# Patient Record
Sex: FEMALE | Race: BLACK OR AFRICAN AMERICAN | NOT HISPANIC OR LATINO | Employment: FULL TIME | URBAN - METROPOLITAN AREA
[De-identification: names, ages, dates, MRNs, and addresses within clinical notes are randomized per-mention and may not be internally consistent; named-entity substitution may affect disease eponyms.]

---

## 2017-04-08 ENCOUNTER — OFFICE VISIT (OUTPATIENT)
Dept: URGENT CARE | Facility: CLINIC | Age: 52
End: 2017-04-08
Payer: COMMERCIAL

## 2017-04-08 VITALS
WEIGHT: 233 LBS | BODY MASS INDEX: 38.82 KG/M2 | DIASTOLIC BLOOD PRESSURE: 66 MMHG | SYSTOLIC BLOOD PRESSURE: 122 MMHG | TEMPERATURE: 98 F | HEIGHT: 65 IN

## 2017-04-08 DIAGNOSIS — J45.20 ALLERGIC ASTHMA, MILD INTERMITTENT, UNCOMPLICATED: Primary | ICD-10-CM

## 2017-04-08 PROCEDURE — 1160F RVW MEDS BY RX/DR IN RCRD: CPT | Mod: S$GLB,,, | Performed by: PHYSICIAN ASSISTANT

## 2017-04-08 PROCEDURE — 99999 PR PBB SHADOW E&M-EST. PATIENT-LVL III: CPT | Mod: PBBFAC,,, | Performed by: PHYSICIAN ASSISTANT

## 2017-04-08 PROCEDURE — 99213 OFFICE O/P EST LOW 20 MIN: CPT | Mod: S$GLB,,, | Performed by: PHYSICIAN ASSISTANT

## 2017-04-08 RX ORDER — METHYLPREDNISOLONE 4 MG/1
TABLET ORAL
Qty: 1 PACKAGE | Refills: 0 | Status: SHIPPED | OUTPATIENT
Start: 2017-04-08 | End: 2018-03-02

## 2017-04-08 RX ORDER — LORATADINE 10 MG
10 TABLET,DISINTEGRATING ORAL DAILY
Qty: 30 TABLET | Refills: 11 | Status: SHIPPED | OUTPATIENT
Start: 2017-04-08 | End: 2023-05-10

## 2017-04-08 RX ORDER — ALBUTEROL SULFATE 90 UG/1
2 AEROSOL, METERED RESPIRATORY (INHALATION) EVERY 6 HOURS PRN
Qty: 18 G | Refills: 0 | Status: SHIPPED | OUTPATIENT
Start: 2017-04-08 | End: 2019-01-04

## 2017-04-08 RX ORDER — MONTELUKAST SODIUM 10 MG/1
10 TABLET ORAL NIGHTLY
Qty: 30 TABLET | Refills: 0 | Status: SHIPPED | OUTPATIENT
Start: 2017-04-08 | End: 2017-05-08

## 2017-04-08 RX ORDER — AZELASTINE HYDROCHLORIDE, FLUTICASONE PROPIONATE 137; 50 UG/1; UG/1
1 SPRAY, METERED NASAL 2 TIMES DAILY
Qty: 23 G | Refills: 1 | Status: SHIPPED | OUTPATIENT
Start: 2017-04-08 | End: 2017-04-22

## 2017-04-08 NOTE — MR AVS SNAPSHOT
[facility]  [address] [facility] [address] [place] [address]  Phone: [phone]  Fax: [phone]                  Jake Hoskins   2017 9:10 AM   Office Visit    Description:  Female : 1965   Provider:  Bulmaro Villagomez Jr., PAMarlineC   Department:  [facility]           Reason for Visit     Cough           Diagnoses this Visit        Comments    Allergic asthma, mild intermittent, uncomplicated    -  Primary            To Do List           Goals (5 Years of Data)     None       These Medications        Disp Refills Start End    azelastine-fluticasone 137-50 mcg/spray Spry nassal spray 23 g 1 2017    1 spray by Each Nare route 2 (two) times daily. - Each Nare    Pharmacy: [facility] [address] [place] [address] Ph #: [phone]       methylPREDNISolone (MEDROL DOSEPACK) 4 mg tablet 1 Package 0 2017     As directed    Pharmacy: [address] Ph #: [phone]       montelukast (SINGULAIR) 10 mg tablet 30 tablet 0 2017    Take 1 tablet (10 mg total) by mouth every evening. - Oral    Pharmacy: [facility] [address] [place] [address] Ph #: [phone]       loratadine (CLARITIN REDITABS) 10 mg dissolvable tablet 30 tablet 11 2017    Take 1 tablet (10 mg total) by mouth once daily. - Oral    Pharmacy: [facility] [address] [place] [address] Ph #: [phone]       albuterol 90 mcg/actuation inhaler 18 g 0 2017    Inhale 2 puffs into the lungs every 6 (six) hours as needed for Wheezing. - Inhalation    Pharmacy: [address] Ph #: [phone]         [facility]     [facility] Care [facility] -  Assistance  Unless otherwise directed by your provider, please contact [facility], our nurse care line that is available for  assistance.     Registered nurses in the [facility]  provide: appointment scheduling, clinical advisement, health education, and other advisory services.  Call: [phone] (toll free)               Medications           Message regarding Medications     Verify the changes and/or additions to your medication regime listed below are the same as discussed with your clinician today.  If any of these changes or additions are incorrect, please notify your healthcare provider.        START taking these NEW medications        Refills    azelastine-fluticasone 137-50 mcg/spray Spry nassal spray 1    Si spray by Each Nare route 2 (two) times daily.    Class: Normal    Route: Each Nare    methylPREDNISolone (MEDROL DOSEPACK) 4 mg tablet 0    Sig: As directed    Class: Normal    montelukast (SINGULAIR) 10 mg tablet 0    Sig: Take 1 tablet (10 mg total) by mouth every evening.    Class: Normal    Route: Oral    loratadine (CLARITIN REDITABS) 10 mg dissolvable tablet 11    Sig: Take 1 tablet (10 mg total) by mouth once daily.    Class: Normal    Route: Oral    albuterol 90 mcg/actuation inhaler 0    Sig: Inhale 2 puffs into the lungs every 6 (six) hours as needed for Wheezing.    Class: Normal    Route: Inhalation      STOP taking these medications     loratadine (CLARITIN) 10 mg tablet Take 10 mg by mouth daily as needed.            Verify that the below list of medications is an accurate representation of the medications you are currently taking.  If none reported, the list may be blank. If incorrect, please contact your healthcare provider. Carry this list with you in case of emergency.           Current Medications     ergocalciferol (ERGOCALCIFEROL) 50,000 unit Cap Take 1 capsule (50,000 Units total) by mouth every 7 days.    FERROUS SULFATE, DRIED (IRON, DRIED, ORAL) Take by mouth once daily.     ibuprofen (ADVIL,MOTRIN) 200 MG tablet Take 400 mg by mouth every 6 (six) hours as needed for Pain.    meloxicam (MOBIC) 7.5 MG tablet Take 1 tablet (7.5 mg total) by mouth  "once daily. Take 2 pills for 12 days and then only take as needed for pain.    MULTIVITS,CA,MINERALS/IRON/FA (ONE-A-DAY WOMENS FORMULA ORAL) Take by mouth once daily.     albuterol 90 mcg/actuation inhaler Inhale 2 puffs into the lungs every 6 (six) hours as needed for Wheezing.    azelastine-fluticasone 137-50 mcg/spray Spry nassal spray 1 spray by Each Nare route 2 (two) times daily.    loratadine (CLARITIN REDITABS) 10 mg dissolvable tablet Take 1 tablet (10 mg total) by mouth once daily.    methylPREDNISolone (MEDROL DOSEPACK) 4 mg tablet As directed    montelukast (SINGULAIR) 10 mg tablet Take 1 tablet (10 mg total) by mouth every evening.           Clinical Reference Information           Your Vitals Were     BP Temp Height Weight BMI    122/66 98.1 °F (36.7 °C) 5' 4.8" (1.646 m) 105.7 kg (233 lb 0.4 oz) 39.02 kg/m2      Blood Pressure          Most Recent Value    BP  122/66      Allergies as of 4/8/2017     Tramadol      Immunizations Administered on Date of Encounter - 4/8/2017     None      [facility] Sign-Up     Activating your [facility] account is as easy as 1-2-3!     1) Visit [email or web address], select Sign Up Now, enter this activation code and your date of birth, then select Next.  [email or web address]  Expires: 5/23/2017  9:38 AM      2) Create a username and password to use when you visit [facility] in the future and select a security question in case you lose your password and select Next.    3) Enter your e-mail address and click Sign Up!    Additional Information  If you have questions, please e-mail [email or web address] or call [phone] to talk to our [facility] staff. Remember, [facility] is NOT to be used for urgent needs. For medical emergencies, dial 911.         Language Assistance Services     ATTENTION: Language assistance services are available, free of charge. Please call [phone].      ATENCIÓN: Si dmitri milligan, tiene a corado disposición servicios gratuitos de asistencia lingüística. " Ángel villeda [phone].     ANDRA Ý: N?u b?n nói Ti?ng Vi?t, có các d?ch v? h? tr? ngôn ng? mi?n phí dành cho b?n. G?i s? [phone].         [facility] complies with applicable Federal civil rights laws and does not discriminate on the basis of race, color, national origin, age, disability, or sex.

## 2017-04-08 NOTE — PROGRESS NOTES
"Subjective:      Patient ID: Jake Hoskins is a 52 y.o. female.    Chief Complaint: Cough (conge)    HPI Comments: Mrs. Hoskins is a 52-year-old female presenting to the clinic with seasonal ALLERGIC rhinitis with increased coughing, eye irritation, wheezing, and clear rhinorrhea.    History of present illness reveals patient was in her usual state of health until earlier this month when her symptoms started mildly.  They have progressively gotten worse with coughing which increases in the supine position, excessive rhinorrhea, itchy watery eyes, and a scratchy sore throat.  Pertinent negatives include no fever, no thick discolored mucus, no chest pain or shortness of breath.    Patient does have a history of chronic seasonal ALLERGIES and does use Flonase and Claritin on a regular basis which has not been controlling her symptoms lately.      Review of Systems   HENT: Positive for congestion and postnasal drip.    Eyes: Positive for itching.   Respiratory: Positive for cough and wheezing.         She also states that she can't laugh without having a significant spasm of coughing.        Review of patient's allergies indicates:   Allergen Reactions    Tramadol Nausea And Vomiting       Past Medical History:   Diagnosis Date    Anemia     Plantar fasciitis of left foot        Objective:   /66  Temp 98.1 °F (36.7 °C)  Ht 5' 4.8" (1.646 m)  Wt 105.7 kg (233 lb 0.4 oz)  BMI 39.02 kg/m2    Physical Exam   Constitutional: She is oriented to person, place, and time. She appears well-developed and well-nourished.   HENT:   Head: Normocephalic.   Eyes: EOM are normal. Pupils are equal, round, and reactive to light. Right eye exhibits no discharge. Left eye exhibits no discharge. No scleral icterus.   Sclera is hyperemic, there is no discharge excessive tearing.   Neck: Normal range of motion. Neck supple. No JVD present. No tracheal deviation present. No thyromegaly present.   Cardiovascular: Normal rate, " regular rhythm and normal heart sounds.    No murmur heard.  Pulmonary/Chest: Effort normal. No stridor. She has wheezes.   Abdominal: Soft. Bowel sounds are normal. She exhibits no mass.   Musculoskeletal: Normal range of motion. She exhibits no edema.   Lymphadenopathy:     She has no cervical adenopathy.   Neurological: She is alert and oriented to person, place, and time. She exhibits normal muscle tone.   Skin: Skin is warm and dry.   Psychiatric: She has a normal mood and affect.   Nursing note and vitals reviewed.    Assessment:     1. Allergic asthma, mild intermittent, uncomplicated       Plan:   Jake Hoskins is seen today for   1. Allergic asthma, mild intermittent, uncomplicated      We have discussed the etiology and treatment options associated with the diagnosis as well as alternatives. She has elected the following treatments.     Allergic asthma, mild intermittent, uncomplicated  -     azelastine-fluticasone 137-50 mcg/spray Spry nassal spray; 1 spray by Each Nare route 2 (two) times daily.  Dispense: 23 g; Refill: 1  -     methylPREDNISolone (MEDROL DOSEPACK) 4 mg tablet; As directed  Dispense: 1 Package; Refill: 0  -     montelukast (SINGULAIR) 10 mg tablet; Take 1 tablet (10 mg total) by mouth every evening.  Dispense: 30 tablet; Refill: 0  -     loratadine (CLARITIN REDITABS) 10 mg dissolvable tablet; Take 1 tablet (10 mg total) by mouth once daily.  Dispense: 30 tablet; Refill: 11  -     albuterol 90 mcg/actuation inhaler; Inhale 2 puffs into the lungs every 6 (six) hours as needed for Wheezing.  Dispense: 18 g; Refill: 0        The patient was explained the above plan and given opportunity to ask questions. She understands, chooses and consents to this plan and accepts all the risks, which include but are not limited to the risks mentioned above. She understands the alternative of having no testing, interventions or treatments at this time. She left content and without further questions.

## 2017-08-21 ENCOUNTER — PATIENT OUTREACH (OUTPATIENT)
Dept: ADMINISTRATIVE | Facility: HOSPITAL | Age: 52
End: 2017-08-21

## 2017-08-21 NOTE — LETTER
August 21, 2017    Jake Hoskins  [address]   [address]  [facility] [address]             [facility]  [address]  [facility] [address]  Phone: [phone] Dear Jake Hoskins     In the spirit of maintaining your good health, our system indicates that you have not been seen in the office in over 12 months and due for a visit.     Our system indicates that you are due for the following:   Health Maintenance Due   Topic Date Due    Colonoscopy  11/20/2014    Mammogram  02/21/2015    Pap Smear with HPV Cotest  02/21/2017    Influenza Vaccine  08/01/2017       Leonie Oropeza MD would like you to schedule an appointment for an annual exam at your earliest convenience. If you have completed any of these health maintenance requirements at an outside facility, please contact our office so we may update your health record.    If your PRIMARY CARE PHYSICIAN has changed please contact your insurance company to reflect the correct physician.    If you have any issues or need assistance in scheduling this appointment, please call [phone]. Thank you for choosing [facility] for all your health care needs.     Gaby GRIJALVA LPN Care Coordinator  [facility]  [phone]

## 2018-03-02 ENCOUNTER — OFFICE VISIT (OUTPATIENT)
Dept: URGENT CARE | Facility: CLINIC | Age: 53
End: 2018-03-02
Payer: COMMERCIAL

## 2018-03-02 VITALS
TEMPERATURE: 98 F | BODY MASS INDEX: 41.52 KG/M2 | HEIGHT: 64 IN | HEART RATE: 68 BPM | DIASTOLIC BLOOD PRESSURE: 74 MMHG | OXYGEN SATURATION: 98 % | SYSTOLIC BLOOD PRESSURE: 133 MMHG | WEIGHT: 243.19 LBS

## 2018-03-02 DIAGNOSIS — J30.1 ACUTE SEASONAL ALLERGIC RHINITIS DUE TO POLLEN: Primary | ICD-10-CM

## 2018-03-02 DIAGNOSIS — R05.9 COUGH: ICD-10-CM

## 2018-03-02 PROCEDURE — 99214 OFFICE O/P EST MOD 30 MIN: CPT | Mod: S$GLB,,, | Performed by: NURSE PRACTITIONER

## 2018-03-02 PROCEDURE — 99999 PR PBB SHADOW E&M-EST. PATIENT-LVL III: CPT | Mod: PBBFAC,,, | Performed by: NURSE PRACTITIONER

## 2018-03-02 RX ORDER — FLUTICASONE PROPIONATE 50 MCG
2 SPRAY, SUSPENSION (ML) NASAL DAILY
Qty: 16 G | Refills: 1 | Status: SHIPPED | OUTPATIENT
Start: 2018-03-02 | End: 2019-07-05 | Stop reason: SDUPTHER

## 2018-03-02 RX ORDER — MONTELUKAST SODIUM 10 MG/1
10 TABLET ORAL NIGHTLY
Qty: 30 TABLET | Refills: 1 | Status: SHIPPED | OUTPATIENT
Start: 2018-03-02 | End: 2018-04-01

## 2018-03-02 RX ORDER — PROMETHAZINE HYDROCHLORIDE AND DEXTROMETHORPHAN HYDROBROMIDE 6.25; 15 MG/5ML; MG/5ML
5 SYRUP ORAL
Qty: 180 ML | Refills: 0 | Status: SHIPPED | OUTPATIENT
Start: 2018-03-02 | End: 2018-03-12

## 2018-03-02 NOTE — PATIENT INSTRUCTIONS
PLAN:   Advise increase p.o. fluids--at least 64 ounces of water/juice & rest  Meds: Flonase with Sensmist & Phenergan dm  Simple saline nasal wash  to irrigate sinuses and for congestion/runny nose.  Cool mist humidifier/vaporizer.  Practice good handwashing.  Mucinex for cough and chest congestion.  Tylenol or Ibuprofen for fever, headache and body aches.  Warm salt water gargles for throat comfort.  Chloraseptic spray or lozenges for throat comfort.  Advise follow with PCP  Advise go to ER if symptoms worsen or fail to improve with treatment.  Given work excuse

## 2018-03-02 NOTE — PROGRESS NOTES
CHIEF COMPLAINT/REASON FOR VISIT: nasal congestion, post nasal drip, sore throat, facial pressure    HISTORY OF PRESENT ILLNESS:  52 y/o female complains of  sneezing, runny nose, nasal congestion, postnasal drip of clear to yellow mucus with cough onset 1 week ago. Patient admits tried medications with little relief. Patient admits has bad allergies. Admits every year has increased allergies at this time. Patient denies shortness of breath, chest pain, dizziness, headache, ear pain, nausea, vomiting, diarrhea & no body aches.     Past Medical History:   Diagnosis Date    Anemia     Plantar fasciitis of left foot          .History reviewed. No pertinent surgical history.      Social History     Social History    Marital status:      Spouse name: N/A    Number of children: 2    Years of education: N/A     Occupational History    [employer]     Social History Main Topics    Smoking status: Never Smoker    Smokeless tobacco: Not on file    Alcohol use No    Drug use: No    Sexual activity: No     Other Topics Concern    Not on file     Social History Narrative    No narrative on file       Family History   Problem Relation Age of Onset    Hypertension Mother     Asthma Mother     Cancer Mother      unknown    Hypertension Father     Heart disease Father     Hypertension Sister          ROS:  GENERAL: reports no fever, chills.  SKIN: No rashes, itching or changes in color or texture of skin.  HEENT: reports sneezing, runny nose, nasal congestion, postnasal drip of clear to yellow mucus  CHEST: report cough with clear to yellow mucus.  CARDIOVASCULAR: Denies chest pain, shortness of breath  ABDOMEN: Appetite fine. No weight loss. .  MUSCULOSKELETAL: No joint stiffness or swelling. Denies back pain.  NEUROLOGIC: No history of seizures, paralysis, alteration of gait or coordination.  PSYCHIATRIC: Denies mood swings, depression or suicidal thoughts.    PE:   APPEARANCE: Well nourished, well  developed, in mild distress.   V/S: Reviewed.  SKIN: Normal skin turgor, no lesions.  HEENT: Turbinates pink,  minimal red pharynx, TM's poor light reflex bilaterally, no facial tenderness. Bilateral sclera injected, no d/c, no soft tissue swelling  CHEST: Lungs clear to auscultation. no wheezing  CARDIOVASCULAR: Regular rate and rhythm.  MUSCULOSKELETAL: Motor: 5/5 strength major flexors/extensors.  NEUROLOGIC: No sensory deficits. Gait & Posture: Normal, No cerebellar signs.  MENTAL STATUS: Patient alert, oriented x 3 & conversant.    PLAN:   Advise increase p.o. fluids--at least 64 ounces of water/juice & rest  Med's: Flonase with Sensmist, Singulair & Phenergan dm  Simple saline nasal wash  to irrigate sinuses and for congestion/runny nose.  Cool mist humidifier/vaporizer.  Practice good handwashing.  Mucinex for cough and chest congestion.  Tylenol or Ibuprofen for fever, headache and body aches.  Warm salt water gargles for throat comfort.  Chloraseptic spray or lozenges for throat comfort.  Advise follow with PCP  Advise go to ER if symptoms worsen or fail to improve with treatment.  Given work excuse    DIAGNOSIS:  Allergic rhinitis  Cough

## 2018-05-10 ENCOUNTER — OFFICE VISIT (OUTPATIENT)
Dept: PODIATRY | Facility: CLINIC | Age: 53
End: 2018-05-10
Payer: COMMERCIAL

## 2018-05-10 ENCOUNTER — HOSPITAL ENCOUNTER (OUTPATIENT)
Dept: RADIOLOGY | Facility: HOSPITAL | Age: 53
Discharge: HOME | End: 2018-05-10
Attending: PODIATRIST
Payer: COMMERCIAL

## 2018-05-10 VITALS — WEIGHT: 242.5 LBS | BODY MASS INDEX: 41.4 KG/M2 | HEIGHT: 64 IN

## 2018-05-10 DIAGNOSIS — M79.672 LEFT FOOT PAIN: Primary | ICD-10-CM

## 2018-05-10 DIAGNOSIS — M79.672 LEFT FOOT PAIN: ICD-10-CM

## 2018-05-10 DIAGNOSIS — M72.2 PLANTAR FASCIITIS: Primary | ICD-10-CM

## 2018-05-10 DIAGNOSIS — M62.462 GASTROCNEMIUS EQUINUS OF LEFT LOWER EXTREMITY: ICD-10-CM

## 2018-05-10 PROCEDURE — 73630 X-RAY EXAM OF FOOT: CPT | Mod: 26,LT,, | Performed by: RADIOLOGY

## 2018-05-10 PROCEDURE — 99999 PR PBB SHADOW E&M-EST. PATIENT-LVL II: CPT | Mod: PBBFAC,,, | Performed by: PODIATRIST

## 2018-05-10 PROCEDURE — 73630 X-RAY EXAM OF FOOT: CPT | Mod: TC,FY,PO,LT

## 2018-05-10 PROCEDURE — 3008F BODY MASS INDEX DOCD: CPT | Mod: CPTII,S$GLB,, | Performed by: PODIATRIST

## 2018-05-10 PROCEDURE — 99214 OFFICE O/P EST MOD 30 MIN: CPT | Mod: S$GLB,,, | Performed by: PODIATRIST

## 2018-05-10 RX ORDER — AZELASTINE HYDROCHLORIDE AND FLUTICASONE PROPIONATE 137; 50 UG/1; UG/1
SPRAY, METERED NASAL
COMMUNITY
Start: 2018-02-23 | End: 2018-07-13

## 2018-05-10 RX ORDER — METHYLPREDNISOLONE 4 MG/1
TABLET ORAL
Qty: 1 PACKAGE | Refills: 0 | Status: SHIPPED | OUTPATIENT
Start: 2018-05-10 | End: 2018-05-31

## 2018-05-10 NOTE — PROGRESS NOTES
PODIATRY  NOTE    CHIEF COMPLAINT  Chief Complaint   Patient presents with    Plantar Fasciitis     Left foot plantar fasciitis pain. Has been stretching and icing without relief. Pain 10/10 at worst       HPI  Jake Hoskins is a 53 y.o. female who complains of pain to the LEFT heel. Pt has been treated for this in the past. She has a 3 year history of left foot plantar fasciitis.   Pain is described as achy.  Pain is worst in the morning and after periods of sitting down and then getting up and walking again.   Pain is rated to be 10/10 by the patient on a 1-10 scale.   Pain has been present for >3 years  Pain is worsening.  Pain is aggravated by weight bearing.  Pain is improved by rest.  Previous treatments include: NSAIDs which resolved symptoms. She is currently wearing inserts and new balance. 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      PMH  Past Medical History:   Diagnosis Date    Anemia     Plantar fasciitis of left foot         PSH  History reviewed. No pertinent surgical history.     MEDS  Current Outpatient Prescriptions on File Prior to Visit   Medication Sig Dispense Refill    albuterol 90 mcg/actuation inhaler Inhale 2 puffs into the lungs every 6 (six) hours as needed for Wheezing. 18 g 0    ergocalciferol (ERGOCALCIFEROL) 50,000 unit Cap Take 1 capsule (50,000 Units total) by mouth every 7 days. 10 capsule 0    FERROUS SULFATE, DRIED (IRON, DRIED, ORAL) Take by mouth once daily.       fluticasone (FLONASE) 50 mcg/actuation nasal spray 2 sprays (100 mcg total) by Each Nare route once daily. 16 g 1    ibuprofen (ADVIL,MOTRIN) 200 MG tablet Take 400 mg by mouth every 6 (six) hours as needed for Pain.      loratadine (CLARITIN REDITABS) 10 mg dissolvable tablet Take 1 tablet (10 mg total) by mouth once daily. 30 tablet 11    MULTIVITS,CA,MINERALS/IRON/FA (ONE-A-DAY WOMENS FORMULA ORAL) Take by mouth once daily.       meloxicam (MOBIC) 7.5 MG tablet Take 1 tablet (7.5 mg  total) by mouth once daily. Take 2 pills for 12 days and then only take as needed for pain. 30 tablet 1     No current facility-administered medications on file prior to visit.       Medication List with Changes/Refills   Current Medications    ALBUTEROL 90 MCG/ACTUATION INHALER    Inhale 2 puffs into the lungs every 6 (six) hours as needed for Wheezing.    DYMISTA 137-50 MCG/SPRAY SPRY NASSAL SPRAY        ERGOCALCIFEROL (ERGOCALCIFEROL) 50,000 UNIT CAP    Take 1 capsule (50,000 Units total) by mouth every 7 days.    FERROUS SULFATE, DRIED (IRON, DRIED, ORAL)    Take by mouth once daily.     FLUTICASONE (FLONASE) 50 MCG/ACTUATION NASAL SPRAY    2 sprays (100 mcg total) by Each Nare route once daily.    IBUPROFEN (ADVIL,MOTRIN) 200 MG TABLET    Take 400 mg by mouth every 6 (six) hours as needed for Pain.    LORATADINE (CLARITIN REDITABS) 10 MG DISSOLVABLE TABLET    Take 1 tablet (10 mg total) by mouth once daily.    MELOXICAM (MOBIC) 7.5 MG TABLET    Take 1 tablet (7.5 mg total) by mouth once daily. Take 2 pills for 12 days and then only take as needed for pain.    MULTIVITS,CA,MINERALS/IRON/FA (ONE-A-DAY WOMENS FORMULA ORAL)    Take by mouth once daily.        ALLG  Review of patient's allergies indicates:   Allergen Reactions    Tramadol Nausea And Vomiting       SOCIAL Hx  Social History     Social History    Marital status:      Spouse name: N/A    Number of children: 2    Years of education: N/A     Occupational History    [employer]     Social History Main Topics    Smoking status: Never Smoker    Smokeless tobacco: Never Used    Alcohol use No    Drug use: No    Sexual activity: No     Other Topics Concern    None     Social History Narrative    None       FAM Hx  Family History   Problem Relation Age of Onset    Hypertension Mother     Asthma Mother     Cancer Mother         unknown    Hypertension Father     Heart disease Father     Hypertension Sister      REVIEW OF SYSTEMS  "  General: This patient is well-developed, well-nourished and appears stated age, well-oriented to person, place and time, and cooperative and pleasant on today's visit  Constitutional: Negative for chills and fever.   Respiratory: Negative for shortness of breath.    Cardiovascular: Negative for chest pain, palpitations, orthopnea  Gastrointestinal: Negative for diarrhea, nausea and vomiting.   Musculoskeletal: Positive for above noted in HPI  Skin: Negative for rash, skin, or nail changes   Neurological: Negative for tingling and sensory changes  Peripheral Vascular: no claudication or cyanosis  Psychiatric/Behavioral: Negative for altered mental status     OBJECTIVE:  Vitals:    05/10/18 0807   Weight: 110 kg (242 lb 8.1 oz)   Height: 5' 4" (1.626 m)   PainSc: 0-No pain     Lower Extremity Physical Exam     Vascular exam:   · Dorsalis pedis and posterior tibial pulses palpable 2/4 bilaterally.   · Capillary refill time immediate to the toes.   · Feet are warm to the touch.   · There are no varicosities, telangiectasias noted to bilateral foot and ankle regions.   · There are no ecchymoses noted to bilateral foot and ankle regions.   · There is no gross lower extremity edema.     Dermatologic exam:   · Skin moist with healthy texture and turgor.  · There are no open ulcerations, lacerations, or fissures to bilateral foot and ankle regions. There are no signs of infection as there is no erythema, no proximal-extending lymphangiitis, no fluctuance, or crepitus noted on palpation to bilateral foot and ankle regions.   · There is no interdigital maceration.   · There are no hyperkeratotic lesions noted to feet. Nails are well-trimmed.     Neurologic exam:  ·  Epicritic sensation is intact as the patient is able to sense light touch to bilateral foot and ankle regions.   · There is a negative Tinel's sign on percussion of the tibial nerve, dorsal cutaneous nerves, sural nerves of the LEFT foot.     Musculoskeletal " exam:   · Pain on palpation plantar medial tubercle of calcaneus, LEFT foot. No pain along plantar fascia bilaterally.   · Maximally pronated foot type  · RCSP eversion  · No pain with medial-lateral compression of calcaneus.  · 5/5 muscle strength in all 4 quadrants.         ASSESSMENT:   Plantar fasciitis - Left Foot    Gastrocnemius equinus of left lower extremity    Other orders  -     methylPREDNISolone (MEDROL DOSEPACK) 4 mg tablet; use as directed  Dispense: 1 Package; Refill: 0           PLAN:   1. The patient was examined and evaluated   2. Treatment options were discussed in detail; the patient elected to undergo conservative treatment. Discussed different treatment options for heel pain.   3. I gave written and verbal instructions on heel cord stretching and this was demonstrated for the patient. A theraband was also provided to the patient for stretching exercises. Recommendations were given for plantar fasciitis treatment including icing, stretching, arch supports, avoidance of barefoot walking, appropriate shoe wear, and strict compliance. Discussed importance of patient to perform stretching exercises.   4. Continue with accommodative insert and new balance shoe. Will benefit from Power steps.  I Discussed that plantar fasciitis typically resolves on its own in a variable amount of time. If no improvement, will proceed with cortisone injection. I also  discussed possible tx with SLC, PT, Dynasplint and custom orthotics.Surgical intervention is the last resort for plantar fasciitis.   5. Information was given regarding the patient's condition and prognosis. All the patient's questions were answered.   6. Return to clinic in 4-6 weeks for follow up evaluation, or sooner if symptoms worsen.   7. The patient is welcome to call or email with any questions or concerns at any time.     Report Electronically Signed By:  Kaylee Florian DPM   Podiatric Medicine & Surgery  [facility]  [place]  5/10/2018  8:39 AM

## 2018-06-15 ENCOUNTER — LAB VISIT (OUTPATIENT)
Dept: LAB | Facility: HOSPITAL | Age: 53
End: 2018-06-15
Attending: FAMILY MEDICINE
Payer: COMMERCIAL

## 2018-06-15 ENCOUNTER — OFFICE VISIT (OUTPATIENT)
Dept: INTERNAL MEDICINE | Facility: CLINIC | Age: 53
End: 2018-06-15
Payer: COMMERCIAL

## 2018-06-15 VITALS
SYSTOLIC BLOOD PRESSURE: 122 MMHG | OXYGEN SATURATION: 99 % | DIASTOLIC BLOOD PRESSURE: 84 MMHG | WEIGHT: 242.94 LBS | HEART RATE: 83 BPM | BODY MASS INDEX: 41.48 KG/M2 | TEMPERATURE: 98 F | RESPIRATION RATE: 18 BRPM | HEIGHT: 64 IN

## 2018-06-15 DIAGNOSIS — I10 ESSENTIAL HYPERTENSION: Primary | ICD-10-CM

## 2018-06-15 DIAGNOSIS — Z12.39 SCREENING FOR BREAST CANCER: ICD-10-CM

## 2018-06-15 DIAGNOSIS — Z00.00 WELLNESS EXAMINATION: ICD-10-CM

## 2018-06-15 DIAGNOSIS — Z12.11 SCREEN FOR COLON CANCER: ICD-10-CM

## 2018-06-15 DIAGNOSIS — Z23 NEED FOR VACCINATION: ICD-10-CM

## 2018-06-15 DIAGNOSIS — D50.9 IRON DEFICIENCY ANEMIA, UNSPECIFIED IRON DEFICIENCY ANEMIA TYPE: ICD-10-CM

## 2018-06-15 DIAGNOSIS — I10 ESSENTIAL HYPERTENSION: ICD-10-CM

## 2018-06-15 LAB
ANION GAP SERPL CALC-SCNC: 7 MMOL/L
BUN SERPL-MCNC: 12 MG/DL
CALCIUM SERPL-MCNC: 9.8 MG/DL
CHLORIDE SERPL-SCNC: 106 MMOL/L
CO2 SERPL-SCNC: 28 MMOL/L
CREAT SERPL-MCNC: 0.8 MG/DL
EST. GFR  (AFRICAN AMERICAN): >60 ML/MIN/1.73 M^2
EST. GFR  (NON AFRICAN AMERICAN): >60 ML/MIN/1.73 M^2
GLUCOSE SERPL-MCNC: 85 MG/DL
POTASSIUM SERPL-SCNC: 3.7 MMOL/L
SODIUM SERPL-SCNC: 141 MMOL/L

## 2018-06-15 PROCEDURE — 99203 OFFICE O/P NEW LOW 30 MIN: CPT | Mod: 25,S$GLB,, | Performed by: FAMILY MEDICINE

## 2018-06-15 PROCEDURE — 99386 PREV VISIT NEW AGE 40-64: CPT | Mod: 25,S$GLB,, | Performed by: FAMILY MEDICINE

## 2018-06-15 PROCEDURE — 36415 COLL VENOUS BLD VENIPUNCTURE: CPT | Mod: PO

## 2018-06-15 PROCEDURE — 3008F BODY MASS INDEX DOCD: CPT | Mod: CPTII,S$GLB,, | Performed by: FAMILY MEDICINE

## 2018-06-15 PROCEDURE — 3079F DIAST BP 80-89 MM HG: CPT | Mod: CPTII,S$GLB,, | Performed by: FAMILY MEDICINE

## 2018-06-15 PROCEDURE — 99999 PR PBB SHADOW E&M-EST. PATIENT-LVL V: CPT | Mod: PBBFAC,,, | Performed by: FAMILY MEDICINE

## 2018-06-15 PROCEDURE — 80048 BASIC METABOLIC PNL TOTAL CA: CPT

## 2018-06-15 PROCEDURE — 3074F SYST BP LT 130 MM HG: CPT | Mod: CPTII,S$GLB,, | Performed by: FAMILY MEDICINE

## 2018-06-15 RX ORDER — HYDROCHLOROTHIAZIDE 12.5 MG/1
12.5 CAPSULE ORAL DAILY
Qty: 30 CAPSULE | Refills: 11 | Status: SHIPPED | OUTPATIENT
Start: 2018-06-15 | End: 2019-07-05 | Stop reason: SDUPTHER

## 2018-06-15 NOTE — PATIENT INSTRUCTIONS
You have a new prescription for hydrochlorothiazide, which is a diuretic used to treat high blood pressure. Be sure to take it every day and monitor your home blood pressure readings if you can. The goal is 120/80 or less. Let us know if you feel symptoms of low blood pressure, such as lightheadedness or feeling faint. Be sure to eat a high potassium food each day to replace potassium that may be lost in the urine because of the medication. Examples include banana, avocado, apricots, sweet potatoes.    Tips for Using Less Salt    Most people with heart problems need to eat less salt (sodium). Reducing the amount of salt you eat may help control your blood pressure. The higher your blood pressure, the greater your risk for heart disease, stroke, blindness, and kidney problems.  At the store  · Make low-salt choices by reading labels carefully. Look for the total amount of sodium per serving.  · Use more fresh food. Buy more fruits and vegetables. Select lean meats, fish, and poultry.  · Use fewer frozen, canned, and packaged foods which often contain a lot of sodium.  · Use plain frozen vegetables without sauces or toppings. These products are often low- or no-sodium.  · Opt for reduced-sodium or no-salt-added versions of canned vegetables and soups.  In the kitchen  · Don't add salt to food when you're cooking. Season with flavorings such as onion, garlic, pepper, salt-free herbal blends, and lemon or lime juice.  · Use a cookbook containing low-salt recipes. It can give you ideas for tasty meals that are healthy for your heart.  · Sprinkle salt-free herbal blends on vegetables and meat.  · Drain and rinse canned foods, such as canned beans and vegetables, before cooking or eating.  Eating out  · Tell the  you're on a low-salt diet. Ask questions about the menu.  · Order fish, chicken, and meat broiled, baked, poached, or grilled without salt, butter, or breading.  · Use lemon, pepper, and salt-free herb mixes  to add flavor.  · Choose plain steamed rice, boiled noodles, and baked or boiled potatoes. Top potatoes with chives and a little sour cream.     Beware! Salt goes by many other names. Limit foods with these words listed as ingredients: salt, sodium, soy sauce, baking soda, baking powder, MSG, monosodium, Na (the chemical symbol for sodium). Some antacids are also high in salt.   Date Last Reviewed: 6/19/2015  © 7634-2413 [employer]. [address] [place] [address]. All rights reserved. This information is not intended as a substitute for professional medical care. Always follow your healthcare professional's instructions.        4 Steps for Eating Healthier  Changing the way you eat can improve your health. It can lower your cholesterol and blood pressure, and help you stay at a healthy weight. Your diet doesnt have to be bland and boring to be healthy. Just watch your calories and follow these steps:    1. Eat fewer unhealthy fats  · Choose more fish and lean meats instead of fatty cuts of meat.  · Skip butter and lard, and use less margarine.  · Pass on foods that have palm, coconut, or hydrogenated oils.  · Eat fewer high-fat dairy foods like cheese, ice cream, and whole milk.  · Get a heart-healthy cookbook and try some low-fat recipes.  2. Go light on salt  · Keep the saltshaker off the table.  · Limit high-salt ingredients, such as soy sauce, bouillon, and garlic salt.  · Instead of adding salt when cooking, season your food with herbs and flavorings. Try lemon, garlic, and onion.  · Limit convenience foods, such as boxed or canned foods and restaurant food.  · Read food labels and choose lower-sodium options.  3. Limit sugar  · Pause before you add sugars to pancakes, cereal, coffee, or tea. This includes white and brown table sugar, syrup, honey, and molasses. Cut your usual amount by half.  · Use non-sugar sweeteners. Stevia, aspartame, and sucralose can satisfy a sweet tooth  without adding calories.  · Swap out sugar-filled soda and other drinks. Buy sugar-free or low-calorie beverages. Remember water is always the best choice.  · Read labels and choose foods with less added sugar. Keep in mind that dairy foods and foods with fruit will have some natural sugar.  · Cut the sugar in recipes by 1/3 to 1/2. Boost the flavor with extracts like almond, vanilla, or orange. Or add spices such as cinnamon or nutmeg.  4. Eat more fiber  · Eat fresh fruits and vegetables every day.  · Boost your diet with whole grains. Go for oats, whole-grain rice, and bran.  · Add beans and lentils to your meals.  · Drink more water to match your fiber increase. This is to help prevent constipation.  Date Last Reviewed: 5/11/2015  © 7252-0997 [employer]. [address] [place] [address], [place] [address]. All rights reserved. This information is not intended as a substitute for professional medical care. Always follow your healthcare professional's instructions.

## 2018-06-15 NOTE — PROGRESS NOTES
Subjective:       Patient ID: Jake Hoskins is a 53 y.o. female.    Chief Complaint: Establish Care and Hypertension (Mild headaches)    52 yo female here to establish care. She would like to change doctors. She has been reluctant to do health maintenance recommended by her previous physician. She is overweight and realizes that she needs to eat better and exercise but finds this difficult to do. She works on her feet all day.     She also is concerned about her high blood pressure. She has noticed a mild headache with increased blood pressure readings at home. 143/88, 132/89; Manual reading by me today is 140/90. She has daily pain due to plantar fasciitis and heel spurs; she was treated with steroids with minimal improvement. She has a f/u coming up with podiatry to address this continued pain.       Hypertension   This is a new problem. The current episode started more than 1 month ago. The problem is uncontrolled. Associated symptoms include headaches. Pertinent negatives include no chest pain, peripheral edema, shortness of breath or sweats. There are no associated agents to hypertension. Past treatments include nothing. Compliance problems include diet and exercise.  There is no history of angina, kidney disease, CVA or PVD. There is no history of a hypertension causing med or a thyroid problem.      does not have any pertinent problems on file.  Past Medical History:   Diagnosis Date    Anemia     Plantar fasciitis of left foot      History reviewed. No pertinent surgical history.  Family History   Problem Relation Age of Onset    Hypertension Mother     Asthma Mother     Cancer Mother         unknown    Hypertension Father     Heart disease Father     Hypertension Sister      Social History     Social History    Marital status:      Spouse name: N/A    Number of children: 2    Years of education: N/A     Occupational History    [employer]     Social History Main Topics     Smoking status: Never Smoker    Smokeless tobacco: Never Used    Alcohol use No    Drug use: No    Sexual activity: No     Other Topics Concern    Not on file     Social History Narrative    No narrative on file     Review of Systems   Constitutional: Negative for appetite change, chills, fatigue and fever.   Respiratory: Negative for cough and shortness of breath.    Cardiovascular: Negative for chest pain and leg swelling.   Musculoskeletal: Positive for gait problem.        Foot pain   Neurological: Positive for headaches. Negative for syncope and light-headedness.   All other systems reviewed and are negative.      Objective:     Vitals:    06/15/18 1338   BP: 122/84   Pulse: 83   Resp: 18   Temp: 97.6 °F (36.4 °C)        Physical Exam   Constitutional: She is oriented to person, place, and time. She appears well-developed and well-nourished.   HENT:   Head: Normocephalic and atraumatic.   Right Ear: External ear normal.   Left Ear: External ear normal.   Nose: Nose normal.   Eyes: Conjunctivae and EOM are normal. Pupils are equal, round, and reactive to light. No scleral icterus.   Cardiovascular: Normal rate and regular rhythm.    Pulmonary/Chest: Effort normal and breath sounds normal.   Neurological: She is alert and oriented to person, place, and time.   Normal gait. No speech abnormality   Psychiatric: She has a normal mood and affect. Her behavior is normal. Judgment and thought content normal.   Nursing note and vitals reviewed.      Assessment:       1. Essential hypertension    2. Wellness examination    3. Screening for breast cancer    4. Screen for colon cancer    5. Need for vaccination    6. Iron deficiency anemia, unspecified iron deficiency anemia type        Plan:           Problem List Items Addressed This Visit     None      Visit Diagnoses     Essential hypertension    -  Primary    Relevant Medications    hydroCHLOROthiazide (MICROZIDE) 12.5 mg capsule    Other Relevant Orders     Basic metabolic panel    Comprehensive metabolic panel    Lipid panel    TSH    Ambulatory referral to Ophthalmology    Wellness examination        Relevant Orders    Ambulatory referral to Obstetrics / Gynecology    Vitamin D    Screening for breast cancer        Relevant Orders    Mammo Digital Screening Bilat with CAD    Screen for colon cancer        Relevant Orders    Case request GI: COLONOSCOPY (Completed)    Need for vaccination        Relevant Orders    Tdap Vaccine    Iron deficiency anemia, unspecified iron deficiency anemia type        Relevant Orders    CBC auto differential      Information given on low salt diet for hypertension as well as healthy eating.     Wellness: Health maintenance addressed today; will return for nurse visit to get tdap immunization. Fasting labs in 3 weeks prior to next visit. Reassess anemia.

## 2018-06-18 ENCOUNTER — TELEPHONE (OUTPATIENT)
Dept: INTERNAL MEDICINE | Facility: CLINIC | Age: 53
End: 2018-06-18

## 2018-06-22 ENCOUNTER — OFFICE VISIT (OUTPATIENT)
Dept: OBSTETRICS AND GYNECOLOGY | Facility: CLINIC | Age: 53
End: 2018-06-22
Payer: COMMERCIAL

## 2018-06-22 VITALS
BODY MASS INDEX: 41.05 KG/M2 | HEIGHT: 64 IN | DIASTOLIC BLOOD PRESSURE: 80 MMHG | WEIGHT: 240.44 LBS | SYSTOLIC BLOOD PRESSURE: 120 MMHG

## 2018-06-22 DIAGNOSIS — Z01.419 ENCOUNTER FOR GYNECOLOGICAL EXAMINATION WITHOUT ABNORMAL FINDING: Primary | ICD-10-CM

## 2018-06-22 DIAGNOSIS — N84.1 CERVICAL POLYP: ICD-10-CM

## 2018-06-22 PROCEDURE — 99386 PREV VISIT NEW AGE 40-64: CPT | Mod: 25,S$GLB,, | Performed by: NURSE PRACTITIONER

## 2018-06-22 PROCEDURE — 88305 TISSUE EXAM BY PATHOLOGIST: CPT | Mod: 26,,, | Performed by: PATHOLOGY

## 2018-06-22 PROCEDURE — 99999 PR PBB SHADOW E&M-EST. PATIENT-LVL IV: CPT | Mod: PBBFAC,,, | Performed by: NURSE PRACTITIONER

## 2018-06-22 PROCEDURE — 3079F DIAST BP 80-89 MM HG: CPT | Mod: CPTII,S$GLB,, | Performed by: NURSE PRACTITIONER

## 2018-06-22 PROCEDURE — 3074F SYST BP LT 130 MM HG: CPT | Mod: CPTII,S$GLB,, | Performed by: NURSE PRACTITIONER

## 2018-06-22 PROCEDURE — 88305 TISSUE EXAM BY PATHOLOGIST: CPT | Performed by: PATHOLOGY

## 2018-06-22 PROCEDURE — 57500 BIOPSY OF CERVIX: CPT | Mod: S$GLB,,, | Performed by: NURSE PRACTITIONER

## 2018-06-22 PROCEDURE — 88175 CYTOPATH C/V AUTO FLUID REDO: CPT

## 2018-06-22 NOTE — LETTER
June 22, 2018      Leonie Oropeza MD  [address] [place] [address]           [facility] - OB/ GYN  [address] [place] [address]  Phone: [phone]  Fax: [phone]          Patient: Jake Hoskins   MR Number: [id number]   YOB: 1965   Date of Visit: 6/22/2018       Dear Dr. Leonie Oropeza:    Thank you for referring Jake Hoskins to me for evaluation. Attached you will find relevant portions of my assessment and plan of care.    If you have questions, please do not hesitate to call me. I look forward to following Jake Hoskins along with you.    Sincerely,    Fabiola Calzada, NP    Enclosure  CC:  No Recipients    If you would like to receive this communication electronically, please contact [email or web address] or [phone] to request more information on [employer] [facility] access.    For providers and/or their staff who would like to refer a patient to [facility], please contact us through our one-stop-shop provider referral line, [facility], at [phone].    If you feel you have received this communication in error or would no longer like to receive these types of communications, please e-mail [email or web address]

## 2018-06-22 NOTE — PATIENT INSTRUCTIONS
The Range of Pap Test Results  When your Pap test is sent to the lab, the lab studies your cell samples and reports any abnormal cell changes. Your health care provider can discuss these changes with you. In some cases, an abnormal Pap test is due to an infection. More serious cell changes range from dysplasia to cancer. Talk to your health care provider about your Pap test.    Normal results  Cervical cells, even normal ones, are always changing. As they mature, normal squamous cells move from deeper layers within the cervix. Over time, these cells flatten and cover the surface of the cervix. Within the cervical canal, the cells are different. These glandular cells are taller and not as flat as the cells on the surface of the cervix. When a Pap test sample shows healthy cells of both types, the results are negative. Keep having Pap tests as often as directed.  Abnormal results  A positive Pap test result means some cells in the sample showed abnormal changes. These results are grouped by the type of cell change and the location, or extent, of the changes. Depending on the results, you may need further testing.  · Inflammation: Noncancerous changes are present. They may be due to normal cell repair. Or, they may be caused by an infection, such as HPV or yeast. Further testing may be needed. (Also called reactive cellular changes.)  · Atypical squamous cells: Test results are unclear. Cells on the surface of the cervix show changes, but their significance is not yet known. Testing for HPV and other sexually transmitted infections (STIs) may be needed. Treatment may be required. (Reported as ASC-US or ASC-H.)  · Atypical glandular cells: Cells lining the cervical canal show abnormal changes. Further testing is likely. You may also have treatment to destroy or remove problem cells. (Reported as AGC.)  · Mild dysplasia: Cells show distinct changes. More testing or HPV typing may be done. You may also have treatment to  destroy or remove problem cells. (Reported as low-grade KULWINDER or JO 1.)  · Moderate to severe dysplasia: Cells show precancerous changes. Or, noninvasive cancer (carcinoma in situ) may be present. Treatment to destroy or remove problem cells is likely. (Reported as high-grade KULWINDER or JO 2 or JO 3.)  · Cancer: Different types of cancer may be detected by your Pap test. More tests to assess the cancer's extent are likely. The type of treatment will depend on the test results and other factors, such as age and health history. (Reported as squamous cell carcinoma, endocervical adenocarcinoma in situ, or adenocarcinoma.)  Date Last Reviewed: 5/12/2015  © 9804-8219 [employer]. [address] [place] [address], [place] [address]. All rights reserved. This information is not intended as a substitute for professional medical care. Always follow your healthcare professional's instructions.        Clinical Breast Exam    Many health organizations recommend a yearly clinical breast exam. This exam may be done by a gynecologist, family healthcare provider, nurse practitioner, or specially trained nurse. Yearly breast exams help to make sure that breast conditions are found early.  Your healthcare providers role  A healthcare professional knows the tests and follow-up care needed if a problem is found. Your clinical exam is also a great time to ask questions about breast self-exams. You can find out if youre checking your breasts in the best way. Or you may want to ask how pregnancy, breast implants, or breast reduction surgery affect the way you should check your breasts.  Diagnostic tests  If a clinical exam reveals a breast change, you may have other tests to find out more. These tests may include:  · Mammography. A low-dose X-ray of your breast tissue.  · Ultrasound. An imaging test that uses sound waves to create images of your breast.  · Biopsy. A small amount of breast tissue is removed by needle or by a cut  (incision). The tissue is then checked under a microscope.  Guidelines for having clinical breast exams  The American College of Obstetricians and Gynecologists recommends that starting at age 29, you should have a clinical breast exam every 1 to 3 years. After age 40, have a clinical breast exam each year. If youre at higher risk for breast cancer, you may need exams more often. Risk factors for breast cancer may include:  · Being over 50 or postmenopausal  · Having a family history of breast cancer  · Having the BRCA1 or BRCA2 gene mutation or certain other gene mutations  · Having more menstrual periods due to starting menstruation early (before age 12) or having a late menopause (after age 55)  · Having no pregnancies  · Having a first pregnancy after age 30  · Being obese  · Having a history of radiation treatment to your chest area  · Exposure to SALMA during your mother's pregnancy  · Not being active  · Drinking too much alcohol  · Having dense breast tissue  · Taking hormone therapy after menopause  Other health organizations have different recommendations. Talk to your healthcare provider about what is best for you.   Date Last Reviewed: 8/13/2015 © 2000-2017 [employer]. [address] [place] [address]. All rights reserved. This information is not intended as a substitute for professional medical care. Always follow your healthcare professional's instructions.        Breast Health: Breast Self-Awareness  What is breast self-awareness?  Breast self-awareness is knowing how your breasts normally look and feel. Your breasts change as you go through different stages of your life. So its important to learn what is normal for your breasts. Breast self-awareness helps you notice any changes in your breasts right away. Report any changes to your healthcare provider.  Why is breast self-awareness important?  Many experts now say that women should focus on breast self-awareness instead of  doing a breast self-examination (BSE). These experts include the American Cancer Society, the U.S. Preventive Services Task Force, and the American Congress of Obstetricians and Gynecologists. Some experts even advise not teaching women to do a BSE. Thats because research hasnt shown a clear benefit to doing BSEs.  Breast self-awareness is different than a BSE. Breast self-awareness isnt about following a certain method and schedule. Its about knowing what's normal for your breasts. That way you can notice even small changes right away. If you see any changes, report them to your healthcare provider.  Changes to look for  Call your healthcare provider if you find any changes in your breasts that concern you. These changes may include:  · A lump  · Nipple discharge other than breast milk, especially a bloody discharge  · Swelling  · A change in size or shape  · Skin irritation, such as redness, thickening, or dimpling of the skin  · Swollen lymph nodes in the armpit  · Nipple problems, such as pain or redness  If you find a lump  Contact your provider if you find lumpiness in one breast, feel something different in the tissue, or feel a definite lump. Sometimes lumpiness may be due to menstrual changes. But there may be reason for concern.  Your provider may want to see you right away if you have:  · Nipple discharge that is bloody  · Skin changes on your breast, such as dimpling or puckering  Its normal to be upset if you find a lump. But its important to contact your provider right away. Remember that most breast lumps are benign. This means they are not cancer.  Date Last Reviewed: 8/10/2015  © 8230-1848 [employer]. [address] [place] [address], [place] [address]. All rights reserved. This information is not intended as a substitute for professional medical care. Always follow your healthcare professional's instructions.

## 2018-06-22 NOTE — PROGRESS NOTES
"CC: Well woman exam    Jake Hoskins is a 53 y.o. female  presents for well woman exam.  LMP: Patient's last menstrual period was 06/15/2017 (lmp unknown)..  No issues, problems, or complaints.    MMG in July.     Past Medical History:   Diagnosis Date    Anemia     Plantar fasciitis of left foot      History reviewed. No pertinent surgical history.  Social History     Social History    Marital status:      Spouse name: N/A    Number of children: 2    Years of education: N/A     Occupational History    [employer]     Social History Main Topics    Smoking status: Never Smoker    Smokeless tobacco: Never Used    Alcohol use No    Drug use: No    Sexual activity: No     Other Topics Concern    Not on file     Social History Narrative    No narrative on file     Family History   Problem Relation Age of Onset    Hypertension Mother     Asthma Mother     Cancer Mother         unknown    Hypertension Father     Heart disease Father     Hypertension Sister      OB History      Para Term  AB Living    2 2 2     2    SAB TAB Ectopic Multiple Live Births            2          /80   Ht 5' 4" (1.626 m)   Wt 109.1 kg (240 lb 6.6 oz)   LMP 06/15/2017 (LMP Unknown)   BMI 41.27 kg/m²       ROS:  GENERAL: Denies weight gain or weight loss. Feeling well overall.   SKIN: Denies rash or lesions.   HEAD: Denies head injury or headache.   NODES: Denies enlarged lymph nodes.   CHEST: Denies chest pain or shortness of breath.   CARDIOVASCULAR: Denies palpitations or left sided chest pain.   ABDOMEN: No abdominal pain, constipation, diarrhea, nausea, vomiting or rectal bleeding.   URINARY: No frequency, dysuria, hematuria, or burning on urination.  REPRODUCTIVE: See HPI.   BREASTS: The patient performs breast self-examination and denies pain, lumps, or nipple discharge.   HEMATOLOGIC: No easy bruisability or excessive bleeding.   MUSCULOSKELETAL: Denies joint pain or " swelling.   NEUROLOGIC: Denies syncope or weakness.   PSYCHIATRIC: Denies depression, anxiety or mood swings.    PHYSICAL EXAM:  APPEARANCE: Well nourished, well developed, in no acute distress.  AFFECT: WNL, alert and oriented x 3  SKIN: No acne or hirsutism  NECK: Neck symmetric without masses or thyromegaly  NODES: No inguinal, cervical, axillary, or femoral lymph node enlargement  CHEST: Good respiratory effect  ABDOMEN: Soft.  No tenderness or masses.  No hepatosplenomegaly.  No hernias.  BREASTS: Symmetrical, no skin changes or visible lesions.  No palpable masses, nipple discharge bilaterally.  PELVIC: Normal external genitalia without lesions.  Normal hair distribution.  Adequate perineal body, normal urethral meatus.  Vagina moist and well rugated without lesions or discharge.  Cervix pink - endocervical polyp was noted - easily removed with ring forceps and mild bleeding cauterized with moncells, otherwise without lesions, discharge or tenderness.  No significant cystocele or rectocele.  Bimanual exam shows uterus to be normal size, regular, mobile and nontender.  Adnexa without masses or tenderness.    EXTREMITIES: No edema.  Physical Exam    1. Encounter for gynecological examination without abnormal finding  Liquid-based pap smear, screening   2. Cervical polyp  Tissue Specimen To Pathology, Obstetrics/Gynecology    Biopsy of cervix    AND PLAN:    Patient was counseled today on A.C.S. Pap guidelines and recommendations for yearly pelvic exams, mammograms and monthly self breast exams; to see her PCP for other health maintenance.

## 2018-06-25 ENCOUNTER — DOCUMENTATION ONLY (OUTPATIENT)
Dept: ENDOSCOPY | Facility: HOSPITAL | Age: 53
End: 2018-06-25

## 2018-06-27 ENCOUNTER — TELEPHONE (OUTPATIENT)
Dept: INTERNAL MEDICINE | Facility: CLINIC | Age: 53
End: 2018-06-27

## 2018-06-27 NOTE — TELEPHONE ENCOUNTER
----- Message from Rox Shea MD sent at 6/15/2018  5:01 PM CDT -----  I'm trying to get Ms. Joseph's health maintenance up to date. Will you call her to let her know I've referred her for an eye exam. Please schedule her for a nurse visit to get her Tdap. Please also schedule a FASTING lab draw in about 3 weeks prior to her f/u appt with me. Thanks!

## 2018-06-27 NOTE — TELEPHONE ENCOUNTER
Called pt to inform her  Dr. Shae referred  her for an eye exam. Please schedule her for a nurse visit to get her Tdap. Please also schedule a FASTING lab draw in about 3 weeks prior to her f/u appt with jarad KIM and scheduled tdap on her next visit . /stb

## 2018-06-28 ENCOUNTER — PATIENT MESSAGE (OUTPATIENT)
Dept: OBSTETRICS AND GYNECOLOGY | Facility: CLINIC | Age: 53
End: 2018-06-28

## 2018-07-06 ENCOUNTER — HOSPITAL ENCOUNTER (OUTPATIENT)
Dept: RADIOLOGY | Facility: HOSPITAL | Age: 53
Discharge: HOME | End: 2018-07-06
Attending: FAMILY MEDICINE
Payer: COMMERCIAL

## 2018-07-06 VITALS — BODY MASS INDEX: 40.97 KG/M2 | HEIGHT: 64 IN | WEIGHT: 240 LBS

## 2018-07-06 DIAGNOSIS — Z12.39 SCREENING FOR BREAST CANCER: ICD-10-CM

## 2018-07-06 PROCEDURE — 77067 SCR MAMMO BI INCL CAD: CPT | Mod: TC,PO

## 2018-07-06 PROCEDURE — 77067 SCR MAMMO BI INCL CAD: CPT | Mod: 26,,, | Performed by: RADIOLOGY

## 2018-07-09 ENCOUNTER — PATIENT MESSAGE (OUTPATIENT)
Dept: OBSTETRICS AND GYNECOLOGY | Facility: CLINIC | Age: 53
End: 2018-07-09

## 2018-07-13 ENCOUNTER — OFFICE VISIT (OUTPATIENT)
Dept: INTERNAL MEDICINE | Facility: CLINIC | Age: 53
End: 2018-07-13
Payer: COMMERCIAL

## 2018-07-13 VITALS
BODY MASS INDEX: 41.33 KG/M2 | TEMPERATURE: 99 F | OXYGEN SATURATION: 96 % | HEIGHT: 64 IN | WEIGHT: 242.06 LBS | DIASTOLIC BLOOD PRESSURE: 86 MMHG | SYSTOLIC BLOOD PRESSURE: 124 MMHG | HEART RATE: 74 BPM

## 2018-07-13 DIAGNOSIS — D64.9 ANEMIA, UNSPECIFIED TYPE: ICD-10-CM

## 2018-07-13 DIAGNOSIS — Z12.11 ENCOUNTER FOR COLORECTAL CANCER SCREENING: ICD-10-CM

## 2018-07-13 DIAGNOSIS — Z12.12 ENCOUNTER FOR COLORECTAL CANCER SCREENING: ICD-10-CM

## 2018-07-13 DIAGNOSIS — I10 ESSENTIAL HYPERTENSION: Primary | ICD-10-CM

## 2018-07-13 DIAGNOSIS — N84.1 CERVICAL POLYP: ICD-10-CM

## 2018-07-13 DIAGNOSIS — M72.2 PLANTAR FASCIITIS OF LEFT FOOT: ICD-10-CM

## 2018-07-13 PROCEDURE — 3008F BODY MASS INDEX DOCD: CPT | Mod: CPTII,S$GLB,, | Performed by: FAMILY MEDICINE

## 2018-07-13 PROCEDURE — 90471 IMMUNIZATION ADMIN: CPT | Mod: S$GLB,,, | Performed by: FAMILY MEDICINE

## 2018-07-13 PROCEDURE — 99999 PR PBB SHADOW E&M-EST. PATIENT-LVL IV: CPT | Mod: PBBFAC,,, | Performed by: FAMILY MEDICINE

## 2018-07-13 PROCEDURE — 90715 TDAP VACCINE 7 YRS/> IM: CPT | Mod: S$GLB,,, | Performed by: FAMILY MEDICINE

## 2018-07-13 PROCEDURE — 3074F SYST BP LT 130 MM HG: CPT | Mod: CPTII,S$GLB,, | Performed by: FAMILY MEDICINE

## 2018-07-13 PROCEDURE — 99214 OFFICE O/P EST MOD 30 MIN: CPT | Mod: 25,S$GLB,, | Performed by: FAMILY MEDICINE

## 2018-07-13 PROCEDURE — 3079F DIAST BP 80-89 MM HG: CPT | Mod: CPTII,S$GLB,, | Performed by: FAMILY MEDICINE

## 2018-07-13 RX ORDER — SODIUM, POTASSIUM,MAG SULFATES 17.5-3.13G
1 SOLUTION, RECONSTITUTED, ORAL ORAL SEE ADMIN INSTRUCTIONS
Qty: 1 KIT | Refills: 0 | Status: SHIPPED | OUTPATIENT
Start: 2018-07-13 | End: 2018-07-15

## 2018-07-13 NOTE — PROGRESS NOTES
Subjective:       Patient ID: Jake Hoskins is a 53 y.o. female.    Chief Complaint: Follow-up    52 yo female here for 1 month follow up. She has had pap smear, blood work, mammogram. Still needs c-scope and eye visit scheduled. She will get tdap today. She has been doing well on her blood pressure medication, no bothersome side effects. Potassium running a little low but she will work on increasing potassium in her diet. She has been working on lowering the salt in her diet. Blood pressure much improved with medication.         does not have any pertinent problems on file.  Past Medical History:   Diagnosis Date    Anemia     Plantar fasciitis of left foot      History reviewed. No pertinent surgical history.  Family History   Problem Relation Age of Onset    Hypertension Mother     Asthma Mother     Cancer Mother         unknown    Hypertension Father     Heart disease Father     Hypertension Sister      Social History     Social History    Marital status:      Spouse name: N/A    Number of children: 2    Years of education: N/A     Occupational History    [employer]     Social History Main Topics    Smoking status: Never Smoker    Smokeless tobacco: Never Used    Alcohol use No    Drug use: No    Sexual activity: No     Other Topics Concern    Not on file     Social History Narrative    No narrative on file     Review of Systems   Constitutional: Negative for fatigue and unexpected weight change.   HENT: Negative for hearing loss and sore throat.    Eyes: Negative for pain and visual disturbance.   Respiratory: Negative for cough and shortness of breath.    Cardiovascular: Negative for chest pain and palpitations.   Gastrointestinal: Negative for abdominal pain, constipation and diarrhea.   Genitourinary: Negative for difficulty urinating, dysuria and vaginal discharge.   Musculoskeletal: Negative for arthralgias and myalgias.   Skin: Negative for rash and wound.    Neurological: Negative for light-headedness and headaches.   Hematological: Negative.        Objective:     Vitals:    07/13/18 1305   BP: 124/86   Pulse: 74   Temp: 98.7 °F (37.1 °C)        Physical Exam   Constitutional: She is oriented to person, place, and time. She appears well-developed and well-nourished.   HENT:   Head: Normocephalic and atraumatic.   Eyes: EOM are normal. Pupils are equal, round, and reactive to light.   Neck: Normal range of motion. Neck supple.   Cardiovascular: Normal rate and regular rhythm.    Pulmonary/Chest: Effort normal and breath sounds normal.   Musculoskeletal: Normal range of motion. She exhibits no deformity.   Neurological: She is alert and oriented to person, place, and time.   Skin: Skin is warm and dry.   Psychiatric: She has a normal mood and affect. Her behavior is normal.   Nursing note and vitals reviewed.      Assessment:       1. Essential hypertension    2. Anemia, unspecified type    3. Plantar fasciitis of left foot    4. Cervical polyp        Plan:           Problem List Items Addressed This Visit     Anemia    Current Assessment & Plan     Stable; continue daily iron         Plantar fasciitis of left foot    Overview     Waiting on appointment with podiatry         Essential hypertension - Primary (Chronic)    Overview     Controlled on HCTZ         Cervical polyp    Overview     Removed 6/2018; benign           Tdap today; schedule c-scope and eye visit as well as podiatry appointment. She evidently missed phone call from endoscopy . RTC 6 months for check up with me.

## 2018-07-13 NOTE — PATIENT INSTRUCTIONS
You need to schedule appointments with GI for your colonoscopy, your eye appointment, and your podiatry appointment.

## 2019-01-04 ENCOUNTER — HOSPITAL ENCOUNTER (OUTPATIENT)
Dept: RADIOLOGY | Facility: HOSPITAL | Age: 54
Discharge: HOME | End: 2019-01-04
Attending: NURSE PRACTITIONER
Payer: COMMERCIAL

## 2019-01-04 ENCOUNTER — OFFICE VISIT (OUTPATIENT)
Dept: INTERNAL MEDICINE | Facility: CLINIC | Age: 54
End: 2019-01-04
Payer: COMMERCIAL

## 2019-01-04 VITALS
TEMPERATURE: 97 F | OXYGEN SATURATION: 99 % | HEIGHT: 64 IN | DIASTOLIC BLOOD PRESSURE: 94 MMHG | HEART RATE: 67 BPM | BODY MASS INDEX: 40.12 KG/M2 | WEIGHT: 235 LBS | SYSTOLIC BLOOD PRESSURE: 124 MMHG

## 2019-01-04 DIAGNOSIS — M25.562 ACUTE PAIN OF LEFT KNEE: Primary | ICD-10-CM

## 2019-01-04 DIAGNOSIS — M25.562 ACUTE PAIN OF LEFT KNEE: ICD-10-CM

## 2019-01-04 DIAGNOSIS — I10 ESSENTIAL HYPERTENSION: Chronic | ICD-10-CM

## 2019-01-04 PROCEDURE — 3008F PR BODY MASS INDEX (BMI) DOCUMENTED: ICD-10-PCS | Mod: CPTII,S$GLB,, | Performed by: NURSE PRACTITIONER

## 2019-01-04 PROCEDURE — 73562 X-RAY EXAM OF KNEE 3: CPT | Mod: 26,RT,, | Performed by: RADIOLOGY

## 2019-01-04 PROCEDURE — 99214 OFFICE O/P EST MOD 30 MIN: CPT | Mod: 25,S$GLB,, | Performed by: NURSE PRACTITIONER

## 2019-01-04 PROCEDURE — 99999 PR PBB SHADOW E&M-EST. PATIENT-LVL IV: ICD-10-PCS | Mod: PBBFAC,,, | Performed by: NURSE PRACTITIONER

## 2019-01-04 PROCEDURE — 99214 PR OFFICE/OUTPT VISIT, EST, LEVL IV, 30-39 MIN: ICD-10-PCS | Mod: 25,S$GLB,, | Performed by: NURSE PRACTITIONER

## 2019-01-04 PROCEDURE — 3080F DIAST BP >= 90 MM HG: CPT | Mod: CPTII,S$GLB,, | Performed by: NURSE PRACTITIONER

## 2019-01-04 PROCEDURE — 96372 THER/PROPH/DIAG INJ SC/IM: CPT | Mod: 59,S$GLB,, | Performed by: NURSE PRACTITIONER

## 2019-01-04 PROCEDURE — 99999 PR PBB SHADOW E&M-EST. PATIENT-LVL IV: CPT | Mod: PBBFAC,,, | Performed by: NURSE PRACTITIONER

## 2019-01-04 PROCEDURE — 73562 XR KNEE ORTHO BILAT: ICD-10-PCS | Mod: 26,RT,, | Performed by: RADIOLOGY

## 2019-01-04 PROCEDURE — 3074F SYST BP LT 130 MM HG: CPT | Mod: CPTII,S$GLB,, | Performed by: NURSE PRACTITIONER

## 2019-01-04 PROCEDURE — 3074F PR MOST RECENT SYSTOLIC BLOOD PRESSURE < 130 MM HG: ICD-10-PCS | Mod: CPTII,S$GLB,, | Performed by: NURSE PRACTITIONER

## 2019-01-04 PROCEDURE — 73562 X-RAY EXAM OF KNEE 3: CPT | Mod: 26,LT,, | Performed by: RADIOLOGY

## 2019-01-04 PROCEDURE — 3080F PR MOST RECENT DIASTOLIC BLOOD PRESSURE >= 90 MM HG: ICD-10-PCS | Mod: CPTII,S$GLB,, | Performed by: NURSE PRACTITIONER

## 2019-01-04 PROCEDURE — 73562 X-RAY EXAM OF KNEE 3: CPT | Mod: TC,50,FY,PO

## 2019-01-04 PROCEDURE — 96372 PR INJECTION,THERAP/PROPH/DIAG2ST, IM OR SUBCUT: ICD-10-PCS | Mod: 59,S$GLB,, | Performed by: NURSE PRACTITIONER

## 2019-01-04 PROCEDURE — 3008F BODY MASS INDEX DOCD: CPT | Mod: CPTII,S$GLB,, | Performed by: NURSE PRACTITIONER

## 2019-01-04 RX ORDER — MONTELUKAST SODIUM 10 MG/1
10 TABLET ORAL DAILY
COMMUNITY
Start: 2018-11-21 | End: 2019-07-05 | Stop reason: SDUPTHER

## 2019-01-04 RX ORDER — DEXTROMETHORPHAN HYDROBROMIDE, GUAIFENESIN 5; 100 MG/5ML; MG/5ML
650 LIQUID ORAL EVERY 8 HOURS
Status: ON HOLD | COMMUNITY
End: 2021-05-18 | Stop reason: HOSPADM

## 2019-01-04 RX ORDER — KETOROLAC TROMETHAMINE 30 MG/ML
30 INJECTION, SOLUTION INTRAMUSCULAR; INTRAVENOUS
Status: COMPLETED | OUTPATIENT
Start: 2019-01-04 | End: 2019-01-04

## 2019-01-04 RX ORDER — MELOXICAM 15 MG/1
15 TABLET ORAL DAILY
Qty: 14 TABLET | Refills: 0 | Status: SHIPPED | OUTPATIENT
Start: 2019-01-04 | End: 2019-04-30

## 2019-01-04 RX ADMIN — KETOROLAC TROMETHAMINE 30 MG: 30 INJECTION, SOLUTION INTRAMUSCULAR; INTRAVENOUS at 04:01

## 2019-01-04 NOTE — PATIENT INSTRUCTIONS
RICE     Rest an injury, elevate it, and use ice and compression as directed.   RICE stands for rest, ice, compression, and elevation. These can limit pain and swelling after an injury. RICE may be recommended to help treat fractures, sprains, strains, and bruises or bumps.   Home care  The following explain the details of RICE:  · Rest. Limit the use of the injured body part. This helps prevent further damage to the body part and gives it time to heal. In some cases, you may need a sling, brace, splint, or cast to help keep the body part still until it has healed.  · Ice. Applying ice right after an injury helps relieve pain and swelling. Wrap a bag of ice in a thin towel. Then, place it over the injured area. Do this for 10 to 15 minutes every 3 to 4 hours. Continue for the next 1 to 3 days or until your symptoms improve. Never put ice directly on your skin or ice an area longer than 15 minutes at a time.  · Compression. Putting pressure on an injury helps reduce swelling and provides support. Wrap the injured area firmly with an elastic bandage/wrap. Make sure not to wrap the bandage too tightly or you will cut off blood flow to the injured area. If your bandage loosens, rewrap it.  · Elevation. Keeping an injury raised above the level of your heart reduces swelling, pain, and throbbing. For instance, if you have a broken leg, it may help to rest your leg on several pillows when sitting or lying down. Try to keep the injured area elevated for at least 2 to 3 hours per day.  Follow-up care  Follow up with your healthcare provider, or as advised.  When to seek medical advice  Call your healthcare provider right away if any of these occur:  · Fever of 100.4°F (38°C) or higher, or as directed by your healthcare provider  · Increased pain or swelling in the injured body part  · Injured body part becomes cold, blue, numb, or tingly  · Signs of infection. These include warmth in the skin, redness, drainage, or bad  smell coming from the injured body part.  Date Last Reviewed: 1/18/2016  © 3022-6308 [employer]. [address] [place] [address], [place] [address]. All rights reserved. This information is not intended as a substitute for professional medical care. Always follow your healthcare professional's instructions.

## 2019-01-04 NOTE — PROGRESS NOTES
Patient was ordered a Ketorolac 30 mg injection per Luz Gilliam NP. Patient was advised to wait in the clinic 15 minutes after injection. While giving injection patient slightly jumped but otherwise tolerated well. Patient complained of a little discomfort after I advised her that's the medicene, Patient will come back down after knee x-ray to reassess pain.

## 2019-01-04 NOTE — PROGRESS NOTES
Subjective:       Patient ID: Jake Hoskins is a 53 y.o. female.    Chief Complaint: Knee Pain    Knee Pain    There was no injury mechanism. The pain is present in the left knee. The quality of the pain is described as aching. The pain is at a severity of 4/10. The pain is moderate. The pain has been fluctuating since onset. Associated symptoms include a loss of motion. Pertinent negatives include no inability to bear weight, loss of sensation, muscle weakness, numbness or tingling. She reports no foreign bodies present. The symptoms are aggravated by movement and weight bearing. She has tried elevation, immobilization, rest, acetaminophen and NSAIDs for the symptoms. The treatment provided mild relief.           Past Medical History:   Diagnosis Date    Allergy     Anemia     Plantar fasciitis of left foot      History reviewed. No pertinent surgical history.  History reviewed. No pertinent surgical history.  Social History     Socioeconomic History    Marital status:      Spouse name: Not on file    Number of children: 2    Years of education: Not on file    Highest education level: Not on file   Social Needs    Financial resource strain: Not on file    Food insecurity - worry: Not on file    Food insecurity - inability: Not on file    Transportation needs - medical: Not on file    Transportation needs - non-medical: Not on file   Occupational History    Occupation: [employer]     Employer: [employer]   Tobacco Use    Smoking status: Never Smoker    Smokeless tobacco: Never Used   Substance and Sexual Activity    Alcohol use: No    Drug use: No    Sexual activity: No   Other Topics Concern    Not on file   Social History Narrative    Not on file     Review of patient's allergies indicates:   Allergen Reactions    Tramadol Nausea And Vomiting     Current Outpatient Medications   Medication Sig    acetaminophen (TYLENOL) 650 MG TbSR Take 650 mg by mouth every 8 (eight) hours.    FERROUS  SULFATE, DRIED (IRON, DRIED, ORAL) Take by mouth once daily.     fluticasone (FLONASE) 50 mcg/actuation nasal spray 2 sprays (100 mcg total) by Each Nare route once daily.    hydroCHLOROthiazide (MICROZIDE) 12.5 mg capsule Take 1 capsule (12.5 mg total) by mouth once daily.    ibuprofen (ADVIL,MOTRIN) 200 MG tablet Take 400 mg by mouth every 6 (six) hours as needed for Pain.    loratadine (CLARITIN REDITABS) 10 mg dissolvable tablet Take 1 tablet (10 mg total) by mouth once daily.    meloxicam (MOBIC) 15 MG tablet Take 1 tablet (15 mg total) by mouth once daily.    MULTIVITS,CA,MINERALS/IRON/FA (ONE-A-DAY WOMENS FORMULA ORAL) Take by mouth once daily.     montelukast (SINGULAIR) 10 mg tablet Take 10 mg by mouth once daily.     No current facility-administered medications for this visit.            Review of Systems   Constitutional: Negative for activity change, appetite change, chills, diaphoresis, fatigue, fever and unexpected weight change.   HENT: Negative for congestion, ear pain, postnasal drip, rhinorrhea, sinus pressure, sinus pain, sneezing, sore throat, tinnitus, trouble swallowing and voice change.    Eyes: Negative for photophobia, pain and visual disturbance.   Respiratory: Negative for cough, chest tightness, shortness of breath and wheezing.    Cardiovascular: Negative for chest pain, palpitations and leg swelling.   Gastrointestinal: Negative for abdominal distention, abdominal pain, constipation, diarrhea, nausea and vomiting.   Genitourinary: Negative for decreased urine volume, difficulty urinating, dysuria, flank pain, frequency, hematuria and urgency.   Musculoskeletal: Positive for arthralgias (L knee pain). Negative for back pain, joint swelling, neck pain and neck stiffness.   Allergic/Immunologic: Negative for immunocompromised state.   Neurological: Negative for dizziness, tingling, tremors, seizures, syncope, facial asymmetry, speech difficulty, weakness, light-headedness, numbness  and headaches.   Hematological: Negative for adenopathy. Does not bruise/bleed easily.   Psychiatric/Behavioral: Negative for confusion and sleep disturbance.       Objective:      Physical Exam   Musculoskeletal:        Left knee: She exhibits decreased range of motion (crepitus ). She exhibits no swelling, no effusion, no ecchymosis and no deformity. No tenderness found.       Assessment:     Vitals:    01/04/19 1620   BP: (!) 124/94   Pulse: 67   Temp: 97.3 °F (36.3 °C)         1. Acute pain of left knee    2. Essential hypertension        Plan:   Acute pain of left knee  -     X-ray Knee Ortho Bilateral; Future; Expected date: 01/04/2019  -     meloxicam (MOBIC) 15 MG tablet; Take 1 tablet (15 mg total) by mouth once daily.  Dispense: 14 tablet; Refill: 0  -     ketorolac injection 30 mg    Essential hypertension    RICE  toradol now  Start mobic tomorrow x 2 weeks  xrays now  New or worsening symptoms return for re-evaluation

## 2019-01-08 RX ORDER — PREDNISONE 20 MG/1
20 TABLET ORAL DAILY
Qty: 5 TABLET | Refills: 0 | Status: SHIPPED | OUTPATIENT
Start: 2019-01-08 | End: 2019-01-13

## 2019-02-01 ENCOUNTER — OFFICE VISIT (OUTPATIENT)
Dept: RHEUMATOLOGY | Facility: CLINIC | Age: 54
End: 2019-02-01
Payer: COMMERCIAL

## 2019-02-01 ENCOUNTER — HOSPITAL ENCOUNTER (OUTPATIENT)
Dept: RADIOLOGY | Facility: HOSPITAL | Age: 54
Discharge: HOME | End: 2019-02-01
Attending: STUDENT IN AN ORGANIZED HEALTH CARE EDUCATION/TRAINING PROGRAM
Payer: COMMERCIAL

## 2019-02-01 VITALS
BODY MASS INDEX: 41.4 KG/M2 | HEART RATE: 72 BPM | DIASTOLIC BLOOD PRESSURE: 78 MMHG | WEIGHT: 241.19 LBS | SYSTOLIC BLOOD PRESSURE: 135 MMHG

## 2019-02-01 DIAGNOSIS — M19.90 OSTEOARTHRITIS, UNSPECIFIED OSTEOARTHRITIS TYPE, UNSPECIFIED SITE: ICD-10-CM

## 2019-02-01 DIAGNOSIS — M19.90 OSTEOARTHRITIS, UNSPECIFIED OSTEOARTHRITIS TYPE, UNSPECIFIED SITE: Primary | ICD-10-CM

## 2019-02-01 PROCEDURE — 73130 X-RAY EXAM OF HAND: CPT | Mod: 50,TC

## 2019-02-01 PROCEDURE — 3075F SYST BP GE 130 - 139MM HG: CPT | Mod: CPTII,S$GLB,, | Performed by: STUDENT IN AN ORGANIZED HEALTH CARE EDUCATION/TRAINING PROGRAM

## 2019-02-01 PROCEDURE — 3075F PR MOST RECENT SYSTOLIC BLOOD PRESS GE 130-139MM HG: ICD-10-PCS | Mod: CPTII,S$GLB,, | Performed by: STUDENT IN AN ORGANIZED HEALTH CARE EDUCATION/TRAINING PROGRAM

## 2019-02-01 PROCEDURE — 3008F PR BODY MASS INDEX (BMI) DOCUMENTED: ICD-10-PCS | Mod: CPTII,S$GLB,, | Performed by: STUDENT IN AN ORGANIZED HEALTH CARE EDUCATION/TRAINING PROGRAM

## 2019-02-01 PROCEDURE — 99999 PR PBB SHADOW E&M-EST. PATIENT-LVL III: CPT | Mod: PBBFAC,,, | Performed by: STUDENT IN AN ORGANIZED HEALTH CARE EDUCATION/TRAINING PROGRAM

## 2019-02-01 PROCEDURE — 73130 X-RAY EXAM OF HAND: CPT | Mod: 26,,, | Performed by: RADIOLOGY

## 2019-02-01 PROCEDURE — 20610 DRAIN/INJ JOINT/BURSA W/O US: CPT | Mod: LT,S$GLB,, | Performed by: STUDENT IN AN ORGANIZED HEALTH CARE EDUCATION/TRAINING PROGRAM

## 2019-02-01 PROCEDURE — 73130 XR HAND COMPLETE 3 VIEWS BILATERAL: ICD-10-PCS | Mod: 26,,, | Performed by: RADIOLOGY

## 2019-02-01 PROCEDURE — 99999 PR PBB SHADOW E&M-EST. PATIENT-LVL III: ICD-10-PCS | Mod: PBBFAC,,, | Performed by: STUDENT IN AN ORGANIZED HEALTH CARE EDUCATION/TRAINING PROGRAM

## 2019-02-01 PROCEDURE — 3078F DIAST BP <80 MM HG: CPT | Mod: CPTII,S$GLB,, | Performed by: STUDENT IN AN ORGANIZED HEALTH CARE EDUCATION/TRAINING PROGRAM

## 2019-02-01 PROCEDURE — 99204 PR OFFICE/OUTPT VISIT, NEW, LEVL IV, 45-59 MIN: ICD-10-PCS | Mod: 25,S$GLB,, | Performed by: STUDENT IN AN ORGANIZED HEALTH CARE EDUCATION/TRAINING PROGRAM

## 2019-02-01 PROCEDURE — 3008F BODY MASS INDEX DOCD: CPT | Mod: CPTII,S$GLB,, | Performed by: STUDENT IN AN ORGANIZED HEALTH CARE EDUCATION/TRAINING PROGRAM

## 2019-02-01 PROCEDURE — 20610 PR DRAIN/INJECT LARGE JOINT/BURSA: ICD-10-PCS | Mod: LT,S$GLB,, | Performed by: STUDENT IN AN ORGANIZED HEALTH CARE EDUCATION/TRAINING PROGRAM

## 2019-02-01 PROCEDURE — 99204 OFFICE O/P NEW MOD 45 MIN: CPT | Mod: 25,S$GLB,, | Performed by: STUDENT IN AN ORGANIZED HEALTH CARE EDUCATION/TRAINING PROGRAM

## 2019-02-01 PROCEDURE — 3078F PR MOST RECENT DIASTOLIC BLOOD PRESSURE < 80 MM HG: ICD-10-PCS | Mod: CPTII,S$GLB,, | Performed by: STUDENT IN AN ORGANIZED HEALTH CARE EDUCATION/TRAINING PROGRAM

## 2019-02-01 RX ORDER — TRIAMCINOLONE ACETONIDE 40 MG/ML
40 INJECTION, SUSPENSION INTRA-ARTICULAR; INTRAMUSCULAR ONCE
Status: COMPLETED | OUTPATIENT
Start: 2019-02-01 | End: 2019-02-01

## 2019-02-01 RX ORDER — PREDNISONE 20 MG/1
20 TABLET ORAL DAILY
COMMUNITY
End: 2019-02-01 | Stop reason: ALTCHOICE

## 2019-02-01 RX ADMIN — TRIAMCINOLONE ACETONIDE 40 MG: 40 INJECTION, SUSPENSION INTRA-ARTICULAR; INTRAMUSCULAR at 11:02

## 2019-02-01 NOTE — PROGRESS NOTES
"     RHEUMATOLOGY OUTPATIENT CLINIC NOTE    2/1/2019    Attending Rheumatologist: Marcie Freeman  Primary Care Provider: Rox Shea MD   Physician Requesting Consultation: Aaareferral Self  No address on file  Chief Complaint/Reason For Consultation:  New Patient (Arthritis pain in left knee)      Subjective:       HPI  Jake Hoskins is a 53 y.o. AAF presents for evaluation of left knee pain. Denies any hx trauma or falls. States knee pain is chronic for several years w/ occasional flare ups. Michael 7/10 "behind knee cap, deep aching within knee". Recently saw NP for worsening knee pain, started on prednisone taper 20mg daily and meloxicam once steroid taper completed. Denies significant relief w this. Taking tylenol prn w moderate relief. No hx of previous injections. Reports occasional knee locking and catching. No buckling. Also reports occasional pain/swelling across PIPs. AM stiffness 30-45 min. No family hx of RA or SLE. No other swollen joints or complaints. No photosensitivity, raynauds, oral ulcers, pleurisy, rashes, pso, dactylitis.     14pt ros negative except as otherwise stated above    Review of Systems   Constitutional: Negative for chills, fever and malaise/fatigue.   HENT: Negative for congestion, hearing loss and sore throat.    Eyes: Negative for blurred vision, photophobia and redness.   Respiratory: Negative for cough, hemoptysis and shortness of breath.    Cardiovascular: Negative for chest pain, orthopnea and leg swelling.   Gastrointestinal: Negative for abdominal pain, heartburn and vomiting.   Genitourinary: Negative for dysuria and frequency.   Musculoskeletal: Positive for joint pain. Negative for back pain, falls and myalgias.   Skin: Negative for itching and rash.   Neurological: Negative for dizziness, tingling and sensory change.   Endo/Heme/Allergies: Negative for polydipsia. Does not bruise/bleed easily.   Psychiatric/Behavioral: Negative for depression and " substance abuse.           Chronic comorbid conditions affecting medical decision making today:  Past Medical History:   Diagnosis Date    Allergy     Anemia     Plantar fasciitis of left foot      No past surgical history on file.  Family History   Problem Relation Age of Onset    Hypertension Mother     Asthma Mother     Cancer Mother         unknown    Hypertension Father     Heart disease Father     Hypertension Sister      Social History     Substance and Sexual Activity   Alcohol Use No     Social History     Tobacco Use   Smoking Status Never Smoker   Smokeless Tobacco Never Used     Social History     Substance and Sexual Activity   Drug Use No       Current Outpatient Medications:     acetaminophen (TYLENOL) 650 MG TbSR, Take 650 mg by mouth every 8 (eight) hours., Disp: , Rfl:     FERROUS SULFATE, DRIED (IRON, DRIED, ORAL), Take by mouth once daily. , Disp: , Rfl:     fluticasone (FLONASE) 50 mcg/actuation nasal spray, 2 sprays (100 mcg total) by Each Nare route once daily., Disp: 16 g, Rfl: 1    hydroCHLOROthiazide (MICROZIDE) 12.5 mg capsule, Take 1 capsule (12.5 mg total) by mouth once daily., Disp: 30 capsule, Rfl: 11    ibuprofen (ADVIL,MOTRIN) 200 MG tablet, Take 400 mg by mouth every 6 (six) hours as needed for Pain., Disp: , Rfl:     montelukast (SINGULAIR) 10 mg tablet, Take 10 mg by mouth once daily., Disp: , Rfl:     MULTIVITS,CA,MINERALS/IRON/FA (ONE-A-DAY WOMENS FORMULA ORAL), Take by mouth once daily. , Disp: , Rfl:     loratadine (CLARITIN REDITABS) 10 mg dissolvable tablet, Take 1 tablet (10 mg total) by mouth once daily., Disp: 30 tablet, Rfl: 11    meloxicam (MOBIC) 15 MG tablet, Take 1 tablet (15 mg total) by mouth once daily., Disp: 14 tablet, Rfl: 0  No current facility-administered medications for this visit.        Objective:         Vitals:    02/01/19 1053   BP: 135/78   Pulse: 72       Physical Exam     Nursing note and vitals reviewed.  Constitutional: She is  oriented to person, place, and time and well-developed, well-nourished, and in no distress.   HENT:   Head: Normocephalic and atraumatic.   Eyes: Conjunctivae and EOM are normal. Pupils are equal, round, and reactive to light.   Neck: Normal range of motion. Neck supple.   Cardiovascular: Normal rate and regular rhythm.    Pulmonary/Chest: Effort normal and breath sounds normal.   Abdominal: Soft. Bowel sounds are normal.   Neurological: She is alert and oriented to person, place, and time.   Skin: Skin is warm and dry.     Psychiatric: Affect and judgment normal.   Musculoskeletal:   +left knee warmth and mild swelling. +pain w patellar compression /resistance. +medial joint line tenderness  +L3PIP mild swelling, ttp; +cmc squaring     No synovitis in small joints of hands and feet  No effusions over large joints           Reviewed old and all outside pertinent medical records available.    All lab results personally reviewed and interpreted by me.  Lab Results   Component Value Date    WBC 5.18 07/06/2018    HGB 13.4 07/06/2018    HCT 43.4 07/06/2018    MCV 81 (L) 07/06/2018    MCH 24.9 (L) 07/06/2018    MCHC 30.9 (L) 07/06/2018    RDW 14.7 (H) 07/06/2018     07/06/2018    MPV 11.2 07/06/2018    PLTEST Adequate 04/02/2009       Lab Results   Component Value Date     07/06/2018    K 3.2 (L) 07/06/2018     07/06/2018    CO2 27 07/06/2018    GLU 93 07/06/2018    BUN 19 07/06/2018    CALCIUM 9.5 07/06/2018    PROT 8.1 07/06/2018    ALBUMIN 4.1 07/06/2018    BILITOT 0.6 07/06/2018    AST 22 07/06/2018    ALKPHOS 66 07/06/2018    ALT 23 07/06/2018       Lab Results   Component Value Date    COLORU Yellow 10/20/2009    APPEARANCEUA Clear 10/20/2009    SPECGRAV 1.030 10/20/2009    PHUR 5.5 10/20/2009    PROTEINUA Negative 10/20/2009    KETONESU Negative 10/20/2009    LEUKOCYTESUR Negative 10/20/2009    NITRITE Negative 10/20/2009       No results found for: CRP    No results found for: SEDRATE,  ERYTHROCYTES    No results found for: ANABELLA, RF, SEDRATE    No components found for: 25OHVITDTOT, 18NBEEAO6, 83AUWSQF8, METHODNOTE    No results found for: URICACID    No components found for: TSPOTTB    Knee XR 1/2019  Right knee: There is no radiographic evidence of acute osseous, articular, or soft tissue abnormality. Small tricompartmental marginal osteophytes are present with slight joint space narrowing in the medial tibiofemoral compartment.    Left knee:  There is no radiographic evidence of acute osseous, articular, or soft tissue abnormality. Small tricompartmental marginal osteophytes are present with slight joint space narrowing in the medial tibiofemoral compartment.    PROCEDURE NOTE:-  Name of the procedure: Injection of left knee with kenalog /lidocaine  Patient consent:   Indication, risks(including skin depigmentation, fat atrophy, infection, bleeding, nerve or tendon injury) and alternative to the procedure were explained to to the patient. Patient was given opportunity to ask questions . Then patient gave a verbal consent for the procedure.   Pertinent lab values: None indicated  Type of anesthesia: 2% Lidocaine   Description of procedure : Using sterile technique, the skin over knee was cleaned with alcohol swab and then with chlorhexidine solution. After applying cold spray , the needle was inserted into the skin and into the joint space without any resistance  and then 40 mg kenalog /lidocaine was injected into the joint space without any resistance.   Complication: None  Estimated blood loss: None  Disposition: Patient tolerated the procedure without any complains and the site was dressed.There were no complications.  Discharge instructions of icing and stretching given.      ASSESSMENT / PLAN:     Jake Hoskins is a 53 y.o. Black or  female with:      1. Osteoarthritis, unspecified osteoarthritis type, unspecified site  -left knee injected today w  lidocaine/kenalog  -tolerated procedure well, no complications; postprocedure instructions given  -complete workup as below to r/o inflammatory arthritis  -tylenol extra strength arthritis prn / ice prn    - X-Ray Hand Complete Bilateral; Future  - Rheumatoid factor; Future  - Cyclic citrul peptide antibody, IgG; Future  - Sedimentation rate; Standing  - C-reactive protein; Future  - Comprehensive metabolic panel; Future  - CBC auto differential; Standing    #Patellofemoral syndrome  -discussed quad strengthening exercises  -tylenol extra strength arthritis prn       Follow-up if symptoms worsen or fail to improve.    Method of contact with patient concerns: [facility]      Marcie Freeman M.D.  Rheumatology Department   [facility] [place] [address], [place] [address]  Phone: [phone]  Fax: [phone]

## 2019-02-08 ENCOUNTER — TELEPHONE (OUTPATIENT)
Dept: RHEUMATOLOGY | Facility: CLINIC | Age: 54
End: 2019-02-08

## 2019-02-08 RX ORDER — PREDNISONE 10 MG/1
10 TABLET ORAL DAILY
Qty: 30 TABLET | Refills: 0 | Status: SHIPPED | OUTPATIENT
Start: 2019-02-08 | End: 2019-07-05

## 2019-02-08 NOTE — TELEPHONE ENCOUNTER
Called patient to discuss results, has rheumatoid arthritis. Will start prednisone 10mg daily until rtc. Prx faxed in to her pharmacy. Pt instructed to make appt to further discuss within 1-2 weeks.

## 2019-03-07 ENCOUNTER — TELEPHONE (OUTPATIENT)
Dept: RHEUMATOLOGY | Facility: CLINIC | Age: 54
End: 2019-03-07

## 2019-03-08 ENCOUNTER — LAB VISIT (OUTPATIENT)
Dept: LAB | Facility: HOSPITAL | Age: 54
End: 2019-03-08
Attending: STUDENT IN AN ORGANIZED HEALTH CARE EDUCATION/TRAINING PROGRAM
Payer: COMMERCIAL

## 2019-03-08 ENCOUNTER — OFFICE VISIT (OUTPATIENT)
Dept: RHEUMATOLOGY | Facility: CLINIC | Age: 54
End: 2019-03-08
Payer: COMMERCIAL

## 2019-03-08 VITALS
DIASTOLIC BLOOD PRESSURE: 90 MMHG | SYSTOLIC BLOOD PRESSURE: 152 MMHG | BODY MASS INDEX: 40.8 KG/M2 | WEIGHT: 239 LBS | HEIGHT: 64 IN | HEART RATE: 73 BPM

## 2019-03-08 DIAGNOSIS — Z79.899 HIGH RISK MEDICATION USE: ICD-10-CM

## 2019-03-08 DIAGNOSIS — M22.2X2 PATELLOFEMORAL ARTHRALGIA OF BOTH KNEES: ICD-10-CM

## 2019-03-08 DIAGNOSIS — M06.9 RHEUMATOID ARTHRITIS, INVOLVING UNSPECIFIED SITE, UNSPECIFIED RHEUMATOID FACTOR PRESENCE: Primary | ICD-10-CM

## 2019-03-08 DIAGNOSIS — M19.90 OSTEOARTHRITIS, UNSPECIFIED OSTEOARTHRITIS TYPE, UNSPECIFIED SITE: ICD-10-CM

## 2019-03-08 DIAGNOSIS — M06.9 RHEUMATOID ARTHRITIS, INVOLVING UNSPECIFIED SITE, UNSPECIFIED RHEUMATOID FACTOR PRESENCE: ICD-10-CM

## 2019-03-08 DIAGNOSIS — M22.2X1 PATELLOFEMORAL ARTHRALGIA OF BOTH KNEES: ICD-10-CM

## 2019-03-08 PROCEDURE — 3008F PR BODY MASS INDEX (BMI) DOCUMENTED: ICD-10-PCS | Mod: CPTII,S$GLB,, | Performed by: STUDENT IN AN ORGANIZED HEALTH CARE EDUCATION/TRAINING PROGRAM

## 2019-03-08 PROCEDURE — 99214 OFFICE O/P EST MOD 30 MIN: CPT | Mod: S$GLB,,, | Performed by: STUDENT IN AN ORGANIZED HEALTH CARE EDUCATION/TRAINING PROGRAM

## 2019-03-08 PROCEDURE — 99214 PR OFFICE/OUTPT VISIT, EST, LEVL IV, 30-39 MIN: ICD-10-PCS | Mod: S$GLB,,, | Performed by: STUDENT IN AN ORGANIZED HEALTH CARE EDUCATION/TRAINING PROGRAM

## 2019-03-08 PROCEDURE — 86480 TB TEST CELL IMMUN MEASURE: CPT

## 2019-03-08 PROCEDURE — 99999 PR PBB SHADOW E&M-EST. PATIENT-LVL III: ICD-10-PCS | Mod: PBBFAC,,, | Performed by: STUDENT IN AN ORGANIZED HEALTH CARE EDUCATION/TRAINING PROGRAM

## 2019-03-08 PROCEDURE — 3077F SYST BP >= 140 MM HG: CPT | Mod: CPTII,S$GLB,, | Performed by: STUDENT IN AN ORGANIZED HEALTH CARE EDUCATION/TRAINING PROGRAM

## 2019-03-08 PROCEDURE — 80074 ACUTE HEPATITIS PANEL: CPT

## 2019-03-08 PROCEDURE — 3008F BODY MASS INDEX DOCD: CPT | Mod: CPTII,S$GLB,, | Performed by: STUDENT IN AN ORGANIZED HEALTH CARE EDUCATION/TRAINING PROGRAM

## 2019-03-08 PROCEDURE — 3077F PR MOST RECENT SYSTOLIC BLOOD PRESSURE >= 140 MM HG: ICD-10-PCS | Mod: CPTII,S$GLB,, | Performed by: STUDENT IN AN ORGANIZED HEALTH CARE EDUCATION/TRAINING PROGRAM

## 2019-03-08 PROCEDURE — 3080F PR MOST RECENT DIASTOLIC BLOOD PRESSURE >= 90 MM HG: ICD-10-PCS | Mod: CPTII,S$GLB,, | Performed by: STUDENT IN AN ORGANIZED HEALTH CARE EDUCATION/TRAINING PROGRAM

## 2019-03-08 PROCEDURE — 3080F DIAST BP >= 90 MM HG: CPT | Mod: CPTII,S$GLB,, | Performed by: STUDENT IN AN ORGANIZED HEALTH CARE EDUCATION/TRAINING PROGRAM

## 2019-03-08 PROCEDURE — 99999 PR PBB SHADOW E&M-EST. PATIENT-LVL III: CPT | Mod: PBBFAC,,, | Performed by: STUDENT IN AN ORGANIZED HEALTH CARE EDUCATION/TRAINING PROGRAM

## 2019-03-08 PROCEDURE — 36415 COLL VENOUS BLD VENIPUNCTURE: CPT

## 2019-03-08 RX ORDER — METHOTREXATE 2.5 MG/1
TABLET ORAL
Qty: 30 TABLET | Refills: 3 | Status: SHIPPED | OUTPATIENT
Start: 2019-03-08 | End: 2020-09-18 | Stop reason: SDUPTHER

## 2019-03-08 RX ORDER — FOLIC ACID 1 MG/1
1 TABLET ORAL DAILY
Qty: 30 TABLET | Refills: 4 | Status: SHIPPED | OUTPATIENT
Start: 2019-03-08 | End: 2020-09-10

## 2019-03-08 RX ORDER — PREDNISONE 5 MG/1
5 TABLET ORAL DAILY
Qty: 60 TABLET | Refills: 0 | Status: SHIPPED | OUTPATIENT
Start: 2019-03-08 | End: 2019-03-18

## 2019-03-08 NOTE — PROGRESS NOTES
"     RHEUMATOLOGY OUTPATIENT CLINIC NOTE    3/8/2019    Attending Rheumatologist: Marcie Freeman  Primary Care Provider: Rox Shea MD   Physician Requesting Consultation: No referring provider defined for this encounter.  Chief Complaint/Reason For Consultation:  Follow up RA    Subjective:       HPI  Historical:  Jake Hoskins is a 54 y.o. AAF presents for evaluation of left knee pain. Denies any hx trauma or falls. States knee pain is chronic for several years w/ occasional flare ups. Currthomas 7/10 "behind knee cap, deep aching within knee". Recently saw NP for worsening knee pain, started on prednisone taper 20mg daily and meloxicam once steroid taper completed. Denies significant relief w this. Taking tylenol prn w moderate relief. No hx of previous injections. Reports occasional knee locking and catching. No buckling. Also reports occasional pain/swelling across PIPs. AM stiffness 30-45 min. No family hx of RA or SLE. No other swollen joints or complaints. No photosensitivity, raynauds, oral ulcers, pleurisy, rashes, pso, dactylitis.  -Diagnosed with rheumatoid arthritis 2/19 after reviewing labs and XR. +RF/CCP+ w/ evidence of erosions on hand XR    Today:  States that since last visit, feels significant relief in hands since starting prednisone 10mg daily. Less AM stiffness (<30min). Swelling resolved. Also reports resolution of knee pain since injection. No other acute issues or complaints. Currently hand pain 2/10 pga     14pt ros negative except as otherwise stated above    Review of Systems   Constitutional: Negative for chills, fever and malaise/fatigue.   HENT: Negative for congestion, hearing loss and sore throat.    Eyes: Negative for blurred vision, photophobia and redness.   Respiratory: Negative for cough, hemoptysis and shortness of breath.    Cardiovascular: Negative for chest pain, orthopnea and leg swelling.   Gastrointestinal: Negative for abdominal pain, heartburn and " vomiting.   Genitourinary: Negative for dysuria and frequency.   Musculoskeletal: Positive for joint pain. Negative for back pain, falls and myalgias.   Skin: Negative for itching and rash.   Neurological: Negative for dizziness, tingling and sensory change.   Endo/Heme/Allergies: Negative for polydipsia. Does not bruise/bleed easily.   Psychiatric/Behavioral: Negative for depression and substance abuse.           Chronic comorbid conditions affecting medical decision making today:  Past Medical History:   Diagnosis Date    Allergy     Anemia     Plantar fasciitis of left foot      No past surgical history on file.  Family History   Problem Relation Age of Onset    Hypertension Mother     Asthma Mother     Cancer Mother         unknown    Hypertension Father     Heart disease Father     Hypertension Sister      Social History     Substance and Sexual Activity   Alcohol Use No     Social History     Tobacco Use   Smoking Status Never Smoker   Smokeless Tobacco Never Used     Social History     Substance and Sexual Activity   Drug Use No       Current Outpatient Medications:     acetaminophen (TYLENOL) 650 MG TbSR, Take 650 mg by mouth every 8 (eight) hours., Disp: , Rfl:     FERROUS SULFATE, DRIED (IRON, DRIED, ORAL), Take by mouth once daily. , Disp: , Rfl:     fluticasone (FLONASE) 50 mcg/actuation nasal spray, 2 sprays (100 mcg total) by Each Nare route once daily., Disp: 16 g, Rfl: 1    hydroCHLOROthiazide (MICROZIDE) 12.5 mg capsule, Take 1 capsule (12.5 mg total) by mouth once daily., Disp: 30 capsule, Rfl: 11    ibuprofen (ADVIL,MOTRIN) 200 MG tablet, Take 400 mg by mouth every 6 (six) hours as needed for Pain., Disp: , Rfl:     loratadine (CLARITIN REDITABS) 10 mg dissolvable tablet, Take 1 tablet (10 mg total) by mouth once daily., Disp: 30 tablet, Rfl: 11    meloxicam (MOBIC) 15 MG tablet, Take 1 tablet (15 mg total) by mouth once daily., Disp: 14 tablet, Rfl: 0    montelukast (SINGULAIR)  10 mg tablet, Take 10 mg by mouth once daily., Disp: , Rfl:     MULTIVITS,CA,MINERALS/IRON/FA (ONE-A-DAY WOMENS FORMULA ORAL), Take by mouth once daily. , Disp: , Rfl:     predniSONE (DELTASONE) 10 MG tablet, Take 1 tablet (10 mg total) by mouth once daily., Disp: 30 tablet, Rfl: 0       Objective:         There were no vitals filed for this visit.    Physical Exam     Nursing note and vitals reviewed.  Constitutional: She is oriented to person, place, and time and well-developed, well-nourished, and in no distress.   HENT:   Head: Normocephalic and atraumatic.   Eyes: Conjunctivae and EOM are normal. Pupils are equal, round, and reactive to light.   Neck: Normal range of motion. Neck supple.   Cardiovascular: Normal rate and regular rhythm.    Pulmonary/Chest: Effort normal and breath sounds normal.   Abdominal: Soft. Bowel sounds are normal.   Neurological: She is alert and oriented to person, place, and time.   Skin: Skin is warm and dry.     Psychiatric: Affect and judgment normal.   Musculoskeletal:    +pain w patellar compression /resistance. +medial joint line tenderness  No synovitis over small joints of hands and feet. No nodules. +cmc squaring   No effusions over large joints           Reviewed old and all outside pertinent medical records available.    All lab results personally reviewed and interpreted by me.  Lab Results   Component Value Date    WBC 5.00 02/01/2019    HGB 13.1 02/01/2019    HCT 42.8 02/01/2019    MCV 82 02/01/2019    MCH 25.0 (L) 02/01/2019    MCHC 30.6 (L) 02/01/2019    RDW 14.6 (H) 02/01/2019     02/01/2019    MPV 10.6 02/01/2019    PLTEST Adequate 04/02/2009       Lab Results   Component Value Date     02/01/2019    K 3.4 (L) 02/01/2019     02/01/2019    CO2 28 02/01/2019    GLU 89 02/01/2019    BUN 20 02/01/2019    CALCIUM 9.8 02/01/2019    PROT 7.9 02/01/2019    ALBUMIN 4.0 02/01/2019    BILITOT 0.3 02/01/2019    AST 25 02/01/2019    ALKPHOS 57 02/01/2019     ALT 29 02/01/2019       Lab Results   Component Value Date    COLORU Yellow 10/20/2009    APPEARANCEUA Clear 10/20/2009    SPECGRAV 1.030 10/20/2009    PHUR 5.5 10/20/2009    PROTEINUA Negative 10/20/2009    KETONESU Negative 10/20/2009    LEUKOCYTESUR Negative 10/20/2009    NITRITE Negative 10/20/2009       Lab Results   Component Value Date    CRP 2.9 02/01/2019       Lab Results   Component Value Date    SEDRATE 23 02/01/2019       Lab Results   Component Value Date    RF 48.0 (H) 02/01/2019    SEDRATE 23 02/01/2019       No components found for: 25OHVITDTOT, 83CTIDTJ8, 61BHIDIK2, METHODNOTE    No results found for: URICACID    No components found for: TSPOTTB    Knee XR 1/2019  Right knee: There is no radiographic evidence of acute osseous, articular, or soft tissue abnormality. Small tricompartmental marginal osteophytes are present with slight joint space narrowing in the medial tibiofemoral compartment.  Left knee:  There is no radiographic evidence of acute osseous, articular, or soft tissue abnormality. Small tricompartmental marginal osteophytes are present with slight joint space narrowing in the medial tibiofemoral compartment.    Hand XR 2/19   There are bilateral degenerative changes with multi articular marginal spur formation and subcortical cystic change.  Subtle cortical irregularity and suggested early erosive change seen adjacent to cystic changes at the proximal articular surface of the left lunate bone.  No acute fracture or dislocation.     ASSESSMENT / PLAN:     Jake Hoskins is a 54 y.o. Black or  female with:    #Rheumatoid Arthritis  -Seropositive RF+/CCP+ w erosive arthritis  -Started on prednisone 10mg daily after reviewing labs/XR from initial visit  -Reports significant relief w/ this, no synovitis on exam  -Start mtx 6 tabs weekly +FA daily. Discussed risks /benefits a/w med   -Decrease prednisone to 5mg daily until rtc     #Osteoarthritis, unspecified  osteoarthritis type, unspecified site  -left knee injected previous visit w lidocaine/kenalog   -resolution of pain since  -tylenol extra strength arthritis prn / ice prn    #Patellofemoral syndrome  -discussed quad strengthening exercises  -tylenol extra strength arthritis prn    #High risk med use  -Hold if infection, no sign of infection at this time  -CMP q3 months for liver toxicity monitoring w mtx    Method of contact with patient concerns: MyChart attn Rheumatology      Marcie Freeman M.D.  Rheumatology Department   [facility] [place] [address], [place] [address]  Phone: [phone]  Fax: [phone]

## 2019-03-11 ENCOUNTER — PATIENT MESSAGE (OUTPATIENT)
Dept: RHEUMATOLOGY | Facility: CLINIC | Age: 54
End: 2019-03-11

## 2019-03-11 LAB
HAV IGM SERPL QL IA: NEGATIVE
HBV CORE IGM SERPL QL IA: NEGATIVE
HBV SURFACE AG SERPL QL IA: NEGATIVE
HCV AB SERPL QL IA: NEGATIVE
M TB IFN-G CD4+ BCKGRND COR BLD-ACNC: -0.01 IU/ML
MITOGEN IGNF BCKGRD COR BLD-ACNC: 3.46 IU/ML
MITOGEN IGNF BCKGRD COR BLD-ACNC: NEGATIVE [IU]/ML
NIL: 0.03 IU/ML
TB2 - NIL: -0.01 IU/ML

## 2019-03-20 ENCOUNTER — PATIENT MESSAGE (OUTPATIENT)
Dept: RHEUMATOLOGY | Facility: CLINIC | Age: 54
End: 2019-03-20

## 2019-03-26 ENCOUNTER — TELEPHONE (OUTPATIENT)
Dept: RHEUMATOLOGY | Facility: CLINIC | Age: 54
End: 2019-03-26

## 2019-03-26 ENCOUNTER — PATIENT MESSAGE (OUTPATIENT)
Dept: RHEUMATOLOGY | Facility: CLINIC | Age: 54
End: 2019-03-26

## 2019-03-27 NOTE — TELEPHONE ENCOUNTER
Left message for patient. Papers finished today. Faxed copy to Dr. Lui, and put a copy in mail to patient for her records.

## 2019-03-28 ENCOUNTER — PATIENT MESSAGE (OUTPATIENT)
Dept: RHEUMATOLOGY | Facility: CLINIC | Age: 54
End: 2019-03-28

## 2019-04-01 ENCOUNTER — PATIENT MESSAGE (OUTPATIENT)
Dept: RHEUMATOLOGY | Facility: CLINIC | Age: 54
End: 2019-04-01

## 2019-04-05 ENCOUNTER — PATIENT MESSAGE (OUTPATIENT)
Dept: RHEUMATOLOGY | Facility: CLINIC | Age: 54
End: 2019-04-05

## 2019-04-30 ENCOUNTER — OFFICE VISIT (OUTPATIENT)
Dept: INTERNAL MEDICINE | Facility: CLINIC | Age: 54
End: 2019-04-30
Payer: COMMERCIAL

## 2019-04-30 ENCOUNTER — TELEPHONE (OUTPATIENT)
Dept: INTERNAL MEDICINE | Facility: CLINIC | Age: 54
End: 2019-04-30

## 2019-04-30 VITALS
SYSTOLIC BLOOD PRESSURE: 136 MMHG | OXYGEN SATURATION: 98 % | HEART RATE: 90 BPM | DIASTOLIC BLOOD PRESSURE: 88 MMHG | BODY MASS INDEX: 40.48 KG/M2 | TEMPERATURE: 98 F | WEIGHT: 242.94 LBS | HEIGHT: 65 IN

## 2019-04-30 DIAGNOSIS — J06.9 VIRAL URI WITH COUGH: Primary | ICD-10-CM

## 2019-04-30 PROCEDURE — 3008F BODY MASS INDEX DOCD: CPT | Mod: CPTII,S$GLB,, | Performed by: NURSE PRACTITIONER

## 2019-04-30 PROCEDURE — 3079F PR MOST RECENT DIASTOLIC BLOOD PRESSURE 80-89 MM HG: ICD-10-PCS | Mod: CPTII,S$GLB,, | Performed by: NURSE PRACTITIONER

## 2019-04-30 PROCEDURE — 3079F DIAST BP 80-89 MM HG: CPT | Mod: CPTII,S$GLB,, | Performed by: NURSE PRACTITIONER

## 2019-04-30 PROCEDURE — 99213 OFFICE O/P EST LOW 20 MIN: CPT | Mod: S$GLB,,, | Performed by: NURSE PRACTITIONER

## 2019-04-30 PROCEDURE — 99213 PR OFFICE/OUTPT VISIT, EST, LEVL III, 20-29 MIN: ICD-10-PCS | Mod: S$GLB,,, | Performed by: NURSE PRACTITIONER

## 2019-04-30 PROCEDURE — 99999 PR PBB SHADOW E&M-EST. PATIENT-LVL IV: CPT | Mod: PBBFAC,,, | Performed by: NURSE PRACTITIONER

## 2019-04-30 PROCEDURE — 3077F SYST BP >= 140 MM HG: CPT | Mod: CPTII,S$GLB,, | Performed by: NURSE PRACTITIONER

## 2019-04-30 PROCEDURE — 99999 PR PBB SHADOW E&M-EST. PATIENT-LVL IV: ICD-10-PCS | Mod: PBBFAC,,, | Performed by: NURSE PRACTITIONER

## 2019-04-30 PROCEDURE — 3077F PR MOST RECENT SYSTOLIC BLOOD PRESSURE >= 140 MM HG: ICD-10-PCS | Mod: CPTII,S$GLB,, | Performed by: NURSE PRACTITIONER

## 2019-04-30 PROCEDURE — 3008F PR BODY MASS INDEX (BMI) DOCUMENTED: ICD-10-PCS | Mod: CPTII,S$GLB,, | Performed by: NURSE PRACTITIONER

## 2019-04-30 RX ORDER — GUAIFENESIN/DEXTROMETHORPHAN 100-10MG/5
5 SYRUP ORAL EVERY 6 HOURS PRN
Qty: 236 ML | Refills: 0 | COMMUNITY
Start: 2019-04-30 | End: 2019-05-10

## 2019-04-30 RX ORDER — PROMETHAZINE HYDROCHLORIDE AND DEXTROMETHORPHAN HYDROBROMIDE 6.25; 15 MG/5ML; MG/5ML
5 SYRUP ORAL EVERY 6 HOURS PRN
Qty: 180 ML | Refills: 0 | Status: SHIPPED | OUTPATIENT
Start: 2019-04-30 | End: 2019-05-10

## 2019-04-30 NOTE — PROGRESS NOTES
"Subjective:      Patient ID: Jake Hoskins is a 54 y.o. female.    Chief Complaint: Cough and Chest Congestion    HPI:  Patient states for the last 3 days she has had a congested cough.  Her mucus is clear, hears a rattling in her chest, no fever, sob, or wheezing.  Has not taken anything OTC for this. Throat and ears feel ok    Past Medical History:   Diagnosis Date    Allergy     Anemia     Plantar fasciitis of left foot        History reviewed. No pertinent surgical history.    Lab Results   Component Value Date    WBC 5.00 02/01/2019    HGB 13.1 02/01/2019    HCT 42.8 02/01/2019     02/01/2019    CHOL 185 07/06/2018    TRIG 91 07/06/2018    HDL 59 07/06/2018    ALT 29 02/01/2019    AST 25 02/01/2019     02/01/2019    K 3.4 (L) 02/01/2019     02/01/2019    CREATININE 0.8 02/01/2019    BUN 20 02/01/2019    CO2 28 02/01/2019    TSH 0.736 07/06/2018       BP (!) 158/88 (BP Location: Left arm, Patient Position: Sitting, BP Method: Large (Automatic))   Pulse 90   Temp 97.7 °F (36.5 °C) (Tympanic)   Ht 5' 5" (1.651 m)   Wt 110.2 kg (242 lb 15.2 oz)   SpO2 98%   BMI 40.43 kg/m²       Review of Systems   Constitutional: Negative for appetite change, fatigue and unexpected weight change.   HENT: Positive for congestion. Negative for ear pain, postnasal drip, rhinorrhea, sinus pressure, sneezing, sore throat, tinnitus, trouble swallowing and voice change.    Eyes: Negative for pain, discharge, redness, itching and visual disturbance.   Respiratory: Positive for cough. Negative for chest tightness, shortness of breath and wheezing.    Cardiovascular: Negative for chest pain, palpitations and leg swelling.   Gastrointestinal: Negative for abdominal distention, abdominal pain, blood in stool, constipation, diarrhea, nausea and vomiting.        No reflux.   Genitourinary: Negative for difficulty urinating, dyspareunia, flank pain, menstrual problem and pelvic pain.   Musculoskeletal: Negative " for arthralgias, back pain, myalgias and neck stiffness.   Skin: Negative for color change and rash.   Neurological: Negative for dizziness and headaches.   Psychiatric/Behavioral: Negative for confusion and sleep disturbance. The patient is not nervous/anxious.       Objective:     Physical Exam   Constitutional: She is oriented to person, place, and time. She appears well-developed and well-nourished. No distress.   HENT:   Head: Normocephalic and atraumatic.   Mouth/Throat: Oropharynx is clear and moist. No oropharyngeal exudate.   Bilateral TMs normal pearly gray   Cardiovascular: Normal rate, regular rhythm and normal heart sounds. Exam reveals no gallop and no friction rub.   No murmur heard.  Pulmonary/Chest: Effort normal and breath sounds normal. No respiratory distress. She has no wheezes. She has no rales.   Clear lungs   Musculoskeletal: She exhibits no edema or tenderness.   Neurological: She is alert and oriented to person, place, and time. No cranial nerve deficit.   Skin: Skin is warm and dry. She is not diaphoretic.   Psychiatric: She has a normal mood and affect. Her behavior is normal.   Nursing note and vitals reviewed.    Assessment:      1. Viral URI with cough      Plan:   Viral URI with cough    Other orders  -     dextromethorphan-guaifenesin  mg/5 ml (ROBITUSSIN-DM)  mg/5 mL liquid; Take 5 mLs by mouth every 6 (six) hours as needed.  Dispense: 236 mL; Refill: 0    force fluids, hot tea with honey and lemon.  Letter given for work      Current Outpatient Medications:     acetaminophen (TYLENOL) 650 MG TbSR, Take 650 mg by mouth every 8 (eight) hours., Disp: , Rfl:     FERROUS SULFATE, DRIED (IRON, DRIED, ORAL), Take by mouth once daily. , Disp: , Rfl:     fluticasone (FLONASE) 50 mcg/actuation nasal spray, 2 sprays (100 mcg total) by Each Nare route once daily., Disp: 16 g, Rfl: 1    folic acid (FOLVITE) 1 MG tablet, Take 1 tablet (1 mg total) by mouth once daily., Disp: 30  tablet, Rfl: 4    hydroCHLOROthiazide (MICROZIDE) 12.5 mg capsule, Take 1 capsule (12.5 mg total) by mouth once daily., Disp: 30 capsule, Rfl: 11    ibuprofen (ADVIL,MOTRIN) 200 MG tablet, Take 400 mg by mouth every 6 (six) hours as needed for Pain., Disp: , Rfl:     methotrexate 2.5 MG Tab, Take 6 tablets once weekly, Disp: 30 tablet, Rfl: 3    montelukast (SINGULAIR) 10 mg tablet, Take 10 mg by mouth once daily., Disp: , Rfl:     MULTIVITS,CA,MINERALS/IRON/FA (ONE-A-DAY WOMENS FORMULA ORAL), Take by mouth once daily. , Disp: , Rfl:     predniSONE (DELTASONE) 10 MG tablet, Take 1 tablet (10 mg total) by mouth once daily. (Patient taking differently: Take 5 mg by mouth once daily. ), Disp: 30 tablet, Rfl: 0    dextromethorphan-guaifenesin  mg/5 ml (ROBITUSSIN-DM)  mg/5 mL liquid, Take 5 mLs by mouth every 6 (six) hours as needed., Disp: 236 mL, Rfl: 0    loratadine (CLARITIN REDITABS) 10 mg dissolvable tablet, Take 1 tablet (10 mg total) by mouth once daily., Disp: 30 tablet, Rfl: 11

## 2019-04-30 NOTE — TELEPHONE ENCOUNTER
----- Message from Jayshree Diaz sent at 4/30/2019  4:19 PM CDT -----  Type:  Needs Medical Advice    Who Called:  Blake Joseph  Symptoms (please be specific): Bad cough     How long has patient had these symptoms:    Pharmacy name and phone #:    [facility] in [place], [place]  Would the patient rather a call back or a response via [facility]?  Call back  Best Call Back Number:  [phone]  Additional Information: States she saw Ms Guerra today and she just received a message from the pharmacy//med sent in is Robitussin//she is needing a med that is going to help the cough so she will be able to get rest//please call to jada/[facility]

## 2019-04-30 NOTE — LETTER
April 30, 2019    Jake Hoskins  [address]   [address]  [place] [address]         [facility]  [address] [address]  Phone: [phone]  Fax: [phone] April 30, 2019     Patient: Jake Hoskins   YOB: 1965   Date of Visit: 4/30/2019       To Whom It May Concern:    It is my medical opinion that Jake Hoskins missed work on 4/29/19-4/30/19 because she was sick.    If you have any questions or concerns, please don't hesitate to call.    Sincerely,        Norman Guerra, NP

## 2019-04-30 NOTE — TELEPHONE ENCOUNTER
Needs something stronger than robitussin-DM.    Pt is going to try calling her PCP to see if they are still open and can send something in for her.

## 2019-05-17 ENCOUNTER — PATIENT OUTREACH (OUTPATIENT)
Dept: ADMINISTRATIVE | Facility: HOSPITAL | Age: 54
End: 2019-05-17

## 2019-05-17 ENCOUNTER — TELEPHONE (OUTPATIENT)
Dept: RHEUMATOLOGY | Facility: CLINIC | Age: 54
End: 2019-05-17

## 2019-05-17 DIAGNOSIS — Z12.11 SCREEN FOR COLON CANCER: Primary | ICD-10-CM

## 2019-05-17 NOTE — PROGRESS NOTES
Health maintenance reviewed. Immunizations reviewed and reconciled, Pended GI request for colonoscopy.

## 2019-07-05 ENCOUNTER — OFFICE VISIT (OUTPATIENT)
Dept: INTERNAL MEDICINE | Facility: CLINIC | Age: 54
End: 2019-07-05
Payer: COMMERCIAL

## 2019-07-05 ENCOUNTER — LAB VISIT (OUTPATIENT)
Dept: LAB | Facility: HOSPITAL | Age: 54
End: 2019-07-05
Attending: FAMILY MEDICINE
Payer: COMMERCIAL

## 2019-07-05 VITALS
OXYGEN SATURATION: 97 % | DIASTOLIC BLOOD PRESSURE: 88 MMHG | SYSTOLIC BLOOD PRESSURE: 128 MMHG | HEIGHT: 65 IN | WEIGHT: 243.63 LBS | HEART RATE: 72 BPM | TEMPERATURE: 96 F | BODY MASS INDEX: 40.59 KG/M2

## 2019-07-05 DIAGNOSIS — Z13.1 DIABETES MELLITUS SCREENING: ICD-10-CM

## 2019-07-05 DIAGNOSIS — I10 ESSENTIAL HYPERTENSION: ICD-10-CM

## 2019-07-05 DIAGNOSIS — E55.9 VITAMIN D DEFICIENCY: ICD-10-CM

## 2019-07-05 DIAGNOSIS — R05.9 COUGH: ICD-10-CM

## 2019-07-05 DIAGNOSIS — Z00.00 ENCOUNTER FOR WELLNESS EXAMINATION: ICD-10-CM

## 2019-07-05 DIAGNOSIS — J30.1 ACUTE SEASONAL ALLERGIC RHINITIS DUE TO POLLEN: ICD-10-CM

## 2019-07-05 DIAGNOSIS — Z12.11 COLON CANCER SCREENING: ICD-10-CM

## 2019-07-05 DIAGNOSIS — E55.9 VITAMIN D DEFICIENCY: Primary | ICD-10-CM

## 2019-07-05 DIAGNOSIS — Z12.4 CERVICAL CANCER SCREENING: ICD-10-CM

## 2019-07-05 DIAGNOSIS — Z12.39 BREAST CANCER SCREENING: ICD-10-CM

## 2019-07-05 LAB
25(OH)D3+25(OH)D2 SERPL-MCNC: 22 NG/ML (ref 30–96)
ALBUMIN SERPL BCP-MCNC: 4.1 G/DL (ref 3.5–5.2)
ALP SERPL-CCNC: 54 U/L (ref 55–135)
ALT SERPL W/O P-5'-P-CCNC: 23 U/L (ref 10–44)
ANION GAP SERPL CALC-SCNC: 8 MMOL/L (ref 8–16)
AST SERPL-CCNC: 25 U/L (ref 10–40)
BASOPHILS # BLD AUTO: 0.03 K/UL (ref 0–0.2)
BASOPHILS NFR BLD: 0.6 % (ref 0–1.9)
BILIRUB SERPL-MCNC: 0.5 MG/DL (ref 0.1–1)
BUN SERPL-MCNC: 17 MG/DL (ref 6–20)
CALCIUM SERPL-MCNC: 10.4 MG/DL (ref 8.7–10.5)
CHLORIDE SERPL-SCNC: 104 MMOL/L (ref 95–110)
CO2 SERPL-SCNC: 27 MMOL/L (ref 23–29)
CREAT SERPL-MCNC: 0.8 MG/DL (ref 0.5–1.4)
DIFFERENTIAL METHOD: ABNORMAL
EOSINOPHIL # BLD AUTO: 0.2 K/UL (ref 0–0.5)
EOSINOPHIL NFR BLD: 4.9 % (ref 0–8)
ERYTHROCYTE [DISTWIDTH] IN BLOOD BY AUTOMATED COUNT: 15.1 % (ref 11.5–14.5)
EST. GFR  (AFRICAN AMERICAN): >60 ML/MIN/1.73 M^2
EST. GFR  (NON AFRICAN AMERICAN): >60 ML/MIN/1.73 M^2
ESTIMATED AVG GLUCOSE: 117 MG/DL (ref 68–131)
GLUCOSE SERPL-MCNC: 79 MG/DL (ref 70–110)
HBA1C MFR BLD HPLC: 5.7 % (ref 4–5.6)
HCT VFR BLD AUTO: 44 % (ref 37–48.5)
HGB BLD-MCNC: 13.3 G/DL (ref 12–16)
IMM GRANULOCYTES # BLD AUTO: 0.02 K/UL (ref 0–0.04)
IMM GRANULOCYTES NFR BLD AUTO: 0.4 % (ref 0–0.5)
LYMPHOCYTES # BLD AUTO: 1.5 K/UL (ref 1–4.8)
LYMPHOCYTES NFR BLD: 30.5 % (ref 18–48)
MCH RBC QN AUTO: 25.6 PG (ref 27–31)
MCHC RBC AUTO-ENTMCNC: 30.2 G/DL (ref 32–36)
MCV RBC AUTO: 85 FL (ref 82–98)
MONOCYTES # BLD AUTO: 0.5 K/UL (ref 0.3–1)
MONOCYTES NFR BLD: 10.4 % (ref 4–15)
NEUTROPHILS # BLD AUTO: 2.6 K/UL (ref 1.8–7.7)
NEUTROPHILS NFR BLD: 53.2 % (ref 38–73)
NRBC BLD-RTO: 0 /100 WBC
PLATELET # BLD AUTO: 270 K/UL (ref 150–350)
PMV BLD AUTO: 11.7 FL (ref 9.2–12.9)
POTASSIUM SERPL-SCNC: 3.3 MMOL/L (ref 3.5–5.1)
PROT SERPL-MCNC: 8 G/DL (ref 6–8.4)
RBC # BLD AUTO: 5.19 M/UL (ref 4–5.4)
SODIUM SERPL-SCNC: 139 MMOL/L (ref 136–145)
WBC # BLD AUTO: 4.91 K/UL (ref 3.9–12.7)

## 2019-07-05 PROCEDURE — 99396 PREV VISIT EST AGE 40-64: CPT | Mod: 25,S$GLB,, | Performed by: FAMILY MEDICINE

## 2019-07-05 PROCEDURE — 80053 COMPREHEN METABOLIC PANEL: CPT

## 2019-07-05 PROCEDURE — 3079F PR MOST RECENT DIASTOLIC BLOOD PRESSURE 80-89 MM HG: ICD-10-PCS | Mod: CPTII,S$GLB,, | Performed by: FAMILY MEDICINE

## 2019-07-05 PROCEDURE — 3074F PR MOST RECENT SYSTOLIC BLOOD PRESSURE < 130 MM HG: ICD-10-PCS | Mod: CPTII,S$GLB,, | Performed by: FAMILY MEDICINE

## 2019-07-05 PROCEDURE — 3008F BODY MASS INDEX DOCD: CPT | Mod: CPTII,S$GLB,, | Performed by: FAMILY MEDICINE

## 2019-07-05 PROCEDURE — 36415 COLL VENOUS BLD VENIPUNCTURE: CPT

## 2019-07-05 PROCEDURE — 99396 PR PREVENTIVE VISIT,EST,40-64: ICD-10-PCS | Mod: 25,S$GLB,, | Performed by: FAMILY MEDICINE

## 2019-07-05 PROCEDURE — 99213 OFFICE O/P EST LOW 20 MIN: CPT | Mod: S$GLB,,, | Performed by: FAMILY MEDICINE

## 2019-07-05 PROCEDURE — 3074F SYST BP LT 130 MM HG: CPT | Mod: CPTII,S$GLB,, | Performed by: FAMILY MEDICINE

## 2019-07-05 PROCEDURE — 99213 PR OFFICE/OUTPT VISIT, EST, LEVL III, 20-29 MIN: ICD-10-PCS | Mod: S$GLB,,, | Performed by: FAMILY MEDICINE

## 2019-07-05 PROCEDURE — 83036 HEMOGLOBIN GLYCOSYLATED A1C: CPT

## 2019-07-05 PROCEDURE — 3008F PR BODY MASS INDEX (BMI) DOCUMENTED: ICD-10-PCS | Mod: CPTII,S$GLB,, | Performed by: FAMILY MEDICINE

## 2019-07-05 PROCEDURE — 3079F DIAST BP 80-89 MM HG: CPT | Mod: CPTII,S$GLB,, | Performed by: FAMILY MEDICINE

## 2019-07-05 PROCEDURE — 99999 PR PBB SHADOW E&M-EST. PATIENT-LVL IV: CPT | Mod: PBBFAC,,, | Performed by: FAMILY MEDICINE

## 2019-07-05 PROCEDURE — 99999 PR PBB SHADOW E&M-EST. PATIENT-LVL IV: ICD-10-PCS | Mod: PBBFAC,,, | Performed by: FAMILY MEDICINE

## 2019-07-05 PROCEDURE — 82306 VITAMIN D 25 HYDROXY: CPT

## 2019-07-05 PROCEDURE — 85025 COMPLETE CBC W/AUTO DIFF WBC: CPT

## 2019-07-05 RX ORDER — FLUTICASONE PROPIONATE 50 MCG
2 SPRAY, SUSPENSION (ML) NASAL DAILY
Qty: 16 G | Refills: 3 | Status: SHIPPED | OUTPATIENT
Start: 2019-07-05 | End: 2020-09-10 | Stop reason: SDUPTHER

## 2019-07-05 RX ORDER — HYDROCHLOROTHIAZIDE 12.5 MG/1
12.5 CAPSULE ORAL DAILY
Qty: 30 CAPSULE | Refills: 11 | Status: SHIPPED | OUTPATIENT
Start: 2019-07-05 | End: 2020-08-03

## 2019-07-05 RX ORDER — MONTELUKAST SODIUM 10 MG/1
10 TABLET ORAL DAILY
Qty: 90 TABLET | Refills: 3 | Status: SHIPPED | OUTPATIENT
Start: 2019-07-05 | End: 2020-08-03

## 2019-07-05 NOTE — PATIENT INSTRUCTIONS
Shingles vaccine is now due and is available at any retail pharmacy without a prescription.     Mediterranean Food Guide   People who live in the area around the [place] have a lower risk of heart disease. Researchers have recently shown that following a Mediterranean diet decreases heart disease by 30% in people whom are at-risk.   This lifestyle is built upon daily exercise along with a lot of fruit, vegetables, plant-based proteins, whole grains, fish and smaller amounts of poultry and red meat. Fatty fish (salmon), olive oil, and nuts make this diet higher in fat than the classic heart healthy diet. These fats are mostly unsaturated, and when consumed in place of saturated fat, are good for the heart.   Physical Activity- The Mediterranean Diet pyramid is built upon daily physical activity and exercise. Aim for at least 150 minutes of moderate to vigorous exercise every week. Moderate-to-vigorous exercises include walking at a brisk pace, biking, or swimming, among other activities that elevate your heart rate. Always choose activities that you enjoy and that are safe, in order to be active throughout your life.   Achieve and Maintain a Healthy Weight - The high fat content of the Mediterranean is healthy for your heart, but may lead to increased energy intake and weight gain if you dont pay attention to how much you are eating. If you are trying to lose weight, choose the smaller number of servings from each food group, and try to make your serving sizes match those listed. 2   Food Groups and Servings per day  Serving Sizes    Non-starchy Vegetables   4-8 servings per day  One serving is ½ cup of cooked vegetables or 1 cup raw vegetables   Non-starchy vegetables include artichoke, asparagus, beets, broccoli, [place] sprouts, cauliflower, cabbage, celery, carrots, tomatoes, eggplant, cucumber, onion, green and wax beans, zucchini, turnips, peppers, salad greens and mushrooms. (Potatoes, peas, and  corn are starchy vegetables.)    Fruit   2-4 servings per day  One serving is a small fresh fruit or ½ cup juice or ¼ cup dried fruit   Fresh fruits are preferred because of the fiber and other nutrients they contain. Fruits canned in light syrup or their own juice, and frozen fruit with little or no added sugar are also good choices. Use only small amounts of fruit juice (6 oz per day or less), since even unsweetened juices can contain as much sugar as regular soda.    Legumes and Nuts   1-3 servings per day  Legumes: One serving is ½ cup cooked kidney, black, garbanzo, rey, soy (edamame), navy beans, split peas, or lentils, or ¼ cup fat free refried beans or baked beans   Nuts and Seeds: One serving is 2 Tbsp. sunflower or sesame seeds, 1 Tbsp. peanut butter,   7-8 walnuts or pecans, 20 peanuts, or 12-15 almonds   Aim for 1-2 servings of nuts or seeds and 1-2 servings of legumes per day. Legumes are high in fiber, protein, and minerals. Nuts are high in unsaturated fat, and may increase HDL without increasing LDL cholesterol levels.    Low-fat Dairy Products   1-3 servings per day  One serving is 1 cup of skim milk, non-fat yogurt, or 1oz of low-fat (part-skim) cheese   Calcium-fortified soy milk, soy yogurt, and soy cheese can take the place of dairy products. If servings of dairy or fortified soy are less than 2 per day, we advise a calcium and vitamin D supplement.    Fish or shellfish   2-3 times a week  One serving is 3 ounces (about the size of a deck of cards)   Cook fish by baking, sautéing, broiling, roasting, grilling, or poaching. Choose fatty fishes like salmon, herring, sardines, or mackerel often. The fat in fish is high in omega-3 fats, so it has healthy effects on triglycerides and blood cells.    Poultry, if desired   1-3 times a week  One serving is 3 ounces (about the size of a deck of cards)   Bake, sauté, stir blackwell, roast, or grill the poultry you eat, and eat it without the skin.    Whole  Grains and Starchy Vegetables   4-6 servings per day  One serving is about 1 ounce of any of these:   1 slice whole wheat bread ½ cup potatoes, sweet potatoes, corn, or peas   ½ large whole grain bun 1 small whole grain roll   6-inch whole wheat yesenia 6 whole grain crackers   ½ cup cooked whole grain cereal (oatmeal, cracked wheat, quinoa)   ½ cup cooked whole wheat pasta, brown rice, or barley   Whole grains are high in fiber and have less effect on blood sugar and triglyceride levels than refined, processed grains like white bread and pasta. Whole grains also keep the stomach full longer, making it easier to control hunger.

## 2019-07-05 NOTE — PROGRESS NOTES
Subjective:       Patient ID: Jake Hoskins is a 54 y.o. female.    Chief Complaint: Annual Exam    55 yo female here for f/u. She has hypertension well controlled on low dose HCTZ. Due for MMG and cscope; will schedule. Last pap nl 6/2018; will schedule for annual well woman exam. She has RA doing well on methotrexate.     Seasonal allergies controlled with daily singulair and flonase nasal spray for rhinorrhea/post-nasal drip.     does not have any pertinent problems on file.  Past Medical History:   Diagnosis Date    Allergy     Anemia     Plantar fasciitis of left foot      History reviewed. No pertinent surgical history.  Family History   Problem Relation Age of Onset    Hypertension Mother     Asthma Mother     Cancer Mother         unknown    Hypertension Father     Heart disease Father     Hypertension Sister      Social History     Socioeconomic History    Marital status:      Spouse name: Not on file    Number of children: 2    Years of education: Not on file    Highest education level: Not on file   Occupational History    Occupation: [employer]     Employer: [employer]   Social Needs    Financial resource strain: Hard    Food insecurity:     Worry: Never true     Inability: Never true    Transportation needs:     Medical: No     Non-medical: No   Tobacco Use    Smoking status: Never Smoker    Smokeless tobacco: Never Used   Substance and Sexual Activity    Alcohol use: No     Frequency: Never     Drinks per session: Patient refused     Binge frequency: Never    Drug use: No    Sexual activity: Never   Lifestyle    Physical activity:     Days per week: Not on file     Minutes per session: Not on file    Stress: Not at all   Relationships    Social connections:     Talks on phone: More than three times a week     Gets together: Once a week     Attends Yazidi service: Not on file     Active member of club or organization: Yes     Attends meetings of clubs or organizations:  1 to 4 times per year     Relationship status:    Other Topics Concern    Not on file   Social History Narrative    Not on file     Review of Systems   Constitutional: Negative for activity change and unexpected weight change.   HENT: Positive for postnasal drip and rhinorrhea. Negative for hearing loss and trouble swallowing.    Eyes: Negative for discharge and visual disturbance.   Respiratory: Negative for chest tightness and wheezing.    Cardiovascular: Negative for chest pain and palpitations.   Gastrointestinal: Negative for blood in stool, constipation, diarrhea and vomiting.   Endocrine: Negative for polydipsia and polyuria.   Genitourinary: Negative for difficulty urinating, dysuria, hematuria and menstrual problem.   Musculoskeletal: Positive for arthralgias and joint swelling. Negative for neck pain.   Skin: Negative for color change and rash.   Neurological: Negative for weakness and headaches.   Psychiatric/Behavioral: Negative for confusion and dysphoric mood.       Objective:     Vitals:    07/05/19 1332   BP: 128/88   Pulse: 72   Temp: 96.4 °F (35.8 °C)        Physical Exam   Constitutional: She is oriented to person, place, and time. She appears well-developed and well-nourished.   HENT:   Head: Normocephalic and atraumatic.   Eyes: Pupils are equal, round, and reactive to light. EOM are normal.   Neck: Normal range of motion. Neck supple.   Cardiovascular: Normal rate, regular rhythm, normal heart sounds and intact distal pulses.   No murmur heard.  Pulmonary/Chest: Effort normal and breath sounds normal. She has no wheezes. She has no rales.   Abdominal: Soft. Bowel sounds are normal. She exhibits no mass. There is no tenderness.   Musculoskeletal: Normal range of motion. She exhibits no edema or deformity.   Neurological: She is alert and oriented to person, place, and time.   Skin: Skin is warm and dry.   Psychiatric: She has a normal mood and affect. Her behavior is normal.   Nursing  note and vitals reviewed.      Assessment:       1. Vitamin D deficiency    2. Essential hypertension    3. Breast cancer screening    4. Cervical cancer screening    5. Diabetes mellitus screening    6. Colon cancer screening    7. Encounter for wellness examination    8. Acute seasonal allergic rhinitis due to pollen    9. Cough        Plan:           Problem List Items Addressed This Visit        Cardiac/Vascular    Essential hypertension (Chronic)    Overview     Controlled on HCTZ         Relevant Medications    hydroCHLOROthiazide (MICROZIDE) 12.5 mg capsule    Other Relevant Orders    Mammo Digital Screening Bilat    Comprehensive metabolic panel (Completed)      Other Visit Diagnoses     Vitamin D deficiency    -  Primary    Relevant Orders    Mammo Digital Screening Bilat    Vitamin D (Completed)    Breast cancer screening        Relevant Orders    Mammo Digital Screening Bilat    Cervical cancer screening        Diabetes mellitus screening        Relevant Orders    Hemoglobin A1c (Completed)    Colon cancer screening        Relevant Orders    Case request GI: COLONOSCOPY (Completed)    Encounter for wellness examination        Relevant Orders    CBC auto differential (Completed)    Ambulatory Referral to Gynecology    Acute seasonal allergic rhinitis due to pollen        Relevant Medications    montelukast (SINGULAIR) 10 mg tablet    fluticasone propionate (FLONASE) 50 mcg/actuation nasal spray    Cough        Relevant Medications    fluticasone propionate (FLONASE) 50 mcg/actuation nasal spray

## 2020-03-15 ENCOUNTER — TELEPHONE (OUTPATIENT)
Dept: INTERNAL MEDICINE | Facility: CLINIC | Age: 55
End: 2020-03-15

## 2020-03-15 NOTE — TELEPHONE ENCOUNTER
D/t Mary  protocol for COVID-19 risk, called pt to postpone appt sched w/ Dr. Shea 03/17/20 or to advise of prescreen visit protocols should pt want to keep appt. No answer, lvm, mychart message also sent.

## 2020-07-09 NOTE — TELEPHONE ENCOUNTER
----- Message from Rox Shea MD sent at 6/15/2018  5:01 PM CDT -----  I'm trying to get Ms. Joseph's health maintenance up to date. Will you call her to let her know I've referred her for an eye exam. Please schedule her for a nurse visit to get her Tdap. Please also schedule a FASTING lab draw in about 3 weeks prior to her f/u appt with me. Thanks!   Excision Depth: adipose tissue

## 2020-08-25 ENCOUNTER — TELEPHONE (OUTPATIENT)
Dept: INTERNAL MEDICINE | Facility: CLINIC | Age: 55
End: 2020-08-25

## 2020-09-01 ENCOUNTER — PATIENT OUTREACH (OUTPATIENT)
Dept: ADMINISTRATIVE | Facility: HOSPITAL | Age: 55
End: 2020-09-01

## 2020-09-10 ENCOUNTER — OFFICE VISIT (OUTPATIENT)
Dept: INTERNAL MEDICINE | Facility: CLINIC | Age: 55
End: 2020-09-10
Payer: COMMERCIAL

## 2020-09-10 VITALS
DIASTOLIC BLOOD PRESSURE: 80 MMHG | BODY MASS INDEX: 39.2 KG/M2 | HEART RATE: 72 BPM | WEIGHT: 235.25 LBS | HEIGHT: 65 IN | SYSTOLIC BLOOD PRESSURE: 118 MMHG | TEMPERATURE: 98 F | OXYGEN SATURATION: 96 %

## 2020-09-10 DIAGNOSIS — M06.9 RHEUMATOID ARTHRITIS, INVOLVING UNSPECIFIED SITE, UNSPECIFIED RHEUMATOID FACTOR PRESENCE: ICD-10-CM

## 2020-09-10 DIAGNOSIS — Z12.39 BREAST CANCER SCREENING: ICD-10-CM

## 2020-09-10 DIAGNOSIS — I10 ESSENTIAL HYPERTENSION: Primary | ICD-10-CM

## 2020-09-10 DIAGNOSIS — Z12.11 COLON CANCER SCREENING: ICD-10-CM

## 2020-09-10 DIAGNOSIS — R05.9 COUGH: ICD-10-CM

## 2020-09-10 DIAGNOSIS — J30.1 ACUTE SEASONAL ALLERGIC RHINITIS DUE TO POLLEN: ICD-10-CM

## 2020-09-10 PROCEDURE — 90686 FLU VACCINE (QUAD) GREATER THAN OR EQUAL TO 3YO PRESERVATIVE FREE IM: ICD-10-PCS | Mod: S$GLB,,, | Performed by: FAMILY MEDICINE

## 2020-09-10 PROCEDURE — 90686 IIV4 VACC NO PRSV 0.5 ML IM: CPT | Mod: S$GLB,,, | Performed by: FAMILY MEDICINE

## 2020-09-10 PROCEDURE — 99214 PR OFFICE/OUTPT VISIT, EST, LEVL IV, 30-39 MIN: ICD-10-PCS | Mod: 25,S$GLB,, | Performed by: FAMILY MEDICINE

## 2020-09-10 PROCEDURE — 3008F PR BODY MASS INDEX (BMI) DOCUMENTED: ICD-10-PCS | Mod: CPTII,S$GLB,, | Performed by: FAMILY MEDICINE

## 2020-09-10 PROCEDURE — 3079F DIAST BP 80-89 MM HG: CPT | Mod: CPTII,S$GLB,, | Performed by: FAMILY MEDICINE

## 2020-09-10 PROCEDURE — 99999 PR PBB SHADOW E&M-EST. PATIENT-LVL IV: CPT | Mod: PBBFAC,,, | Performed by: FAMILY MEDICINE

## 2020-09-10 PROCEDURE — 99999 PR PBB SHADOW E&M-EST. PATIENT-LVL IV: ICD-10-PCS | Mod: PBBFAC,,, | Performed by: FAMILY MEDICINE

## 2020-09-10 PROCEDURE — 3074F SYST BP LT 130 MM HG: CPT | Mod: CPTII,S$GLB,, | Performed by: FAMILY MEDICINE

## 2020-09-10 PROCEDURE — 90471 IMMUNIZATION ADMIN: CPT | Mod: S$GLB,,, | Performed by: FAMILY MEDICINE

## 2020-09-10 PROCEDURE — 99214 OFFICE O/P EST MOD 30 MIN: CPT | Mod: 25,S$GLB,, | Performed by: FAMILY MEDICINE

## 2020-09-10 PROCEDURE — 3008F BODY MASS INDEX DOCD: CPT | Mod: CPTII,S$GLB,, | Performed by: FAMILY MEDICINE

## 2020-09-10 PROCEDURE — 3079F PR MOST RECENT DIASTOLIC BLOOD PRESSURE 80-89 MM HG: ICD-10-PCS | Mod: CPTII,S$GLB,, | Performed by: FAMILY MEDICINE

## 2020-09-10 PROCEDURE — 3074F PR MOST RECENT SYSTOLIC BLOOD PRESSURE < 130 MM HG: ICD-10-PCS | Mod: CPTII,S$GLB,, | Performed by: FAMILY MEDICINE

## 2020-09-10 PROCEDURE — 90471 FLU VACCINE (QUAD) GREATER THAN OR EQUAL TO 3YO PRESERVATIVE FREE IM: ICD-10-PCS | Mod: S$GLB,,, | Performed by: FAMILY MEDICINE

## 2020-09-10 RX ORDER — HYDROCHLOROTHIAZIDE 12.5 MG/1
12.5 CAPSULE ORAL DAILY
Qty: 90 CAPSULE | Refills: 1 | Status: SHIPPED | OUTPATIENT
Start: 2020-09-10 | End: 2021-03-17

## 2020-09-10 RX ORDER — MONTELUKAST SODIUM 10 MG/1
10 TABLET ORAL DAILY
Qty: 90 TABLET | Refills: 0 | Status: SHIPPED | OUTPATIENT
Start: 2020-09-10 | End: 2020-09-15 | Stop reason: SDUPTHER

## 2020-09-10 RX ORDER — FLUTICASONE PROPIONATE 50 MCG
2 SPRAY, SUSPENSION (ML) NASAL DAILY
Qty: 16 G | Refills: 3 | Status: SHIPPED | OUTPATIENT
Start: 2020-09-10 | End: 2021-09-04 | Stop reason: SDUPTHER

## 2020-09-10 NOTE — PROGRESS NOTES
Jake Yatesvin  09/10/2020  [id number]    Angelica Lagunas MD  Patient Care Team:  Angelica Lagunas MD as PCP - General (Family Medicine)  Phil Baires LPN (Inactive) as Care Coordinator (Internal Medicine)  Has the patient seen any provider outside of the [facility] network since the last visit? (no). If yes, HIPPA forms completed and records requested.        Visit Type:Establish care, PCP Shabbir    Chief Complaint:  Chief Complaint   Patient presents with    [facility]       History of Present Illness:  History of HTN  On dose dose HCTZ, stable    History of RA, was seeing [facility], no recent appt  On Methotrexate, but recently ran out.    She is due for MMG, and COlon screen.  Ordered on last visit, not completed.    Pap is up to date            History:  Past Medical History:   Diagnosis Date    Allergy     Anemia     Plantar fasciitis of left foot      History reviewed. No pertinent surgical history.  Family History   Problem Relation Age of Onset    Hypertension Mother     Asthma Mother     Cancer Mother         unknown    Hypertension Father     Heart disease Father     Hypertension Sister      Social History     Socioeconomic History    Marital status:      Spouse name: Not on file    Number of children: 2    Years of education: Not on file    Highest education level: Not on file   Occupational History    Occupation: [employer]     Employer: [employer]   Social Needs    Financial resource strain: Hard    Food insecurity     Worry: Never true     Inability: Never true    Transportation needs     Medical: No     Non-medical: No   Tobacco Use    Smoking status: Never Smoker    Smokeless tobacco: Never Used   Substance and Sexual Activity    Alcohol use: No     Frequency: Never     Drinks per session: Patient refused     Binge frequency: Never    Drug use: No    Sexual activity: Never   Lifestyle    Physical activity     Days per week: Not on file     Minutes per session: Not on  file    Stress: Not at all   Relationships    Social connections     Talks on phone: More than three times a week     Gets together: Once a week     Attends Orthodoxy service: Not on file     Active member of club or organization: Yes     Attends meetings of clubs or organizations: 1 to 4 times per year     Relationship status:    Other Topics Concern    Not on file   Social History Narrative    Not on file     Patient Active Problem List   Diagnosis    Anemia    Plantar fasciitis of left foot    Essential hypertension    Cervical polyp     Review of patient's allergies indicates:   Allergen Reactions    Tramadol Nausea And Vomiting       The following were reviewed at this visit: active problem list, medication list, allergies, family history, social history, and health maintenance.    Medications:  Current Outpatient Medications on File Prior to Visit   Medication Sig Dispense Refill    acetaminophen (TYLENOL) 650 MG TbSR Take 650 mg by mouth every 8 (eight) hours.      FERROUS SULFATE, DRIED (IRON, DRIED, ORAL) Take by mouth once daily.       folic acid (FOLVITE) 1 MG tablet Take 1 tablet (1 mg total) by mouth once daily. 30 tablet 4    ibuprofen (ADVIL,MOTRIN) 200 MG tablet Take 400 mg by mouth every 6 (six) hours as needed for Pain.      loratadine (CLARITIN REDITABS) 10 mg dissolvable tablet Take 1 tablet (10 mg total) by mouth once daily. 30 tablet 11    MULTIVITS,CA,MINERALS/IRON/FA (ONE-A-DAY WOMENS FORMULA ORAL) Take by mouth once daily.       [DISCONTINUED] fluticasone propionate (FLONASE) 50 mcg/actuation nasal spray 2 sprays (100 mcg total) by Each Nare route once daily. 16 g 3    [DISCONTINUED] hydroCHLOROthiazide (MICROZIDE) 12.5 mg capsule Take 1 capsule by mouth once daily 30 capsule 0    [DISCONTINUED] montelukast (SINGULAIR) 10 mg tablet Take 1 tablet by mouth once daily 90 tablet 0    methotrexate 2.5 MG Tab Take 6 tablets once weekly (Patient not taking: Reported on  9/10/2020) 30 tablet 3     No current facility-administered medications on file prior to visit.        Medications have been reviewed and reconciled with patient at this visit.  Barriers to medications present (no)    Adverse reactions to current medications (no)    Over the counter medications reviewed (No ), and if needed added to active Medication list at this visit.     Exam:  Wt Readings from Last 3 Encounters:   09/10/20 106.7 kg (235 lb 3.7 oz)   07/05/19 110.5 kg (243 lb 9.7 oz)   04/30/19 110.2 kg (242 lb 15.2 oz)     Temp Readings from Last 3 Encounters:   09/10/20 98.1 °F (36.7 °C) (Tympanic)   07/05/19 96.4 °F (35.8 °C) (Tympanic)   04/30/19 97.7 °F (36.5 °C) (Tympanic)     BP Readings from Last 3 Encounters:   09/10/20 118/80   07/05/19 128/88   04/30/19 136/88     Pulse Readings from Last 3 Encounters:   09/10/20 72   07/05/19 72   04/30/19 90     Body mass index is 39.14 kg/m².      Review of Systems   Constitutional: Negative.  Negative for chills and fever.   HENT: Negative.  Negative for congestion, sinus pain and sore throat.    Eyes: Negative for blurred vision and double vision.   Respiratory: Negative for cough, sputum production, shortness of breath and wheezing.    Cardiovascular: Negative for chest pain, palpitations and leg swelling.   Gastrointestinal: Negative for abdominal pain, constipation, diarrhea, heartburn, nausea and vomiting.   Genitourinary: Negative.    Musculoskeletal: Negative.    Skin: Negative.  Negative for rash.   Neurological: Negative.    Endo/Heme/Allergies: Negative.  Negative for polydipsia. Does not bruise/bleed easily.   Psychiatric/Behavioral: Negative for depression and substance abuse.     Physical Exam  Vitals signs and nursing note reviewed.   Constitutional:       General: She is not in acute distress.     Appearance: She is well-developed. She is not diaphoretic.   HENT:      Head: Normocephalic and atraumatic.      Right Ear: External ear normal.      Left  Ear: External ear normal.      Nose: Nose normal.      Mouth/Throat:      Pharynx: No oropharyngeal exudate.   Eyes:      General:         Right eye: No discharge.         Left eye: No discharge.      Conjunctiva/sclera: Conjunctivae normal.      Pupils: Pupils are equal, round, and reactive to light.   Neck:      Musculoskeletal: Normal range of motion and neck supple.      Thyroid: No thyromegaly.   Cardiovascular:      Rate and Rhythm: Normal rate and regular rhythm.      Heart sounds: Normal heart sounds. No murmur.   Pulmonary:      Effort: Pulmonary effort is normal. No respiratory distress.      Breath sounds: Normal breath sounds. No wheezing.   Abdominal:      General: Bowel sounds are normal. There is no distension.      Palpations: Abdomen is soft. There is no mass.      Tenderness: There is no abdominal tenderness.   Musculoskeletal: Normal range of motion.   Lymphadenopathy:      Cervical: No cervical adenopathy.   Skin:     Capillary Refill: Capillary refill takes less than 2 seconds.   Neurological:      Mental Status: She is alert and oriented to person, place, and time.      Cranial Nerves: No cranial nerve deficit.   Psychiatric:         Behavior: Behavior normal.         Thought Content: Thought content normal.         Judgment: Judgment normal.         Laboratory Reviewed ({N/A)  Lab Results   Component Value Date    WBC 4.91 07/05/2019    HGB 13.3 07/05/2019    HCT 44.0 07/05/2019     07/05/2019    CHOL 185 07/06/2018    TRIG 91 07/06/2018    HDL 59 07/06/2018    ALT 23 07/05/2019    AST 25 07/05/2019     07/05/2019    K 3.3 (L) 07/05/2019     07/05/2019    CREATININE 0.8 07/05/2019    BUN 17 07/05/2019    CO2 27 07/05/2019    TSH 0.736 07/06/2018    HGBA1C 5.7 (H) 07/05/2019       Jake was seen today for establish care.    Diagnoses and all orders for this visit:    Essential hypertension  -     CBC auto differential; Future  -     Comprehensive metabolic panel; Future  -      hydroCHLOROthiazide (MICROZIDE) 12.5 mg capsule; Take 1 capsule (12.5 mg total) by mouth once daily.    Breast cancer screening  -     Mammo Digital Screening Bilat w/ [place]ito; Future    Colon cancer screening  -     Case request GI: COLONOSCOPY    Rheumatoid arthritis, involving unspecified site, unspecified rheumatoid factor presence  -     CBC auto differential; Future  -     Rheumatoid factor; Future  -     CYCLIC CITRUL PEPTIDE ANTIBODY, IGG; Future  -     Sedimentation rate; Future  -     C-reactive protein; Future    Acute seasonal allergic rhinitis due to pollen  -     fluticasone propionate (FLONASE) 50 mcg/actuation nasal spray; 2 sprays (100 mcg total) by Each Nostril route once daily.  -     montelukast (SINGULAIR) 10 mg tablet; Take 1 tablet (10 mg total) by mouth once daily.    Cough  -     fluticasone propionate (FLONASE) 50 mcg/actuation nasal spray; 2 sprays (100 mcg total) by Each Nostril route once daily.      Flu shot today            Care Plan/Goals: Reviewed  (N/A)  Goals    None         Follow up: No follow-ups on file.    After visit summary was printed and given to patient upon discharge today.  Patient goals and care plan are included in After Visit Summary.

## 2020-09-12 ENCOUNTER — LAB VISIT (OUTPATIENT)
Dept: LAB | Facility: HOSPITAL | Age: 55
End: 2020-09-12
Attending: FAMILY MEDICINE
Payer: COMMERCIAL

## 2020-09-12 DIAGNOSIS — M06.9 RHEUMATOID ARTHRITIS, INVOLVING UNSPECIFIED SITE, UNSPECIFIED RHEUMATOID FACTOR PRESENCE: ICD-10-CM

## 2020-09-12 DIAGNOSIS — I10 ESSENTIAL HYPERTENSION: ICD-10-CM

## 2020-09-12 LAB
ALBUMIN SERPL BCP-MCNC: 3.9 G/DL (ref 3.5–5.2)
ALP SERPL-CCNC: 55 U/L (ref 55–135)
ALT SERPL W/O P-5'-P-CCNC: 16 U/L (ref 10–44)
ANION GAP SERPL CALC-SCNC: 9 MMOL/L (ref 8–16)
AST SERPL-CCNC: 20 U/L (ref 10–40)
BASOPHILS # BLD AUTO: 0.03 K/UL (ref 0–0.2)
BASOPHILS NFR BLD: 0.6 % (ref 0–1.9)
BILIRUB SERPL-MCNC: 0.4 MG/DL (ref 0.1–1)
BUN SERPL-MCNC: 15 MG/DL (ref 6–20)
CALCIUM SERPL-MCNC: 9.3 MG/DL (ref 8.7–10.5)
CCP AB SER IA-ACNC: 9.8 U/ML
CHLORIDE SERPL-SCNC: 103 MMOL/L (ref 95–110)
CO2 SERPL-SCNC: 29 MMOL/L (ref 23–29)
CREAT SERPL-MCNC: 0.8 MG/DL (ref 0.5–1.4)
CRP SERPL-MCNC: 1.5 MG/L (ref 0–8.2)
DIFFERENTIAL METHOD: ABNORMAL
EOSINOPHIL # BLD AUTO: 0.3 K/UL (ref 0–0.5)
EOSINOPHIL NFR BLD: 5.6 % (ref 0–8)
ERYTHROCYTE [DISTWIDTH] IN BLOOD BY AUTOMATED COUNT: 14.6 % (ref 11.5–14.5)
ERYTHROCYTE [SEDIMENTATION RATE] IN BLOOD BY WESTERGREN METHOD: 18 MM/HR (ref 0–36)
EST. GFR  (AFRICAN AMERICAN): >60 ML/MIN/1.73 M^2
EST. GFR  (NON AFRICAN AMERICAN): >60 ML/MIN/1.73 M^2
GLUCOSE SERPL-MCNC: 79 MG/DL (ref 70–110)
HCT VFR BLD AUTO: 45.1 % (ref 37–48.5)
HGB BLD-MCNC: 13.3 G/DL (ref 12–16)
IMM GRANULOCYTES # BLD AUTO: 0.01 K/UL (ref 0–0.04)
IMM GRANULOCYTES NFR BLD AUTO: 0.2 % (ref 0–0.5)
LYMPHOCYTES # BLD AUTO: 1.8 K/UL (ref 1–4.8)
LYMPHOCYTES NFR BLD: 39.2 % (ref 18–48)
MCH RBC QN AUTO: 24.9 PG (ref 27–31)
MCHC RBC AUTO-ENTMCNC: 29.5 G/DL (ref 32–36)
MCV RBC AUTO: 85 FL (ref 82–98)
MONOCYTES # BLD AUTO: 0.4 K/UL (ref 0.3–1)
MONOCYTES NFR BLD: 9.1 % (ref 4–15)
NEUTROPHILS # BLD AUTO: 2.1 K/UL (ref 1.8–7.7)
NEUTROPHILS NFR BLD: 45.3 % (ref 38–73)
NRBC BLD-RTO: 0 /100 WBC
PLATELET # BLD AUTO: 285 K/UL (ref 150–350)
PMV BLD AUTO: 11.4 FL (ref 9.2–12.9)
POTASSIUM SERPL-SCNC: 3.6 MMOL/L (ref 3.5–5.1)
PROT SERPL-MCNC: 7.8 G/DL (ref 6–8.4)
RBC # BLD AUTO: 5.34 M/UL (ref 4–5.4)
RHEUMATOID FACT SERPL-ACNC: 59 IU/ML (ref 0–15)
SODIUM SERPL-SCNC: 141 MMOL/L (ref 136–145)
WBC # BLD AUTO: 4.64 K/UL (ref 3.9–12.7)

## 2020-09-12 PROCEDURE — 36415 COLL VENOUS BLD VENIPUNCTURE: CPT

## 2020-09-12 PROCEDURE — 86431 RHEUMATOID FACTOR QUANT: CPT

## 2020-09-12 PROCEDURE — 85652 RBC SED RATE AUTOMATED: CPT

## 2020-09-12 PROCEDURE — 80053 COMPREHEN METABOLIC PANEL: CPT

## 2020-09-12 PROCEDURE — 86140 C-REACTIVE PROTEIN: CPT

## 2020-09-12 PROCEDURE — 85025 COMPLETE CBC W/AUTO DIFF WBC: CPT

## 2020-09-12 PROCEDURE — 86200 CCP ANTIBODY: CPT

## 2020-09-15 DIAGNOSIS — J30.1 ACUTE SEASONAL ALLERGIC RHINITIS DUE TO POLLEN: ICD-10-CM

## 2020-09-15 RX ORDER — MONTELUKAST SODIUM 10 MG/1
10 TABLET ORAL DAILY
Qty: 90 TABLET | Refills: 0 | Status: SHIPPED | OUTPATIENT
Start: 2020-09-15 | End: 2021-06-06 | Stop reason: SDUPTHER

## 2020-09-17 ENCOUNTER — TELEPHONE (OUTPATIENT)
Dept: RHEUMATOLOGY | Facility: CLINIC | Age: 55
End: 2020-09-17

## 2020-09-17 ENCOUNTER — PATIENT OUTREACH (OUTPATIENT)
Dept: ADMINISTRATIVE | Facility: OTHER | Age: 55
End: 2020-09-17

## 2020-09-18 ENCOUNTER — HOSPITAL ENCOUNTER (OUTPATIENT)
Dept: RADIOLOGY | Facility: HOSPITAL | Age: 55
Discharge: HOME | End: 2020-09-18
Attending: PHYSICIAN ASSISTANT
Payer: COMMERCIAL

## 2020-09-18 ENCOUNTER — OFFICE VISIT (OUTPATIENT)
Dept: RHEUMATOLOGY | Facility: CLINIC | Age: 55
End: 2020-09-18
Payer: COMMERCIAL

## 2020-09-18 VITALS
DIASTOLIC BLOOD PRESSURE: 89 MMHG | HEIGHT: 65 IN | HEART RATE: 73 BPM | BODY MASS INDEX: 39.71 KG/M2 | SYSTOLIC BLOOD PRESSURE: 143 MMHG | WEIGHT: 238.31 LBS

## 2020-09-18 DIAGNOSIS — Z79.899 HIGH RISK MEDICATION USE: ICD-10-CM

## 2020-09-18 DIAGNOSIS — E55.9 VITAMIN D DEFICIENCY: ICD-10-CM

## 2020-09-18 DIAGNOSIS — M05.79 RHEUMATOID ARTHRITIS INVOLVING MULTIPLE SITES WITH POSITIVE RHEUMATOID FACTOR: ICD-10-CM

## 2020-09-18 DIAGNOSIS — M25.562 CHRONIC PAIN OF LEFT KNEE: ICD-10-CM

## 2020-09-18 DIAGNOSIS — M05.79 RHEUMATOID ARTHRITIS INVOLVING MULTIPLE SITES WITH POSITIVE RHEUMATOID FACTOR: Primary | ICD-10-CM

## 2020-09-18 DIAGNOSIS — M15.9 PRIMARY OSTEOARTHRITIS INVOLVING MULTIPLE JOINTS: ICD-10-CM

## 2020-09-18 DIAGNOSIS — G89.29 CHRONIC PAIN OF LEFT KNEE: ICD-10-CM

## 2020-09-18 PROCEDURE — 99214 PR OFFICE/OUTPT VISIT, EST, LEVL IV, 30-39 MIN: ICD-10-PCS | Mod: S$GLB,,, | Performed by: PHYSICIAN ASSISTANT

## 2020-09-18 PROCEDURE — 3079F PR MOST RECENT DIASTOLIC BLOOD PRESSURE 80-89 MM HG: ICD-10-PCS | Mod: CPTII,S$GLB,, | Performed by: PHYSICIAN ASSISTANT

## 2020-09-18 PROCEDURE — 73130 X-RAY EXAM OF HAND: CPT | Mod: TC,50

## 2020-09-18 PROCEDURE — 3008F PR BODY MASS INDEX (BMI) DOCUMENTED: ICD-10-PCS | Mod: CPTII,S$GLB,, | Performed by: PHYSICIAN ASSISTANT

## 2020-09-18 PROCEDURE — 73130 XR HAND COMPLETE 3 VIEWS BILATERAL: ICD-10-PCS | Mod: 26,,, | Performed by: RADIOLOGY

## 2020-09-18 PROCEDURE — 73130 X-RAY EXAM OF HAND: CPT | Mod: 26,,, | Performed by: RADIOLOGY

## 2020-09-18 PROCEDURE — 99999 PR PBB SHADOW E&M-EST. PATIENT-LVL IV: ICD-10-PCS | Mod: PBBFAC,,, | Performed by: PHYSICIAN ASSISTANT

## 2020-09-18 PROCEDURE — 3077F PR MOST RECENT SYSTOLIC BLOOD PRESSURE >= 140 MM HG: ICD-10-PCS | Mod: CPTII,S$GLB,, | Performed by: PHYSICIAN ASSISTANT

## 2020-09-18 PROCEDURE — 99999 PR PBB SHADOW E&M-EST. PATIENT-LVL IV: CPT | Mod: PBBFAC,,, | Performed by: PHYSICIAN ASSISTANT

## 2020-09-18 PROCEDURE — 3077F SYST BP >= 140 MM HG: CPT | Mod: CPTII,S$GLB,, | Performed by: PHYSICIAN ASSISTANT

## 2020-09-18 PROCEDURE — 99214 OFFICE O/P EST MOD 30 MIN: CPT | Mod: S$GLB,,, | Performed by: PHYSICIAN ASSISTANT

## 2020-09-18 PROCEDURE — 73630 XR FOOT COMPLETE 3 VIEW BILATERAL: ICD-10-PCS | Mod: 26,,, | Performed by: RADIOLOGY

## 2020-09-18 PROCEDURE — 73630 X-RAY EXAM OF FOOT: CPT | Mod: TC,50

## 2020-09-18 PROCEDURE — 73630 X-RAY EXAM OF FOOT: CPT | Mod: 26,,, | Performed by: RADIOLOGY

## 2020-09-18 PROCEDURE — 3008F BODY MASS INDEX DOCD: CPT | Mod: CPTII,S$GLB,, | Performed by: PHYSICIAN ASSISTANT

## 2020-09-18 PROCEDURE — 3079F DIAST BP 80-89 MM HG: CPT | Mod: CPTII,S$GLB,, | Performed by: PHYSICIAN ASSISTANT

## 2020-09-18 RX ORDER — ERGOCALCIFEROL 1.25 MG/1
50000 CAPSULE ORAL
Qty: 12 CAPSULE | Refills: 3 | Status: SHIPPED | OUTPATIENT
Start: 2020-09-18 | End: 2021-05-03 | Stop reason: SDUPTHER

## 2020-09-18 RX ORDER — FOLIC ACID 1 MG/1
1 TABLET ORAL DAILY
Qty: 90 TABLET | Refills: 3 | Status: SHIPPED | OUTPATIENT
Start: 2020-09-18 | End: 2021-09-13

## 2020-09-18 RX ORDER — METHOTREXATE 2.5 MG/1
TABLET ORAL
Qty: 30 TABLET | Refills: 3 | Status: SHIPPED | OUTPATIENT
Start: 2020-09-18 | End: 2020-12-23 | Stop reason: SDUPTHER

## 2020-09-18 NOTE — PROGRESS NOTES
Subjective:       Patient ID: Jake Hoskins is a 55 y.o. female.    Chief Complaint: Knee Pain (left)    Jake was last seen in march of 2019 by dr SEBAS abdullahi newly diagnosed seropositive RA.  New patient to me today.  She has +RF +CCP with early poss erosions on her L hand xrays.  She was started on prednisone which improved her symtpoms.  Last visit Dr. MULLEN started mtx 15mg/wk but she only took for 6 weeks or so didn't realize to keep taking    She is here today with mild joint pain, am stiffness ab 30 minutes, c/o hand swelling at times.  Also with L knee pain.  She has chronic L foot plantar fasciitis.  She takes tylenol arthritis for her knee pain. Today her knee pain is about 4/10.  She has mild djd on xrays knee, injections in the past have helped    Past Medical History:   Diagnosis Date    Allergy     Anemia     Plantar fasciitis of left foot      History reviewed. No pertinent surgical history.  Family History   Problem Relation Age of Onset    Hypertension Mother     Asthma Mother     Cancer Mother         unknown    Hypertension Father     Heart disease Father     Hypertension Sister      Social History     Socioeconomic History    Marital status:      Spouse name: Not on file    Number of children: 2    Years of education: Not on file    Highest education level: Not on file   Occupational History    Occupation: [employer]     Employer: [employer]   Social Needs    Financial resource strain: Hard    Food insecurity     Worry: Never true     Inability: Never true    Transportation needs     Medical: No     Non-medical: No   Tobacco Use    Smoking status: Never Smoker    Smokeless tobacco: Never Used   Substance and Sexual Activity    Alcohol use: No     Frequency: Never     Drinks per session: Patient refused     Binge frequency: Never    Drug use: No    Sexual activity: Never   Lifestyle    Physical activity     Days per week: Not on file     Minutes per session: Not on file     "Stress: Not at all   Relationships    Social connections     Talks on phone: More than three times a week     Gets together: Once a week     Attends Christianity service: Not on file     Active member of club or organization: Yes     Attends meetings of clubs or organizations: 1 to 4 times per year     Relationship status:    Other Topics Concern    Not on file   Social History Narrative    Not on file     Review of patient's allergies indicates:   Allergen Reactions    Tramadol Nausea And Vomiting     Review of Systems   Constitutional: Negative for chills, fatigue and fever.   HENT: Negative for mouth sores, rhinorrhea and sore throat.    Eyes: Negative for pain and redness.   Respiratory: Negative for cough and shortness of breath.    Cardiovascular: Negative for chest pain.   Gastrointestinal: Negative for abdominal pain, constipation, diarrhea, nausea and vomiting.   Genitourinary: Negative for dysuria and hematuria.   Musculoskeletal: Positive for arthralgias and joint swelling. Negative for myalgias.   Skin: Negative for rash.   Neurological: Negative for weakness, numbness and headaches.   Psychiatric/Behavioral: The patient is not nervous/anxious.          Objective:   BP (!) 143/89 Comment: denies headache or dizziness  Pulse 73   Ht 5' 5" (1.651 m)   Wt 108.1 kg (238 lb 5.1 oz)   BMI 39.66 kg/m²   Immunization History   Administered Date(s) Administered    Influenza - Quadrivalent - PF *Preferred* (6 months and older) 09/10/2020    Influenza Split 10/29/2008    Td (ADULT) 04/29/2008    Tdap 07/13/2018              Physical Exam   Constitutional: She is oriented to person, place, and time and well-developed, well-nourished, and in no distress.   HENT:   Head: Normocephalic and atraumatic.   Eyes: Pupils are equal, round, and reactive to light. Right eye exhibits no discharge.   Neck: Normal range of motion.   Cardiovascular: Normal rate, regular rhythm and normal heart sounds.  Exam reveals " no friction rub.    Pulmonary/Chest: Effort normal and breath sounds normal. No respiratory distress.   Abdominal: Soft. She exhibits no distension. There is no abdominal tenderness.   Lymphadenopathy:     She has no cervical adenopathy.   Neurological: She is alert and oriented to person, place, and time.   Skin: No rash noted. No erythema.     Psychiatric: Mood normal.   Musculoskeletal: Normal range of motion. No deformity or edema.      Comments: susannah wrists, mcps, pips no synvoitis, no tenderness, no warmth, good rom  susannah elbows shoulders no swelling or tenderness,full rom  susannah knees no effusion, no warmth, no tenderness, full rom                 Recent Results (from the past 336 hour(s))   CBC auto differential    Collection Time: 09/12/20  9:16 AM   Result Value Ref Range    WBC 4.64 3.90 - 12.70 K/uL    RBC 5.34 4.00 - 5.40 M/uL    Hemoglobin 13.3 12.0 - 16.0 g/dL    Hematocrit 45.1 37.0 - 48.5 %    Mean Corpuscular Volume 85 82 - 98 fL    Mean Corpuscular Hemoglobin 24.9 (L) 27.0 - 31.0 pg    Mean Corpuscular Hemoglobin Conc 29.5 (L) 32.0 - 36.0 g/dL    RDW 14.6 (H) 11.5 - 14.5 %    Platelets 285 150 - 350 K/uL    MPV 11.4 9.2 - 12.9 fL    Immature Granulocytes 0.2 0.0 - 0.5 %    Gran # (ANC) 2.1 1.8 - 7.7 K/uL    Immature Grans (Abs) 0.01 0.00 - 0.04 K/uL    Lymph # 1.8 1.0 - 4.8 K/uL    Mono # 0.4 0.3 - 1.0 K/uL    Eos # 0.3 0.0 - 0.5 K/uL    Baso # 0.03 0.00 - 0.20 K/uL    nRBC 0 0 /100 WBC    Gran% 45.3 38.0 - 73.0 %    Lymph% 39.2 18.0 - 48.0 %    Mono% 9.1 4.0 - 15.0 %    Eosinophil% 5.6 0.0 - 8.0 %    Basophil% 0.6 0.0 - 1.9 %    Differential Method Automated    Comprehensive metabolic panel    Collection Time: 09/12/20  9:16 AM   Result Value Ref Range    Sodium 141 136 - 145 mmol/L    Potassium 3.6 3.5 - 5.1 mmol/L    Chloride 103 95 - 110 mmol/L    CO2 29 23 - 29 mmol/L    Glucose 79 70 - 110 mg/dL    BUN, Bld 15 6 - 20 mg/dL    Creatinine 0.8 0.5 - 1.4 mg/dL    Calcium 9.3 8.7 - 10.5 mg/dL     Total Protein 7.8 6.0 - 8.4 g/dL    Albumin 3.9 3.5 - 5.2 g/dL    Total Bilirubin 0.4 0.1 - 1.0 mg/dL    Alkaline Phosphatase 55 55 - 135 U/L    AST 20 10 - 40 U/L    ALT 16 10 - 44 U/L    Anion Gap 9 8 - 16 mmol/L    eGFR if African American >60.0 >60 mL/min/1.73 m^2    eGFR if non African American >60.0 >60 mL/min/1.73 m^2   Rheumatoid factor    Collection Time: 09/12/20  9:16 AM   Result Value Ref Range    Rheumatoid Factor 59.0 (H) 0.0 - 15.0 IU/mL   CYCLIC CITRUL PEPTIDE ANTIBODY, IGG    Collection Time: 09/12/20  9:16 AM   Result Value Ref Range    CCP Antibodies 9.8 (H) <5.0 U/mL   Sedimentation rate    Collection Time: 09/12/20  9:16 AM   Result Value Ref Range    Sed Rate 18 0 - 36 mm/Hr   C-reactive protein    Collection Time: 09/12/20  9:16 AM   Result Value Ref Range    CRP 1.5 0.0 - 8.2 mg/L          Lab Results   Component Value Date    TBGOLDPLUS Negative 03/08/2019      Lab Results   Component Value Date    HEPAIGM Negative 03/08/2019    HEPBIGM Negative 03/08/2019    HEPCAB Negative 03/08/2019    Results for GIA PARRISH (MRN [id number]) as of 9/18/2020 07:20   Ref. Range 9/12/2020 09:16   CCP Antibodies Latest Ref Range: <5.0 U/mL 9.8 (H)   Rheumatoid Factor Latest Ref Range: 0.0 - 15.0 IU/mL 59.0 (H)     Knee XR 1/2019  Right knee: There is no radiographic evidence of acute osseous, articular, or soft tissue abnormality. Small tricompartmental marginal osteophytes are present with slight joint space narrowing in the medial tibiofemoral compartment.  Left knee:  There is no radiographic evidence of acute osseous, articular, or soft tissue abnormality. Small tricompartmental marginal osteophytes are present with slight joint space narrowing in the medial tibiofemoral compartment.     Hand XR 2/19   There are bilateral degenerative changes with multi articular marginal spur formation and subcortical cystic change.  Subtle cortical irregularity and suggested early erosive change seen adjacent to  cystic changes at the proximal articular surface of the left lunate bone.  No acute fracture or dislocation.  Assessment:       1. Rheumatoid arthritis involving multiple sites with positive rheumatoid factor    2. High risk medication use    3. Primary osteoarthritis involving multiple joints          Impression:   Seropositive RA w poss erosion on L hand xray : lost to f/u, started mtx last visit x 6 weeks then stopped, no synovitis on exam today     High risk med use: tolerated mtx well for the time she took    L knee djd: prev injections in the past, tylenol arthritis prn    Vaccines: just had flu vaccine     Low vit D: level 22, not taking vit d  Plan:         Xray hands and feet look for progression yearly  Resume mtx 15mg/wk and folic acid   Consider humira if needed but no indication at this time  voltaren gel for knee   Can inject knee any time     Start 50K ergocalciferol for low vit d    She will send us papers for intermittent leave we can fill out     Labs next time reg 4, tb gold. Hep B studies

## 2020-10-21 ENCOUNTER — PATIENT MESSAGE (OUTPATIENT)
Dept: RHEUMATOLOGY | Facility: CLINIC | Age: 55
End: 2020-10-21

## 2020-11-06 ENCOUNTER — PATIENT MESSAGE (OUTPATIENT)
Dept: RHEUMATOLOGY | Facility: CLINIC | Age: 55
End: 2020-11-06

## 2020-11-06 NOTE — TELEPHONE ENCOUNTER
Bess Luu PA-C  You 1 minute ago (9:44 AM)     Give to me when she faxes.  See if you can fill out any of it before me filling it out     Message text

## 2020-11-17 ENCOUNTER — TELEPHONE (OUTPATIENT)
Dept: RHEUMATOLOGY | Facility: CLINIC | Age: 55
End: 2020-11-17

## 2020-11-17 NOTE — TELEPHONE ENCOUNTER
Spoke with pt to confirm FMLA paperwork received, stated it should have been 3 pages, advised we only received 1, stated she will fax complete set of forms tonight, Verbalized understanding, advised pt we will complete and fax back once we get them, Verbalized understanding.

## 2020-11-18 ENCOUNTER — TELEPHONE (OUTPATIENT)
Dept: RHEUMATOLOGY | Facility: CLINIC | Age: 55
End: 2020-11-18

## 2020-12-14 ENCOUNTER — LAB VISIT (OUTPATIENT)
Dept: LAB | Facility: HOSPITAL | Age: 55
End: 2020-12-14
Attending: PHYSICIAN ASSISTANT
Payer: COMMERCIAL

## 2020-12-14 DIAGNOSIS — M05.79 RHEUMATOID ARTHRITIS INVOLVING MULTIPLE SITES WITH POSITIVE RHEUMATOID FACTOR: ICD-10-CM

## 2020-12-14 DIAGNOSIS — Z79.899 HIGH RISK MEDICATION USE: ICD-10-CM

## 2020-12-14 LAB
ALBUMIN SERPL BCP-MCNC: 4.2 G/DL (ref 3.5–5.2)
ALP SERPL-CCNC: 56 U/L (ref 55–135)
ALT SERPL W/O P-5'-P-CCNC: 17 U/L (ref 10–44)
ANION GAP SERPL CALC-SCNC: 14 MMOL/L (ref 8–16)
AST SERPL-CCNC: 20 U/L (ref 10–40)
BASOPHILS # BLD AUTO: 0.04 K/UL (ref 0–0.2)
BASOPHILS NFR BLD: 0.7 % (ref 0–1.9)
BILIRUB SERPL-MCNC: 0.4 MG/DL (ref 0.1–1)
BUN SERPL-MCNC: 16 MG/DL (ref 6–20)
CALCIUM SERPL-MCNC: 9.3 MG/DL (ref 8.7–10.5)
CHLORIDE SERPL-SCNC: 100 MMOL/L (ref 95–110)
CO2 SERPL-SCNC: 25 MMOL/L (ref 23–29)
CREAT SERPL-MCNC: 0.8 MG/DL (ref 0.5–1.4)
CRP SERPL-MCNC: 3.5 MG/L (ref 0–8.2)
DIFFERENTIAL METHOD: ABNORMAL
EOSINOPHIL # BLD AUTO: 0.2 K/UL (ref 0–0.5)
EOSINOPHIL NFR BLD: 3.9 % (ref 0–8)
ERYTHROCYTE [DISTWIDTH] IN BLOOD BY AUTOMATED COUNT: 16.2 % (ref 11.5–14.5)
ERYTHROCYTE [SEDIMENTATION RATE] IN BLOOD BY WESTERGREN METHOD: 22 MM/HR (ref 0–36)
EST. GFR  (AFRICAN AMERICAN): >60 ML/MIN/1.73 M^2
EST. GFR  (NON AFRICAN AMERICAN): >60 ML/MIN/1.73 M^2
GLUCOSE SERPL-MCNC: 98 MG/DL (ref 70–110)
HCT VFR BLD AUTO: 44.8 % (ref 37–48.5)
HGB BLD-MCNC: 13.3 G/DL (ref 12–16)
IMM GRANULOCYTES # BLD AUTO: 0.03 K/UL (ref 0–0.04)
IMM GRANULOCYTES NFR BLD AUTO: 0.5 % (ref 0–0.5)
LYMPHOCYTES # BLD AUTO: 1.6 K/UL (ref 1–4.8)
LYMPHOCYTES NFR BLD: 27.1 % (ref 18–48)
MCH RBC QN AUTO: 25.2 PG (ref 27–31)
MCHC RBC AUTO-ENTMCNC: 29.7 G/DL (ref 32–36)
MCV RBC AUTO: 85 FL (ref 82–98)
MONOCYTES # BLD AUTO: 0.5 K/UL (ref 0.3–1)
MONOCYTES NFR BLD: 8.1 % (ref 4–15)
NEUTROPHILS # BLD AUTO: 3.5 K/UL (ref 1.8–7.7)
NEUTROPHILS NFR BLD: 59.7 % (ref 38–73)
NRBC BLD-RTO: 0 /100 WBC
PLATELET # BLD AUTO: 360 K/UL (ref 150–350)
PMV BLD AUTO: 10.8 FL (ref 9.2–12.9)
POTASSIUM SERPL-SCNC: 3.7 MMOL/L (ref 3.5–5.1)
PROT SERPL-MCNC: 8.2 G/DL (ref 6–8.4)
RBC # BLD AUTO: 5.27 M/UL (ref 4–5.4)
SODIUM SERPL-SCNC: 139 MMOL/L (ref 136–145)
WBC # BLD AUTO: 5.9 K/UL (ref 3.9–12.7)

## 2020-12-14 PROCEDURE — 85025 COMPLETE CBC W/AUTO DIFF WBC: CPT

## 2020-12-14 PROCEDURE — 36415 COLL VENOUS BLD VENIPUNCTURE: CPT | Mod: PO

## 2020-12-14 PROCEDURE — 86140 C-REACTIVE PROTEIN: CPT

## 2020-12-14 PROCEDURE — 87340 HEPATITIS B SURFACE AG IA: CPT

## 2020-12-14 PROCEDURE — 80053 COMPREHEN METABOLIC PANEL: CPT

## 2020-12-14 PROCEDURE — 86480 TB TEST CELL IMMUN MEASURE: CPT

## 2020-12-14 PROCEDURE — 86704 HEP B CORE ANTIBODY TOTAL: CPT

## 2020-12-14 PROCEDURE — 86706 HEP B SURFACE ANTIBODY: CPT

## 2020-12-14 PROCEDURE — 85652 RBC SED RATE AUTOMATED: CPT

## 2020-12-15 LAB
HBV CORE AB SERPL QL IA: NEGATIVE
HBV SURFACE AB SER-ACNC: NEGATIVE M[IU]/ML
HBV SURFACE AG SERPL QL IA: NEGATIVE

## 2020-12-16 ENCOUNTER — PATIENT OUTREACH (OUTPATIENT)
Dept: ADMINISTRATIVE | Facility: OTHER | Age: 55
End: 2020-12-16

## 2020-12-16 LAB
GAMMA INTERFERON BACKGROUND BLD IA-ACNC: 0.06 IU/ML
M TB IFN-G CD4+ BCKGRND COR BLD-ACNC: -0.02 IU/ML
MITOGEN IGNF BCKGRD COR BLD-ACNC: 7.09 IU/ML
TB GOLD PLUS: NEGATIVE
TB2 - NIL: -0.03 IU/ML

## 2020-12-17 ENCOUNTER — TELEPHONE (OUTPATIENT)
Dept: RHEUMATOLOGY | Facility: CLINIC | Age: 55
End: 2020-12-17

## 2020-12-21 ENCOUNTER — DOCUMENTATION ONLY (OUTPATIENT)
Dept: RHEUMATOLOGY | Facility: CLINIC | Age: 55
End: 2020-12-21

## 2020-12-21 NOTE — PROGRESS NOTES
Subjective:       Patient ID: Jake Hoskins is a 55 y.o. female.    Chief Complaint: Rheumatoid Arthritis (left knee pain )    Jake is here for follow up, seen by dr birch in the past and diagnosed seropositive RA.    She has +RF +CCP with early poss erosions on her L hand xrays.  She was started on prednisone which improved her symtpoms.   Started and remains on mtx 15mg/wk     She has L knee djd.  She has chronic L foot plantar fasciitis.  She takes tylenol arthritis for her knee pain.  She has mild djd on xrays knee, injections in the past have helped    She has low vit D, started weekly 50K last visit    No major complaints since last seen. Has been consistently taking her medications, no joint flares no real tender joints.  Used voltaren gel but didn't like it.     Past Medical History:   Diagnosis Date    Allergy     Anemia     Plantar fasciitis of left foot      History reviewed. No pertinent surgical history.  Family History   Problem Relation Age of Onset    Hypertension Mother     Asthma Mother     Cancer Mother         unknown    Hypertension Father     Heart disease Father     Hypertension Sister      Social History     Socioeconomic History    Marital status:      Spouse name: Not on file    Number of children: 2    Years of education: Not on file    Highest education level: Not on file   Occupational History    Occupation: [employer]     Employer: [employer]   Social Needs    Financial resource strain: Hard    Food insecurity     Worry: Never true     Inability: Never true    Transportation needs     Medical: No     Non-medical: No   Tobacco Use    Smoking status: Never Smoker    Smokeless tobacco: Never Used   Substance and Sexual Activity    Alcohol use: No     Frequency: Never     Drinks per session: Patient refused     Binge frequency: Never    Drug use: No    Sexual activity: Never   Lifestyle    Physical activity     Days per week: Not on file     Minutes per session:  Not on file    Stress: Not at all   Relationships    Social connections     Talks on phone: More than three times a week     Gets together: Once a week     Attends Adventism service: Not on file     Active member of club or organization: Yes     Attends meetings of clubs or organizations: 1 to 4 times per year     Relationship status:    Other Topics Concern    Not on file   Social History Narrative    Not on file     Review of patient's allergies indicates:   Allergen Reactions    Tramadol Nausea And Vomiting     Review of Systems   Constitutional: Negative for chills, fatigue and fever.   HENT: Negative for mouth sores, rhinorrhea and sore throat.    Eyes: Negative for pain and redness.   Respiratory: Negative for cough and shortness of breath.    Cardiovascular: Negative for chest pain.   Gastrointestinal: Negative for abdominal pain, constipation, diarrhea, nausea and vomiting.   Genitourinary: Negative for dysuria and hematuria.   Musculoskeletal: Positive for arthralgias and joint swelling. Negative for myalgias.   Skin: Negative for rash.   Neurological: Negative for weakness, numbness and headaches.   Psychiatric/Behavioral: The patient is not nervous/anxious.          Objective:   /85   Pulse 71   Wt 106.6 kg (235 lb 0.2 oz)   BMI 39.11 kg/m²   Immunization History   Administered Date(s) Administered    Influenza - Quadrivalent - PF *Preferred* (6 months and older) 09/10/2020    Influenza Split 10/29/2008    Td (ADULT) 04/29/2008    Tdap 07/13/2018              Physical Exam   Constitutional: She is oriented to person, place, and time and well-developed, well-nourished, and in no distress.   HENT:   Head: Normocephalic and atraumatic.   Eyes: Pupils are equal, round, and reactive to light. Right eye exhibits no discharge.   Neck: Normal range of motion.   Cardiovascular: Normal rate, regular rhythm and normal heart sounds.  Exam reveals no friction rub.    Pulmonary/Chest: Effort  normal and breath sounds normal. No respiratory distress.   Abdominal: Soft. She exhibits no distension. There is no abdominal tenderness.   Lymphadenopathy:     She has no cervical adenopathy.   Neurological: She is alert and oriented to person, place, and time.   Skin: No rash noted. No erythema.     Psychiatric: Mood normal.   Musculoskeletal: Normal range of motion. No deformity or edema.      Comments: susannah wrists, mcps, pips no synvoitis, no tenderness, no warmth, good rom  susannah elbows shoulders no swelling or tenderness,full rom  susannah knees no effusion, no warmth, no tenderness, full rom                 Recent Results (from the past 336 hour(s))   Hepatitis B Core Antibody, Total    Collection Time: 12/14/20  8:00 AM   Result Value Ref Range    Hep B Core Total Ab Negative    Hepatitis B Surface Ab, Qualitative    Collection Time: 12/14/20  8:00 AM   Result Value Ref Range    Hep B S Ab Negative    Hepatitis B Surface Antigen    Collection Time: 12/14/20  8:00 AM   Result Value Ref Range    Hepatitis B Surface Ag Negative Negative   Quantiferon Gold TB    Collection Time: 12/14/20  8:00 AM   Result Value Ref Range    NIL 0.060 See text IU/mL    TB1 - Nil -0.020 See text IU/mL    TB2 - Nil -0.030 See text IU/mL    Mitogen - Nil 7.090 See text IU/mL    TB Gold Plus Negative    CBC auto differential    Collection Time: 12/14/20  8:00 AM   Result Value Ref Range    WBC 5.90 3.90 - 12.70 K/uL    RBC 5.27 4.00 - 5.40 M/uL    Hemoglobin 13.3 12.0 - 16.0 g/dL    Hematocrit 44.8 37.0 - 48.5 %    MCV 85 82 - 98 fL    MCH 25.2 (L) 27.0 - 31.0 pg    MCHC 29.7 (L) 32.0 - 36.0 g/dL    RDW 16.2 (H) 11.5 - 14.5 %    Platelets 360 (H) 150 - 350 K/uL    MPV 10.8 9.2 - 12.9 fL    Immature Granulocytes 0.5 0.0 - 0.5 %    Gran # (ANC) 3.5 1.8 - 7.7 K/uL    Immature Grans (Abs) 0.03 0.00 - 0.04 K/uL    Lymph # 1.6 1.0 - 4.8 K/uL    Mono # 0.5 0.3 - 1.0 K/uL    Eos # 0.2 0.0 - 0.5 K/uL    Baso # 0.04 0.00 - 0.20 K/uL    nRBC 0 0 /100  WBC    Gran % 59.7 38.0 - 73.0 %    Lymph % 27.1 18.0 - 48.0 %    Mono % 8.1 4.0 - 15.0 %    Eosinophil % 3.9 0.0 - 8.0 %    Basophil % 0.7 0.0 - 1.9 %    Differential Method Automated    Comprehensive metabolic panel    Collection Time: 12/14/20  8:00 AM   Result Value Ref Range    Sodium 139 136 - 145 mmol/L    Potassium 3.7 3.5 - 5.1 mmol/L    Chloride 100 95 - 110 mmol/L    CO2 25 23 - 29 mmol/L    Glucose 98 70 - 110 mg/dL    BUN 16 6 - 20 mg/dL    Creatinine 0.8 0.5 - 1.4 mg/dL    Calcium 9.3 8.7 - 10.5 mg/dL    Total Protein 8.2 6.0 - 8.4 g/dL    Albumin 4.2 3.5 - 5.2 g/dL    Total Bilirubin 0.4 0.1 - 1.0 mg/dL    Alkaline Phosphatase 56 55 - 135 U/L    AST 20 10 - 40 U/L    ALT 17 10 - 44 U/L    Anion Gap 14 8 - 16 mmol/L    eGFR if African American >60 >60 mL/min/1.73 m^2    eGFR if non African American >60 >60 mL/min/1.73 m^2   C-Reactive Protein    Collection Time: 12/14/20  8:00 AM   Result Value Ref Range    CRP 3.5 0.0 - 8.2 mg/L   Sedimentation rate    Collection Time: 12/14/20  8:00 AM   Result Value Ref Range    Sed Rate 22 0 - 36 mm/Hr          Lab Results   Component Value Date    TBGOLDPLUS Negative 12/14/2020      Lab Results   Component Value Date    HEPAIGM Negative 03/08/2019    HEPBIGM Negative 03/08/2019    HEPBCAB Negative 12/14/2020    HEPCAB Negative 03/08/2019    Results for GIA PARRISH (MRN [id number]) as of 9/18/2020 07:20   Ref. Range 9/12/2020 09:16   CCP Antibodies Latest Ref Range: <5.0 U/mL 9.8 (H)   Rheumatoid Factor Latest Ref Range: 0.0 - 15.0 IU/mL 59.0 (H)     Knee XR 1/2019  Right knee: There is no radiographic evidence of acute osseous, articular, or soft tissue abnormality. Small tricompartmental marginal osteophytes are present with slight joint space narrowing in the medial tibiofemoral compartment.  Left knee:  There is no radiographic evidence of acute osseous, articular, or soft tissue abnormality. Small tricompartmental marginal osteophytes are present with  slight joint space narrowing in the medial tibiofemoral compartment.     Hand XR 2/19   There are bilateral degenerative changes with multi articular marginal spur formation and subcortical cystic change.  Subtle cortical irregularity and suggested early erosive change seen adjacent to cystic changes at the proximal articular surface of the left lunate bone.  No acute fracture or dislocation.  Assessment:       1. Rheumatoid arthritis involving multiple sites with positive rheumatoid factor    2. High risk medication use    3. Vitamin D deficiency    4. Primary osteoarthritis of left knee          Impression:   Seropositive RA w poss erosion on L hand xray : lost to f/u, resumed mtx last visit stable joint today     High risk med use: tolerated mtx well for the time she took    L knee djd: prev injections in the past, tylenol arthritis prn    Vaccines: had flu vaccine, needs hep b    Low vit D: level 22, started 50K weekly   Plan:         Cont mtx 15mg/wk and folic acid   Consider humira if needed but no indication at this time  Can inject knee any time not needed    cont 50K ergocalciferol for low vit d    Xray hand 9.2021      F/u in 4 mos with reg 4 vit D, start heplisav next visit

## 2020-12-22 ENCOUNTER — TELEPHONE (OUTPATIENT)
Dept: RHEUMATOLOGY | Facility: CLINIC | Age: 55
End: 2020-12-22

## 2020-12-23 ENCOUNTER — OFFICE VISIT (OUTPATIENT)
Dept: RHEUMATOLOGY | Facility: CLINIC | Age: 55
End: 2020-12-23
Payer: COMMERCIAL

## 2020-12-23 VITALS
HEART RATE: 71 BPM | BODY MASS INDEX: 39.11 KG/M2 | WEIGHT: 235 LBS | SYSTOLIC BLOOD PRESSURE: 135 MMHG | DIASTOLIC BLOOD PRESSURE: 85 MMHG

## 2020-12-23 DIAGNOSIS — E55.9 VITAMIN D DEFICIENCY: ICD-10-CM

## 2020-12-23 DIAGNOSIS — M17.12 PRIMARY OSTEOARTHRITIS OF LEFT KNEE: ICD-10-CM

## 2020-12-23 DIAGNOSIS — M05.79 RHEUMATOID ARTHRITIS INVOLVING MULTIPLE SITES WITH POSITIVE RHEUMATOID FACTOR: Primary | ICD-10-CM

## 2020-12-23 DIAGNOSIS — Z79.899 HIGH RISK MEDICATION USE: ICD-10-CM

## 2020-12-23 PROCEDURE — 3079F DIAST BP 80-89 MM HG: CPT | Mod: CPTII,S$GLB,, | Performed by: PHYSICIAN ASSISTANT

## 2020-12-23 PROCEDURE — 99214 PR OFFICE/OUTPT VISIT, EST, LEVL IV, 30-39 MIN: ICD-10-PCS | Mod: S$GLB,,, | Performed by: PHYSICIAN ASSISTANT

## 2020-12-23 PROCEDURE — 99999 PR PBB SHADOW E&M-EST. PATIENT-LVL IV: ICD-10-PCS | Mod: PBBFAC,,, | Performed by: PHYSICIAN ASSISTANT

## 2020-12-23 PROCEDURE — 3075F SYST BP GE 130 - 139MM HG: CPT | Mod: CPTII,S$GLB,, | Performed by: PHYSICIAN ASSISTANT

## 2020-12-23 PROCEDURE — 3079F PR MOST RECENT DIASTOLIC BLOOD PRESSURE 80-89 MM HG: ICD-10-PCS | Mod: CPTII,S$GLB,, | Performed by: PHYSICIAN ASSISTANT

## 2020-12-23 PROCEDURE — 99214 OFFICE O/P EST MOD 30 MIN: CPT | Mod: S$GLB,,, | Performed by: PHYSICIAN ASSISTANT

## 2020-12-23 PROCEDURE — 1125F PR PAIN SEVERITY QUANTIFIED, PAIN PRESENT: ICD-10-PCS | Mod: S$GLB,,, | Performed by: PHYSICIAN ASSISTANT

## 2020-12-23 PROCEDURE — 99999 PR PBB SHADOW E&M-EST. PATIENT-LVL IV: CPT | Mod: PBBFAC,,, | Performed by: PHYSICIAN ASSISTANT

## 2020-12-23 PROCEDURE — 3008F BODY MASS INDEX DOCD: CPT | Mod: CPTII,S$GLB,, | Performed by: PHYSICIAN ASSISTANT

## 2020-12-23 PROCEDURE — 3008F PR BODY MASS INDEX (BMI) DOCUMENTED: ICD-10-PCS | Mod: CPTII,S$GLB,, | Performed by: PHYSICIAN ASSISTANT

## 2020-12-23 PROCEDURE — 3075F PR MOST RECENT SYSTOLIC BLOOD PRESS GE 130-139MM HG: ICD-10-PCS | Mod: CPTII,S$GLB,, | Performed by: PHYSICIAN ASSISTANT

## 2020-12-23 PROCEDURE — 1125F AMNT PAIN NOTED PAIN PRSNT: CPT | Mod: S$GLB,,, | Performed by: PHYSICIAN ASSISTANT

## 2020-12-23 RX ORDER — METHOTREXATE 2.5 MG/1
TABLET ORAL
Qty: 30 TABLET | Refills: 5 | Status: SHIPPED | OUTPATIENT
Start: 2020-12-23 | End: 2021-05-03 | Stop reason: SDUPTHER

## 2020-12-23 ASSESSMENT — ROUTINE ASSESSMENT OF PATIENT INDEX DATA (RAPID3): MDHAQ FUNCTION SCORE: 0.4

## 2020-12-23 NOTE — PATIENT INSTRUCTIONS
Labs look good     Cont methotrexate 15mg/wk  Cont folic acid   Cont vit D weekly     Start hep B vaccine next time, so hold mtx that week

## 2021-01-19 ENCOUNTER — TELEPHONE (OUTPATIENT)
Dept: ENDOSCOPY | Facility: HOSPITAL | Age: 56
End: 2021-01-19

## 2021-01-20 ENCOUNTER — TELEPHONE (OUTPATIENT)
Dept: ENDOSCOPY | Facility: HOSPITAL | Age: 56
End: 2021-01-20

## 2021-01-20 ENCOUNTER — TELEPHONE (OUTPATIENT)
Dept: SURGERY | Facility: CLINIC | Age: 56
End: 2021-01-20

## 2021-01-29 ENCOUNTER — TELEPHONE (OUTPATIENT)
Dept: RHEUMATOLOGY | Facility: CLINIC | Age: 56
End: 2021-01-29

## 2021-02-04 ENCOUNTER — DOCUMENTATION ONLY (OUTPATIENT)
Dept: RHEUMATOLOGY | Facility: CLINIC | Age: 56
End: 2021-02-04

## 2021-02-11 ENCOUNTER — TELEPHONE (OUTPATIENT)
Dept: ADMINISTRATIVE | Facility: HOSPITAL | Age: 56
End: 2021-02-11

## 2021-02-12 ENCOUNTER — PATIENT MESSAGE (OUTPATIENT)
Dept: INTERNAL MEDICINE | Facility: CLINIC | Age: 56
End: 2021-02-12

## 2021-02-26 ENCOUNTER — OFFICE VISIT (OUTPATIENT)
Dept: OBSTETRICS AND GYNECOLOGY | Facility: CLINIC | Age: 56
End: 2021-02-26
Payer: COMMERCIAL

## 2021-02-26 ENCOUNTER — LAB VISIT (OUTPATIENT)
Dept: LAB | Facility: HOSPITAL | Age: 56
End: 2021-02-26
Attending: FAMILY MEDICINE
Payer: COMMERCIAL

## 2021-02-26 VITALS
BODY MASS INDEX: 38.53 KG/M2 | HEIGHT: 65 IN | SYSTOLIC BLOOD PRESSURE: 138 MMHG | DIASTOLIC BLOOD PRESSURE: 96 MMHG | WEIGHT: 231.25 LBS

## 2021-02-26 DIAGNOSIS — N95.0 POSTMENOPAUSAL BLEEDING: ICD-10-CM

## 2021-02-26 DIAGNOSIS — N95.0 POSTMENOPAUSAL BLEEDING: Primary | ICD-10-CM

## 2021-02-26 DIAGNOSIS — Z12.31 ENCOUNTER FOR SCREENING MAMMOGRAM FOR BREAST CANCER: ICD-10-CM

## 2021-02-26 LAB
BASOPHILS # BLD AUTO: 0.04 K/UL (ref 0–0.2)
BASOPHILS NFR BLD: 0.7 % (ref 0–1.9)
DIFFERENTIAL METHOD: ABNORMAL
EOSINOPHIL # BLD AUTO: 0.2 K/UL (ref 0–0.5)
EOSINOPHIL NFR BLD: 3.7 % (ref 0–8)
ERYTHROCYTE [DISTWIDTH] IN BLOOD BY AUTOMATED COUNT: 14.6 % (ref 11.5–14.5)
HCT VFR BLD AUTO: 40.1 % (ref 37–48.5)
HGB BLD-MCNC: 12.4 G/DL (ref 12–16)
IMM GRANULOCYTES # BLD AUTO: 0.02 K/UL (ref 0–0.04)
IMM GRANULOCYTES NFR BLD AUTO: 0.3 % (ref 0–0.5)
LYMPHOCYTES # BLD AUTO: 2 K/UL (ref 1–4.8)
LYMPHOCYTES NFR BLD: 33.4 % (ref 18–48)
MCH RBC QN AUTO: 26.3 PG (ref 27–31)
MCHC RBC AUTO-ENTMCNC: 30.9 G/DL (ref 32–36)
MCV RBC AUTO: 85 FL (ref 82–98)
MONOCYTES # BLD AUTO: 0.6 K/UL (ref 0.3–1)
MONOCYTES NFR BLD: 9.5 % (ref 4–15)
NEUTROPHILS # BLD AUTO: 3.1 K/UL (ref 1.8–7.7)
NEUTROPHILS NFR BLD: 52.4 % (ref 38–73)
NRBC BLD-RTO: 0 /100 WBC
PLATELET # BLD AUTO: 330 K/UL (ref 150–350)
PMV BLD AUTO: 11.6 FL (ref 9.2–12.9)
RBC # BLD AUTO: 4.71 M/UL (ref 4–5.4)
WBC # BLD AUTO: 5.98 K/UL (ref 3.9–12.7)

## 2021-02-26 PROCEDURE — 3008F BODY MASS INDEX DOCD: CPT | Mod: CPTII,S$GLB,, | Performed by: NURSE PRACTITIONER

## 2021-02-26 PROCEDURE — 58100 PR BIOPSY OF UTERUS LINING: ICD-10-PCS | Mod: S$GLB,,, | Performed by: NURSE PRACTITIONER

## 2021-02-26 PROCEDURE — 81025 PR  URINE PREGNANCY TEST: ICD-10-PCS | Mod: S$GLB,,, | Performed by: NURSE PRACTITIONER

## 2021-02-26 PROCEDURE — 3008F PR BODY MASS INDEX (BMI) DOCUMENTED: ICD-10-PCS | Mod: CPTII,S$GLB,, | Performed by: NURSE PRACTITIONER

## 2021-02-26 PROCEDURE — 58100 BIOPSY OF UTERUS LINING: CPT | Mod: S$GLB,,, | Performed by: NURSE PRACTITIONER

## 2021-02-26 PROCEDURE — 3080F DIAST BP >= 90 MM HG: CPT | Mod: CPTII,S$GLB,, | Performed by: NURSE PRACTITIONER

## 2021-02-26 PROCEDURE — 36415 COLL VENOUS BLD VENIPUNCTURE: CPT

## 2021-02-26 PROCEDURE — 3075F SYST BP GE 130 - 139MM HG: CPT | Mod: CPTII,S$GLB,, | Performed by: NURSE PRACTITIONER

## 2021-02-26 PROCEDURE — 84443 ASSAY THYROID STIM HORMONE: CPT

## 2021-02-26 PROCEDURE — 99999 PR PBB SHADOW E&M-EST. PATIENT-LVL IV: CPT | Mod: PBBFAC,,, | Performed by: NURSE PRACTITIONER

## 2021-02-26 PROCEDURE — 99214 PR OFFICE/OUTPT VISIT, EST, LEVL IV, 30-39 MIN: ICD-10-PCS | Mod: 25,S$GLB,, | Performed by: NURSE PRACTITIONER

## 2021-02-26 PROCEDURE — 3080F PR MOST RECENT DIASTOLIC BLOOD PRESSURE >= 90 MM HG: ICD-10-PCS | Mod: CPTII,S$GLB,, | Performed by: NURSE PRACTITIONER

## 2021-02-26 PROCEDURE — 88305 TISSUE EXAM BY PATHOLOGIST: CPT | Performed by: PATHOLOGY

## 2021-02-26 PROCEDURE — 88305 TISSUE EXAM BY PATHOLOGIST: ICD-10-PCS | Mod: 26,,, | Performed by: PATHOLOGY

## 2021-02-26 PROCEDURE — 85025 COMPLETE CBC W/AUTO DIFF WBC: CPT

## 2021-02-26 PROCEDURE — 99214 OFFICE O/P EST MOD 30 MIN: CPT | Mod: 25,S$GLB,, | Performed by: NURSE PRACTITIONER

## 2021-02-26 PROCEDURE — 99999 PR PBB SHADOW E&M-EST. PATIENT-LVL IV: ICD-10-PCS | Mod: PBBFAC,,, | Performed by: NURSE PRACTITIONER

## 2021-02-26 PROCEDURE — 3075F PR MOST RECENT SYSTOLIC BLOOD PRESS GE 130-139MM HG: ICD-10-PCS | Mod: CPTII,S$GLB,, | Performed by: NURSE PRACTITIONER

## 2021-02-26 PROCEDURE — 88305 TISSUE EXAM BY PATHOLOGIST: CPT | Mod: 26,,, | Performed by: PATHOLOGY

## 2021-02-26 PROCEDURE — 81025 URINE PREGNANCY TEST: CPT | Mod: S$GLB,,, | Performed by: NURSE PRACTITIONER

## 2021-02-26 RX ORDER — MEDROXYPROGESTERONE ACETATE 10 MG/1
10 TABLET ORAL DAILY
Qty: 10 TABLET | Refills: 0 | Status: SHIPPED | OUTPATIENT
Start: 2021-02-26 | End: 2021-03-23 | Stop reason: SDUPTHER

## 2021-02-27 LAB — TSH SERPL DL<=0.005 MIU/L-ACNC: 0.75 UIU/ML (ref 0.4–4)

## 2021-03-02 ENCOUNTER — TELEPHONE (OUTPATIENT)
Dept: RHEUMATOLOGY | Facility: CLINIC | Age: 56
End: 2021-03-02

## 2021-03-02 ENCOUNTER — PATIENT MESSAGE (OUTPATIENT)
Dept: RHEUMATOLOGY | Facility: CLINIC | Age: 56
End: 2021-03-02

## 2021-03-02 LAB
FINAL PATHOLOGIC DIAGNOSIS: NORMAL
GROSS: NORMAL

## 2021-03-03 ENCOUNTER — TELEPHONE (OUTPATIENT)
Dept: ENDOSCOPY | Facility: HOSPITAL | Age: 56
End: 2021-03-03

## 2021-03-03 DIAGNOSIS — Z13.9 SCREENING PROCEDURE: Primary | ICD-10-CM

## 2021-03-03 RX ORDER — SODIUM, POTASSIUM,MAG SULFATES 17.5-3.13G
1 SOLUTION, RECONSTITUTED, ORAL ORAL DAILY
Qty: 1 KIT | Refills: 0 | Status: SHIPPED | OUTPATIENT
Start: 2021-03-03 | End: 2021-03-05

## 2021-03-04 ENCOUNTER — TELEPHONE (OUTPATIENT)
Dept: RADIOLOGY | Facility: HOSPITAL | Age: 56
End: 2021-03-04

## 2021-03-07 ENCOUNTER — PATIENT MESSAGE (OUTPATIENT)
Dept: OBSTETRICS AND GYNECOLOGY | Facility: CLINIC | Age: 56
End: 2021-03-07

## 2021-03-15 DIAGNOSIS — I10 ESSENTIAL HYPERTENSION: ICD-10-CM

## 2021-03-17 ENCOUNTER — TELEPHONE (OUTPATIENT)
Dept: RADIOLOGY | Facility: HOSPITAL | Age: 56
End: 2021-03-17

## 2021-03-18 ENCOUNTER — HOSPITAL ENCOUNTER (OUTPATIENT)
Dept: RADIOLOGY | Facility: HOSPITAL | Age: 56
Discharge: HOME | End: 2021-03-18
Attending: NURSE PRACTITIONER
Payer: COMMERCIAL

## 2021-03-18 DIAGNOSIS — N95.0 POSTMENOPAUSAL BLEEDING: Primary | ICD-10-CM

## 2021-03-18 DIAGNOSIS — N95.0 POSTMENOPAUSAL BLEEDING: ICD-10-CM

## 2021-03-18 PROCEDURE — 76856 US EXAM PELVIC COMPLETE: CPT | Mod: 26,,, | Performed by: RADIOLOGY

## 2021-03-18 PROCEDURE — 76856 US PELVIS COMP WITH TRANSVAG NON-OB (XPD): ICD-10-PCS | Mod: 26,,, | Performed by: RADIOLOGY

## 2021-03-18 PROCEDURE — 76856 US EXAM PELVIC COMPLETE: CPT | Mod: TC

## 2021-03-18 PROCEDURE — 76830 TRANSVAGINAL US NON-OB: CPT | Mod: 26,,, | Performed by: RADIOLOGY

## 2021-03-18 PROCEDURE — 76830 US PELVIS COMP WITH TRANSVAG NON-OB (XPD): ICD-10-PCS | Mod: 26,,, | Performed by: RADIOLOGY

## 2021-03-18 RX ORDER — MISOPROSTOL 200 UG/1
200 TABLET ORAL ONCE
Qty: 1 TABLET | Refills: 0 | Status: SHIPPED | OUTPATIENT
Start: 2021-03-18 | End: 2021-03-22 | Stop reason: SDUPTHER

## 2021-03-18 RX ORDER — HYDROCHLOROTHIAZIDE 12.5 MG/1
CAPSULE ORAL
Qty: 90 CAPSULE | Refills: 0 | Status: SHIPPED | OUTPATIENT
Start: 2021-03-18 | End: 2021-06-06 | Stop reason: SDUPTHER

## 2021-03-19 ENCOUNTER — PATIENT MESSAGE (OUTPATIENT)
Dept: OBSTETRICS AND GYNECOLOGY | Facility: CLINIC | Age: 56
End: 2021-03-19

## 2021-03-19 DIAGNOSIS — I10 ESSENTIAL HYPERTENSION: ICD-10-CM

## 2021-03-22 ENCOUNTER — TELEPHONE (OUTPATIENT)
Dept: OBSTETRICS AND GYNECOLOGY | Facility: CLINIC | Age: 56
End: 2021-03-22

## 2021-03-22 DIAGNOSIS — N95.0 POSTMENOPAUSAL BLEEDING: ICD-10-CM

## 2021-03-22 RX ORDER — MISOPROSTOL 200 UG/1
200 TABLET ORAL ONCE
Qty: 1 TABLET | Refills: 0 | Status: ON HOLD | OUTPATIENT
Start: 2021-03-22 | End: 2021-05-18 | Stop reason: HOSPADM

## 2021-03-23 ENCOUNTER — PROCEDURE VISIT (OUTPATIENT)
Dept: OBSTETRICS AND GYNECOLOGY | Facility: CLINIC | Age: 56
End: 2021-03-23
Payer: COMMERCIAL

## 2021-03-23 VITALS
DIASTOLIC BLOOD PRESSURE: 84 MMHG | HEIGHT: 65 IN | BODY MASS INDEX: 38.64 KG/M2 | WEIGHT: 231.94 LBS | SYSTOLIC BLOOD PRESSURE: 132 MMHG

## 2021-03-23 DIAGNOSIS — R93.89 ENDOMETRIAL THICKENING ON ULTRASOUND: ICD-10-CM

## 2021-03-23 DIAGNOSIS — N95.0 POSTMENOPAUSAL BLEEDING: Primary | ICD-10-CM

## 2021-03-23 PROCEDURE — 88305 TISSUE EXAM BY PATHOLOGIST: CPT | Performed by: PATHOLOGY

## 2021-03-23 PROCEDURE — 58100 BIOPSY OF UTERUS LINING: CPT | Mod: S$GLB,,, | Performed by: NURSE PRACTITIONER

## 2021-03-23 PROCEDURE — 88305 TISSUE EXAM BY PATHOLOGIST: ICD-10-PCS | Mod: 26,,, | Performed by: PATHOLOGY

## 2021-03-23 PROCEDURE — 88305 TISSUE EXAM BY PATHOLOGIST: CPT | Mod: 26,,, | Performed by: PATHOLOGY

## 2021-03-23 PROCEDURE — 58100 ENDOMETRIAL BIOPSY: ICD-10-PCS | Mod: S$GLB,,, | Performed by: NURSE PRACTITIONER

## 2021-03-23 RX ORDER — MEDROXYPROGESTERONE ACETATE 10 MG/1
10 TABLET ORAL DAILY
Qty: 30 TABLET | Refills: 0 | Status: SHIPPED | OUTPATIENT
Start: 2021-03-23 | End: 2021-04-30 | Stop reason: SDUPTHER

## 2021-03-23 RX ORDER — HYDROCHLOROTHIAZIDE 12.5 MG/1
12.5 CAPSULE ORAL DAILY
Qty: 90 CAPSULE | Refills: 0 | OUTPATIENT
Start: 2021-03-23

## 2021-03-25 LAB
FINAL PATHOLOGIC DIAGNOSIS: NORMAL
GROSS: NORMAL

## 2021-04-05 ENCOUNTER — TELEPHONE (OUTPATIENT)
Dept: ENDOSCOPY | Facility: HOSPITAL | Age: 56
End: 2021-04-05

## 2021-04-06 ENCOUNTER — TELEPHONE (OUTPATIENT)
Dept: ENDOSCOPY | Facility: HOSPITAL | Age: 56
End: 2021-04-06

## 2021-04-06 ENCOUNTER — LAB VISIT (OUTPATIENT)
Dept: OTOLARYNGOLOGY | Facility: CLINIC | Age: 56
End: 2021-04-06
Payer: COMMERCIAL

## 2021-04-06 DIAGNOSIS — Z13.9 SCREENING PROCEDURE: ICD-10-CM

## 2021-04-06 PROCEDURE — U0003 INFECTIOUS AGENT DETECTION BY NUCLEIC ACID (DNA OR RNA); SEVERE ACUTE RESPIRATORY SYNDROME CORONAVIRUS 2 (SARS-COV-2) (CORONAVIRUS DISEASE [COVID-19]), AMPLIFIED PROBE TECHNIQUE, MAKING USE OF HIGH THROUGHPUT TECHNOLOGIES AS DESCRIBED BY CMS-2020-01-R: HCPCS | Performed by: COLON & RECTAL SURGERY

## 2021-04-06 PROCEDURE — U0005 INFEC AGEN DETEC AMPLI PROBE: HCPCS | Performed by: COLON & RECTAL SURGERY

## 2021-04-07 LAB — SARS-COV-2 RNA RESP QL NAA+PROBE: NOT DETECTED

## 2021-04-08 ENCOUNTER — TELEPHONE (OUTPATIENT)
Dept: SURGERY | Facility: CLINIC | Age: 56
End: 2021-04-08

## 2021-04-09 ENCOUNTER — HOSPITAL ENCOUNTER (OUTPATIENT)
Facility: HOSPITAL | Age: 56
Discharge: HOME | End: 2021-04-09
Attending: COLON & RECTAL SURGERY | Admitting: COLON & RECTAL SURGERY
Payer: COMMERCIAL

## 2021-04-09 ENCOUNTER — ANESTHESIA (OUTPATIENT)
Dept: ENDOSCOPY | Facility: HOSPITAL | Age: 56
End: 2021-04-09
Payer: COMMERCIAL

## 2021-04-09 ENCOUNTER — ANESTHESIA EVENT (OUTPATIENT)
Dept: ENDOSCOPY | Facility: HOSPITAL | Age: 56
End: 2021-04-09
Payer: COMMERCIAL

## 2021-04-09 DIAGNOSIS — Z12.11 SCREENING FOR COLON CANCER: ICD-10-CM

## 2021-04-09 PROCEDURE — 88305 TISSUE EXAM BY PATHOLOGIST: CPT | Mod: 26,,, | Performed by: PATHOLOGY

## 2021-04-09 PROCEDURE — 45380 COLONOSCOPY AND BIOPSY: CPT | Mod: 59,,, | Performed by: COLON & RECTAL SURGERY

## 2021-04-09 PROCEDURE — 45380 COLONOSCOPY AND BIOPSY: CPT | Performed by: COLON & RECTAL SURGERY

## 2021-04-09 PROCEDURE — PATHNN PATH DEFICIENCY: ICD-10-PCS | Mod: ,,, | Performed by: COLON & RECTAL SURGERY

## 2021-04-09 PROCEDURE — 45385 COLONOSCOPY W/LESION REMOVAL: CPT | Mod: 33,,, | Performed by: COLON & RECTAL SURGERY

## 2021-04-09 PROCEDURE — 37000009 HC ANESTHESIA EA ADD 15 MINS: Performed by: COLON & RECTAL SURGERY

## 2021-04-09 PROCEDURE — 45385 COLONOSCOPY W/LESION REMOVAL: CPT | Performed by: COLON & RECTAL SURGERY

## 2021-04-09 PROCEDURE — 88305 TISSUE EXAM BY PATHOLOGIST: ICD-10-PCS | Mod: 26,,, | Performed by: PATHOLOGY

## 2021-04-09 PROCEDURE — 27201089 HC SNARE, DISP (ANY): Performed by: COLON & RECTAL SURGERY

## 2021-04-09 PROCEDURE — 63600175 PHARM REV CODE 636 W HCPCS: Performed by: NURSE ANESTHETIST, CERTIFIED REGISTERED

## 2021-04-09 PROCEDURE — 45385 PR COLONOSCOPY,REMV LESN,SNARE: ICD-10-PCS | Mod: 33,,, | Performed by: COLON & RECTAL SURGERY

## 2021-04-09 PROCEDURE — 88305 TISSUE EXAM BY PATHOLOGIST: CPT | Mod: 59 | Performed by: PATHOLOGY

## 2021-04-09 PROCEDURE — 27201012 HC FORCEPS, HOT/COLD, DISP: Performed by: COLON & RECTAL SURGERY

## 2021-04-09 PROCEDURE — 25000003 PHARM REV CODE 250: Performed by: NURSE ANESTHETIST, CERTIFIED REGISTERED

## 2021-04-09 PROCEDURE — 45380 PR COLONOSCOPY,BIOPSY: ICD-10-PCS | Mod: 59,,, | Performed by: COLON & RECTAL SURGERY

## 2021-04-09 PROCEDURE — 37000008 HC ANESTHESIA 1ST 15 MINUTES: Performed by: COLON & RECTAL SURGERY

## 2021-04-09 PROCEDURE — PATHNN PATH DEFICIENCY: Mod: ,,, | Performed by: COLON & RECTAL SURGERY

## 2021-04-09 RX ORDER — PROPOFOL 10 MG/ML
VIAL (ML) INTRAVENOUS
Status: DISCONTINUED | OUTPATIENT
Start: 2021-04-09 | End: 2021-04-09

## 2021-04-09 RX ORDER — LIDOCAINE HYDROCHLORIDE 10 MG/ML
INJECTION, SOLUTION EPIDURAL; INFILTRATION; INTRACAUDAL; PERINEURAL
Status: DISCONTINUED | OUTPATIENT
Start: 2021-04-09 | End: 2021-04-09

## 2021-04-09 RX ADMIN — PROPOFOL 50 MG: 10 INJECTION, EMULSION INTRAVENOUS at 10:04

## 2021-04-09 RX ADMIN — PROPOFOL 25 MG: 10 INJECTION, EMULSION INTRAVENOUS at 10:04

## 2021-04-09 RX ADMIN — SODIUM CHLORIDE, POTASSIUM CHLORIDE, SODIUM LACTATE AND CALCIUM CHLORIDE: 600; 310; 30; 20 INJECTION, SOLUTION INTRAVENOUS at 10:04

## 2021-04-09 RX ADMIN — LIDOCAINE HYDROCHLORIDE 50 MG: 10 INJECTION, SOLUTION EPIDURAL; INFILTRATION; INTRACAUDAL; PERINEURAL at 10:04

## 2021-04-12 VITALS
DIASTOLIC BLOOD PRESSURE: 78 MMHG | HEART RATE: 86 BPM | OXYGEN SATURATION: 99 % | SYSTOLIC BLOOD PRESSURE: 129 MMHG | RESPIRATION RATE: 16 BRPM | TEMPERATURE: 99 F

## 2021-04-13 LAB
FINAL PATHOLOGIC DIAGNOSIS: NORMAL
GROSS: NORMAL
Lab: NORMAL

## 2021-04-15 ENCOUNTER — OFFICE VISIT (OUTPATIENT)
Dept: OBSTETRICS AND GYNECOLOGY | Facility: CLINIC | Age: 56
End: 2021-04-15
Payer: COMMERCIAL

## 2021-04-15 DIAGNOSIS — N95.0 POSTMENOPAUSAL BLEEDING: Primary | ICD-10-CM

## 2021-04-15 DIAGNOSIS — E66.9 OBESITY (BMI 35.0-39.9 WITHOUT COMORBIDITY): ICD-10-CM

## 2021-04-15 PROCEDURE — 99213 OFFICE O/P EST LOW 20 MIN: CPT | Mod: 95,,, | Performed by: OBSTETRICS & GYNECOLOGY

## 2021-04-15 PROCEDURE — 99213 PR OFFICE/OUTPT VISIT, EST, LEVL III, 20-29 MIN: ICD-10-PCS | Mod: 95,,, | Performed by: OBSTETRICS & GYNECOLOGY

## 2021-04-16 ENCOUNTER — LAB VISIT (OUTPATIENT)
Dept: LAB | Facility: HOSPITAL | Age: 56
End: 2021-04-16
Attending: PHYSICIAN ASSISTANT
Payer: COMMERCIAL

## 2021-04-16 DIAGNOSIS — E55.9 VITAMIN D DEFICIENCY: ICD-10-CM

## 2021-04-16 DIAGNOSIS — Z79.899 HIGH RISK MEDICATION USE: ICD-10-CM

## 2021-04-16 DIAGNOSIS — M05.79 RHEUMATOID ARTHRITIS INVOLVING MULTIPLE SITES WITH POSITIVE RHEUMATOID FACTOR: ICD-10-CM

## 2021-04-16 LAB
25(OH)D3+25(OH)D2 SERPL-MCNC: 24 NG/ML (ref 30–96)
ALBUMIN SERPL BCP-MCNC: 3.8 G/DL (ref 3.5–5.2)
ALP SERPL-CCNC: 53 U/L (ref 55–135)
ALT SERPL W/O P-5'-P-CCNC: 16 U/L (ref 10–44)
ANION GAP SERPL CALC-SCNC: 12 MMOL/L (ref 8–16)
AST SERPL-CCNC: 18 U/L (ref 10–40)
BASOPHILS # BLD AUTO: 0.04 K/UL (ref 0–0.2)
BASOPHILS NFR BLD: 0.6 % (ref 0–1.9)
BILIRUB SERPL-MCNC: 0.2 MG/DL (ref 0.1–1)
BUN SERPL-MCNC: 15 MG/DL (ref 6–20)
CALCIUM SERPL-MCNC: 9.2 MG/DL (ref 8.7–10.5)
CHLORIDE SERPL-SCNC: 106 MMOL/L (ref 95–110)
CO2 SERPL-SCNC: 24 MMOL/L (ref 23–29)
CREAT SERPL-MCNC: 0.8 MG/DL (ref 0.5–1.4)
CRP SERPL-MCNC: 1.4 MG/L (ref 0–8.2)
DIFFERENTIAL METHOD: ABNORMAL
EOSINOPHIL # BLD AUTO: 0.3 K/UL (ref 0–0.5)
EOSINOPHIL NFR BLD: 4.4 % (ref 0–8)
ERYTHROCYTE [DISTWIDTH] IN BLOOD BY AUTOMATED COUNT: 15.3 % (ref 11.5–14.5)
ERYTHROCYTE [SEDIMENTATION RATE] IN BLOOD BY WESTERGREN METHOD: 28 MM/HR (ref 0–36)
EST. GFR  (AFRICAN AMERICAN): >60 ML/MIN/1.73 M^2
EST. GFR  (NON AFRICAN AMERICAN): >60 ML/MIN/1.73 M^2
GLUCOSE SERPL-MCNC: 77 MG/DL (ref 70–110)
HCT VFR BLD AUTO: 38.1 % (ref 37–48.5)
HGB BLD-MCNC: 11.3 G/DL (ref 12–16)
IMM GRANULOCYTES # BLD AUTO: 0.04 K/UL (ref 0–0.04)
IMM GRANULOCYTES NFR BLD AUTO: 0.6 % (ref 0–0.5)
LYMPHOCYTES # BLD AUTO: 2.3 K/UL (ref 1–4.8)
LYMPHOCYTES NFR BLD: 32.9 % (ref 18–48)
MCH RBC QN AUTO: 25.1 PG (ref 27–31)
MCHC RBC AUTO-ENTMCNC: 29.7 G/DL (ref 32–36)
MCV RBC AUTO: 85 FL (ref 82–98)
MONOCYTES # BLD AUTO: 0.4 K/UL (ref 0.3–1)
MONOCYTES NFR BLD: 6.1 % (ref 4–15)
NEUTROPHILS # BLD AUTO: 3.9 K/UL (ref 1.8–7.7)
NEUTROPHILS NFR BLD: 55.4 % (ref 38–73)
NRBC BLD-RTO: 0 /100 WBC
PLATELET # BLD AUTO: 367 K/UL (ref 150–450)
PMV BLD AUTO: 10.8 FL (ref 9.2–12.9)
POTASSIUM SERPL-SCNC: 3.6 MMOL/L (ref 3.5–5.1)
PROT SERPL-MCNC: 7.8 G/DL (ref 6–8.4)
RBC # BLD AUTO: 4.51 M/UL (ref 4–5.4)
SODIUM SERPL-SCNC: 142 MMOL/L (ref 136–145)
WBC # BLD AUTO: 7.02 K/UL (ref 3.9–12.7)

## 2021-04-16 PROCEDURE — 85652 RBC SED RATE AUTOMATED: CPT | Performed by: PHYSICIAN ASSISTANT

## 2021-04-16 PROCEDURE — 85025 COMPLETE CBC W/AUTO DIFF WBC: CPT | Performed by: PHYSICIAN ASSISTANT

## 2021-04-16 PROCEDURE — 86140 C-REACTIVE PROTEIN: CPT | Performed by: PHYSICIAN ASSISTANT

## 2021-04-16 PROCEDURE — 82306 VITAMIN D 25 HYDROXY: CPT | Performed by: PHYSICIAN ASSISTANT

## 2021-04-16 PROCEDURE — 80053 COMPREHEN METABOLIC PANEL: CPT | Performed by: PHYSICIAN ASSISTANT

## 2021-04-16 PROCEDURE — 36415 COLL VENOUS BLD VENIPUNCTURE: CPT | Mod: PO | Performed by: PHYSICIAN ASSISTANT

## 2021-04-19 ENCOUNTER — TELEPHONE (OUTPATIENT)
Dept: OBSTETRICS AND GYNECOLOGY | Facility: CLINIC | Age: 56
End: 2021-04-19

## 2021-04-19 ENCOUNTER — PATIENT MESSAGE (OUTPATIENT)
Dept: RHEUMATOLOGY | Facility: CLINIC | Age: 56
End: 2021-04-19

## 2021-04-19 DIAGNOSIS — N95.0 POSTMENOPAUSAL BLEEDING: Primary | ICD-10-CM

## 2021-04-19 DIAGNOSIS — Z01.818 PREOP TESTING: ICD-10-CM

## 2021-04-23 ENCOUNTER — TELEPHONE (OUTPATIENT)
Dept: RHEUMATOLOGY | Facility: CLINIC | Age: 56
End: 2021-04-23

## 2021-04-28 ENCOUNTER — PATIENT MESSAGE (OUTPATIENT)
Dept: RESEARCH | Facility: HOSPITAL | Age: 56
End: 2021-04-28

## 2021-04-30 ENCOUNTER — OFFICE VISIT (OUTPATIENT)
Dept: OBSTETRICS AND GYNECOLOGY | Facility: CLINIC | Age: 56
End: 2021-04-30
Payer: COMMERCIAL

## 2021-04-30 ENCOUNTER — LAB VISIT (OUTPATIENT)
Dept: LAB | Facility: HOSPITAL | Age: 56
End: 2021-04-30
Attending: OBSTETRICS & GYNECOLOGY
Payer: COMMERCIAL

## 2021-04-30 ENCOUNTER — TELEPHONE (OUTPATIENT)
Dept: OBSTETRICS AND GYNECOLOGY | Facility: CLINIC | Age: 56
End: 2021-04-30

## 2021-04-30 VITALS
BODY MASS INDEX: 40.11 KG/M2 | DIASTOLIC BLOOD PRESSURE: 96 MMHG | SYSTOLIC BLOOD PRESSURE: 130 MMHG | WEIGHT: 240.75 LBS | HEIGHT: 65 IN

## 2021-04-30 DIAGNOSIS — N95.0 POSTMENOPAUSAL BLEEDING: ICD-10-CM

## 2021-04-30 DIAGNOSIS — R93.89 ENDOMETRIAL THICKENING ON ULTRASOUND: ICD-10-CM

## 2021-04-30 DIAGNOSIS — N84.1 CERVICAL POLYP: ICD-10-CM

## 2021-04-30 DIAGNOSIS — Z12.4 ENCOUNTER FOR PAPANICOLAOU SMEAR FOR CERVICAL CANCER SCREENING: Primary | ICD-10-CM

## 2021-04-30 LAB
BASOPHILS # BLD AUTO: 0.04 K/UL (ref 0–0.2)
BASOPHILS NFR BLD: 0.6 % (ref 0–1.9)
DIFFERENTIAL METHOD: ABNORMAL
EOSINOPHIL # BLD AUTO: 0.2 K/UL (ref 0–0.5)
EOSINOPHIL NFR BLD: 3.7 % (ref 0–8)
ERYTHROCYTE [DISTWIDTH] IN BLOOD BY AUTOMATED COUNT: 15.3 % (ref 11.5–14.5)
HCT VFR BLD AUTO: 39 % (ref 37–48.5)
HGB BLD-MCNC: 11.8 G/DL (ref 12–16)
IMM GRANULOCYTES # BLD AUTO: 0.03 K/UL (ref 0–0.04)
IMM GRANULOCYTES NFR BLD AUTO: 0.5 % (ref 0–0.5)
LYMPHOCYTES # BLD AUTO: 2 K/UL (ref 1–4.8)
LYMPHOCYTES NFR BLD: 31.1 % (ref 18–48)
MCH RBC QN AUTO: 25.2 PG (ref 27–31)
MCHC RBC AUTO-ENTMCNC: 30.3 G/DL (ref 32–36)
MCV RBC AUTO: 83 FL (ref 82–98)
MONOCYTES # BLD AUTO: 0.6 K/UL (ref 0.3–1)
MONOCYTES NFR BLD: 9.2 % (ref 4–15)
NEUTROPHILS # BLD AUTO: 3.6 K/UL (ref 1.8–7.7)
NEUTROPHILS NFR BLD: 54.9 % (ref 38–73)
NRBC BLD-RTO: 0 /100 WBC
PLATELET # BLD AUTO: 380 K/UL (ref 150–450)
PMV BLD AUTO: 11 FL (ref 9.2–12.9)
RBC # BLD AUTO: 4.69 M/UL (ref 4–5.4)
WBC # BLD AUTO: 6.5 K/UL (ref 3.9–12.7)

## 2021-04-30 PROCEDURE — 88142 CYTOPATH C/V THIN LAYER: CPT | Performed by: OBSTETRICS & GYNECOLOGY

## 2021-04-30 PROCEDURE — 99999 PR PBB SHADOW E&M-EST. PATIENT-LVL III: ICD-10-PCS | Mod: PBBFAC,,, | Performed by: OBSTETRICS & GYNECOLOGY

## 2021-04-30 PROCEDURE — 3008F BODY MASS INDEX DOCD: CPT | Mod: CPTII,S$GLB,, | Performed by: OBSTETRICS & GYNECOLOGY

## 2021-04-30 PROCEDURE — 85025 COMPLETE CBC W/AUTO DIFF WBC: CPT | Performed by: OBSTETRICS & GYNECOLOGY

## 2021-04-30 PROCEDURE — 99213 PR OFFICE/OUTPT VISIT, EST, LEVL III, 20-29 MIN: ICD-10-PCS | Mod: S$GLB,,, | Performed by: OBSTETRICS & GYNECOLOGY

## 2021-04-30 PROCEDURE — 1126F AMNT PAIN NOTED NONE PRSNT: CPT | Mod: S$GLB,,, | Performed by: OBSTETRICS & GYNECOLOGY

## 2021-04-30 PROCEDURE — 87624 HPV HI-RISK TYP POOLED RSLT: CPT | Performed by: OBSTETRICS & GYNECOLOGY

## 2021-04-30 PROCEDURE — 3008F PR BODY MASS INDEX (BMI) DOCUMENTED: ICD-10-PCS | Mod: CPTII,S$GLB,, | Performed by: OBSTETRICS & GYNECOLOGY

## 2021-04-30 PROCEDURE — 80048 BASIC METABOLIC PNL TOTAL CA: CPT | Performed by: OBSTETRICS & GYNECOLOGY

## 2021-04-30 PROCEDURE — 1126F PR PAIN SEVERITY QUANTIFIED, NO PAIN PRESENT: ICD-10-PCS | Mod: S$GLB,,, | Performed by: OBSTETRICS & GYNECOLOGY

## 2021-04-30 PROCEDURE — 36415 COLL VENOUS BLD VENIPUNCTURE: CPT | Performed by: OBSTETRICS & GYNECOLOGY

## 2021-04-30 PROCEDURE — 99213 OFFICE O/P EST LOW 20 MIN: CPT | Mod: S$GLB,,, | Performed by: OBSTETRICS & GYNECOLOGY

## 2021-04-30 PROCEDURE — 99999 PR PBB SHADOW E&M-EST. PATIENT-LVL III: CPT | Mod: PBBFAC,,, | Performed by: OBSTETRICS & GYNECOLOGY

## 2021-04-30 RX ORDER — MEDROXYPROGESTERONE ACETATE 10 MG/1
10 TABLET ORAL DAILY
Qty: 15 TABLET | Refills: 0 | Status: ON HOLD | OUTPATIENT
Start: 2021-04-30 | End: 2021-05-18 | Stop reason: HOSPADM

## 2021-05-01 ENCOUNTER — PATIENT OUTREACH (OUTPATIENT)
Dept: ADMINISTRATIVE | Facility: OTHER | Age: 56
End: 2021-05-01

## 2021-05-01 LAB
ANION GAP SERPL CALC-SCNC: 11 MMOL/L (ref 8–16)
BUN SERPL-MCNC: 17 MG/DL (ref 6–20)
CALCIUM SERPL-MCNC: 10.1 MG/DL (ref 8.7–10.5)
CHLORIDE SERPL-SCNC: 106 MMOL/L (ref 95–110)
CO2 SERPL-SCNC: 25 MMOL/L (ref 23–29)
CREAT SERPL-MCNC: 0.8 MG/DL (ref 0.5–1.4)
EST. GFR  (AFRICAN AMERICAN): >60 ML/MIN/1.73 M^2
EST. GFR  (NON AFRICAN AMERICAN): >60 ML/MIN/1.73 M^2
GLUCOSE SERPL-MCNC: 81 MG/DL (ref 70–110)
POTASSIUM SERPL-SCNC: 3.1 MMOL/L (ref 3.5–5.1)
SODIUM SERPL-SCNC: 142 MMOL/L (ref 136–145)

## 2021-05-01 RX ORDER — SODIUM CHLORIDE 9 MG/ML
INJECTION, SOLUTION INTRAVENOUS CONTINUOUS
Status: CANCELLED | OUTPATIENT
Start: 2021-05-01

## 2021-05-01 RX ORDER — MUPIROCIN 20 MG/G
OINTMENT TOPICAL
Status: CANCELLED | OUTPATIENT
Start: 2021-05-01

## 2021-05-03 ENCOUNTER — TELEPHONE (OUTPATIENT)
Dept: OBSTETRICS AND GYNECOLOGY | Facility: CLINIC | Age: 56
End: 2021-05-03

## 2021-05-03 ENCOUNTER — OFFICE VISIT (OUTPATIENT)
Dept: RHEUMATOLOGY | Facility: CLINIC | Age: 56
End: 2021-05-03
Payer: COMMERCIAL

## 2021-05-03 VITALS
DIASTOLIC BLOOD PRESSURE: 83 MMHG | HEIGHT: 65 IN | WEIGHT: 240 LBS | SYSTOLIC BLOOD PRESSURE: 132 MMHG | BODY MASS INDEX: 39.99 KG/M2 | HEART RATE: 72 BPM

## 2021-05-03 DIAGNOSIS — M17.12 PRIMARY OSTEOARTHRITIS OF LEFT KNEE: ICD-10-CM

## 2021-05-03 DIAGNOSIS — M05.79 RHEUMATOID ARTHRITIS INVOLVING MULTIPLE SITES WITH POSITIVE RHEUMATOID FACTOR: Primary | ICD-10-CM

## 2021-05-03 DIAGNOSIS — Z79.899 HIGH RISK MEDICATION USE: ICD-10-CM

## 2021-05-03 DIAGNOSIS — E55.9 VITAMIN D DEFICIENCY: ICD-10-CM

## 2021-05-03 DIAGNOSIS — I10 ESSENTIAL HYPERTENSION: Primary | ICD-10-CM

## 2021-05-03 PROCEDURE — 1125F AMNT PAIN NOTED PAIN PRSNT: CPT | Mod: S$GLB,,, | Performed by: PHYSICIAN ASSISTANT

## 2021-05-03 PROCEDURE — 99999 PR PBB SHADOW E&M-EST. PATIENT-LVL IV: ICD-10-PCS | Mod: PBBFAC,,, | Performed by: PHYSICIAN ASSISTANT

## 2021-05-03 PROCEDURE — 3008F BODY MASS INDEX DOCD: CPT | Mod: CPTII,S$GLB,, | Performed by: PHYSICIAN ASSISTANT

## 2021-05-03 PROCEDURE — 99214 PR OFFICE/OUTPT VISIT, EST, LEVL IV, 30-39 MIN: ICD-10-PCS | Mod: S$GLB,,, | Performed by: PHYSICIAN ASSISTANT

## 2021-05-03 PROCEDURE — 3008F PR BODY MASS INDEX (BMI) DOCUMENTED: ICD-10-PCS | Mod: CPTII,S$GLB,, | Performed by: PHYSICIAN ASSISTANT

## 2021-05-03 PROCEDURE — 99999 PR PBB SHADOW E&M-EST. PATIENT-LVL IV: CPT | Mod: PBBFAC,,, | Performed by: PHYSICIAN ASSISTANT

## 2021-05-03 PROCEDURE — 99214 OFFICE O/P EST MOD 30 MIN: CPT | Mod: S$GLB,,, | Performed by: PHYSICIAN ASSISTANT

## 2021-05-03 PROCEDURE — 1125F PR PAIN SEVERITY QUANTIFIED, PAIN PRESENT: ICD-10-PCS | Mod: S$GLB,,, | Performed by: PHYSICIAN ASSISTANT

## 2021-05-03 RX ORDER — ERGOCALCIFEROL 1.25 MG/1
50000 CAPSULE ORAL
Qty: 21 CAPSULE | Refills: 3 | Status: SHIPPED | OUTPATIENT
Start: 2021-05-03 | End: 2022-03-31 | Stop reason: SDUPTHER

## 2021-05-03 RX ORDER — METHOTREXATE 2.5 MG/1
TABLET ORAL
Qty: 30 TABLET | Refills: 5 | Status: SHIPPED | OUTPATIENT
Start: 2021-05-03 | End: 2021-09-16 | Stop reason: SDUPTHER

## 2021-05-07 LAB
FINAL PATHOLOGIC DIAGNOSIS: NORMAL
Lab: NORMAL

## 2021-05-11 ENCOUNTER — PATIENT MESSAGE (OUTPATIENT)
Dept: OBSTETRICS AND GYNECOLOGY | Facility: CLINIC | Age: 56
End: 2021-05-11

## 2021-05-11 LAB
HPV HR 12 DNA SPEC QL NAA+PROBE: NEGATIVE
HPV16 AG SPEC QL: NEGATIVE
HPV18 DNA SPEC QL NAA+PROBE: NEGATIVE

## 2021-05-11 RX ORDER — HYDROCODONE BITARTRATE AND ACETAMINOPHEN 7.5; 325 MG/1; MG/1
1 TABLET ORAL EVERY 6 HOURS PRN
COMMUNITY
End: 2021-06-14

## 2021-05-11 RX ORDER — AMOXICILLIN 500 MG/1
500 CAPSULE ORAL
COMMUNITY
End: 2021-06-14

## 2021-05-13 ENCOUNTER — CLINICAL SUPPORT (OUTPATIENT)
Dept: INTERNAL MEDICINE | Facility: CLINIC | Age: 56
End: 2021-05-13
Payer: COMMERCIAL

## 2021-05-13 DIAGNOSIS — I10 ESSENTIAL HYPERTENSION: ICD-10-CM

## 2021-05-13 PROCEDURE — 93010 ELECTROCARDIOGRAM REPORT: CPT | Mod: S$GLB,,, | Performed by: INTERNAL MEDICINE

## 2021-05-13 PROCEDURE — 93005 EKG 12-LEAD: ICD-10-PCS | Mod: S$GLB,,, | Performed by: OBSTETRICS & GYNECOLOGY

## 2021-05-13 PROCEDURE — 93005 ELECTROCARDIOGRAM TRACING: CPT | Mod: S$GLB,,, | Performed by: OBSTETRICS & GYNECOLOGY

## 2021-05-13 PROCEDURE — 93010 EKG 12-LEAD: ICD-10-PCS | Mod: S$GLB,,, | Performed by: INTERNAL MEDICINE

## 2021-05-15 ENCOUNTER — LAB VISIT (OUTPATIENT)
Dept: OTOLARYNGOLOGY | Facility: CLINIC | Age: 56
End: 2021-05-15
Payer: COMMERCIAL

## 2021-05-15 DIAGNOSIS — Z01.818 PREOP TESTING: ICD-10-CM

## 2021-05-15 PROCEDURE — U0003 INFECTIOUS AGENT DETECTION BY NUCLEIC ACID (DNA OR RNA); SEVERE ACUTE RESPIRATORY SYNDROME CORONAVIRUS 2 (SARS-COV-2) (CORONAVIRUS DISEASE [COVID-19]), AMPLIFIED PROBE TECHNIQUE, MAKING USE OF HIGH THROUGHPUT TECHNOLOGIES AS DESCRIBED BY CMS-2020-01-R: HCPCS | Performed by: OBSTETRICS & GYNECOLOGY

## 2021-05-15 PROCEDURE — U0005 INFEC AGEN DETEC AMPLI PROBE: HCPCS | Performed by: OBSTETRICS & GYNECOLOGY

## 2021-05-16 LAB — SARS-COV-2 RNA RESP QL NAA+PROBE: NOT DETECTED

## 2021-05-17 ENCOUNTER — ANESTHESIA EVENT (OUTPATIENT)
Dept: SURGERY | Facility: HOSPITAL | Age: 56
End: 2021-05-17
Payer: COMMERCIAL

## 2021-05-18 ENCOUNTER — HOSPITAL ENCOUNTER (OUTPATIENT)
Facility: HOSPITAL | Age: 56
Discharge: HOME | End: 2021-05-18
Attending: OBSTETRICS & GYNECOLOGY | Admitting: OBSTETRICS & GYNECOLOGY
Payer: COMMERCIAL

## 2021-05-18 ENCOUNTER — PATIENT MESSAGE (OUTPATIENT)
Dept: SURGERY | Facility: HOSPITAL | Age: 56
End: 2021-05-18

## 2021-05-18 ENCOUNTER — ANESTHESIA (OUTPATIENT)
Dept: SURGERY | Facility: HOSPITAL | Age: 56
End: 2021-05-18
Payer: COMMERCIAL

## 2021-05-18 VITALS
HEART RATE: 67 BPM | WEIGHT: 231.81 LBS | OXYGEN SATURATION: 99 % | TEMPERATURE: 98 F | SYSTOLIC BLOOD PRESSURE: 134 MMHG | HEIGHT: 64 IN | RESPIRATION RATE: 13 BRPM | DIASTOLIC BLOOD PRESSURE: 70 MMHG | BODY MASS INDEX: 39.58 KG/M2

## 2021-05-18 DIAGNOSIS — R93.89 ENDOMETRIAL THICKENING ON ULTRASOUND: ICD-10-CM

## 2021-05-18 DIAGNOSIS — N95.0 POSTMENOPAUSAL BLEEDING: ICD-10-CM

## 2021-05-18 DIAGNOSIS — N84.1 CERVICAL POLYP: ICD-10-CM

## 2021-05-18 DIAGNOSIS — Z12.4 ENCOUNTER FOR PAPANICOLAOU SMEAR FOR CERVICAL CANCER SCREENING: ICD-10-CM

## 2021-05-18 PROBLEM — Z90.710 S/P TOTAL HYSTERECTOMY AND BSO (BILATERAL SALPINGO-OOPHORECTOMY): Status: ACTIVE | Noted: 2021-05-18

## 2021-05-18 PROBLEM — Z90.722 S/P TOTAL HYSTERECTOMY AND BSO (BILATERAL SALPINGO-OOPHORECTOMY): Status: ACTIVE | Noted: 2021-05-18

## 2021-05-18 PROBLEM — Z90.79 S/P TOTAL HYSTERECTOMY AND BSO (BILATERAL SALPINGO-OOPHORECTOMY): Status: ACTIVE | Noted: 2021-05-18

## 2021-05-18 PROBLEM — Z12.11 SCREENING FOR COLON CANCER: Status: RESOLVED | Noted: 2021-04-09 | Resolved: 2021-05-18

## 2021-05-18 LAB
B-HCG UR QL: NEGATIVE
CTP QC/QA: YES
POCT GLUCOSE: 75 MG/DL (ref 70–110)

## 2021-05-18 PROCEDURE — 37000009 HC ANESTHESIA EA ADD 15 MINS: Performed by: OBSTETRICS & GYNECOLOGY

## 2021-05-18 PROCEDURE — 63600175 PHARM REV CODE 636 W HCPCS: Performed by: NURSE ANESTHETIST, CERTIFIED REGISTERED

## 2021-05-18 PROCEDURE — 58571 PR LAPAROSCOPY W TOT HYSTERECTUTERUS <=250 GRAM  W TUBE/OVARY: ICD-10-PCS | Mod: ,,, | Performed by: OBSTETRICS & GYNECOLOGY

## 2021-05-18 PROCEDURE — 82962 GLUCOSE BLOOD TEST: CPT | Performed by: OBSTETRICS & GYNECOLOGY

## 2021-05-18 PROCEDURE — 71000033 HC RECOVERY, INTIAL HOUR: Performed by: OBSTETRICS & GYNECOLOGY

## 2021-05-18 PROCEDURE — 37000008 HC ANESTHESIA 1ST 15 MINUTES: Performed by: OBSTETRICS & GYNECOLOGY

## 2021-05-18 PROCEDURE — 81025 URINE PREGNANCY TEST: CPT | Performed by: OBSTETRICS & GYNECOLOGY

## 2021-05-18 PROCEDURE — 58571 TLH W/T/O 250 G OR LESS: CPT | Mod: ,,, | Performed by: OBSTETRICS & GYNECOLOGY

## 2021-05-18 PROCEDURE — 63600175 PHARM REV CODE 636 W HCPCS: Performed by: OBSTETRICS & GYNECOLOGY

## 2021-05-18 PROCEDURE — 36000713 HC OR TIME LEV V EA ADD 15 MIN: Performed by: OBSTETRICS & GYNECOLOGY

## 2021-05-18 PROCEDURE — 25000003 PHARM REV CODE 250: Performed by: ANESTHESIOLOGY

## 2021-05-18 PROCEDURE — 71000015 HC POSTOP RECOV 1ST HR: Performed by: OBSTETRICS & GYNECOLOGY

## 2021-05-18 PROCEDURE — 63600175 PHARM REV CODE 636 W HCPCS: Performed by: ANESTHESIOLOGY

## 2021-05-18 PROCEDURE — 36000712 HC OR TIME LEV V 1ST 15 MIN: Performed by: OBSTETRICS & GYNECOLOGY

## 2021-05-18 PROCEDURE — D9220A PRA ANESTHESIA: ICD-10-PCS | Mod: ,,, | Performed by: NURSE ANESTHETIST, CERTIFIED REGISTERED

## 2021-05-18 PROCEDURE — D9220A PRA ANESTHESIA: ICD-10-PCS | Mod: ,,, | Performed by: ANESTHESIOLOGY

## 2021-05-18 PROCEDURE — 71000016 HC POSTOP RECOV ADDL HR: Performed by: OBSTETRICS & GYNECOLOGY

## 2021-05-18 PROCEDURE — D9220A PRA ANESTHESIA: Mod: ,,, | Performed by: NURSE ANESTHETIST, CERTIFIED REGISTERED

## 2021-05-18 PROCEDURE — 25000003 PHARM REV CODE 250: Performed by: NURSE ANESTHETIST, CERTIFIED REGISTERED

## 2021-05-18 PROCEDURE — 88307 TISSUE EXAM BY PATHOLOGIST: CPT | Mod: 26,,, | Performed by: STUDENT IN AN ORGANIZED HEALTH CARE EDUCATION/TRAINING PROGRAM

## 2021-05-18 PROCEDURE — 88307 TISSUE EXAM BY PATHOLOGIST: CPT | Performed by: STUDENT IN AN ORGANIZED HEALTH CARE EDUCATION/TRAINING PROGRAM

## 2021-05-18 PROCEDURE — 88307 PR  SURG PATH,LEVEL V: ICD-10-PCS | Mod: 26,,, | Performed by: STUDENT IN AN ORGANIZED HEALTH CARE EDUCATION/TRAINING PROGRAM

## 2021-05-18 PROCEDURE — 27201423 OPTIME MED/SURG SUP & DEVICES STERILE SUPPLY: Performed by: OBSTETRICS & GYNECOLOGY

## 2021-05-18 PROCEDURE — D9220A PRA ANESTHESIA: Mod: ,,, | Performed by: ANESTHESIOLOGY

## 2021-05-18 PROCEDURE — C2628 CATHETER, OCCLUSION: HCPCS | Performed by: OBSTETRICS & GYNECOLOGY

## 2021-05-18 RX ORDER — MIDAZOLAM HYDROCHLORIDE 1 MG/ML
INJECTION INTRAMUSCULAR; INTRAVENOUS
Status: DISCONTINUED | OUTPATIENT
Start: 2021-05-18 | End: 2021-05-18

## 2021-05-18 RX ORDER — ALBUTEROL SULFATE 0.83 MG/ML
2.5 SOLUTION RESPIRATORY (INHALATION) EVERY 4 HOURS PRN
Status: DISCONTINUED | OUTPATIENT
Start: 2021-05-18 | End: 2021-05-18 | Stop reason: HOSPADM

## 2021-05-18 RX ORDER — DIPHENHYDRAMINE HYDROCHLORIDE 50 MG/ML
25 INJECTION INTRAMUSCULAR; INTRAVENOUS EVERY 6 HOURS PRN
Status: DISCONTINUED | OUTPATIENT
Start: 2021-05-18 | End: 2021-05-18 | Stop reason: HOSPADM

## 2021-05-18 RX ORDER — HYDROCODONE BITARTRATE AND ACETAMINOPHEN 5; 325 MG/1; MG/1
1 TABLET ORAL
Qty: 15 TABLET | Refills: 0 | Status: SHIPPED | OUTPATIENT
Start: 2021-05-18 | End: 2021-06-14

## 2021-05-18 RX ORDER — LIDOCAINE HYDROCHLORIDE 20 MG/ML
INJECTION, SOLUTION EPIDURAL; INFILTRATION; INTRACAUDAL; PERINEURAL
Status: DISCONTINUED | OUTPATIENT
Start: 2021-05-18 | End: 2021-05-18

## 2021-05-18 RX ORDER — HYDROCODONE BITARTRATE AND ACETAMINOPHEN 5; 325 MG/1; MG/1
1 TABLET ORAL EVERY 4 HOURS PRN
Status: DISCONTINUED | OUTPATIENT
Start: 2021-05-18 | End: 2021-05-18 | Stop reason: HOSPADM

## 2021-05-18 RX ORDER — DEXAMETHASONE SODIUM PHOSPHATE 4 MG/ML
INJECTION, SOLUTION INTRA-ARTICULAR; INTRALESIONAL; INTRAMUSCULAR; INTRAVENOUS; SOFT TISSUE
Status: DISCONTINUED | OUTPATIENT
Start: 2021-05-18 | End: 2021-05-18

## 2021-05-18 RX ORDER — MEPERIDINE HYDROCHLORIDE 25 MG/ML
12.5 INJECTION INTRAMUSCULAR; INTRAVENOUS; SUBCUTANEOUS ONCE
Status: COMPLETED | OUTPATIENT
Start: 2021-05-18 | End: 2021-05-18

## 2021-05-18 RX ORDER — SODIUM CHLORIDE 9 MG/ML
INJECTION, SOLUTION INTRAVENOUS CONTINUOUS
Status: DISCONTINUED | OUTPATIENT
Start: 2021-05-18 | End: 2021-05-18 | Stop reason: HOSPADM

## 2021-05-18 RX ORDER — FENTANYL CITRATE 50 UG/ML
INJECTION, SOLUTION INTRAMUSCULAR; INTRAVENOUS
Status: DISCONTINUED | OUTPATIENT
Start: 2021-05-18 | End: 2021-05-18

## 2021-05-18 RX ORDER — ROCURONIUM BROMIDE 10 MG/ML
INJECTION, SOLUTION INTRAVENOUS
Status: DISCONTINUED | OUTPATIENT
Start: 2021-05-18 | End: 2021-05-18

## 2021-05-18 RX ORDER — ONDANSETRON HYDROCHLORIDE 2 MG/ML
INJECTION, SOLUTION INTRAMUSCULAR; INTRAVENOUS
Status: DISCONTINUED | OUTPATIENT
Start: 2021-05-18 | End: 2021-05-18

## 2021-05-18 RX ORDER — FENTANYL CITRATE 50 UG/ML
25 INJECTION, SOLUTION INTRAMUSCULAR; INTRAVENOUS EVERY 5 MIN PRN
Status: DISCONTINUED | OUTPATIENT
Start: 2021-05-18 | End: 2021-05-18 | Stop reason: HOSPADM

## 2021-05-18 RX ORDER — CEFAZOLIN SODIUM 2 G/50ML
2 SOLUTION INTRAVENOUS
Status: COMPLETED | OUTPATIENT
Start: 2021-05-18 | End: 2021-05-18

## 2021-05-18 RX ORDER — MUPIROCIN 20 MG/G
OINTMENT TOPICAL
Status: DISCONTINUED | OUTPATIENT
Start: 2021-05-18 | End: 2021-05-18 | Stop reason: HOSPADM

## 2021-05-18 RX ORDER — KETOROLAC TROMETHAMINE 30 MG/ML
INJECTION, SOLUTION INTRAMUSCULAR; INTRAVENOUS
Status: DISCONTINUED | OUTPATIENT
Start: 2021-05-18 | End: 2021-05-18

## 2021-05-18 RX ORDER — IBUPROFEN 800 MG/1
800 TABLET ORAL 3 TIMES DAILY
Qty: 21 TABLET | Refills: 0 | Status: SHIPPED | OUTPATIENT
Start: 2021-05-18 | End: 2021-05-25

## 2021-05-18 RX ORDER — PROPOFOL 10 MG/ML
VIAL (ML) INTRAVENOUS
Status: DISCONTINUED | OUTPATIENT
Start: 2021-05-18 | End: 2021-05-18

## 2021-05-18 RX ORDER — ONDANSETRON 2 MG/ML
4 INJECTION INTRAMUSCULAR; INTRAVENOUS ONCE AS NEEDED
Status: DISCONTINUED | OUTPATIENT
Start: 2021-05-18 | End: 2021-05-18 | Stop reason: HOSPADM

## 2021-05-18 RX ADMIN — LIDOCAINE HYDROCHLORIDE 50 MG: 20 INJECTION, SOLUTION EPIDURAL; INFILTRATION; INTRACAUDAL; PERINEURAL at 08:05

## 2021-05-18 RX ADMIN — FENTANYL CITRATE 50 MCG: 50 INJECTION, SOLUTION INTRAMUSCULAR; INTRAVENOUS at 08:05

## 2021-05-18 RX ADMIN — ROCURONIUM BROMIDE 10 MG: 10 INJECTION, SOLUTION INTRAVENOUS at 08:05

## 2021-05-18 RX ADMIN — MIDAZOLAM HYDROCHLORIDE 2 MG: 1 INJECTION INTRAMUSCULAR; INTRAVENOUS at 07:05

## 2021-05-18 RX ADMIN — ROCURONIUM BROMIDE 40 MG: 10 INJECTION, SOLUTION INTRAVENOUS at 08:05

## 2021-05-18 RX ADMIN — FENTANYL CITRATE 50 MCG: 50 INJECTION, SOLUTION INTRAMUSCULAR; INTRAVENOUS at 07:05

## 2021-05-18 RX ADMIN — MEPERIDINE HYDROCHLORIDE 12.5 MG: 25 INJECTION INTRAMUSCULAR; INTRAVENOUS; SUBCUTANEOUS at 10:05

## 2021-05-18 RX ADMIN — ONDANSETRON HYDROCHLORIDE 4 MG: 2 INJECTION, SOLUTION INTRAMUSCULAR; INTRAVENOUS at 09:05

## 2021-05-18 RX ADMIN — SUGAMMADEX 200 MG: 100 INJECTION, SOLUTION INTRAVENOUS at 09:05

## 2021-05-18 RX ADMIN — HYDROCODONE BITARTRATE AND ACETAMINOPHEN 1 TABLET: 5; 325 TABLET ORAL at 10:05

## 2021-05-18 RX ADMIN — CEFAZOLIN SODIUM 2 G: 2 SOLUTION INTRAVENOUS at 08:05

## 2021-05-18 RX ADMIN — DEXAMETHASONE SODIUM PHOSPHATE 8 MG: 4 INJECTION, SOLUTION INTRA-ARTICULAR; INTRALESIONAL; INTRAMUSCULAR; INTRAVENOUS; SOFT TISSUE at 09:05

## 2021-05-18 RX ADMIN — PROPOFOL 150 MG: 10 INJECTION, EMULSION INTRAVENOUS at 08:05

## 2021-05-18 RX ADMIN — KETOROLAC TROMETHAMINE 15 MG: 30 INJECTION, SOLUTION INTRAMUSCULAR at 09:05

## 2021-05-21 LAB
FINAL PATHOLOGIC DIAGNOSIS: NORMAL
GROSS: NORMAL
Lab: NORMAL

## 2021-05-22 ENCOUNTER — PATIENT MESSAGE (OUTPATIENT)
Dept: OBSTETRICS AND GYNECOLOGY | Facility: CLINIC | Age: 56
End: 2021-05-22

## 2021-05-24 ENCOUNTER — PATIENT MESSAGE (OUTPATIENT)
Dept: OBSTETRICS AND GYNECOLOGY | Facility: CLINIC | Age: 56
End: 2021-05-24

## 2021-06-01 ENCOUNTER — TELEPHONE (OUTPATIENT)
Dept: INTERNAL MEDICINE | Facility: CLINIC | Age: 56
End: 2021-06-01

## 2021-06-01 ENCOUNTER — TELEPHONE (OUTPATIENT)
Dept: OBSTETRICS AND GYNECOLOGY | Facility: CLINIC | Age: 56
End: 2021-06-01

## 2021-06-02 ENCOUNTER — OFFICE VISIT (OUTPATIENT)
Dept: OBSTETRICS AND GYNECOLOGY | Facility: CLINIC | Age: 56
End: 2021-06-02
Payer: COMMERCIAL

## 2021-06-02 VITALS — SYSTOLIC BLOOD PRESSURE: 142 MMHG | BODY MASS INDEX: 40 KG/M2 | DIASTOLIC BLOOD PRESSURE: 80 MMHG | WEIGHT: 233 LBS

## 2021-06-02 DIAGNOSIS — Z90.710 S/P TOTAL HYSTERECTOMY AND BSO (BILATERAL SALPINGO-OOPHORECTOMY): Primary | ICD-10-CM

## 2021-06-02 DIAGNOSIS — Z90.722 S/P TOTAL HYSTERECTOMY AND BSO (BILATERAL SALPINGO-OOPHORECTOMY): Primary | ICD-10-CM

## 2021-06-02 DIAGNOSIS — G89.18 POST-OP PAIN: ICD-10-CM

## 2021-06-02 DIAGNOSIS — Z90.79 S/P TOTAL HYSTERECTOMY AND BSO (BILATERAL SALPINGO-OOPHORECTOMY): Primary | ICD-10-CM

## 2021-06-02 PROCEDURE — 3008F BODY MASS INDEX DOCD: CPT | Mod: CPTII,S$GLB,, | Performed by: OBSTETRICS & GYNECOLOGY

## 2021-06-02 PROCEDURE — 99024 POSTOP FOLLOW-UP VISIT: CPT | Mod: S$GLB,,, | Performed by: OBSTETRICS & GYNECOLOGY

## 2021-06-02 PROCEDURE — 99999 PR PBB SHADOW E&M-EST. PATIENT-LVL III: ICD-10-PCS | Mod: PBBFAC,,,

## 2021-06-02 PROCEDURE — 87086 URINE CULTURE/COLONY COUNT: CPT | Performed by: PHYSICIAN ASSISTANT

## 2021-06-02 PROCEDURE — 1125F AMNT PAIN NOTED PAIN PRSNT: CPT | Mod: S$GLB,,, | Performed by: OBSTETRICS & GYNECOLOGY

## 2021-06-02 PROCEDURE — 99024 PR POST-OP FOLLOW-UP VISIT: ICD-10-PCS | Mod: S$GLB,,, | Performed by: OBSTETRICS & GYNECOLOGY

## 2021-06-02 PROCEDURE — 99999 PR PBB SHADOW E&M-EST. PATIENT-LVL III: CPT | Mod: PBBFAC,,,

## 2021-06-02 PROCEDURE — 1125F PR PAIN SEVERITY QUANTIFIED, PAIN PRESENT: ICD-10-PCS | Mod: S$GLB,,, | Performed by: OBSTETRICS & GYNECOLOGY

## 2021-06-02 PROCEDURE — 3008F PR BODY MASS INDEX (BMI) DOCUMENTED: ICD-10-PCS | Mod: CPTII,S$GLB,, | Performed by: OBSTETRICS & GYNECOLOGY

## 2021-06-02 RX ORDER — CYCLOBENZAPRINE HCL 10 MG
10 TABLET ORAL EVERY 8 HOURS PRN
Qty: 30 TABLET | Refills: 0 | Status: SHIPPED | OUTPATIENT
Start: 2021-06-02 | End: 2021-06-12

## 2021-06-03 LAB — BACTERIA UR CULT: NO GROWTH

## 2021-06-14 ENCOUNTER — OFFICE VISIT (OUTPATIENT)
Dept: OBSTETRICS AND GYNECOLOGY | Facility: CLINIC | Age: 56
End: 2021-06-14
Payer: COMMERCIAL

## 2021-06-14 ENCOUNTER — TELEPHONE (OUTPATIENT)
Dept: OBSTETRICS AND GYNECOLOGY | Facility: CLINIC | Age: 56
End: 2021-06-14

## 2021-06-14 VITALS
HEIGHT: 64 IN | DIASTOLIC BLOOD PRESSURE: 86 MMHG | WEIGHT: 230.81 LBS | BODY MASS INDEX: 39.4 KG/M2 | SYSTOLIC BLOOD PRESSURE: 126 MMHG

## 2021-06-14 DIAGNOSIS — Z98.890 POST-OPERATIVE STATE: Primary | ICD-10-CM

## 2021-06-14 PROCEDURE — 1126F PR PAIN SEVERITY QUANTIFIED, NO PAIN PRESENT: ICD-10-PCS | Mod: S$GLB,,, | Performed by: OBSTETRICS & GYNECOLOGY

## 2021-06-14 PROCEDURE — 99999 PR PBB SHADOW E&M-EST. PATIENT-LVL III: ICD-10-PCS | Mod: PBBFAC,,, | Performed by: OBSTETRICS & GYNECOLOGY

## 2021-06-14 PROCEDURE — 99999 PR PBB SHADOW E&M-EST. PATIENT-LVL III: CPT | Mod: PBBFAC,,, | Performed by: OBSTETRICS & GYNECOLOGY

## 2021-06-14 PROCEDURE — 99024 POSTOP FOLLOW-UP VISIT: CPT | Mod: S$GLB,,, | Performed by: OBSTETRICS & GYNECOLOGY

## 2021-06-14 PROCEDURE — 99024 PR POST-OP FOLLOW-UP VISIT: ICD-10-PCS | Mod: S$GLB,,, | Performed by: OBSTETRICS & GYNECOLOGY

## 2021-06-14 PROCEDURE — 3008F PR BODY MASS INDEX (BMI) DOCUMENTED: ICD-10-PCS | Mod: CPTII,S$GLB,, | Performed by: OBSTETRICS & GYNECOLOGY

## 2021-06-14 PROCEDURE — 1126F AMNT PAIN NOTED NONE PRSNT: CPT | Mod: S$GLB,,, | Performed by: OBSTETRICS & GYNECOLOGY

## 2021-06-14 PROCEDURE — 3008F BODY MASS INDEX DOCD: CPT | Mod: CPTII,S$GLB,, | Performed by: OBSTETRICS & GYNECOLOGY

## 2021-06-18 ENCOUNTER — HOSPITAL ENCOUNTER (OUTPATIENT)
Dept: RADIOLOGY | Facility: HOSPITAL | Age: 56
Discharge: HOME | End: 2021-06-18
Attending: NURSE PRACTITIONER
Payer: COMMERCIAL

## 2021-06-18 VITALS — WEIGHT: 230.81 LBS | HEIGHT: 64 IN | BODY MASS INDEX: 39.4 KG/M2

## 2021-06-18 DIAGNOSIS — Z12.31 ENCOUNTER FOR SCREENING MAMMOGRAM FOR BREAST CANCER: ICD-10-CM

## 2021-06-18 PROCEDURE — 77067 MAMMO DIGITAL SCREENING BILAT WITH TOMO: ICD-10-PCS | Mod: 26,,, | Performed by: RADIOLOGY

## 2021-06-18 PROCEDURE — 77063 MAMMO DIGITAL SCREENING BILAT WITH TOMO: ICD-10-PCS | Mod: 26,,, | Performed by: RADIOLOGY

## 2021-06-18 PROCEDURE — 77067 SCR MAMMO BI INCL CAD: CPT | Mod: TC

## 2021-06-18 PROCEDURE — 77063 BREAST TOMOSYNTHESIS BI: CPT | Mod: 26,,, | Performed by: RADIOLOGY

## 2021-06-18 PROCEDURE — 77067 SCR MAMMO BI INCL CAD: CPT | Mod: 26,,, | Performed by: RADIOLOGY

## 2021-06-23 ENCOUNTER — TELEPHONE (OUTPATIENT)
Dept: RADIOLOGY | Facility: HOSPITAL | Age: 56
End: 2021-06-23

## 2021-06-24 ENCOUNTER — HOSPITAL ENCOUNTER (OUTPATIENT)
Dept: RADIOLOGY | Facility: HOSPITAL | Age: 56
Discharge: HOME | End: 2021-06-24
Attending: NURSE PRACTITIONER
Payer: COMMERCIAL

## 2021-06-24 ENCOUNTER — PATIENT MESSAGE (OUTPATIENT)
Dept: OBSTETRICS AND GYNECOLOGY | Facility: CLINIC | Age: 56
End: 2021-06-24

## 2021-06-24 DIAGNOSIS — R92.8 ABNORMAL MAMMOGRAM: ICD-10-CM

## 2021-06-24 PROCEDURE — 77065 DX MAMMO INCL CAD UNI: CPT | Mod: 26,LT,, | Performed by: RADIOLOGY

## 2021-06-24 PROCEDURE — 77061 BREAST TOMOSYNTHESIS UNI: CPT | Mod: 26,LT,, | Performed by: RADIOLOGY

## 2021-06-24 PROCEDURE — 77061 BREAST TOMOSYNTHESIS UNI: CPT | Mod: TC,LT

## 2021-06-24 PROCEDURE — 76642 ULTRASOUND BREAST LIMITED: CPT | Mod: TC,LT

## 2021-06-24 PROCEDURE — 76642 US BREAST LEFT LIMITED: ICD-10-PCS | Mod: 26,LT,, | Performed by: RADIOLOGY

## 2021-06-24 PROCEDURE — 76642 ULTRASOUND BREAST LIMITED: CPT | Mod: 26,LT,, | Performed by: RADIOLOGY

## 2021-06-24 PROCEDURE — 77061 MAMMO DIGITAL DIAGNOSTIC LEFT WITH TOMO: ICD-10-PCS | Mod: 26,LT,, | Performed by: RADIOLOGY

## 2021-06-24 PROCEDURE — 77065 MAMMO DIGITAL DIAGNOSTIC LEFT WITH TOMO: ICD-10-PCS | Mod: 26,LT,, | Performed by: RADIOLOGY

## 2021-06-29 ENCOUNTER — PATIENT MESSAGE (OUTPATIENT)
Dept: OBSTETRICS AND GYNECOLOGY | Facility: CLINIC | Age: 56
End: 2021-06-29

## 2021-07-16 ENCOUNTER — PATIENT MESSAGE (OUTPATIENT)
Dept: ADMINISTRATIVE | Facility: OTHER | Age: 56
End: 2021-07-16

## 2021-07-29 ENCOUNTER — PATIENT OUTREACH (OUTPATIENT)
Dept: ADMINISTRATIVE | Facility: OTHER | Age: 56
End: 2021-07-29

## 2021-09-01 ENCOUNTER — TELEPHONE (OUTPATIENT)
Dept: RHEUMATOLOGY | Facility: CLINIC | Age: 56
End: 2021-09-01

## 2021-09-10 ENCOUNTER — LAB VISIT (OUTPATIENT)
Dept: LAB | Facility: HOSPITAL | Age: 56
End: 2021-09-10
Attending: PHYSICIAN ASSISTANT
Payer: COMMERCIAL

## 2021-09-10 ENCOUNTER — HOSPITAL ENCOUNTER (OUTPATIENT)
Dept: RADIOLOGY | Facility: HOSPITAL | Age: 56
Discharge: HOME | End: 2021-09-10
Attending: PHYSICIAN ASSISTANT
Payer: COMMERCIAL

## 2021-09-10 DIAGNOSIS — M05.79 RHEUMATOID ARTHRITIS INVOLVING MULTIPLE SITES WITH POSITIVE RHEUMATOID FACTOR: ICD-10-CM

## 2021-09-10 DIAGNOSIS — Z79.899 HIGH RISK MEDICATION USE: ICD-10-CM

## 2021-09-10 LAB
ALBUMIN SERPL BCP-MCNC: 4.1 G/DL (ref 3.5–5.2)
ALP SERPL-CCNC: 59 U/L (ref 55–135)
ALT SERPL W/O P-5'-P-CCNC: 22 U/L (ref 10–44)
ANION GAP SERPL CALC-SCNC: 9 MMOL/L (ref 8–16)
AST SERPL-CCNC: 19 U/L (ref 10–40)
BASOPHILS # BLD AUTO: 0.06 K/UL (ref 0–0.2)
BASOPHILS NFR BLD: 0.9 % (ref 0–1.9)
BILIRUB SERPL-MCNC: 0.2 MG/DL (ref 0.1–1)
BUN SERPL-MCNC: 19 MG/DL (ref 6–20)
CALCIUM SERPL-MCNC: 9.6 MG/DL (ref 8.7–10.5)
CHLORIDE SERPL-SCNC: 104 MMOL/L (ref 95–110)
CO2 SERPL-SCNC: 28 MMOL/L (ref 23–29)
CREAT SERPL-MCNC: 0.8 MG/DL (ref 0.5–1.4)
CRP SERPL-MCNC: 2.2 MG/L (ref 0–8.2)
DIFFERENTIAL METHOD: ABNORMAL
EOSINOPHIL # BLD AUTO: 0.4 K/UL (ref 0–0.5)
EOSINOPHIL NFR BLD: 5.3 % (ref 0–8)
ERYTHROCYTE [DISTWIDTH] IN BLOOD BY AUTOMATED COUNT: 17.5 % (ref 11.5–14.5)
ERYTHROCYTE [SEDIMENTATION RATE] IN BLOOD BY WESTERGREN METHOD: 42 MM/HR (ref 0–36)
EST. GFR  (AFRICAN AMERICAN): >60 ML/MIN/1.73 M^2
EST. GFR  (NON AFRICAN AMERICAN): >60 ML/MIN/1.73 M^2
GLUCOSE SERPL-MCNC: 96 MG/DL (ref 70–110)
HCT VFR BLD AUTO: 37.7 % (ref 37–48.5)
HGB BLD-MCNC: 11.8 G/DL (ref 12–16)
IMM GRANULOCYTES # BLD AUTO: 0.03 K/UL (ref 0–0.04)
IMM GRANULOCYTES NFR BLD AUTO: 0.5 % (ref 0–0.5)
LYMPHOCYTES # BLD AUTO: 2.1 K/UL (ref 1–4.8)
LYMPHOCYTES NFR BLD: 32.4 % (ref 18–48)
MCH RBC QN AUTO: 24.6 PG (ref 27–31)
MCHC RBC AUTO-ENTMCNC: 31.3 G/DL (ref 32–36)
MCV RBC AUTO: 79 FL (ref 82–98)
MONOCYTES # BLD AUTO: 0.5 K/UL (ref 0.3–1)
MONOCYTES NFR BLD: 7.8 % (ref 4–15)
NEUTROPHILS # BLD AUTO: 3.5 K/UL (ref 1.8–7.7)
NEUTROPHILS NFR BLD: 53.1 % (ref 38–73)
NRBC BLD-RTO: 0 /100 WBC
PLATELET # BLD AUTO: 336 K/UL (ref 150–450)
PMV BLD AUTO: 9.9 FL (ref 9.2–12.9)
POTASSIUM SERPL-SCNC: 3.8 MMOL/L (ref 3.5–5.1)
PROT SERPL-MCNC: 7.9 G/DL (ref 6–8.4)
RBC # BLD AUTO: 4.8 M/UL (ref 4–5.4)
SODIUM SERPL-SCNC: 141 MMOL/L (ref 136–145)
WBC # BLD AUTO: 6.58 K/UL (ref 3.9–12.7)

## 2021-09-10 PROCEDURE — 73130 XR HAND COMPLETE 3 VIEWS BILATERAL: ICD-10-PCS | Mod: 26,,, | Performed by: RADIOLOGY

## 2021-09-10 PROCEDURE — 85025 COMPLETE CBC W/AUTO DIFF WBC: CPT | Performed by: PHYSICIAN ASSISTANT

## 2021-09-10 PROCEDURE — 36415 COLL VENOUS BLD VENIPUNCTURE: CPT | Performed by: PHYSICIAN ASSISTANT

## 2021-09-10 PROCEDURE — 73130 X-RAY EXAM OF HAND: CPT | Mod: 26,,, | Performed by: RADIOLOGY

## 2021-09-10 PROCEDURE — 85652 RBC SED RATE AUTOMATED: CPT | Performed by: PHYSICIAN ASSISTANT

## 2021-09-10 PROCEDURE — 86140 C-REACTIVE PROTEIN: CPT | Performed by: PHYSICIAN ASSISTANT

## 2021-09-10 PROCEDURE — 80053 COMPREHEN METABOLIC PANEL: CPT | Performed by: PHYSICIAN ASSISTANT

## 2021-09-10 PROCEDURE — 73130 X-RAY EXAM OF HAND: CPT | Mod: TC,50

## 2021-09-15 ENCOUNTER — PATIENT OUTREACH (OUTPATIENT)
Dept: ADMINISTRATIVE | Facility: OTHER | Age: 56
End: 2021-09-15

## 2021-09-16 ENCOUNTER — HOSPITAL ENCOUNTER (OUTPATIENT)
Dept: RADIOLOGY | Facility: HOSPITAL | Age: 56
Discharge: HOME | End: 2021-09-16
Attending: PHYSICIAN ASSISTANT
Payer: COMMERCIAL

## 2021-09-16 ENCOUNTER — OFFICE VISIT (OUTPATIENT)
Dept: RHEUMATOLOGY | Facility: CLINIC | Age: 56
End: 2021-09-16
Payer: COMMERCIAL

## 2021-09-16 VITALS
WEIGHT: 233.94 LBS | HEIGHT: 64 IN | HEART RATE: 73 BPM | DIASTOLIC BLOOD PRESSURE: 81 MMHG | SYSTOLIC BLOOD PRESSURE: 133 MMHG | BODY MASS INDEX: 39.94 KG/M2

## 2021-09-16 DIAGNOSIS — M05.79 RHEUMATOID ARTHRITIS INVOLVING MULTIPLE SITES WITH POSITIVE RHEUMATOID FACTOR: Primary | ICD-10-CM

## 2021-09-16 DIAGNOSIS — M17.12 PRIMARY OSTEOARTHRITIS OF LEFT KNEE: ICD-10-CM

## 2021-09-16 DIAGNOSIS — Z79.899 HIGH RISK MEDICATION USE: ICD-10-CM

## 2021-09-16 DIAGNOSIS — M05.79 RHEUMATOID ARTHRITIS INVOLVING MULTIPLE SITES WITH POSITIVE RHEUMATOID FACTOR: ICD-10-CM

## 2021-09-16 DIAGNOSIS — E55.9 VITAMIN D DEFICIENCY: ICD-10-CM

## 2021-09-16 PROCEDURE — 3079F PR MOST RECENT DIASTOLIC BLOOD PRESSURE 80-89 MM HG: ICD-10-PCS | Mod: CPTII,S$GLB,, | Performed by: PHYSICIAN ASSISTANT

## 2021-09-16 PROCEDURE — 90636 HEP A/HEP B VACC ADULT IM: CPT | Mod: S$GLB,,, | Performed by: PHYSICIAN ASSISTANT

## 2021-09-16 PROCEDURE — 1160F RVW MEDS BY RX/DR IN RCRD: CPT | Mod: CPTII,S$GLB,, | Performed by: PHYSICIAN ASSISTANT

## 2021-09-16 PROCEDURE — 73630 XR FOOT COMPLETE 3 VIEW BILATERAL: ICD-10-PCS | Mod: 26,,, | Performed by: RADIOLOGY

## 2021-09-16 PROCEDURE — 3079F DIAST BP 80-89 MM HG: CPT | Mod: CPTII,S$GLB,, | Performed by: PHYSICIAN ASSISTANT

## 2021-09-16 PROCEDURE — 90471 IMMUNIZATION ADMIN: CPT | Mod: S$GLB,,, | Performed by: PHYSICIAN ASSISTANT

## 2021-09-16 PROCEDURE — 73630 X-RAY EXAM OF FOOT: CPT | Mod: 26,,, | Performed by: RADIOLOGY

## 2021-09-16 PROCEDURE — 99214 PR OFFICE/OUTPT VISIT, EST, LEVL IV, 30-39 MIN: ICD-10-PCS | Mod: 25,S$GLB,, | Performed by: PHYSICIAN ASSISTANT

## 2021-09-16 PROCEDURE — 99999 PR PBB SHADOW E&M-EST. PATIENT-LVL IV: CPT | Mod: PBBFAC,,, | Performed by: PHYSICIAN ASSISTANT

## 2021-09-16 PROCEDURE — 1159F PR MEDICATION LIST DOCUMENTED IN MEDICAL RECORD: ICD-10-PCS | Mod: CPTII,S$GLB,, | Performed by: PHYSICIAN ASSISTANT

## 2021-09-16 PROCEDURE — 1159F MED LIST DOCD IN RCRD: CPT | Mod: CPTII,S$GLB,, | Performed by: PHYSICIAN ASSISTANT

## 2021-09-16 PROCEDURE — 90471 PR IMMUNIZ ADMIN,1 SINGLE/COMB VAC/TOXOID: ICD-10-PCS | Mod: S$GLB,,, | Performed by: PHYSICIAN ASSISTANT

## 2021-09-16 PROCEDURE — 90636 HEPATITIS A HEPATITIS B COMBINED VACCINE IM: ICD-10-PCS | Mod: S$GLB,,, | Performed by: PHYSICIAN ASSISTANT

## 2021-09-16 PROCEDURE — 73630 X-RAY EXAM OF FOOT: CPT | Mod: TC,50

## 2021-09-16 PROCEDURE — 3075F SYST BP GE 130 - 139MM HG: CPT | Mod: CPTII,S$GLB,, | Performed by: PHYSICIAN ASSISTANT

## 2021-09-16 PROCEDURE — 99214 OFFICE O/P EST MOD 30 MIN: CPT | Mod: 25,S$GLB,, | Performed by: PHYSICIAN ASSISTANT

## 2021-09-16 PROCEDURE — 3008F BODY MASS INDEX DOCD: CPT | Mod: CPTII,S$GLB,, | Performed by: PHYSICIAN ASSISTANT

## 2021-09-16 PROCEDURE — 20610 DRAIN/INJ JOINT/BURSA W/O US: CPT | Mod: LT,S$GLB,, | Performed by: PHYSICIAN ASSISTANT

## 2021-09-16 PROCEDURE — 3075F PR MOST RECENT SYSTOLIC BLOOD PRESS GE 130-139MM HG: ICD-10-PCS | Mod: CPTII,S$GLB,, | Performed by: PHYSICIAN ASSISTANT

## 2021-09-16 PROCEDURE — 1160F PR REVIEW ALL MEDS BY PRESCRIBER/CLIN PHARMACIST DOCUMENTED: ICD-10-PCS | Mod: CPTII,S$GLB,, | Performed by: PHYSICIAN ASSISTANT

## 2021-09-16 PROCEDURE — 99999 PR PBB SHADOW E&M-EST. PATIENT-LVL IV: ICD-10-PCS | Mod: PBBFAC,,, | Performed by: PHYSICIAN ASSISTANT

## 2021-09-16 PROCEDURE — 20610 LARGE JOINT ASPIRATION/INJECTION: L KNEE: ICD-10-PCS | Mod: LT,S$GLB,, | Performed by: PHYSICIAN ASSISTANT

## 2021-09-16 PROCEDURE — 3008F PR BODY MASS INDEX (BMI) DOCUMENTED: ICD-10-PCS | Mod: CPTII,S$GLB,, | Performed by: PHYSICIAN ASSISTANT

## 2021-09-16 RX ORDER — TRIAMCINOLONE ACETONIDE 40 MG/ML
40 INJECTION, SUSPENSION INTRA-ARTICULAR; INTRAMUSCULAR
Status: DISCONTINUED | OUTPATIENT
Start: 2021-09-16 | End: 2021-09-16 | Stop reason: HOSPADM

## 2021-09-16 RX ORDER — METHOTREXATE 2.5 MG/1
TABLET ORAL
Qty: 30 TABLET | Refills: 6 | Status: SHIPPED | OUTPATIENT
Start: 2021-09-16 | End: 2022-05-06 | Stop reason: SDUPTHER

## 2021-09-16 RX ADMIN — TRIAMCINOLONE ACETONIDE 40 MG: 40 INJECTION, SUSPENSION INTRA-ARTICULAR; INTRAMUSCULAR at 11:09

## 2021-09-20 ENCOUNTER — TELEPHONE (OUTPATIENT)
Dept: RHEUMATOLOGY | Facility: CLINIC | Age: 56
End: 2021-09-20

## 2021-10-07 PROBLEM — M05.79 RHEUMATOID ARTHRITIS INVOLVING MULTIPLE SITES WITH POSITIVE RHEUMATOID FACTOR: Status: ACTIVE | Noted: 2021-10-07

## 2021-10-20 ENCOUNTER — CLINICAL SUPPORT (OUTPATIENT)
Dept: RHEUMATOLOGY | Facility: CLINIC | Age: 56
End: 2021-10-20
Payer: COMMERCIAL

## 2021-10-20 PROCEDURE — 99999 PR PBB SHADOW E&M-EST. PATIENT-LVL II: ICD-10-PCS | Mod: PBBFAC,,,

## 2021-10-20 PROCEDURE — 99999 PR PBB SHADOW E&M-EST. PATIENT-LVL II: CPT | Mod: PBBFAC,,,

## 2021-10-20 PROCEDURE — 90636 HEPATITIS A HEPATITIS B COMBINED VACCINE IM: ICD-10-PCS | Mod: S$GLB,,, | Performed by: INTERNAL MEDICINE

## 2021-10-20 PROCEDURE — 90636 HEP A/HEP B VACC ADULT IM: CPT | Mod: S$GLB,,, | Performed by: INTERNAL MEDICINE

## 2021-10-20 PROCEDURE — 90471 HEPATITIS A HEPATITIS B COMBINED VACCINE IM: ICD-10-PCS | Mod: S$GLB,,, | Performed by: INTERNAL MEDICINE

## 2021-10-20 PROCEDURE — 90471 IMMUNIZATION ADMIN: CPT | Mod: S$GLB,,, | Performed by: INTERNAL MEDICINE

## 2021-12-13 ENCOUNTER — OFFICE VISIT (OUTPATIENT)
Dept: INTERNAL MEDICINE | Facility: CLINIC | Age: 56
End: 2021-12-13
Payer: COMMERCIAL

## 2021-12-13 VITALS
WEIGHT: 230.25 LBS | DIASTOLIC BLOOD PRESSURE: 70 MMHG | SYSTOLIC BLOOD PRESSURE: 128 MMHG | OXYGEN SATURATION: 96 % | BODY MASS INDEX: 39.31 KG/M2 | TEMPERATURE: 97 F | HEIGHT: 64 IN | HEART RATE: 89 BPM

## 2021-12-13 DIAGNOSIS — Z00.00 ANNUAL PHYSICAL EXAM: Primary | ICD-10-CM

## 2021-12-13 DIAGNOSIS — D64.9 ANEMIA, UNSPECIFIED TYPE: ICD-10-CM

## 2021-12-13 DIAGNOSIS — I10 ESSENTIAL HYPERTENSION: ICD-10-CM

## 2021-12-13 DIAGNOSIS — M05.79 RHEUMATOID ARTHRITIS INVOLVING MULTIPLE SITES WITH POSITIVE RHEUMATOID FACTOR: ICD-10-CM

## 2021-12-13 PROBLEM — N95.0 POSTMENOPAUSAL BLEEDING: Status: RESOLVED | Noted: 2021-05-18 | Resolved: 2021-12-13

## 2021-12-13 PROCEDURE — 99999 PR PBB SHADOW E&M-EST. PATIENT-LVL IV: ICD-10-PCS | Mod: PBBFAC,,, | Performed by: FAMILY MEDICINE

## 2021-12-13 PROCEDURE — 99396 PREV VISIT EST AGE 40-64: CPT | Mod: 25,S$GLB,, | Performed by: FAMILY MEDICINE

## 2021-12-13 PROCEDURE — 90686 FLU VACCINE (QUAD) GREATER THAN OR EQUAL TO 3YO PRESERVATIVE FREE IM: ICD-10-PCS | Mod: S$GLB,,, | Performed by: FAMILY MEDICINE

## 2021-12-13 PROCEDURE — 90686 IIV4 VACC NO PRSV 0.5 ML IM: CPT | Mod: S$GLB,,, | Performed by: FAMILY MEDICINE

## 2021-12-13 PROCEDURE — 99396 PR PREVENTIVE VISIT,EST,40-64: ICD-10-PCS | Mod: 25,S$GLB,, | Performed by: FAMILY MEDICINE

## 2021-12-13 PROCEDURE — 99999 PR PBB SHADOW E&M-EST. PATIENT-LVL IV: CPT | Mod: PBBFAC,,, | Performed by: FAMILY MEDICINE

## 2021-12-13 PROCEDURE — 90471 FLU VACCINE (QUAD) GREATER THAN OR EQUAL TO 3YO PRESERVATIVE FREE IM: ICD-10-PCS | Mod: S$GLB,,, | Performed by: FAMILY MEDICINE

## 2021-12-13 PROCEDURE — 90471 IMMUNIZATION ADMIN: CPT | Mod: S$GLB,,, | Performed by: FAMILY MEDICINE

## 2021-12-13 RX ORDER — HYDROCHLOROTHIAZIDE 12.5 MG/1
12.5 CAPSULE ORAL DAILY
Qty: 90 CAPSULE | Refills: 3 | Status: SHIPPED | OUTPATIENT
Start: 2021-12-13 | End: 2022-12-13 | Stop reason: SDUPTHER

## 2021-12-23 ENCOUNTER — HOSPITAL ENCOUNTER (OUTPATIENT)
Dept: RADIOLOGY | Facility: HOSPITAL | Age: 56
Discharge: HOME | End: 2021-12-23
Attending: NURSE PRACTITIONER
Payer: COMMERCIAL

## 2021-12-23 DIAGNOSIS — N63.20 LEFT BREAST MASS: Primary | ICD-10-CM

## 2021-12-23 DIAGNOSIS — R92.8 ABNORMAL MAMMOGRAM: ICD-10-CM

## 2021-12-23 PROCEDURE — 77061 BREAST TOMOSYNTHESIS UNI: CPT | Mod: TC,LT

## 2021-12-23 PROCEDURE — 77061 BREAST TOMOSYNTHESIS UNI: CPT | Mod: 26,LT,, | Performed by: RADIOLOGY

## 2021-12-23 PROCEDURE — 76642 US BREAST LEFT LIMITED: ICD-10-PCS | Mod: 26,LT,, | Performed by: RADIOLOGY

## 2021-12-23 PROCEDURE — 76642 ULTRASOUND BREAST LIMITED: CPT | Mod: 26,LT,, | Performed by: RADIOLOGY

## 2021-12-23 PROCEDURE — 77065 MAMMO DIGITAL DIAGNOSTIC LEFT WITH TOMO: ICD-10-PCS | Mod: 26,LT,, | Performed by: RADIOLOGY

## 2021-12-23 PROCEDURE — 77061 MAMMO DIGITAL DIAGNOSTIC LEFT WITH TOMO: ICD-10-PCS | Mod: 26,LT,, | Performed by: RADIOLOGY

## 2021-12-23 PROCEDURE — 77065 DX MAMMO INCL CAD UNI: CPT | Mod: 26,LT,, | Performed by: RADIOLOGY

## 2021-12-23 PROCEDURE — 76642 ULTRASOUND BREAST LIMITED: CPT | Mod: TC,LT

## 2021-12-28 ENCOUNTER — PATIENT MESSAGE (OUTPATIENT)
Dept: OBSTETRICS AND GYNECOLOGY | Facility: CLINIC | Age: 56
End: 2021-12-28
Payer: COMMERCIAL

## 2021-12-29 ENCOUNTER — TELEPHONE (OUTPATIENT)
Dept: OBSTETRICS AND GYNECOLOGY | Facility: CLINIC | Age: 56
End: 2021-12-29
Payer: COMMERCIAL

## 2022-01-05 ENCOUNTER — PATIENT OUTREACH (OUTPATIENT)
Dept: ADMINISTRATIVE | Facility: OTHER | Age: 57
End: 2022-01-05
Payer: COMMERCIAL

## 2022-01-05 NOTE — PROGRESS NOTES
Health Maintenance Due   Topic Date Due    Shingles Vaccine (1 of 2) Never done     Updates were requested from care everywhere.  Chart was reviewed for overdue Proactive [facility] Encounters ([facility]) topics (CRS, Breast Cancer Screening, Eye exam)  Health Maintenance has been updated.  [facility] immunization registry triggered.  Immunizations were reconciled.

## 2022-01-11 ENCOUNTER — PATIENT MESSAGE (OUTPATIENT)
Dept: OBSTETRICS AND GYNECOLOGY | Facility: CLINIC | Age: 57
End: 2022-01-11
Payer: COMMERCIAL

## 2022-01-11 RX ORDER — FLUCONAZOLE 150 MG/1
TABLET ORAL
Qty: 2 TABLET | Refills: 1 | Status: SHIPPED | OUTPATIENT
Start: 2022-01-11 | End: 2022-09-12

## 2022-01-14 ENCOUNTER — OFFICE VISIT (OUTPATIENT)
Dept: SURGERY | Facility: CLINIC | Age: 57
End: 2022-01-14
Payer: COMMERCIAL

## 2022-01-14 VITALS
BODY MASS INDEX: 40.53 KG/M2 | HEART RATE: 82 BPM | DIASTOLIC BLOOD PRESSURE: 96 MMHG | WEIGHT: 236.13 LBS | SYSTOLIC BLOOD PRESSURE: 143 MMHG | TEMPERATURE: 97 F

## 2022-01-14 DIAGNOSIS — N63.20 LEFT BREAST MASS: ICD-10-CM

## 2022-01-14 DIAGNOSIS — N63.20 BREAST MASS, LEFT: ICD-10-CM

## 2022-01-14 PROBLEM — N63.10 BREAST MASS, RIGHT: Status: ACTIVE | Noted: 2022-01-14

## 2022-01-14 PROCEDURE — 1159F PR MEDICATION LIST DOCUMENTED IN MEDICAL RECORD: ICD-10-PCS | Mod: CPTII,S$GLB,, | Performed by: SURGERY

## 2022-01-14 PROCEDURE — 3008F PR BODY MASS INDEX (BMI) DOCUMENTED: ICD-10-PCS | Mod: CPTII,S$GLB,, | Performed by: SURGERY

## 2022-01-14 PROCEDURE — 3077F SYST BP >= 140 MM HG: CPT | Mod: CPTII,S$GLB,, | Performed by: SURGERY

## 2022-01-14 PROCEDURE — 99999 PR PBB SHADOW E&M-EST. PATIENT-LVL IV: CPT | Mod: PBBFAC,,, | Performed by: SURGERY

## 2022-01-14 PROCEDURE — 3077F PR MOST RECENT SYSTOLIC BLOOD PRESSURE >= 140 MM HG: ICD-10-PCS | Mod: CPTII,S$GLB,, | Performed by: SURGERY

## 2022-01-14 PROCEDURE — 3008F BODY MASS INDEX DOCD: CPT | Mod: CPTII,S$GLB,, | Performed by: SURGERY

## 2022-01-14 PROCEDURE — 99203 PR OFFICE/OUTPT VISIT, NEW, LEVL III, 30-44 MIN: ICD-10-PCS | Mod: S$GLB,,, | Performed by: SURGERY

## 2022-01-14 PROCEDURE — 1159F MED LIST DOCD IN RCRD: CPT | Mod: CPTII,S$GLB,, | Performed by: SURGERY

## 2022-01-14 PROCEDURE — 3080F PR MOST RECENT DIASTOLIC BLOOD PRESSURE >= 90 MM HG: ICD-10-PCS | Mod: CPTII,S$GLB,, | Performed by: SURGERY

## 2022-01-14 PROCEDURE — 99203 OFFICE O/P NEW LOW 30 MIN: CPT | Mod: S$GLB,,, | Performed by: SURGERY

## 2022-01-14 PROCEDURE — 3080F DIAST BP >= 90 MM HG: CPT | Mod: CPTII,S$GLB,, | Performed by: SURGERY

## 2022-01-14 PROCEDURE — 99999 PR PBB SHADOW E&M-EST. PATIENT-LVL IV: ICD-10-PCS | Mod: PBBFAC,,, | Performed by: SURGERY

## 2022-01-14 RX ORDER — PROMETHAZINE HYDROCHLORIDE 6.25 MG/5ML
SYRUP ORAL EVERY 6 HOURS PRN
COMMUNITY
End: 2022-05-06

## 2022-01-14 NOTE — PROGRESS NOTES
[facility] -   General Surgery History & Physical    SUBJECTIVE:     History of Present Illness:  Patient is a 56 y.o. female presents with concerns of a left breast mass that her gynecologist discovered. She underwent a mammo/US with reading of birads3. 6 months later, she had another mammo/US which stated birads3 with no significant changes. She states her gynecologist, however, recommends a biopsy. She has a concerning family history of a maternal aunt with breast cancer, mother with colon cancer, and sister with colon cancer. The patient did a colonoscopy last year which found polyps.    Chief Complaint   Patient presents with    Breast Problem     Left breast mass       Review of patient's allergies indicates:   Allergen Reactions    Tramadol Nausea And Vomiting       Current Outpatient Medications   Medication Sig Dispense Refill    ergocalciferol (ERGOCALCIFEROL) 50,000 unit Cap Take 1 capsule (50,000 Units total) by mouth twice a week. 21 capsule 3    FERROUS SULFATE, DRIED (IRON, DRIED, ORAL) Take by mouth once daily.       fluconazole (DIFLUCAN) 150 MG Tab Take one tablet and repeat dose in 3 days 2 tablet 1    fluticasone propionate (FLONASE) 50 mcg/actuation nasal spray 2 sprays (100 mcg total) by Each Nostril route once daily. 16 g 3    folic acid (FOLVITE) 1 MG tablet Take 1 tablet by mouth once daily 90 tablet 1    hydroCHLOROthiazide (MICROZIDE) 12.5 mg capsule Take 1 capsule (12.5 mg total) by mouth once daily. 90 capsule 3    methotrexate 2.5 MG Tab Take 6 tablets once weekly by mouth 30 tablet 6    montelukast (SINGULAIR) 10 mg tablet Take 1 tablet (10 mg total) by mouth once daily. 90 tablet 0    MULTIVITS,CA,MINERALS/IRON/FA (ONE-A-DAY WOMENS FORMULA ORAL) Take by mouth once daily.       promethazine (PHENERGAN) 6.25 mg/5 mL syrup Take by mouth every 6 (six) hours as needed for Nausea.      loratadine (CLARITIN REDITABS) 10 mg dissolvable tablet Take 1 tablet (10 mg  total) by mouth once daily. 30 tablet 11     No current facility-administered medications for this visit.       Past Medical History:   Diagnosis Date    Allergy     Anemia     Hypertension     Plantar fasciitis of left foot      Past Surgical History:   Procedure Laterality Date    COLONOSCOPY N/A 4/9/2021    Procedure: COLONOSCOPY;  Surgeon: Hua Elmore MD;  Location: [facility] ENDO;  Service: Endoscopy;  Laterality: N/A;    HYSTERECTOMY  05/2021    OOPHORECTOMY  05/2021    ROBOT-ASSISTED LAPAROSCOPIC ABDOMINAL HYSTERECTOMY USING DA ABBEY XI N/A 5/18/2021    Procedure: XI ROBOTIC HYSTERECTOMY;  Surgeon: Christa Davila MD;  Location: [facility] OR;  Service: OB/GYN;  Laterality: N/A;    ROBOT-ASSISTED LAPAROSCOPIC SALPINGO-OOPHORECTOMY USING DA ABBEY XI Bilateral 5/18/2021    Procedure: XI ROBOTIC SALPINGO-OOPHORECTOMY;  Surgeon: Christa Davila MD;  Location: [facility] OR;  Service: OB/GYN;  Laterality: Bilateral;     Family History   Problem Relation Age of Onset    Hypertension Mother     Asthma Mother     Cancer Mother         unknown    Hypertension Father     Heart disease Father     Hypertension Sister      Social History     Tobacco Use    Smoking status: Never Smoker    Smokeless tobacco: Never Used   Substance Use Topics    Alcohol use: No    Drug use: No        Review of Systems:  Review of Systems   Constitutional: Negative for activity change, appetite change, chills, fever and unexpected weight change.   HENT: Negative for congestion, drooling, ear pain, nosebleeds, sinus pain and trouble swallowing.    Eyes: Negative for pain, discharge and redness.   Respiratory: Negative for apnea, cough, choking, shortness of breath, wheezing and stridor.    Cardiovascular: Negative for chest pain and leg swelling.   Gastrointestinal: Negative for abdominal pain, anal bleeding, blood in stool, constipation and diarrhea.   Endocrine: Negative for polydipsia and polyphagia.   Genitourinary: Negative for  difficulty urinating, dysuria and hematuria.   Musculoskeletal: Negative for gait problem, myalgias and neck stiffness.   Skin: Negative for color change, rash and wound.   Allergic/Immunologic: Negative for immunocompromised state.   Neurological: Negative for seizures, syncope, speech difficulty and weakness.   Hematological: Does not bruise/bleed easily.   Psychiatric/Behavioral: Negative for agitation, hallucinations, self-injury and suicidal ideas.       OBJECTIVE:     Vital Signs (Most Recent)  Temp: 97.1 °F (36.2 °C) (01/14/22 0858)  Pulse: 82 (01/14/22 0858)  BP: (!) 143/96 (01/14/22 0858)     107.1 kg (236 lb 1.8 oz)     Physical Exam:  Physical Exam  Vitals and nursing note reviewed.   Constitutional:       General: She is not in acute distress.     Appearance: Normal appearance. She is not ill-appearing, toxic-appearing or diaphoretic.   HENT:      Head: Normocephalic and atraumatic.      Right Ear: External ear normal.      Left Ear: External ear normal.      Nose: Nose normal. No congestion or rhinorrhea.      Mouth/Throat:      Mouth: Mucous membranes are moist.      Pharynx: Oropharynx is clear.   Eyes:      General: No scleral icterus.        Right eye: No discharge.         Left eye: No discharge.      Extraocular Movements: Extraocular movements intact.      Conjunctiva/sclera: Conjunctivae normal.      Pupils: Pupils are equal, round, and reactive to light.   Cardiovascular:      Rate and Rhythm: Normal rate and regular rhythm.      Heart sounds: Normal heart sounds. No murmur heard.      Pulmonary:      Effort: Pulmonary effort is normal. No respiratory distress.      Breath sounds: Normal breath sounds. No stridor. No wheezing or rhonchi.   Abdominal:      General: There is no distension.      Palpations: Abdomen is soft.      Tenderness: There is no abdominal tenderness. There is no guarding or rebound.   Musculoskeletal:         General: No deformity. Normal range of motion.      Cervical  back: Normal range of motion and neck supple. No rigidity.      Right lower leg: No edema.      Left lower leg: No edema.   Skin:     General: Skin is warm and dry.      Capillary Refill: Capillary refill takes less than 2 seconds.      Coloration: Skin is not jaundiced.      Findings: No rash.   Neurological:      General: No focal deficit present.      Mental Status: She is alert and oriented to person, place, and time. Mental status is at baseline.      Cranial Nerves: No cranial nerve deficit.      Coordination: Coordination normal.      Gait: Gait normal.   Psychiatric:         Mood and Affect: Mood normal.         Behavior: Behavior normal.         Thought Content: Thought content normal.         Judgment: Judgment normal.         Laboratory      Diagnostic Results:  Mammo Digital Diagnostic Left with Osito  US Breast Left Limited     History:  Patient is 56 y.o. and is seen for diagnostic imaging.     Films Compared:  Compared to: 06/24/2021 Mammo Digital Diagnostic Left with Osito, 06/24/2021 US Breast Left Limited, and 06/18/2021 Mammo Digital Screening Bilat w/ Osito     Findings:  This procedure was performed using tomosynthesis. Computer-aided detection was utilized in the interpretation of this examination.  The left breast is heterogeneously dense, which may obscure small masses.      Mammo Digital Diagnostic Left with Osito  Left  Mass: There is a 9 mm mass seen in the upper outer quadrant of the left breast in the posterior depth. Compared to the previous study, there are no significant changes.      US Breast Left Limited  Left  Lymph Node: There is an 8 mm x 3 mm x 7 mm intramammary lymph node seen in the left breast at 2 o'clock, 8 cm from the nipple. Compared to the previous study, there are no significant changes.      Impression:  Left  Mass: Left breast 9 mm mass at the upper outer posterior position. Assessment: 3 - Probably benign. Short Interval Follow-Up in 6 Months is recommended.       BI-RADS Category:   Overall: 3 - Probably Benign    ASSESSMENT/PLAN:     Jake was seen today for breast problem.    Diagnoses and all orders for this visit:    Left breast mass  -     Ambulatory referral/consult to General Surgery  -     US Breast Biopsy with Imaging 1st site Left; Future  -     Mammo Digital Diagnostic Post Procedure_Mammo Guide_Clip_Needle Loc only; Future         Patient has a concerning family history of breast and colon cancer and she is quite distraught. Will schedule for core needle biopsy.    I spent a total of 30 minutes on the day of the visit.  This includes face to face time and non-face to face time preparing to see the patient (eg, review of tests), obtaining and/or reviewing separately obtained history, documenting clinical information in the electronic or other health record, independently interpreting results and communicating results to the patient/family/caregiver, or care coordinator.    Brianda Pham, DO  General Surgery  [facility]  1/14/2022

## 2022-01-21 ENCOUNTER — OFFICE VISIT (OUTPATIENT)
Dept: OPHTHALMOLOGY | Facility: CLINIC | Age: 57
End: 2022-01-21
Payer: COMMERCIAL

## 2022-01-21 DIAGNOSIS — H52.7 REFRACTIVE ERRORS: ICD-10-CM

## 2022-01-21 DIAGNOSIS — I10 ESSENTIAL HYPERTENSION: Primary | Chronic | ICD-10-CM

## 2022-01-21 PROCEDURE — 92015 DETERMINE REFRACTIVE STATE: CPT | Mod: S$GLB,,, | Performed by: OPTOMETRIST

## 2022-01-21 PROCEDURE — 92015 PR REFRACTION: ICD-10-PCS | Mod: S$GLB,,, | Performed by: OPTOMETRIST

## 2022-01-21 PROCEDURE — 1159F MED LIST DOCD IN RCRD: CPT | Mod: CPTII,S$GLB,, | Performed by: OPTOMETRIST

## 2022-01-21 PROCEDURE — 92004 COMPRE OPH EXAM NEW PT 1/>: CPT | Mod: S$GLB,,, | Performed by: OPTOMETRIST

## 2022-01-21 PROCEDURE — 99999 PR PBB SHADOW E&M-EST. PATIENT-LVL II: ICD-10-PCS | Mod: PBBFAC,,, | Performed by: OPTOMETRIST

## 2022-01-21 PROCEDURE — 99999 PR PBB SHADOW E&M-EST. PATIENT-LVL II: CPT | Mod: PBBFAC,,, | Performed by: OPTOMETRIST

## 2022-01-21 PROCEDURE — 1159F PR MEDICATION LIST DOCUMENTED IN MEDICAL RECORD: ICD-10-PCS | Mod: CPTII,S$GLB,, | Performed by: OPTOMETRIST

## 2022-01-21 PROCEDURE — 92004 PR EYE EXAM, NEW PATIENT,COMPREHESV: ICD-10-PCS | Mod: S$GLB,,, | Performed by: OPTOMETRIST

## 2022-01-21 NOTE — PROGRESS NOTES
HPI     Ep saw TRF in 2004 Annual Exam   OTC Readers +1.75  No Visual Complaints     Last edited by Lily Barnahrt MA on 1/21/2022  1:42 PM. (History)            Assessment /Plan     For exam results, see Encounter Report.    Essential hypertension    Refractive errors      No HTN Retinopathy    Dispense Final Rx for glasses or may use OTC glasses.  RTC 1 year  Discussed above and answered questions.

## 2022-01-31 ENCOUNTER — HOSPITAL ENCOUNTER (OUTPATIENT)
Dept: RADIOLOGY | Facility: HOSPITAL | Age: 57
Discharge: HOME | End: 2022-01-31
Attending: SURGERY
Payer: COMMERCIAL

## 2022-01-31 DIAGNOSIS — N63.20 LEFT BREAST MASS: ICD-10-CM

## 2022-01-31 PROCEDURE — 76642 ULTRASOUND BREAST LIMITED: CPT | Mod: TC,LT

## 2022-01-31 PROCEDURE — 76642 US BREAST LEFT LIMITED: ICD-10-PCS | Mod: 26,LT,, | Performed by: RADIOLOGY

## 2022-01-31 PROCEDURE — 76642 ULTRASOUND BREAST LIMITED: CPT | Mod: 26,LT,, | Performed by: RADIOLOGY

## 2022-02-23 DIAGNOSIS — D84.9 IMMUNOSUPPRESSED STATUS: ICD-10-CM

## 2022-03-21 ENCOUNTER — TELEPHONE (OUTPATIENT)
Dept: RHEUMATOLOGY | Facility: CLINIC | Age: 57
End: 2022-03-21
Payer: COMMERCIAL

## 2022-03-21 DIAGNOSIS — M05.79 RHEUMATOID ARTHRITIS INVOLVING MULTIPLE SITES WITH POSITIVE RHEUMATOID FACTOR: Primary | ICD-10-CM

## 2022-03-21 NOTE — TELEPHONE ENCOUNTER
Called and spoke to pt regarding rescheduling appt. Informed pt that Dr. MON was booked up through September, but pt could see a PA. Pt stated this was fine. Scheduled with Sadia Nicolas PA-C for 3/25/22 at 1:30 pm at the [facility] with Labs at 1 pm prior to visit. Pt verbalized understanding.

## 2022-03-21 NOTE — TELEPHONE ENCOUNTER
----- Message from Crystal Ga sent at 3/21/2022  4:17 PM CDT -----  Who Called: GIA PARRISH    What is the request in detail: Pt had an appt scheduled for tomorrow 3/22 and cancelled it out. Needs to reschedule. Please advise.     Can the clinic reply by [facility]?    Best Call Back Number: [phone]    Additional Information:

## 2022-03-24 ENCOUNTER — TELEPHONE (OUTPATIENT)
Dept: RHEUMATOLOGY | Facility: CLINIC | Age: 57
End: 2022-03-24
Payer: COMMERCIAL

## 2022-03-24 ENCOUNTER — PATIENT OUTREACH (OUTPATIENT)
Dept: ADMINISTRATIVE | Facility: OTHER | Age: 57
End: 2022-03-24
Payer: COMMERCIAL

## 2022-03-31 ENCOUNTER — PATIENT MESSAGE (OUTPATIENT)
Dept: INTERNAL MEDICINE | Facility: CLINIC | Age: 57
End: 2022-03-31
Payer: COMMERCIAL

## 2022-03-31 RX ORDER — ERGOCALCIFEROL 1.25 MG/1
50000 CAPSULE ORAL
Qty: 21 CAPSULE | Refills: 3 | Status: SHIPPED | OUTPATIENT
Start: 2022-03-31 | End: 2022-05-06 | Stop reason: SDUPTHER

## 2022-03-31 RX ORDER — ERGOCALCIFEROL 1.25 MG/1
50000 CAPSULE ORAL
Qty: 21 CAPSULE | Refills: 3 | Status: SHIPPED | OUTPATIENT
Start: 2022-03-31 | End: 2022-03-31 | Stop reason: SDUPTHER

## 2022-04-07 ENCOUNTER — TELEPHONE (OUTPATIENT)
Dept: RHEUMATOLOGY | Facility: CLINIC | Age: 57
End: 2022-04-07
Payer: COMMERCIAL

## 2022-05-05 ENCOUNTER — PATIENT OUTREACH (OUTPATIENT)
Dept: ADMINISTRATIVE | Facility: OTHER | Age: 57
End: 2022-05-05
Payer: COMMERCIAL

## 2022-05-05 ENCOUNTER — TELEPHONE (OUTPATIENT)
Dept: RHEUMATOLOGY | Facility: CLINIC | Age: 57
End: 2022-05-05
Payer: COMMERCIAL

## 2022-05-06 ENCOUNTER — LAB VISIT (OUTPATIENT)
Dept: LAB | Facility: HOSPITAL | Age: 57
End: 2022-05-06
Attending: PHYSICIAN ASSISTANT
Payer: COMMERCIAL

## 2022-05-06 ENCOUNTER — OFFICE VISIT (OUTPATIENT)
Dept: RHEUMATOLOGY | Facility: CLINIC | Age: 57
End: 2022-05-06
Payer: COMMERCIAL

## 2022-05-06 VITALS
DIASTOLIC BLOOD PRESSURE: 86 MMHG | SYSTOLIC BLOOD PRESSURE: 134 MMHG | BODY MASS INDEX: 40.57 KG/M2 | HEIGHT: 64 IN | WEIGHT: 237.63 LBS | HEART RATE: 80 BPM

## 2022-05-06 DIAGNOSIS — M05.79 RHEUMATOID ARTHRITIS INVOLVING MULTIPLE SITES WITH POSITIVE RHEUMATOID FACTOR: Primary | ICD-10-CM

## 2022-05-06 DIAGNOSIS — Z79.899 HIGH RISK MEDICATION USE: ICD-10-CM

## 2022-05-06 DIAGNOSIS — M05.79 RHEUMATOID ARTHRITIS INVOLVING MULTIPLE SITES WITH POSITIVE RHEUMATOID FACTOR: ICD-10-CM

## 2022-05-06 DIAGNOSIS — M15.9 PRIMARY OSTEOARTHRITIS INVOLVING MULTIPLE JOINTS: ICD-10-CM

## 2022-05-06 DIAGNOSIS — M17.12 PRIMARY OSTEOARTHRITIS OF LEFT KNEE: ICD-10-CM

## 2022-05-06 DIAGNOSIS — E55.9 VITAMIN D DEFICIENCY: ICD-10-CM

## 2022-05-06 LAB
25(OH)D3+25(OH)D2 SERPL-MCNC: 34 NG/ML (ref 30–96)
ALBUMIN SERPL BCP-MCNC: 3.9 G/DL (ref 3.5–5.2)
ALP SERPL-CCNC: 53 U/L (ref 55–135)
ALT SERPL W/O P-5'-P-CCNC: 26 U/L (ref 10–44)
ANION GAP SERPL CALC-SCNC: 9 MMOL/L (ref 8–16)
AST SERPL-CCNC: 19 U/L (ref 10–40)
BASOPHILS # BLD AUTO: 0.07 K/UL (ref 0–0.2)
BASOPHILS NFR BLD: 1.1 % (ref 0–1.9)
BILIRUB SERPL-MCNC: 0.5 MG/DL (ref 0.1–1)
BUN SERPL-MCNC: 14 MG/DL (ref 6–20)
CALCIUM SERPL-MCNC: 9.8 MG/DL (ref 8.7–10.5)
CHLORIDE SERPL-SCNC: 105 MMOL/L (ref 95–110)
CO2 SERPL-SCNC: 27 MMOL/L (ref 23–29)
CREAT SERPL-MCNC: 0.8 MG/DL (ref 0.5–1.4)
CRP SERPL-MCNC: 3 MG/L (ref 0–8.2)
DIFFERENTIAL METHOD: ABNORMAL
EOSINOPHIL # BLD AUTO: 0.3 K/UL (ref 0–0.5)
EOSINOPHIL NFR BLD: 4 % (ref 0–8)
ERYTHROCYTE [DISTWIDTH] IN BLOOD BY AUTOMATED COUNT: 15.8 % (ref 11.5–14.5)
EST. GFR  (AFRICAN AMERICAN): >60 ML/MIN/1.73 M^2
EST. GFR  (NON AFRICAN AMERICAN): >60 ML/MIN/1.73 M^2
GLUCOSE SERPL-MCNC: 88 MG/DL (ref 70–110)
HCT VFR BLD AUTO: 38.7 % (ref 37–48.5)
HGB BLD-MCNC: 12.1 G/DL (ref 12–16)
IMM GRANULOCYTES # BLD AUTO: 0.03 K/UL (ref 0–0.04)
IMM GRANULOCYTES NFR BLD AUTO: 0.5 % (ref 0–0.5)
LYMPHOCYTES # BLD AUTO: 2 K/UL (ref 1–4.8)
LYMPHOCYTES NFR BLD: 32.1 % (ref 18–48)
MCH RBC QN AUTO: 25.2 PG (ref 27–31)
MCHC RBC AUTO-ENTMCNC: 31.3 G/DL (ref 32–36)
MCV RBC AUTO: 81 FL (ref 82–98)
MONOCYTES # BLD AUTO: 0.6 K/UL (ref 0.3–1)
MONOCYTES NFR BLD: 9.5 % (ref 4–15)
NEUTROPHILS # BLD AUTO: 3.3 K/UL (ref 1.8–7.7)
NEUTROPHILS NFR BLD: 52.8 % (ref 38–73)
NRBC BLD-RTO: 0 /100 WBC
PLATELET # BLD AUTO: 302 K/UL (ref 150–450)
PMV BLD AUTO: 10.2 FL (ref 9.2–12.9)
POTASSIUM SERPL-SCNC: 3.6 MMOL/L (ref 3.5–5.1)
PROT SERPL-MCNC: 7.4 G/DL (ref 6–8.4)
RBC # BLD AUTO: 4.8 M/UL (ref 4–5.4)
SODIUM SERPL-SCNC: 141 MMOL/L (ref 136–145)
WBC # BLD AUTO: 6.2 K/UL (ref 3.9–12.7)

## 2022-05-06 PROCEDURE — 86140 C-REACTIVE PROTEIN: CPT | Performed by: PHYSICIAN ASSISTANT

## 2022-05-06 PROCEDURE — 85025 COMPLETE CBC W/AUTO DIFF WBC: CPT | Performed by: PHYSICIAN ASSISTANT

## 2022-05-06 PROCEDURE — 99215 OFFICE O/P EST HI 40 MIN: CPT | Mod: 25,S$GLB,, | Performed by: PHYSICIAN ASSISTANT

## 2022-05-06 PROCEDURE — 99999 PR PBB SHADOW E&M-EST. PATIENT-LVL III: CPT | Mod: PBBFAC,,, | Performed by: PHYSICIAN ASSISTANT

## 2022-05-06 PROCEDURE — 99215 PR OFFICE/OUTPT VISIT, EST, LEVL V, 40-54 MIN: ICD-10-PCS | Mod: 25,S$GLB,, | Performed by: PHYSICIAN ASSISTANT

## 2022-05-06 PROCEDURE — 80053 COMPREHEN METABOLIC PANEL: CPT | Performed by: PHYSICIAN ASSISTANT

## 2022-05-06 PROCEDURE — 1159F PR MEDICATION LIST DOCUMENTED IN MEDICAL RECORD: ICD-10-PCS | Mod: CPTII,S$GLB,, | Performed by: PHYSICIAN ASSISTANT

## 2022-05-06 PROCEDURE — 1159F MED LIST DOCD IN RCRD: CPT | Mod: CPTII,S$GLB,, | Performed by: PHYSICIAN ASSISTANT

## 2022-05-06 PROCEDURE — 85652 RBC SED RATE AUTOMATED: CPT | Performed by: PHYSICIAN ASSISTANT

## 2022-05-06 PROCEDURE — 82306 VITAMIN D 25 HYDROXY: CPT | Performed by: PHYSICIAN ASSISTANT

## 2022-05-06 PROCEDURE — 99999 PR PBB SHADOW E&M-EST. PATIENT-LVL III: ICD-10-PCS | Mod: PBBFAC,,, | Performed by: PHYSICIAN ASSISTANT

## 2022-05-06 PROCEDURE — 96372 PR INJECTION,THERAP/PROPH/DIAG2ST, IM OR SUBCUT: ICD-10-PCS | Mod: S$GLB,,, | Performed by: PHYSICIAN ASSISTANT

## 2022-05-06 PROCEDURE — 96372 THER/PROPH/DIAG INJ SC/IM: CPT | Mod: S$GLB,,, | Performed by: PHYSICIAN ASSISTANT

## 2022-05-06 PROCEDURE — 3008F PR BODY MASS INDEX (BMI) DOCUMENTED: ICD-10-PCS | Mod: CPTII,S$GLB,, | Performed by: PHYSICIAN ASSISTANT

## 2022-05-06 PROCEDURE — 3008F BODY MASS INDEX DOCD: CPT | Mod: CPTII,S$GLB,, | Performed by: PHYSICIAN ASSISTANT

## 2022-05-06 RX ORDER — METHOTREXATE 2.5 MG/1
TABLET ORAL
Qty: 30 TABLET | Refills: 6 | Status: SHIPPED | OUTPATIENT
Start: 2022-05-06 | End: 2022-09-09

## 2022-05-06 RX ORDER — ERGOCALCIFEROL 1.25 MG/1
50000 CAPSULE ORAL
Qty: 21 CAPSULE | Refills: 3 | Status: SHIPPED | OUTPATIENT
Start: 2022-05-09 | End: 2022-12-13 | Stop reason: SDUPTHER

## 2022-05-06 RX ORDER — BETAMETHASONE SODIUM PHOSPHATE AND BETAMETHASONE ACETATE 3; 3 MG/ML; MG/ML
6 INJECTION, SUSPENSION INTRA-ARTICULAR; INTRALESIONAL; INTRAMUSCULAR; SOFT TISSUE
Status: COMPLETED | OUTPATIENT
Start: 2022-05-06 | End: 2022-05-06

## 2022-05-06 RX ORDER — KETOROLAC TROMETHAMINE 30 MG/ML
30 INJECTION, SOLUTION INTRAMUSCULAR; INTRAVENOUS
Status: COMPLETED | OUTPATIENT
Start: 2022-05-06 | End: 2022-05-06

## 2022-05-06 RX ORDER — FOLIC ACID 1 MG/1
1000 TABLET ORAL DAILY
Qty: 90 TABLET | Refills: 3 | Status: SHIPPED | OUTPATIENT
Start: 2022-05-06 | End: 2023-05-17 | Stop reason: SDUPTHER

## 2022-05-06 RX ADMIN — BETAMETHASONE SODIUM PHOSPHATE AND BETAMETHASONE ACETATE 6 MG: 3; 3 INJECTION, SUSPENSION INTRA-ARTICULAR; INTRALESIONAL; INTRAMUSCULAR; SOFT TISSUE at 03:05

## 2022-05-06 RX ADMIN — KETOROLAC TROMETHAMINE 30 MG: 30 INJECTION, SOLUTION INTRAMUSCULAR; INTRAVENOUS at 03:05

## 2022-05-06 NOTE — PROGRESS NOTES
"     RHEUMATOLOGY OUTPATIENT CLINIC NOTE    5/6/2022    Attending Rheumatologist: Sadia Nicolas PA-C  Primary Care Provider: Angelica Lagunas MD   Chief Complaint/Reason For Consultation:  Rheumatoid Arthritis      Subjective:        Jake Hoskins is a 57 y.o. female here today for follow up seropositive RA. She has +RF +CCP with early poss erosions on her L hand xrays (stable 9.2021 xr) .  She was started on mtx 15mg/wk and does well in general.  She does complain of some left ankle pain today.  No injuries or trauma of any kind.  She does need refills on her medication.  She has run out of her folic acid.  She is requesting more information today on dietary changes that would help reduce inflammation and help her RA.  Physical exam shows synovitis to the left ankle.  No rashes no Raynaud's phenomena.  No dactylitis.     She has L knee djd.  She has chronic L foot plantar fasciitis.  She takes tylenol arthritis for her knee pain.  She has mild djd on xrays knee, injections in the past have helped. She has low vit D, increased to twice weekly 50K   Rheumatologic systems otherwise negative.    Serologies  Lab Results   Component Value Date    TBGOLDPLUS Negative 12/14/2020       No results found for: ANABELLA  Lab Results   Component Value Date    RF 59.0 (H) 09/12/2020      Lab Results   Component Value Date    HEPAIGM Negative 03/08/2019    HEPBIGM Negative 03/08/2019    HEPBCAB Negative 12/14/2020    HEPCAB Negative 03/08/2019         Current Rheum Medications:  · Methotrexate 15 mg per week  Previous Rheum Medications:   · Voltaren gel    Past Medical History:   Diagnosis Date    Allergy     Anemia     Hypertension     Plantar fasciitis of left foot        Review of patient's allergies indicates:   Allergen Reactions    Tramadol Nausea And Vomiting       Objective:   /86   Pulse 80   Ht 5' 4" (1.626 m)   Wt 107.8 kg (237 lb 10.5 oz)   LMP 12/26/2020   BMI 40.79 kg/m²     Immunization " History   Administered Date(s) Administered    COVID-19, MRNA, LN-S, PF (Pfizer) (Purple Cap) 03/06/2021, 03/28/2021, 12/26/2021    Hepatitis A / Hepatitis B 09/16/2021, 10/20/2021    Influenza - Quadrivalent - PF *Preferred* (6 months and older) 09/10/2020, 12/13/2021    Influenza - Trivalent (ADULT) 10/29/2008    Influenza Split 10/29/2008    Td (ADULT) 04/29/2008    Tdap 07/13/2018       Current Outpatient Medications:     FERROUS SULFATE, DRIED (IRON, DRIED, ORAL), Take by mouth once daily. , Disp: , Rfl:     fluconazole (DIFLUCAN) 150 MG Tab, Take one tablet and repeat dose in 3 days, Disp: 2 tablet, Rfl: 1    fluticasone propionate (FLONASE) 50 mcg/actuation nasal spray, 2 sprays (100 mcg total) by Each Nostril route once daily., Disp: 16 g, Rfl: 3    hydroCHLOROthiazide (MICROZIDE) 12.5 mg capsule, Take 1 capsule (12.5 mg total) by mouth once daily., Disp: 90 capsule, Rfl: 3    levocetirizine (XYZAL) 5 MG tablet, levocetirizine Take 1 tablet (oral) 1 time per day for 30 days 20211231 tablet 1 time per day oral 30 days active 5 mg, Disp: , Rfl:     montelukast (SINGULAIR) 10 mg tablet, Take 1 tablet (10 mg total) by mouth once daily., Disp: 90 tablet, Rfl: 0    MULTIVITS,CA,MINERALS/IRON/FA (ONE-A-DAY WOMENS FORMULA ORAL), Take by mouth once daily. , Disp: , Rfl:     [START ON 5/9/2022] ergocalciferol (ERGOCALCIFEROL) 50,000 unit Cap, Take 1 capsule (50,000 Units total) by mouth twice a week., Disp: 21 capsule, Rfl: 3    folic acid (FOLVITE) 1 MG tablet, Take 1 tablet (1,000 mcg total) by mouth once daily., Disp: 90 tablet, Rfl: 3    loratadine (CLARITIN REDITABS) 10 mg dissolvable tablet, Take 1 tablet (10 mg total) by mouth once daily., Disp: 30 tablet, Rfl: 11    methotrexate 2.5 MG Tab, Take 6 tablets once weekly by mouth, Disp: 30 tablet, Rfl: 6    Current Facility-Administered Medications:     betamethasone acetate-betamethasone sodium phosphate injection 6 mg, 6 mg, Intramuscular, 1  time in Clinic/HOD, Sadia Nicolas PA-C    ketorolac injection 30 mg, 30 mg, Intramuscular, 1 time in Clinic/HOD, Sadia Nicolas PA-C      Imaging:  All imaging reviewed and independently interpreted by me.    XR FOOT COMPLETE 3 VIEW BILATERAL 9/16/21  COMPARISON:  September 18, 2020     FINDINGS:  Multi articular degenerative changes again noted bilaterally throughout the feet.  Mild prominence degenerative changes at the 1st MTP joints.  No acute or healing fracture.  Small right os tibialis externum.  Bilateral dorsal and plantar calcaneal spurs, more prominent on the left.  Diffuse soft tissue prominence bilaterally.     Assessment:     1. Rheumatoid arthritis involving multiple sites with positive rheumatoid factor    2. High risk medication use    3. Primary osteoarthritis of left knee    4. Vitamin D deficiency    5. Primary osteoarthritis involving multiple joints          Due for hand and foot x-rays again in September 2023  Plan:       · erosive seropositive rheumatoid arthritis on methotrexate with some disease activity present today and the left ankle.  IM Toradol and Celestone today.  Continue methotrexate and folic acid if and check inflammatory markers today  · Check vitamin-D level today  · Discussed whole 30 diet and anti-inflammatory diet changes at length with patient.  She is strongly encouraged to make these changes to her diet.  · Compromised immune system secondary to autoimmune disease and use of immunosuppressive drugs. Monitor carefully for infections and toxicities- Currently denies issues with recurrent infections. Advised to get immediate medical care if any infection.  · Recommend Yearly Skin Cancer Screening by Dermatology for all patients on biologic or Tisha. advised strict adherence to age appropriate vaccinations (shingles, pneumonia, flu and covid) and cancer screenings with PCP   · Patient advised to hold DMARD and/or biologic therapy for signs of infection or for surgery. If  you are unsure what to do please call our office for instruction.[facility] [phone]  · no current medication related issues, no evidence of toxicity. I ordered labs for toxicity monitoring;  results reviewed and discussed findings with the patient   · Return to clinic:  For 4 months with safety labs    The patient understands, chooses and consents to this plan and accepts all the risks which include but are not limited to the risks mentioned above.     Method of contact with patient concerns: [facility]    60 minutes of total time spent on the encounter, which includes face to face time and non-face to face time preparing to see the patient (eg, review of tests), Obtaining and/or reviewing separately obtained history, Documenting clinical information in the electronic or other health record, Independently interpreting results (not separately reported) and communicating results to the patient/family/caregiver, or Care coordination (not separately reported).          Disclaimer: This note was prepared using a voice recognition system and is likely to have sound alike errors within the text.       Sadia Nicolas PA-C  Rheumatology Department   [facility] [place] [facility]

## 2022-05-07 LAB — ERYTHROCYTE [SEDIMENTATION RATE] IN BLOOD BY WESTERGREN METHOD: 18 MM/HR (ref 0–36)

## 2022-05-09 ENCOUNTER — PATIENT MESSAGE (OUTPATIENT)
Dept: RHEUMATOLOGY | Facility: CLINIC | Age: 57
End: 2022-05-09
Payer: COMMERCIAL

## 2022-05-28 DIAGNOSIS — J30.1 ACUTE SEASONAL ALLERGIC RHINITIS DUE TO POLLEN: ICD-10-CM

## 2022-05-28 RX ORDER — MONTELUKAST SODIUM 10 MG/1
TABLET ORAL
Qty: 90 TABLET | Refills: 1 | Status: SHIPPED | OUTPATIENT
Start: 2022-05-28 | End: 2022-12-13 | Stop reason: SDUPTHER

## 2022-05-29 NOTE — TELEPHONE ENCOUNTER
Refill Authorization Note   Jake Hoskins  is requesting a refill authorization.  Brief Assessment and Rationale for Refill:  Approve     Medication Therapy Plan:       Medication Reconciliation Completed: No   Comments:     No Care Gaps recommended.     Note composed:9:14 PM 05/28/2022

## 2022-05-29 NOTE — TELEPHONE ENCOUNTER
No new care gaps identified.  [facility] Embedded Care Gaps. Reference number: [id number]. 5/28/2022   7:59:44 PM CDT

## 2022-07-11 ENCOUNTER — HOSPITAL ENCOUNTER (OUTPATIENT)
Dept: RADIOLOGY | Facility: HOSPITAL | Age: 57
Discharge: HOME | End: 2022-07-11
Attending: SURGERY
Payer: COMMERCIAL

## 2022-07-11 DIAGNOSIS — R92.8 ABNORMAL MAMMOGRAM: ICD-10-CM

## 2022-07-11 PROCEDURE — 77066 DX MAMMO INCL CAD BI: CPT | Mod: 26,,, | Performed by: RADIOLOGY

## 2022-07-11 PROCEDURE — 77062 MAMMO DIGITAL DIAGNOSTIC BILAT WITH TOMO: ICD-10-PCS | Mod: 26,,, | Performed by: RADIOLOGY

## 2022-07-11 PROCEDURE — 77066 DX MAMMO INCL CAD BI: CPT | Mod: TC

## 2022-07-11 PROCEDURE — 77062 BREAST TOMOSYNTHESIS BI: CPT | Mod: 26,,, | Performed by: RADIOLOGY

## 2022-07-11 PROCEDURE — 77066 MAMMO DIGITAL DIAGNOSTIC BILAT WITH TOMO: ICD-10-PCS | Mod: 26,,, | Performed by: RADIOLOGY

## 2022-08-08 ENCOUNTER — TELEPHONE (OUTPATIENT)
Dept: RHEUMATOLOGY | Facility: CLINIC | Age: 57
End: 2022-08-08
Payer: COMMERCIAL

## 2022-08-08 NOTE — TELEPHONE ENCOUNTER
----- Message from Shruthi Olivera sent at 8/8/2022  4:28 PM CDT -----  Contact: [facility]  [facility] is calling to speak with the nurse concerning,methotrexate 2.5 MG Tab medication. Explains need diagnoses code. Please give pharmacy a call back at [phone] as requested.  Thanks

## 2022-09-09 ENCOUNTER — LAB VISIT (OUTPATIENT)
Dept: LAB | Facility: HOSPITAL | Age: 57
End: 2022-09-09
Attending: PHYSICIAN ASSISTANT
Payer: COMMERCIAL

## 2022-09-09 ENCOUNTER — OFFICE VISIT (OUTPATIENT)
Dept: RHEUMATOLOGY | Facility: CLINIC | Age: 57
End: 2022-09-09
Payer: COMMERCIAL

## 2022-09-09 VITALS
DIASTOLIC BLOOD PRESSURE: 86 MMHG | BODY MASS INDEX: 39.94 KG/M2 | HEART RATE: 68 BPM | SYSTOLIC BLOOD PRESSURE: 136 MMHG | WEIGHT: 233.94 LBS | HEIGHT: 64 IN

## 2022-09-09 DIAGNOSIS — M25.572 PAIN OF JOINT OF LEFT ANKLE AND FOOT: ICD-10-CM

## 2022-09-09 DIAGNOSIS — E55.9 VITAMIN D DEFICIENCY: ICD-10-CM

## 2022-09-09 DIAGNOSIS — M05.79 RHEUMATOID ARTHRITIS INVOLVING MULTIPLE SITES WITH POSITIVE RHEUMATOID FACTOR: ICD-10-CM

## 2022-09-09 DIAGNOSIS — M05.79 RHEUMATOID ARTHRITIS INVOLVING MULTIPLE SITES WITH POSITIVE RHEUMATOID FACTOR: Primary | ICD-10-CM

## 2022-09-09 DIAGNOSIS — Z79.899 HIGH RISK MEDICATION USE: ICD-10-CM

## 2022-09-09 DIAGNOSIS — M15.9 PRIMARY OSTEOARTHRITIS INVOLVING MULTIPLE JOINTS: ICD-10-CM

## 2022-09-09 DIAGNOSIS — D84.9 IMMUNOCOMPROMISED: ICD-10-CM

## 2022-09-09 LAB
ALBUMIN SERPL BCP-MCNC: 4 G/DL (ref 3.5–5.2)
ALP SERPL-CCNC: 62 U/L (ref 55–135)
ALT SERPL W/O P-5'-P-CCNC: 19 U/L (ref 10–44)
ANION GAP SERPL CALC-SCNC: 12 MMOL/L (ref 8–16)
AST SERPL-CCNC: 19 U/L (ref 10–40)
BASOPHILS # BLD AUTO: 0.04 K/UL (ref 0–0.2)
BASOPHILS NFR BLD: 0.6 % (ref 0–1.9)
BILIRUB SERPL-MCNC: 0.3 MG/DL (ref 0.1–1)
BUN SERPL-MCNC: 16 MG/DL (ref 6–20)
CALCIUM SERPL-MCNC: 9.9 MG/DL (ref 8.7–10.5)
CHLORIDE SERPL-SCNC: 104 MMOL/L (ref 95–110)
CO2 SERPL-SCNC: 25 MMOL/L (ref 23–29)
CREAT SERPL-MCNC: 0.7 MG/DL (ref 0.5–1.4)
CRP SERPL-MCNC: 2.4 MG/L (ref 0–8.2)
DIFFERENTIAL METHOD: ABNORMAL
EOSINOPHIL # BLD AUTO: 0.2 K/UL (ref 0–0.5)
EOSINOPHIL NFR BLD: 3.4 % (ref 0–8)
ERYTHROCYTE [DISTWIDTH] IN BLOOD BY AUTOMATED COUNT: 15.1 % (ref 11.5–14.5)
ERYTHROCYTE [SEDIMENTATION RATE] IN BLOOD BY PHOTOMETRIC METHOD: 47 MM/HR (ref 0–36)
EST. GFR  (NO RACE VARIABLE): >60 ML/MIN/1.73 M^2
GLUCOSE SERPL-MCNC: 95 MG/DL (ref 70–110)
HCT VFR BLD AUTO: 40.5 % (ref 37–48.5)
HGB BLD-MCNC: 12.9 G/DL (ref 12–16)
IMM GRANULOCYTES # BLD AUTO: 0.02 K/UL (ref 0–0.04)
IMM GRANULOCYTES NFR BLD AUTO: 0.3 % (ref 0–0.5)
LYMPHOCYTES # BLD AUTO: 2.4 K/UL (ref 1–4.8)
LYMPHOCYTES NFR BLD: 35.3 % (ref 18–48)
MCH RBC QN AUTO: 26 PG (ref 27–31)
MCHC RBC AUTO-ENTMCNC: 31.9 G/DL (ref 32–36)
MCV RBC AUTO: 82 FL (ref 82–98)
MONOCYTES # BLD AUTO: 0.7 K/UL (ref 0.3–1)
MONOCYTES NFR BLD: 11 % (ref 4–15)
NEUTROPHILS # BLD AUTO: 3.3 K/UL (ref 1.8–7.7)
NEUTROPHILS NFR BLD: 49.4 % (ref 38–73)
NRBC BLD-RTO: 0 /100 WBC
PLATELET # BLD AUTO: 320 K/UL (ref 150–450)
PMV BLD AUTO: 10.4 FL (ref 9.2–12.9)
POTASSIUM SERPL-SCNC: 4.1 MMOL/L (ref 3.5–5.1)
PROT SERPL-MCNC: 7.3 G/DL (ref 6–8.4)
RBC # BLD AUTO: 4.97 M/UL (ref 4–5.4)
SODIUM SERPL-SCNC: 141 MMOL/L (ref 136–145)
WBC # BLD AUTO: 6.75 K/UL (ref 3.9–12.7)

## 2022-09-09 PROCEDURE — 85025 COMPLETE CBC W/AUTO DIFF WBC: CPT | Performed by: PHYSICIAN ASSISTANT

## 2022-09-09 PROCEDURE — 86140 C-REACTIVE PROTEIN: CPT | Performed by: PHYSICIAN ASSISTANT

## 2022-09-09 PROCEDURE — 85652 RBC SED RATE AUTOMATED: CPT | Performed by: PHYSICIAN ASSISTANT

## 2022-09-09 PROCEDURE — 99999 PR PBB SHADOW E&M-EST. PATIENT-LVL III: CPT | Mod: PBBFAC,,, | Performed by: PHYSICIAN ASSISTANT

## 2022-09-09 PROCEDURE — 3008F PR BODY MASS INDEX (BMI) DOCUMENTED: ICD-10-PCS | Mod: CPTII,S$GLB,, | Performed by: PHYSICIAN ASSISTANT

## 2022-09-09 PROCEDURE — 99215 PR OFFICE/OUTPT VISIT, EST, LEVL V, 40-54 MIN: ICD-10-PCS | Mod: S$GLB,,, | Performed by: PHYSICIAN ASSISTANT

## 2022-09-09 PROCEDURE — 3075F SYST BP GE 130 - 139MM HG: CPT | Mod: CPTII,S$GLB,, | Performed by: PHYSICIAN ASSISTANT

## 2022-09-09 PROCEDURE — 80053 COMPREHEN METABOLIC PANEL: CPT | Performed by: PHYSICIAN ASSISTANT

## 2022-09-09 PROCEDURE — 1159F MED LIST DOCD IN RCRD: CPT | Mod: CPTII,S$GLB,, | Performed by: PHYSICIAN ASSISTANT

## 2022-09-09 PROCEDURE — 99999 PR PBB SHADOW E&M-EST. PATIENT-LVL III: ICD-10-PCS | Mod: PBBFAC,,, | Performed by: PHYSICIAN ASSISTANT

## 2022-09-09 PROCEDURE — 1159F PR MEDICATION LIST DOCUMENTED IN MEDICAL RECORD: ICD-10-PCS | Mod: CPTII,S$GLB,, | Performed by: PHYSICIAN ASSISTANT

## 2022-09-09 PROCEDURE — 3008F BODY MASS INDEX DOCD: CPT | Mod: CPTII,S$GLB,, | Performed by: PHYSICIAN ASSISTANT

## 2022-09-09 PROCEDURE — 3075F PR MOST RECENT SYSTOLIC BLOOD PRESS GE 130-139MM HG: ICD-10-PCS | Mod: CPTII,S$GLB,, | Performed by: PHYSICIAN ASSISTANT

## 2022-09-09 PROCEDURE — 3079F DIAST BP 80-89 MM HG: CPT | Mod: CPTII,S$GLB,, | Performed by: PHYSICIAN ASSISTANT

## 2022-09-09 PROCEDURE — 3079F PR MOST RECENT DIASTOLIC BLOOD PRESSURE 80-89 MM HG: ICD-10-PCS | Mod: CPTII,S$GLB,, | Performed by: PHYSICIAN ASSISTANT

## 2022-09-09 PROCEDURE — 99215 OFFICE O/P EST HI 40 MIN: CPT | Mod: S$GLB,,, | Performed by: PHYSICIAN ASSISTANT

## 2022-09-09 RX ORDER — CELECOXIB 200 MG/1
200 CAPSULE ORAL 2 TIMES DAILY
Qty: 60 CAPSULE | Refills: 2 | Status: SHIPPED | OUTPATIENT
Start: 2022-09-09 | End: 2023-03-27

## 2022-09-09 RX ORDER — METHOTREXATE 2.5 MG/1
20 TABLET ORAL
Qty: 96 TABLET | Refills: 0 | Status: SHIPPED | OUTPATIENT
Start: 2022-09-09 | End: 2023-05-17 | Stop reason: SDUPTHER

## 2022-09-09 RX ORDER — CYCLOBENZAPRINE HCL 10 MG
10 TABLET ORAL NIGHTLY PRN
Qty: 30 TABLET | Refills: 2 | Status: SHIPPED | OUTPATIENT
Start: 2022-09-09 | End: 2022-10-09

## 2022-09-09 ASSESSMENT — ROUTINE ASSESSMENT OF PATIENT INDEX DATA (RAPID3): MDHAQ FUNCTION SCORE: 0.6

## 2022-09-09 NOTE — PROGRESS NOTES
"     RHEUMATOLOGY OUTPATIENT CLINIC NOTE    9/9/2022    Attending Rheumatologist: Sadia Nicolas PA-C  Primary Care Provider: Angelica Lagunas MD   Chief Complaint/Reason For Consultation:  Rheumatoid Arthritis      Subjective:        Jake Hoskins is a 57 y.o. female here today for follow up seropositive RA. She has +RF +CCP with early poss erosions on her L hand xrays (stable 9.2021 xr) . on mtx 15mg/wk. Has chronic pain with her left foot and ankle. She does often use tylenol and ibuprofen at home in addition to her MTX. Uses flexeril as needed. Physical exam shows synovitis to the left ankle.  No rashes no Raynaud's phenomena.  No dactylitis.     She has L knee djd.  She has chronic L foot plantar fasciitis.  She has low vit D, increased to twice weekly 50K   Rheumatologic systems otherwise negative.    Serologies  Lab Results   Component Value Date    TBGOLDPLUS Negative 12/14/2020     No results found for: ANABELLA  Lab Results   Component Value Date    RF 59.0 (H) 09/12/2020     Lab Results   Component Value Date    HEPAIGM Negative 03/08/2019    HEPBIGM Negative 03/08/2019    HEPBCAB Negative 12/14/2020    HEPCAB Negative 03/08/2019        Current Rheum Medications:  Methotrexate 15 mg per week, FA, tylenol, ibuprofen  Previous Rheum Medications:   Voltaren gel    Past Medical History:   Diagnosis Date    Allergy     Anemia     Hypertension     Plantar fasciitis of left foot        Review of patient's allergies indicates:   Allergen Reactions    Tramadol Nausea And Vomiting       Objective:   /86   Pulse 68   Ht 5' 4" (1.626 m)   Wt 106.1 kg (233 lb 14.5 oz)   LMP 12/26/2020   BMI 40.15 kg/m²     Immunization History   Administered Date(s) Administered    COVID-19, MRNA, LN-S, PF (Pfizer) (Purple Cap) 03/06/2021, 03/28/2021, 12/26/2021    Hepatitis A / Hepatitis B 09/16/2021, 10/20/2021    Influenza - Quadrivalent - PF *Preferred* (6 months and older) 09/10/2020, 12/13/2021    Influenza - " Trivalent (ADULT) 10/29/2008    Influenza Split 10/29/2008    Td (ADULT) 04/29/2008    Tdap 07/13/2018       Current Outpatient Medications:     ergocalciferol (ERGOCALCIFEROL) 50,000 unit Cap, Take 1 capsule (50,000 Units total) by mouth twice a week., Disp: 21 capsule, Rfl: 3    FERROUS SULFATE, DRIED (IRON, DRIED, ORAL), Take by mouth once daily. , Disp: , Rfl:     fluconazole (DIFLUCAN) 150 MG Tab, Take one tablet and repeat dose in 3 days, Disp: 2 tablet, Rfl: 1    fluticasone propionate (FLONASE) 50 mcg/actuation nasal spray, 2 sprays (100 mcg total) by Each Nostril route once daily., Disp: 16 g, Rfl: 3    folic acid (FOLVITE) 1 MG tablet, Take 1 tablet (1,000 mcg total) by mouth once daily., Disp: 90 tablet, Rfl: 3    hydroCHLOROthiazide (MICROZIDE) 12.5 mg capsule, Take 1 capsule (12.5 mg total) by mouth once daily., Disp: 90 capsule, Rfl: 3    levocetirizine (XYZAL) 5 MG tablet, levocetirizine Take 1 tablet (oral) 1 time per day for 30 days 20211231 tablet 1 time per day oral 30 days active 5 mg, Disp: , Rfl:     methotrexate 2.5 MG Tab, Take 6 tablets once weekly by mouth, Disp: 30 tablet, Rfl: 6    montelukast (SINGULAIR) 10 mg tablet, Take 1 tablet by mouth once daily, Disp: 90 tablet, Rfl: 1    MULTIVITS,CA,MINERALS/IRON/FA (ONE-A-DAY WOMENS FORMULA ORAL), Take by mouth once daily. , Disp: , Rfl:     loratadine (CLARITIN REDITABS) 10 mg dissolvable tablet, Take 1 tablet (10 mg total) by mouth once daily., Disp: 30 tablet, Rfl: 11      Imaging:  All imaging reviewed and independently interpreted by me.    XR FOOT COMPLETE 3 VIEW BILATERAL 9/16/21  COMPARISON:  September 18, 2020     FINDINGS:  Multi articular degenerative changes again noted bilaterally throughout the feet.  Mild prominence degenerative changes at the 1st MTP joints.  No acute or healing fracture.  Small right os tibialis externum.  Bilateral dorsal and plantar calcaneal spurs, more prominent on the left.  Diffuse soft tissue prominence  bilaterally.     Assessment:     1. Rheumatoid arthritis involving multiple sites with positive rheumatoid factor    2. High risk medication use    3. Primary osteoarthritis involving multiple joints    4. Pain of joint of left ankle and foot    5. Vitamin D deficiency    6. Immunocompromised            Due for hand and foot x-rays again in September 2023  Plan:       erosive seropositive rheumatoid arthritis on methotrexate - increase dose to 20 mg/wk, cont FA daily. Trial celebrex BID. Flexeril rx renewed today  MRI left ankle for further evaluation  Compromised immune system secondary to autoimmune disease and use of immunosuppressive drugs. Monitor carefully for infections and toxicities- Currently denies issues with recurrent infections. Advised to get immediate medical care if any infection.  Recommend Yearly Skin Cancer Screening by Dermatology for all patients on biologic or Tisha. advised strict adherence to age appropriate vaccinations (shingles, pneumonia, flu and covid) and cancer screenings with PCP   Patient advised to hold DMARD and/or biologic therapy for signs of infection or for surgery. If you are unsure what to do please call our office for instruction.[facility] [phone]  no current medication related issues, no evidence of toxicity. I ordered labs for toxicity monitoring;  results reviewed and discussed findings with the patient   Return to clinic:  For 4 months with safety labs    The patient understands, chooses and consents to this plan and accepts all the risks which include but are not limited to the risks mentioned above.     Method of contact with patient concerns: Madeleine attn Rheumatology    60 minutes of total time spent on the encounter, which includes face to face time and non-face to face time preparing to see the patient (eg, review of tests), Obtaining and/or reviewing separately obtained history, Documenting clinical information in the electronic or other  health record, Independently interpreting results (not separately reported) and communicating results to the patient/family/caregiver, or Care coordination (not separately reported).          Disclaimer: This note was prepared using a voice recognition system and is likely to have sound alike errors within the text.       Sadia Nicolas PA-C  Rheumatology Department   [facility] [place]

## 2022-10-11 ENCOUNTER — PATIENT MESSAGE (OUTPATIENT)
Dept: RHEUMATOLOGY | Facility: CLINIC | Age: 57
End: 2022-10-11
Payer: COMMERCIAL

## 2022-10-24 ENCOUNTER — PATIENT MESSAGE (OUTPATIENT)
Dept: RHEUMATOLOGY | Facility: CLINIC | Age: 57
End: 2022-10-24
Payer: COMMERCIAL

## 2022-10-24 ENCOUNTER — TELEPHONE (OUTPATIENT)
Dept: RHEUMATOLOGY | Facility: CLINIC | Age: 57
End: 2022-10-24
Payer: COMMERCIAL

## 2023-04-11 ENCOUNTER — TELEPHONE (OUTPATIENT)
Dept: RHEUMATOLOGY | Facility: CLINIC | Age: 58
End: 2023-04-11
Payer: COMMERCIAL

## 2023-04-21 ENCOUNTER — TELEPHONE (OUTPATIENT)
Dept: RHEUMATOLOGY | Facility: CLINIC | Age: 58
End: 2023-04-21
Payer: COMMERCIAL

## 2023-04-21 NOTE — TELEPHONE ENCOUNTER
----- Message from Janene Roque sent at 4/21/2023 12:24 PM CDT -----  Contact: Jake Lozano was scheduled for a virtual visit at 11:30 and was in the virtual waiting room until 12 pm. The provider did not arrive. Please call her back at [phone].     Thanks  TS

## 2023-04-21 NOTE — TELEPHONE ENCOUNTER
1st attempt to contact patient to discuss her virtual visit. Patient did not answer. LVM for pt to return call_DD

## 2023-04-25 ENCOUNTER — TELEPHONE (OUTPATIENT)
Dept: RHEUMATOLOGY | Facility: CLINIC | Age: 58
End: 2023-04-25
Payer: COMMERCIAL

## 2023-04-25 DIAGNOSIS — Z79.899 HIGH RISK MEDICATION USE: Primary | ICD-10-CM

## 2023-04-25 NOTE — TELEPHONE ENCOUNTER
----- Message from Ava Moya sent at 4/25/2023  8:37 AM CDT -----  Regarding: patient request  Contact: patient  Name of Who is Calling: GIA PARRISH [[id number]]      What is the request in detail: Patient is requesting a call back to re-schedule an apt she missed. Patient is requesting an office visit. Please advise!         Can the clinic reply by [facility]: NO        What Number to Call Back if not in [facility]: [phone]

## 2023-04-25 NOTE — TELEPHONE ENCOUNTER
Spoke with patient and scheduled next available appointment with different provider due to Sadia leaving the rheumatology department. Patient was notified of time date and location and she was also scheduled for labs

## 2023-05-10 ENCOUNTER — OFFICE VISIT (OUTPATIENT)
Dept: INTERNAL MEDICINE | Facility: CLINIC | Age: 58
End: 2023-05-10
Payer: COMMERCIAL

## 2023-05-10 ENCOUNTER — LAB VISIT (OUTPATIENT)
Dept: LAB | Facility: HOSPITAL | Age: 58
End: 2023-05-10
Attending: PHYSICIAN ASSISTANT
Payer: COMMERCIAL

## 2023-05-10 VITALS
HEIGHT: 64 IN | WEIGHT: 229.94 LBS | BODY MASS INDEX: 39.26 KG/M2 | SYSTOLIC BLOOD PRESSURE: 118 MMHG | TEMPERATURE: 96 F | DIASTOLIC BLOOD PRESSURE: 82 MMHG | HEART RATE: 68 BPM | OXYGEN SATURATION: 99 %

## 2023-05-10 DIAGNOSIS — Z86.2 HISTORY OF ANEMIA: ICD-10-CM

## 2023-05-10 DIAGNOSIS — I10 ESSENTIAL HYPERTENSION: ICD-10-CM

## 2023-05-10 DIAGNOSIS — Z12.31 SCREENING MAMMOGRAM, ENCOUNTER FOR: ICD-10-CM

## 2023-05-10 DIAGNOSIS — Z79.899 HIGH RISK MEDICATION USE: ICD-10-CM

## 2023-05-10 DIAGNOSIS — R05.9 COUGH: ICD-10-CM

## 2023-05-10 DIAGNOSIS — E55.9 VITAMIN D DEFICIENCY: ICD-10-CM

## 2023-05-10 DIAGNOSIS — Z00.00 ANNUAL PHYSICAL EXAM: Primary | ICD-10-CM

## 2023-05-10 DIAGNOSIS — M05.79 RHEUMATOID ARTHRITIS INVOLVING MULTIPLE SITES WITH POSITIVE RHEUMATOID FACTOR: ICD-10-CM

## 2023-05-10 DIAGNOSIS — J30.1 ACUTE SEASONAL ALLERGIC RHINITIS DUE TO POLLEN: ICD-10-CM

## 2023-05-10 LAB
ALBUMIN SERPL BCP-MCNC: 4.2 G/DL (ref 3.5–5.2)
ALP SERPL-CCNC: 67 U/L (ref 55–135)
ALT SERPL W/O P-5'-P-CCNC: 20 U/L (ref 10–44)
ANION GAP SERPL CALC-SCNC: 11 MMOL/L (ref 8–16)
AST SERPL-CCNC: 23 U/L (ref 10–40)
BASOPHILS # BLD AUTO: 0.05 K/UL (ref 0–0.2)
BASOPHILS NFR BLD: 0.9 % (ref 0–1.9)
BILIRUB SERPL-MCNC: 0.3 MG/DL (ref 0.1–1)
BUN SERPL-MCNC: 17 MG/DL (ref 6–20)
CALCIUM SERPL-MCNC: 9.9 MG/DL (ref 8.7–10.5)
CHLORIDE SERPL-SCNC: 105 MMOL/L (ref 95–110)
CO2 SERPL-SCNC: 26 MMOL/L (ref 23–29)
CREAT SERPL-MCNC: 0.8 MG/DL (ref 0.5–1.4)
CRP SERPL-MCNC: 2.3 MG/L (ref 0–8.2)
DIFFERENTIAL METHOD: ABNORMAL
EOSINOPHIL # BLD AUTO: 0.3 K/UL (ref 0–0.5)
EOSINOPHIL NFR BLD: 4.9 % (ref 0–8)
ERYTHROCYTE [DISTWIDTH] IN BLOOD BY AUTOMATED COUNT: 14.7 % (ref 11.5–14.5)
ERYTHROCYTE [SEDIMENTATION RATE] IN BLOOD BY WESTERGREN METHOD: 13 MM/HR (ref 0–20)
EST. GFR  (NO RACE VARIABLE): >60 ML/MIN/1.73 M^2
GLUCOSE SERPL-MCNC: 96 MG/DL (ref 70–110)
HCT VFR BLD AUTO: 43.8 % (ref 37–48.5)
HGB BLD-MCNC: 13.3 G/DL (ref 12–16)
IMM GRANULOCYTES # BLD AUTO: 0.02 K/UL (ref 0–0.04)
IMM GRANULOCYTES NFR BLD AUTO: 0.3 % (ref 0–0.5)
LYMPHOCYTES # BLD AUTO: 1.9 K/UL (ref 1–4.8)
LYMPHOCYTES NFR BLD: 33.2 % (ref 18–48)
MCH RBC QN AUTO: 24.6 PG (ref 27–31)
MCHC RBC AUTO-ENTMCNC: 30.4 G/DL (ref 32–36)
MCV RBC AUTO: 81 FL (ref 82–98)
MONOCYTES # BLD AUTO: 0.6 K/UL (ref 0.3–1)
MONOCYTES NFR BLD: 10.3 % (ref 4–15)
NEUTROPHILS # BLD AUTO: 2.9 K/UL (ref 1.8–7.7)
NEUTROPHILS NFR BLD: 50.4 % (ref 38–73)
NRBC BLD-RTO: 0 /100 WBC
PLATELET # BLD AUTO: 306 K/UL (ref 150–450)
PMV BLD AUTO: 10.8 FL (ref 9.2–12.9)
POTASSIUM SERPL-SCNC: 3.8 MMOL/L (ref 3.5–5.1)
PROT SERPL-MCNC: 8 G/DL (ref 6–8.4)
RBC # BLD AUTO: 5.41 M/UL (ref 4–5.4)
SODIUM SERPL-SCNC: 142 MMOL/L (ref 136–145)
WBC # BLD AUTO: 5.75 K/UL (ref 3.9–12.7)

## 2023-05-10 PROCEDURE — 1159F MED LIST DOCD IN RCRD: CPT | Mod: CPTII,S$GLB,, | Performed by: FAMILY MEDICINE

## 2023-05-10 PROCEDURE — 36415 COLL VENOUS BLD VENIPUNCTURE: CPT | Mod: PO | Performed by: PHYSICIAN ASSISTANT

## 2023-05-10 PROCEDURE — 86140 C-REACTIVE PROTEIN: CPT | Performed by: PHYSICIAN ASSISTANT

## 2023-05-10 PROCEDURE — 3008F PR BODY MASS INDEX (BMI) DOCUMENTED: ICD-10-PCS | Mod: CPTII,S$GLB,, | Performed by: FAMILY MEDICINE

## 2023-05-10 PROCEDURE — 3074F SYST BP LT 130 MM HG: CPT | Mod: CPTII,S$GLB,, | Performed by: FAMILY MEDICINE

## 2023-05-10 PROCEDURE — 99999 PR PBB SHADOW E&M-EST. PATIENT-LVL IV: CPT | Mod: PBBFAC,,, | Performed by: FAMILY MEDICINE

## 2023-05-10 PROCEDURE — 1160F RVW MEDS BY RX/DR IN RCRD: CPT | Mod: CPTII,S$GLB,, | Performed by: FAMILY MEDICINE

## 2023-05-10 PROCEDURE — 3074F PR MOST RECENT SYSTOLIC BLOOD PRESSURE < 130 MM HG: ICD-10-PCS | Mod: CPTII,S$GLB,, | Performed by: FAMILY MEDICINE

## 2023-05-10 PROCEDURE — 99396 PR PREVENTIVE VISIT,EST,40-64: ICD-10-PCS | Mod: S$GLB,,, | Performed by: FAMILY MEDICINE

## 2023-05-10 PROCEDURE — 1159F PR MEDICATION LIST DOCUMENTED IN MEDICAL RECORD: ICD-10-PCS | Mod: CPTII,S$GLB,, | Performed by: FAMILY MEDICINE

## 2023-05-10 PROCEDURE — 85025 COMPLETE CBC W/AUTO DIFF WBC: CPT | Performed by: PHYSICIAN ASSISTANT

## 2023-05-10 PROCEDURE — 85651 RBC SED RATE NONAUTOMATED: CPT | Performed by: PHYSICIAN ASSISTANT

## 2023-05-10 PROCEDURE — 1160F PR REVIEW ALL MEDS BY PRESCRIBER/CLIN PHARMACIST DOCUMENTED: ICD-10-PCS | Mod: CPTII,S$GLB,, | Performed by: FAMILY MEDICINE

## 2023-05-10 PROCEDURE — 3079F DIAST BP 80-89 MM HG: CPT | Mod: CPTII,S$GLB,, | Performed by: FAMILY MEDICINE

## 2023-05-10 PROCEDURE — 99396 PREV VISIT EST AGE 40-64: CPT | Mod: S$GLB,,, | Performed by: FAMILY MEDICINE

## 2023-05-10 PROCEDURE — 80053 COMPREHEN METABOLIC PANEL: CPT | Performed by: PHYSICIAN ASSISTANT

## 2023-05-10 PROCEDURE — 3008F BODY MASS INDEX DOCD: CPT | Mod: CPTII,S$GLB,, | Performed by: FAMILY MEDICINE

## 2023-05-10 PROCEDURE — 3079F PR MOST RECENT DIASTOLIC BLOOD PRESSURE 80-89 MM HG: ICD-10-PCS | Mod: CPTII,S$GLB,, | Performed by: FAMILY MEDICINE

## 2023-05-10 PROCEDURE — 99999 PR PBB SHADOW E&M-EST. PATIENT-LVL IV: ICD-10-PCS | Mod: PBBFAC,,, | Performed by: FAMILY MEDICINE

## 2023-05-10 RX ORDER — ERGOCALCIFEROL 1.25 MG/1
50000 CAPSULE ORAL
Qty: 21 CAPSULE | Refills: 3 | Status: SHIPPED | OUTPATIENT
Start: 2023-05-11 | End: 2024-02-26

## 2023-05-10 RX ORDER — MONTELUKAST SODIUM 10 MG/1
10 TABLET ORAL DAILY
Qty: 90 TABLET | Refills: 3 | Status: SHIPPED | OUTPATIENT
Start: 2023-05-10

## 2023-05-10 RX ORDER — FLUTICASONE PROPIONATE 50 MCG
2 SPRAY, SUSPENSION (ML) NASAL DAILY
Qty: 16 G | Refills: 3 | Status: SHIPPED | OUTPATIENT
Start: 2023-05-10

## 2023-05-10 RX ORDER — HYDROCHLOROTHIAZIDE 12.5 MG/1
12.5 CAPSULE ORAL DAILY
Qty: 90 CAPSULE | Refills: 3 | Status: SHIPPED | OUTPATIENT
Start: 2023-05-10

## 2023-05-10 NOTE — PROGRESS NOTES
Jake Hoskins  05/10/2023  [id number]    Angelica Lagunas MD  Patient Care Team:  Angelica Lagunas MD as PCP - General (Family Medicine)  Phil Baires LPN (Inactive) as Care Coordinator (Internal Medicine)          Visit Type:a scheduled routine follow-up visit    Chief Complaint:  Chief Complaint   Patient presents with    Annual Exam       History of Present Illness:  58 year old  Check up    Known HTN  On meds    Had Hyst    RA- [facility].- seropositive RA. She has +RF +CCP with early poss erosions on her L hand xrays (stable 9.2021 xr) . on mtx 15mg/wk. Has chronic pain with her left foot and ankle. She does often use tylenol and ibuprofen at home in addition to her MTX. Uses flexeril as needed. Physical exam shows synovitis to the left ankle.  No rashes no Raynaud's phenomena.  No dactylitis.     She has L knee djd.  She has chronic L foot plantar fasciitis.  She has low vit D, increased to twice weekly 50K   Rheumatologic systems otherwise negative.    Health Maintenance Due   Topic Date Due    Pneumococcal Vaccines (Age 0-64) (1 - PCV) Never done    Shingles Vaccine (1 of 2) Never done    COVID-19 Vaccine (4 - Booster for Pfizer series) 02/20/2022    Hemoglobin A1c (Diabetic Prevention Screening)  07/05/2022       Answers submitted by the patient for this visit:  Review of Systems Questionnaire (Submitted on 5/10/2023)  activity change: No  unexpected weight change: No  rhinorrhea: No  trouble swallowing: No  visual disturbance: No  chest tightness: No  polyuria: No  difficulty urinating: No  menstrual problem: No  joint swelling: No  arthralgias: No  confusion: No  dysphoric mood: No      History:  Past Medical History:   Diagnosis Date    Allergy     Anemia     Hypertension     Plantar fasciitis of left foot      Past Surgical History:   Procedure Laterality Date    COLONOSCOPY N/A 04/09/2021    Procedure: COLONOSCOPY;  Surgeon: Hua Elmore MD;  Location: [facility];  Service:  Endoscopy;  Laterality: N/A;    HYSTERECTOMY  05/2021    OOPHORECTOMY  05/2021    ROBOT-ASSISTED LAPAROSCOPIC ABDOMINAL HYSTERECTOMY USING DA ABBEY XI N/A 05/18/2021    Procedure: XI ROBOTIC HYSTERECTOMY;  Surgeon: Christa Davila MD;  Location: [facility] [place] [facility];  Service: OB/GYN;  Laterality: N/A;    ROBOT-ASSISTED LAPAROSCOPIC SALPINGO-OOPHORECTOMY USING DA ABBEY XI Bilateral 05/18/2021    Procedure: XI ROBOTIC SALPINGO-OOPHORECTOMY;  Surgeon: Christa Davila MD;  Location: [facility] [place] [facility];  Service: OB/GYN;  Laterality: Bilateral;     Family History   Problem Relation Age of Onset    Hypertension Mother     Asthma Mother     Cancer Mother         unknown    Hypertension Father     Heart disease Father     Hypertension Sister      Social History     Socioeconomic History    Marital status:     Number of children: 2   Occupational History    Occupation: [employer]     Employer: [employer]   Tobacco Use    Smoking status: Never    Smokeless tobacco: Never   Substance and Sexual Activity    Alcohol use: No    Drug use: No    Sexual activity: Not Currently     Birth control/protection: Surgical     Social Determinants of Health     Financial Resource Strain: Low Risk     Difficulty of Paying Living Expenses: Not very hard   Food Insecurity: Food Insecurity Present    Worried About Running Out of Food in the Last Year: Sometimes true    Ran Out of Food in the Last Year: Sometimes true   Transportation Needs: Unmet Transportation Needs    Lack of Transportation (Medical): Yes    Lack of Transportation (Non-Medical): Yes   Physical Activity: Sufficiently Active    Days of Exercise per Week: 5 days    Minutes of Exercise per Session: 150+ min   Stress: No Stress Concern Present    Feeling of Stress : Only a little   Social Connections: Unknown    Frequency of Communication with Friends and Family: Once a week    Frequency of Social Gatherings with Friends and Family: Once a week    Active Member of Clubs or Organizations: Yes    Attends  Club or Organization Meetings: More than 4 times per year    Marital Status:    Housing Stability: High Risk    Unable to Pay for Housing in the Last Year: Yes    Number of Places Lived in the Last Year: 1    Unstable Housing in the Last Year: No     Patient Active Problem List   Diagnosis    Anemia    Plantar fasciitis of left foot    Essential hypertension    Cervical polyp    S/P total hysterectomy and BSO (bilateral salpingo-oophorectomy)    Rheumatoid arthritis involving multiple sites with positive rheumatoid factor    Breast mass, left     Review of patient's allergies indicates:   Allergen Reactions    Tramadol Nausea And Vomiting       The following were reviewed at this visit: active problem list, medication list, allergies, family history, social history, and health maintenance.    Medications:  Current Outpatient Medications on File Prior to Visit   Medication Sig Dispense Refill    celecoxib (CELEBREX) 200 MG capsule Take 1 capsule by mouth twice daily 60 capsule 0    ergocalciferol (ERGOCALCIFEROL) 50,000 unit Cap Take 1 capsule (50,000 Units total) by mouth twice a week. 21 capsule 3    FERROUS SULFATE, DRIED (IRON, DRIED, ORAL) Take by mouth once daily.       fluticasone propionate (FLONASE) 50 mcg/actuation nasal spray 2 sprays (100 mcg total) by Each Nostril route once daily. 16 g 3    folic acid (FOLVITE) 1 MG tablet Take 1 tablet (1,000 mcg total) by mouth once daily. 90 tablet 3    hydroCHLOROthiazide (MICROZIDE) 12.5 mg capsule Take 1 capsule (12.5 mg total) by mouth once daily. 90 capsule 3    loratadine (CLARITIN REDITABS) 10 mg dissolvable tablet Take 1 tablet (10 mg total) by mouth once daily. 30 tablet 11    methotrexate 2.5 MG Tab Take 8 tablets (20 mg total) by mouth every 7 days. 96 tablet 0    montelukast (SINGULAIR) 10 mg tablet Take 1 tablet (10 mg total) by mouth once daily. 90 tablet 1    MULTIVITS,CA,MINERALS/IRON/FA (ONE-A-DAY WOMENS FORMULA ORAL) Take by mouth once daily.        fluconazole (DIFLUCAN) 150 MG Tab TAKE ONE TABLET BY MOUTH NOW, THEN REPEAT DOSE IN 3 DAYS (Patient not taking: Reported on 5/10/2023) 2 tablet 0    levocetirizine (XYZAL) 5 MG tablet levocetirizine Take 1 tablet (oral) 1 time per day for 30 days 20211231 tablet 1 time per day oral 30 days active 5 mg       No current facility-administered medications on file prior to visit.       Medications have been reviewed and reconciled with patient at this visit.  Barriers to medications reviewed with patient.    Adverse reactions to current medications reviewed with patient..    Over the counter medications reviewed and reconciled with patient.    Exam:  Wt Readings from Last 3 Encounters:   05/10/23 104.3 kg (229 lb 15 oz)   09/09/22 106.1 kg (233 lb 14.5 oz)   05/06/22 107.8 kg (237 lb 10.5 oz)     Temp Readings from Last 3 Encounters:   05/10/23 96.3 °F (35.7 °C) (Tympanic)   01/14/22 97.1 °F (36.2 °C) (Tympanic)   12/13/21 97.3 °F (36.3 °C) (Tympanic)     BP Readings from Last 3 Encounters:   05/10/23 118/82   09/09/22 136/86   05/06/22 134/86     Pulse Readings from Last 3 Encounters:   05/10/23 68   09/09/22 68   05/06/22 80     Body mass index is 39.47 kg/m².      Review of Systems   Constitutional: Negative.  Negative for chills and fever.   HENT: Negative.  Negative for congestion, hearing loss, sinus pain and sore throat.    Eyes:  Negative for blurred vision, double vision and discharge.   Respiratory:  Negative for cough, sputum production, shortness of breath and wheezing.    Cardiovascular:  Negative for chest pain, palpitations and leg swelling.   Gastrointestinal:  Negative for abdominal pain, blood in stool, constipation, diarrhea, heartburn, nausea and vomiting.   Genitourinary: Negative.  Negative for dysuria and hematuria.   Musculoskeletal:  Positive for joint pain. Negative for neck pain.   Skin: Negative.  Negative for rash.   Neurological: Negative.  Negative for weakness and headaches.    Endo/Heme/Allergies: Negative.  Negative for polydipsia. Does not bruise/bleed easily.   Psychiatric/Behavioral:  Negative for depression and substance abuse.    Physical Exam  Nursing note reviewed.   Pulmonary:      Effort: Pulmonary effort is normal. No respiratory distress.   Neurological:      Mental Status: She is alert and oriented to person, place, and time.   Psychiatric:         Mood and Affect: Mood normal.         Behavior: Behavior normal.         Thought Content: Thought content normal.         Judgment: Judgment normal.       Laboratory Reviewed ({Yes)  Lab Results   Component Value Date    WBC 5.75 05/10/2023    HGB 13.3 05/10/2023    HCT 43.8 05/10/2023     05/10/2023    CHOL 185 07/06/2018    TRIG 91 07/06/2018    HDL 59 07/06/2018    ALT 20 05/10/2023    AST 23 05/10/2023     05/10/2023    K 3.8 05/10/2023     05/10/2023    CREATININE 0.8 05/10/2023    BUN 17 05/10/2023    CO2 26 05/10/2023    TSH 0.752 02/26/2021    HGBA1C 5.7 (H) 07/05/2019       Jake was seen today for annual exam.    Diagnoses and all orders for this visit:    Annual physical exam  -     Lipid Panel; Future  -     Hemoglobin A1C; Future    Essential hypertension    Rheumatoid arthritis involving multiple sites with positive rheumatoid factor    History of anemia                Care Plan/Goals: Reviewed    Goals    None         Follow up: No follow-ups on file.    After visit summary was printed and given to patient upon discharge today.  Patient goals and care plan are included in After Visit Summary.

## 2023-05-12 ENCOUNTER — LAB VISIT (OUTPATIENT)
Dept: LAB | Facility: HOSPITAL | Age: 58
End: 2023-05-12
Attending: FAMILY MEDICINE
Payer: COMMERCIAL

## 2023-05-12 DIAGNOSIS — Z00.00 ANNUAL PHYSICAL EXAM: ICD-10-CM

## 2023-05-12 LAB
CHOLEST SERPL-MCNC: 179 MG/DL (ref 120–199)
CHOLEST/HDLC SERPL: 2.9 {RATIO} (ref 2–5)
ESTIMATED AVG GLUCOSE: 114 MG/DL (ref 68–131)
HBA1C MFR BLD: 5.6 % (ref 4–5.6)
HDLC SERPL-MCNC: 61 MG/DL (ref 40–75)
HDLC SERPL: 34.1 % (ref 20–50)
LDLC SERPL CALC-MCNC: 98.4 MG/DL (ref 63–159)
NONHDLC SERPL-MCNC: 118 MG/DL
TRIGL SERPL-MCNC: 98 MG/DL (ref 30–150)

## 2023-05-12 PROCEDURE — 36415 COLL VENOUS BLD VENIPUNCTURE: CPT | Mod: PO | Performed by: FAMILY MEDICINE

## 2023-05-12 PROCEDURE — 83036 HEMOGLOBIN GLYCOSYLATED A1C: CPT | Performed by: FAMILY MEDICINE

## 2023-05-12 PROCEDURE — 80061 LIPID PANEL: CPT | Performed by: FAMILY MEDICINE

## 2023-05-16 ENCOUNTER — PATIENT MESSAGE (OUTPATIENT)
Dept: INTERNAL MEDICINE | Facility: CLINIC | Age: 58
End: 2023-05-16
Payer: COMMERCIAL

## 2023-05-17 ENCOUNTER — OFFICE VISIT (OUTPATIENT)
Dept: RHEUMATOLOGY | Facility: CLINIC | Age: 58
End: 2023-05-17
Payer: COMMERCIAL

## 2023-05-17 ENCOUNTER — HOSPITAL ENCOUNTER (OUTPATIENT)
Dept: RADIOLOGY | Facility: HOSPITAL | Age: 58
Discharge: HOME | End: 2023-05-17
Attending: PHYSICIAN ASSISTANT
Payer: COMMERCIAL

## 2023-05-17 VITALS
WEIGHT: 227.94 LBS | HEIGHT: 64 IN | HEART RATE: 85 BPM | BODY MASS INDEX: 38.91 KG/M2 | SYSTOLIC BLOOD PRESSURE: 129 MMHG | DIASTOLIC BLOOD PRESSURE: 87 MMHG

## 2023-05-17 DIAGNOSIS — Z79.899 HIGH RISK MEDICATION USE: ICD-10-CM

## 2023-05-17 DIAGNOSIS — M05.79 RHEUMATOID ARTHRITIS INVOLVING MULTIPLE SITES WITH POSITIVE RHEUMATOID FACTOR: Primary | ICD-10-CM

## 2023-05-17 DIAGNOSIS — D84.9 IMMUNOCOMPROMISED: ICD-10-CM

## 2023-05-17 DIAGNOSIS — M25.562 CHRONIC PAIN OF LEFT KNEE: ICD-10-CM

## 2023-05-17 DIAGNOSIS — G89.29 CHRONIC PAIN OF LEFT KNEE: ICD-10-CM

## 2023-05-17 DIAGNOSIS — M17.12 PRIMARY OSTEOARTHRITIS OF LEFT KNEE: ICD-10-CM

## 2023-05-17 DIAGNOSIS — Z51.81 MEDICATION MONITORING ENCOUNTER: ICD-10-CM

## 2023-05-17 PROCEDURE — 73564 XR KNEE ORTHO BILAT WITH FLEXION: ICD-10-PCS | Mod: 26,,, | Performed by: RADIOLOGY

## 2023-05-17 PROCEDURE — 3079F PR MOST RECENT DIASTOLIC BLOOD PRESSURE 80-89 MM HG: ICD-10-PCS | Mod: CPTII,S$GLB,, | Performed by: PHYSICIAN ASSISTANT

## 2023-05-17 PROCEDURE — 1159F PR MEDICATION LIST DOCUMENTED IN MEDICAL RECORD: ICD-10-PCS | Mod: CPTII,S$GLB,, | Performed by: PHYSICIAN ASSISTANT

## 2023-05-17 PROCEDURE — 73564 X-RAY EXAM KNEE 4 OR MORE: CPT | Mod: 26,,, | Performed by: RADIOLOGY

## 2023-05-17 PROCEDURE — 20610 DRAIN/INJ JOINT/BURSA W/O US: CPT | Mod: LT,S$GLB,, | Performed by: PHYSICIAN ASSISTANT

## 2023-05-17 PROCEDURE — 99999 PR PBB SHADOW E&M-EST. PATIENT-LVL IV: CPT | Mod: PBBFAC,,, | Performed by: PHYSICIAN ASSISTANT

## 2023-05-17 PROCEDURE — 99214 OFFICE O/P EST MOD 30 MIN: CPT | Mod: 25,S$GLB,, | Performed by: PHYSICIAN ASSISTANT

## 2023-05-17 PROCEDURE — 3074F SYST BP LT 130 MM HG: CPT | Mod: CPTII,S$GLB,, | Performed by: PHYSICIAN ASSISTANT

## 2023-05-17 PROCEDURE — 1160F RVW MEDS BY RX/DR IN RCRD: CPT | Mod: CPTII,S$GLB,, | Performed by: PHYSICIAN ASSISTANT

## 2023-05-17 PROCEDURE — 99214 PR OFFICE/OUTPT VISIT, EST, LEVL IV, 30-39 MIN: ICD-10-PCS | Mod: 25,S$GLB,, | Performed by: PHYSICIAN ASSISTANT

## 2023-05-17 PROCEDURE — 99999 PR PBB SHADOW E&M-EST. PATIENT-LVL IV: ICD-10-PCS | Mod: PBBFAC,,, | Performed by: PHYSICIAN ASSISTANT

## 2023-05-17 PROCEDURE — 1160F PR REVIEW ALL MEDS BY PRESCRIBER/CLIN PHARMACIST DOCUMENTED: ICD-10-PCS | Mod: CPTII,S$GLB,, | Performed by: PHYSICIAN ASSISTANT

## 2023-05-17 PROCEDURE — 3008F PR BODY MASS INDEX (BMI) DOCUMENTED: ICD-10-PCS | Mod: CPTII,S$GLB,, | Performed by: PHYSICIAN ASSISTANT

## 2023-05-17 PROCEDURE — 1159F MED LIST DOCD IN RCRD: CPT | Mod: CPTII,S$GLB,, | Performed by: PHYSICIAN ASSISTANT

## 2023-05-17 PROCEDURE — 20610 LARGE JOINT ASPIRATION/INJECTION: L KNEE: ICD-10-PCS | Mod: LT,S$GLB,, | Performed by: PHYSICIAN ASSISTANT

## 2023-05-17 PROCEDURE — 73564 X-RAY EXAM KNEE 4 OR MORE: CPT | Mod: TC,50

## 2023-05-17 PROCEDURE — 3079F DIAST BP 80-89 MM HG: CPT | Mod: CPTII,S$GLB,, | Performed by: PHYSICIAN ASSISTANT

## 2023-05-17 PROCEDURE — 3008F BODY MASS INDEX DOCD: CPT | Mod: CPTII,S$GLB,, | Performed by: PHYSICIAN ASSISTANT

## 2023-05-17 PROCEDURE — 3044F PR MOST RECENT HEMOGLOBIN A1C LEVEL <7.0%: ICD-10-PCS | Mod: CPTII,S$GLB,, | Performed by: PHYSICIAN ASSISTANT

## 2023-05-17 PROCEDURE — 3044F HG A1C LEVEL LT 7.0%: CPT | Mod: CPTII,S$GLB,, | Performed by: PHYSICIAN ASSISTANT

## 2023-05-17 PROCEDURE — 3074F PR MOST RECENT SYSTOLIC BLOOD PRESSURE < 130 MM HG: ICD-10-PCS | Mod: CPTII,S$GLB,, | Performed by: PHYSICIAN ASSISTANT

## 2023-05-17 RX ORDER — CYCLOBENZAPRINE HCL 10 MG
10 TABLET ORAL NIGHTLY PRN
Qty: 90 TABLET | Refills: 1 | Status: SHIPPED | OUTPATIENT
Start: 2023-05-17

## 2023-05-17 RX ORDER — CELECOXIB 200 MG/1
200 CAPSULE ORAL DAILY
Qty: 90 CAPSULE | Refills: 1 | Status: SHIPPED | OUTPATIENT
Start: 2023-05-17 | End: 2023-09-18 | Stop reason: SDUPTHER

## 2023-05-17 RX ORDER — FOLIC ACID 1 MG/1
1000 TABLET ORAL DAILY
Qty: 90 TABLET | Refills: 3 | Status: SHIPPED | OUTPATIENT
Start: 2023-05-17

## 2023-05-17 RX ORDER — METHOTREXATE 2.5 MG/1
15 TABLET ORAL
Qty: 96 TABLET | Refills: 1 | Status: SHIPPED | OUTPATIENT
Start: 2023-05-17 | End: 2023-07-06 | Stop reason: SDUPTHER

## 2023-05-17 RX ORDER — TRIAMCINOLONE ACETONIDE 40 MG/ML
40 INJECTION, SUSPENSION INTRA-ARTICULAR; INTRAMUSCULAR
Status: DISCONTINUED | OUTPATIENT
Start: 2023-05-17 | End: 2023-05-17 | Stop reason: HOSPADM

## 2023-05-17 RX ADMIN — TRIAMCINOLONE ACETONIDE 40 MG: 40 INJECTION, SUSPENSION INTRA-ARTICULAR; INTRAMUSCULAR at 02:05

## 2023-05-17 ASSESSMENT — ROUTINE ASSESSMENT OF PATIENT INDEX DATA (RAPID3): MDHAQ FUNCTION SCORE: 0.3

## 2023-05-17 NOTE — PROGRESS NOTES
Subjective:      Patient ID: Jake Hoskins is a 58 y.o. female.    Chief Complaint: Disease Management and Rheumatoid Arthritis      HPI   Jake Hoskins  is a 58 y.o. female here to f/u on seropositive RA.  Small joints are doing well.  She denies prolonged morning stiffness.  Currently stable on methotrexate 15 mg weekly with folic acid 1 mg daily.  Tolerating both medications well without problems.  Also takes Celebrex 200 mg daily p.r.n. as well as Flexeril 10 mg q.h.s. p.r.n..    Also with known history of left knee DJD.  She has required injections in the past.  It has been quite some time since she is had any injections.  Knee pain is worsened with weight-bearing and getting up after prolonged inactivity.  Eases up after a few steps.  She is on ergocalciferol 22474 units weekly.      Patient denies fevers, chills, photosensitivity, eye pain, shortness of breath, chest pain, hematuria, blood in the stool, rash, sicca symptoms, raynauds, finger ulcerations.  Rheumatologic systems otherwise negative.    MHAQ: 0.3  Serologies/Labs:  +RF (59, +CCP  Current Treatment:  MTX 15 mg weekly  Folic acid 1 mg daily  Flexeril 10 mg qhs prn  Celebrex 200 mg daily prn  Previous Treatment:   na      Current Outpatient Medications:     ergocalciferol (ERGOCALCIFEROL) 50,000 unit Cap, Take 1 capsule (50,000 Units total) by mouth twice a week., Disp: 21 capsule, Rfl: 3    FERROUS SULFATE, DRIED (IRON, DRIED, ORAL), Take by mouth once daily. , Disp: , Rfl:     fluticasone propionate (FLONASE) 50 mcg/actuation nasal spray, 2 sprays (100 mcg total) by Each Nostril route once daily., Disp: 16 g, Rfl: 3    hydroCHLOROthiazide (MICROZIDE) 12.5 mg capsule, Take 1 capsule (12.5 mg total) by mouth once daily., Disp: 90 capsule, Rfl: 3    montelukast (SINGULAIR) 10 mg tablet, Take 1 tablet (10 mg total) by mouth once daily., Disp: 90 tablet, Rfl: 3    MULTIVITS,CA,MINERALS/IRON/FA (ONE-A-DAY WOMENS FORMULA ORAL), Take by mouth once  daily. , Disp: , Rfl:     celecoxib (CELEBREX) 200 MG capsule, Take 1 capsule (200 mg total) by mouth once daily., Disp: 90 capsule, Rfl: 1    cyclobenzaprine (FLEXERIL) 10 MG tablet, Take 1 tablet (10 mg total) by mouth nightly as needed for Muscle spasms., Disp: 90 tablet, Rfl: 1    folic acid (FOLVITE) 1 MG tablet, Take 1 tablet (1,000 mcg total) by mouth once daily., Disp: 90 tablet, Rfl: 3    loratadine (CLARITIN REDITABS) 10 mg dissolvable tablet, Take 1 tablet (10 mg total) by mouth once daily., Disp: 30 tablet, Rfl: 11    methotrexate 2.5 MG Tab, Take 6 tablets (15 mg total) by mouth every 7 days., Disp: 96 tablet, Rfl: 1    Past Medical History:   Diagnosis Date    Allergy     Anemia     Hypertension     Plantar fasciitis of left foot      Family History   Problem Relation Age of Onset    Hypertension Mother     Asthma Mother     Cancer Mother         unknown    Hypertension Father     Heart disease Father     Hypertension Sister      Social History     Socioeconomic History    Marital status:     Number of children: 2   Occupational History    Occupation: [employer]     Employer: [employer]   Tobacco Use    Smoking status: Never    Smokeless tobacco: Never   Substance and Sexual Activity    Alcohol use: No    Drug use: No    Sexual activity: Not Currently     Birth control/protection: Surgical     Social Determinants of Health     Financial Resource Strain: Low Risk     Difficulty of Paying Living Expenses: Not very hard   Food Insecurity: Food Insecurity Present    Worried About Running Out of Food in the Last Year: Sometimes true    Ran Out of Food in the Last Year: Sometimes true   Transportation Needs: Unmet Transportation Needs    Lack of Transportation (Medical): Yes    Lack of Transportation (Non-Medical): Yes   Physical Activity: Sufficiently Active    Days of Exercise per Week: 5 days    Minutes of Exercise per Session: 150+ min   Stress: No Stress Concern Present    Feeling of Stress : Only a  "little   Social Connections: Unknown    Frequency of Communication with Friends and Family: Once a week    Frequency of Social Gatherings with Friends and Family: Once a week    Active Member of Clubs or Organizations: Yes    Attends Club or Organization Meetings: More than 4 times per year    Marital Status:    Housing Stability: High Risk    Unable to Pay for Housing in the Last Year: Yes    Number of Places Lived in the Last Year: 1    Unstable Housing in the Last Year: No     Review of patient's allergies indicates:   Allergen Reactions    Tramadol Nausea And Vomiting       Objective:   /87   Pulse 85   Ht 5' 4" (1.626 m)   Wt 103.4 kg (227 lb 15.3 oz)   LMP 12/26/2020   BMI 39.13 kg/m²   Immunization History   Administered Date(s) Administered    COVID-19, MRNA, LN-S, PF (Pfizer) (Purple Cap) 03/06/2021, 03/28/2021, 12/26/2021    Hepatitis A / Hepatitis B 09/16/2021, 10/20/2021    Influenza - Quadrivalent - PF *Preferred* (6 months and older) 09/10/2020, 12/13/2021    Influenza - Trivalent (ADULT) 10/29/2008    Influenza Split 10/29/2008    Td (ADULT) 04/29/2008    Tdap 07/13/2018       Physical Exam   Constitutional: She is oriented to person, place, and time. No distress.   HENT:   Head: Normocephalic and atraumatic.   Pulmonary/Chest: Effort normal.   Abdominal: She exhibits no distension.   Musculoskeletal:         General: No swelling or tenderness. Normal range of motion.      Cervical back: Normal range of motion.   Lymphadenopathy:     She has no cervical adenopathy.   Neurological: She is alert and oriented to person, place, and time.   Skin: Skin is warm and dry. No rash noted.   Psychiatric: Mood normal.   Nursing note and vitals reviewed.  No synovitis, no dactylitis, no enthesitis  No effusions of large or small joints  100% fist formation  Well preserved ROM    Left knee exam  FLOR  No effusion   TTP med comp  crepitus      Recent Results (from the past 672 hour(s))   CBC Auto " Differential    Collection Time: 05/10/23  8:02 AM   Result Value Ref Range    WBC 5.75 3.90 - 12.70 K/uL    RBC 5.41 (H) 4.00 - 5.40 M/uL    Hemoglobin 13.3 12.0 - 16.0 g/dL    Hematocrit 43.8 37.0 - 48.5 %    MCV 81 (L) 82 - 98 fL    MCH 24.6 (L) 27.0 - 31.0 pg    MCHC 30.4 (L) 32.0 - 36.0 g/dL    RDW 14.7 (H) 11.5 - 14.5 %    Platelets 306 150 - 450 K/uL    MPV 10.8 9.2 - 12.9 fL    Immature Granulocytes 0.3 0.0 - 0.5 %    Gran # (ANC) 2.9 1.8 - 7.7 K/uL    Immature Grans (Abs) 0.02 0.00 - 0.04 K/uL    Lymph # 1.9 1.0 - 4.8 K/uL    Mono # 0.6 0.3 - 1.0 K/uL    Eos # 0.3 0.0 - 0.5 K/uL    Baso # 0.05 0.00 - 0.20 K/uL    nRBC 0 0 /100 WBC    Gran % 50.4 38.0 - 73.0 %    Lymph % 33.2 18.0 - 48.0 %    Mono % 10.3 4.0 - 15.0 %    Eosinophil % 4.9 0.0 - 8.0 %    Basophil % 0.9 0.0 - 1.9 %    Differential Method Automated    Comprehensive Metabolic Panel    Collection Time: 05/10/23  8:02 AM   Result Value Ref Range    Sodium 142 136 - 145 mmol/L    Potassium 3.8 3.5 - 5.1 mmol/L    Chloride 105 95 - 110 mmol/L    CO2 26 23 - 29 mmol/L    Glucose 96 70 - 110 mg/dL    BUN 17 6 - 20 mg/dL    Creatinine 0.8 0.5 - 1.4 mg/dL    Calcium 9.9 8.7 - 10.5 mg/dL    Total Protein 8.0 6.0 - 8.4 g/dL    Albumin 4.2 3.5 - 5.2 g/dL    Total Bilirubin 0.3 0.1 - 1.0 mg/dL    Alkaline Phosphatase 67 55 - 135 U/L    AST 23 10 - 40 U/L    ALT 20 10 - 44 U/L    Anion Gap 11 8 - 16 mmol/L    eGFR >60 >60 mL/min/1.73 m^2   Sedimentation rate    Collection Time: 05/10/23  8:02 AM   Result Value Ref Range    Sed Rate 13 0 - 20 mm/Hr   C-Reactive Protein    Collection Time: 05/10/23  8:02 AM   Result Value Ref Range    CRP 2.3 0.0 - 8.2 mg/L   Lipid Panel    Collection Time: 05/12/23 10:34 AM   Result Value Ref Range    Cholesterol 179 120 - 199 mg/dL    Triglycerides 98 30 - 150 mg/dL    HDL 61 40 - 75 mg/dL    LDL Cholesterol 98.4 63.0 - 159.0 mg/dL    HDL/Cholesterol Ratio 34.1 20.0 - 50.0 %    Total Cholesterol/HDL Ratio 2.9 2.0 - 5.0     Non-HDL Cholesterol 118 mg/dL   Hemoglobin A1C    Collection Time: 05/12/23 10:34 AM   Result Value Ref Range    Hemoglobin A1C 5.6 4.0 - 5.6 %    Estimated Avg Glucose 114 68 - 131 mg/dL         Lab Results   Component Value Date    TBGOLDPLUS Negative 12/14/2020      Lab Results   Component Value Date    HEPAIGM Negative 03/08/2019    HEPBIGM Negative 03/08/2019    HEPBCAB Negative 12/14/2020    HEPCAB Negative 03/08/2019        Imaging  I have personally reviewed images and reports as below.  I agree with the interpretation.  X-ray Knee Ortho Bilateral  Order: [id number]  Status: Final result     Visible to patient: Yes (seen)     Next appt: 07/24/2023 at 01:00 PM [id number]     Dx: Acute pain of left knee     5 Result Notes  Details    Reading Physician Reading Date Result Priority   Hua Avitia MD  [phone] 1/7/2019      Narrative & Impression  EXAMINATION:  XR KNEE ORTHO BILAT     CLINICAL HISTORY:  Pain in left knee     TECHNIQUE:  AP standing of both knees, PA flexion standing views of both knees, and Merchant views of both knees were performed.  Lateral views of both knees were also performed.     COMPARISON:  None     FINDINGS:  Right knee: There is no radiographic evidence of acute osseous, articular, or soft tissue abnormality. Small tricompartmental marginal osteophytes are present with slight joint space narrowing in the medial tibiofemoral compartment.     Left knee:  There is no radiographic evidence of acute osseous, articular, or soft tissue abnormality. Small tricompartmental marginal osteophytes are present with slight joint space narrowing in the medial tibiofemoral compartment.     IMPRESSION:      As above.  No acute findings.        Electronically signed by: Hua Avitia MD  Date:                                            01/07/2019  Time:                                           07:54       Assessment:     1. Rheumatoid arthritis involving multiple sites  with positive rheumatoid factor    2. High risk medication use    3. Immunocompromised    4. Medication monitoring encounter    5. Chronic pain of left knee    6. Primary osteoarthritis of left knee            Plan:     Jake was seen today for disease management and rheumatoid arthritis.    Diagnoses and all orders for this visit:    Rheumatoid arthritis involving multiple sites with positive rheumatoid factor  -     folic acid (FOLVITE) 1 MG tablet; Take 1 tablet (1,000 mcg total) by mouth once daily.  -     methotrexate 2.5 MG Tab; Take 6 tablets (15 mg total) by mouth every 7 days.  -     celecoxib (CELEBREX) 200 MG capsule; Take 1 capsule (200 mg total) by mouth once daily.  -     cyclobenzaprine (FLEXERIL) 10 MG tablet; Take 1 tablet (10 mg total) by mouth nightly as needed for Muscle spasms.  -     Large Joint Aspiration/Injection: L knee    High risk medication use    Immunocompromised    Medication monitoring encounter    Chronic pain of left knee  -     X-ray Knee Ortho Bilateral with Flexion; Future  -     Large Joint Aspiration/Injection: L knee    Primary osteoarthritis of left knee        Seropositive RA   Labs stable  Low disease activity  C/w MTX 15 mg weekly w folic acid 1 mg daily  Celebrex 200 mg daily p.r.n. refilled in addition to Flexeril 10 mg q.h.s. p.r.n..  We will continue to monitor renal function.  We will  Osteoarthritis left knee  CSI today  Discussed risks and benefits  Patient wishes to proceed  Home exercise program for knee strengthening  Celebrex as above  Consider viscosupplementation down the road  Update knee x-rays today  Drug therapy requiring intensive monitoring for toxicity  High Risk Medication Monitoring encounter  No current medication related issues, no evidence of toxicity  I ordered labs for toxicity monitoring, have personally reviewed the findings, and discussed them with the patient.  Pending labs will be sent via the portal  Compromised immune system secondary to  autoimmune disease and/or use of immunosuppressive drugs.  Monitor carefully for infections.  Advised patient to get immediate medical care if any infection arises.  Also advised strict adherence age-appropriate vaccinations and cancer screenings with PCP.  Patient advised to hold DMARD and/or biologic therapy for signs of infection or for surgery. If you are unsure what to do please call our office for instruction.[facility] [phone]  Return to clinic: Reg 4 one week prior to f/u    Follow up in about 4 months (around 9/17/2023).    The patient understands, chooses and consents to this plan and accepts all the risks which include but are not limited to the risks mentioned above.     Disclaimer: This note was prepared using a voice recognition system and is likely to have sound alike errors within the text.

## 2023-05-17 NOTE — PROCEDURES
Large Joint Aspiration/Injection: L knee    Date/Time: 5/17/2023 2:30 PM  Performed by: Sydni Dinh PA-C  Authorized by: Sydni Dinh PA-C     Consent Done?:  Yes (Verbal)  Indications:  Pain  Site marked: the procedure site was marked    Timeout: prior to procedure the correct patient, procedure, and site was verified    Prep: patient was prepped and draped in usual sterile fashion      Local anesthesia used?: Yes    Local anesthetic:  Lidocaine 2% without epinephrine  Anesthetic total (ml):  2      Details:  Needle Size:  22 G  Ultrasonic Guidance for needle placement?: No    Approach:  Anterolateral  Location:  Knee  Site:  L knee  Medications:  40 mg triamcinolone acetonide 40 mg/mL  Patient tolerance:  Patient tolerated the procedure well with no immediate complications     Left Knee Injection Report:  After verbal consent was obtained for left knee injection, patient ID, site, and side were verified.  The  left  knee was sterilly prepped in the standard fashion.  A 22-gauge needle was introduced into left knee joint from an corrie-lateral approach without complication. The left knee was then injected with 40 mg lidocaine plain and 40 mg Kenalog.  A sterile bandaid was applied.  The patient was informed to apply an ice pack approximately 10min once arriving home and not to do anything strenuous for 24hours.  She was instructed to call if there were any problems. The patient was discharged in stable condition.

## 2023-07-06 DIAGNOSIS — M05.79 RHEUMATOID ARTHRITIS INVOLVING MULTIPLE SITES WITH POSITIVE RHEUMATOID FACTOR: ICD-10-CM

## 2023-07-06 RX ORDER — METHOTREXATE 2.5 MG/1
15 TABLET ORAL
Qty: 72 TABLET | Refills: 1 | Status: SHIPPED | OUTPATIENT
Start: 2023-07-06 | End: 2023-09-18 | Stop reason: SDUPTHER

## 2023-09-14 ENCOUNTER — LAB VISIT (OUTPATIENT)
Dept: LAB | Facility: HOSPITAL | Age: 58
End: 2023-09-14
Attending: PHYSICIAN ASSISTANT
Payer: COMMERCIAL

## 2023-09-14 DIAGNOSIS — Z79.899 HIGH RISK MEDICATION USE: ICD-10-CM

## 2023-09-14 LAB
ALBUMIN SERPL BCP-MCNC: 4.1 G/DL (ref 3.5–5.2)
ALP SERPL-CCNC: 66 U/L (ref 55–135)
ALT SERPL W/O P-5'-P-CCNC: 19 U/L (ref 10–44)
ANION GAP SERPL CALC-SCNC: 12 MMOL/L (ref 8–16)
AST SERPL-CCNC: 22 U/L (ref 10–40)
BASOPHILS # BLD AUTO: 0.04 K/UL (ref 0–0.2)
BASOPHILS NFR BLD: 0.8 % (ref 0–1.9)
BILIRUB SERPL-MCNC: 0.2 MG/DL (ref 0.1–1)
BUN SERPL-MCNC: 15 MG/DL (ref 6–20)
CALCIUM SERPL-MCNC: 9.7 MG/DL (ref 8.7–10.5)
CHLORIDE SERPL-SCNC: 105 MMOL/L (ref 95–110)
CO2 SERPL-SCNC: 27 MMOL/L (ref 23–29)
CREAT SERPL-MCNC: 0.8 MG/DL (ref 0.5–1.4)
CRP SERPL-MCNC: 3.7 MG/L (ref 0–8.2)
DIFFERENTIAL METHOD: ABNORMAL
EOSINOPHIL # BLD AUTO: 0.2 K/UL (ref 0–0.5)
EOSINOPHIL NFR BLD: 4 % (ref 0–8)
ERYTHROCYTE [DISTWIDTH] IN BLOOD BY AUTOMATED COUNT: 14.6 % (ref 11.5–14.5)
ERYTHROCYTE [SEDIMENTATION RATE] IN BLOOD BY WESTERGREN METHOD: 15 MM/HR (ref 0–20)
EST. GFR  (NO RACE VARIABLE): >60 ML/MIN/1.73 M^2
GLUCOSE SERPL-MCNC: 96 MG/DL (ref 70–110)
HCT VFR BLD AUTO: 40.5 % (ref 37–48.5)
HGB BLD-MCNC: 12.6 G/DL (ref 12–16)
IMM GRANULOCYTES # BLD AUTO: 0.03 K/UL (ref 0–0.04)
IMM GRANULOCYTES NFR BLD AUTO: 0.6 % (ref 0–0.5)
LYMPHOCYTES # BLD AUTO: 1.7 K/UL (ref 1–4.8)
LYMPHOCYTES NFR BLD: 32.1 % (ref 18–48)
MCH RBC QN AUTO: 25.5 PG (ref 27–31)
MCHC RBC AUTO-ENTMCNC: 31.1 G/DL (ref 32–36)
MCV RBC AUTO: 82 FL (ref 82–98)
MONOCYTES # BLD AUTO: 0.5 K/UL (ref 0.3–1)
MONOCYTES NFR BLD: 8.7 % (ref 4–15)
NEUTROPHILS # BLD AUTO: 2.8 K/UL (ref 1.8–7.7)
NEUTROPHILS NFR BLD: 53.8 % (ref 38–73)
NRBC BLD-RTO: 0 /100 WBC
PLATELET # BLD AUTO: 316 K/UL (ref 150–450)
PMV BLD AUTO: 10.6 FL (ref 9.2–12.9)
POTASSIUM SERPL-SCNC: 3.3 MMOL/L (ref 3.5–5.1)
PROT SERPL-MCNC: 7.8 G/DL (ref 6–8.4)
RBC # BLD AUTO: 4.95 M/UL (ref 4–5.4)
SODIUM SERPL-SCNC: 144 MMOL/L (ref 136–145)
WBC # BLD AUTO: 5.26 K/UL (ref 3.9–12.7)

## 2023-09-14 PROCEDURE — 86140 C-REACTIVE PROTEIN: CPT | Performed by: PHYSICIAN ASSISTANT

## 2023-09-14 PROCEDURE — 36415 COLL VENOUS BLD VENIPUNCTURE: CPT | Mod: PO | Performed by: PHYSICIAN ASSISTANT

## 2023-09-14 PROCEDURE — 85651 RBC SED RATE NONAUTOMATED: CPT | Performed by: PHYSICIAN ASSISTANT

## 2023-09-14 PROCEDURE — 85025 COMPLETE CBC W/AUTO DIFF WBC: CPT | Performed by: PHYSICIAN ASSISTANT

## 2023-09-14 PROCEDURE — 80053 COMPREHEN METABOLIC PANEL: CPT | Performed by: PHYSICIAN ASSISTANT

## 2023-09-15 ENCOUNTER — PATIENT MESSAGE (OUTPATIENT)
Dept: INTERNAL MEDICINE | Facility: CLINIC | Age: 58
End: 2023-09-15
Payer: COMMERCIAL

## 2023-09-15 DIAGNOSIS — E87.6 HYPOKALEMIA: Primary | ICD-10-CM

## 2023-09-15 RX ORDER — POTASSIUM CHLORIDE 20 MEQ/1
20 TABLET, EXTENDED RELEASE ORAL DAILY
Qty: 2 TABLET | Refills: 0 | Status: SHIPPED | OUTPATIENT
Start: 2023-09-15

## 2023-09-18 ENCOUNTER — OFFICE VISIT (OUTPATIENT)
Dept: RHEUMATOLOGY | Facility: CLINIC | Age: 58
End: 2023-09-18
Payer: COMMERCIAL

## 2023-09-18 VITALS
DIASTOLIC BLOOD PRESSURE: 83 MMHG | WEIGHT: 223.13 LBS | SYSTOLIC BLOOD PRESSURE: 124 MMHG | BODY MASS INDEX: 38.09 KG/M2 | HEART RATE: 68 BPM | HEIGHT: 64 IN

## 2023-09-18 DIAGNOSIS — Z51.81 MEDICATION MONITORING ENCOUNTER: ICD-10-CM

## 2023-09-18 DIAGNOSIS — M25.562 CHRONIC PAIN OF LEFT KNEE: ICD-10-CM

## 2023-09-18 DIAGNOSIS — Z79.899 IMMUNOCOMPROMISED STATE DUE TO DRUG THERAPY: ICD-10-CM

## 2023-09-18 DIAGNOSIS — G89.29 CHRONIC PAIN OF LEFT KNEE: ICD-10-CM

## 2023-09-18 DIAGNOSIS — D84.821 IMMUNOCOMPROMISED STATE DUE TO DRUG THERAPY: ICD-10-CM

## 2023-09-18 DIAGNOSIS — M17.12 PRIMARY OSTEOARTHRITIS OF LEFT KNEE: ICD-10-CM

## 2023-09-18 DIAGNOSIS — M05.79 RHEUMATOID ARTHRITIS INVOLVING MULTIPLE SITES WITH POSITIVE RHEUMATOID FACTOR: Primary | ICD-10-CM

## 2023-09-18 DIAGNOSIS — Z79.899 HIGH RISK MEDICATION USE: ICD-10-CM

## 2023-09-18 PROCEDURE — 99999 PR PBB SHADOW E&M-EST. PATIENT-LVL IV: ICD-10-PCS | Mod: PBBFAC,,, | Performed by: PHYSICIAN ASSISTANT

## 2023-09-18 PROCEDURE — 1159F PR MEDICATION LIST DOCUMENTED IN MEDICAL RECORD: ICD-10-PCS | Mod: CPTII,S$GLB,, | Performed by: PHYSICIAN ASSISTANT

## 2023-09-18 PROCEDURE — 1160F RVW MEDS BY RX/DR IN RCRD: CPT | Mod: CPTII,S$GLB,, | Performed by: PHYSICIAN ASSISTANT

## 2023-09-18 PROCEDURE — 99214 PR OFFICE/OUTPT VISIT, EST, LEVL IV, 30-39 MIN: ICD-10-PCS | Mod: S$GLB,,, | Performed by: PHYSICIAN ASSISTANT

## 2023-09-18 PROCEDURE — 3044F PR MOST RECENT HEMOGLOBIN A1C LEVEL <7.0%: ICD-10-PCS | Mod: CPTII,S$GLB,, | Performed by: PHYSICIAN ASSISTANT

## 2023-09-18 PROCEDURE — 3079F DIAST BP 80-89 MM HG: CPT | Mod: CPTII,S$GLB,, | Performed by: PHYSICIAN ASSISTANT

## 2023-09-18 PROCEDURE — 3074F SYST BP LT 130 MM HG: CPT | Mod: CPTII,S$GLB,, | Performed by: PHYSICIAN ASSISTANT

## 2023-09-18 PROCEDURE — 99999 PR PBB SHADOW E&M-EST. PATIENT-LVL IV: CPT | Mod: PBBFAC,,, | Performed by: PHYSICIAN ASSISTANT

## 2023-09-18 PROCEDURE — 3074F PR MOST RECENT SYSTOLIC BLOOD PRESSURE < 130 MM HG: ICD-10-PCS | Mod: CPTII,S$GLB,, | Performed by: PHYSICIAN ASSISTANT

## 2023-09-18 PROCEDURE — 3079F PR MOST RECENT DIASTOLIC BLOOD PRESSURE 80-89 MM HG: ICD-10-PCS | Mod: CPTII,S$GLB,, | Performed by: PHYSICIAN ASSISTANT

## 2023-09-18 PROCEDURE — 3044F HG A1C LEVEL LT 7.0%: CPT | Mod: CPTII,S$GLB,, | Performed by: PHYSICIAN ASSISTANT

## 2023-09-18 PROCEDURE — 1160F PR REVIEW ALL MEDS BY PRESCRIBER/CLIN PHARMACIST DOCUMENTED: ICD-10-PCS | Mod: CPTII,S$GLB,, | Performed by: PHYSICIAN ASSISTANT

## 2023-09-18 PROCEDURE — 1159F MED LIST DOCD IN RCRD: CPT | Mod: CPTII,S$GLB,, | Performed by: PHYSICIAN ASSISTANT

## 2023-09-18 PROCEDURE — 3008F BODY MASS INDEX DOCD: CPT | Mod: CPTII,S$GLB,, | Performed by: PHYSICIAN ASSISTANT

## 2023-09-18 PROCEDURE — 3008F PR BODY MASS INDEX (BMI) DOCUMENTED: ICD-10-PCS | Mod: CPTII,S$GLB,, | Performed by: PHYSICIAN ASSISTANT

## 2023-09-18 PROCEDURE — 99214 OFFICE O/P EST MOD 30 MIN: CPT | Mod: S$GLB,,, | Performed by: PHYSICIAN ASSISTANT

## 2023-09-18 RX ORDER — METHOTREXATE 2.5 MG/1
15 TABLET ORAL
Qty: 72 TABLET | Refills: 1 | Status: SHIPPED | OUTPATIENT
Start: 2023-09-18 | End: 2024-01-19 | Stop reason: SDUPTHER

## 2023-09-18 RX ORDER — CELECOXIB 200 MG/1
200 CAPSULE ORAL DAILY
Qty: 90 CAPSULE | Refills: 1 | Status: SHIPPED | OUTPATIENT
Start: 2023-09-18 | End: 2024-01-19 | Stop reason: SDUPTHER

## 2023-09-18 ASSESSMENT — ROUTINE ASSESSMENT OF PATIENT INDEX DATA (RAPID3): MDHAQ FUNCTION SCORE: 0.5

## 2023-09-18 NOTE — PROGRESS NOTES
Subjective:      Patient ID: Jake Hoskins is a 58 y.o. female.    Chief Complaint: Rheumatoid Arthritis      HPI   Jake Hoskins  is a 58 y.o. female here to f/u on seropositive RA.  Small joints are doing well.  She denies prolonged morning stiffness.  Currently stable on methotrexate 15 mg weekly with folic acid 1 mg daily.  Tolerating both medications well without problems.  Also takes Celebrex 200 mg daily p.r.n. as well as Flexeril 10 mg q.h.s. p.r.n..    Also with known history of left knee DJD.  She has required injections in the past.  It has been quite some time since she is had any injections.  Knee pain improved w CSI last visit.  Eases up after a few steps.  She is on ergocalciferol 89081 units weekly.      Patient denies fevers, chills, photosensitivity, eye pain, shortness of breath, chest pain, hematuria, blood in the stool, rash, sicca symptoms, raynauds, finger ulcerations.  Rheumatologic systems otherwise negative.    MHAQ: 0.5 - skewed due to knee OA  Serologies/Labs:  +RF (59, +CCP  Current Treatment:  MTX 15 mg weekly  Folic acid 1 mg daily  Flexeril 10 mg qhs prn  Celebrex 200 mg daily prn  Previous Treatment:   na      Current Outpatient Medications:     cyclobenzaprine (FLEXERIL) 10 MG tablet, Take 1 tablet (10 mg total) by mouth nightly as needed for Muscle spasms., Disp: 90 tablet, Rfl: 1    ergocalciferol (ERGOCALCIFEROL) 50,000 unit Cap, Take 1 capsule (50,000 Units total) by mouth twice a week., Disp: 21 capsule, Rfl: 3    FERROUS SULFATE, DRIED (IRON, DRIED, ORAL), Take by mouth once daily. , Disp: , Rfl:     fluticasone propionate (FLONASE) 50 mcg/actuation nasal spray, 2 sprays (100 mcg total) by Each Nostril route once daily., Disp: 16 g, Rfl: 3    folic acid (FOLVITE) 1 MG tablet, Take 1 tablet (1,000 mcg total) by mouth once daily., Disp: 90 tablet, Rfl: 3    hydroCHLOROthiazide (MICROZIDE) 12.5 mg capsule, Take 1 capsule (12.5 mg total) by mouth once daily., Disp: 90  capsule, Rfl: 3    montelukast (SINGULAIR) 10 mg tablet, Take 1 tablet (10 mg total) by mouth once daily., Disp: 90 tablet, Rfl: 3    MULTIVITS,CA,MINERALS/IRON/FA (ONE-A-DAY WOMENS FORMULA ORAL), Take by mouth once daily. , Disp: , Rfl:     potassium chloride SA (K-DUR,KLOR-CON) 20 MEQ tablet, Take 1 tablet (20 mEq total) by mouth once daily., Disp: 2 tablet, Rfl: 0    celecoxib (CELEBREX) 200 MG capsule, Take 1 capsule (200 mg total) by mouth once daily., Disp: 90 capsule, Rfl: 1    loratadine (CLARITIN REDITABS) 10 mg dissolvable tablet, Take 1 tablet (10 mg total) by mouth once daily., Disp: 30 tablet, Rfl: 11    methotrexate 2.5 MG Tab, Take 6 tablets (15 mg total) by mouth every 7 days., Disp: 72 tablet, Rfl: 1    Past Medical History:   Diagnosis Date    Allergy     Anemia     Hypertension     Plantar fasciitis of left foot      Family History   Problem Relation Age of Onset    Hypertension Mother     Asthma Mother     Cancer Mother         unknown    Hypertension Father     Heart disease Father     Hypertension Sister      Social History     Socioeconomic History    Marital status:     Number of children: 2   Occupational History    Occupation: [employer]     Employer: [employer]   Tobacco Use    Smoking status: Never    Smokeless tobacco: Never   Substance and Sexual Activity    Alcohol use: No    Drug use: No    Sexual activity: Not Currently     Birth control/protection: Surgical     Social Determinants of Health     Financial Resource Strain: Low Risk  (5/10/2023)    Overall Financial Resource Strain (CARDIA)     Difficulty of Paying Living Expenses: Not very hard   Food Insecurity: Food Insecurity Present (5/10/2023)    Hunger Vital Sign     Worried About Running Out of Food in the Last Year: Sometimes true     Ran Out of Food in the Last Year: Sometimes true   Transportation Needs: Unmet Transportation Needs (5/10/2023)    PRAPARE - Transportation     Lack of Transportation (Medical): Yes     Lack of  "Transportation (Non-Medical): Yes   Physical Activity: Sufficiently Active (5/10/2023)    Exercise Vital Sign     Days of Exercise per Week: 5 days     Minutes of Exercise per Session: 150+ min   Stress: No Stress Concern Present (5/10/2023)    [employer] - Occupational Stress Questionnaire     Feeling of Stress : Only a little   Social Connections: Unknown (5/10/2023)    Social Connection and Isolation Panel [NHANES]     Frequency of Communication with Friends and Family: Once a week     Frequency of Social Gatherings with Friends and Family: Once a week     Active Member of Clubs or Organizations: Yes     Attends Club or Organization Meetings: More than 4 times per year     Marital Status:    Housing Stability: High Risk (5/10/2023)    Housing Stability Vital Sign     Unable to Pay for Housing in the Last Year: Yes     Number of Places Lived in the Last Year: 1     Unstable Housing in the Last Year: No     Review of patient's allergies indicates:   Allergen Reactions    Tramadol Nausea And Vomiting       Objective:   /83   Pulse 68   Ht 5' 4" (1.626 m)   Wt 101.2 kg (223 lb 1.7 oz)   LMP 12/26/2020   BMI 38.30 kg/m²   Immunization History   Administered Date(s) Administered    COVID-19, MRNA, LN-S, PF (Pfizer) (Purple Cap) 03/06/2021, 03/28/2021, 12/26/2021    Hepatitis A / Hepatitis B 09/16/2021, 10/20/2021    Influenza - Quadrivalent - PF *Preferred* (6 months and older) 09/10/2020, 12/13/2021    Influenza - Trivalent (ADULT) 10/29/2008    Influenza Split 10/29/2008    Td (ADULT) 04/29/2008    Tdap 07/13/2018       Physical Exam   Constitutional: She is oriented to person, place, and time. No distress.   HENT:   Head: Normocephalic and atraumatic.   Pulmonary/Chest: Effort normal.   Abdominal: She exhibits no distension.   Musculoskeletal:         General: No swelling or tenderness. Normal range of motion.      Cervical back: Normal range of motion. "   Lymphadenopathy:     She has no cervical adenopathy.   Neurological: She is alert and oriented to person, place, and time.   Skin: Skin is warm and dry. No rash noted.   Psychiatric: Mood normal.   Nursing note and vitals reviewed.    No synovitis, no dactylitis, no enthesitis  No effusions of large or small joints  100% fist formation  Well preserved ROM    Left knee exam  FLOR  No effusion   TTP med comp  crepitus      Recent Results (from the past 672 hour(s))   CBC Auto Differential    Collection Time: 09/14/23  8:05 AM   Result Value Ref Range    WBC 5.26 3.90 - 12.70 K/uL    RBC 4.95 4.00 - 5.40 M/uL    Hemoglobin 12.6 12.0 - 16.0 g/dL    Hematocrit 40.5 37.0 - 48.5 %    MCV 82 82 - 98 fL    MCH 25.5 (L) 27.0 - 31.0 pg    MCHC 31.1 (L) 32.0 - 36.0 g/dL    RDW 14.6 (H) 11.5 - 14.5 %    Platelets 316 150 - 450 K/uL    MPV 10.6 9.2 - 12.9 fL    Immature Granulocytes 0.6 (H) 0.0 - 0.5 %    Gran # (ANC) 2.8 1.8 - 7.7 K/uL    Immature Grans (Abs) 0.03 0.00 - 0.04 K/uL    Lymph # 1.7 1.0 - 4.8 K/uL    Mono # 0.5 0.3 - 1.0 K/uL    Eos # 0.2 0.0 - 0.5 K/uL    Baso # 0.04 0.00 - 0.20 K/uL    nRBC 0 0 /100 WBC    Gran % 53.8 38.0 - 73.0 %    Lymph % 32.1 18.0 - 48.0 %    Mono % 8.7 4.0 - 15.0 %    Eosinophil % 4.0 0.0 - 8.0 %    Basophil % 0.8 0.0 - 1.9 %    Differential Method Automated    Comprehensive Metabolic Panel    Collection Time: 09/14/23  8:05 AM   Result Value Ref Range    Sodium 144 136 - 145 mmol/L    Potassium 3.3 (L) 3.5 - 5.1 mmol/L    Chloride 105 95 - 110 mmol/L    CO2 27 23 - 29 mmol/L    Glucose 96 70 - 110 mg/dL    BUN 15 6 - 20 mg/dL    Creatinine 0.8 0.5 - 1.4 mg/dL    Calcium 9.7 8.7 - 10.5 mg/dL    Total Protein 7.8 6.0 - 8.4 g/dL    Albumin 4.1 3.5 - 5.2 g/dL    Total Bilirubin 0.2 0.1 - 1.0 mg/dL    Alkaline Phosphatase 66 55 - 135 U/L    AST 22 10 - 40 U/L    ALT 19 10 - 44 U/L    eGFR >60 >60 mL/min/1.73 m^2    Anion Gap 12 8 - 16 mmol/L   Sedimentation rate    Collection Time: 09/14/23   8:05 AM   Result Value Ref Range    Sed Rate 15 0 - 20 mm/Hr   C-Reactive Protein    Collection Time: 09/14/23  8:05 AM   Result Value Ref Range    CRP 3.7 0.0 - 8.2 mg/L         Lab Results   Component Value Date    TBGOLDPLUS Negative 12/14/2020      Lab Results   Component Value Date    HEPAIGM Negative 03/08/2019    HEPBIGM Negative 03/08/2019    HEPBCAB Negative 12/14/2020    HEPCAB Negative 03/08/2019        Imaging  I have personally reviewed images and reports as below.  I agree with the interpretation.  X-ray Knee Ortho Bilateral  Order: [id number]  Status: Final result     Visible to patient: Yes (seen)     Next appt: 07/24/2023 at 01:00 PM in Radiology ([id number])     Dx: Acute pain of left knee     5 Result Notes  Details    Reading Physician Reading Date Result Priority   Hua Avitia MD  [phone] 1/7/2019      Narrative & Impression  EXAMINATION:  XR KNEE ORTHO BILAT     CLINICAL HISTORY:  Pain in left knee     TECHNIQUE:  AP standing of both knees, PA flexion standing views of both knees, and Merchant views of both knees were performed.  Lateral views of both knees were also performed.     COMPARISON:  None     FINDINGS:  Right knee: There is no radiographic evidence of acute osseous, articular, or soft tissue abnormality. Small tricompartmental marginal osteophytes are present with slight joint space narrowing in the medial tibiofemoral compartment.     Left knee:  There is no radiographic evidence of acute osseous, articular, or soft tissue abnormality. Small tricompartmental marginal osteophytes are present with slight joint space narrowing in the medial tibiofemoral compartment.     IMPRESSION:      As above.  No acute findings.        Electronically signed by: Hua Avitia MD  Date:                                            01/07/2019  Time:                                           07:54       Assessment:     1. Rheumatoid arthritis involving multiple sites with positive  rheumatoid factor    2. Medication monitoring encounter    3. High risk medication use    4. Immunocompromised state due to drug therapy    5. Primary osteoarthritis of left knee    6. Chronic pain of left knee              Plan:     Jake was seen today for rheumatoid arthritis.    Diagnoses and all orders for this visit:    Rheumatoid arthritis involving multiple sites with positive rheumatoid factor  -     celecoxib (CELEBREX) 200 MG capsule; Take 1 capsule (200 mg total) by mouth once daily.  -     methotrexate 2.5 MG Tab; Take 6 tablets (15 mg total) by mouth every 7 days.    Medication monitoring encounter    High risk medication use    Immunocompromised state due to drug therapy    Primary osteoarthritis of left knee    Chronic pain of left knee          Seropositive RA   Labs stable  Low disease activity  C/w MTX 15 mg weekly w folic acid 1 mg daily  Celebrex 200 mg daily p.r.n. refilled in addition to Flexeril 10 mg q.h.s. p.r.n..  We will continue to monitor renal function.  We will  Osteoarthritis left knee  Improved after CSI  Home exercise program for knee strengthening  Celebrex as above  Consider viscosupplementation vs ortho down the road if pain worsens  Avoid weight gain  Discussed wt loss shown to improve symptoms  Drug therapy requiring intensive monitoring for toxicity  High Risk Medication Monitoring encounter  No current medication related issues, no evidence of toxicity  I ordered labs for toxicity monitoring, have personally reviewed the findings, and discussed them with the patient.  Pending labs will be sent via the portal  Compromised immune system secondary to autoimmune disease and/or use of immunosuppressive drugs.  Monitor carefully for infections.  Advised patient to get immediate medical care if any infection arises.  Also advised strict adherence age-appropriate vaccinations and cancer screenings with PCP.  Patient advised to hold DMARD and/or biologic therapy for signs of  infection or for surgery. If you are unsure what to do please call our office for instruction.[facility] [phone]  Return to clinic: Reg 4 in 15 weeks, f/u me one week later    Follow up in about 4 months (around 1/18/2024).    The patient understands, chooses and consents to this plan and accepts all the risks which include but are not limited to the risks mentioned above.     Disclaimer: This note was prepared using a voice recognition system and is likely to have sound alike errors within the text.

## 2023-10-04 ENCOUNTER — PATIENT MESSAGE (OUTPATIENT)
Dept: RHEUMATOLOGY | Facility: CLINIC | Age: 58
End: 2023-10-04
Payer: COMMERCIAL

## 2024-01-12 ENCOUNTER — LAB VISIT (OUTPATIENT)
Dept: LAB | Facility: HOSPITAL | Age: 59
End: 2024-01-12
Attending: PHYSICIAN ASSISTANT
Payer: COMMERCIAL

## 2024-01-12 DIAGNOSIS — Z79.899 HIGH RISK MEDICATION USE: ICD-10-CM

## 2024-01-12 LAB
ALBUMIN SERPL BCP-MCNC: 4 G/DL (ref 3.5–5.2)
ALP SERPL-CCNC: 68 U/L (ref 55–135)
ALT SERPL W/O P-5'-P-CCNC: 18 U/L (ref 10–44)
ANION GAP SERPL CALC-SCNC: 10 MMOL/L (ref 8–16)
AST SERPL-CCNC: 24 U/L (ref 10–40)
BASOPHILS # BLD AUTO: 0.03 K/UL (ref 0–0.2)
BASOPHILS NFR BLD: 0.5 % (ref 0–1.9)
BILIRUB SERPL-MCNC: 0.3 MG/DL (ref 0.1–1)
BUN SERPL-MCNC: 22 MG/DL (ref 6–20)
CALCIUM SERPL-MCNC: 9.3 MG/DL (ref 8.7–10.5)
CHLORIDE SERPL-SCNC: 105 MMOL/L (ref 95–110)
CO2 SERPL-SCNC: 28 MMOL/L (ref 23–29)
CREAT SERPL-MCNC: 0.8 MG/DL (ref 0.5–1.4)
CRP SERPL-MCNC: 6 MG/L (ref 0–8.2)
DIFFERENTIAL METHOD BLD: ABNORMAL
EOSINOPHIL # BLD AUTO: 0.3 K/UL (ref 0–0.5)
EOSINOPHIL NFR BLD: 4.1 % (ref 0–8)
ERYTHROCYTE [DISTWIDTH] IN BLOOD BY AUTOMATED COUNT: 14.6 % (ref 11.5–14.5)
ERYTHROCYTE [SEDIMENTATION RATE] IN BLOOD BY WESTERGREN METHOD: 18 MM/HR (ref 0–20)
EST. GFR  (NO RACE VARIABLE): >60 ML/MIN/1.73 M^2
GLUCOSE SERPL-MCNC: 91 MG/DL (ref 70–110)
HCT VFR BLD AUTO: 40 % (ref 37–48.5)
HGB BLD-MCNC: 12.3 G/DL (ref 12–16)
IMM GRANULOCYTES # BLD AUTO: 0.02 K/UL (ref 0–0.04)
IMM GRANULOCYTES NFR BLD AUTO: 0.3 % (ref 0–0.5)
LYMPHOCYTES # BLD AUTO: 1.9 K/UL (ref 1–4.8)
LYMPHOCYTES NFR BLD: 30.4 % (ref 18–48)
MCH RBC QN AUTO: 24.8 PG (ref 27–31)
MCHC RBC AUTO-ENTMCNC: 30.8 G/DL (ref 32–36)
MCV RBC AUTO: 81 FL (ref 82–98)
MONOCYTES # BLD AUTO: 0.6 K/UL (ref 0.3–1)
MONOCYTES NFR BLD: 10.5 % (ref 4–15)
NEUTROPHILS # BLD AUTO: 3.3 K/UL (ref 1.8–7.7)
NEUTROPHILS NFR BLD: 54.2 % (ref 38–73)
NRBC BLD-RTO: 0 /100 WBC
PLATELET # BLD AUTO: 330 K/UL (ref 150–450)
PMV BLD AUTO: 11.2 FL (ref 9.2–12.9)
POTASSIUM SERPL-SCNC: 3.7 MMOL/L (ref 3.5–5.1)
PROT SERPL-MCNC: 7.7 G/DL (ref 6–8.4)
RBC # BLD AUTO: 4.95 M/UL (ref 4–5.4)
SODIUM SERPL-SCNC: 143 MMOL/L (ref 136–145)
WBC # BLD AUTO: 6.11 K/UL (ref 3.9–12.7)

## 2024-01-12 PROCEDURE — 85651 RBC SED RATE NONAUTOMATED: CPT | Performed by: PHYSICIAN ASSISTANT

## 2024-01-12 PROCEDURE — 80053 COMPREHEN METABOLIC PANEL: CPT | Performed by: PHYSICIAN ASSISTANT

## 2024-01-12 PROCEDURE — 85025 COMPLETE CBC W/AUTO DIFF WBC: CPT | Performed by: PHYSICIAN ASSISTANT

## 2024-01-12 PROCEDURE — 86140 C-REACTIVE PROTEIN: CPT | Performed by: PHYSICIAN ASSISTANT

## 2024-01-12 PROCEDURE — 36415 COLL VENOUS BLD VENIPUNCTURE: CPT | Mod: PN | Performed by: PHYSICIAN ASSISTANT

## 2024-01-19 ENCOUNTER — OFFICE VISIT (OUTPATIENT)
Dept: RHEUMATOLOGY | Facility: CLINIC | Age: 59
End: 2024-01-19
Payer: COMMERCIAL

## 2024-01-19 VITALS
BODY MASS INDEX: 38.37 KG/M2 | WEIGHT: 224.75 LBS | HEIGHT: 64 IN | DIASTOLIC BLOOD PRESSURE: 83 MMHG | SYSTOLIC BLOOD PRESSURE: 111 MMHG | HEART RATE: 79 BPM

## 2024-01-19 DIAGNOSIS — M17.12 PRIMARY OSTEOARTHRITIS OF LEFT KNEE: ICD-10-CM

## 2024-01-19 DIAGNOSIS — E55.9 VITAMIN D DEFICIENCY: ICD-10-CM

## 2024-01-19 DIAGNOSIS — Z79.899 HIGH RISK MEDICATION USE: ICD-10-CM

## 2024-01-19 DIAGNOSIS — Z51.81 MEDICATION MONITORING ENCOUNTER: ICD-10-CM

## 2024-01-19 DIAGNOSIS — D84.821 IMMUNOCOMPROMISED STATE DUE TO DRUG THERAPY: ICD-10-CM

## 2024-01-19 DIAGNOSIS — M05.79 RHEUMATOID ARTHRITIS INVOLVING MULTIPLE SITES WITH POSITIVE RHEUMATOID FACTOR: Primary | ICD-10-CM

## 2024-01-19 DIAGNOSIS — Z79.899 IMMUNOCOMPROMISED STATE DUE TO DRUG THERAPY: ICD-10-CM

## 2024-01-19 PROCEDURE — 3079F DIAST BP 80-89 MM HG: CPT | Mod: CPTII,S$GLB,, | Performed by: PHYSICIAN ASSISTANT

## 2024-01-19 PROCEDURE — 3008F BODY MASS INDEX DOCD: CPT | Mod: CPTII,S$GLB,, | Performed by: PHYSICIAN ASSISTANT

## 2024-01-19 PROCEDURE — 1159F MED LIST DOCD IN RCRD: CPT | Mod: CPTII,S$GLB,, | Performed by: PHYSICIAN ASSISTANT

## 2024-01-19 PROCEDURE — 99214 OFFICE O/P EST MOD 30 MIN: CPT | Mod: S$GLB,,, | Performed by: PHYSICIAN ASSISTANT

## 2024-01-19 PROCEDURE — 1160F RVW MEDS BY RX/DR IN RCRD: CPT | Mod: CPTII,S$GLB,, | Performed by: PHYSICIAN ASSISTANT

## 2024-01-19 PROCEDURE — 3074F SYST BP LT 130 MM HG: CPT | Mod: CPTII,S$GLB,, | Performed by: PHYSICIAN ASSISTANT

## 2024-01-19 PROCEDURE — 99999 PR PBB SHADOW E&M-EST. PATIENT-LVL IV: CPT | Mod: PBBFAC,,, | Performed by: PHYSICIAN ASSISTANT

## 2024-01-19 RX ORDER — METHOTREXATE 2.5 MG/1
15 TABLET ORAL
Qty: 72 TABLET | Refills: 1 | Status: ON HOLD | OUTPATIENT
Start: 2024-01-19 | End: 2024-05-17 | Stop reason: HOSPADM

## 2024-01-19 RX ORDER — CELECOXIB 200 MG/1
200 CAPSULE ORAL DAILY
Qty: 90 CAPSULE | Refills: 1 | Status: SHIPPED | OUTPATIENT
Start: 2024-01-19 | End: 2024-06-04 | Stop reason: SDUPTHER

## 2024-01-19 RX ORDER — PREDNISONE 5 MG/1
5 TABLET ORAL DAILY PRN
Qty: 30 TABLET | Refills: 2 | Status: ON HOLD | OUTPATIENT
Start: 2024-01-19 | End: 2024-05-17 | Stop reason: HOSPADM

## 2024-01-19 ASSESSMENT — ROUTINE ASSESSMENT OF PATIENT INDEX DATA (RAPID3): MDHAQ FUNCTION SCORE: 0.3

## 2024-01-19 NOTE — PROGRESS NOTES
Subjective:      Patient ID: Jake Hoskins is a 58 y.o. female.    Chief Complaint: Disease Management and Rheumatoid Arthritis    HPI   Jake Hoskins  is a 58 y.o. female here to f/u on seropositive RA.  C/o mild pain/swelling long finger PIP bilaterally.  She denies prolonged morning stiffness.  Currently stable on methotrexate 15 mg weekly with folic acid 1 mg daily.  Tolerating both medications well without problems.  Also takes Celebrex 200 mg daily p.r.n. as well as Flexeril 10 mg q.h.s. p.r.n..    Has known left knee DJD.  She has required injections in the past.  It has been quite some time since she is had any injections.  Knee pain improved w CSI May 2023.  Eases up after a few steps.  Not bad enough to repeat today.  She is on ergocalciferol 28561 units twice weekly for low D.      Patient denies fevers, chills, photosensitivity, eye pain, shortness of breath, chest pain, hematuria, blood in the stool, rash, sicca symptoms, raynauds, finger ulcerations.  Rheumatologic systems otherwise negative.    MHAQ: 0.3   Serologies/Labs:  +RF 59, +CCP 10.8  Current Treatment:  MTX 15 mg weekly  Folic acid 1 mg daily  Flexeril 10 mg qhs prn  Celebrex 200 mg daily prn  Previous Treatment:   na      Current Outpatient Medications:     celecoxib (CELEBREX) 200 MG capsule, Take 1 capsule (200 mg total) by mouth once daily., Disp: 90 capsule, Rfl: 1    cyclobenzaprine (FLEXERIL) 10 MG tablet, Take 1 tablet (10 mg total) by mouth nightly as needed for Muscle spasms., Disp: 90 tablet, Rfl: 1    ergocalciferol (ERGOCALCIFEROL) 50,000 unit Cap, Take 1 capsule (50,000 Units total) by mouth twice a week., Disp: 21 capsule, Rfl: 3    FERROUS SULFATE, DRIED (IRON, DRIED, ORAL), Take by mouth once daily. , Disp: , Rfl:     fluticasone propionate (FLONASE) 50 mcg/actuation nasal spray, 2 sprays (100 mcg total) by Each Nostril route once daily., Disp: 16 g, Rfl: 3    folic acid (FOLVITE) 1 MG tablet, Take 1 tablet (1,000 mcg  total) by mouth once daily., Disp: 90 tablet, Rfl: 3    hydroCHLOROthiazide (MICROZIDE) 12.5 mg capsule, Take 1 capsule (12.5 mg total) by mouth once daily., Disp: 90 capsule, Rfl: 3    methotrexate 2.5 MG Tab, Take 6 tablets (15 mg total) by mouth every 7 days., Disp: 72 tablet, Rfl: 1    montelukast (SINGULAIR) 10 mg tablet, Take 1 tablet (10 mg total) by mouth once daily., Disp: 90 tablet, Rfl: 3    MULTIVITS,CA,MINERALS/IRON/FA (ONE-A-DAY WOMENS FORMULA ORAL), Take by mouth once daily. , Disp: , Rfl:     potassium chloride SA (K-DUR,KLOR-CON) 20 MEQ tablet, Take 1 tablet (20 mEq total) by mouth once daily., Disp: 2 tablet, Rfl: 0    loratadine (CLARITIN REDITABS) 10 mg dissolvable tablet, Take 1 tablet (10 mg total) by mouth once daily., Disp: 30 tablet, Rfl: 11    predniSONE (DELTASONE) 5 MG tablet, Take 1 tablet (5 mg total) by mouth daily as needed (RA flare)., Disp: 30 tablet, Rfl: 2    Past Medical History:   Diagnosis Date    Allergy     Anemia     Hypertension     Plantar fasciitis of left foot      Family History   Problem Relation Age of Onset    Hypertension Mother     Asthma Mother     Cancer Mother         unknown    Hypertension Father     Heart disease Father     Hypertension Sister      Social History     Socioeconomic History    Marital status:     Number of children: 2   Occupational History    Occupation: [employer]     Employer: [employer]   Tobacco Use    Smoking status: Never    Smokeless tobacco: Never   Substance and Sexual Activity    Alcohol use: No    Drug use: No    Sexual activity: Not Currently     Birth control/protection: Surgical     Social Determinants of Health     Financial Resource Strain: Low Risk  (5/10/2023)    Overall Financial Resource Strain (CARDIA)     Difficulty of Paying Living Expenses: Not very hard   Food Insecurity: Food Insecurity Present (5/10/2023)    Hunger Vital Sign     Worried About Running Out of Food in the Last Year: Sometimes true     Ran Out of Food  "in the Last Year: Sometimes true   Transportation Needs: Unmet Transportation Needs (5/10/2023)    PRAPARE - Transportation     Lack of Transportation (Medical): Yes     Lack of Transportation (Non-Medical): Yes   Physical Activity: Sufficiently Active (5/10/2023)    Exercise Vital Sign     Days of Exercise per Week: 5 days     Minutes of Exercise per Session: 150+ min   Stress: No Stress Concern Present (5/10/2023)    [employer] - Occupational Stress Questionnaire     Feeling of Stress : Only a little   Social Connections: Unknown (5/10/2023)    Social Connection and Isolation Panel [NHANES]     Frequency of Communication with Friends and Family: Once a week     Frequency of Social Gatherings with Friends and Family: Once a week     Active Member of Clubs or Organizations: Yes     Attends Club or Organization Meetings: More than 4 times per year     Marital Status:    Housing Stability: High Risk (5/10/2023)    Housing Stability Vital Sign     Unable to Pay for Housing in the Last Year: Yes     Number of Places Lived in the Last Year: 1     Unstable Housing in the Last Year: No     Review of patient's allergies indicates:   Allergen Reactions    Tramadol Nausea And Vomiting       Objective:   /83   Pulse 79   Ht 5' 4" (1.626 m)   Wt 101.9 kg (224 lb 12.1 oz)   LMP 12/26/2020   BMI 38.58 kg/m²   Immunization History   Administered Date(s) Administered    COVID-19, MRNA, LN-S, PF (Pfizer) (Purple Cap) 03/06/2021, 03/28/2021, 12/26/2021    Hepatitis A / Hepatitis B 09/16/2021, 10/20/2021    Influenza - Quadrivalent - PF *Preferred* (6 months and older) 09/10/2020, 12/13/2021    Influenza - Trivalent (ADULT) 10/29/2008    Influenza Split 10/29/2008    Td (ADULT) 04/29/2008    Tdap 07/13/2018       Physical Exam   Constitutional: She is oriented to person, place, and time. No distress.   HENT:   Head: Normocephalic and atraumatic.   Pulmonary/Chest: Effort normal. "   Abdominal: She exhibits no distension.   Musculoskeletal:         General: No swelling or tenderness. Normal range of motion.      Cervical back: Normal range of motion.   Lymphadenopathy:     She has no cervical adenopathy.   Neurological: She is alert and oriented to person, place, and time.   Skin: Skin is warm and dry. No rash noted.   Psychiatric: Mood normal.   Nursing note and vitals reviewed.    Mild synovitis rt long finger PIP   no dactylitis, no enthesitis  No effusions of large or small joints  100% fist formation  Well preserved ROM    Left knee exam  FLOR  No effusion   TTP med comp  crepitus      Recent Results (from the past 672 hour(s))   CBC Auto Differential    Collection Time: 01/12/24  7:58 AM   Result Value Ref Range    WBC 6.11 3.90 - 12.70 K/uL    RBC 4.95 4.00 - 5.40 M/uL    Hemoglobin 12.3 12.0 - 16.0 g/dL    Hematocrit 40.0 37.0 - 48.5 %    MCV 81 (L) 82 - 98 fL    MCH 24.8 (L) 27.0 - 31.0 pg    MCHC 30.8 (L) 32.0 - 36.0 g/dL    RDW 14.6 (H) 11.5 - 14.5 %    Platelets 330 150 - 450 K/uL    MPV 11.2 9.2 - 12.9 fL    Immature Granulocytes 0.3 0.0 - 0.5 %    Gran # (ANC) 3.3 1.8 - 7.7 K/uL    Immature Grans (Abs) 0.02 0.00 - 0.04 K/uL    Lymph # 1.9 1.0 - 4.8 K/uL    Mono # 0.6 0.3 - 1.0 K/uL    Eos # 0.3 0.0 - 0.5 K/uL    Baso # 0.03 0.00 - 0.20 K/uL    nRBC 0 0 /100 WBC    Gran % 54.2 38.0 - 73.0 %    Lymph % 30.4 18.0 - 48.0 %    Mono % 10.5 4.0 - 15.0 %    Eosinophil % 4.1 0.0 - 8.0 %    Basophil % 0.5 0.0 - 1.9 %    Differential Method Automated    Comprehensive Metabolic Panel    Collection Time: 01/12/24  7:58 AM   Result Value Ref Range    Sodium 143 136 - 145 mmol/L    Potassium 3.7 3.5 - 5.1 mmol/L    Chloride 105 95 - 110 mmol/L    CO2 28 23 - 29 mmol/L    Glucose 91 70 - 110 mg/dL    BUN 22 (H) 6 - 20 mg/dL    Creatinine 0.8 0.5 - 1.4 mg/dL    Calcium 9.3 8.7 - 10.5 mg/dL    Total Protein 7.7 6.0 - 8.4 g/dL    Albumin 4.0 3.5 - 5.2 g/dL    Total Bilirubin 0.3 0.1 - 1.0 mg/dL     Alkaline Phosphatase 68 55 - 135 U/L    AST 24 10 - 40 U/L    ALT 18 10 - 44 U/L    eGFR >60 >60 mL/min/1.73 m^2    Anion Gap 10 8 - 16 mmol/L   Sedimentation rate    Collection Time: 01/12/24  7:58 AM   Result Value Ref Range    Sed Rate 18 0 - 20 mm/Hr   C-Reactive Protein    Collection Time: 01/12/24  7:58 AM   Result Value Ref Range    CRP 6.0 0.0 - 8.2 mg/L         Lab Results   Component Value Date    TBGOLDPLUS Negative 12/14/2020      Lab Results   Component Value Date    HEPAIGM Negative 03/08/2019    HEPBIGM Negative 03/08/2019    HEPBCAB Negative 12/14/2020    HEPCAB Negative 03/08/2019        Imaging  I have personally reviewed images and reports as below.  I agree with the interpretation.  X-ray Knee Ortho Bilateral  Order: [id number]  Status: Final result     Visible to patient: Yes (seen)     Next appt: 07/24/2023 at 01:00 PM in Radiology ([id number])     Dx: Acute pain of left knee     5 Result Notes  Details    Reading Physician Reading Date Result Priority   Hua Avitia MD  [phone] 1/7/2019      Narrative & Impression  EXAMINATION:  XR KNEE ORTHO BILAT     CLINICAL HISTORY:  Pain in left knee     TECHNIQUE:  AP standing of both knees, PA flexion standing views of both knees, and Merchant views of both knees were performed.  Lateral views of both knees were also performed.     COMPARISON:  None     FINDINGS:  Right knee: There is no radiographic evidence of acute osseous, articular, or soft tissue abnormality. Small tricompartmental marginal osteophytes are present with slight joint space narrowing in the medial tibiofemoral compartment.     Left knee:  There is no radiographic evidence of acute osseous, articular, or soft tissue abnormality. Small tricompartmental marginal osteophytes are present with slight joint space narrowing in the medial tibiofemoral compartment.     IMPRESSION:      As above.  No acute findings.        Electronically signed by: Hua Avitia MD  Date:                                             01/07/2019  Time:                                           07:54       Assessment:     1. Rheumatoid arthritis involving multiple sites with positive rheumatoid factor    2. Medication monitoring encounter    3. High risk medication use    4. Immunocompromised state due to drug therapy    5. Primary osteoarthritis of left knee              Plan:     Jake was seen today for disease management and rheumatoid arthritis.    Diagnoses and all orders for this visit:    Rheumatoid arthritis involving multiple sites with positive rheumatoid factor  -     predniSONE (DELTASONE) 5 MG tablet; Take 1 tablet (5 mg total) by mouth daily as needed (RA flare).    Medication monitoring encounter    High risk medication use    Immunocompromised state due to drug therapy    Primary osteoarthritis of left knee          Seropositive RA   Labs stable  Low disease activity  C/w MTX 15 mg weekly w folic acid 1 mg daily  Celebrex 200 mg daily p.r.n. refilled in addition to Flexeril 10 mg q.h.s. p.r.n..  We will continue to monitor renal function.   PDN 5-10 mg daily prn RA flare  Osteoarthritis left knee  Improved after CSI  Home exercise program for knee strengthening  C/w Celebrex as above  Consider viscosupplementation vs ortho down the road if pain worsens  Avoid weight gain  Discussed wt loss shown to improve symptoms  Drug therapy requiring intensive monitoring for toxicity  High Risk Medication Monitoring encounter  No current medication related issues, no evidence of toxicity  I ordered labs for toxicity monitoring, have personally reviewed the findings, and discussed them with the patient.  Pending labs will be sent via the portal  Compromised immune system secondary to autoimmune disease and/or use of immunosuppressive drugs.  Monitor carefully for infections.  Advised patient to get immediate medical care if any infection arises.  Also advised strict adherence age-appropriate vaccinations  and cancer screenings with PCP.  Patient advised to hold DMARD and/or biologic therapy for signs of infection or for surgery. If you are unsure what to do please call our office for instruction.[facility] [phone]  Return to clinic: Reg 4/Vit D in 15 weeks, f/u me one week later w xray hands/feet same day    Follow up in about 16 weeks (around 5/10/2024).    The patient understands, chooses and consents to this plan and accepts all the risks which include but are not limited to the risks mentioned above.     Disclaimer: This note was prepared using a voice recognition system and is likely to have sound alike errors within the text.

## 2024-02-23 ENCOUNTER — HOSPITAL ENCOUNTER (OUTPATIENT)
Dept: RADIOLOGY | Facility: HOSPITAL | Age: 59
Discharge: HOME | End: 2024-02-23
Attending: FAMILY MEDICINE
Payer: COMMERCIAL

## 2024-02-23 VITALS — WEIGHT: 222.69 LBS | HEIGHT: 64 IN | BODY MASS INDEX: 38.02 KG/M2

## 2024-02-23 DIAGNOSIS — Z12.31 SCREENING MAMMOGRAM, ENCOUNTER FOR: ICD-10-CM

## 2024-02-23 PROCEDURE — 77067 SCR MAMMO BI INCL CAD: CPT | Mod: 26,,, | Performed by: RADIOLOGY

## 2024-02-23 PROCEDURE — 77063 BREAST TOMOSYNTHESIS BI: CPT | Mod: 26,,, | Performed by: RADIOLOGY

## 2024-02-23 PROCEDURE — 77067 SCR MAMMO BI INCL CAD: CPT | Mod: TC

## 2024-02-26 DIAGNOSIS — E55.9 VITAMIN D DEFICIENCY: ICD-10-CM

## 2024-02-26 RX ORDER — ERGOCALCIFEROL 1.25 MG/1
50000 CAPSULE ORAL
Qty: 21 CAPSULE | Refills: 0 | Status: SHIPPED | OUTPATIENT
Start: 2024-02-26 | End: 2024-05-06

## 2024-04-08 ENCOUNTER — PATIENT MESSAGE (OUTPATIENT)
Dept: RHEUMATOLOGY | Facility: CLINIC | Age: 59
End: 2024-04-08
Payer: COMMERCIAL

## 2024-04-28 PROCEDURE — 93005 ELECTROCARDIOGRAM TRACING: CPT | Mod: S$GLB,,,

## 2024-04-28 PROCEDURE — 93010 ELECTROCARDIOGRAM REPORT: CPT | Mod: S$GLB,,, | Performed by: INTERNAL MEDICINE

## 2024-04-29 ENCOUNTER — OFFICE VISIT (OUTPATIENT)
Dept: INTERNAL MEDICINE | Facility: CLINIC | Age: 59
End: 2024-04-29
Payer: COMMERCIAL

## 2024-04-29 ENCOUNTER — LAB VISIT (OUTPATIENT)
Dept: LAB | Facility: HOSPITAL | Age: 59
End: 2024-04-29
Payer: COMMERCIAL

## 2024-04-29 VITALS
HEART RATE: 74 BPM | HEIGHT: 64 IN | TEMPERATURE: 98 F | WEIGHT: 224.88 LBS | DIASTOLIC BLOOD PRESSURE: 70 MMHG | BODY MASS INDEX: 38.39 KG/M2 | SYSTOLIC BLOOD PRESSURE: 128 MMHG | OXYGEN SATURATION: 97 %

## 2024-04-29 DIAGNOSIS — E66.01 SEVERE OBESITY (BMI 35.0-35.9 WITH COMORBIDITY): ICD-10-CM

## 2024-04-29 DIAGNOSIS — R55 SYNCOPE, UNSPECIFIED SYNCOPE TYPE: Primary | ICD-10-CM

## 2024-04-29 DIAGNOSIS — D64.9 ANEMIA, UNSPECIFIED TYPE: ICD-10-CM

## 2024-04-29 DIAGNOSIS — R55 SYNCOPE, UNSPECIFIED SYNCOPE TYPE: ICD-10-CM

## 2024-04-29 DIAGNOSIS — I10 ESSENTIAL HYPERTENSION: ICD-10-CM

## 2024-04-29 LAB
ALBUMIN SERPL BCP-MCNC: 3.6 G/DL (ref 3.5–5.2)
ALP SERPL-CCNC: 64 U/L (ref 55–135)
ALT SERPL W/O P-5'-P-CCNC: 18 U/L (ref 10–44)
ANION GAP SERPL CALC-SCNC: 10 MMOL/L (ref 8–16)
AST SERPL-CCNC: 18 U/L (ref 10–40)
BASOPHILS # BLD AUTO: 0.07 K/UL (ref 0–0.2)
BASOPHILS NFR BLD: 0.9 % (ref 0–1.9)
BILIRUB SERPL-MCNC: 0.3 MG/DL (ref 0.1–1)
BUN SERPL-MCNC: 13 MG/DL (ref 6–20)
CALCIUM SERPL-MCNC: 9.7 MG/DL (ref 8.7–10.5)
CHLORIDE SERPL-SCNC: 104 MMOL/L (ref 95–110)
CO2 SERPL-SCNC: 27 MMOL/L (ref 23–29)
CREAT SERPL-MCNC: 0.8 MG/DL (ref 0.5–1.4)
DIFFERENTIAL METHOD BLD: ABNORMAL
EOSINOPHIL # BLD AUTO: 0.3 K/UL (ref 0–0.5)
EOSINOPHIL NFR BLD: 3.2 % (ref 0–8)
ERYTHROCYTE [DISTWIDTH] IN BLOOD BY AUTOMATED COUNT: 15.5 % (ref 11.5–14.5)
EST. GFR  (NO RACE VARIABLE): >60 ML/MIN/1.73 M^2
GLUCOSE SERPL-MCNC: 96 MG/DL (ref 70–110)
HCT VFR BLD AUTO: 42.4 % (ref 37–48.5)
HGB BLD-MCNC: 12.7 G/DL (ref 12–16)
IMM GRANULOCYTES # BLD AUTO: 0.12 K/UL (ref 0–0.04)
IMM GRANULOCYTES NFR BLD AUTO: 1.5 % (ref 0–0.5)
IRON SERPL-MCNC: 45 UG/DL (ref 30–160)
LYMPHOCYTES # BLD AUTO: 1.8 K/UL (ref 1–4.8)
LYMPHOCYTES NFR BLD: 23.4 % (ref 18–48)
MCH RBC QN AUTO: 24.8 PG (ref 27–31)
MCHC RBC AUTO-ENTMCNC: 30 G/DL (ref 32–36)
MCV RBC AUTO: 83 FL (ref 82–98)
MONOCYTES # BLD AUTO: 0.8 K/UL (ref 0.3–1)
MONOCYTES NFR BLD: 10.8 % (ref 4–15)
NEUTROPHILS # BLD AUTO: 4.7 K/UL (ref 1.8–7.7)
NEUTROPHILS NFR BLD: 60.2 % (ref 38–73)
NRBC BLD-RTO: 0 /100 WBC
OHS QRS DURATION: 86 MS
OHS QTC CALCULATION: 442 MS
PLATELET # BLD AUTO: 379 K/UL (ref 150–450)
PMV BLD AUTO: 10.9 FL (ref 9.2–12.9)
POTASSIUM SERPL-SCNC: 3.6 MMOL/L (ref 3.5–5.1)
PROT SERPL-MCNC: 7.7 G/DL (ref 6–8.4)
RBC # BLD AUTO: 5.12 M/UL (ref 4–5.4)
SATURATED IRON: 13 % (ref 20–50)
SODIUM SERPL-SCNC: 141 MMOL/L (ref 136–145)
TOTAL IRON BINDING CAPACITY: 352 UG/DL (ref 250–450)
TRANSFERRIN SERPL-MCNC: 238 MG/DL (ref 200–375)
TSH SERPL DL<=0.005 MIU/L-ACNC: 0.73 UIU/ML (ref 0.4–4)
WBC # BLD AUTO: 7.77 K/UL (ref 3.9–12.7)

## 2024-04-29 PROCEDURE — 99999 PR PBB SHADOW E&M-EST. PATIENT-LVL V: CPT | Mod: PBBFAC,,,

## 2024-04-29 PROCEDURE — 85025 COMPLETE CBC W/AUTO DIFF WBC: CPT

## 2024-04-29 PROCEDURE — 84443 ASSAY THYROID STIM HORMONE: CPT

## 2024-04-29 PROCEDURE — 1159F MED LIST DOCD IN RCRD: CPT | Mod: CPTII,S$GLB,,

## 2024-04-29 PROCEDURE — 99214 OFFICE O/P EST MOD 30 MIN: CPT | Mod: S$GLB,,,

## 2024-04-29 PROCEDURE — 3078F DIAST BP <80 MM HG: CPT | Mod: CPTII,S$GLB,,

## 2024-04-29 PROCEDURE — 83540 ASSAY OF IRON: CPT

## 2024-04-29 PROCEDURE — 36415 COLL VENOUS BLD VENIPUNCTURE: CPT

## 2024-04-29 PROCEDURE — 3008F BODY MASS INDEX DOCD: CPT | Mod: CPTII,S$GLB,,

## 2024-04-29 PROCEDURE — 3074F SYST BP LT 130 MM HG: CPT | Mod: CPTII,S$GLB,,

## 2024-04-29 PROCEDURE — 80053 COMPREHEN METABOLIC PANEL: CPT

## 2024-04-29 PROCEDURE — 1160F RVW MEDS BY RX/DR IN RCRD: CPT | Mod: CPTII,S$GLB,,

## 2024-04-29 NOTE — PROGRESS NOTES
Jake Hoskins  04/29/2024  [id number]    Angelica Lagunas MD  Patient Care Team:  Angelica Lagunas MD as PCP - General (Family Medicine)  Phil Baires LPN (Inactive) as Care Coordinator (Internal Medicine)          Visit Type:an urgent visit for a new problem    Chief Complaint:  Syncope     History of Present Illness:  Yesterday felt lightheaded/dizzy  Tried to sit down  Had syncope episode  Nephew who is a  said her breathing was swallow and pulse was low?  CPR started by family? Lasted maybe 5-10 seconds per her sister   EMS was called   EMS Did EKG  BG levels was 106  BP was normal  Pt did not want to go to the ER    Soreness in chest from CP    10 years ago had a syncope episode  Saw cards. Cardiac testing was good       History:  Past Medical History:   Diagnosis Date    Allergy     Anemia     Hypertension     Plantar fasciitis of left foot      Past Surgical History:   Procedure Laterality Date    COLONOSCOPY N/A 04/09/2021    Procedure: COLONOSCOPY;  Surgeon: Hua Elmore MD;  Location: [facility];  Service: Endoscopy;  Laterality: N/A;    HYSTERECTOMY  05/2021    OOPHORECTOMY  05/2021    ROBOT-ASSISTED LAPAROSCOPIC ABDOMINAL HYSTERECTOMY USING DA ABBEY XI N/A 05/18/2021    Procedure: XI ROBOTIC HYSTERECTOMY;  Surgeon: Christa Davila MD;  Location: [facility] OR;  Service: OB/GYN;  Laterality: N/A;    ROBOT-ASSISTED LAPAROSCOPIC SALPINGO-OOPHORECTOMY USING DA ABBEY XI Bilateral 05/18/2021    Procedure: XI ROBOTIC SALPINGO-OOPHORECTOMY;  Surgeon: Christa Davila MD;  Location: [facility] OR;  Service: OB/GYN;  Laterality: Bilateral;     Family History   Problem Relation Name Age of Onset    Hypertension Mother      Asthma Mother      Cancer Mother          unknown    Hypertension Father      Heart disease Father      Hypertension Sister       Social History     Socioeconomic History    Marital status:     Number of children: 2   Occupational History    Occupation: [employer]      Employer: [employer]   Tobacco Use    Smoking status: Never    Smokeless tobacco: Never   Substance and Sexual Activity    Alcohol use: No    Drug use: No    Sexual activity: Not Currently     Birth control/protection: Surgical     Social Determinants of Health     Financial Resource Strain: Low Risk  (5/10/2023)    Overall Financial Resource Strain (CARDIA)     Difficulty of Paying Living Expenses: Not very hard   Food Insecurity: Food Insecurity Present (5/10/2023)    Hunger Vital Sign     Worried About Running Out of Food in the Last Year: Sometimes true     Ran Out of Food in the Last Year: Sometimes true   Transportation Needs: Unmet Transportation Needs (5/10/2023)    PRAPARE - Transportation     Lack of Transportation (Medical): Yes     Lack of Transportation (Non-Medical): Yes   Physical Activity: Sufficiently Active (5/10/2023)    Exercise Vital Sign     Days of Exercise per Week: 5 days     Minutes of Exercise per Session: 150+ min   Stress: No Stress Concern Present (5/10/2023)    [employer] [employer] - Occupational Stress Questionnaire     Feeling of Stress : Only a little   Social Connections: Unknown (5/10/2023)    Social Connection and Isolation Panel [NHANES]     Frequency of Communication with Friends and Family: Once a week     Frequency of Social Gatherings with Friends and Family: Once a week     Active Member of Clubs or Organizations: Yes     Attends Club or Organization Meetings: More than 4 times per year     Marital Status:    Housing Stability: High Risk (5/10/2023)    Housing Stability Vital Sign     Unable to Pay for Housing in the Last Year: Yes     Number of Places Lived in the Last Year: 1     Unstable Housing in the Last Year: No     Patient Active Problem List   Diagnosis    Anemia    Plantar fasciitis of left foot    Essential hypertension    Cervical polyp    S/P total hysterectomy and BSO (bilateral salpingo-oophorectomy)    Rheumatoid arthritis involving  multiple sites with positive rheumatoid factor    Breast mass, left     Review of patient's allergies indicates:   Allergen Reactions    Tramadol Nausea And Vomiting       The following were reviewed at this visit: active problem list, medication list, allergies, family history, social history, and health maintenance.    Medications:  Current Outpatient Medications on File Prior to Visit   Medication Sig Dispense Refill    celecoxib (CELEBREX) 200 MG capsule Take 1 capsule (200 mg total) by mouth once daily. 90 capsule 1    cyclobenzaprine (FLEXERIL) 10 MG tablet Take 1 tablet (10 mg total) by mouth nightly as needed for Muscle spasms. 90 tablet 1    ergocalciferol (ERGOCALCIFEROL) 50,000 unit Cap TAKE 1 CAPSULE BY MOUTH TWICE A WEEK 21 capsule 0    FERROUS SULFATE, DRIED (IRON, DRIED, ORAL) Take by mouth once daily.       fluticasone propionate (FLONASE) 50 mcg/actuation nasal spray 2 sprays (100 mcg total) by Each Nostril route once daily. 16 g 3    folic acid (FOLVITE) 1 MG tablet Take 1 tablet (1,000 mcg total) by mouth once daily. 90 tablet 3    hydroCHLOROthiazide (MICROZIDE) 12.5 mg capsule Take 1 capsule (12.5 mg total) by mouth once daily. 90 capsule 3    loratadine (CLARITIN REDITABS) 10 mg dissolvable tablet Take 1 tablet (10 mg total) by mouth once daily. 30 tablet 11    methotrexate 2.5 MG Tab Take 6 tablets (15 mg total) by mouth every 7 days. 72 tablet 1    montelukast (SINGULAIR) 10 mg tablet Take 1 tablet (10 mg total) by mouth once daily. 90 tablet 3    MULTIVITS,CA,MINERALS/IRON/FA (ONE-A-DAY WOMENS FORMULA ORAL) Take by mouth once daily.       potassium chloride SA (K-DUR,KLOR-CON) 20 MEQ tablet Take 1 tablet (20 mEq total) by mouth once daily. 2 tablet 0    predniSONE (DELTASONE) 5 MG tablet Take 1 tablet (5 mg total) by mouth daily as needed (RA flare). 30 tablet 2     No current facility-administered medications on file prior to visit.       Medications have been reviewed and reconciled with  patient at this visit.  Barriers to medications reviewed with patient.    Adverse reactions to current medications reviewed with patient..    Over the counter medications reviewed and reconciled with patient.    Exam:  Wt Readings from Last 3 Encounters:   02/23/24 101 kg (222 lb 10.6 oz)   01/19/24 101.9 kg (224 lb 12.1 oz)   09/18/23 101.2 kg (223 lb 1.7 oz)     Temp Readings from Last 3 Encounters:   05/10/23 96.3 °F (35.7 °C) (Tympanic)   01/14/22 97.1 °F (36.2 °C) (Tympanic)   12/13/21 97.3 °F (36.3 °C) (Tympanic)     BP Readings from Last 3 Encounters:   01/19/24 111/83   09/18/23 124/83   05/17/23 129/87     Pulse Readings from Last 3 Encounters:   01/19/24 79   09/18/23 68   05/17/23 85     There is no height or weight on file to calculate BMI.      Review of Systems   Respiratory:  Negative for shortness of breath.    Neurological:  Negative for dizziness.        Lightheaded      Physical Exam  Nursing note reviewed.   Constitutional:       Appearance: She is obese.   HENT:      Head: Normocephalic and atraumatic.      Nose: Congestion and rhinorrhea present.   Cardiovascular:      Rate and Rhythm: Normal rate and regular rhythm.      Pulses: Normal pulses.      Heart sounds: Normal heart sounds.   Pulmonary:      Effort: Pulmonary effort is normal. No respiratory distress.      Breath sounds: Normal breath sounds.   Abdominal:      General: Bowel sounds are normal.   Skin:     General: Skin is dry.   Neurological:      Mental Status: She is alert and oriented to person, place, and time.   Psychiatric:         Mood and Affect: Mood normal.         Behavior: Behavior normal.         Thought Content: Thought content normal.         Judgment: Judgment normal.         Laboratory Reviewed ({Yes)  Lab Results   Component Value Date    WBC 6.11 01/12/2024    HGB 12.3 01/12/2024    HCT 40.0 01/12/2024     01/12/2024    CHOL 179 05/12/2023    TRIG 98 05/12/2023    HDL 61 05/12/2023    ALT 18 01/12/2024    AST  24 01/12/2024     01/12/2024    K 3.7 01/12/2024     01/12/2024    CREATININE 0.8 01/12/2024    BUN 22 (H) 01/12/2024    CO2 28 01/12/2024    TSH 0.752 02/26/2021    HGBA1C 5.6 05/12/2023     Diagnoses and all orders for this visit:    Syncope, unspecified syncope type  -     IN OFFICE EKG 12-LEAD (to Muse)  -     CBC Auto Differential; Future  -     COMPREHENSIVE METABOLIC PANEL; Future  -     Ambulatory referral/consult to Cardiology; Future  -     TSH; Future    Essential hypertension  -     CBC Auto Differential; Future  -     COMPREHENSIVE METABOLIC PANEL; Future    Severe obesity (BMI 35.0-35.9 with comorbidity)  -     CBC Auto Differential; Future  -     COMPREHENSIVE METABOLIC PANEL; Future  -     TSH; Future    Anemia, unspecified type  -     CBC Auto Differential; Future  -     COMPREHENSIVE METABOLIC PANEL; Future  -     Iron and TIBC; Future      EKG did not show changes from EKG in 2021  Labs  Refer to cards  Change position slowly           Care Plan/Goals: Reviewed    Goals    None         Follow up: No follow-ups on file.    After visit summary was printed and given to patient upon discharge today.  Patient goals and care plan are included in After Visit Summary.

## 2024-04-30 DIAGNOSIS — I10 ESSENTIAL HYPERTENSION: Primary | Chronic | ICD-10-CM

## 2024-05-02 ENCOUNTER — HOSPITAL ENCOUNTER (OUTPATIENT)
Dept: CARDIOLOGY | Facility: HOSPITAL | Age: 59
Discharge: HOME | End: 2024-05-02
Attending: INTERNAL MEDICINE
Payer: COMMERCIAL

## 2024-05-02 ENCOUNTER — OFFICE VISIT (OUTPATIENT)
Dept: CARDIOLOGY | Facility: CLINIC | Age: 59
End: 2024-05-02
Payer: COMMERCIAL

## 2024-05-02 VITALS
OXYGEN SATURATION: 99 % | HEART RATE: 80 BPM | WEIGHT: 225.06 LBS | BODY MASS INDEX: 38.42 KG/M2 | SYSTOLIC BLOOD PRESSURE: 143 MMHG | DIASTOLIC BLOOD PRESSURE: 96 MMHG | HEIGHT: 64 IN

## 2024-05-02 DIAGNOSIS — I10 ESSENTIAL HYPERTENSION: Primary | ICD-10-CM

## 2024-05-02 DIAGNOSIS — M05.79 RHEUMATOID ARTHRITIS INVOLVING MULTIPLE SITES WITH POSITIVE RHEUMATOID FACTOR: ICD-10-CM

## 2024-05-02 DIAGNOSIS — E66.01 MORBID OBESITY: ICD-10-CM

## 2024-05-02 DIAGNOSIS — I10 HYPERTENSION, UNSPECIFIED TYPE: ICD-10-CM

## 2024-05-02 DIAGNOSIS — R55 SYNCOPE, UNSPECIFIED SYNCOPE TYPE: ICD-10-CM

## 2024-05-02 DIAGNOSIS — I10 ESSENTIAL HYPERTENSION: Chronic | ICD-10-CM

## 2024-05-02 DIAGNOSIS — D64.9 ANEMIA, UNSPECIFIED TYPE: Primary | ICD-10-CM

## 2024-05-02 LAB
OHS QRS DURATION: 82 MS
OHS QTC CALCULATION: 425 MS

## 2024-05-02 PROCEDURE — 3008F BODY MASS INDEX DOCD: CPT | Mod: CPTII,S$GLB,, | Performed by: INTERNAL MEDICINE

## 2024-05-02 PROCEDURE — 99999 PR PBB SHADOW E&M-EST. PATIENT-LVL V: CPT | Mod: PBBFAC,,, | Performed by: INTERNAL MEDICINE

## 2024-05-02 PROCEDURE — 3080F DIAST BP >= 90 MM HG: CPT | Mod: CPTII,S$GLB,, | Performed by: INTERNAL MEDICINE

## 2024-05-02 PROCEDURE — 93005 ELECTROCARDIOGRAM TRACING: CPT

## 2024-05-02 PROCEDURE — 1159F MED LIST DOCD IN RCRD: CPT | Mod: CPTII,S$GLB,, | Performed by: INTERNAL MEDICINE

## 2024-05-02 PROCEDURE — 3077F SYST BP >= 140 MM HG: CPT | Mod: CPTII,S$GLB,, | Performed by: INTERNAL MEDICINE

## 2024-05-02 PROCEDURE — 93010 ELECTROCARDIOGRAM REPORT: CPT | Mod: ,,, | Performed by: STUDENT IN AN ORGANIZED HEALTH CARE EDUCATION/TRAINING PROGRAM

## 2024-05-02 PROCEDURE — 99204 OFFICE O/P NEW MOD 45 MIN: CPT | Mod: S$GLB,,, | Performed by: INTERNAL MEDICINE

## 2024-05-02 PROCEDURE — 4010F ACE/ARB THERAPY RXD/TAKEN: CPT | Mod: CPTII,S$GLB,, | Performed by: INTERNAL MEDICINE

## 2024-05-02 RX ORDER — LOSARTAN POTASSIUM 25 MG/1
25 TABLET ORAL DAILY
Qty: 30 TABLET | Refills: 11 | Status: SHIPPED | OUTPATIENT
Start: 2024-05-02 | End: 2024-06-18

## 2024-05-02 RX ORDER — OLMESARTAN MEDOXOMIL 20 MG/1
20 TABLET ORAL DAILY
Qty: 30 TABLET | Refills: 11 | Status: SHIPPED | OUTPATIENT
Start: 2024-05-02 | End: 2024-05-02

## 2024-05-02 NOTE — PROGRESS NOTES
Subjective:   Patient ID:  Jake Hoskins is a 59 y.o. female who presents for evaluation of No chief complaint on file.      HPI  59-year-old female, with history of hypertension, morbid obesity.    Comes in for evaluation of syncope   She states that a week ago she was feeling dizzy she stood up and she passed out.  She was loss of consciousness.  Her family started doing chest compressions.  EMS arrived and her vitals were normal EKG was normal , fingerstick 105.     Denies any exertional angina.  She is on HCTZ.    Her pressure is elevated today however her blood pressure readings are usually normal to mildly elevated..    Denies palpitations.    She states that she had a similar episode about 10 years ago and she had a complete workup that was negative including stress test echo in monitor.        Past Medical History:   Diagnosis Date    Allergy     Anemia     Hypertension     Plantar fasciitis of left foot        Past Surgical History:   Procedure Laterality Date    COLONOSCOPY N/A 04/09/2021    Procedure: COLONOSCOPY;  Surgeon: Hua Elmore MD;  Location: [facility];  Service: Endoscopy;  Laterality: N/A;    HYSTERECTOMY  05/2021    OOPHORECTOMY  05/2021    ROBOT-ASSISTED LAPAROSCOPIC ABDOMINAL HYSTERECTOMY USING DA ABBEY XI N/A 05/18/2021    Procedure: XI ROBOTIC HYSTERECTOMY;  Surgeon: Christa Davila MD;  Location: [facility] OR;  Service: OB/GYN;  Laterality: N/A;    ROBOT-ASSISTED LAPAROSCOPIC SALPINGO-OOPHORECTOMY USING DA ABBEY XI Bilateral 05/18/2021    Procedure: XI ROBOTIC SALPINGO-OOPHORECTOMY;  Surgeon: Christa Davila MD;  Location: [facility] OR;  Service: OB/GYN;  Laterality: Bilateral;       Social History     Tobacco Use    Smoking status: Never    Smokeless tobacco: Never   Substance Use Topics    Alcohol use: No    Drug use: No       Family History   Problem Relation Name Age of Onset    Hypertension Mother      Asthma Mother      Cancer Mother          unknown    Hypertension Father       Heart disease Father      Hypertension Sister         Review of Systems   Cardiovascular:  Negative for chest pain, dyspnea on exertion, palpitations and syncope.   Genitourinary: Negative.    Neurological: Negative.        Current Outpatient Medications   Medication Sig Dispense Refill    celecoxib (CELEBREX) 200 MG capsule Take 1 capsule (200 mg total) by mouth once daily. 90 capsule 1    cyclobenzaprine (FLEXERIL) 10 MG tablet Take 1 tablet (10 mg total) by mouth nightly as needed for Muscle spasms. 90 tablet 1    ergocalciferol (ERGOCALCIFEROL) 50,000 unit Cap TAKE 1 CAPSULE BY MOUTH TWICE A WEEK 21 capsule 0    fluticasone propionate (FLONASE) 50 mcg/actuation nasal spray 2 sprays (100 mcg total) by Each Nostril route once daily. 16 g 3    folic acid (FOLVITE) 1 MG tablet Take 1 tablet (1,000 mcg total) by mouth once daily. 90 tablet 3    loratadine (CLARITIN REDITABS) 10 mg dissolvable tablet Take 1 tablet (10 mg total) by mouth once daily. 30 tablet 11    methotrexate 2.5 MG Tab Take 6 tablets (15 mg total) by mouth every 7 days. 72 tablet 1    montelukast (SINGULAIR) 10 mg tablet Take 1 tablet (10 mg total) by mouth once daily. 90 tablet 3    MULTIVITS,CA,MINERALS/IRON/FA (ONE-A-DAY WOMENS FORMULA ORAL) Take by mouth once daily.       potassium chloride SA (K-DUR,KLOR-CON) 20 MEQ tablet Take 1 tablet (20 mEq total) by mouth once daily. 2 tablet 0    ferrous sulfate, dried (SLOW FE) 160 mg (50 mg iron) TbSR Take 1 tablet (160 mg total) by mouth once daily. 90 tablet 3    losartan (COZAAR) 25 MG tablet Take 1 tablet (25 mg total) by mouth once daily. 30 tablet 11    predniSONE (DELTASONE) 5 MG tablet Take 1 tablet (5 mg total) by mouth daily as needed (RA flare). (Patient not taking: Reported on 5/2/2024) 30 tablet 2     No current facility-administered medications for this visit.       Objective:   Objective:  Wt Readings from Last 3 Encounters:   05/02/24 102.1 kg (225 lb 1.4 oz)   04/29/24 102 kg (224 lb  13.9 oz)   02/23/24 101 kg (222 lb 10.6 oz)     Temp Readings from Last 3 Encounters:   04/29/24 98.1 °F (36.7 °C) (Tympanic)   05/10/23 96.3 °F (35.7 °C) (Tympanic)   01/14/22 97.1 °F (36.2 °C) (Tympanic)     BP Readings from Last 3 Encounters:   05/02/24 (!) 143/96   04/29/24 128/70   01/19/24 111/83     Pulse Readings from Last 3 Encounters:   05/02/24 80   04/29/24 74   01/19/24 79       Physical Exam  Vitals reviewed.   Constitutional:       Appearance: She is well-developed.   Neck:      Vascular: No carotid bruit.   Cardiovascular:      Rate and Rhythm: Normal rate and regular rhythm.      Pulses: Intact distal pulses.      Heart sounds: Normal heart sounds. No murmur heard.  Pulmonary:      Breath sounds: Normal breath sounds.   Neurological:      Mental Status: She is oriented to person, place, and time.         Lab Results   Component Value Date    CHOL 179 05/12/2023    CHOL 185 07/06/2018    CHOL 149 03/23/2015     Lab Results   Component Value Date    HDL 61 05/12/2023    HDL 59 07/06/2018    HDL 43 03/23/2015     Lab Results   Component Value Date    LDLCALC 98.4 05/12/2023    LDLCALC 107.8 07/06/2018    LDLCALC 87.8 03/23/2015     Lab Results   Component Value Date    TRIG 98 05/12/2023    TRIG 91 07/06/2018    TRIG 91 03/23/2015     Lab Results   Component Value Date    CHOLHDL 34.1 05/12/2023    CHOLHDL 31.9 07/06/2018    CHOLHDL 28.9 03/23/2015       Chemistry        Component Value Date/Time     04/29/2024 1008    K 3.6 04/29/2024 1008     04/29/2024 1008    CO2 27 04/29/2024 1008    BUN 13 04/29/2024 1008    CREATININE 0.8 04/29/2024 1008    GLU 96 04/29/2024 1008        Component Value Date/Time    CALCIUM 9.7 04/29/2024 1008    ALKPHOS 64 04/29/2024 1008    AST 18 04/29/2024 1008    ALT 18 04/29/2024 1008    BILITOT 0.3 04/29/2024 1008    ESTGFRAFRICA >60 05/06/2022 1547    EGFRNONAA >60 05/06/2022 1547          Lab Results   Component Value Date    TSH 0.730 04/29/2024     No  "results found for: "INR", "PROTIME"  Lab Results   Component Value Date    WBC 7.77 04/29/2024    HGB 12.7 04/29/2024    HCT 42.4 04/29/2024    MCV 83 04/29/2024     04/29/2024     BNP  @LABRCNTIP(BNP,BNPTRIAGEBLO)@  Estimated Creatinine Clearance: 87 mL/min (based on SCr of 0.8 mg/dL).     Imaging:  ======    No results found for this or any previous visit.    No results found for this or any previous visit.    No results found for this or any previous visit.    No results found for this or any previous visit.    No valid procedures specified.    No results found for this or any previous visit.      No results found for this or any previous visit.      No results found for this or any previous visit.      Diagnostic Results:  ECG: Reviewed    The 10-year ASCVD risk score (Titus VEGAS, et al., 2019) is: 8%    Values used to calculate the score:      Age: 59 years      Sex: Female      Is Non- : Yes      Diabetic: No      Tobacco smoker: No      Systolic Blood Pressure: 143 mmHg      Is BP treated: Yes      HDL Cholesterol: 61 mg/dL      Total Cholesterol: 179 mg/dL        Assessment and Plan:   Essential hypertension    -     losartan (COZAAR) 25 MG tablet; Take 1 tablet (25 mg total) by mouth once daily.  Dispense: 30 tablet; Refill: 11    Syncope, unspecified syncope type  -     Ambulatory referral/consult to Cardiology  -     Cardiac Monitor - 3-15 Day Adult (Cupid Only); Future  -     Echo; Future    Hypertension, unspecified type    Morbid obesity    Rheumatoid arthritis involving multiple sites with positive rheumatoid factor      Likely orthostatic syncope.  Unclear trigger however  BP elevated.  Will take her off HCTZ orthostatic episode and put on losartan.  Usually BP in the 130s over low 80s at home.  Reviewed all tests and above medical conditions with patient in detail and formulated treatment plan.  Risk factor modification discussed.   Cardiac low salt diet " discussed.  Maintaining healthy weight and weight loss goals were discussed in clinic.    Follow up in 3 months to go over results

## 2024-05-03 ENCOUNTER — TELEPHONE (OUTPATIENT)
Dept: CARDIOLOGY | Facility: CLINIC | Age: 59
End: 2024-05-03
Payer: COMMERCIAL

## 2024-05-03 ENCOUNTER — PATIENT MESSAGE (OUTPATIENT)
Dept: CARDIOLOGY | Facility: CLINIC | Age: 59
End: 2024-05-03
Payer: COMMERCIAL

## 2024-05-03 ENCOUNTER — LAB VISIT (OUTPATIENT)
Dept: LAB | Facility: HOSPITAL | Age: 59
End: 2024-05-03
Attending: PHYSICIAN ASSISTANT
Payer: COMMERCIAL

## 2024-05-03 DIAGNOSIS — E55.9 VITAMIN D DEFICIENCY: ICD-10-CM

## 2024-05-03 DIAGNOSIS — Z79.899 HIGH RISK MEDICATION USE: ICD-10-CM

## 2024-05-03 LAB
25(OH)D3+25(OH)D2 SERPL-MCNC: 44 NG/ML (ref 30–96)
ALBUMIN SERPL BCP-MCNC: 3.5 G/DL (ref 3.5–5.2)
ALP SERPL-CCNC: 65 U/L (ref 55–135)
ALT SERPL W/O P-5'-P-CCNC: 24 U/L (ref 10–44)
ANION GAP SERPL CALC-SCNC: 8 MMOL/L (ref 8–16)
AST SERPL-CCNC: 22 U/L (ref 10–40)
BASOPHILS # BLD AUTO: 0.07 K/UL (ref 0–0.2)
BASOPHILS NFR BLD: 1.1 % (ref 0–1.9)
BILIRUB SERPL-MCNC: 0.3 MG/DL (ref 0.1–1)
BUN SERPL-MCNC: 12 MG/DL (ref 6–20)
CALCIUM SERPL-MCNC: 9.6 MG/DL (ref 8.7–10.5)
CHLORIDE SERPL-SCNC: 106 MMOL/L (ref 95–110)
CO2 SERPL-SCNC: 27 MMOL/L (ref 23–29)
CREAT SERPL-MCNC: 0.8 MG/DL (ref 0.5–1.4)
CRP SERPL-MCNC: 7.5 MG/L (ref 0–8.2)
DIFFERENTIAL METHOD BLD: ABNORMAL
EOSINOPHIL # BLD AUTO: 0.2 K/UL (ref 0–0.5)
EOSINOPHIL NFR BLD: 3.2 % (ref 0–8)
ERYTHROCYTE [DISTWIDTH] IN BLOOD BY AUTOMATED COUNT: 14.9 % (ref 11.5–14.5)
ERYTHROCYTE [SEDIMENTATION RATE] IN BLOOD BY WESTERGREN METHOD: 29 MM/HR (ref 0–20)
EST. GFR  (NO RACE VARIABLE): >60 ML/MIN/1.73 M^2
GLUCOSE SERPL-MCNC: 87 MG/DL (ref 70–110)
HCT VFR BLD AUTO: 40.1 % (ref 37–48.5)
HGB BLD-MCNC: 12.4 G/DL (ref 12–16)
IMM GRANULOCYTES # BLD AUTO: 0.05 K/UL (ref 0–0.04)
IMM GRANULOCYTES NFR BLD AUTO: 0.8 % (ref 0–0.5)
LYMPHOCYTES # BLD AUTO: 1.8 K/UL (ref 1–4.8)
LYMPHOCYTES NFR BLD: 28.7 % (ref 18–48)
MCH RBC QN AUTO: 24.8 PG (ref 27–31)
MCHC RBC AUTO-ENTMCNC: 30.9 G/DL (ref 32–36)
MCV RBC AUTO: 80 FL (ref 82–98)
MONOCYTES # BLD AUTO: 0.6 K/UL (ref 0.3–1)
MONOCYTES NFR BLD: 9.5 % (ref 4–15)
NEUTROPHILS # BLD AUTO: 3.6 K/UL (ref 1.8–7.7)
NEUTROPHILS NFR BLD: 56.7 % (ref 38–73)
NRBC BLD-RTO: 0 /100 WBC
PLATELET # BLD AUTO: 349 K/UL (ref 150–450)
PMV BLD AUTO: 10.4 FL (ref 9.2–12.9)
POTASSIUM SERPL-SCNC: 3.7 MMOL/L (ref 3.5–5.1)
PROT SERPL-MCNC: 7.4 G/DL (ref 6–8.4)
RBC # BLD AUTO: 5 M/UL (ref 4–5.4)
SODIUM SERPL-SCNC: 141 MMOL/L (ref 136–145)
WBC # BLD AUTO: 6.31 K/UL (ref 3.9–12.7)

## 2024-05-03 PROCEDURE — 36415 COLL VENOUS BLD VENIPUNCTURE: CPT | Mod: PN | Performed by: PHYSICIAN ASSISTANT

## 2024-05-03 PROCEDURE — 80053 COMPREHEN METABOLIC PANEL: CPT | Performed by: PHYSICIAN ASSISTANT

## 2024-05-03 PROCEDURE — 85025 COMPLETE CBC W/AUTO DIFF WBC: CPT | Performed by: PHYSICIAN ASSISTANT

## 2024-05-03 PROCEDURE — 86140 C-REACTIVE PROTEIN: CPT | Performed by: PHYSICIAN ASSISTANT

## 2024-05-03 PROCEDURE — 85651 RBC SED RATE NONAUTOMATED: CPT | Performed by: PHYSICIAN ASSISTANT

## 2024-05-03 PROCEDURE — 82306 VITAMIN D 25 HYDROXY: CPT | Performed by: PHYSICIAN ASSISTANT

## 2024-05-03 NOTE — TELEPHONE ENCOUNTER
GHAZALA for pt to call us back        ----- Message from Yadi Lagunas sent at 5/3/2024  7:09 AM CDT -----  Contact: Jake Joseph called in regards to questions on medication list.  Pharmacy stated that the medication prescribed does the same she need to know because she I supposed to start taking today. Call back [phone].

## 2024-05-06 ENCOUNTER — PATIENT MESSAGE (OUTPATIENT)
Dept: RHEUMATOLOGY | Facility: CLINIC | Age: 59
End: 2024-05-06
Payer: COMMERCIAL

## 2024-05-06 ENCOUNTER — TELEPHONE (OUTPATIENT)
Dept: CARDIOLOGY | Facility: CLINIC | Age: 59
End: 2024-05-06
Payer: COMMERCIAL

## 2024-05-06 DIAGNOSIS — E55.9 VITAMIN D DEFICIENCY: ICD-10-CM

## 2024-05-06 RX ORDER — ERGOCALCIFEROL 1.25 MG/1
50000 CAPSULE ORAL
Qty: 12 CAPSULE | Refills: 4 | Status: SHIPPED | OUTPATIENT
Start: 2024-05-06

## 2024-05-06 NOTE — TELEPHONE ENCOUNTER
Contact patient and advise her on the the instrument you give me to tell her but she stated that when she  her RX for the pharmacy gave her olmesartan 20 mg too but I advise her that  on put her on losarton patient stated she will not take olmesartan            ----- Message from Alison Buchanan MD sent at 5/6/2024 11:44 AM CDT -----  Contact: pt  No I did not.  I only put her on losartan.    Thank you  ----- Message -----  From: Ale Hinojosa MA  Sent: 5/6/2024  11:40 AM CDT  To: Alison Buchanan MD    Okay she wants to know did you change her losartan to Olmesartan 20 mg  ----- Message -----  From: Alison Buchanan MD  Sent: 5/6/2024  11:07 AM CDT  To: Ale Hinojosa MA    Please make her aware that the losartan has nothing to do with the iron supplements.  For as far as the ferrous sulfate or the iron tablet.  She needs to check with her primary care physician in that regard.    Thank you  ----- Message -----  From: Ale Hinojosa MA  Sent: 5/3/2024   3:07 PM CDT  To: Alison Buchanan MD    Please advise  Thanks  ----- Message -----  From: Jennifer Mix  Sent: 5/3/2024   2:00 PM CDT  To: Chanel Rosas Staff    Pt is calling in rgd to her meds, losartan (COZAAR) 25 MG tablet and ferrous sulfate, dried (SLOW FE) 160 mg (50 mg iron) TbSR  as per pharmacist they do the same thing and she could only take 1 if ok by .  Pt wants to know which one should she take please.  Please call her back at  [phone]  thanks/mpd

## 2024-05-08 DIAGNOSIS — J30.1 ACUTE SEASONAL ALLERGIC RHINITIS DUE TO POLLEN: ICD-10-CM

## 2024-05-08 RX ORDER — MONTELUKAST SODIUM 10 MG/1
10 TABLET ORAL
Qty: 90 TABLET | Refills: 0 | Status: SHIPPED | OUTPATIENT
Start: 2024-05-08

## 2024-05-08 NOTE — TELEPHONE ENCOUNTER
Refill Decision Note   Elenahugo Gato  is requesting a refill authorization.  Brief Assessment and Rationale for Refill:  Approve     Medication Therapy Plan:         Comments:     Note composed:5:55 PM 05/08/2024

## 2024-05-08 NOTE — TELEPHONE ENCOUNTER
No care due was identified.  [facility] Embedded Care Due Messages. Reference number: [id number].   5/08/2024 5:28:11 PM CDT

## 2024-05-09 ENCOUNTER — HOSPITAL ENCOUNTER (INPATIENT)
Facility: HOSPITAL | Age: 59
LOS: 8 days | Discharge: HOME HEALTH | DRG: 228 | End: 2024-05-17
Attending: EMERGENCY MEDICINE | Admitting: SPECIALIST
Payer: COMMERCIAL

## 2024-05-09 ENCOUNTER — HOSPITAL ENCOUNTER (OUTPATIENT)
Dept: CARDIOLOGY | Facility: HOSPITAL | Age: 59
Discharge: HOME | DRG: 228 | End: 2024-05-09
Attending: INTERNAL MEDICINE
Payer: COMMERCIAL

## 2024-05-09 VITALS
HEIGHT: 64 IN | DIASTOLIC BLOOD PRESSURE: 96 MMHG | SYSTOLIC BLOOD PRESSURE: 143 MMHG | BODY MASS INDEX: 38.41 KG/M2 | WEIGHT: 225 LBS

## 2024-05-09 DIAGNOSIS — Z98.890 POST-OPERATIVE STATE: ICD-10-CM

## 2024-05-09 DIAGNOSIS — R55 NEAR SYNCOPE: ICD-10-CM

## 2024-05-09 DIAGNOSIS — Z01.810 PRE-OPERATIVE CARDIOVASCULAR EXAMINATION: ICD-10-CM

## 2024-05-09 DIAGNOSIS — R93.1 ABNORMAL ECHOCARDIOGRAM FINDINGS WITHOUT DIAGNOSIS: ICD-10-CM

## 2024-05-09 DIAGNOSIS — D15.1 ATRIAL MYXOMA: ICD-10-CM

## 2024-05-09 DIAGNOSIS — R07.9 CHEST PAIN: ICD-10-CM

## 2024-05-09 DIAGNOSIS — I10 ESSENTIAL HYPERTENSION: ICD-10-CM

## 2024-05-09 DIAGNOSIS — I05.8 MITRAL VALVE MASS: Primary | ICD-10-CM

## 2024-05-09 DIAGNOSIS — R55 SYNCOPE, UNSPECIFIED SYNCOPE TYPE: ICD-10-CM

## 2024-05-09 PROBLEM — G93.41 ENCEPHALOPATHY, METABOLIC: Status: ACTIVE | Noted: 2024-05-09

## 2024-05-09 LAB
ALBUMIN SERPL BCP-MCNC: 3.7 G/DL (ref 3.5–5.2)
ALP SERPL-CCNC: 70 U/L (ref 55–135)
ALT SERPL W/O P-5'-P-CCNC: 19 U/L (ref 10–44)
ANION GAP SERPL CALC-SCNC: 10 MMOL/L (ref 8–16)
AORTIC ROOT ANNULUS: 2.7 CM
APTT PPP: 31.1 SEC (ref 21–32)
ASCENDING AORTA: 3.17 CM
AST SERPL-CCNC: 17 U/L (ref 10–40)
AV INDEX (PROSTH): 0.85
AV MEAN GRADIENT: 4 MMHG
AV PEAK GRADIENT: 7 MMHG
AV VALVE AREA BY VELOCITY RATIO: 2.66 CM²
AV VALVE AREA: 2.67 CM²
AV VELOCITY RATIO: 0.85
BASOPHILS # BLD AUTO: 0.05 K/UL (ref 0–0.2)
BASOPHILS NFR BLD: 0.8 % (ref 0–1.9)
BILIRUB SERPL-MCNC: 0.4 MG/DL (ref 0.1–1)
BSA FOR ECHO PROCEDURE: 2.15 M2
BUN SERPL-MCNC: 11 MG/DL (ref 6–20)
CALCIUM SERPL-MCNC: 10.2 MG/DL (ref 8.7–10.5)
CHLORIDE SERPL-SCNC: 106 MMOL/L (ref 95–110)
CO2 SERPL-SCNC: 24 MMOL/L (ref 23–29)
CREAT SERPL-MCNC: 0.8 MG/DL (ref 0.5–1.4)
CV ECHO LV RWT: 0.43 CM
DIFFERENTIAL METHOD BLD: ABNORMAL
DOP CALC AO PEAK VEL: 1.31 M/S
DOP CALC AO VTI: 28.6 CM
DOP CALC LVOT AREA: 3.1 CM2
DOP CALC LVOT DIAMETER: 2 CM
DOP CALC LVOT PEAK VEL: 1.11 M/S
DOP CALC LVOT STROKE VOLUME: 76.3 CM3
DOP CALC RVOT PEAK VEL: 0.77 M/S
DOP CALC RVOT VTI: 21.3 CM
DOP CALCLVOT PEAK VEL VTI: 24.3 CM
E WAVE DECELERATION TIME: 340.08 MSEC
E/A RATIO: 0.69
E/E' RATIO: 14.71 M/S
ECHO LV POSTERIOR WALL: 1.06 CM (ref 0.6–1.1)
EOSINOPHIL # BLD AUTO: 0.2 K/UL (ref 0–0.5)
EOSINOPHIL NFR BLD: 2.8 % (ref 0–8)
ERYTHROCYTE [DISTWIDTH] IN BLOOD BY AUTOMATED COUNT: 15 % (ref 11.5–14.5)
EST. GFR  (NO RACE VARIABLE): >60 ML/MIN/1.73 M^2
FRACTIONAL SHORTENING: 35 % (ref 28–44)
GLUCOSE SERPL-MCNC: 91 MG/DL (ref 70–110)
HCT VFR BLD AUTO: 41.5 % (ref 37–48.5)
HCV AB SERPL QL IA: NEGATIVE
HEP C VIRUS HOLD SPECIMEN: NORMAL
HGB BLD-MCNC: 12.7 G/DL (ref 12–16)
HIV 1+2 AB+HIV1 P24 AG SERPL QL IA: NEGATIVE
IMM GRANULOCYTES # BLD AUTO: 0.02 K/UL (ref 0–0.04)
IMM GRANULOCYTES NFR BLD AUTO: 0.3 % (ref 0–0.5)
INR PPP: 1 (ref 0.8–1.2)
INTERVENTRICULAR SEPTUM: 0.86 CM (ref 0.6–1.1)
IVC DIAMETER: 1.26 CM
IVRT: 91.34 MSEC
LA MAJOR: 5.65 CM
LA MINOR: 6.4 CM
LA WIDTH: 4.8 CM
LEFT ATRIUM SIZE: 4.14 CM
LEFT ATRIUM VOLUME INDEX MOD: 42.4 ML/M2
LEFT ATRIUM VOLUME INDEX: 49.2 ML/M2
LEFT ATRIUM VOLUME MOD: 87.26 CM3
LEFT ATRIUM VOLUME: 101.38 CM3
LEFT INTERNAL DIMENSION IN SYSTOLE: 3.21 CM (ref 2.1–4)
LEFT VENTRICLE DIASTOLIC VOLUME INDEX: 56.53 ML/M2
LEFT VENTRICLE DIASTOLIC VOLUME: 116.46 ML
LEFT VENTRICLE MASS INDEX: 83 G/M2
LEFT VENTRICLE SYSTOLIC VOLUME INDEX: 20 ML/M2
LEFT VENTRICLE SYSTOLIC VOLUME: 41.28 ML
LEFT VENTRICULAR INTERNAL DIMENSION IN DIASTOLE: 4.97 CM (ref 3.5–6)
LEFT VENTRICULAR MASS: 170.59 G
LV LATERAL E/E' RATIO: 12.88 M/S
LV SEPTAL E/E' RATIO: 17.17 M/S
LVOT MG: 2.88 MMHG
LVOT MV: 0.81 CM/S
LYMPHOCYTES # BLD AUTO: 2.4 K/UL (ref 1–4.8)
LYMPHOCYTES NFR BLD: 39 % (ref 18–48)
MCH RBC QN AUTO: 24.5 PG (ref 27–31)
MCHC RBC AUTO-ENTMCNC: 30.6 G/DL (ref 32–36)
MCV RBC AUTO: 80 FL (ref 82–98)
MONOCYTES # BLD AUTO: 0.6 K/UL (ref 0.3–1)
MONOCYTES NFR BLD: 9.2 % (ref 4–15)
MV PEAK A VEL: 1.49 M/S
MV PEAK E VEL: 1.03 M/S
MV STENOSIS PRESSURE HALF TIME: 98.62 MS
MV VALVE AREA P 1/2 METHOD: 2.23 CM2
NEUTROPHILS # BLD AUTO: 2.9 K/UL (ref 1.8–7.7)
NEUTROPHILS NFR BLD: 47.9 % (ref 38–73)
NRBC BLD-RTO: 0 /100 WBC
OHS CV RV/LV RATIO: 0.91 CM
PISA TR MAX VEL: 2.6 M/S
PLATELET # BLD AUTO: 371 K/UL (ref 150–450)
PMV BLD AUTO: 10 FL (ref 9.2–12.9)
POTASSIUM SERPL-SCNC: 3.9 MMOL/L (ref 3.5–5.1)
PROT SERPL-MCNC: 7.9 G/DL (ref 6–8.4)
PROTHROMBIN TIME: 11.2 SEC (ref 9–12.5)
PULM VEIN S/D RATIO: 1.4
PV MEAN GRADIENT: 2 MMHG
PV MV: 0.68 M/S
PV PEAK D VEL: 0.4 M/S
PV PEAK GRADIENT: 4 MMHG
PV PEAK S VEL: 0.56 M/S
PV PEAK VELOCITY: 0.95 M/S
RA MAJOR: 5.8 CM
RA PRESSURE ESTIMATED: 3 MMHG
RA WIDTH: 4.46 CM
RBC # BLD AUTO: 5.18 M/UL (ref 4–5.4)
RIGHT VENTRICULAR END-DIASTOLIC DIMENSION: 4.52 CM
RV TB RVSP: 6 MMHG
SINUS: 2.68 CM
SODIUM SERPL-SCNC: 140 MMOL/L (ref 136–145)
STJ: 2.73 CM
TDI LATERAL: 0.08 M/S
TDI SEPTAL: 0.06 M/S
TDI: 0.07 M/S
TR MAX PG: 27 MMHG
TRICUSPID ANNULAR PLANE SYSTOLIC EXCURSION: 2.02 CM
TROPONIN I SERPL DL<=0.01 NG/ML-MCNC: <0.006 NG/ML (ref 0–0.03)
TV REST PULMONARY ARTERY PRESSURE: 30 MMHG
WBC # BLD AUTO: 6.1 K/UL (ref 3.9–12.7)
Z-SCORE OF LEFT VENTRICULAR DIMENSION IN END DIASTOLE: -2.24
Z-SCORE OF LEFT VENTRICULAR DIMENSION IN END SYSTOLE: -1.35

## 2024-05-09 PROCEDURE — 87389 HIV-1 AG W/HIV-1&-2 AB AG IA: CPT | Performed by: EMERGENCY MEDICINE

## 2024-05-09 PROCEDURE — 25000003 PHARM REV CODE 250: Performed by: NURSE PRACTITIONER

## 2024-05-09 PROCEDURE — 93005 ELECTROCARDIOGRAM TRACING: CPT

## 2024-05-09 PROCEDURE — 85025 COMPLETE CBC W/AUTO DIFF WBC: CPT | Performed by: EMERGENCY MEDICINE

## 2024-05-09 PROCEDURE — 85730 THROMBOPLASTIN TIME PARTIAL: CPT | Performed by: EMERGENCY MEDICINE

## 2024-05-09 PROCEDURE — 99223 1ST HOSP IP/OBS HIGH 75: CPT | Mod: ,,, | Performed by: STUDENT IN AN ORGANIZED HEALTH CARE EDUCATION/TRAINING PROGRAM

## 2024-05-09 PROCEDURE — 85610 PROTHROMBIN TIME: CPT | Performed by: EMERGENCY MEDICINE

## 2024-05-09 PROCEDURE — 84484 ASSAY OF TROPONIN QUANT: CPT | Performed by: EMERGENCY MEDICINE

## 2024-05-09 PROCEDURE — 99285 EMERGENCY DEPT VISIT HI MDM: CPT | Mod: 25

## 2024-05-09 PROCEDURE — 93306 TTE W/DOPPLER COMPLETE: CPT

## 2024-05-09 PROCEDURE — 93010 ELECTROCARDIOGRAM REPORT: CPT | Mod: ,,, | Performed by: STUDENT IN AN ORGANIZED HEALTH CARE EDUCATION/TRAINING PROGRAM

## 2024-05-09 PROCEDURE — 21400001 HC TELEMETRY ROOM

## 2024-05-09 PROCEDURE — 93306 TTE W/DOPPLER COMPLETE: CPT | Mod: 26,,, | Performed by: INTERNAL MEDICINE

## 2024-05-09 PROCEDURE — 86803 HEPATITIS C AB TEST: CPT | Performed by: EMERGENCY MEDICINE

## 2024-05-09 PROCEDURE — 80053 COMPREHEN METABOLIC PANEL: CPT | Performed by: EMERGENCY MEDICINE

## 2024-05-09 RX ORDER — IBUPROFEN 200 MG
16 TABLET ORAL
Status: DISCONTINUED | OUTPATIENT
Start: 2024-05-09 | End: 2024-05-17 | Stop reason: HOSPADM

## 2024-05-09 RX ORDER — TALC
6 POWDER (GRAM) TOPICAL NIGHTLY PRN
Status: DISCONTINUED | OUTPATIENT
Start: 2024-05-09 | End: 2024-05-17 | Stop reason: HOSPADM

## 2024-05-09 RX ORDER — ONDANSETRON HYDROCHLORIDE 2 MG/ML
4 INJECTION, SOLUTION INTRAVENOUS EVERY 8 HOURS PRN
Status: DISCONTINUED | OUTPATIENT
Start: 2024-05-09 | End: 2024-05-13

## 2024-05-09 RX ORDER — HYDRALAZINE HYDROCHLORIDE 20 MG/ML
10 INJECTION INTRAMUSCULAR; INTRAVENOUS EVERY 6 HOURS PRN
Status: DISCONTINUED | OUTPATIENT
Start: 2024-05-09 | End: 2024-05-17 | Stop reason: HOSPADM

## 2024-05-09 RX ORDER — ACETAMINOPHEN 325 MG/1
650 TABLET ORAL EVERY 4 HOURS PRN
Status: DISCONTINUED | OUTPATIENT
Start: 2024-05-09 | End: 2024-05-13 | Stop reason: SDUPTHER

## 2024-05-09 RX ORDER — GLUCAGON 1 MG
1 KIT INJECTION
Status: DISCONTINUED | OUTPATIENT
Start: 2024-05-09 | End: 2024-05-17 | Stop reason: HOSPADM

## 2024-05-09 RX ORDER — SENNOSIDES 8.6 MG/1
8.6 TABLET ORAL DAILY PRN
Status: DISCONTINUED | OUTPATIENT
Start: 2024-05-09 | End: 2024-05-17 | Stop reason: HOSPADM

## 2024-05-09 RX ORDER — OLMESARTAN MEDOXOMIL 20 MG/1
20 TABLET ORAL DAILY
COMMUNITY
Start: 2024-05-02

## 2024-05-09 RX ORDER — NALOXONE HCL 0.4 MG/ML
0.02 VIAL (ML) INJECTION
Status: DISCONTINUED | OUTPATIENT
Start: 2024-05-09 | End: 2024-05-17 | Stop reason: HOSPADM

## 2024-05-09 RX ORDER — FAMOTIDINE 20 MG/1
20 TABLET, FILM COATED ORAL 2 TIMES DAILY
Status: DISCONTINUED | OUTPATIENT
Start: 2024-05-09 | End: 2024-05-13

## 2024-05-09 RX ORDER — SODIUM CHLORIDE 0.9 % (FLUSH) 0.9 %
10 SYRINGE (ML) INJECTION EVERY 12 HOURS PRN
Status: DISCONTINUED | OUTPATIENT
Start: 2024-05-09 | End: 2024-05-17 | Stop reason: HOSPADM

## 2024-05-09 RX ORDER — LOSARTAN POTASSIUM 25 MG/1
25 TABLET ORAL DAILY
Status: DISCONTINUED | OUTPATIENT
Start: 2024-05-10 | End: 2024-05-15

## 2024-05-09 RX ORDER — IBUPROFEN 200 MG
24 TABLET ORAL
Status: DISCONTINUED | OUTPATIENT
Start: 2024-05-09 | End: 2024-05-17 | Stop reason: HOSPADM

## 2024-05-09 RX ADMIN — FAMOTIDINE 20 MG: 20 TABLET ORAL at 09:05

## 2024-05-09 NOTE — PHARMACY MED REC
"Admission Medication History     The home medication history was taken by Jarred Carrillo.    You may go to "Admission" then "Reconcile Home Medications" tabs to review and/or act upon these items.     The home medication list has been updated by the Pharmacy department.   Please read ALL comments highlighted in yellow.   Please address this information as you see fit.    Feel free to contact us if you have any questions or require assistance.      Medications listed below were obtained from: Analytic software- [employer], Medical records, and Patient's pharmacy  (Not in a hospital admission)    Jarred Carrillo  [id number]    Current Outpatient Medications on File Prior to Encounter   Medication Sig Dispense Refill Last Dose    celecoxib (CELEBREX) 200 MG capsule Take 1 capsule (200 mg total) by mouth once daily. 90 capsule 1 Past Month    cyclobenzaprine (FLEXERIL) 10 MG tablet Take 1 tablet (10 mg total) by mouth nightly as needed for Muscle spasms. 90 tablet 1 Past Month    ergocalciferol (ERGOCALCIFEROL) 50,000 unit Cap Take 1 capsule (50,000 Units total) by mouth every 7 days. 12 capsule 4 Past Week    folic acid (FOLVITE) 1 MG tablet Take 1 tablet (1,000 mcg total) by mouth once daily. 90 tablet 3 5/9/2024    losartan (COZAAR) 25 MG tablet Take 1 tablet (25 mg total) by mouth once daily. 30 tablet 11 5/9/2024    methotrexate 2.5 MG Tab Take 6 tablets (15 mg total) by mouth every 7 days. 72 tablet 1 5/8/2024    montelukast (SINGULAIR) 10 mg tablet Take 1 tablet by mouth once daily 90 tablet 0 5/9/2024    MULTIVITS,CA,MINERALS/IRON/FA (ONE-A-DAY WOMENS FORMULA ORAL) Take by mouth once daily.    5/8/2024    predniSONE (DELTASONE) 5 MG tablet Take 1 tablet (5 mg total) by mouth daily as needed (RA flare). 30 tablet 2 Past Month    ferrous sulfate, dried (SLOW FE) 160 mg (50 mg iron) TbSR Take 1 tablet (160 mg total) by mouth once daily. 90 tablet 3 5/7/2024    fluticasone propionate (FLONASE) 50 mcg/actuation " nasal spray 2 sprays (100 mcg total) by Each Nostril route once daily. 16 g 3     loratadine (CLARITIN REDITABS) 10 mg dissolvable tablet Take 1 tablet (10 mg total) by mouth once daily. 30 tablet 11     olmesartan (BENICAR) 20 MG tablet Take 20 mg by mouth once daily.   Unknown    potassium chloride SA (K-DUR,KLOR-CON) 20 MEQ tablet Take 1 tablet (20 mEq total) by mouth once daily. 2 tablet 0 5/7/2024                           .

## 2024-05-09 NOTE — H&P (VIEW-ONLY)
[facility] - Emergency Dept.  Cardiology  Consult Note    Patient Name: Jake Hoskins  MRN: [id number]  Admission Date: 5/9/2024  Hospital Length of Stay: 0 days  Code Status: Full Code   Attending Provider: Rao Noble MD   Consulting Provider: Aurea Mchugh PA-C  Primary Care Physician: Angelica Lagunas MD  Principal Problem:Mitral valve mass    Patient information was obtained from patient, past medical records, and ER records.     Inpatient consult to Cardiology  Consult performed by: Aurea Mchugh PA-C  Consult ordered by: Dorita Benavides NP        Subjective:     Chief Complaint:  Abnormal echo    HPI:   Ms. Hoskins is a 59 year old female patient whose current medical conditions include HTN and morbid obesity who was sent to [facility] from cardiology clinic due to abnormal echo. Patient had previously seen Dr. Buchanan due to syncopal episode two weeks ago and echo was ordered for further evaluation. 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 Cardiology consulted to assist with management. Patient seen and examined today in ED. Stable CV wise. No CP or SOB. No recurrent syncope. No s/s of TIA/CVA. CT surgery consulted. R/LHC planned tmw.          Past Medical History:   Diagnosis Date    Allergy     Anemia     Hypertension     Plantar fasciitis of left foot        Past Surgical History:   Procedure Laterality Date    COLONOSCOPY N/A 04/09/2021    Procedure: COLONOSCOPY;  Surgeon: Hua Elmore MD;  Location: [facility];  Service: Endoscopy;  Laterality: N/A;    HYSTERECTOMY  05/2021    OOPHORECTOMY  05/2021    ROBOT-ASSISTED LAPAROSCOPIC ABDOMINAL HYSTERECTOMY USING DA ABBEY XI N/A 05/18/2021    Procedure: XI ROBOTIC HYSTERECTOMY;  Surgeon: Christa Davila MD;  Location: [facility] OR;  Service: OB/GYN;  Laterality: N/A;    ROBOT-ASSISTED LAPAROSCOPIC SALPINGO-OOPHORECTOMY USING DA ABBEY XI Bilateral 05/18/2021    Procedure: XI ROBOTIC SALPINGO-OOPHORECTOMY;   Surgeon: Christa Davila MD;  Location: [facility];  Service: OB/GYN;  Laterality: Bilateral;       Review of patient's allergies indicates:   Allergen Reactions    Tramadol Nausea And Vomiting       No current facility-administered medications on file prior to encounter.     Current Outpatient Medications on File Prior to Encounter   Medication Sig    celecoxib (CELEBREX) 200 MG capsule Take 1 capsule (200 mg total) by mouth once daily.    cyclobenzaprine (FLEXERIL) 10 MG tablet Take 1 tablet (10 mg total) by mouth nightly as needed for Muscle spasms.    ergocalciferol (ERGOCALCIFEROL) 50,000 unit Cap Take 1 capsule (50,000 Units total) by mouth every 7 days.    folic acid (FOLVITE) 1 MG tablet Take 1 tablet (1,000 mcg total) by mouth once daily.    losartan (COZAAR) 25 MG tablet Take 1 tablet (25 mg total) by mouth once daily.    methotrexate 2.5 MG Tab Take 6 tablets (15 mg total) by mouth every 7 days.    montelukast (SINGULAIR) 10 mg tablet Take 1 tablet by mouth once daily    MULTIVITS,CA,MINERALS/IRON/FA (ONE-A-DAY WOMENS FORMULA ORAL) Take by mouth once daily.     predniSONE (DELTASONE) 5 MG tablet Take 1 tablet (5 mg total) by mouth daily as needed (RA flare).    ferrous sulfate, dried (SLOW FE) 160 mg (50 mg iron) TbSR Take 1 tablet (160 mg total) by mouth once daily.    fluticasone propionate (FLONASE) 50 mcg/actuation nasal spray 2 sprays (100 mcg total) by Each Nostril route once daily.    loratadine (CLARITIN REDITABS) 10 mg dissolvable tablet Take 1 tablet (10 mg total) by mouth once daily.    olmesartan (BENICAR) 20 MG tablet Take 20 mg by mouth.    potassium chloride SA (K-DUR,KLOR-CON) 20 MEQ tablet Take 1 tablet (20 mEq total) by mouth once daily.     Family History       Problem Relation (Age of Onset)    Asthma Mother    Cancer Mother    Heart disease Father    Hypertension Mother, Father, Sister          Tobacco Use    Smoking status: Never    Smokeless tobacco: Never   Substance and Sexual  Activity    Alcohol use: No    Drug use: No    Sexual activity: Not Currently     Birth control/protection: Surgical     Review of Systems   Constitutional: Negative.   HENT: Negative.     Eyes: Negative.    Cardiovascular:  Positive for syncope (prior episode two weeks ago).   Respiratory: Negative.     Endocrine: Negative.    Hematologic/Lymphatic: Negative.    Skin: Negative.    Musculoskeletal: Negative.    Gastrointestinal: Negative.    Genitourinary: Negative.    Psychiatric/Behavioral: Negative.     Allergic/Immunologic: Negative.      Objective:     Vital Signs (Most Recent):  Temp: 98.3 °F (36.8 °C) (05/09/24 1034)  Pulse: 79 (05/09/24 1132)  Resp: 20 (05/09/24 1132)  BP: (!) 168/85 (05/09/24 1132)  SpO2: 100 % (05/09/24 1132) Vital Signs (24h Range):  Temp:  [98.3 °F (36.8 °C)] 98.3 °F (36.8 °C)  Pulse:  [78-85] 79  Resp:  [17-20] 20  SpO2:  [97 %-100 %] 100 %  BP: (132-168)/(77-96) 168/85     Weight: 102.4 kg (225 lb 12 oz)  Body mass index is 38.75 kg/m².    SpO2: 100 %       No intake or output data in the 24 hours ending 05/09/24 1244    Lines/Drains/Airways       Peripheral Intravenous Line  Duration                  Peripheral IV - Single Lumen 05/09/24 1055 20 G Right Antecubital <1 day                     Physical Exam  Vitals and nursing note reviewed.   Constitutional:       General: She is not in acute distress.     Appearance: Normal appearance. She is well-developed. She is obese. She is not diaphoretic.   HENT:      Head: Normocephalic and atraumatic.   Eyes:      General:         Right eye: No discharge.         Left eye: No discharge.      Pupils: Pupils are equal, round, and reactive to light.   Cardiovascular:      Rate and Rhythm: Normal rate and regular rhythm.      Heart sounds: Normal heart sounds, S1 normal and S2 normal. No murmur heard.  Pulmonary:      Effort: Pulmonary effort is normal. No respiratory distress.      Breath sounds: Normal breath sounds.   Abdominal:      General:  There is no distension.   Musculoskeletal:      Right lower leg: No edema.      Left lower leg: No edema.   Skin:     General: Skin is warm and dry.      Findings: No erythema.   Neurological:      General: No focal deficit present.      Mental Status: She is alert and oriented to person, place, and time.   Psychiatric:         Mood and Affect: Mood normal.         Behavior: Behavior normal.          Significant Labs: CMP   Recent Labs   Lab 05/09/24  1055      K 3.9      CO2 24   GLU 91   BUN 11   CREATININE 0.8   CALCIUM 10.2   PROT 7.9   ALBUMIN 3.7   BILITOT 0.4   ALKPHOS 70   AST 17   ALT 19   ANIONGAP 10   , CBC   Recent Labs   Lab 05/09/24  1055   WBC 6.10   HGB 12.7   HCT 41.5      , Troponin   Recent Labs   Lab 05/09/24  1055   TROPONINI <0.006   , and All pertinent lab results from the last 24 hours have been reviewed.    Significant Imaging: Echocardiogram: Transthoracic echo (TTE) complete (Cupid Only):   Results for orders placed or performed during the hospital encounter of 05/09/24   Echo   Result Value Ref Range    BSA 2.15 m2    LVOT stroke volume 76.30 cm3    LVIDd 4.97 3.5 - 6.0 cm    LV Systolic Volume 41.28 mL    LV Systolic Volume Index 20.0 mL/m2    LVIDs 3.21 2.1 - 4.0 cm    LV Diastolic Volume 116.46 mL    LV Diastolic Volume Index 56.53 mL/m2    IVS 0.86 0.6 - 1.1 cm    LVOT diameter 2.00 cm    LVOT area 3.1 cm2    FS 35 28 - 44 %    Left Ventricle Relative Wall Thickness 0.43 cm    Posterior Wall 1.06 0.6 - 1.1 cm    LV mass 170.59 g    LV Mass Index 83 g/m2    MV Peak E Talon 1.03 m/s    TDI LATERAL 0.08 m/s    TDI SEPTAL 0.06 m/s    E/E' ratio 14.71 m/s    MV Peak A Talon 1.49 m/s    TR Max Talon 2.60 m/s    E/A ratio 0.69     IVRT 91.34 msec    E wave deceleration time 340.08 msec    LV SEPTAL E/E' RATIO 17.17 m/s    LV LATERAL E/E' RATIO 12.88 m/s    PV Peak S Talon 0.56 m/s    PV Peak D Talon 0.40 m/s    Pulm vein S/D ratio 1.40     LVOT peak talon 1.11 m/s    Left Ventricular  Outflow Tract Mean Velocity 0.81 cm/s    Left Ventricular Outflow Tract Mean Gradient 2.88 mmHg    RVDD 4.52 cm    RVOT peak VTI 21.3 cm    TAPSE 2.02 cm    RV/LV Ratio 0.91 cm    LA size 4.14 cm    Left Atrium Minor Axis 6.40 cm    Left Atrium Major Axis 5.65 cm    LA volume (mod) 87.26 cm3    LA Volume Index (Mod) 42.4 mL/m2    RA Major Axis 5.80 cm    RA Width 4.46 cm    AV mean gradient 4 mmHg    AV peak gradient 7 mmHg    Ao peak edmund 1.31 m/s    Ao VTI 28.60 cm    LVOT peak VTI 24.30 cm    AV valve area 2.67 cm²    AV Velocity Ratio 0.85     AV index (prosthetic) 0.85     BÁRBARA by Velocity Ratio 2.66 cm²    MV stenosis pressure 1/2 time 98.62 ms    MV valve area p 1/2 method 2.23 cm2    Triscuspid Valve Regurgitation Peak Gradient 27 mmHg    PV mean gradient 2 mmHg    PV PEAK VELOCITY 0.95 m/s    PV peak gradient 2 mmHg    Pulmonary Valve Mean Velocity 0.68 m/s    RVOT peak edmund 0.77 m/s    Ao root annulus 2.70 cm    Sinus 2.68 cm    STJ 2.73 cm    Ascending aorta 3.17 cm    IVC diameter 1.26 cm    Mean e' 0.07 m/s    ZLVIDS -1.35     ZLVIDD -2.24     LA Volume Index 49.2 mL/m2    LA volume 101.38 cm3    LA WIDTH 4.8 cm    and EKG: Reviewed  Assessment and Plan:   Patient who presents with mitral valve mass, 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 Stable during exam. CTS on board. R/C tmw, NPO after MN.    * Mitral valve mass  -TTE with large mitral mass, concerning for myxoma  -CTS consulted  -R/LHC planned tmw    Syncope and collapse  -Episode two weeks ago  -No recurrence  -Continue to monitor    Rheumatoid arthritis involving multiple sites with positive rheumatoid factor  -Mgmt as per primary team    Essential hypertension  -Continue home meds        VTE Risk Mitigation (From admission, onward)           Ordered     Reason for No Pharmacological VTE Prophylaxis  Once        Question:  Reasons:  Answer:  Physician Provided (leave comment)  Comment:  possible surgical intervention    05/09/24 1216     IP VTE HIGH RISK PATIENT  Once          05/09/24 1216     Place sequential compression device  Until discontinued         05/09/24 1216                    Thank you for your consult. I will follow-up with patient. Please contact us if you have any additional questions.    Aurea Mchugh PA-C  Cardiology   [facility] - Emergency Dept.

## 2024-05-09 NOTE — Clinical Note
35 ml of contrast were injected throughout the case. 0 mL of contrast was the total wasted during the case. 35 mL was the total amount used during the case.

## 2024-05-09 NOTE — SUBJECTIVE & OBJECTIVE
Past Medical History:   Diagnosis Date    Allergy     Anemia     Hypertension     Plantar fasciitis of left foot        Past Surgical History:   Procedure Laterality Date    COLONOSCOPY N/A 04/09/2021    Procedure: COLONOSCOPY;  Surgeon: Hua Elmore MD;  Location: [facility];  Service: Endoscopy;  Laterality: N/A;    HYSTERECTOMY  05/2021    OOPHORECTOMY  05/2021    ROBOT-ASSISTED LAPAROSCOPIC ABDOMINAL HYSTERECTOMY USING DA ABBEY XI N/A 05/18/2021    Procedure: XI ROBOTIC HYSTERECTOMY;  Surgeon: Christa Davila MD;  Location: [facility] OR;  Service: OB/GYN;  Laterality: N/A;    ROBOT-ASSISTED LAPAROSCOPIC SALPINGO-OOPHORECTOMY USING DA ABBEY XI Bilateral 05/18/2021    Procedure: XI ROBOTIC SALPINGO-OOPHORECTOMY;  Surgeon: Christa Davila MD;  Location: [facility] OR;  Service: OB/GYN;  Laterality: Bilateral;       Review of patient's allergies indicates:   Allergen Reactions    Tramadol Nausea And Vomiting       No current facility-administered medications on file prior to encounter.     Current Outpatient Medications on File Prior to Encounter   Medication Sig    celecoxib (CELEBREX) 200 MG capsule Take 1 capsule (200 mg total) by mouth once daily.    cyclobenzaprine (FLEXERIL) 10 MG tablet Take 1 tablet (10 mg total) by mouth nightly as needed for Muscle spasms.    ergocalciferol (ERGOCALCIFEROL) 50,000 unit Cap Take 1 capsule (50,000 Units total) by mouth every 7 days.    folic acid (FOLVITE) 1 MG tablet Take 1 tablet (1,000 mcg total) by mouth once daily.    losartan (COZAAR) 25 MG tablet Take 1 tablet (25 mg total) by mouth once daily.    methotrexate 2.5 MG Tab Take 6 tablets (15 mg total) by mouth every 7 days.    montelukast (SINGULAIR) 10 mg tablet Take 1 tablet by mouth once daily    MULTIVITS,CA,MINERALS/IRON/FA (ONE-A-DAY WOMENS FORMULA ORAL) Take by mouth once daily.     predniSONE (DELTASONE) 5 MG tablet Take 1 tablet (5 mg total) by mouth daily as needed (RA flare).    ferrous sulfate, dried (SLOW FE) 160  mg (50 mg iron) TbSR Take 1 tablet (160 mg total) by mouth once daily.    fluticasone propionate (FLONASE) 50 mcg/actuation nasal spray 2 sprays (100 mcg total) by Each Nostril route once daily.    loratadine (CLARITIN REDITABS) 10 mg dissolvable tablet Take 1 tablet (10 mg total) by mouth once daily.    olmesartan (BENICAR) 20 MG tablet Take 20 mg by mouth.    potassium chloride SA (K-DUR,KLOR-CON) 20 MEQ tablet Take 1 tablet (20 mEq total) by mouth once daily.     Family History       Problem Relation (Age of Onset)    Asthma Mother    Cancer Mother    Heart disease Father    Hypertension Mother, Father, Sister          Tobacco Use    Smoking status: Never    Smokeless tobacco: Never   Substance and Sexual Activity    Alcohol use: No    Drug use: No    Sexual activity: Not Currently     Birth control/protection: Surgical     Review of Systems   Constitutional: Negative.    HENT: Negative.     Respiratory: Negative.     Cardiovascular: Negative.    Gastrointestinal: Negative.    Genitourinary: Negative.    Neurological:  Positive for dizziness, syncope, weakness and light-headedness.   Psychiatric/Behavioral: Negative.     All other systems reviewed and are negative.    Objective:     Vital Signs (Most Recent):  Temp: 98.3 °F (36.8 °C) (05/09/24 1034)  Pulse: 79 (05/09/24 1132)  Resp: 20 (05/09/24 1132)  BP: (!) 168/85 (05/09/24 1132)  SpO2: 100 % (05/09/24 1132) Vital Signs (24h Range):  Temp:  [98.3 °F (36.8 °C)] 98.3 °F (36.8 °C)  Pulse:  [78-85] 79  Resp:  [17-20] 20  SpO2:  [97 %-100 %] 100 %  BP: (132-168)/(77-96) 168/85     Weight: 102.4 kg (225 lb 12 oz)  Body mass index is 38.75 kg/m².     Physical Exam  Vitals and nursing note reviewed.   HENT:      Head: Normocephalic.      Mouth/Throat:      Mouth: Mucous membranes are moist.   Eyes:      Pupils: Pupils are equal, round, and reactive to light.   Cardiovascular:      Rate and Rhythm: Normal rate.      Pulses: Normal pulses.   Pulmonary:      Effort:  Pulmonary effort is normal.   Abdominal:      General: Abdomen is flat.   Skin:     General: Skin is warm.      Capillary Refill: Capillary refill takes less than 2 seconds.   Neurological:      General: No focal deficit present.      Mental Status: She is alert.              CRANIAL NERVES     CN III, IV, VI   Pupils are equal, round, and reactive to light.       Significant Labs: All pertinent labs within the past 24 hours have been reviewed.    Significant Imaging: I have reviewed all pertinent imaging results/findings within the past 24 hours.

## 2024-05-09 NOTE — SUBJECTIVE & OBJECTIVE
Past Medical History:   Diagnosis Date    Allergy     Anemia     Hypertension     Plantar fasciitis of left foot        Past Surgical History:   Procedure Laterality Date    COLONOSCOPY N/A 04/09/2021    Procedure: COLONOSCOPY;  Surgeon: Hua Elmore MD;  Location: [facility];  Service: Endoscopy;  Laterality: N/A;    HYSTERECTOMY  05/2021    OOPHORECTOMY  05/2021    ROBOT-ASSISTED LAPAROSCOPIC ABDOMINAL HYSTERECTOMY USING DA ABBEY XI N/A 05/18/2021    Procedure: XI ROBOTIC HYSTERECTOMY;  Surgeon: Christa Davila MD;  Location: [facility] OR;  Service: OB/GYN;  Laterality: N/A;    ROBOT-ASSISTED LAPAROSCOPIC SALPINGO-OOPHORECTOMY USING DA ABBEY XI Bilateral 05/18/2021    Procedure: XI ROBOTIC SALPINGO-OOPHORECTOMY;  Surgeon: Christa Davila MD;  Location: [facility] OR;  Service: OB/GYN;  Laterality: Bilateral;       Review of patient's allergies indicates:   Allergen Reactions    Tramadol Nausea And Vomiting       No current facility-administered medications on file prior to encounter.     Current Outpatient Medications on File Prior to Encounter   Medication Sig    celecoxib (CELEBREX) 200 MG capsule Take 1 capsule (200 mg total) by mouth once daily.    cyclobenzaprine (FLEXERIL) 10 MG tablet Take 1 tablet (10 mg total) by mouth nightly as needed for Muscle spasms.    ergocalciferol (ERGOCALCIFEROL) 50,000 unit Cap Take 1 capsule (50,000 Units total) by mouth every 7 days.    folic acid (FOLVITE) 1 MG tablet Take 1 tablet (1,000 mcg total) by mouth once daily.    losartan (COZAAR) 25 MG tablet Take 1 tablet (25 mg total) by mouth once daily.    methotrexate 2.5 MG Tab Take 6 tablets (15 mg total) by mouth every 7 days.    montelukast (SINGULAIR) 10 mg tablet Take 1 tablet by mouth once daily    MULTIVITS,CA,MINERALS/IRON/FA (ONE-A-DAY WOMENS FORMULA ORAL) Take by mouth once daily.     predniSONE (DELTASONE) 5 MG tablet Take 1 tablet (5 mg total) by mouth daily as needed (RA flare).    ferrous sulfate, dried (SLOW FE) 160  mg (50 mg iron) TbSR Take 1 tablet (160 mg total) by mouth once daily.    fluticasone propionate (FLONASE) 50 mcg/actuation nasal spray 2 sprays (100 mcg total) by Each Nostril route once daily.    loratadine (CLARITIN REDITABS) 10 mg dissolvable tablet Take 1 tablet (10 mg total) by mouth once daily.    olmesartan (BENICAR) 20 MG tablet Take 20 mg by mouth.    potassium chloride SA (K-DUR,KLOR-CON) 20 MEQ tablet Take 1 tablet (20 mEq total) by mouth once daily.     Family History       Problem Relation (Age of Onset)    Asthma Mother    Cancer Mother    Heart disease Father    Hypertension Mother, Father, Sister          Tobacco Use    Smoking status: Never    Smokeless tobacco: Never   Substance and Sexual Activity    Alcohol use: No    Drug use: No    Sexual activity: Not Currently     Birth control/protection: Surgical     Review of Systems   Constitutional: Negative.   HENT: Negative.     Eyes: Negative.    Cardiovascular:  Positive for syncope (prior episode two weeks ago).   Respiratory: Negative.     Endocrine: Negative.    Hematologic/Lymphatic: Negative.    Skin: Negative.    Musculoskeletal: Negative.    Gastrointestinal: Negative.    Genitourinary: Negative.    Psychiatric/Behavioral: Negative.     Allergic/Immunologic: Negative.      Objective:     Vital Signs (Most Recent):  Temp: 98.3 °F (36.8 °C) (05/09/24 1034)  Pulse: 79 (05/09/24 1132)  Resp: 20 (05/09/24 1132)  BP: (!) 168/85 (05/09/24 1132)  SpO2: 100 % (05/09/24 1132) Vital Signs (24h Range):  Temp:  [98.3 °F (36.8 °C)] 98.3 °F (36.8 °C)  Pulse:  [78-85] 79  Resp:  [17-20] 20  SpO2:  [97 %-100 %] 100 %  BP: (132-168)/(77-96) 168/85     Weight: 102.4 kg (225 lb 12 oz)  Body mass index is 38.75 kg/m².    SpO2: 100 %       No intake or output data in the 24 hours ending 05/09/24 1244    Lines/Drains/Airways       Peripheral Intravenous Line  Duration                  Peripheral IV - Single Lumen 05/09/24 1055 20 G Right Antecubital <1 day                      Physical Exam  Vitals and nursing note reviewed.   Constitutional:       General: She is not in acute distress.     Appearance: Normal appearance. She is well-developed. She is obese. She is not diaphoretic.   HENT:      Head: Normocephalic and atraumatic.   Eyes:      General:         Right eye: No discharge.         Left eye: No discharge.      Pupils: Pupils are equal, round, and reactive to light.   Cardiovascular:      Rate and Rhythm: Normal rate and regular rhythm.      Heart sounds: Normal heart sounds, S1 normal and S2 normal. No murmur heard.  Pulmonary:      Effort: Pulmonary effort is normal. No respiratory distress.      Breath sounds: Normal breath sounds.   Abdominal:      General: There is no distension.   Musculoskeletal:      Right lower leg: No edema.      Left lower leg: No edema.   Skin:     General: Skin is warm and dry.      Findings: No erythema.   Neurological:      General: No focal deficit present.      Mental Status: She is alert and oriented to person, place, and time.   Psychiatric:         Mood and Affect: Mood normal.         Behavior: Behavior normal.          Significant Labs: CMP   Recent Labs   Lab 05/09/24  1055      K 3.9      CO2 24   GLU 91   BUN 11   CREATININE 0.8   CALCIUM 10.2   PROT 7.9   ALBUMIN 3.7   BILITOT 0.4   ALKPHOS 70   AST 17   ALT 19   ANIONGAP 10   , CBC   Recent Labs   Lab 05/09/24  1055   WBC 6.10   HGB 12.7   HCT 41.5      , Troponin   Recent Labs   Lab 05/09/24  1055   TROPONINI <0.006   , and All pertinent lab results from the last 24 hours have been reviewed.    Significant Imaging: Echocardiogram: Transthoracic echo (TTE) complete (Cupid Only):   Results for orders placed or performed during the hospital encounter of 05/09/24   Echo   Result Value Ref Range    BSA 2.15 m2    LVOT stroke volume 76.30 cm3    LVIDd 4.97 3.5 - 6.0 cm    LV Systolic Volume 41.28 mL    LV Systolic Volume Index 20.0 mL/m2    LVIDs 3.21 2.1 - 4.0  cm    LV Diastolic Volume 116.46 mL    LV Diastolic Volume Index 56.53 mL/m2    IVS 0.86 0.6 - 1.1 cm    LVOT diameter 2.00 cm    LVOT area 3.1 cm2    FS 35 28 - 44 %    Left Ventricle Relative Wall Thickness 0.43 cm    Posterior Wall 1.06 0.6 - 1.1 cm    LV mass 170.59 g    LV Mass Index 83 g/m2    MV Peak E Talon 1.03 m/s    TDI LATERAL 0.08 m/s    TDI SEPTAL 0.06 m/s    E/E' ratio 14.71 m/s    MV Peak A Talon 1.49 m/s    TR Max Talon 2.60 m/s    E/A ratio 0.69     IVRT 91.34 msec    E wave deceleration time 340.08 msec    LV SEPTAL E/E' RATIO 17.17 m/s    LV LATERAL E/E' RATIO 12.88 m/s    PV Peak S Talon 0.56 m/s    PV Peak D Talon 0.40 m/s    Pulm vein S/D ratio 1.40     LVOT peak talon 1.11 m/s    Left Ventricular Outflow Tract Mean Velocity 0.81 cm/s    Left Ventricular Outflow Tract Mean Gradient 2.88 mmHg    RVDD 4.52 cm    RVOT peak VTI 21.3 cm    TAPSE 2.02 cm    RV/LV Ratio 0.91 cm    LA size 4.14 cm    Left Atrium Minor Axis 6.40 cm    Left Atrium Major Axis 5.65 cm    LA volume (mod) 87.26 cm3    LA Volume Index (Mod) 42.4 mL/m2    RA Major Axis 5.80 cm    RA Width 4.46 cm    AV mean gradient 4 mmHg    AV peak gradient 7 mmHg    Ao peak talon 1.31 m/s    Ao VTI 28.60 cm    LVOT peak VTI 24.30 cm    AV valve area 2.67 cm²    AV Velocity Ratio 0.85     AV index (prosthetic) 0.85     BÁRBARA by Velocity Ratio 2.66 cm²    MV stenosis pressure 1/2 time 98.62 ms    MV valve area p 1/2 method 2.23 cm2    Triscuspid Valve Regurgitation Peak Gradient 27 mmHg    PV mean gradient 2 mmHg    PV PEAK VELOCITY 0.95 m/s    PV peak gradient 2 mmHg    Pulmonary Valve Mean Velocity 0.68 m/s    RVOT peak talon 0.77 m/s    Ao root annulus 2.70 cm    Sinus 2.68 cm    STJ 2.73 cm    Ascending aorta 3.17 cm    IVC diameter 1.26 cm    Mean e' 0.07 m/s    ZLVIDS -1.35     ZLVIDD -2.24     LA Volume Index 49.2 mL/m2    LA volume 101.38 cm3    LA WIDTH 4.8 cm    and EKG: Reviewed

## 2024-05-09 NOTE — HPI
Ms. Hoskins is a 59 year old female patient whose current medical conditions include HTN and morbid obesity who was sent to [facility] from cardiology clinic due to abnormal echo. Patient had previously seen Dr. Buchanan due to syncopal episode two weeks ago and echo was ordered for further evaluation. 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 Cardiology consulted to assist with management. Patient seen and examined today in ED. Stable CV wise. No CP or SOB. No recurrent syncope. No s/s of TIA/CVA. CT surgery consulted. R/LHC planned tmw.

## 2024-05-09 NOTE — H&P
[facility] - Emergency Dept.  [facility]  History & Physical    Patient Name: Jake Hoskins  MRN: [id number]  Patient Class: IP- Inpatient  Admission Date: 5/9/2024  Attending Physician: Rao Noble MD   Primary Care Provider: Angelica Lagunas MD         Patient information was obtained from patient and ER records.     Subjective:     Principal Problem:Mitral valve mass    Chief Complaint:   Chief Complaint   Patient presents with    Abnormal Lab     Pt sent by Dr. Hernandez for large mass on mitral valve. Pt states she feels fine, denies chest pain and SOB.         HPI: 59yr old with seropositive RA, HTN, Anemia on chronic immunosuppression with MTX and prednisone (Not taking it) presents after a syncopal episode 2 weeks ago. She had a similar episode 10 years ago and required CPR by EMS. She had stress test and other work up but not an echo.  At this time she has uncontrolled BP but no symptoms. Her  had CABG and developed a fib requiring amiodarone which then resulted in ILD and his demise. She is anxious about surgery. Her sister who weighs half as much is at the bedside. I looked at the echo and explained to them the significance of the findings and need for surgery input.    Past Medical History:   Diagnosis Date    Allergy     Anemia     Hypertension     Plantar fasciitis of left foot        Past Surgical History:   Procedure Laterality Date    COLONOSCOPY N/A 04/09/2021    Procedure: COLONOSCOPY;  Surgeon: Hua Elmore MD;  Location: [facility];  Service: Endoscopy;  Laterality: N/A;    HYSTERECTOMY  05/2021    OOPHORECTOMY  05/2021    ROBOT-ASSISTED LAPAROSCOPIC ABDOMINAL HYSTERECTOMY USING DA ABBEY XI N/A 05/18/2021    Procedure: XI ROBOTIC HYSTERECTOMY;  Surgeon: Christa Davila MD;  Location: [facility] OR;  Service: OB/GYN;  Laterality: N/A;    ROBOT-ASSISTED LAPAROSCOPIC SALPINGO-OOPHORECTOMY USING DA ABBEY XI Bilateral 05/18/2021    Procedure: XI ROBOTIC SALPINGO-OOPHORECTOMY;   Surgeon: Christa Davila MD;  Location: [facility];  Service: OB/GYN;  Laterality: Bilateral;       Review of patient's allergies indicates:   Allergen Reactions    Tramadol Nausea And Vomiting       No current facility-administered medications on file prior to encounter.     Current Outpatient Medications on File Prior to Encounter   Medication Sig    celecoxib (CELEBREX) 200 MG capsule Take 1 capsule (200 mg total) by mouth once daily.    cyclobenzaprine (FLEXERIL) 10 MG tablet Take 1 tablet (10 mg total) by mouth nightly as needed for Muscle spasms.    ergocalciferol (ERGOCALCIFEROL) 50,000 unit Cap Take 1 capsule (50,000 Units total) by mouth every 7 days.    folic acid (FOLVITE) 1 MG tablet Take 1 tablet (1,000 mcg total) by mouth once daily.    losartan (COZAAR) 25 MG tablet Take 1 tablet (25 mg total) by mouth once daily.    methotrexate 2.5 MG Tab Take 6 tablets (15 mg total) by mouth every 7 days.    montelukast (SINGULAIR) 10 mg tablet Take 1 tablet by mouth once daily    MULTIVITS,CA,MINERALS/IRON/FA (ONE-A-DAY WOMENS FORMULA ORAL) Take by mouth once daily.     predniSONE (DELTASONE) 5 MG tablet Take 1 tablet (5 mg total) by mouth daily as needed (RA flare).    ferrous sulfate, dried (SLOW FE) 160 mg (50 mg iron) TbSR Take 1 tablet (160 mg total) by mouth once daily.    fluticasone propionate (FLONASE) 50 mcg/actuation nasal spray 2 sprays (100 mcg total) by Each Nostril route once daily.    loratadine (CLARITIN REDITABS) 10 mg dissolvable tablet Take 1 tablet (10 mg total) by mouth once daily.    olmesartan (BENICAR) 20 MG tablet Take 20 mg by mouth.    potassium chloride SA (K-DUR,KLOR-CON) 20 MEQ tablet Take 1 tablet (20 mEq total) by mouth once daily.     Family History       Problem Relation (Age of Onset)    Asthma Mother    Cancer Mother    Heart disease Father    Hypertension Mother, Father, Sister          Tobacco Use    Smoking status: Never    Smokeless tobacco: Never   Substance and Sexual  Activity    Alcohol use: No    Drug use: No    Sexual activity: Not Currently     Birth control/protection: Surgical     Review of Systems   Constitutional: Negative.    HENT: Negative.     Respiratory: Negative.     Cardiovascular: Negative.    Gastrointestinal: Negative.    Genitourinary: Negative.    Neurological:  Positive for dizziness, syncope, weakness and light-headedness.   Psychiatric/Behavioral: Negative.     All other systems reviewed and are negative.    Objective:     Vital Signs (Most Recent):  Temp: 98.3 °F (36.8 °C) (05/09/24 1034)  Pulse: 79 (05/09/24 1132)  Resp: 20 (05/09/24 1132)  BP: (!) 168/85 (05/09/24 1132)  SpO2: 100 % (05/09/24 1132) Vital Signs (24h Range):  Temp:  [98.3 °F (36.8 °C)] 98.3 °F (36.8 °C)  Pulse:  [78-85] 79  Resp:  [17-20] 20  SpO2:  [97 %-100 %] 100 %  BP: (132-168)/(77-96) 168/85     Weight: 102.4 kg (225 lb 12 oz)  Body mass index is 38.75 kg/m².     Physical Exam  Vitals and nursing note reviewed.   HENT:      Head: Normocephalic.      Mouth/Throat:      Mouth: Mucous membranes are moist.   Eyes:      Pupils: Pupils are equal, round, and reactive to light.   Cardiovascular:      Rate and Rhythm: Normal rate.      Pulses: Normal pulses.   Pulmonary:      Effort: Pulmonary effort is normal.   Abdominal:      General: Abdomen is flat.   Skin:     General: Skin is warm.      Capillary Refill: Capillary refill takes less than 2 seconds.   Neurological:      General: No focal deficit present.      Mental Status: She is alert.              CRANIAL NERVES     CN III, IV, VI   Pupils are equal, round, and reactive to light.       Significant Labs: All pertinent labs within the past 24 hours have been reviewed.    Significant Imaging: I have reviewed all pertinent imaging results/findings within the past 24 hours.  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 Sz vs hypercapnia but regained senses soon after.  Normal at this time        Syncope and collapse    Cardiac in etiology    Rheumatoid arthritis involving multiple sites with positive rheumatoid factor    On chronic immunosuppression  Takes MTX regularly but not compliant with prednisone.       Essential hypertension  Chronic, uncontrolled. Latest blood pressure and vitals reviewed-     Temp:  [98.3 °F (36.8 °C)]   Pulse:  [78-85]   Resp:  [17-20]   BP: (132-168)/(77-96)   SpO2:  [97 %-100 %] .   Home meds for hypertension were reviewed and noted below.   Hypertension Medications               losartan (COZAAR) 25 MG tablet Take 1 tablet (25 mg total) by mouth once daily.    olmesartan (BENICAR) 20 MG tablet Take 20 mg by mouth.            While in the hospital, will manage blood pressure as follows; Adjust home antihypertensive regimen as follows- See orders    Will utilize p.r.n. blood pressure medication only if patient's blood pressure greater than 140/90 and she develops symptoms such as worsening chest pain or shortness of breath.      VTE Risk Mitigation (From admission, onward)           Ordered     Reason for No Pharmacological VTE Prophylaxis  Once        Question:  Reasons:  Answer:  Physician Provided (leave comment)  Comment:  possible surgical intervention    05/09/24 1216     IP VTE HIGH RISK PATIENT  Once         05/09/24 1216     Place sequential compression device  Until discontinued         05/09/24 1216                                    Rao Noble MD  Department of Hospital Medicine  [facility] - Emergency Dept.

## 2024-05-09 NOTE — ED PROVIDER NOTES
Emergency Medicine Provider Note - 5/9/2024       SCRIBE NOTE: Swetha MOTA am scribing for, and in the presence of, Nancy Shah DO.     History     Chief Complaint   Patient presents with    Abnormal Lab     Pt sent by Dr. Hernandez for large mass on mitral valve. Pt states she feels fine, denies chest pain and SOB.        Allergies:  Review of patient's allergies indicates:   Allergen Reactions    Tramadol Nausea And Vomiting        History of Present Illness   HPI    5/9/2024, 10:37 AM  The history is provided by the patient and outpatient echo    Jake Hoskins is a 59 y.o. 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 Hernandez (Cardiology) for  a mass on the mitral valve of her heart. Dr. Hernandez sent the pt here for admission and further workup.Echocardiogram was performed for a syncopal episode that the patient had two week ago.  Pt states that she had syncopal episode 2 weeks ago. Since, then she only has been experience of mild light-headedness. No syncopal events. Symptoms are constant and moderate in severity. No mitigating or exacerbating factors reported. Patient denies any fever, chills, CP, SOB, n/v/d, headaches, cough, congestion, abdominal pain, and all other sxs at this time.. No further complaints or concerns at this time.        Arrival mode: Private Vehicle     PCP: Angelica Lagunas MD     Past Medical History:  Past Medical History:   Diagnosis Date    Allergy     Anemia     Hypertension     Plantar fasciitis of left foot        Past Surgical History:  Past Surgical History:   Procedure Laterality Date    COLONOSCOPY N/A 04/09/2021    Procedure: COLONOSCOPY;  Surgeon: Hua Elmore MD;  Location: [facility];  Service: Endoscopy;  Laterality: N/A;    HYSTERECTOMY  05/2021    OOPHORECTOMY   05/2021    ROBOT-ASSISTED LAPAROSCOPIC ABDOMINAL HYSTERECTOMY USING DA ABBEY XI N/A 05/18/2021    Procedure: XI ROBOTIC HYSTERECTOMY;  Surgeon: Christa Davila MD;  Location: [facility] OR;  Service: OB/GYN;  Laterality: N/A;    ROBOT-ASSISTED LAPAROSCOPIC SALPINGO-OOPHORECTOMY USING DA ABBEY XI Bilateral 05/18/2021    Procedure: XI ROBOTIC SALPINGO-OOPHORECTOMY;  Surgeon: Christa Davila MD;  Location: [facility] OR;  Service: OB/GYN;  Laterality: Bilateral;         Family History:  Family History   Problem Relation Name Age of Onset    Hypertension Mother      Asthma Mother      Cancer Mother          unknown    Hypertension Father      Heart disease Father      Hypertension Sister         Social History:  Social History     Tobacco Use    Smoking status: Never    Smokeless tobacco: Never   Substance and Sexual Activity    Alcohol use: No    Drug use: No    Sexual activity: Not Currently     Birth control/protection: Surgical        Review of Systems   Review of Systems   Constitutional:  Negative for chills and fever.   HENT:  Negative for congestion and sore throat.    Respiratory:  Negative for cough and shortness of breath.    Cardiovascular:  Negative for chest pain.   Gastrointestinal:  Negative for abdominal pain, diarrhea, nausea and vomiting.   Genitourinary:  Negative for dysuria.   Musculoskeletal:  Negative for back pain.   Skin:  Negative for rash.   Neurological:  Positive for light-headedness. Negative for weakness and headaches.   Hematological:  Does not bruise/bleed easily.   All other systems reviewed and are negative.     Physical Exam     Initial Vitals [05/09/24 1034]   BP Pulse Resp Temp SpO2   (!) 137/91 85 17 98.3 °F (36.8 °C) 100 %      MAP       --          Physical Exam    Nursing Notes and Vital Signs Reviewed.  Constitutional: Patient is in no acute distress. Well-developed and well-nourished.  Head: Atraumatic. Normocephalic.  Eyes: PERRL. EOM intact. Conjunctivae are not pale. No  scleral icterus.  ENT: Mucous membranes are moist. Oropharynx is clear and symmetric.    Neck: Supple. Full ROM. No lymphadenopathy.  Cardiovascular: Regular rate. Regular rhythm. No murmurs, rubs, or gallops. Distal pulses are 2+ and symmetric.  Pulmonary/Chest: No respiratory distress. Clear to auscultation bilaterally. No wheezing or rales.  Abdominal: Soft and non-distended.  There is no tenderness.  No rebound, guarding, or rigidity. Good bowel sounds.  Musculoskeletal: Moves all extremities. No obvious deformities. No edema. No calf tenderness.  Skin: Warm and dry.  Neurological:  Alert, awake, and appropriate.  Normal speech.  No acute focal neurological deficits are appreciated.  Psychiatric: Normal affect. Good eye contact. Appropriate in content.     ED Course   ED Procedures:  Procedures    ED Vital Signs:  Vitals:    05/09/24 1034 05/09/24 1051 05/09/24 1052 05/09/24 1132   BP: (!) 137/91 132/77  (!) 168/85   Pulse: 85  78 79   Resp: 17  18 20   Temp: 98.3 °F (36.8 °C)      TempSrc: Oral      SpO2: 100%  97% 100%   Weight: 102.4 kg (225 lb 12 oz)       05/09/24 1232 05/09/24 1325 05/09/24 1503 05/09/24 1620   BP: (!) 167/85 135/79 122/75 (!) 141/70   Pulse: 73 65 80 70   Resp: 19 (!) 22 17 18   Temp:   98.2 °F (36.8 °C) 98.4 °F (36.9 °C)   TempSrc:   Oral    SpO2: 100% 99% 99% 99%   Weight:        05/09/24 1700 05/09/24 1946   BP:  123/70   Pulse: 86 74   Resp:  18   Temp:  98.2 °F (36.8 °C)   TempSrc:  Oral   SpO2:  99%   Weight:         Abnormal Lab Results:  Labs Reviewed   CBC W/ AUTO DIFFERENTIAL - Abnormal; Notable for the following components:       Result Value    MCV 80 (*)     MCH 24.5 (*)     MCHC 30.6 (*)     RDW 15.0 (*)     All other components within normal limits    Narrative:     Release to patient->Immediate   HIV 1 / 2 ANTIBODY    Narrative:     Release to patient->Immediate   HEPATITIS C ANTIBODY    Narrative:     Release to patient->Immediate   HEP C VIRUS HOLD SPECIMEN    Narrative:      Release to patient->Immediate   COMPREHENSIVE METABOLIC PANEL    Narrative:     Release to patient->Immediate   TROPONIN I    Narrative:     Release to patient->Immediate   PROTIME-INR    Narrative:     Release to patient->Immediate   APTT    Narrative:     Release to patient->Immediate        All Lab Results:  Results for orders placed or performed during the hospital encounter of 05/09/24   HIV 1/2 Ag/Ab (4th Gen)   Result Value Ref Range    HIV 1/2 Ag/Ab Negative Negative   Hepatitis C Antibody   Result Value Ref Range    Hepatitis C Ab Negative Negative   HCV Virus Hold Specimen   Result Value Ref Range    HEP C Virus Hold Specimen Hold for HCV sendout    CBC auto differential   Result Value Ref Range    WBC 6.10 3.90 - 12.70 K/uL    RBC 5.18 4.00 - 5.40 M/uL    Hemoglobin 12.7 12.0 - 16.0 g/dL    Hematocrit 41.5 37.0 - 48.5 %    MCV 80 (L) 82 - 98 fL    MCH 24.5 (L) 27.0 - 31.0 pg    MCHC 30.6 (L) 32.0 - 36.0 g/dL    RDW 15.0 (H) 11.5 - 14.5 %    Platelets 371 150 - 450 K/uL    MPV 10.0 9.2 - 12.9 fL    Immature Granulocytes 0.3 0.0 - 0.5 %    Gran # (ANC) 2.9 1.8 - 7.7 K/uL    Immature Grans (Abs) 0.02 0.00 - 0.04 K/uL    Lymph # 2.4 1.0 - 4.8 K/uL    Mono # 0.6 0.3 - 1.0 K/uL    Eos # 0.2 0.0 - 0.5 K/uL    Baso # 0.05 0.00 - 0.20 K/uL    nRBC 0 0 /100 WBC    Gran % 47.9 38.0 - 73.0 %    Lymph % 39.0 18.0 - 48.0 %    Mono % 9.2 4.0 - 15.0 %    Eosinophil % 2.8 0.0 - 8.0 %    Basophil % 0.8 0.0 - 1.9 %    Differential Method Automated    Comprehensive metabolic panel   Result Value Ref Range    Sodium 140 136 - 145 mmol/L    Potassium 3.9 3.5 - 5.1 mmol/L    Chloride 106 95 - 110 mmol/L    CO2 24 23 - 29 mmol/L    Glucose 91 70 - 110 mg/dL    BUN 11 6 - 20 mg/dL    Creatinine 0.8 0.5 - 1.4 mg/dL    Calcium 10.2 8.7 - 10.5 mg/dL    Total Protein 7.9 6.0 - 8.4 g/dL    Albumin 3.7 3.5 - 5.2 g/dL    Total Bilirubin 0.4 0.1 - 1.0 mg/dL    Alkaline Phosphatase 70 55 - 135 U/L    AST 17 10 - 40 U/L    ALT 19 10 -  44 U/L    eGFR >60 >60 mL/min/1.73 m^2    Anion Gap 10 8 - 16 mmol/L   Troponin I #1   Result Value Ref Range    Troponin I <0.006 0.000 - 0.026 ng/mL   Protime-INR   Result Value Ref Range    Prothrombin Time 11.2 9.0 - 12.5 sec    INR 1.0 0.8 - 1.2   APTT   Result Value Ref Range    aPTT 31.1 21.0 - 32.0 sec       The EKG was ordered, reviewed, and independently interpreted by the ED provider:  ECG Results              EKG 12-lead (Preliminary result)  Result time 05/09/24 12:58:27      Wet Read by Nancy Shah DO (05/09/24 12:58:27, [facility] - Emergency Dept., Emergency Medicine)    Rate of 70 beats per minute.  Sinus rhythm.  Normal axis.  No ST segment elevation.  No STEMI                                         The Emergency Provider reviewed the vital signs and test results, which are outlined above.     ED Discussion   ED Medication(s):  Medications   sodium chloride 0.9% flush 10 mL (has no administration in time range)   naloxone 0.4 mg/mL injection 0.02 mg (has no administration in time range)   glucose chewable tablet 16 g (has no administration in time range)   glucose chewable tablet 24 g (has no administration in time range)   glucagon (human recombinant) injection 1 mg (has no administration in time range)   acetaminophen tablet 650 mg (has no administration in time range)   ondansetron injection 4 mg (has no administration in time range)   melatonin tablet 6 mg (has no administration in time range)   dextrose 10% bolus 125 mL 125 mL (has no administration in time range)   dextrose 10% bolus 250 mL 250 mL (has no administration in time range)   hydrALAZINE injection 10 mg (has no administration in time range)   losartan tablet 25 mg (has no administration in time range)   famotidine tablet 20 mg (has no administration in time range)   senna tablet 8.6 mg (has no administration in time range)       ED Course as of 05/09/24 2009   Thu May 09, 2024   1015 Dr. PAN Dexter (CVT surgery):  aware of  patient. [LB]   1054 Secure chat with Dr. GORAN Noble:  () Admit.  Tele [LB]      ED Course User Index  [LB] Nancy Shah, DO        MIPS Measures     Smoker? No     Hypertension: History of Hypertension: The patient has elevated blood pressure (higher than 120/80) while being treated in the ED but has a history of hypertension.     Medical Decision Making                 Medical Decision Making  Differential diagnosis: Arrhythmia, myxoma, vegetation    Patient had outpatient echo which showed mass on the mitral valve.  Patient had recent syncope.  Referred to emergency department for admission to hospital medicine services with CVT surgery and cardiology consultation.    EKG:  No STEMI.  White cell count normal.  H&H normal.  Troponin less than 0.006.  CMP normal.    Amount and/or Complexity of Data Reviewed  Labs: ordered. Decision-making details documented in ED Course.  ECG/medicine tests: ordered.    Risk  Decision regarding hospitalization.        Coding    Prescription Management: I performed a review of the patient's current Rx medication list as input by nursing staff.    Current Discharge Medication List        CONTINUE these medications which have NOT CHANGED    Details   celecoxib (CELEBREX) 200 MG capsule Take 1 capsule (200 mg total) by mouth once daily.  Qty: 90 capsule, Refills: 1    Associated Diagnoses: Rheumatoid arthritis involving multiple sites with positive rheumatoid factor      cyclobenzaprine (FLEXERIL) 10 MG tablet Take 1 tablet (10 mg total) by mouth nightly as needed for Muscle spasms.  Qty: 90 tablet, Refills: 1    Associated Diagnoses: Rheumatoid arthritis involving multiple sites with positive rheumatoid factor      ergocalciferol (ERGOCALCIFEROL) 50,000 unit Cap Take 1 capsule (50,000 Units total) by mouth every 7 days.  Qty: 12 capsule, Refills: 4    Associated Diagnoses: Vitamin D deficiency      folic acid (FOLVITE) 1 MG tablet Take 1 tablet (1,000 mcg total) by mouth once  daily.  Qty: 90 tablet, Refills: 3    Associated Diagnoses: Rheumatoid arthritis involving multiple sites with positive rheumatoid factor      losartan (COZAAR) 25 MG tablet Take 1 tablet (25 mg total) by mouth once daily.  Qty: 30 tablet, Refills: 11    Comments: .  Associated Diagnoses: Essential hypertension      methotrexate 2.5 MG Tab Take 6 tablets (15 mg total) by mouth every 7 days.  Qty: 72 tablet, Refills: 1    Associated Diagnoses: Rheumatoid arthritis involving multiple sites with positive rheumatoid factor      montelukast (SINGULAIR) 10 mg tablet Take 1 tablet by mouth once daily  Qty: 90 tablet, Refills: 0    Associated Diagnoses: Acute seasonal allergic rhinitis due to pollen      MULTIVITS,CA,MINERALS/IRON/FA (ONE-A-DAY WOMENS FORMULA ORAL) Take by mouth once daily.       predniSONE (DELTASONE) 5 MG tablet Take 1 tablet (5 mg total) by mouth daily as needed (RA flare).  Qty: 30 tablet, Refills: 2    Associated Diagnoses: Rheumatoid arthritis involving multiple sites with positive rheumatoid factor      ferrous sulfate, dried (SLOW FE) 160 mg (50 mg iron) TbSR Take 1 tablet (160 mg total) by mouth once daily.  Qty: 90 tablet, Refills: 3    Associated Diagnoses: Anemia, unspecified type      fluticasone propionate (FLONASE) 50 mcg/actuation nasal spray 2 sprays (100 mcg total) by Each Nostril route once daily.  Qty: 16 g, Refills: 3    Associated Diagnoses: Acute seasonal allergic rhinitis due to pollen; Cough      loratadine (CLARITIN REDITABS) 10 mg dissolvable tablet Take 1 tablet (10 mg total) by mouth once daily.  Qty: 30 tablet, Refills: 11    Associated Diagnoses: Allergic asthma, mild intermittent, uncomplicated      olmesartan (BENICAR) 20 MG tablet Take 20 mg by mouth once daily.      potassium chloride SA (K-DUR,KLOR-CON) 20 MEQ tablet Take 1 tablet (20 mEq total) by mouth once daily.  Qty: 2 tablet, Refills: 0    Associated Diagnoses: Hypokalemia              Discussed case with:[facility]  "Medicine and Cardiology, CVT surgery    Portions of this note may have been created with voice recognition software. Occasional "wrong-word" or "sound-a-like" substitutions may have occurred due to the inherent limitations of voice recognition software. Please, read the note carefully and recognize, using context, where substitutions have occurred.          Clinical Impression       ICD-10-CM ICD-9-CM   1. Near syncope  R55 780.2   2. Chest pain  R07.9 786.50   3. Mitral valve mass  I05.8 424.0   4. Essential hypertension  I10 401.9   5. Abnormal echocardiogram findings without diagnosis  R93.1 793.2   6. 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 I attest to the accuracy of the note.     Attending:   Physician Attestation Statement for Scribe #1: I, Nancy Shah DO, personally performed the services described in this documentation, as scribed by Swetha Gale, in my presence, and it is both accurate and complete.                Nancy Shah DO  05/09/24 2009    "

## 2024-05-09 NOTE — HPI
59yr old with seropositive RA, HTN, Anemia on chronic immunosuppression with MTX and prednisone (Not taking it) presents after a syncopal episode 2 weeks ago. She had a similar episode 10 years ago and required CPR by EMS. She had stress test and other work up but not an echo.  At this time she has uncontrolled BP but no symptoms. Her  had CABG and developed a fib requiring amiodarone which then resulted in ILD and his demise. She is anxious about surgery. Her sister who weighs half as much is at the bedside. I looked at the echo and explained to them the significance of the findings and need for surgery input.

## 2024-05-09 NOTE — OP NOTE
[facility] Surg  Cardiothoracic Surgery  Consult Note    Patient Name: Jake Hoskins  MRN: [id number]  Admission Date: 5/9/2024  Attending Physician: Rao Noble MD  Referring Provider: Self, Aaareferral    Patient information was obtained from patient, ER records, and primary team.     Consults  Subjective:     Chief Complaint/Reason for Admission:  Syncopal attacks    History of Present Illness: 59 year old female patient with HTN and morbid obesity was evaluated for a syncopal attack 2 weeks ago.  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  No more syncopal attacks since the last week does not have any major cardiac symptoms like chest pain or dyspnea or pedal edema.  No current facility-administered medications on file prior to encounter.     Current Outpatient Medications on File Prior to Encounter   Medication Sig    celecoxib (CELEBREX) 200 MG capsule Take 1 capsule (200 mg total) by mouth once daily.    cyclobenzaprine (FLEXERIL) 10 MG tablet Take 1 tablet (10 mg total) by mouth nightly as needed for Muscle spasms.    ergocalciferol (ERGOCALCIFEROL) 50,000 unit Cap Take 1 capsule (50,000 Units total) by mouth every 7 days.    folic acid (FOLVITE) 1 MG tablet Take 1 tablet (1,000 mcg total) by mouth once daily.    losartan (COZAAR) 25 MG tablet Take 1 tablet (25 mg total) by mouth once daily.    methotrexate 2.5 MG Tab Take 6 tablets (15 mg total) by mouth every 7 days.    montelukast (SINGULAIR) 10 mg tablet Take 1 tablet by mouth once daily    MULTIVITS,CA,MINERALS/IRON/FA (ONE-A-DAY WOMENS FORMULA ORAL) Take by mouth once daily.     predniSONE (DELTASONE) 5 MG tablet Take 1 tablet (5 mg total) by mouth daily as needed (RA flare).    ferrous sulfate, dried (SLOW FE) 160 mg (50 mg iron) TbSR Take 1 tablet (160 mg total) by mouth once daily.    fluticasone propionate  (FLONASE) 50 mcg/actuation nasal spray 2 sprays (100 mcg total) by Each Nostril route once daily.    loratadine (CLARITIN REDITABS) 10 mg dissolvable tablet Take 1 tablet (10 mg total) by mouth once daily.    olmesartan (BENICAR) 20 MG tablet Take 20 mg by mouth once daily.    potassium chloride SA (K-DUR,KLOR-CON) 20 MEQ tablet Take 1 tablet (20 mEq total) by mouth once daily.       Review of patient's allergies indicates:   Allergen Reactions    Tramadol Nausea And Vomiting       Past Medical History:   Diagnosis Date    Allergy     Anemia     Hypertension     Plantar fasciitis of left foot      Past Surgical History:   Procedure Laterality Date    COLONOSCOPY N/A 04/09/2021    Procedure: COLONOSCOPY;  Surgeon: Hua Elmore MD;  Location: [facility];  Service: Endoscopy;  Laterality: N/A;    HYSTERECTOMY  05/2021    OOPHORECTOMY  05/2021    ROBOT-ASSISTED LAPAROSCOPIC ABDOMINAL HYSTERECTOMY USING DA ABBEY XI N/A 05/18/2021    Procedure: XI ROBOTIC HYSTERECTOMY;  Surgeon: Christa Davila MD;  Location: [facility] OR;  Service: OB/GYN;  Laterality: N/A;    ROBOT-ASSISTED LAPAROSCOPIC SALPINGO-OOPHORECTOMY USING DA ABBEY XI Bilateral 05/18/2021    Procedure: XI ROBOTIC SALPINGO-OOPHORECTOMY;  Surgeon: Christa Davila MD;  Location: [facility] OR;  Service: OB/GYN;  Laterality: Bilateral;     Family History       Problem Relation (Age of Onset)    Asthma Mother    Cancer Mother    Heart disease Father    Hypertension Mother, Father, Sister          Tobacco Use    Smoking status: Never    Smokeless tobacco: Never   Substance and Sexual Activity    Alcohol use: No    Drug use: No    Sexual activity: Not Currently     Birth control/protection: Surgical     Review of Systems   Constitutional:  Negative for activity change and appetite change.   HENT:  Negative for dental problem, nosebleeds and sore throat.    Eyes:  Negative for discharge and visual disturbance.   Respiratory:  Negative for cough, chest tightness and stridor.     Cardiovascular:  Negative for leg swelling.   Gastrointestinal:  Negative for abdominal distention and abdominal pain.   Genitourinary:  Negative for difficulty urinating and dysuria.   Musculoskeletal:  Negative for arthralgias, back pain and joint swelling.   Allergic/Immunologic: Negative for environmental allergies.   Neurological:  Positive for syncope. Negative for dizziness and headaches.   Hematological:  Does not bruise/bleed easily.   Psychiatric/Behavioral:  Negative for behavioral problems.      Objective:     Vital Signs (Most Recent):  Temp: 98.2 °F (36.8 °C) (05/09/24 1503)  Pulse: 80 (05/09/24 1503)  Resp: 17 (05/09/24 1503)  BP: 122/75 (05/09/24 1503)  SpO2: 99 % (05/09/24 1503) Vital Signs (24h Range):  Temp:  [98.2 °F (36.8 °C)-98.3 °F (36.8 °C)] 98.2 °F (36.8 °C)  Pulse:  [65-85] 80  Resp:  [17-22] 17  SpO2:  [97 %-100 %] 99 %  BP: (122-168)/(75-96) 122/75     Weight: 102.4 kg (225 lb 12 oz)  Body mass index is 38.75 kg/m².    SpO2: 99 %        Intake/Output - Last 3 Shifts       None             Lines/Drains/Airways       Peripheral Intravenous Line  Duration                  Peripheral IV - Single Lumen 05/09/24 1055 20 G Right Antecubital <1 day                     STS Risk Score: na    Physical Exam  Vitals reviewed.   Constitutional:       Appearance: Normal appearance. She is obese.   HENT:      Head: Normocephalic and atraumatic.      Mouth/Throat:      Mouth: Mucous membranes are moist.   Eyes:      Extraocular Movements: Extraocular movements intact.   Cardiovascular:      Rate and Rhythm: Normal rate and regular rhythm.      Pulses: Normal pulses.      Heart sounds: Normal heart sounds.   Pulmonary:      Effort: Pulmonary effort is normal.      Breath sounds: Normal breath sounds.   Abdominal:      Palpations: Abdomen is soft.   Musculoskeletal:         General: Normal range of motion.      Cervical back: Normal range of motion and neck supple.   Skin:     General: Skin is warm.       Capillary Refill: Capillary refill takes less than 2 seconds.   Neurological:      General: No focal deficit present.      Mental Status: She is alert and oriented to person, place, and time.   Psychiatric:         Mood and Affect: Mood normal.         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  The patient will need left heart catheterization and coronary angiogram to rule out any significant stenosis.  I discussed the left atrial myxoma resection through median sternotomy under cardiopulmonary bypass with the patient and we will do it during this admission.  NYHA Score: NYHA I: cardiac disease, but without resulting limitations of physical activity    Active Diagnoses:    Diagnosis Date Noted POA    PRINCIPAL PROBLEM:  Mitral valve mass [I05.8] 05/09/2024 Yes    Syncope and collapse [R55] 05/09/2024 Yes    Encephalopathy, metabolic [G93.41] 05/09/2024 Yes    Rheumatoid arthritis involving multiple sites with positive rheumatoid factor [M05.79] 10/07/2021 Yes    Essential hypertension [I10] 07/13/2018 Yes     Chronic      Problems Resolved During this Admission:       Thank you for your consult. I will follow-up with patient. Please contact us if you have any additional questions.    Chester Sanchez MD  Cardiothoracic Surgery  [facility] - Med Surg

## 2024-05-09 NOTE — ASSESSMENT & PLAN NOTE
Chronic, uncontrolled. Latest blood pressure and vitals reviewed-     Temp:  [98.3 °F (36.8 °C)]   Pulse:  [78-85]   Resp:  [17-20]   BP: (132-168)/(77-96)   SpO2:  [97 %-100 %] .   Home meds for hypertension were reviewed and noted below.   Hypertension Medications               losartan (COZAAR) 25 MG tablet Take 1 tablet (25 mg total) by mouth once daily.    olmesartan (BENICAR) 20 MG tablet Take 20 mg by mouth.            While in the hospital, will manage blood pressure as follows; Adjust home antihypertensive regimen as follows- See orders    Will utilize p.r.n. blood pressure medication only if patient's blood pressure greater than 140/90 and she develops symptoms such as worsening chest pain or shortness of breath.

## 2024-05-09 NOTE — CONSULTS
[facility] - Emergency Dept.  Cardiology  Consult Note    Patient Name: Jake Hoskins  MRN: [id number]  Admission Date: 5/9/2024  Hospital Length of Stay: 0 days  Code Status: Full Code   Attending Provider: Rao Noble MD   Consulting Provider: Aurea Mchugh PA-C  Primary Care Physician: Angelica Lagunas MD  Principal Problem:Mitral valve mass    Patient information was obtained from patient, past medical records, and ER records.     Inpatient consult to Cardiology  Consult performed by: Aurea Mchugh PA-C  Consult ordered by: Dorita Benavides NP        Subjective:     Chief Complaint:  Abnormal echo    HPI:   Ms. Hoskins is a 59 year old female patient whose current medical conditions include HTN and morbid obesity who was sent to [facility] from cardiology clinic due to abnormal echo. Patient had previously seen Dr. Buchanan due to syncopal episode two weeks ago and echo was ordered for further evaluation. 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 Cardiology consulted to assist with management. Patient seen and examined today in ED. Stable CV wise. No CP or SOB. No recurrent syncope. No s/s of TIA/CVA. CT surgery consulted. R/LHC planned tmw.          Past Medical History:   Diagnosis Date    Allergy     Anemia     Hypertension     Plantar fasciitis of left foot        Past Surgical History:   Procedure Laterality Date    COLONOSCOPY N/A 04/09/2021    Procedure: COLONOSCOPY;  Surgeon: Hua Elmore MD;  Location: [facility];  Service: Endoscopy;  Laterality: N/A;    HYSTERECTOMY  05/2021    OOPHORECTOMY  05/2021    ROBOT-ASSISTED LAPAROSCOPIC ABDOMINAL HYSTERECTOMY USING DA ABBEY XI N/A 05/18/2021    Procedure: XI ROBOTIC HYSTERECTOMY;  Surgeon: Christa Davila MD;  Location: [facility] OR;  Service: OB/GYN;  Laterality: N/A;    ROBOT-ASSISTED LAPAROSCOPIC SALPINGO-OOPHORECTOMY USING DA ABBEY XI Bilateral 05/18/2021    Procedure: XI ROBOTIC SALPINGO-OOPHORECTOMY;   Surgeon: Christa Davila MD;  Location: [facility];  Service: OB/GYN;  Laterality: Bilateral;       Review of patient's allergies indicates:   Allergen Reactions    Tramadol Nausea And Vomiting       No current facility-administered medications on file prior to encounter.     Current Outpatient Medications on File Prior to Encounter   Medication Sig    celecoxib (CELEBREX) 200 MG capsule Take 1 capsule (200 mg total) by mouth once daily.    cyclobenzaprine (FLEXERIL) 10 MG tablet Take 1 tablet (10 mg total) by mouth nightly as needed for Muscle spasms.    ergocalciferol (ERGOCALCIFEROL) 50,000 unit Cap Take 1 capsule (50,000 Units total) by mouth every 7 days.    folic acid (FOLVITE) 1 MG tablet Take 1 tablet (1,000 mcg total) by mouth once daily.    losartan (COZAAR) 25 MG tablet Take 1 tablet (25 mg total) by mouth once daily.    methotrexate 2.5 MG Tab Take 6 tablets (15 mg total) by mouth every 7 days.    montelukast (SINGULAIR) 10 mg tablet Take 1 tablet by mouth once daily    MULTIVITS,CA,MINERALS/IRON/FA (ONE-A-DAY WOMENS FORMULA ORAL) Take by mouth once daily.     predniSONE (DELTASONE) 5 MG tablet Take 1 tablet (5 mg total) by mouth daily as needed (RA flare).    ferrous sulfate, dried (SLOW FE) 160 mg (50 mg iron) TbSR Take 1 tablet (160 mg total) by mouth once daily.    fluticasone propionate (FLONASE) 50 mcg/actuation nasal spray 2 sprays (100 mcg total) by Each Nostril route once daily.    loratadine (CLARITIN REDITABS) 10 mg dissolvable tablet Take 1 tablet (10 mg total) by mouth once daily.    olmesartan (BENICAR) 20 MG tablet Take 20 mg by mouth.    potassium chloride SA (K-DUR,KLOR-CON) 20 MEQ tablet Take 1 tablet (20 mEq total) by mouth once daily.     Family History       Problem Relation (Age of Onset)    Asthma Mother    Cancer Mother    Heart disease Father    Hypertension Mother, Father, Sister          Tobacco Use    Smoking status: Never    Smokeless tobacco: Never   Substance and Sexual  Activity    Alcohol use: No    Drug use: No    Sexual activity: Not Currently     Birth control/protection: Surgical     Review of Systems   Constitutional: Negative.   HENT: Negative.     Eyes: Negative.    Cardiovascular:  Positive for syncope (prior episode two weeks ago).   Respiratory: Negative.     Endocrine: Negative.    Hematologic/Lymphatic: Negative.    Skin: Negative.    Musculoskeletal: Negative.    Gastrointestinal: Negative.    Genitourinary: Negative.    Psychiatric/Behavioral: Negative.     Allergic/Immunologic: Negative.      Objective:     Vital Signs (Most Recent):  Temp: 98.3 °F (36.8 °C) (05/09/24 1034)  Pulse: 79 (05/09/24 1132)  Resp: 20 (05/09/24 1132)  BP: (!) 168/85 (05/09/24 1132)  SpO2: 100 % (05/09/24 1132) Vital Signs (24h Range):  Temp:  [98.3 °F (36.8 °C)] 98.3 °F (36.8 °C)  Pulse:  [78-85] 79  Resp:  [17-20] 20  SpO2:  [97 %-100 %] 100 %  BP: (132-168)/(77-96) 168/85     Weight: 102.4 kg (225 lb 12 oz)  Body mass index is 38.75 kg/m².    SpO2: 100 %       No intake or output data in the 24 hours ending 05/09/24 1244    Lines/Drains/Airways       Peripheral Intravenous Line  Duration                  Peripheral IV - Single Lumen 05/09/24 1055 20 G Right Antecubital <1 day                     Physical Exam  Vitals and nursing note reviewed.   Constitutional:       General: She is not in acute distress.     Appearance: Normal appearance. She is well-developed. She is obese. She is not diaphoretic.   HENT:      Head: Normocephalic and atraumatic.   Eyes:      General:         Right eye: No discharge.         Left eye: No discharge.      Pupils: Pupils are equal, round, and reactive to light.   Cardiovascular:      Rate and Rhythm: Normal rate and regular rhythm.      Heart sounds: Normal heart sounds, S1 normal and S2 normal. No murmur heard.  Pulmonary:      Effort: Pulmonary effort is normal. No respiratory distress.      Breath sounds: Normal breath sounds.   Abdominal:      General:  There is no distension.   Musculoskeletal:      Right lower leg: No edema.      Left lower leg: No edema.   Skin:     General: Skin is warm and dry.      Findings: No erythema.   Neurological:      General: No focal deficit present.      Mental Status: She is alert and oriented to person, place, and time.   Psychiatric:         Mood and Affect: Mood normal.         Behavior: Behavior normal.          Significant Labs: CMP   Recent Labs   Lab 05/09/24  1055      K 3.9      CO2 24   GLU 91   BUN 11   CREATININE 0.8   CALCIUM 10.2   PROT 7.9   ALBUMIN 3.7   BILITOT 0.4   ALKPHOS 70   AST 17   ALT 19   ANIONGAP 10   , CBC   Recent Labs   Lab 05/09/24  1055   WBC 6.10   HGB 12.7   HCT 41.5      , Troponin   Recent Labs   Lab 05/09/24  1055   TROPONINI <0.006   , and All pertinent lab results from the last 24 hours have been reviewed.    Significant Imaging: Echocardiogram: Transthoracic echo (TTE) complete (Cupid Only):   Results for orders placed or performed during the hospital encounter of 05/09/24   Echo   Result Value Ref Range    BSA 2.15 m2    LVOT stroke volume 76.30 cm3    LVIDd 4.97 3.5 - 6.0 cm    LV Systolic Volume 41.28 mL    LV Systolic Volume Index 20.0 mL/m2    LVIDs 3.21 2.1 - 4.0 cm    LV Diastolic Volume 116.46 mL    LV Diastolic Volume Index 56.53 mL/m2    IVS 0.86 0.6 - 1.1 cm    LVOT diameter 2.00 cm    LVOT area 3.1 cm2    FS 35 28 - 44 %    Left Ventricle Relative Wall Thickness 0.43 cm    Posterior Wall 1.06 0.6 - 1.1 cm    LV mass 170.59 g    LV Mass Index 83 g/m2    MV Peak E Talon 1.03 m/s    TDI LATERAL 0.08 m/s    TDI SEPTAL 0.06 m/s    E/E' ratio 14.71 m/s    MV Peak A Talon 1.49 m/s    TR Max Talon 2.60 m/s    E/A ratio 0.69     IVRT 91.34 msec    E wave deceleration time 340.08 msec    LV SEPTAL E/E' RATIO 17.17 m/s    LV LATERAL E/E' RATIO 12.88 m/s    PV Peak S Talon 0.56 m/s    PV Peak D Tlaon 0.40 m/s    Pulm vein S/D ratio 1.40     LVOT peak talon 1.11 m/s    Left Ventricular  Outflow Tract Mean Velocity 0.81 cm/s    Left Ventricular Outflow Tract Mean Gradient 2.88 mmHg    RVDD 4.52 cm    RVOT peak VTI 21.3 cm    TAPSE 2.02 cm    RV/LV Ratio 0.91 cm    LA size 4.14 cm    Left Atrium Minor Axis 6.40 cm    Left Atrium Major Axis 5.65 cm    LA volume (mod) 87.26 cm3    LA Volume Index (Mod) 42.4 mL/m2    RA Major Axis 5.80 cm    RA Width 4.46 cm    AV mean gradient 4 mmHg    AV peak gradient 7 mmHg    Ao peak edmund 1.31 m/s    Ao VTI 28.60 cm    LVOT peak VTI 24.30 cm    AV valve area 2.67 cm²    AV Velocity Ratio 0.85     AV index (prosthetic) 0.85     BÁRBARA by Velocity Ratio 2.66 cm²    MV stenosis pressure 1/2 time 98.62 ms    MV valve area p 1/2 method 2.23 cm2    Triscuspid Valve Regurgitation Peak Gradient 27 mmHg    PV mean gradient 2 mmHg    PV PEAK VELOCITY 0.95 m/s    PV peak gradient 2 mmHg    Pulmonary Valve Mean Velocity 0.68 m/s    RVOT peak edmund 0.77 m/s    Ao root annulus 2.70 cm    Sinus 2.68 cm    STJ 2.73 cm    Ascending aorta 3.17 cm    IVC diameter 1.26 cm    Mean e' 0.07 m/s    ZLVIDS -1.35     ZLVIDD -2.24     LA Volume Index 49.2 mL/m2    LA volume 101.38 cm3    LA WIDTH 4.8 cm    and EKG: Reviewed  Assessment and Plan:   Patient who presents with mitral valve mass, 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 Stable during exam. CTS on board. R/C tmw, NPO after MN.    * Mitral valve mass  -TTE with large mitral mass, concerning for myxoma  -CTS consulted  -R/LHC planned tmw    Syncope and collapse  -Episode two weeks ago  -No recurrence  -Continue to monitor    Rheumatoid arthritis involving multiple sites with positive rheumatoid factor  -Mgmt as per primary team    Essential hypertension  -Continue home meds        VTE Risk Mitigation (From admission, onward)           Ordered     Reason for No Pharmacological VTE Prophylaxis  Once        Question:  Reasons:  Answer:  Physician Provided (leave comment)  Comment:  possible surgical intervention    05/09/24 1216     IP VTE HIGH RISK PATIENT  Once          05/09/24 1216     Place sequential compression device  Until discontinued         05/09/24 1216                    Thank you for your consult. I will follow-up with patient. Please contact us if you have any additional questions.    Aurea Mchugh PA-C  Cardiology   [facility] - Emergency Dept.

## 2024-05-09 NOTE — Clinical Note
The catheter was inserted into the ostium   right coronary artery. An angiography was performed of the right coronary arteries. Multiple views were taken. Over J wire

## 2024-05-09 NOTE — Clinical Note
The catheter was inserted into the ostial  left coronary artery. An angiography was performed of the left coronary arteries. Multiple views were taken. J wire exchange

## 2024-05-10 LAB
ALBUMIN SERPL BCP-MCNC: 3.4 G/DL (ref 3.5–5.2)
ALLENS TEST: NORMAL
ALP SERPL-CCNC: 60 U/L (ref 55–135)
ALT SERPL W/O P-5'-P-CCNC: 13 U/L (ref 10–44)
ANION GAP SERPL CALC-SCNC: 9 MMOL/L (ref 8–16)
AST SERPL-CCNC: 13 U/L (ref 10–40)
BASOPHILS # BLD AUTO: 0.04 K/UL (ref 0–0.2)
BASOPHILS NFR BLD: 0.8 % (ref 0–1.9)
BILIRUB SERPL-MCNC: 0.7 MG/DL (ref 0.1–1)
BUN SERPL-MCNC: 16 MG/DL (ref 6–20)
CALCIUM SERPL-MCNC: 9.4 MG/DL (ref 8.7–10.5)
CHLORIDE SERPL-SCNC: 107 MMOL/L (ref 95–110)
CO2 SERPL-SCNC: 24 MMOL/L (ref 23–29)
CREAT SERPL-MCNC: 0.7 MG/DL (ref 0.5–1.4)
DIFFERENTIAL METHOD BLD: ABNORMAL
EOSINOPHIL # BLD AUTO: 0.2 K/UL (ref 0–0.5)
EOSINOPHIL NFR BLD: 3.2 % (ref 0–8)
ERYTHROCYTE [DISTWIDTH] IN BLOOD BY AUTOMATED COUNT: 14.9 % (ref 11.5–14.5)
EST. GFR  (NO RACE VARIABLE): >60 ML/MIN/1.73 M^2
GLUCOSE SERPL-MCNC: 96 MG/DL (ref 70–110)
HCO3 UR-SCNC: 24.9 MMOL/L (ref 24–28)
HCT VFR BLD AUTO: 37.2 % (ref 37–48.5)
HGB BLD-MCNC: 11.6 G/DL (ref 12–16)
IMM GRANULOCYTES # BLD AUTO: 0.03 K/UL (ref 0–0.04)
IMM GRANULOCYTES NFR BLD AUTO: 0.6 % (ref 0–0.5)
LYMPHOCYTES # BLD AUTO: 1.8 K/UL (ref 1–4.8)
LYMPHOCYTES NFR BLD: 33 % (ref 18–48)
MCH RBC QN AUTO: 25.1 PG (ref 27–31)
MCHC RBC AUTO-ENTMCNC: 31.2 G/DL (ref 32–36)
MCV RBC AUTO: 81 FL (ref 82–98)
MONOCYTES # BLD AUTO: 0.7 K/UL (ref 0.3–1)
MONOCYTES NFR BLD: 12.4 % (ref 4–15)
NEUTROPHILS # BLD AUTO: 2.7 K/UL (ref 1.8–7.7)
NEUTROPHILS NFR BLD: 50 % (ref 38–73)
NRBC BLD-RTO: 0 /100 WBC
OHS QRS DURATION: 86 MS
OHS QTC CALCULATION: 425 MS
PCO2 BLDA: 40.9 MMHG (ref 35–45)
PH SMN: 7.39 [PH] (ref 7.35–7.45)
PLATELET # BLD AUTO: 329 K/UL (ref 150–450)
PMV BLD AUTO: 10 FL (ref 9.2–12.9)
PO2 BLDA: 40 MMHG (ref 40–60)
POC BE: 0 MMOL/L
POC SATURATED O2: 75 % (ref 95–100)
POTASSIUM SERPL-SCNC: 4 MMOL/L (ref 3.5–5.1)
PROT SERPL-MCNC: 6.6 G/DL (ref 6–8.4)
RBC # BLD AUTO: 4.62 M/UL (ref 4–5.4)
SAMPLE: NORMAL
SITE: NORMAL
SODIUM SERPL-SCNC: 140 MMOL/L (ref 136–145)
WBC # BLD AUTO: 5.33 K/UL (ref 3.9–12.7)

## 2024-05-10 PROCEDURE — 99232 SBSQ HOSP IP/OBS MODERATE 35: CPT | Mod: ,,, | Performed by: PHYSICIAN ASSISTANT

## 2024-05-10 PROCEDURE — 36415 COLL VENOUS BLD VENIPUNCTURE: CPT | Performed by: NURSE PRACTITIONER

## 2024-05-10 PROCEDURE — 93456 R HRT CORONARY ARTERY ANGIO: CPT | Mod: 26,,, | Performed by: INTERNAL MEDICINE

## 2024-05-10 PROCEDURE — 99152 MOD SED SAME PHYS/QHP 5/>YRS: CPT | Mod: ,,, | Performed by: INTERNAL MEDICINE

## 2024-05-10 PROCEDURE — 80053 COMPREHEN METABOLIC PANEL: CPT | Performed by: NURSE PRACTITIONER

## 2024-05-10 PROCEDURE — 11000001 HC ACUTE MED/SURG PRIVATE ROOM

## 2024-05-10 PROCEDURE — 99900035 HC TECH TIME PER 15 MIN (STAT)

## 2024-05-10 PROCEDURE — 25000003 PHARM REV CODE 250: Performed by: PHYSICIAN ASSISTANT

## 2024-05-10 PROCEDURE — 21400001 HC TELEMETRY ROOM

## 2024-05-10 PROCEDURE — 4A023N6 MEASUREMENT OF CARDIAC SAMPLING AND PRESSURE, RIGHT HEART, PERCUTANEOUS APPROACH: ICD-10-PCS | Performed by: INTERNAL MEDICINE

## 2024-05-10 PROCEDURE — 99152 MOD SED SAME PHYS/QHP 5/>YRS: CPT | Performed by: INTERNAL MEDICINE

## 2024-05-10 PROCEDURE — 25500020 PHARM REV CODE 255: Performed by: INTERNAL MEDICINE

## 2024-05-10 PROCEDURE — 25000003 PHARM REV CODE 250: Performed by: FAMILY MEDICINE

## 2024-05-10 PROCEDURE — 25000003 PHARM REV CODE 250: Performed by: INTERNAL MEDICINE

## 2024-05-10 PROCEDURE — 93456 R HRT CORONARY ARTERY ANGIO: CPT | Performed by: INTERNAL MEDICINE

## 2024-05-10 PROCEDURE — 25000003 PHARM REV CODE 250: Performed by: NURSE PRACTITIONER

## 2024-05-10 PROCEDURE — C1894 INTRO/SHEATH, NON-LASER: HCPCS | Performed by: INTERNAL MEDICINE

## 2024-05-10 PROCEDURE — C1751 CATH, INF, PER/CENT/MIDLINE: HCPCS | Performed by: INTERNAL MEDICINE

## 2024-05-10 PROCEDURE — C1769 GUIDE WIRE: HCPCS | Performed by: INTERNAL MEDICINE

## 2024-05-10 PROCEDURE — 85025 COMPLETE CBC W/AUTO DIFF WBC: CPT | Performed by: NURSE PRACTITIONER

## 2024-05-10 PROCEDURE — B2111ZZ FLUOROSCOPY OF MULTIPLE CORONARY ARTERIES USING LOW OSMOLAR CONTRAST: ICD-10-PCS | Performed by: INTERNAL MEDICINE

## 2024-05-10 PROCEDURE — 82803 BLOOD GASES ANY COMBINATION: CPT

## 2024-05-10 PROCEDURE — 63600175 PHARM REV CODE 636 W HCPCS: Performed by: INTERNAL MEDICINE

## 2024-05-10 RX ORDER — MIDAZOLAM HYDROCHLORIDE 1 MG/ML
INJECTION INTRAMUSCULAR; INTRAVENOUS
Status: DISCONTINUED | OUTPATIENT
Start: 2024-05-10 | End: 2024-05-10 | Stop reason: HOSPADM

## 2024-05-10 RX ORDER — FENTANYL CITRATE 50 UG/ML
INJECTION, SOLUTION INTRAMUSCULAR; INTRAVENOUS
Status: DISCONTINUED | OUTPATIENT
Start: 2024-05-10 | End: 2024-05-10 | Stop reason: HOSPADM

## 2024-05-10 RX ORDER — SODIUM CHLORIDE 9 MG/ML
INJECTION, SOLUTION INTRAVENOUS CONTINUOUS
Status: DISCONTINUED | OUTPATIENT
Start: 2024-05-10 | End: 2024-05-10

## 2024-05-10 RX ORDER — SODIUM CHLORIDE 9 MG/ML
INJECTION, SOLUTION INTRAVENOUS CONTINUOUS
Status: ACTIVE | OUTPATIENT
Start: 2024-05-10 | End: 2024-05-10

## 2024-05-10 RX ORDER — ACETAMINOPHEN 325 MG/1
650 TABLET ORAL EVERY 4 HOURS PRN
Status: DISCONTINUED | OUTPATIENT
Start: 2024-05-10 | End: 2024-05-13

## 2024-05-10 RX ORDER — NITROGLYCERIN 5 MG/ML
INJECTION, SOLUTION INTRAVENOUS
Status: DISCONTINUED | OUTPATIENT
Start: 2024-05-10 | End: 2024-05-10 | Stop reason: HOSPADM

## 2024-05-10 RX ORDER — MONTELUKAST SODIUM 10 MG/1
10 TABLET ORAL DAILY
Status: DISCONTINUED | OUTPATIENT
Start: 2024-05-10 | End: 2024-05-17 | Stop reason: HOSPADM

## 2024-05-10 RX ORDER — VERAPAMIL HYDROCHLORIDE 2.5 MG/ML
INJECTION, SOLUTION INTRAVENOUS
Status: DISCONTINUED | OUTPATIENT
Start: 2024-05-10 | End: 2024-05-10 | Stop reason: HOSPADM

## 2024-05-10 RX ORDER — HEPARIN SODIUM 1000 [USP'U]/ML
INJECTION, SOLUTION INTRAVENOUS; SUBCUTANEOUS
Status: DISCONTINUED | OUTPATIENT
Start: 2024-05-10 | End: 2024-05-10 | Stop reason: HOSPADM

## 2024-05-10 RX ORDER — ONDANSETRON 8 MG/1
8 TABLET, ORALLY DISINTEGRATING ORAL EVERY 8 HOURS PRN
Status: DISCONTINUED | OUTPATIENT
Start: 2024-05-10 | End: 2024-05-13 | Stop reason: SDUPTHER

## 2024-05-10 RX ORDER — LIDOCAINE HYDROCHLORIDE 20 MG/ML
INJECTION, SOLUTION EPIDURAL; INFILTRATION; INTRACAUDAL; PERINEURAL
Status: DISCONTINUED | OUTPATIENT
Start: 2024-05-10 | End: 2024-05-10 | Stop reason: HOSPADM

## 2024-05-10 RX ORDER — ATROPINE SULFATE 1 MG/ML
INJECTION, SOLUTION INTRAMUSCULAR; INTRAVENOUS; SUBCUTANEOUS
Status: DISCONTINUED | OUTPATIENT
Start: 2024-05-10 | End: 2024-05-10 | Stop reason: HOSPADM

## 2024-05-10 RX ORDER — DIPHENHYDRAMINE HYDROCHLORIDE 50 MG/ML
INJECTION INTRAMUSCULAR; INTRAVENOUS
Status: DISCONTINUED | OUTPATIENT
Start: 2024-05-10 | End: 2024-05-10 | Stop reason: HOSPADM

## 2024-05-10 RX ADMIN — SODIUM CHLORIDE: 9 INJECTION, SOLUTION INTRAVENOUS at 08:05

## 2024-05-10 RX ADMIN — SODIUM CHLORIDE: 0.9 INJECTION, SOLUTION INTRAVENOUS at 01:05

## 2024-05-10 RX ADMIN — MONTELUKAST 10 MG: 10 TABLET, FILM COATED ORAL at 10:05

## 2024-05-10 RX ADMIN — LOSARTAN POTASSIUM 25 MG: 25 TABLET, FILM COATED ORAL at 08:05

## 2024-05-10 RX ADMIN — FAMOTIDINE 20 MG: 20 TABLET ORAL at 09:05

## 2024-05-10 RX ADMIN — FAMOTIDINE 20 MG: 20 TABLET ORAL at 08:05

## 2024-05-10 NOTE — HOSPITAL COURSE
Patient is sent from Cardiology office for a large mass on the mitral valve.  Patient is planned for a right/left heart catheterization today with Dr. Buchanan.  Appreciate CT surgery assistance as well.  Plan for OR with Dr. Sanchez on 05/13/2024.    5/16- pt transferred out of ICU last evening. looks and feels better, still fatigued but getting stronger, walked with PT this am. Still has no BM but passing gas. Appetite also improving. Getting IV lasix and leg swelling also decreased. WBC down to 11, H/H 9.3/3, electrolytes normal. Making good progress overall. May be d/rich home soon. Will start triple Vitamins and give Inj b12 IM.

## 2024-05-10 NOTE — HOSPITAL COURSE
Ms. Hoskins is a 59 year old female patient whose current medical conditions include HTN and morbid obesity who was sent to [facility] from cardiology clinic due to abnormal echo. Patient had previously seen Dr. Buchanan due to syncopal episode two weeks ago and echo was ordered for further evaluation. 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 Cardiology consulted to assist with management. Patient seen and examined today in ED. Stable CV wise. No CP or SOB. No recurrent syncope. No s/s of TIA/CVA. CT surgery consulted. R/[facility] planned tmw.     5/10/24-Patient seen and examined today, resting in bed. Stable. No AEON. No CV complaints. Labs stable. R/[facility] today. CTS on board.    05/13/24 saw pt in the icu after removal of LE myxoma. S/p intubated. Chest tube in the place. Off epicardial pacer. Wean off the pressor. VSS.     05/14/24 extubated, feels ok. The lab reviewed and VSS.     5/15/24-Patient seen and examined today, resting in bed. Feels ok. Admits to fatigue/incisional soreness. Remains in SR. Labs reviewed. H/H 8.9/28.4.    5/16/24-Patient seen and examined today. No AEON. Stable CV wise. Labs reviewed. Working with PT/OT.

## 2024-05-10 NOTE — PLAN OF CARE
Patient is in stable condition, no acute distress, remained free from injuries, no pain reported this shift, on cardiac monitoring (NSR), VSS, NPO after midnight for upcoming procedure, and all active orders reviewed. 24 hr chart check performed.

## 2024-05-10 NOTE — INTERVAL H&P NOTE
The patient has been examined and the H&P has been reviewed:    I concur with the findings and no changes have occurred since H&P was written.    Anesthesia/Surgery risks, benefits and alternative options discussed and understood by patient/family.          Active Hospital Problems    Diagnosis  POA    *Mitral valve mass [I05.8]  Yes    Syncope and collapse [R55]  Yes    Encephalopathy, metabolic [G93.41]  Yes    Rheumatoid arthritis involving multiple sites with positive rheumatoid factor [M05.79]  Yes    Essential hypertension [I10]  Yes     Chronic     Controlled on HCTZ        Resolved Hospital Problems   No resolved problems to display.

## 2024-05-10 NOTE — PROGRESS NOTES
[facility] - Cath Lab ([facility])  Cardiology  Progress Note    Patient Name: Jake Hoskins  MRN: [id number]  Admission Date: 5/9/2024  Hospital Length of Stay: 1 days  Code Status: Full Code   Attending Physician: Garo Yancey MD   Primary Care Physician: Angelica Lagunas MD  Expected Discharge Date:   Principal Problem:Mitral valve mass    Subjective:   HPI:  Ms. Hoskins is a 59 year old female patient whose current medical conditions include HTN and morbid obesity who was sent to [facility] from cardiology clinic due to abnormal echo. Patient had previously seen Dr. Buchanan due to syncopal episode two weeks ago and echo was ordered for further evaluation. 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 Cardiology consulted to assist with management. Patient seen and examined today in ED. Stable CV wise. No CP or SOB. No recurrent syncope. No s/s of TIA/CVA. CT surgery consulted. R/[facility] planned tmw.     Hospital Course:   5/10/24-Patient seen and examined today, resting in bed. Stable. No AEON. No CV complaints. Labs stable. R/LHC today. CTS on board.        Review of Systems   Constitutional: Negative.   HENT: Negative.     Eyes: Negative.    Cardiovascular: Negative.    Respiratory: Negative.     Endocrine: Negative.    Hematologic/Lymphatic: Negative.    Skin: Negative.    Musculoskeletal: Negative.    Gastrointestinal: Negative.    Genitourinary: Negative.    Neurological: Negative.    Psychiatric/Behavioral: Negative.     Allergic/Immunologic: Negative.      Objective:     Vital Signs (Most Recent):  Temp: 97.7 °F (36.5 °C) (05/10/24 1250)  Pulse: 75 (05/10/24 1300)  Resp: 14 (05/10/24 1300)  BP: 98/60 (05/10/24 1300)  SpO2: 98 % (05/10/24 1300) Vital Signs (24h Range):  Temp:  [97.7 °F (36.5 °C)-98.5 °F (36.9 °C)] 97.7 °F (36.5 °C)  Pulse:  [62-86] 75  Resp:  [14-22] 14  SpO2:  [97 %-100 %] 98 %  BP: ()/(56-81) 98/60     Weight: 102.4 kg (225 lb 12 oz)  Body mass index is 38.75  kg/m².     SpO2: 98 %         Intake/Output Summary (Last 24 hours) at 5/10/2024 1311  Last data filed at 5/10/2024 0815  Gross per 24 hour   Intake 0 ml   Output --   Net 0 ml       Lines/Drains/Airways       Peripheral Intravenous Line  Duration                  Peripheral IV - Single Lumen 05/09/24 1055 20 G Right Antecubital 1 day                       Physical Exam  Vitals and nursing note reviewed.   Constitutional:       General: She is not in acute distress.     Appearance: Normal appearance. She is well-developed. She is not diaphoretic.   HENT:      Head: Normocephalic and atraumatic.   Eyes:      General:         Right eye: No discharge.         Left eye: No discharge.      Pupils: Pupils are equal, round, and reactive to light.   Cardiovascular:      Rate and Rhythm: Normal rate and regular rhythm.      Heart sounds: Normal heart sounds, S1 normal and S2 normal. No murmur heard.  Pulmonary:      Effort: Pulmonary effort is normal. No respiratory distress.      Breath sounds: Normal breath sounds.   Abdominal:      General: There is no distension.      Tenderness: There is no abdominal tenderness.   Skin:     General: Skin is warm and dry.      Findings: No erythema.   Neurological:      General: No focal deficit present.      Mental Status: She is alert and oriented to person, place, and time.   Psychiatric:         Mood and Affect: Mood normal.         Behavior: Behavior normal.         Thought Content: Thought content normal.            Significant Labs: CMP   Recent Labs   Lab 05/09/24  1055 05/10/24  0559    140   K 3.9 4.0    107   CO2 24 24   GLU 91 96   BUN 11 16   CREATININE 0.8 0.7   CALCIUM 10.2 9.4   PROT 7.9 6.6   ALBUMIN 3.7 3.4*   BILITOT 0.4 0.7   ALKPHOS 70 60   AST 17 13   ALT 19 13   ANIONGAP 10 9   , CBC   Recent Labs   Lab 05/09/24  1055 05/10/24  0559   WBC 6.10 5.33   HGB 12.7 11.6*   HCT 41.5 37.2    329   , Troponin   Recent Labs   Lab 05/09/24  1055   TROPONINI  <0.006   , and All pertinent lab results from the last 24 hours have been reviewed.    Significant Imaging: Echocardiogram: Transthoracic echo (TTE) complete (Cupid Only):   Results for orders placed or performed during the hospital encounter of 05/09/24   Echo   Result Value Ref Range    BSA 2.15 m2    LVOT stroke volume 76.30 cm3    LVIDd 4.97 3.5 - 6.0 cm    LV Systolic Volume 41.28 mL    LV Systolic Volume Index 20.0 mL/m2    LVIDs 3.21 2.1 - 4.0 cm    LV Diastolic Volume 116.46 mL    LV Diastolic Volume Index 56.53 mL/m2    IVS 0.86 0.6 - 1.1 cm    LVOT diameter 2.00 cm    LVOT area 3.1 cm2    FS 35 28 - 44 %    Left Ventricle Relative Wall Thickness 0.43 cm    Posterior Wall 1.06 0.6 - 1.1 cm    LV mass 170.59 g    LV Mass Index 83 g/m2    MV Peak E Talon 1.03 m/s    TDI LATERAL 0.08 m/s    TDI SEPTAL 0.06 m/s    E/E' ratio 14.71 m/s    MV Peak A Talon 1.49 m/s    TR Max Talon 2.60 m/s    E/A ratio 0.69     IVRT 91.34 msec    E wave deceleration time 340.08 msec    LV SEPTAL E/E' RATIO 17.17 m/s    LV LATERAL E/E' RATIO 12.88 m/s    PV Peak S Talon 0.56 m/s    PV Peak D Talon 0.40 m/s    Pulm vein S/D ratio 1.40     LVOT peak talon 1.11 m/s    Left Ventricular Outflow Tract Mean Velocity 0.81 cm/s    Left Ventricular Outflow Tract Mean Gradient 2.88 mmHg    RVDD 4.52 cm    RVOT peak VTI 21.3 cm    TAPSE 2.02 cm    RV/LV Ratio 0.91 cm    LA size 4.14 cm    Left Atrium Minor Axis 6.40 cm    Left Atrium Major Axis 5.65 cm    LA volume (mod) 87.26 cm3    LA Volume Index (Mod) 42.4 mL/m2    RA Major Axis 5.80 cm    RA Width 4.46 cm    AV mean gradient 4 mmHg    AV peak gradient 7 mmHg    Ao peak talon 1.31 m/s    Ao VTI 28.60 cm    LVOT peak VTI 24.30 cm    AV valve area 2.67 cm²    AV Velocity Ratio 0.85     AV index (prosthetic) 0.85     BÁRBARA by Velocity Ratio 2.66 cm²    MV stenosis pressure 1/2 time 98.62 ms    MV valve area p 1/2 method 2.23 cm2    Triscuspid Valve Regurgitation Peak Gradient 27 mmHg    PV mean gradient 2  mmHg    PV PEAK VELOCITY 0.95 m/s    PV peak gradient 4 mmHg    Pulmonary Valve Mean Velocity 0.68 m/s    RVOT peak edmund 0.77 m/s    Ao root annulus 2.70 cm    Sinus 2.68 cm    STJ 2.73 cm    Ascending aorta 3.17 cm    IVC diameter 1.26 cm    Mean e' 0.07 m/s    ZLVIDS -1.35     ZLVIDD -2.24     LA Volume Index 49.2 mL/m2    LA volume 101.38 cm3    LA WIDTH 4.8 cm    TV resting pulmonary artery pressure 30 mmHg    RV TB RVSP 6 mmHg    Est. RA pres 3 mmHg    Narrative      Left Ventricle: The left ventricle is normal in size. Normal wall   thickness. There is concentric remodeling. There is normal systolic   function with a visually estimated ejection fraction of 60 - 65%. There is   diastolic dysfunction.    Right Ventricle: Normal right ventricular cavity size. Wall thickness   is normal. Systolic function is normal.    Left Atrium: Left atrium is severely dilated. There is a 3.4 cm x 2.6   cm fixed irregular mass that appears to be myxoma attached to anterior   mitral valve leaflet.    Mitral Valve: There is mild regurgitation.    Pulmonary Artery: The estimated pulmonary artery systolic pressure is   30 mmHg.    IVC/SVC: Normal venous pressure at 3 mmHg.    There is a 3.4 cm x 2.6 cm fixed irregular mass that appears to be   myxoma attached to anterior mitral valve leaflet.     , EKG: Reviewed, and X-Ray: CXR: X-Ray Chest 1 View (CXR): No results found for this visit on 05/09/24. and X-Ray Chest PA and Lateral (CXR): No results found for this visit on 05/09/24.  Assessment and Plan:   Patient who presents with mitral valve mass/myxoma. Stable. R/LHC today. CTS on board.    * Mitral valve mass  -TTE with large mitral mass, concerning for myxoma  -CTS consulted  -R/LHC planned tmw    5/10/24  -Stable  -R/LHC today    Syncope and collapse  -Episode two weeks ago  -No recurrence  -Continue to monitor    Rheumatoid arthritis involving multiple sites with positive rheumatoid factor  -Mgmt as per primary  team    Essential hypertension  -Continue home meds        VTE Risk Mitigation (From admission, onward)           Ordered     heparin (porcine) injection  As needed (PRN)         05/10/24 1229     Reason for No Pharmacological VTE Prophylaxis  Once        Question:  Reasons:  Answer:  Physician Provided (leave comment)  Comment:  possible surgical intervention    05/09/24 1216     IP VTE HIGH RISK PATIENT  Once         05/09/24 1216     Place sequential compression device  Until discontinued         05/09/24 1216                    Aurea Mchugh PA-C  Cardiology  [facility] - Cath Lab ([facility])

## 2024-05-10 NOTE — ASSESSMENT & PLAN NOTE
-Likely a myxoma, Obstructive physiology  -cardiology planning R/left heart catheterization on 05/10/2024   -CT surgery consulted for assistance.

## 2024-05-10 NOTE — ASSESSMENT & PLAN NOTE
Chronic, uncontrolled. Latest blood pressure and vitals reviewed-     Temp:  [97.9 °F (36.6 °C)-98.5 °F (36.9 °C)]   Pulse:  [62-86]   Resp:  [17-22]   BP: (105-168)/(60-96)   SpO2:  [97 %-100 %] .   Home meds for hypertension were reviewed and noted below.   Hypertension Medications               losartan (COZAAR) 25 MG tablet Take 1 tablet (25 mg total) by mouth once daily.    olmesartan (BENICAR) 20 MG tablet Take 20 mg by mouth.            While in the hospital, will manage blood pressure as follows; Adjust home antihypertensive regimen as follows- See orders    Will utilize p.r.n. blood pressure medication only if patient's blood pressure greater than 140/90 and she develops symptoms such as worsening chest pain or shortness of breath.

## 2024-05-10 NOTE — ASSESSMENT & PLAN NOTE
-TTE with large mitral mass, concerning for myxoma  -CTS consulted  -R/LHC planned tmw    5/10/24  -Stable  -R/LHC today

## 2024-05-10 NOTE — SUBJECTIVE & OBJECTIVE
Interval History:  Follow up for mitral valve mass.  Patient is sitting up on the side of bed with her sister at bedside in no acute distress.  Patient denies any chest pain or shortness a breath at this time.  Patient is awaiting heart catheterization with Cardiology at 1:30 p.m. today.  Further recommendations per Cardiology and CT surgery upon completion of the heart catheterization.    Review of Systems  Objective:     Vital Signs (Most Recent):  Temp: 98.5 °F (36.9 °C) (05/10/24 0415)  Pulse: 69 (05/10/24 0832)  Resp: 18 (05/10/24 0415)  BP: 105/60 (05/10/24 0415)  SpO2: 97 % (05/10/24 0415) Vital Signs (24h Range):  Temp:  [97.9 °F (36.6 °C)-98.5 °F (36.9 °C)] 98.5 °F (36.9 °C)  Pulse:  [62-86] 69  Resp:  [17-22] 18  SpO2:  [97 %-100 %] 97 %  BP: (105-168)/(60-96) 105/60     Weight: 102.4 kg (225 lb 12 oz)  Body mass index is 38.75 kg/m².    Intake/Output Summary (Last 24 hours) at 5/10/2024 0911  Last data filed at 5/9/2024 1736  Gross per 24 hour   Intake 0 ml   Output --   Net 0 ml         Physical Exam  Vitals and nursing note reviewed.   Constitutional:       Appearance: Normal appearance.   HENT:      Head: Normocephalic and atraumatic.      Nose: Nose normal.      Mouth/Throat:      Mouth: Mucous membranes are moist.   Eyes:      Extraocular Movements: Extraocular movements intact.      Conjunctiva/sclera: Conjunctivae normal.   Cardiovascular:      Rate and Rhythm: Normal rate and regular rhythm.      Pulses: Normal pulses.      Heart sounds: Normal heart sounds.   Pulmonary:      Effort: Pulmonary effort is normal.      Breath sounds: Normal breath sounds.   Abdominal:      General: Abdomen is flat. Bowel sounds are normal.      Palpations: Abdomen is soft.   Musculoskeletal:         General: Normal range of motion.      Cervical back: Normal range of motion and neck supple.   Skin:     General: Skin is warm.      Capillary Refill: Capillary refill takes less than 2 seconds.   Neurological:       Mental Status: She is alert and oriented to person, place, and time. Mental status is at baseline.   Psychiatric:         Mood and Affect: Mood normal.         Behavior: Behavior normal.             Significant Labs: All pertinent labs within the past 24 hours have been reviewed.  Recent Lab Results         05/10/24  0559   05/09/24  1055   05/09/24  1001        Albumin 3.4   3.7         ALP 60   70         ALT 13   19         Anion Gap 9   10         Ao root annulus     2.70       Ascending aorta     3.17       Ao peak edmund     1.31       Ao VTI     28.60       PTT   31.1  Comment: Refer to local heparin nomogram for intensity/dose specific   therapeutic   range.           AST 13   17         AV valve area     2.67       BÁRBARA by Velocity Ratio     2.66       AV mean gradient     4       AV index (prosthetic)     0.85       AV peak gradient     7       AV Velocity Ratio     0.85       Baso # 0.04   0.05         Basophil % 0.8   0.8         BILIRUBIN TOTAL 0.7  Comment: For infants and newborns, interpretation of results should be based  on gestational age, weight and in agreement with clinical  observations.    Premature Infant recommended reference ranges:  Up to 24 hours.............<8.0 mg/dL  Up to 48 hours............<12.0 mg/dL  3-5 days..................<15.0 mg/dL  6-29 days.................<15.0 mg/dL     0.4  Comment: For infants and newborns, interpretation of results should be based  on gestational age, weight and in agreement with clinical  observations.    Premature Infant recommended reference ranges:  Up to 24 hours.............<8.0 mg/dL  Up to 48 hours............<12.0 mg/dL  3-5 days..................<15.0 mg/dL  6-29 days.................<15.0 mg/dL           BSA     2.15       BUN 16   11         Calcium 9.4   10.2         Chloride 107   106         CO2 24   24         Creatinine 0.7   0.8         Left Ventricle Relative Wall Thickness     0.43       Differential Method Automated   Automated          E/A ratio     0.69       E/E' ratio     14.71       eGFR >60   >60         Eos # 0.2   0.2         Eos % 3.2   2.8         E wave deceleration time     340.08       FS     35       Glucose 96   91         Gran # (ANC) 2.7   2.9         Gran % 50.0   47.9         Hematocrit 37.2   41.5         Hemoglobin 11.6   12.7         Hepatitis C Ab   Negative         HEP C Virus Hold Specimen   Hold for HCV sendout         HIV 1/2 Ag/Ab   Negative         Immature Grans (Abs) 0.03  Comment: Mild elevation in immature granulocytes is non specific and   can be seen in a variety of conditions including stress response,   acute inflammation, trauma and pregnancy. Correlation with other   laboratory and clinical findings is essential.     0.02  Comment: Mild elevation in immature granulocytes is non specific and   can be seen in a variety of conditions including stress response,   acute inflammation, trauma and pregnancy. Correlation with other   laboratory and clinical findings is essential.           Immature Granulocytes 0.6   0.3         INR   1.0  Comment: Coumadin Therapy:  2.0 - 3.0 for INR for all indicators except mechanical heart valves  and antiphospholipid syndromes which should use 2.5 - 3.5.           IVC diameter     1.26       IVRT     91.34       IVSd     0.86       LA WIDTH     4.8       Left Atrium Major Axis     5.65       Left Atrium Minor Axis     6.40       LA size     4.14       LA volume     101.38            87.26       LA vol index     49.2       LA Volume Index (Mod)     42.4       LVOT area     3.1       LV LATERAL E/E' RATIO     12.88       LV SEPTAL E/E' RATIO     17.17       LV EDV BP     116.46       LV Diastolic Volume Index     56.53       LVIDd     4.97       LVIDs     3.21       LV mass     170.59       LV Mass Index     83       Left Ventricular Outflow Tract Mean Gradient     2.88       Left Ventricular Outflow Tract Mean Velocity     0.81       LVOT diameter     2.00       LVOT peak edmund      1.11       LVOT stroke volume     76.30       LVOT peak VTI     24.30       LV ESV BP     41.28       LV Systolic Volume Index     20.0       Lymph # 1.8   2.4         Lymph % 33.0   39.0         MCH 25.1   24.5         MCHC 31.2   30.6         MCV 81   80         Mean e'     0.07       Mono # 0.7   0.6         Mono % 12.4   9.2         MPV 10.0   10.0         MV valve area p 1/2 method     2.23       MV Peak A Talon     1.49       MV Peak E Talon     1.03       MV stenosis pressure 1/2 time     98.62       nRBC 0   0         Platelet Count 329   371         Potassium 4.0   3.9         PROTEIN TOTAL 6.6   7.9         PT   11.2         Pulmonary Valve Mean Velocity     0.68       PV mean gradient     2       PV peak gradient     4       PV Peak D Talon     0.40       PV Peak S Talon     0.56       PV PEAK VELOCITY     0.95       Pulm vein S/D ratio     1.40       Posterior Wall     1.06       RA Major Axis     5.80       Est. RA pres     3       RA Width     4.46       RBC 4.62   5.18         RDW 14.9   15.0         RV TB RVSP     6       RV/LV Ratio     0.91       RVDD     4.52       RVOT peak talon     0.77       RVOT peak VTI     21.3       Sinus     2.68       Sodium 140   140         STJ     2.73       TAPSE     2.02       TDI SEPTAL     0.06       TDI LATERAL     0.08       Triscuspid Valve Regurgitation Peak Gradient     27       TR Max Talon     2.60       Troponin I   <0.006  Comment: The reference interval for Troponin I represents the 99th percentile   cutoff   for our facility and is consistent with 3rd generation assay   performance.           TV resting pulmonary artery pressure     30       WBC 5.33   6.10         ZLVIDD     -2.24       ZLVIDS     -1.35               Significant Imaging:   No orders to display

## 2024-05-10 NOTE — ASSESSMENT & PLAN NOTE
-On chronic immunosuppression  -Takes MTX regularly but not compliant with prednisone.   -hold immunosuppression until cleared by surgery.

## 2024-05-10 NOTE — NURSING
1450 Report called to Aida on MS.  1500 Transferred via bed to room on portable moniter.  IVf's complete.  L wrist and L brachial dressings dry. Sites soft and flat.  Care handed off to Aida.

## 2024-05-10 NOTE — PLAN OF CARE
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 No  Secondary Emergency Contact: Samreen Beck   [place]  Mobile Phone: [phone]  Relation: Sister    Discharge Plan A: Home with family         [facility] [address] [place] [address] ROAD  [address] [facility] [address]  Phone: [phone] Fax: [phone]    [facility] [address] [address]  Phone: [phone] Fax: [phone]      Initial Assessment (most recent)       Adult Discharge Assessment - 05/10/24 1042          Discharge Assessment    Assessment Type Discharge Planning Assessment     Confirmed/corrected address, phone number and insurance Yes     Confirmed Demographics Correct on Facesheet     Source of Information patient     Communicated MITCHELL with patient/caregiver Date not available/Unable to determine     Reason For Admission mitral valve mass     People in Home sibling(s);other relative(s)     Do you expect to return to your current living situation? Yes     Do you have help at home or someone to help you manage your care at home? Yes     Who are your caregiver(s) and their phone number(s)? 2 sisters and nephew     Prior to hospitilization cognitive status: Alert/Oriented     Current cognitive status: Alert/Oriented     Walking or Climbing Stairs Difficulty no      Dressing/Bathing Difficulty no     Home Accessibility wheelchair accessible     Home Layout Able to live on 1st floor     Equipment Currently Used at Home none     Readmission within 30 days? No     Patient currently being followed by outpatient case management? No     Do you currently have service(s) that help you manage your care at home? No     Do you take prescription medications? Yes     Do you have prescription coverage? Yes     Coverage BCBS     Do you have any problems affording any of your prescribed medications? No     Is the patient taking medications as prescribed? yes     Who is going to help you get home at discharge? family     How do you get to doctors appointments? car, drives self;family or friend will provide     Are you on dialysis? No     Do you take coumadin? No     Discharge Plan A Home with family     DME Needed Upon Discharge  none     Discharge Plan discussed with: Patient;Sibling     Name(s) and Number(s) sisterLissette     Transition of Care Barriers None                   Anticipated DC dispo: Home with family  Prior Level of Function: Independent   People in home:  2 sisters and nephew     Comments:  CM met with patient at bedside to introduce role and discuss discharge planning. Siblings and nephew  will be help at home and can provide transport at time of discharge. CM discharge needs depends on hospital progress. CM will continue following to assist with other needs.

## 2024-05-10 NOTE — SUBJECTIVE & OBJECTIVE
Review of Systems   Constitutional: Negative.   HENT: Negative.     Eyes: Negative.    Cardiovascular: Negative.    Respiratory: Negative.     Endocrine: Negative.    Hematologic/Lymphatic: Negative.    Skin: Negative.    Musculoskeletal: Negative.    Gastrointestinal: Negative.    Genitourinary: Negative.    Neurological: Negative.    Psychiatric/Behavioral: Negative.     Allergic/Immunologic: Negative.      Objective:     Vital Signs (Most Recent):  Temp: 97.7 °F (36.5 °C) (05/10/24 1250)  Pulse: 75 (05/10/24 1300)  Resp: 14 (05/10/24 1300)  BP: 98/60 (05/10/24 1300)  SpO2: 98 % (05/10/24 1300) Vital Signs (24h Range):  Temp:  [97.7 °F (36.5 °C)-98.5 °F (36.9 °C)] 97.7 °F (36.5 °C)  Pulse:  [62-86] 75  Resp:  [14-22] 14  SpO2:  [97 %-100 %] 98 %  BP: ()/(56-81) 98/60     Weight: 102.4 kg (225 lb 12 oz)  Body mass index is 38.75 kg/m².     SpO2: 98 %         Intake/Output Summary (Last 24 hours) at 5/10/2024 1311  Last data filed at 5/10/2024 0815  Gross per 24 hour   Intake 0 ml   Output --   Net 0 ml       Lines/Drains/Airways       Peripheral Intravenous Line  Duration                  Peripheral IV - Single Lumen 05/09/24 1055 20 G Right Antecubital 1 day                       Physical Exam  Vitals and nursing note reviewed.   Constitutional:       General: She is not in acute distress.     Appearance: Normal appearance. She is well-developed. She is not diaphoretic.   HENT:      Head: Normocephalic and atraumatic.   Eyes:      General:         Right eye: No discharge.         Left eye: No discharge.      Pupils: Pupils are equal, round, and reactive to light.   Cardiovascular:      Rate and Rhythm: Normal rate and regular rhythm.      Heart sounds: Normal heart sounds, S1 normal and S2 normal. No murmur heard.  Pulmonary:      Effort: Pulmonary effort is normal. No respiratory distress.      Breath sounds: Normal breath sounds.   Abdominal:      General: There is no distension.      Tenderness: There  is no abdominal tenderness.   Skin:     General: Skin is warm and dry.      Findings: No erythema.   Neurological:      General: No focal deficit present.      Mental Status: She is alert and oriented to person, place, and time.   Psychiatric:         Mood and Affect: Mood normal.         Behavior: Behavior normal.         Thought Content: Thought content normal.            Significant Labs: CMP   Recent Labs   Lab 05/09/24  1055 05/10/24  0559    140   K 3.9 4.0    107   CO2 24 24   GLU 91 96   BUN 11 16   CREATININE 0.8 0.7   CALCIUM 10.2 9.4   PROT 7.9 6.6   ALBUMIN 3.7 3.4*   BILITOT 0.4 0.7   ALKPHOS 70 60   AST 17 13   ALT 19 13   ANIONGAP 10 9   , CBC   Recent Labs   Lab 05/09/24  1055 05/10/24  0559   WBC 6.10 5.33   HGB 12.7 11.6*   HCT 41.5 37.2    329   , Troponin   Recent Labs   Lab 05/09/24  1055   TROPONINI <0.006   , and All pertinent lab results from the last 24 hours have been reviewed.    Significant Imaging: Echocardiogram: Transthoracic echo (TTE) complete (Cupid Only):   Results for orders placed or performed during the hospital encounter of 05/09/24   Echo   Result Value Ref Range    BSA 2.15 m2    LVOT stroke volume 76.30 cm3    LVIDd 4.97 3.5 - 6.0 cm    LV Systolic Volume 41.28 mL    LV Systolic Volume Index 20.0 mL/m2    LVIDs 3.21 2.1 - 4.0 cm    LV Diastolic Volume 116.46 mL    LV Diastolic Volume Index 56.53 mL/m2    IVS 0.86 0.6 - 1.1 cm    LVOT diameter 2.00 cm    LVOT area 3.1 cm2    FS 35 28 - 44 %    Left Ventricle Relative Wall Thickness 0.43 cm    Posterior Wall 1.06 0.6 - 1.1 cm    LV mass 170.59 g    LV Mass Index 83 g/m2    MV Peak E Talon 1.03 m/s    TDI LATERAL 0.08 m/s    TDI SEPTAL 0.06 m/s    E/E' ratio 14.71 m/s    MV Peak A Talon 1.49 m/s    TR Max Talon 2.60 m/s    E/A ratio 0.69     IVRT 91.34 msec    E wave deceleration time 340.08 msec    LV SEPTAL E/E' RATIO 17.17 m/s    LV LATERAL E/E' RATIO 12.88 m/s    PV Peak S Talon 0.56 m/s    PV Peak D Talon 0.40 m/s     Pulm vein S/D ratio 1.40     LVOT peak edmund 1.11 m/s    Left Ventricular Outflow Tract Mean Velocity 0.81 cm/s    Left Ventricular Outflow Tract Mean Gradient 2.88 mmHg    RVDD 4.52 cm    RVOT peak VTI 21.3 cm    TAPSE 2.02 cm    RV/LV Ratio 0.91 cm    LA size 4.14 cm    Left Atrium Minor Axis 6.40 cm    Left Atrium Major Axis 5.65 cm    LA volume (mod) 87.26 cm3    LA Volume Index (Mod) 42.4 mL/m2    RA Major Axis 5.80 cm    RA Width 4.46 cm    AV mean gradient 4 mmHg    AV peak gradient 7 mmHg    Ao peak edmund 1.31 m/s    Ao VTI 28.60 cm    LVOT peak VTI 24.30 cm    AV valve area 2.67 cm²    AV Velocity Ratio 0.85     AV index (prosthetic) 0.85     BÁRBARA by Velocity Ratio 2.66 cm²    MV stenosis pressure 1/2 time 98.62 ms    MV valve area p 1/2 method 2.23 cm2    Triscuspid Valve Regurgitation Peak Gradient 27 mmHg    PV mean gradient 2 mmHg    PV PEAK VELOCITY 0.95 m/s    PV peak gradient 4 mmHg    Pulmonary Valve Mean Velocity 0.68 m/s    RVOT peak edmund 0.77 m/s    Ao root annulus 2.70 cm    Sinus 2.68 cm    STJ 2.73 cm    Ascending aorta 3.17 cm    IVC diameter 1.26 cm    Mean e' 0.07 m/s    ZLVIDS -1.35     ZLVIDD -2.24     LA Volume Index 49.2 mL/m2    LA volume 101.38 cm3    LA WIDTH 4.8 cm    TV resting pulmonary artery pressure 30 mmHg    RV TB RVSP 6 mmHg    Est. RA pres 3 mmHg    Narrative      Left Ventricle: The left ventricle is normal in size. Normal wall   thickness. There is concentric remodeling. There is normal systolic   function with a visually estimated ejection fraction of 60 - 65%. There is   diastolic dysfunction.    Right Ventricle: Normal right ventricular cavity size. Wall thickness   is normal. Systolic function is normal.    Left Atrium: Left atrium is severely dilated. There is a 3.4 cm x 2.6   cm fixed irregular mass that appears to be myxoma attached to anterior   mitral valve leaflet.    Mitral Valve: There is mild regurgitation.    Pulmonary Artery: The estimated pulmonary artery  systolic pressure is   30 mmHg.    IVC/SVC: Normal venous pressure at 3 mmHg.    There is a 3.4 cm x 2.6 cm fixed irregular mass that appears to be   myxoma attached to anterior mitral valve leaflet.     , EKG: Reviewed, and X-Ray: CXR: X-Ray Chest 1 View (CXR): No results found for this visit on 05/09/24. and X-Ray Chest PA and Lateral (CXR): No results found for this visit on 05/09/24.

## 2024-05-10 NOTE — PROGRESS NOTES
[facility]  Progress Note    Patient Name: Jake Hoskins  MRN: [id number]  Patient Class: IP- Inpatient   Admission Date: 5/9/2024  Length of Stay: 1 days  Attending Physician: Garo Yancey MD  Primary Care Provider: Angelica Lagunas MD        Subjective:     Principal Problem:Mitral valve mass        HPI:  59yr old with seropositive RA, HTN, Anemia on chronic immunosuppression with MTX and prednisone (Not taking it) presents after a syncopal episode 2 weeks ago. She had a similar episode 10 years ago and required CPR by EMS. She had stress test and other work up but not an echo.  At this time she has uncontrolled BP but no symptoms. Her  had CABG and developed a fib requiring amiodarone which then resulted in ILD and his demise. She is anxious about surgery. Her sister who weighs half as much is at the bedside. I looked at the echo and explained to them the significance of the findings and need for surgery input.    Overview/Hospital Course:  Patient is sent from Cardiology office for a large mass on the mitral valve.  Patient is planned for a right/left heart catheterization today with Dr. Buchanan.  Appreciate CT surgery assistance as well.     Interval History:  Follow up for mitral valve mass.  Patient is sitting up on the side of bed with her sister at bedside in no acute distress.  Patient denies any chest pain or shortness a breath at this time.  Patient is awaiting heart catheterization with Cardiology at 1:30 p.m. today.  Further recommendations per Cardiology and CT surgery upon completion of the heart catheterization.    Review of Systems  Objective:     Vital Signs (Most Recent):  Temp: 98.5 °F (36.9 °C) (05/10/24 0415)  Pulse: 69 (05/10/24 0832)  Resp: 18 (05/10/24 0415)  BP: 105/60 (05/10/24 0415)  SpO2: 97 % (05/10/24 0415) Vital Signs (24h Range):  Temp:  [97.9 °F (36.6 °C)-98.5 °F (36.9 °C)] 98.5 °F (36.9 °C)  Pulse:  [62-86] 69  Resp:  [17-22] 18  SpO2:  [97  %-100 %] 97 %  BP: (105-168)/(60-96) 105/60     Weight: 102.4 kg (225 lb 12 oz)  Body mass index is 38.75 kg/m².    Intake/Output Summary (Last 24 hours) at 5/10/2024 0911  Last data filed at 5/9/2024 1736  Gross per 24 hour   Intake 0 ml   Output --   Net 0 ml         Physical Exam  Vitals and nursing note reviewed.   Constitutional:       Appearance: Normal appearance.   HENT:      Head: Normocephalic and atraumatic.      Nose: Nose normal.      Mouth/Throat:      Mouth: Mucous membranes are moist.   Eyes:      Extraocular Movements: Extraocular movements intact.      Conjunctiva/sclera: Conjunctivae normal.   Cardiovascular:      Rate and Rhythm: Normal rate and regular rhythm.      Pulses: Normal pulses.      Heart sounds: Normal heart sounds.   Pulmonary:      Effort: Pulmonary effort is normal.      Breath sounds: Normal breath sounds.   Abdominal:      General: Abdomen is flat. Bowel sounds are normal.      Palpations: Abdomen is soft.   Musculoskeletal:         General: Normal range of motion.      Cervical back: Normal range of motion and neck supple.   Skin:     General: Skin is warm.      Capillary Refill: Capillary refill takes less than 2 seconds.   Neurological:      Mental Status: She is alert and oriented to person, place, and time. Mental status is at baseline.   Psychiatric:         Mood and Affect: Mood normal.         Behavior: Behavior normal.             Significant Labs: All pertinent labs within the past 24 hours have been reviewed.  Recent Lab Results         05/10/24  0559   05/09/24  1055   05/09/24  1001        Albumin 3.4   3.7         ALP 60   70         ALT 13   19         Anion Gap 9   10         Ao root annulus     2.70       Ascending aorta     3.17       Ao peak edmund     1.31       Ao VTI     28.60       PTT   31.1  Comment: Refer to local heparin nomogram for intensity/dose specific   therapeutic   range.           AST 13   17         AV valve area     2.67       BÁRBARA by Velocity  Ratio     2.66       AV mean gradient     4       AV index (prosthetic)     0.85       AV peak gradient     7       AV Velocity Ratio     0.85       Baso # 0.04   0.05         Basophil % 0.8   0.8         BILIRUBIN TOTAL 0.7  Comment: For infants and newborns, interpretation of results should be based  on gestational age, weight and in agreement with clinical  observations.    Premature Infant recommended reference ranges:  Up to 24 hours.............<8.0 mg/dL  Up to 48 hours............<12.0 mg/dL  3-5 days..................<15.0 mg/dL  6-29 days.................<15.0 mg/dL     0.4  Comment: For infants and newborns, interpretation of results should be based  on gestational age, weight and in agreement with clinical  observations.    Premature Infant recommended reference ranges:  Up to 24 hours.............<8.0 mg/dL  Up to 48 hours............<12.0 mg/dL  3-5 days..................<15.0 mg/dL  6-29 days.................<15.0 mg/dL           BSA     2.15       BUN 16   11         Calcium 9.4   10.2         Chloride 107   106         CO2 24   24         Creatinine 0.7   0.8         Left Ventricle Relative Wall Thickness     0.43       Differential Method Automated   Automated         E/A ratio     0.69       E/E' ratio     14.71       eGFR >60   >60         Eos # 0.2   0.2         Eos % 3.2   2.8         E wave deceleration time     340.08       FS     35       Glucose 96   91         Gran # (ANC) 2.7   2.9         Gran % 50.0   47.9         Hematocrit 37.2   41.5         Hemoglobin 11.6   12.7         Hepatitis C Ab   Negative         HEP C Virus Hold Specimen   Hold for HCV sendout         HIV 1/2 Ag/Ab   Negative         Immature Grans (Abs) 0.03  Comment: Mild elevation in immature granulocytes is non specific and   can be seen in a variety of conditions including stress response,   acute inflammation, trauma and pregnancy. Correlation with other   laboratory and clinical findings is essential.      0.02  Comment: Mild elevation in immature granulocytes is non specific and   can be seen in a variety of conditions including stress response,   acute inflammation, trauma and pregnancy. Correlation with other   laboratory and clinical findings is essential.           Immature Granulocytes 0.6   0.3         INR   1.0  Comment: Coumadin Therapy:  2.0 - 3.0 for INR for all indicators except mechanical heart valves  and antiphospholipid syndromes which should use 2.5 - 3.5.           IVC diameter     1.26       IVRT     91.34       IVSd     0.86       LA WIDTH     4.8       Left Atrium Major Axis     5.65       Left Atrium Minor Axis     6.40       LA size     4.14       LA volume     101.38            87.26       LA vol index     49.2       LA Volume Index (Mod)     42.4       LVOT area     3.1       LV LATERAL E/E' RATIO     12.88       LV SEPTAL E/E' RATIO     17.17       LV EDV BP     116.46       LV Diastolic Volume Index     56.53       LVIDd     4.97       LVIDs     3.21       LV mass     170.59       LV Mass Index     83       Left Ventricular Outflow Tract Mean Gradient     2.88       Left Ventricular Outflow Tract Mean Velocity     0.81       LVOT diameter     2.00       LVOT peak talon     1.11       LVOT stroke volume     76.30       LVOT peak VTI     24.30       LV ESV BP     41.28       LV Systolic Volume Index     20.0       Lymph # 1.8   2.4         Lymph % 33.0   39.0         MCH 25.1   24.5         MCHC 31.2   30.6         MCV 81   80         Mean e'     0.07       Mono # 0.7   0.6         Mono % 12.4   9.2         MPV 10.0   10.0         MV valve area p 1/2 method     2.23       MV Peak A Talon     1.49       MV Peak E Talon     1.03       MV stenosis pressure 1/2 time     98.62       nRBC 0   0         Platelet Count 329   371         Potassium 4.0   3.9         PROTEIN TOTAL 6.6   7.9         PT   11.2         Pulmonary Valve Mean Velocity     0.68       PV mean gradient     2       PV peak gradient      4       PV Peak D Talon     0.40       PV Peak S Talon     0.56       PV PEAK VELOCITY     0.95       Pulm vein S/D ratio     1.40       Posterior Wall     1.06       RA Major Axis     5.80       Est. RA pres     3       RA Width     4.46       RBC 4.62   5.18         RDW 14.9   15.0         RV TB RVSP     6       RV/LV Ratio     0.91       RVDD     4.52       RVOT peak talon     0.77       RVOT peak VTI     21.3       Sinus     2.68       Sodium 140   140         STJ     2.73       TAPSE     2.02       TDI SEPTAL     0.06       TDI LATERAL     0.08       Triscuspid Valve Regurgitation Peak Gradient     27       TR Max Talon     2.60       Troponin I   <0.006  Comment: The reference interval for Troponin I represents the 99th percentile   cutoff   for our facility and is consistent with 3rd generation assay   performance.           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      Encephalopathy, metabolic  Post syncope   ? Sz vs hypercapnia but regained senses soon after.  -resolved        Syncope and collapse  -likely secondary to mitral valve mass    Rheumatoid arthritis involving multiple sites with positive rheumatoid factor  -On chronic immunosuppression  -Takes MTX regularly but not compliant with prednisone.   -hold immunosuppression until cleared by surgery.      Essential hypertension  Chronic, uncontrolled. Latest blood pressure and vitals reviewed-     Temp:  [97.9 °F (36.6 °C)-98.5 °F (36.9 °C)]   Pulse:  [62-86]   Resp:  [17-22]   BP: (105-168)/(60-96)   SpO2:  [97 %-100 %] .   Home meds for hypertension were reviewed and noted below.   Hypertension Medications               losartan (COZAAR)  25 MG tablet Take 1 tablet (25 mg total) by mouth once daily.    olmesartan (BENICAR) 20 MG tablet Take 20 mg by mouth.            While in the hospital, will manage blood pressure as follows; Adjust home antihypertensive regimen as follows- See orders    Will utilize p.r.n. blood pressure medication only if patient's blood pressure greater than 140/90 and she develops symptoms such as worsening chest pain or shortness of breath.      VTE Risk Mitigation (From admission, onward)           Ordered     Reason for No Pharmacological VTE Prophylaxis  Once        Question:  Reasons:  Answer:  Physician Provided (leave comment)  Comment:  possible surgical intervention    05/09/24 1216     IP VTE HIGH RISK PATIENT  Once         05/09/24 1216     Place sequential compression device  Until discontinued         05/09/24 1216                    Discharge Planning   MITCHELL:      Code Status: Full Code   Is the patient medically ready for discharge?:     Reason for patient still in hospital (select all that apply): Patient trending condition, Treatment, and Consult recommendations                     Garo Yancey MD  Department of Hospital Medicine   [facility] Med S[facility]

## 2024-05-10 NOTE — NURSING
Pt remains free of falls/injury. Safety precautions maintained. Pt denies pain/discomfort.  Cardiac diet tolerated. VSS. No S/S of distress noted at this time. Pt had L and R heart cath.  Pt sat in chair for dinner w/o any issues as well as ambulated in the room throughout shift.  Pt family at bedside.

## 2024-05-11 LAB
ALBUMIN SERPL BCP-MCNC: 3.5 G/DL (ref 3.5–5.2)
ALP SERPL-CCNC: 63 U/L (ref 55–135)
ALT SERPL W/O P-5'-P-CCNC: 12 U/L (ref 10–44)
ANION GAP SERPL CALC-SCNC: 8 MMOL/L (ref 8–16)
APTT PPP: 28.5 SEC (ref 21–32)
AST SERPL-CCNC: 13 U/L (ref 10–40)
BASOPHILS # BLD AUTO: 0.06 K/UL (ref 0–0.2)
BASOPHILS NFR BLD: 0.9 % (ref 0–1.9)
BILIRUB SERPL-MCNC: 0.7 MG/DL (ref 0.1–1)
BILIRUB UR QL STRIP: NEGATIVE
BUN SERPL-MCNC: 14 MG/DL (ref 6–20)
CALCIUM SERPL-MCNC: 9.4 MG/DL (ref 8.7–10.5)
CHLORIDE SERPL-SCNC: 107 MMOL/L (ref 95–110)
CLARITY UR: CLEAR
CO2 SERPL-SCNC: 26 MMOL/L (ref 23–29)
COLOR UR: YELLOW
CREAT SERPL-MCNC: 0.8 MG/DL (ref 0.5–1.4)
DIFFERENTIAL METHOD BLD: ABNORMAL
EOSINOPHIL # BLD AUTO: 0.2 K/UL (ref 0–0.5)
EOSINOPHIL NFR BLD: 2.8 % (ref 0–8)
ERYTHROCYTE [DISTWIDTH] IN BLOOD BY AUTOMATED COUNT: 15.2 % (ref 11.5–14.5)
EST. GFR  (NO RACE VARIABLE): >60 ML/MIN/1.73 M^2
ESTIMATED AVG GLUCOSE: 117 MG/DL (ref 68–131)
GLUCOSE SERPL-MCNC: 101 MG/DL (ref 70–110)
GLUCOSE UR QL STRIP: NEGATIVE
HBA1C MFR BLD: 5.7 % (ref 4–5.6)
HCT VFR BLD AUTO: 39.8 % (ref 37–48.5)
HGB BLD-MCNC: 12.4 G/DL (ref 12–16)
HGB UR QL STRIP: NEGATIVE
IMM GRANULOCYTES # BLD AUTO: 0.04 K/UL (ref 0–0.04)
IMM GRANULOCYTES NFR BLD AUTO: 0.6 % (ref 0–0.5)
KETONES UR QL STRIP: NEGATIVE
LEUKOCYTE ESTERASE UR QL STRIP: ABNORMAL
LYMPHOCYTES # BLD AUTO: 2.3 K/UL (ref 1–4.8)
LYMPHOCYTES NFR BLD: 35.7 % (ref 18–48)
MCH RBC QN AUTO: 25.3 PG (ref 27–31)
MCHC RBC AUTO-ENTMCNC: 31.2 G/DL (ref 32–36)
MCV RBC AUTO: 81 FL (ref 82–98)
MICROSCOPIC COMMENT: NORMAL
MONOCYTES # BLD AUTO: 0.6 K/UL (ref 0.3–1)
MONOCYTES NFR BLD: 8.8 % (ref 4–15)
NEUTROPHILS # BLD AUTO: 3.2 K/UL (ref 1.8–7.7)
NEUTROPHILS NFR BLD: 51.2 % (ref 38–73)
NITRITE UR QL STRIP: NEGATIVE
NRBC BLD-RTO: 0 /100 WBC
PH UR STRIP: 6 [PH] (ref 5–8)
PLATELET # BLD AUTO: 374 K/UL (ref 150–450)
PMV BLD AUTO: 9.9 FL (ref 9.2–12.9)
POTASSIUM SERPL-SCNC: 3.9 MMOL/L (ref 3.5–5.1)
PROT SERPL-MCNC: 7.4 G/DL (ref 6–8.4)
PROT UR QL STRIP: NEGATIVE
RBC # BLD AUTO: 4.9 M/UL (ref 4–5.4)
RBC #/AREA URNS HPF: 1 /HPF (ref 0–4)
SODIUM SERPL-SCNC: 141 MMOL/L (ref 136–145)
SP GR UR STRIP: 1.02 (ref 1–1.03)
SQUAMOUS #/AREA URNS HPF: 2 /HPF
UNIDENT CRYS URNS QL MICRO: NORMAL
URN SPEC COLLECT METH UR: ABNORMAL
UROBILINOGEN UR STRIP-ACNC: NEGATIVE EU/DL
WBC # BLD AUTO: 6.33 K/UL (ref 3.9–12.7)
WBC #/AREA URNS HPF: 2 /HPF (ref 0–5)

## 2024-05-11 PROCEDURE — 85730 THROMBOPLASTIN TIME PARTIAL: CPT | Performed by: THORACIC SURGERY (CARDIOTHORACIC VASCULAR SURGERY)

## 2024-05-11 PROCEDURE — 85025 COMPLETE CBC W/AUTO DIFF WBC: CPT | Performed by: NURSE PRACTITIONER

## 2024-05-11 PROCEDURE — 94761 N-INVAS EAR/PLS OXIMETRY MLT: CPT

## 2024-05-11 PROCEDURE — 25000003 PHARM REV CODE 250: Performed by: FAMILY MEDICINE

## 2024-05-11 PROCEDURE — 11000001 HC ACUTE MED/SURG PRIVATE ROOM

## 2024-05-11 PROCEDURE — 36415 COLL VENOUS BLD VENIPUNCTURE: CPT | Performed by: THORACIC SURGERY (CARDIOTHORACIC VASCULAR SURGERY)

## 2024-05-11 PROCEDURE — 25000003 PHARM REV CODE 250: Performed by: NURSE PRACTITIONER

## 2024-05-11 PROCEDURE — 21400001 HC TELEMETRY ROOM

## 2024-05-11 PROCEDURE — 94010 BREATHING CAPACITY TEST: CPT

## 2024-05-11 PROCEDURE — 99900031 HC PATIENT EDUCATION (STAT)

## 2024-05-11 PROCEDURE — 36415 COLL VENOUS BLD VENIPUNCTURE: CPT | Performed by: NURSE PRACTITIONER

## 2024-05-11 PROCEDURE — 99233 SBSQ HOSP IP/OBS HIGH 50: CPT | Mod: ,,, | Performed by: STUDENT IN AN ORGANIZED HEALTH CARE EDUCATION/TRAINING PROGRAM

## 2024-05-11 PROCEDURE — 83036 HEMOGLOBIN GLYCOSYLATED A1C: CPT | Performed by: THORACIC SURGERY (CARDIOTHORACIC VASCULAR SURGERY)

## 2024-05-11 PROCEDURE — 81000 URINALYSIS NONAUTO W/SCOPE: CPT | Performed by: THORACIC SURGERY (CARDIOTHORACIC VASCULAR SURGERY)

## 2024-05-11 PROCEDURE — 80053 COMPREHEN METABOLIC PANEL: CPT | Performed by: NURSE PRACTITIONER

## 2024-05-11 RX ORDER — HYDROCODONE BITARTRATE AND ACETAMINOPHEN 500; 5 MG/1; MG/1
TABLET ORAL
Status: DISCONTINUED | OUTPATIENT
Start: 2024-05-11 | End: 2024-05-15 | Stop reason: ALTCHOICE

## 2024-05-11 RX ADMIN — FAMOTIDINE 20 MG: 20 TABLET ORAL at 08:05

## 2024-05-11 RX ADMIN — MONTELUKAST 10 MG: 10 TABLET, FILM COATED ORAL at 08:05

## 2024-05-11 RX ADMIN — LOSARTAN POTASSIUM 25 MG: 25 TABLET, FILM COATED ORAL at 08:05

## 2024-05-11 RX ADMIN — FAMOTIDINE 20 MG: 20 TABLET ORAL at 09:05

## 2024-05-11 NOTE — ASSESSMENT & PLAN NOTE
Chronic, uncontrolled. Latest blood pressure and vitals reviewed-     Temp:  [96.2 °F (35.7 °C)-98.1 °F (36.7 °C)]   Pulse:  []   Resp:  [14-18]   BP: ()/(53-78)   SpO2:  [96 %-100 %] .   Home meds for hypertension were reviewed and noted below.   Hypertension Medications               losartan (COZAAR) 25 MG tablet Take 1 tablet (25 mg total) by mouth once daily.    olmesartan (BENICAR) 20 MG tablet Take 20 mg by mouth.            While in the hospital, will manage blood pressure as follows; Adjust home antihypertensive regimen as follows- See orders    Will utilize p.r.n. blood pressure medication only if patient's blood pressure greater than 140/90 and she develops symptoms such as worsening chest pain or shortness of breath.

## 2024-05-11 NOTE — PLAN OF CARE
Patient resting in bed, remains free of falls and injuries this shift. VSS. No obvious s/sx of distress noted. No complaints of pain this shift. Puncture sites to L wrist and L brachial C/D/I, patient was able to remove wrist brace at 1430 with no complications and no signs of a hematoma at the site. Cardiac monitor in place as ordered. Preop surgery orders for Monday in progress and being completed as ordered. POC reviewed with the patient, verbalized understanding. No further needs at this time, call light within reach. Continue POC as ordered, chart check completed and all orders reviewed.

## 2024-05-11 NOTE — ASSESSMENT & PLAN NOTE
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

## 2024-05-11 NOTE — PROGRESS NOTES
[facility] Medicine  Progress Note    Patient Name: Jake Hoskins  MRN: [id number]  Patient Class: IP- Inpatient   Admission Date: 5/9/2024  Length of Stay: 2 days  Attending Physician: Garo Yancey MD  Primary Care Provider: Angelica Lagunas MD        Subjective:     Principal Problem:Mitral valve mass        HPI:  59yr old with seropositive RA, HTN, Anemia on chronic immunosuppression with MTX and prednisone (Not taking it) presents after a syncopal episode 2 weeks ago. She had a similar episode 10 years ago and required CPR by EMS. She had stress test and other work up but not an echo.  At this time she has uncontrolled BP but no symptoms. Her  had CABG and developed a fib requiring amiodarone which then resulted in ILD and his demise. She is anxious about surgery. Her sister who weighs half as much is at the bedside. I looked at the echo and explained to them the significance of the findings and need for surgery input.    Overview/Hospital Course:  Patient is sent from Cardiology office for a large mass on the mitral valve.  Patient is planned for a right/left heart catheterization today with Dr. Buchanan.  Appreciate CT surgery assistance as well.  Plan for OR with Dr. Sanchez on 05/13/2024    Interval History:  Follow up for myxoma.  Patient is seen and examined the family at bedside.  She denies any chest pain or shortness a breath this time.  Plan of care discussed with patient and her family.  All of patient's questions were answered prior to my arrival as she would spoken to CT surgery.    Review of Systems  Objective:     Vital Signs (Most Recent):  Temp: 97.8 °F (36.6 °C) (05/11/24 1249)  Pulse: 78 (05/11/24 1249)  Resp: 18 (05/11/24 1249)  BP: 124/78 (05/11/24 1249)  SpO2: 100 % (05/11/24 1249) Vital Signs (24h Range):  Temp:  [96.2 °F (35.7 °C)-98.1 °F (36.7 °C)] 97.8 °F (36.6 °C)  Pulse:  [] 78  Resp:  [14-18] 18  SpO2:  [96 %-100 %] 100 %  BP: ()/(53-78)  124/78     Weight: 102.6 kg (226 lb 3.1 oz)  Body mass index is 38.83 kg/m².  No intake or output data in the 24 hours ending 05/11/24 1457      Physical Exam    Vitals and nursing note reviewed.   Constitutional:       Appearance: Normal appearance.   HENT:      Head: Normocephalic and atraumatic.      Nose: Nose normal.      Mouth/Throat:      Mouth: Mucous membranes are moist.   Eyes:      Extraocular Movements: Extraocular movements intact.      Conjunctiva/sclera: Conjunctivae normal.   Cardiovascular:      Rate and Rhythm: Normal rate and regular rhythm.      Pulses: Normal pulses.      Heart sounds: Normal heart sounds.   Pulmonary:      Effort: Pulmonary effort is normal.      Breath sounds: Normal breath sounds.   Abdominal:      General: Abdomen is flat. Bowel sounds are normal.      Palpations: Abdomen is soft.   Musculoskeletal:         General: Normal range of motion.      Cervical back: Normal range of motion and neck supple.   Skin:     General: Skin is warm.      Capillary Refill: Capillary refill takes less than 2 seconds.   Neurological:      Mental Status: She is alert and oriented to person, place, and time. Mental status is at baseline.   Psychiatric:         Mood and Affect: Mood normal.         Behavior: Behavior normal.     Significant Labs: All pertinent labs within the past 24 hours have been reviewed.  Recent Lab Results         05/11/24  0903   05/11/24  0638        Albumin   3.5       ALP   63       ALT   12       Anion Gap   8       PTT 28.5  Comment: Refer to local heparin nomogram for intensity/dose specific   therapeutic   range.           AST   13       Baso #   0.06       Basophil %   0.9       BILIRUBIN TOTAL   0.7  Comment: For infants and newborns, interpretation of results should be based  on gestational age, weight and in agreement with clinical  observations.    Premature Infant recommended reference ranges:  Up to 24 hours.............<8.0 mg/dL  Up to 48  hours............<12.0 mg/dL  3-5 days..................<15.0 mg/dL  6-29 days.................<15.0 mg/dL         BUN   14       Calcium   9.4       Chloride   107       CO2   26       Creatinine   0.8       Differential Method   Automated       eGFR   >60       Eos #   0.2       Eos %   2.8       Glucose   101       Gran # (ANC)   3.2       Gran %   51.2       Hematocrit   39.8       Hemoglobin   12.4       Immature Grans (Abs)   0.04  Comment: Mild elevation in immature granulocytes is non specific and   can be seen in a variety of conditions including stress response,   acute inflammation, trauma and pregnancy. Correlation with other   laboratory and clinical findings is essential.         Immature Granulocytes   0.6       Lymph #   2.3       Lymph %   35.7       MCH   25.3       MCHC   31.2       MCV   81       Mono #   0.6       Mono %   8.8       MPV   9.9       nRBC   0       Platelet Count   374       Potassium   3.9       PROTEIN TOTAL   7.4       RBC   4.90       RDW   15.2       Sodium   141       WBC   6.33               Significant Imaging:   US Carotid Bilateral   Final Result      No evidence of a hemodynamically significant carotid bifurcation stenosis.         Electronically signed by: Federico Jean   Date:    05/11/2024   Time:    11:08      X-Ray Chest AP Portable   Final Result      No acute abnormality.         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 Sz vs hypercapnia but regained senses soon after.  -resolved        Syncope and collapse  -likely secondary to mitral valve mass    Rheumatoid arthritis involving multiple sites with positive rheumatoid factor  -On chronic immunosuppression  -Takes MTX  regularly but not compliant with prednisone.   -hold immunosuppression until cleared by surgery.      Essential hypertension  Chronic, uncontrolled. Latest blood pressure and vitals reviewed-     Temp:  [96.2 °F (35.7 °C)-98.1 °F (36.7 °C)]   Pulse:  []   Resp:  [14-18]   BP: ()/(53-78)   SpO2:  [96 %-100 %] .   Home meds for hypertension were reviewed and noted below.   Hypertension Medications               losartan (COZAAR) 25 MG tablet Take 1 tablet (25 mg total) by mouth once daily.    olmesartan (BENICAR) 20 MG tablet Take 20 mg by mouth.            While in the hospital, will manage blood pressure as follows; Adjust home antihypertensive regimen as follows- See orders    Will utilize p.r.n. blood pressure medication only if patient's blood pressure greater than 140/90 and she develops symptoms such as worsening chest pain or shortness of breath.      VTE Risk Mitigation (From admission, onward)           Ordered     Reason for No Pharmacological VTE Prophylaxis  Once        Question:  Reasons:  Answer:  Physician Provided (leave comment)  Comment:  possible surgical intervention    05/09/24 1216     IP VTE HIGH RISK PATIENT  Once         05/09/24 1216     Place sequential compression device  Until discontinued         05/09/24 1216                    Discharge Planning   MITCHELL:      Code Status: Full Code   Is the patient medically ready for discharge?:     Reason for patient still in hospital (select all that apply): Patient trending condition, Laboratory test, Treatment, and Consult recommendations  Discharge Plan A: Home with family                  Garo Yancey MD  Department of Hospital Medicine   [facility] Med [facility]

## 2024-05-11 NOTE — SUBJECTIVE & OBJECTIVE
Interval History:  Follow up for myxoma.  Patient is seen and examined the family at bedside.  She denies any chest pain or shortness a breath this time.  Plan of care discussed with patient and her family.  All of patient's questions were answered prior to my arrival as she would spoken to CT surgery.    Review of Systems  Objective:     Vital Signs (Most Recent):  Temp: 97.8 °F (36.6 °C) (05/11/24 1249)  Pulse: 78 (05/11/24 1249)  Resp: 18 (05/11/24 1249)  BP: 124/78 (05/11/24 1249)  SpO2: 100 % (05/11/24 1249) Vital Signs (24h Range):  Temp:  [96.2 °F (35.7 °C)-98.1 °F (36.7 °C)] 97.8 °F (36.6 °C)  Pulse:  [] 78  Resp:  [14-18] 18  SpO2:  [96 %-100 %] 100 %  BP: ()/(53-78) 124/78     Weight: 102.6 kg (226 lb 3.1 oz)  Body mass index is 38.83 kg/m².  No intake or output data in the 24 hours ending 05/11/24 1457      Physical Exam    Vitals and nursing note reviewed.   Constitutional:       Appearance: Normal appearance.   HENT:      Head: Normocephalic and atraumatic.      Nose: Nose normal.      Mouth/Throat:      Mouth: Mucous membranes are moist.   Eyes:      Extraocular Movements: Extraocular movements intact.      Conjunctiva/sclera: Conjunctivae normal.   Cardiovascular:      Rate and Rhythm: Normal rate and regular rhythm.      Pulses: Normal pulses.      Heart sounds: Normal heart sounds.   Pulmonary:      Effort: Pulmonary effort is normal.      Breath sounds: Normal breath sounds.   Abdominal:      General: Abdomen is flat. Bowel sounds are normal.      Palpations: Abdomen is soft.   Musculoskeletal:         General: Normal range of motion.      Cervical back: Normal range of motion and neck supple.   Skin:     General: Skin is warm.      Capillary Refill: Capillary refill takes less than 2 seconds.   Neurological:      Mental Status: She is alert and oriented to person, place, and time. Mental status is at baseline.   Psychiatric:         Mood and Affect: Mood normal.         Behavior:  Behavior normal.     Significant Labs: All pertinent labs within the past 24 hours have been reviewed.  Recent Lab Results         05/11/24  0903   05/11/24  0638        Albumin   3.5       ALP   63       ALT   12       Anion Gap   8       PTT 28.5  Comment: Refer to local heparin nomogram for intensity/dose specific   therapeutic   range.           AST   13       Baso #   0.06       Basophil %   0.9       BILIRUBIN TOTAL   0.7  Comment: For infants and newborns, interpretation of results should be based  on gestational age, weight and in agreement with clinical  observations.    Premature Infant recommended reference ranges:  Up to 24 hours.............<8.0 mg/dL  Up to 48 hours............<12.0 mg/dL  3-5 days..................<15.0 mg/dL  6-29 days.................<15.0 mg/dL         BUN   14       Calcium   9.4       Chloride   107       CO2   26       Creatinine   0.8       Differential Method   Automated       eGFR   >60       Eos #   0.2       Eos %   2.8       Glucose   101       Gran # (ANC)   3.2       Gran %   51.2       Hematocrit   39.8       Hemoglobin   12.4       Immature Grans (Abs)   0.04  Comment: Mild elevation in immature granulocytes is non specific and   can be seen in a variety of conditions including stress response,   acute inflammation, trauma and pregnancy. Correlation with other   laboratory and clinical findings is essential.         Immature Granulocytes   0.6       Lymph #   2.3       Lymph %   35.7       MCH   25.3       MCHC   31.2       MCV   81       Mono #   0.6       Mono %   8.8       MPV   9.9       nRBC   0       Platelet Count   374       Potassium   3.9       PROTEIN TOTAL   7.4       RBC   4.90       RDW   15.2       Sodium   141       WBC   6.33               Significant Imaging:   US Carotid Bilateral   Final Result      No evidence of a hemodynamically significant carotid bifurcation stenosis.         Electronically signed by: Federico Jean   Date:    05/11/2024    Time:    11:08      X-Ray Chest AP Portable   Final Result      No acute abnormality.         Electronically signed by: Federico Jean   Date:    05/11/2024   Time:    09:58

## 2024-05-11 NOTE — PROGRESS NOTES
[facility] ([facility])  Cardiology  Progress Note     Patient Name: Jake Hoskins  MRN: [id number]  Admission Date: 5/9/2024  Hospital Length of Stay: 1 days  Code Status: Full Code   Attending Physician: Garo Yancey MD   Primary Care Physician: Angelica Lagunas MD  Expected Discharge Date:   Principal Problem:Mitral valve mass     Subjective:   HPI:  Ms. Hoskins is a 59 year old female patient whose current medical conditions include HTN and morbid obesity who was sent to [facility] from cardiology clinic due to abnormal echo. Patient had previously seen Dr. Buchanan due to syncopal episode two weeks ago and echo was ordered for further evaluation. 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 Cardiology consulted to assist with management. Patient seen and examined today in ED. Stable CV wise. No CP or SOB. No recurrent syncope. No s/s of TIA/CVA. CT surgery consulted. R/[facility] planned tmw.      Hospital Course:   5/10/24-Patient seen and examined today, resting in bed. Stable. No AEON. No CV complaints. Labs stable. R/LHC today. CTS on board.     5/11/24- Patient seen and examined today, resting in bed. She feels good. No overnight problems. Heart cath went fine with no blockages. Patient denies any CP. Labs are stable. Blood pressure was a bit high. Will increase losartan dosage.         Review of Systems   Constitutional: Negative.   HENT: Negative.     Eyes: Negative.    Cardiovascular: Negative.    Respiratory: Negative.     Endocrine: Negative.    Hematologic/Lymphatic: Negative.    Skin: Negative.    Musculoskeletal: Negative.    Gastrointestinal: Negative.    Genitourinary: Negative.    Neurological: Negative.    Psychiatric/Behavioral: Negative.     Allergic/Immunologic: Negative.       Objective:      Vital Signs (Most Recent):  Temp: 97.7 °F (36.5 °C) (05/10/24 1250)  Pulse: 75 (05/10/24 1300)  Resp: 14 (05/10/24 1300)  BP: 98/60 (05/10/24 1300)  SpO2: 98 % (05/10/24 1300)  Vital Signs (24h Range):  Temp:  [97.7 °F (36.5 °C)-98.5 °F (36.9 °C)] 97.7 °F (36.5 °C)  Pulse:  [62-86] 75  Resp:  [14-22] 14  SpO2:  [97 %-100 %] 98 %  BP: ()/(56-81) 98/60      Weight: 102.4 kg (225 lb 12 oz)  Body mass index is 38.75 kg/m².     SpO2: 98 %           Intake/Output Summary (Last 24 hours) at 5/10/2024 1311  Last data filed at 5/10/2024 0815      Gross per 24 hour   Intake 0 ml   Output --   Net 0 ml         Lines/Drains/Airways         Peripheral Intravenous Line  Duration                     Peripheral IV - Single Lumen 05/09/24 1055 20 G Right Antecubital 1 day                             Physical Exam  Vitals and nursing note reviewed.   Constitutional:       General: She is not in acute distress.     Appearance: Normal appearance. She is well-developed. She is not diaphoretic.   HENT:      Head: Normocephalic and atraumatic.   Eyes:      General:         Right eye: No discharge.         Left eye: No discharge.      Pupils: Pupils are equal, round, and reactive to light.   Cardiovascular:      Rate and Rhythm: Normal rate and regular rhythm.      Heart sounds: Normal heart sounds, S1 normal and S2 normal. No murmur heard.  Pulmonary:      Effort: Pulmonary effort is normal. No respiratory distress.      Breath sounds: Normal breath sounds.   Abdominal:      General: There is no distension.      Tenderness: There is no abdominal tenderness.   Skin:     General: Skin is warm and dry.      Findings: No erythema.   Neurological:      General: No focal deficit present.      Mental Status: She is alert and oriented to person, place, and time.   Psychiatric:         Mood and Affect: Mood normal.         Behavior: Behavior normal.         Thought Content: Thought content normal.               Significant Labs: CMP        Recent Labs   Lab 05/09/24  1055 05/10/24  0559    140   K 3.9 4.0    107   CO2 24 24   GLU 91 96   BUN 11 16   CREATININE 0.8 0.7   CALCIUM 10.2 9.4   PROT 7.9 6.6    ALBUMIN 3.7 3.4*   BILITOT 0.4 0.7   ALKPHOS 70 60   AST 17 13   ALT 19 13   ANIONGAP 10 9   , CBC        Recent Labs   Lab 05/09/24  1055 05/10/24  0559   WBC 6.10 5.33   HGB 12.7 11.6*   HCT 41.5 37.2    329   , Troponin       Recent Labs   Lab 05/09/24  1055   TROPONINI <0.006   , BMP  Lab Results   Component Value Date     05/11/2024    K 3.9 05/11/2024     05/11/2024    CO2 26 05/11/2024    BUN 14 05/11/2024    CREATININE 0.8 05/11/2024    CALCIUM 9.4 05/11/2024    ANIONGAP 8 05/11/2024    EGFRNORACEVR >60 05/11/2024       and All pertinent lab results from the last 24 hours have been reviewed.     Significant Imaging: Echocardiogram: Transthoracic echo (TTE) complete (Cupid Only):         Results for orders placed or performed during the hospital encounter of 05/09/24   Echo   Result Value Ref Range     BSA 2.15 m2     LVOT stroke volume 76.30 cm3     LVIDd 4.97 3.5 - 6.0 cm     LV Systolic Volume 41.28 mL     LV Systolic Volume Index 20.0 mL/m2     LVIDs 3.21 2.1 - 4.0 cm     LV Diastolic Volume 116.46 mL     LV Diastolic Volume Index 56.53 mL/m2     IVS 0.86 0.6 - 1.1 cm     LVOT diameter 2.00 cm     LVOT area 3.1 cm2     FS 35 28 - 44 %     Left Ventricle Relative Wall Thickness 0.43 cm     Posterior Wall 1.06 0.6 - 1.1 cm     LV mass 170.59 g     LV Mass Index 83 g/m2     MV Peak E Talon 1.03 m/s     TDI LATERAL 0.08 m/s     TDI SEPTAL 0.06 m/s     E/E' ratio 14.71 m/s     MV Peak A Talon 1.49 m/s     TR Max Talon 2.60 m/s     E/A ratio 0.69       IVRT 91.34 msec     E wave deceleration time 340.08 msec     LV SEPTAL E/E' RATIO 17.17 m/s     LV LATERAL E/E' RATIO 12.88 m/s     PV Peak S Talon 0.56 m/s     PV Peak D Talon 0.40 m/s     Pulm vein S/D ratio 1.40       LVOT peak talon 1.11 m/s     Left Ventricular Outflow Tract Mean Velocity 0.81 cm/s     Left Ventricular Outflow Tract Mean Gradient 2.88 mmHg     RVDD 4.52 cm     RVOT peak VTI 21.3 cm     TAPSE 2.02 cm     RV/LV Ratio 0.91 cm     LA  size 4.14 cm     Left Atrium Minor Axis 6.40 cm     Left Atrium Major Axis 5.65 cm     LA volume (mod) 87.26 cm3     LA Volume Index (Mod) 42.4 mL/m2     RA Major Axis 5.80 cm     RA Width 4.46 cm     AV mean gradient 4 mmHg     AV peak gradient 7 mmHg     Ao peak edmund 1.31 m/s     Ao VTI 28.60 cm     LVOT peak VTI 24.30 cm     AV valve area 2.67 cm²     AV Velocity Ratio 0.85       AV index (prosthetic) 0.85       BÁRBARA by Velocity Ratio 2.66 cm²     MV stenosis pressure 1/2 time 98.62 ms     MV valve area p 1/2 method 2.23 cm2     Triscuspid Valve Regurgitation Peak Gradient 27 mmHg     PV mean gradient 2 mmHg     PV PEAK VELOCITY 0.95 m/s     PV peak gradient 4 mmHg     Pulmonary Valve Mean Velocity 0.68 m/s     RVOT peak edmund 0.77 m/s     Ao root annulus 2.70 cm     Sinus 2.68 cm     STJ 2.73 cm     Ascending aorta 3.17 cm     IVC diameter 1.26 cm     Mean e' 0.07 m/s     ZLVIDS -1.35       ZLVIDD -2.24       LA Volume Index 49.2 mL/m2     LA volume 101.38 cm3     LA WIDTH 4.8 cm     TV resting pulmonary artery pressure 30 mmHg     RV TB RVSP 6 mmHg     Est. RA pres 3 mmHg     Narrative       Left Ventricle: The left ventricle is normal in size. Normal wall   thickness. There is concentric remodeling. There is normal systolic   function with a visually estimated ejection fraction of 60 - 65%. There is   diastolic dysfunction.    Right Ventricle: Normal right ventricular cavity size. Wall thickness   is normal. Systolic function is normal.    Left Atrium: Left atrium is severely dilated. There is a 3.4 cm x 2.6   cm fixed irregular mass that appears to be myxoma attached to anterior   mitral valve leaflet.    Mitral Valve: There is mild regurgitation.    Pulmonary Artery: The estimated pulmonary artery systolic pressure is   30 mmHg.    IVC/SVC: Normal venous pressure at 3 mmHg.    There is a 3.4 cm x 2.6 cm fixed irregular mass that appears to be   myxoma attached to anterior mitral valve leaflet.      , EKG:Normal  sinus rhythm   Possible Left atrial enlargement   Minimal voltage criteria for LVH, may be normal variant ( R in aVL )   Inferior infarct ,age undetermined   Abnormal ECG   When compared with ECG of 02-MAY-2024 09:22,   Inferior infarct is now Present  , and X-Ray: CXR: X-Ray Chest 1 View (CXR): No results found for this visit on 05/09/24. and X-Ray Chest PA and Lateral (CXR): No results found for this visit on 05/09/24.  Assessment and Plan:     * Mitral valve mass  -TTE with large mitral mass, concerning for myxoma  -CTS consulted  -R/LHC planned tmw     5/10/24  -Stable  -R/LHC today    5/11   Right heart cath with normal pressure   Left heart cath with normal coronaries       Getting PFTs today      Syncope and collapse  -Episode two weeks ago  -No recurrence  -Continue to monitor     Rheumatoid arthritis involving multiple sites with positive rheumatoid factor  -Mgmt as per primary team     Essential hypertension  -Continue home meds       5/11   Blood pressure was high today will go up on losartan 50 mg today      VTE Risk Mitigation (From admission, onward)              Ordered       heparin (porcine) injection  As needed (PRN)         05/10/24 1229       Reason for No Pharmacological VTE Prophylaxis  Once        Question:  Reasons:  Answer:  Physician Provided (leave comment)  Comment:  possible surgical intervention    05/09/24 1216       IP VTE HIGH RISK PATIENT  Once         05/09/24 1216       Place sequential compression device  Until discontinued         05/09/24 1216                            Cardiology  [facility] ([facility])

## 2024-05-11 NOTE — PROGRESS NOTES
Bedside spirometry complete, and copy placed in pt's folder. RT provided pt and family with mechanical ventilation education. Pt and family verbalized understanding and all questions answered.

## 2024-05-12 ENCOUNTER — ANESTHESIA EVENT (OUTPATIENT)
Dept: SURGERY | Facility: HOSPITAL | Age: 59
DRG: 228 | End: 2024-05-12
Payer: COMMERCIAL

## 2024-05-12 LAB
ABO + RH BLD: NORMAL
ALBUMIN SERPL BCP-MCNC: 3.4 G/DL (ref 3.5–5.2)
ALBUMIN SERPL BCP-MCNC: 3.4 G/DL (ref 3.5–5.2)
ALLENS TEST: NORMAL
ALP SERPL-CCNC: 60 U/L (ref 55–135)
ALP SERPL-CCNC: 62 U/L (ref 55–135)
ALT SERPL W/O P-5'-P-CCNC: 11 U/L (ref 10–44)
ALT SERPL W/O P-5'-P-CCNC: 12 U/L (ref 10–44)
ANION GAP SERPL CALC-SCNC: 7 MMOL/L (ref 8–16)
ANION GAP SERPL CALC-SCNC: 9 MMOL/L (ref 8–16)
AST SERPL-CCNC: 13 U/L (ref 10–40)
AST SERPL-CCNC: 13 U/L (ref 10–40)
BASOPHILS # BLD AUTO: 0.04 K/UL (ref 0–0.2)
BASOPHILS # BLD AUTO: 0.04 K/UL (ref 0–0.2)
BASOPHILS NFR BLD: 0.7 % (ref 0–1.9)
BASOPHILS NFR BLD: 0.7 % (ref 0–1.9)
BILIRUB SERPL-MCNC: 0.4 MG/DL (ref 0.1–1)
BILIRUB SERPL-MCNC: 0.6 MG/DL (ref 0.1–1)
BLD GP AB SCN CELLS X3 SERPL QL: NORMAL
BUN SERPL-MCNC: 11 MG/DL (ref 6–20)
BUN SERPL-MCNC: 12 MG/DL (ref 6–20)
CALCIUM SERPL-MCNC: 9.5 MG/DL (ref 8.7–10.5)
CALCIUM SERPL-MCNC: 9.5 MG/DL (ref 8.7–10.5)
CHLORIDE SERPL-SCNC: 105 MMOL/L (ref 95–110)
CHLORIDE SERPL-SCNC: 109 MMOL/L (ref 95–110)
CO2 SERPL-SCNC: 22 MMOL/L (ref 23–29)
CO2 SERPL-SCNC: 27 MMOL/L (ref 23–29)
CREAT SERPL-MCNC: 0.8 MG/DL (ref 0.5–1.4)
CREAT SERPL-MCNC: 0.8 MG/DL (ref 0.5–1.4)
DELSYS: NORMAL
DIFFERENTIAL METHOD BLD: ABNORMAL
DIFFERENTIAL METHOD BLD: ABNORMAL
EOSINOPHIL # BLD AUTO: 0.2 K/UL (ref 0–0.5)
EOSINOPHIL # BLD AUTO: 0.2 K/UL (ref 0–0.5)
EOSINOPHIL NFR BLD: 3.3 % (ref 0–8)
EOSINOPHIL NFR BLD: 3.5 % (ref 0–8)
ERYTHROCYTE [DISTWIDTH] IN BLOOD BY AUTOMATED COUNT: 14.9 % (ref 11.5–14.5)
ERYTHROCYTE [DISTWIDTH] IN BLOOD BY AUTOMATED COUNT: 15.1 % (ref 11.5–14.5)
EST. GFR  (NO RACE VARIABLE): >60 ML/MIN/1.73 M^2
EST. GFR  (NO RACE VARIABLE): >60 ML/MIN/1.73 M^2
GLUCOSE SERPL-MCNC: 130 MG/DL (ref 70–110)
GLUCOSE SERPL-MCNC: 96 MG/DL (ref 70–110)
HCO3 UR-SCNC: 24.6 MMOL/L (ref 24–28)
HCT VFR BLD AUTO: 38.5 % (ref 37–48.5)
HCT VFR BLD AUTO: 39.9 % (ref 37–48.5)
HGB BLD-MCNC: 11.9 G/DL (ref 12–16)
HGB BLD-MCNC: 12.4 G/DL (ref 12–16)
IMM GRANULOCYTES # BLD AUTO: 0.02 K/UL (ref 0–0.04)
IMM GRANULOCYTES # BLD AUTO: 0.02 K/UL (ref 0–0.04)
IMM GRANULOCYTES NFR BLD AUTO: 0.3 % (ref 0–0.5)
IMM GRANULOCYTES NFR BLD AUTO: 0.4 % (ref 0–0.5)
INR PPP: 1 (ref 0.8–1.2)
LYMPHOCYTES # BLD AUTO: 1.7 K/UL (ref 1–4.8)
LYMPHOCYTES # BLD AUTO: 1.9 K/UL (ref 1–4.8)
LYMPHOCYTES NFR BLD: 30.9 % (ref 18–48)
LYMPHOCYTES NFR BLD: 32.2 % (ref 18–48)
MCH RBC QN AUTO: 25.1 PG (ref 27–31)
MCH RBC QN AUTO: 25.2 PG (ref 27–31)
MCHC RBC AUTO-ENTMCNC: 30.9 G/DL (ref 32–36)
MCHC RBC AUTO-ENTMCNC: 31.1 G/DL (ref 32–36)
MCV RBC AUTO: 81 FL (ref 82–98)
MCV RBC AUTO: 81 FL (ref 82–98)
MODE: NORMAL
MONOCYTES # BLD AUTO: 0.5 K/UL (ref 0.3–1)
MONOCYTES # BLD AUTO: 0.6 K/UL (ref 0.3–1)
MONOCYTES NFR BLD: 10.7 % (ref 4–15)
MONOCYTES NFR BLD: 8.6 % (ref 4–15)
NEUTROPHILS # BLD AUTO: 2.8 K/UL (ref 1.8–7.7)
NEUTROPHILS # BLD AUTO: 3.4 K/UL (ref 1.8–7.7)
NEUTROPHILS NFR BLD: 52.5 % (ref 38–73)
NEUTROPHILS NFR BLD: 56.2 % (ref 38–73)
NRBC BLD-RTO: 0 /100 WBC
NRBC BLD-RTO: 0 /100 WBC
PCO2 BLDA: 36.7 MMHG (ref 35–45)
PH SMN: 7.43 [PH] (ref 7.35–7.45)
PLATELET # BLD AUTO: 348 K/UL (ref 150–450)
PLATELET # BLD AUTO: 349 K/UL (ref 150–450)
PMV BLD AUTO: 10.3 FL (ref 9.2–12.9)
PMV BLD AUTO: 10.4 FL (ref 9.2–12.9)
PO2 BLDA: 80 MMHG (ref 80–100)
POC BE: 0 MMOL/L
POC SATURATED O2: 96 % (ref 95–100)
POTASSIUM SERPL-SCNC: 3.9 MMOL/L (ref 3.5–5.1)
POTASSIUM SERPL-SCNC: 4 MMOL/L (ref 3.5–5.1)
PROT SERPL-MCNC: 7.2 G/DL (ref 6–8.4)
PROT SERPL-MCNC: 7.3 G/DL (ref 6–8.4)
PROTHROMBIN TIME: 11.3 SEC (ref 9–12.5)
RBC # BLD AUTO: 4.75 M/UL (ref 4–5.4)
RBC # BLD AUTO: 4.93 M/UL (ref 4–5.4)
SAMPLE: NORMAL
SITE: NORMAL
SODIUM SERPL-SCNC: 139 MMOL/L (ref 136–145)
SODIUM SERPL-SCNC: 140 MMOL/L (ref 136–145)
SPECIMEN OUTDATE: NORMAL
WBC # BLD AUTO: 5.41 K/UL (ref 3.9–12.7)
WBC # BLD AUTO: 6.05 K/UL (ref 3.9–12.7)

## 2024-05-12 PROCEDURE — 80053 COMPREHEN METABOLIC PANEL: CPT | Performed by: NURSE PRACTITIONER

## 2024-05-12 PROCEDURE — 80053 COMPREHEN METABOLIC PANEL: CPT | Mod: 91 | Performed by: THORACIC SURGERY (CARDIOTHORACIC VASCULAR SURGERY)

## 2024-05-12 PROCEDURE — 85025 COMPLETE CBC W/AUTO DIFF WBC: CPT | Performed by: NURSE PRACTITIONER

## 2024-05-12 PROCEDURE — 86920 COMPATIBILITY TEST SPIN: CPT | Performed by: THORACIC SURGERY (CARDIOTHORACIC VASCULAR SURGERY)

## 2024-05-12 PROCEDURE — 25000003 PHARM REV CODE 250: Performed by: NURSE PRACTITIONER

## 2024-05-12 PROCEDURE — 36415 COLL VENOUS BLD VENIPUNCTURE: CPT | Performed by: FAMILY MEDICINE

## 2024-05-12 PROCEDURE — 36415 COLL VENOUS BLD VENIPUNCTURE: CPT | Mod: XB | Performed by: THORACIC SURGERY (CARDIOTHORACIC VASCULAR SURGERY)

## 2024-05-12 PROCEDURE — 21400001 HC TELEMETRY ROOM

## 2024-05-12 PROCEDURE — 11000001 HC ACUTE MED/SURG PRIVATE ROOM

## 2024-05-12 PROCEDURE — 25000003 PHARM REV CODE 250: Performed by: FAMILY MEDICINE

## 2024-05-12 PROCEDURE — 99900035 HC TECH TIME PER 15 MIN (STAT)

## 2024-05-12 PROCEDURE — 36600 WITHDRAWAL OF ARTERIAL BLOOD: CPT

## 2024-05-12 PROCEDURE — 85610 PROTHROMBIN TIME: CPT | Performed by: THORACIC SURGERY (CARDIOTHORACIC VASCULAR SURGERY)

## 2024-05-12 PROCEDURE — 86901 BLOOD TYPING SEROLOGIC RH(D): CPT | Performed by: FAMILY MEDICINE

## 2024-05-12 PROCEDURE — 85025 COMPLETE CBC W/AUTO DIFF WBC: CPT | Mod: 91 | Performed by: THORACIC SURGERY (CARDIOTHORACIC VASCULAR SURGERY)

## 2024-05-12 PROCEDURE — 36415 COLL VENOUS BLD VENIPUNCTURE: CPT | Performed by: NURSE PRACTITIONER

## 2024-05-12 PROCEDURE — 82803 BLOOD GASES ANY COMBINATION: CPT

## 2024-05-12 RX ORDER — AMIODARONE HYDROCHLORIDE 200 MG/1
200 TABLET ORAL ONCE
Status: DISCONTINUED | OUTPATIENT
Start: 2024-05-13 | End: 2024-05-12

## 2024-05-12 RX ORDER — CHLORHEXIDINE GLUCONATE ORAL RINSE 1.2 MG/ML
10 SOLUTION DENTAL
Status: DISCONTINUED | OUTPATIENT
Start: 2024-05-12 | End: 2024-05-13 | Stop reason: HOSPADM

## 2024-05-12 RX ORDER — CEFAZOLIN SODIUM 2 G/50ML
2 SOLUTION INTRAVENOUS
Status: DISCONTINUED | OUTPATIENT
Start: 2024-05-12 | End: 2024-05-12

## 2024-05-12 RX ORDER — METOPROLOL TARTRATE 25 MG/1
25 TABLET, FILM COATED ORAL
Status: DISCONTINUED | OUTPATIENT
Start: 2024-05-12 | End: 2024-05-13 | Stop reason: HOSPADM

## 2024-05-12 RX ORDER — AMIODARONE HYDROCHLORIDE 200 MG/1
200 TABLET ORAL ONCE
Status: DISCONTINUED | OUTPATIENT
Start: 2024-05-12 | End: 2024-05-12

## 2024-05-12 RX ADMIN — MONTELUKAST 10 MG: 10 TABLET, FILM COATED ORAL at 09:05

## 2024-05-12 RX ADMIN — FAMOTIDINE 20 MG: 20 TABLET ORAL at 09:05

## 2024-05-12 RX ADMIN — LOSARTAN POTASSIUM 25 MG: 25 TABLET, FILM COATED ORAL at 09:05

## 2024-05-12 NOTE — PLAN OF CARE
Patient resting in bed, remains free of falls and injuries this shift. VSS. No obvious s/sx of distress noted. No complaints of pain this shift. No signs of hematoma or bleeding at puncture sites to L wrist and brachial from 5/10. Cardiac monitor in place as ordered. Preop surgery orders for Monday in progress and being completed as ordered. POC reviewed with the patient, verbalized understanding. No further needs at this time, call light within reach. Continue POC as ordered, chart check completed and all orders reviewed.

## 2024-05-12 NOTE — ANESTHESIA PREPROCEDURE EVALUATION
05/12/2024  Jake Hoskins is a 59 y.o., female.    59yr old with seropositive RA, HTN, Anemia on chronic immunosuppression with MTX and prednisone (Not taking it) presents after a syncopal episode 2 weeks ago.  Echo consistent with atrial myxoma.      Patient Active Problem List   Diagnosis    Anemia    Plantar fasciitis of left foot    Essential hypertension    Cervical polyp    S/P total hysterectomy and BSO (bilateral salpingo-oophorectomy)    Rheumatoid arthritis involving multiple sites with positive rheumatoid factor    Breast mass, left    Mitral valve mass    Syncope and collapse    Encephalopathy, metabolic     Past Surgical History:   Procedure Laterality Date    COLONOSCOPY N/A 04/09/2021    Procedure: COLONOSCOPY;  Surgeon: Hua Elmore MD;  Location: [facility];  Service: Endoscopy;  Laterality: N/A;    HYSTERECTOMY  05/2021    OOPHORECTOMY  05/2021    ROBOT-ASSISTED LAPAROSCOPIC ABDOMINAL HYSTERECTOMY USING DA ABBEY XI N/A 05/18/2021    Procedure: XI ROBOTIC HYSTERECTOMY;  Surgeon: Christa Davila MD;  Location: [facility] OR;  Service: OB/GYN;  Laterality: N/A;    ROBOT-ASSISTED LAPAROSCOPIC SALPINGO-OOPHORECTOMY USING DA ABBEY XI Bilateral 05/18/2021    Procedure: XI ROBOTIC SALPINGO-OOPHORECTOMY;  Surgeon: Christa Davila MD;  Location: [facility] OR;  Service: OB/GYN;  Laterality: Bilateral;       Pre-op Assessment    I have reviewed the Patient Summary Reports.    I have reviewed the NPO Status.   I have reviewed the Medications.     Review of Systems  Anesthesia Hx:  No problems with previous Anesthesia                Social:  Non-Smoker       Hematology/Oncology:  Hematology Normal                                     Cardiovascular:     Hypertension              ECG has been reviewed.                          Pulmonary:  Pulmonary Normal                       Renal/:  Renal/ Normal                  Hepatic/GI:  Hepatic/GI Normal                 Musculoskeletal:     Rheumatoid arthritis involving multiple sites             Neurological:  Neurology Normal                                      Endocrine:  Endocrine Normal          Obesity / BMI > 30      Physical Exam  General: Well nourished    Airway:  Mallampati: II   Mouth Opening: Normal  TM Distance: Normal  Neck ROM: Normal ROM    Dental:  Intact        Anesthesia Plan  Type of Anesthesia, risks & benefits discussed:    Anesthesia Type: Gen ETT  Intra-op Monitoring Plan: Standard ASA Monitors, Art Line, Central Line, KSENIA, CO and PA  Post Op Pain Control Plan: multimodal analgesia  Induction:  IV  Airway Plan: Direct, Post-Induction  Informed Consent: Informed consent signed with the Patient and all parties understand the risks and agree with anesthesia plan.  All questions answered. Patient consented to blood products? Yes  ASA Score: 4    Ready For Surgery From Anesthesia Perspective.     .      Chemistry        Component Value Date/Time     05/11/2024 0638    K 3.9 05/11/2024 0638     05/11/2024 0638    CO2 26 05/11/2024 0638    BUN 14 05/11/2024 0638    CREATININE 0.8 05/11/2024 0638     05/11/2024 0638        Component Value Date/Time    CALCIUM 9.4 05/11/2024 0638    ALKPHOS 63 05/11/2024 0638    AST 13 05/11/2024 0638    ALT 12 05/11/2024 0638    BILITOT 0.7 05/11/2024 0638    ESTGFRAFRICA >60 05/06/2022 1547    EGFRNONAA >60 05/06/2022 1547        Lab Results   Component Value Date    WBC 6.33 05/11/2024    HGB 12.4 05/11/2024    HCT 39.8 05/11/2024    MCV 81 (L) 05/11/2024     05/11/2024       Normal sinus rhythm   Possible Left atrial enlargement   Minimal voltage criteria for LVH, may be normal variant ( R in aVL )   Inferior infarct ,age undetermined   Abnormal ECG   When compared with ECG of 02-MAY-2024 09:22,   Inferior infarct is now Present     Echo 5/9/24:      Left Ventricle: The left ventricle is normal  in size. Normal wall thickness. There is concentric remodeling. There is normal systolic function with a visually estimated ejection fraction of 60 - 65%. There is diastolic dysfunction.    Right Ventricle: Normal right ventricular cavity size. Wall thickness is normal. Systolic function is normal.    Left Atrium: Left atrium is severely dilated. There is a 3.4 cm x 2.6 cm fixed irregular mass that appears to be myxoma attached to anterior mitral valve leaflet.    Mitral Valve: There is mild regurgitation.    Pulmonary Artery: The estimated pulmonary artery systolic pressure is 30 mmHg.    IVC/SVC: Normal venous pressure at 3 mmHg.    There is a 3.4 cm x 2.6 cm fixed irregular mass that appears to be myxoma attached to anterior mitral valve leaflet.

## 2024-05-12 NOTE — PROGRESS NOTES
[facility] Medicine  Progress Note    Patient Name: Jake Hoskins  MRN: [id number]  Patient Class: IP- Inpatient   Admission Date: 5/9/2024  Length of Stay: 3 days  Attending Physician: Garo Yancey MD  Primary Care Provider: Angelica Lagunas MD        Subjective:     Principal Problem:Mitral valve mass        HPI:  59yr old with seropositive RA, HTN, Anemia on chronic immunosuppression with MTX and prednisone (Not taking it) presents after a syncopal episode 2 weeks ago. She had a similar episode 10 years ago and required CPR by EMS. She had stress test and other work up but not an echo.  At this time she has uncontrolled BP but no symptoms. Her  had CABG and developed a fib requiring amiodarone which then resulted in ILD and his demise. She is anxious about surgery. Her sister who weighs half as much is at the bedside. I looked at the echo and explained to them the significance of the findings and need for surgery input.    Overview/Hospital Course:  Patient is sent from Cardiology office for a large mass on the mitral valve.  Patient is planned for a right/left heart catheterization today with Dr. Buchanan.  Appreciate CT surgery assistance as well.  Plan for OR with Dr. Sanchez on 05/13/2024    Interval History:  Follow up for mitral valve mass.  Plan for OR on 05/13/2024 with Dr. Sanchez.  Patient is sitting up in chair eating breakfast.  In no acute distress.  NPO after midnight.  Plan of care discussed with patient and family at bedside.    Review of Systems  Objective:     Vital Signs (Most Recent):  Temp: 98.1 °F (36.7 °C) (05/12/24 0703)  Pulse: 74 (05/12/24 0930)  Resp: 16 (05/12/24 0703)  BP: 127/86 (05/12/24 0703)  SpO2: 99 % (05/12/24 0703) Vital Signs (24h Range):  Temp:  [97.7 °F (36.5 °C)-98.6 °F (37 °C)] 98.1 °F (36.7 °C)  Pulse:  [56-80] 74  Resp:  [16-18] 16  SpO2:  [99 %-100 %] 99 %  BP: (103-136)/(57-91) 127/86     Weight: 102.6 kg (226 lb 3.1 oz)  Body mass index  is 38.83 kg/m².    Intake/Output Summary (Last 24 hours) at 5/12/2024 1107  Last data filed at 5/12/2024 0559  Gross per 24 hour   Intake 240 ml   Output 100 ml   Net 140 ml         Physical Exam  Vitals and nursing note reviewed.   Constitutional:       Appearance: Normal appearance.   HENT:      Head: Normocephalic and atraumatic.      Nose: Nose normal.      Mouth/Throat:      Mouth: Mucous membranes are moist.   Eyes:      Extraocular Movements: Extraocular movements intact.      Conjunctiva/sclera: Conjunctivae normal.   Cardiovascular:      Rate and Rhythm: Normal rate and regular rhythm.      Pulses: Normal pulses.      Heart sounds: Normal heart sounds.   Pulmonary:      Effort: Pulmonary effort is normal.      Breath sounds: Normal breath sounds.   Abdominal:      General: Abdomen is flat. Bowel sounds are normal.      Palpations: Abdomen is soft.   Musculoskeletal:         General: Normal range of motion.      Cervical back: Normal range of motion and neck supple.   Skin:     General: Skin is warm.      Capillary Refill: Capillary refill takes less than 2 seconds.   Neurological:      Mental Status: She is alert and oriented to person, place, and time. Mental status is at baseline.   Psychiatric:         Mood and Affect: Mood normal.         Behavior: Behavior normal.             Significant Labs: All pertinent labs within the past 24 hours have been reviewed.  Recent Lab Results         05/12/24  0738   05/11/24  1449        Albumin 3.4         ALP 60         ALT 11         Anion Gap 9         Appearance, UA   Clear       AST 13         Baso # 0.04         Basophil % 0.7         Bilirubin (UA)   Negative       BILIRUBIN TOTAL 0.6  Comment: For infants and newborns, interpretation of results should be based  on gestational age, weight and in agreement with clinical  observations.    Premature Infant recommended reference ranges:  Up to 24 hours.............<8.0 mg/dL  Up to 48 hours............<12.0  mg/dL  3-5 days..................<15.0 mg/dL  6-29 days.................<15.0 mg/dL           BUN 11         Calcium 9.5         Chloride 109         CO2 22         Color, UA   Yellow       Creatinine 0.8         Differential Method Automated         eGFR >60         Eos # 0.2         Eos % 3.5         Glucose 96         Glucose, UA   Negative       Gran # (ANC) 2.8         Gran % 52.5         Hematocrit 39.9         Hemoglobin 12.4         Immature Grans (Abs) 0.02  Comment: Mild elevation in immature granulocytes is non specific and   can be seen in a variety of conditions including stress response,   acute inflammation, trauma and pregnancy. Correlation with other   laboratory and clinical findings is essential.           Immature Granulocytes 0.4         Ketones, UA   Negative       Leukocyte Esterase, UA   1+       Lymph # 1.7         Lymph % 32.2         MCH 25.2         MCHC 31.1         MCV 81         Microscopic Comment   SEE COMMENT  Comment: Other formed elements not mentioned in the report are not   present in the microscopic examination.          Mono # 0.6         Mono % 10.7         MPV 10.4         NITRITE UA   Negative       nRBC 0         Blood, UA   Negative       pH, UA   6.0       Platelet Count 348         Potassium 4.0         PROTEIN TOTAL 7.3         Protein, UA   Negative  Comment: Recommend a 24 hour urine protein or a urine   protein/creatinine ratio if globulin induced proteinuria is  clinically suspected.         RBC 4.93         RBC, UA   1       RDW 15.1         Sodium 140         Specific Gravity, UA   1.020       Specimen UA   Urine, Clean Catch       Squam Epithel, UA   2       Unclassified Crystals   Rare       UROBILINOGEN UA   Negative       WBC, UA   2       WBC 5.41                 Significant Imaging:   US Carotid Bilateral   Final Result      No evidence of a hemodynamically significant carotid bifurcation stenosis.         Electronically signed by: Federico Jean    Date:    05/11/2024   Time:    11:08      X-Ray Chest AP Portable   Final Result      No acute abnormality.         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 Sz vs hypercapnia but regained senses soon after.  -resolved        Syncope and collapse  -likely secondary to mitral valve mass    Rheumatoid arthritis involving multiple sites with positive rheumatoid factor  -On chronic immunosuppression  -Takes MTX regularly but not compliant with prednisone.   -hold immunosuppression until cleared by surgery.      Essential hypertension  Chronic, uncontrolled. Latest blood pressure and vitals reviewed-     Temp:  [97.7 °F (36.5 °C)-98.6 °F (37 °C)]   Pulse:  [56-80]   Resp:  [16-18]   BP: (103-136)/(57-91)   SpO2:  [99 %-100 %] .   Home meds for hypertension were reviewed and noted below.   Hypertension Medications               losartan (COZAAR) 25 MG tablet Take 1 tablet (25 mg total) by mouth once daily.    olmesartan (BENICAR) 20 MG tablet Take 20 mg by mouth.            While in the hospital, will manage blood pressure as follows; Adjust home antihypertensive regimen as follows- See orders    Will utilize p.r.n. blood pressure medication only if patient's blood pressure greater than 140/90 and she develops symptoms such as worsening chest pain or shortness of breath.      VTE Risk Mitigation (From admission, onward)           Ordered     Reason for No Pharmacological VTE Prophylaxis  Once        Question:  Reasons:  Answer:  Physician Provided (leave comment)  Comment:  possible surgical intervention    05/09/24 1216     IP VTE HIGH RISK PATIENT  Once         05/09/24 1216     Place sequential compression device  Until  discontinued         05/09/24 1216                    Discharge Planning   MITCHELL:      Code Status: Full Code   Is the patient medically ready for discharge?:     Reason for patient still in hospital (select all that apply): Patient trending condition, Treatment, and Consult recommendations  Discharge Plan A: Home with family                  Garo Yancey MD  Department of Hospital Medicine   '[facility]

## 2024-05-12 NOTE — SUBJECTIVE & OBJECTIVE
Interval History:  Follow up for mitral valve mass.  Plan for OR on 05/13/2024 with Dr. Sanchez.  Patient is sitting up in chair eating breakfast.  In no acute distress.  NPO after midnight.  Plan of care discussed with patient and family at bedside.    Review of Systems  Objective:     Vital Signs (Most Recent):  Temp: 98.1 °F (36.7 °C) (05/12/24 0703)  Pulse: 74 (05/12/24 0930)  Resp: 16 (05/12/24 0703)  BP: 127/86 (05/12/24 0703)  SpO2: 99 % (05/12/24 0703) Vital Signs (24h Range):  Temp:  [97.7 °F (36.5 °C)-98.6 °F (37 °C)] 98.1 °F (36.7 °C)  Pulse:  [56-80] 74  Resp:  [16-18] 16  SpO2:  [99 %-100 %] 99 %  BP: (103-136)/(57-91) 127/86     Weight: 102.6 kg (226 lb 3.1 oz)  Body mass index is 38.83 kg/m².    Intake/Output Summary (Last 24 hours) at 5/12/2024 1107  Last data filed at 5/12/2024 0559  Gross per 24 hour   Intake 240 ml   Output 100 ml   Net 140 ml         Physical Exam  Vitals and nursing note reviewed.   Constitutional:       Appearance: Normal appearance.   HENT:      Head: Normocephalic and atraumatic.      Nose: Nose normal.      Mouth/Throat:      Mouth: Mucous membranes are moist.   Eyes:      Extraocular Movements: Extraocular movements intact.      Conjunctiva/sclera: Conjunctivae normal.   Cardiovascular:      Rate and Rhythm: Normal rate and regular rhythm.      Pulses: Normal pulses.      Heart sounds: Normal heart sounds.   Pulmonary:      Effort: Pulmonary effort is normal.      Breath sounds: Normal breath sounds.   Abdominal:      General: Abdomen is flat. Bowel sounds are normal.      Palpations: Abdomen is soft.   Musculoskeletal:         General: Normal range of motion.      Cervical back: Normal range of motion and neck supple.   Skin:     General: Skin is warm.      Capillary Refill: Capillary refill takes less than 2 seconds.   Neurological:      Mental Status: She is alert and oriented to person, place, and time. Mental status is at baseline.   Psychiatric:         Mood and  Affect: Mood normal.         Behavior: Behavior normal.             Significant Labs: All pertinent labs within the past 24 hours have been reviewed.  Recent Lab Results         05/12/24  0738   05/11/24  1449        Albumin 3.4         ALP 60         ALT 11         Anion Gap 9         Appearance, UA   Clear       AST 13         Baso # 0.04         Basophil % 0.7         Bilirubin (UA)   Negative       BILIRUBIN TOTAL 0.6  Comment: For infants and newborns, interpretation of results should be based  on gestational age, weight and in agreement with clinical  observations.    Premature Infant recommended reference ranges:  Up to 24 hours.............<8.0 mg/dL  Up to 48 hours............<12.0 mg/dL  3-5 days..................<15.0 mg/dL  6-29 days.................<15.0 mg/dL           BUN 11         Calcium 9.5         Chloride 109         CO2 22         Color, UA   Yellow       Creatinine 0.8         Differential Method Automated         eGFR >60         Eos # 0.2         Eos % 3.5         Glucose 96         Glucose, UA   Negative       Gran # (ANC) 2.8         Gran % 52.5         Hematocrit 39.9         Hemoglobin 12.4         Immature Grans (Abs) 0.02  Comment: Mild elevation in immature granulocytes is non specific and   can be seen in a variety of conditions including stress response,   acute inflammation, trauma and pregnancy. Correlation with other   laboratory and clinical findings is essential.           Immature Granulocytes 0.4         Ketones, UA   Negative       Leukocyte Esterase, UA   1+       Lymph # 1.7         Lymph % 32.2         MCH 25.2         MCHC 31.1         MCV 81         Microscopic Comment   SEE COMMENT  Comment: Other formed elements not mentioned in the report are not   present in the microscopic examination.          Mono # 0.6         Mono % 10.7         MPV 10.4         NITRITE UA   Negative       nRBC 0         Blood, UA   Negative       pH, UA   6.0       Platelet Count 348          Potassium 4.0         PROTEIN TOTAL 7.3         Protein, UA   Negative  Comment: Recommend a 24 hour urine protein or a urine   protein/creatinine ratio if globulin induced proteinuria is  clinically suspected.         RBC 4.93         RBC, UA   1       RDW 15.1         Sodium 140         Specific Gravity, UA   1.020       Specimen UA   Urine, Clean Catch       Squam Epithel, UA   2       Unclassified Crystals   Rare       UROBILINOGEN UA   Negative       WBC, UA   2       WBC 5.41                 Significant Imaging:   US Carotid Bilateral   Final Result      No evidence of a hemodynamically significant carotid bifurcation stenosis.         Electronically signed by: Federico Jean   Date:    05/11/2024   Time:    11:08      X-Ray Chest AP Portable   Final Result      No acute abnormality.         Electronically signed by: Federico Jean   Date:    05/11/2024   Time:    09:58

## 2024-05-12 NOTE — PLAN OF CARE
Problem: Adult Inpatient Plan of Care  Goal: Plan of Care Review  Outcome: Progressing  Goal: Patient-Specific Goal (Individualized)  Outcome: Progressing  Flowsheets (Taken 5/12/2024 0130)  Individualized Care Needs: light off  Anxieties, Fears or Concerns: food  Goal: Absence of Hospital-Acquired Illness or Injury  Outcome: Progressing  Goal: Optimal Comfort and Wellbeing  Outcome: Progressing  Goal: Readiness for Transition of Care  Outcome: Progressing     Problem: Pain Acute  Goal: Optimal Pain Control and Function  Outcome: Progressing     Problem: Cardiovascular Surgery  Goal: Improved Activity Tolerance  Outcome: Progressing  Goal: Optimal Coping with Heart Surgery  Outcome: Progressing  Goal: Absence of Bleeding  Outcome: Progressing  Goal: Effective Bowel Elimination  Outcome: Progressing  Goal: Effective Cardiac Function  Outcome: Progressing  Goal: Optimal Cerebral Tissue Perfusion  Outcome: Progressing  Goal: Fluid and Electrolyte Balance  Outcome: Progressing  Goal: Blood Glucose Level Within Targeted Range  Outcome: Progressing  Goal: Absence of Infection Signs and Symptoms  Outcome: Progressing  Goal: Anesthesia/Sedation Recovery  Outcome: Progressing  Goal: Acceptable Pain Control  Outcome: Progressing  Goal: Nausea and Vomiting Relief  Outcome: Progressing  Goal: Effective Urinary Elimination  Outcome: Progressing  Goal: Effective Oxygenation and Ventilation  Outcome: Progressing     Problem: Fall Injury Risk  Goal: Absence of Fall and Fall-Related Injury  Outcome: Progressing    Discussed POC with pt and family, verbalized understanding.  Patient remains free from injury.  Safety precautions maintained.   Call light and personal belongings within reach, bed in lowest position with bed wheels locked.   No s/s of acute distress.  Purposeful rounding continued this shift.  Pain controlled per MD order.  Cardiac monitoring in place, tele box number [id number]. Reading normal sinus to sinus tarsha  Diet  orders continued, pt diet: cardiac  Vital signs continued per orders this shift.   Neuro checks q4 continued this shift.   Chart and orders review completed. Pt education about care completed.

## 2024-05-12 NOTE — ASSESSMENT & PLAN NOTE
Chronic, uncontrolled. Latest blood pressure and vitals reviewed-     Temp:  [97.7 °F (36.5 °C)-98.6 °F (37 °C)]   Pulse:  [56-80]   Resp:  [16-18]   BP: (103-136)/(57-91)   SpO2:  [99 %-100 %] .   Home meds for hypertension were reviewed and noted below.   Hypertension Medications               losartan (COZAAR) 25 MG tablet Take 1 tablet (25 mg total) by mouth once daily.    olmesartan (BENICAR) 20 MG tablet Take 20 mg by mouth.            While in the hospital, will manage blood pressure as follows; Adjust home antihypertensive regimen as follows- See orders    Will utilize p.r.n. blood pressure medication only if patient's blood pressure greater than 140/90 and she develops symptoms such as worsening chest pain or shortness of breath.

## 2024-05-12 NOTE — PROGRESS NOTES
[facility] ([facility])  Cardiology  Progress Note     Patient Name: Jake Hoskins  MRN: [id number]  Admission Date: 5/9/2024  Hospital Length of Stay: 1 days  Code Status: Full Code   Attending Physician: Garo Yancey MD   Primary Care Physician: Angelica Lagunas MD  Expected Discharge Date:   Principal Problem:Mitral valve mass     Subjective:   HPI:  Ms. Hoskins is a 59 year old female patient whose current medical conditions include HTN and morbid obesity who was sent to [facility] from cardiology clinic due to abnormal echo. Patient had previously seen Dr. Buchanan due to syncopal episode two weeks ago and echo was ordered for further evaluation. 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 Cardiology consulted to assist with management. Patient seen and examined today in ED. Stable CV wise. No CP or SOB. No recurrent syncope. No s/s of TIA/CVA. CT surgery consulted. R/[facility] planned tmw.      Hospital Course:   5/10/24-Patient seen and examined today, resting in bed. Stable. No AEON. No CV complaints. Labs stable. R/LHC today. CTS on board.     5/11/24- Patient seen and examined today, resting in bed. She feels good. No overnight problems. Heart cath went fine with no blockages. Patient denies any CP. Labs are stable. Blood pressure was a bit high. Will increase losartan dosage.         Review of Systems   Constitutional: Negative.   HENT: Negative.     Eyes: Negative.    Cardiovascular: Negative.    Respiratory: Negative.     Endocrine: Negative.    Hematologic/Lymphatic: Negative.    Skin: Negative.    Musculoskeletal: Negative.    Gastrointestinal: Negative.    Genitourinary: Negative.    Neurological: Negative.    Psychiatric/Behavioral: Negative.     Allergic/Immunologic: Negative.       Objective:      Vital Signs (Most Recent):  Temp: 97.7 °F (36.5 °C) (05/10/24 1250)  Pulse: 75 (05/10/24 1300)  Resp: 14 (05/10/24 1300)  BP: 98/60 (05/10/24 1300)  SpO2: 98 % (05/10/24 1300)  Vital Signs (24h Range):  Temp:  [97.7 °F (36.5 °C)-98.5 °F (36.9 °C)] 97.7 °F (36.5 °C)  Pulse:  [62-86] 75  Resp:  [14-22] 14  SpO2:  [97 %-100 %] 98 %  BP: ()/(56-81) 98/60      Weight: 102.4 kg (225 lb 12 oz)  Body mass index is 38.75 kg/m².     SpO2: 98 %           Intake/Output Summary (Last 24 hours) at 5/10/2024 1311  Last data filed at 5/10/2024 0815        Gross per 24 hour   Intake 0 ml   Output --   Net 0 ml         Lines/Drains/Airways         Peripheral Intravenous Line  Duration                     Peripheral IV - Single Lumen 05/09/24 1055 20 G Right Antecubital 1 day                             Physical Exam  Vitals and nursing note reviewed.   Constitutional:       General: She is not in acute distress.     Appearance: Normal appearance. She is well-developed. She is not diaphoretic.   HENT:      Head: Normocephalic and atraumatic.   Eyes:      General:         Right eye: No discharge.         Left eye: No discharge.      Pupils: Pupils are equal, round, and reactive to light.   Cardiovascular:      Rate and Rhythm: Normal rate and regular rhythm.      Heart sounds: Normal heart sounds, S1 normal and S2 normal. No murmur heard.  Pulmonary:      Effort: Pulmonary effort is normal. No respiratory distress.      Breath sounds: Normal breath sounds.   Abdominal:      General: There is no distension.      Tenderness: There is no abdominal tenderness.   Skin:     General: Skin is warm and dry.      Findings: No erythema.   Neurological:      General: No focal deficit present.      Mental Status: She is alert and oriented to person, place, and time.   Psychiatric:         Mood and Affect: Mood normal.         Behavior: Behavior normal.         Thought Content: Thought content normal.               Significant Labs: CMP           Recent Labs   Lab 05/09/24  1055 05/10/24  0559    140   K 3.9 4.0    107   CO2 24 24   GLU 91 96   BUN 11 16   CREATININE 0.8 0.7   CALCIUM 10.2 9.4   PROT 7.9  6.6   ALBUMIN 3.7 3.4*   BILITOT 0.4 0.7   ALKPHOS 70 60   AST 17 13   ALT 19 13   ANIONGAP 10 9   , CBC           Recent Labs   Lab 05/09/24  1055 05/10/24  0559   WBC 6.10 5.33   HGB 12.7 11.6*   HCT 41.5 37.2    329   , Troponin         Recent Labs   Lab 05/09/24  1055   TROPONINI <0.006   , BMP        Lab Results   Component Value Date      05/11/2024     K 3.9 05/11/2024      05/11/2024     CO2 26 05/11/2024     BUN 14 05/11/2024     CREATININE 0.8 05/11/2024     CALCIUM 9.4 05/11/2024     ANIONGAP 8 05/11/2024     EGFRNORACEVR >60 05/11/2024       and All pertinent lab results from the last 24 hours have been reviewed.     Significant Imaging: Echocardiogram: Transthoracic echo (TTE) complete (Cupid Only):             Results for orders placed or performed during the hospital encounter of 05/09/24   Echo   Result Value Ref Range     BSA 2.15 m2     LVOT stroke volume 76.30 cm3     LVIDd 4.97 3.5 - 6.0 cm     LV Systolic Volume 41.28 mL     LV Systolic Volume Index 20.0 mL/m2     LVIDs 3.21 2.1 - 4.0 cm     LV Diastolic Volume 116.46 mL     LV Diastolic Volume Index 56.53 mL/m2     IVS 0.86 0.6 - 1.1 cm     LVOT diameter 2.00 cm     LVOT area 3.1 cm2     FS 35 28 - 44 %     Left Ventricle Relative Wall Thickness 0.43 cm     Posterior Wall 1.06 0.6 - 1.1 cm     LV mass 170.59 g     LV Mass Index 83 g/m2     MV Peak E Talon 1.03 m/s     TDI LATERAL 0.08 m/s     TDI SEPTAL 0.06 m/s     E/E' ratio 14.71 m/s     MV Peak A Talon 1.49 m/s     TR Max Talon 2.60 m/s     E/A ratio 0.69       IVRT 91.34 msec     E wave deceleration time 340.08 msec     LV SEPTAL E/E' RATIO 17.17 m/s     LV LATERAL E/E' RATIO 12.88 m/s     PV Peak S Talon 0.56 m/s     PV Peak D Talon 0.40 m/s     Pulm vein S/D ratio 1.40       LVOT peak talon 1.11 m/s     Left Ventricular Outflow Tract Mean Velocity 0.81 cm/s     Left Ventricular Outflow Tract Mean Gradient 2.88 mmHg     RVDD 4.52 cm     RVOT peak VTI 21.3 cm     TAPSE 2.02 cm      RV/LV Ratio 0.91 cm     LA size 4.14 cm     Left Atrium Minor Axis 6.40 cm     Left Atrium Major Axis 5.65 cm     LA volume (mod) 87.26 cm3     LA Volume Index (Mod) 42.4 mL/m2     RA Major Axis 5.80 cm     RA Width 4.46 cm     AV mean gradient 4 mmHg     AV peak gradient 7 mmHg     Ao peak edmund 1.31 m/s     Ao VTI 28.60 cm     LVOT peak VTI 24.30 cm     AV valve area 2.67 cm²     AV Velocity Ratio 0.85       AV index (prosthetic) 0.85       BÁRBARA by Velocity Ratio 2.66 cm²     MV stenosis pressure 1/2 time 98.62 ms     MV valve area p 1/2 method 2.23 cm2     Triscuspid Valve Regurgitation Peak Gradient 27 mmHg     PV mean gradient 2 mmHg     PV PEAK VELOCITY 0.95 m/s     PV peak gradient 4 mmHg     Pulmonary Valve Mean Velocity 0.68 m/s     RVOT peak edmund 0.77 m/s     Ao root annulus 2.70 cm     Sinus 2.68 cm     STJ 2.73 cm     Ascending aorta 3.17 cm     IVC diameter 1.26 cm     Mean e' 0.07 m/s     ZLVIDS -1.35       ZLVIDD -2.24       LA Volume Index 49.2 mL/m2     LA volume 101.38 cm3     LA WIDTH 4.8 cm     TV resting pulmonary artery pressure 30 mmHg     RV TB RVSP 6 mmHg     Est. RA pres 3 mmHg     Narrative       Left Ventricle: The left ventricle is normal in size. Normal wall   thickness. There is concentric remodeling. There is normal systolic   function with a visually estimated ejection fraction of 60 - 65%. There is   diastolic dysfunction.    Right Ventricle: Normal right ventricular cavity size. Wall thickness   is normal. Systolic function is normal.    Left Atrium: Left atrium is severely dilated. There is a 3.4 cm x 2.6   cm fixed irregular mass that appears to be myxoma attached to anterior   mitral valve leaflet.    Mitral Valve: There is mild regurgitation.    Pulmonary Artery: The estimated pulmonary artery systolic pressure is   30 mmHg.    IVC/SVC: Normal venous pressure at 3 mmHg.    There is a 3.4 cm x 2.6 cm fixed irregular mass that appears to be   myxoma attached to anterior mitral valve  leaflet.      , EKG:Normal sinus rhythm   Possible Left atrial enlargement   Minimal voltage criteria for LVH, may be normal variant ( R in aVL )   Inferior infarct ,age undetermined   Abnormal ECG   When compared with ECG of 02-MAY-2024 09:22,   Inferior infarct is now Present  , and X-Ray: CXR: X-Ray Chest 1 View (CXR): No results found for this visit on 05/09/24. and X-Ray Chest PA and Lateral (CXR): No results found for this visit on 05/09/24.  Assessment and Plan:      * Mitral valve mass  -TTE with large mitral mass, concerning for myxoma  -CTS consulted  -R/LHC planned tmw     5/10/24  -Stable  -R/LHC today     5/11   Right heart cath with normal pressure   Left heart cath with normal coronaries       Getting PFTs today      Syncope and collapse  -Episode two weeks ago  -No recurrence  -Continue to monitor     Rheumatoid arthritis involving multiple sites with positive rheumatoid factor  -Mgmt as per primary team     Essential hypertension  -Continue home meds                  5/11   Blood pressure was high today will go up on losartan 50 mg today      VTE Risk Mitigation (From admission, onward)              Ordered       heparin (porcine) injection  As needed (PRN)         05/10/24 1229       Reason for No Pharmacological VTE Prophylaxis  Once        Question:  Reasons:  Answer:  Physician Provided (leave comment)  Comment:  possible surgical intervention    05/09/24 1216       IP VTE HIGH RISK PATIENT  Once         05/09/24 1216       Place sequential compression device  Until discontinued         05/09/24 1216                             Cardiology  [facility] ([facility])

## 2024-05-13 ENCOUNTER — ANESTHESIA (OUTPATIENT)
Dept: SURGERY | Facility: HOSPITAL | Age: 59
DRG: 228 | End: 2024-05-13
Payer: COMMERCIAL

## 2024-05-13 PROBLEM — G93.41 ENCEPHALOPATHY, METABOLIC: Status: RESOLVED | Noted: 2024-05-09 | Resolved: 2024-05-13

## 2024-05-13 LAB
ALBUMIN SERPL BCP-MCNC: 2.6 G/DL (ref 3.5–5.2)
ALLENS TEST: ABNORMAL
ALP SERPL-CCNC: 49 U/L (ref 55–135)
ALT SERPL W/O P-5'-P-CCNC: 15 U/L (ref 10–44)
ANION GAP SERPL CALC-SCNC: 9 MMOL/L (ref 8–16)
ANION GAP SERPL CALC-SCNC: 9 MMOL/L (ref 8–16)
APTT PPP: 28.2 SEC (ref 21–32)
AST SERPL-CCNC: 35 U/L (ref 10–40)
BILIRUB SERPL-MCNC: 0.5 MG/DL (ref 0.1–1)
BRPFT: ABNORMAL
BUN SERPL-MCNC: 10 MG/DL (ref 6–20)
BUN SERPL-MCNC: 11 MG/DL (ref 6–20)
CALCIUM SERPL-MCNC: 8.3 MG/DL (ref 8.7–10.5)
CALCIUM SERPL-MCNC: 8.7 MG/DL (ref 8.7–10.5)
CHLORIDE SERPL-SCNC: 110 MMOL/L (ref 95–110)
CHLORIDE SERPL-SCNC: 112 MMOL/L (ref 95–110)
CITRATED FUNCTIONAL FIBRINOGEN FLEV: 521.9 MG/DL
CITRATED FUNCTIONAL FIBRINOGEN FLEV: 613.1 MG/DL
CITRATED FUNCTIONAL FIBRINOGEN MA: 28.6 MM
CITRATED FUNCTIONAL FIBRINOGEN MA: 33.6 MM
CITRATED KAOLIN ANGLE: 77.3 DEG
CITRATED KAOLIN ANGLE: 77.5 DEG
CITRATED KAOLIN HEPARINASE R: 5 MIN
CITRATED KAOLIN HEPARINASE R: 6.5 MIN
CITRATED KAOLIN K: 0.8 MIN
CITRATED KAOLIN K: 0.9 MIN
CITRATED KAOLIN MA: 68 MM
CITRATED KAOLIN MA: 68 MM
CITRATED KAOLIN R: 5.9 MIN
CITRATED KAOLIN R: 6.7 MIN
CITRATED RAPID TEG MA: 66.8 MM
CITRATED RAPID TEG MA: 68.9 MM
CO2 SERPL-SCNC: 19 MMOL/L (ref 23–29)
CO2 SERPL-SCNC: 22 MMOL/L (ref 23–29)
CREAT SERPL-MCNC: 0.7 MG/DL (ref 0.5–1.4)
CREAT SERPL-MCNC: 0.8 MG/DL (ref 0.5–1.4)
DELSYS: ABNORMAL
DELSYS: ABNORMAL
ERYTHROCYTE [DISTWIDTH] IN BLOOD BY AUTOMATED COUNT: 14.9 % (ref 11.5–14.5)
ERYTHROCYTE [SEDIMENTATION RATE] IN BLOOD BY WESTERGREN METHOD: 20 MM/H
EST. GFR  (NO RACE VARIABLE): >60 ML/MIN/1.73 M^2
EST. GFR  (NO RACE VARIABLE): >60 ML/MIN/1.73 M^2
FEF 25 75 LLN: 0.86
FEF 25 75 PRE REF: 67.6 %
FEF 25 75 REF: 2
FEV1 FVC LLN: 68
FEV1 FVC PRE REF: 88.5 %
FEV1 FVC REF: 80
FEV1 LLN: 1.57
FEV1 PRE REF: 65 %
FEV1 REF: 2.15
FIBRINOGEN PPP-MCNC: 324 MG/DL (ref 182–400)
FIO2: 100
FIO2: 40
FVC LLN: 2
FVC PRE REF: 73.1 %
FVC REF: 2.71
GLUCOSE SERPL-MCNC: 120 MG/DL (ref 70–110)
GLUCOSE SERPL-MCNC: 128 MG/DL (ref 70–110)
GLUCOSE SERPL-MCNC: 161 MG/DL (ref 70–110)
GLUCOSE SERPL-MCNC: 172 MG/DL (ref 70–110)
GLUCOSE SERPL-MCNC: 176 MG/DL (ref 70–110)
GLUCOSE SERPL-MCNC: 181 MG/DL (ref 70–110)
GLUCOSE SERPL-MCNC: 206 MG/DL (ref 70–110)
GLUCOSE SERPL-MCNC: 222 MG/DL (ref 70–110)
GLUCOSE SERPL-MCNC: 254 MG/DL (ref 70–110)
HCO3 UR-SCNC: 20.7 MMOL/L (ref 24–28)
HCO3 UR-SCNC: 21.6 MMOL/L (ref 24–28)
HCO3 UR-SCNC: 21.9 MMOL/L (ref 24–28)
HCO3 UR-SCNC: 22.5 MMOL/L (ref 24–28)
HCO3 UR-SCNC: 22.6 MMOL/L (ref 24–28)
HCO3 UR-SCNC: 24.7 MMOL/L (ref 24–28)
HCO3 UR-SCNC: 25 MMOL/L (ref 24–28)
HCT VFR BLD AUTO: 34.8 % (ref 37–48.5)
HCT VFR BLD CALC: 23 %PCV (ref 36–54)
HCT VFR BLD CALC: 24 %PCV (ref 36–54)
HCT VFR BLD CALC: 26 %PCV (ref 36–54)
HCT VFR BLD CALC: 32 %PCV (ref 36–54)
HCT VFR BLD CALC: 32 %PCV (ref 36–54)
HCT VFR BLD CALC: 33 %PCV (ref 36–54)
HCT VFR BLD CALC: 35 %PCV (ref 36–54)
HGB BLD-MCNC: 11 G/DL (ref 12–16)
INR PPP: 1.1 (ref 0.8–1.2)
MAGNESIUM SERPL-MCNC: 2.1 MG/DL (ref 1.6–2.6)
MAGNESIUM SERPL-MCNC: 2.6 MG/DL (ref 1.6–2.6)
MCH RBC QN AUTO: 25.7 PG (ref 27–31)
MCHC RBC AUTO-ENTMCNC: 31.6 G/DL (ref 32–36)
MCV RBC AUTO: 81 FL (ref 82–98)
MODE: ABNORMAL
MODE: ABNORMAL
PCO2 BLDA: 32.6 MMHG (ref 35–45)
PCO2 BLDA: 36.2 MMHG (ref 35–45)
PCO2 BLDA: 37.7 MMHG (ref 35–45)
PCO2 BLDA: 39.3 MMHG (ref 35–45)
PCO2 BLDA: 39.8 MMHG (ref 35–45)
PCO2 BLDA: 40.4 MMHG (ref 35–45)
PCO2 BLDA: 45.3 MMHG (ref 35–45)
PEEP: 5
PEEP: 5
PEF LLN: 3.6
PEF PRE REF: 33.6 %
PEF REF: 5.6
PH SMN: 7.32 [PH] (ref 7.35–7.45)
PH SMN: 7.35 [PH] (ref 7.35–7.45)
PH SMN: 7.36 [PH] (ref 7.35–7.45)
PH SMN: 7.37 [PH] (ref 7.35–7.45)
PH SMN: 7.4 [PH] (ref 7.35–7.45)
PH SMN: 7.41 [PH] (ref 7.35–7.45)
PH SMN: 7.43 [PH] (ref 7.35–7.45)
PLATELET # BLD AUTO: 306 K/UL (ref 150–450)
PMV BLD AUTO: 10.4 FL (ref 9.2–12.9)
PO2 BLDA: 118 MMHG (ref 80–100)
PO2 BLDA: 315 MMHG (ref 80–100)
PO2 BLDA: 325 MMHG (ref 80–100)
PO2 BLDA: 342 MMHG (ref 80–100)
PO2 BLDA: 375 MMHG (ref 80–100)
PO2 BLDA: 425 MMHG (ref 80–100)
PO2 BLDA: 84 MMHG (ref 80–100)
POC ACTIVATED CLOTTING TIME K: 131 SEC (ref 74–137)
POC ACTIVATED CLOTTING TIME K: 158 SEC (ref 74–137)
POC ACTIVATED CLOTTING TIME K: 450 SEC (ref 74–137)
POC ACTIVATED CLOTTING TIME K: 590 SEC (ref 74–137)
POC ACTIVATED CLOTTING TIME K: 601 SEC (ref 74–137)
POC BE: -1 MMOL/L
POC BE: -2 MMOL/L
POC BE: -2 MMOL/L
POC BE: -3 MMOL/L
POC BE: -4 MMOL/L
POC BE: -5 MMOL/L
POC BE: 0 MMOL/L
POC IONIZED CALCIUM: 1.08 MMOL/L (ref 1.06–1.42)
POC IONIZED CALCIUM: 1.1 MMOL/L (ref 1.06–1.42)
POC IONIZED CALCIUM: 1.13 MMOL/L (ref 1.06–1.42)
POC IONIZED CALCIUM: 1.23 MMOL/L (ref 1.06–1.42)
POC IONIZED CALCIUM: 1.26 MMOL/L (ref 1.06–1.42)
POC IONIZED CALCIUM: 1.3 MMOL/L (ref 1.06–1.42)
POC IONIZED CALCIUM: 2.07 MMOL/L (ref 1.06–1.42)
POC SATURATED O2: 100 % (ref 95–100)
POC SATURATED O2: 96 % (ref 95–100)
POC SATURATED O2: 98 % (ref 95–100)
POCT GLUCOSE: 144 MG/DL (ref 70–110)
POCT GLUCOSE: 146 MG/DL (ref 70–110)
POCT GLUCOSE: 157 MG/DL (ref 70–110)
POCT GLUCOSE: 157 MG/DL (ref 70–110)
POCT GLUCOSE: 160 MG/DL (ref 70–110)
POCT GLUCOSE: 162 MG/DL (ref 70–110)
POCT GLUCOSE: 170 MG/DL (ref 70–110)
POCT GLUCOSE: 190 MG/DL (ref 70–110)
POCT GLUCOSE: 198 MG/DL (ref 70–110)
POCT GLUCOSE: 215 MG/DL (ref 70–110)
POCT GLUCOSE: 219 MG/DL (ref 70–110)
POTASSIUM BLD-SCNC: 3.2 MMOL/L (ref 3.5–5.1)
POTASSIUM BLD-SCNC: 3.2 MMOL/L (ref 3.5–5.1)
POTASSIUM BLD-SCNC: 3.3 MMOL/L (ref 3.5–5.1)
POTASSIUM BLD-SCNC: 3.4 MMOL/L (ref 3.5–5.1)
POTASSIUM BLD-SCNC: 4.1 MMOL/L (ref 3.5–5.1)
POTASSIUM BLD-SCNC: 4.2 MMOL/L (ref 3.5–5.1)
POTASSIUM BLD-SCNC: 4.5 MMOL/L (ref 3.5–5.1)
POTASSIUM SERPL-SCNC: 3.5 MMOL/L (ref 3.5–5.1)
POTASSIUM SERPL-SCNC: 4.4 MMOL/L (ref 3.5–5.1)
PRE FEF 25 75: 1.35 L/S (ref 0.86–3.62)
PRE FET 100: 4.62 SEC
PRE FEV1 FVC: 70.59 % (ref 68.05–89.78)
PRE FEV1: 1.4 L (ref 1.57–2.71)
PRE FVC: 1.98 L (ref 2–3.45)
PRE PEF: 1.88 L/S (ref 3.6–7.61)
PROT SERPL-MCNC: 5.3 G/DL (ref 6–8.4)
PROTHROMBIN TIME: 12.4 SEC (ref 9–12.5)
PS: 7
RBC # BLD AUTO: 4.28 M/UL (ref 4–5.4)
SAMPLE: ABNORMAL
SAMPLE: NORMAL
SITE: ABNORMAL
SODIUM BLD-SCNC: 136 MMOL/L (ref 136–145)
SODIUM BLD-SCNC: 138 MMOL/L (ref 136–145)
SODIUM BLD-SCNC: 140 MMOL/L (ref 136–145)
SODIUM BLD-SCNC: 141 MMOL/L (ref 136–145)
SODIUM BLD-SCNC: 142 MMOL/L (ref 136–145)
SODIUM SERPL-SCNC: 140 MMOL/L (ref 136–145)
SODIUM SERPL-SCNC: 141 MMOL/L (ref 136–145)
VT: 400
WBC # BLD AUTO: 32.08 K/UL (ref 3.9–12.7)

## 2024-05-13 PROCEDURE — 86920 COMPATIBILITY TEST SPIN: CPT | Performed by: FAMILY MEDICINE

## 2024-05-13 PROCEDURE — 88331 PATH CONSLTJ SURG 1 BLK 1SPC: CPT | Mod: 59 | Performed by: PATHOLOGY

## 2024-05-13 PROCEDURE — 37799 UNLISTED PX VASCULAR SURGERY: CPT

## 2024-05-13 PROCEDURE — C1768 GRAFT, VASCULAR: HCPCS | Performed by: THORACIC SURGERY (CARDIOTHORACIC VASCULAR SURGERY)

## 2024-05-13 PROCEDURE — C1898 LEAD, PMKR, OTHER THAN TRANS: HCPCS | Performed by: THORACIC SURGERY (CARDIOTHORACIC VASCULAR SURGERY)

## 2024-05-13 PROCEDURE — 85027 COMPLETE CBC AUTOMATED: CPT | Performed by: THORACIC SURGERY (CARDIOTHORACIC VASCULAR SURGERY)

## 2024-05-13 PROCEDURE — 63600175 PHARM REV CODE 636 W HCPCS: Performed by: THORACIC SURGERY (CARDIOTHORACIC VASCULAR SURGERY)

## 2024-05-13 PROCEDURE — 85730 THROMBOPLASTIN TIME PARTIAL: CPT | Performed by: THORACIC SURGERY (CARDIOTHORACIC VASCULAR SURGERY)

## 2024-05-13 PROCEDURE — C9290 INJ, BUPIVACAINE LIPOSOME: HCPCS | Performed by: THORACIC SURGERY (CARDIOTHORACIC VASCULAR SURGERY)

## 2024-05-13 PROCEDURE — 25000003 PHARM REV CODE 250: Performed by: THORACIC SURGERY (CARDIOTHORACIC VASCULAR SURGERY)

## 2024-05-13 PROCEDURE — 80048 BASIC METABOLIC PNL TOTAL CA: CPT | Mod: XB | Performed by: THORACIC SURGERY (CARDIOTHORACIC VASCULAR SURGERY)

## 2024-05-13 PROCEDURE — 82803 BLOOD GASES ANY COMBINATION: CPT

## 2024-05-13 PROCEDURE — 27100171 HC OXYGEN HIGH FLOW UP TO 24 HOURS

## 2024-05-13 PROCEDURE — 84295 ASSAY OF SERUM SODIUM: CPT

## 2024-05-13 PROCEDURE — 85610 PROTHROMBIN TIME: CPT | Performed by: THORACIC SURGERY (CARDIOTHORACIC VASCULAR SURGERY)

## 2024-05-13 PROCEDURE — 37000008 HC ANESTHESIA 1ST 15 MINUTES: Performed by: THORACIC SURGERY (CARDIOTHORACIC VASCULAR SURGERY)

## 2024-05-13 PROCEDURE — 02B70ZZ EXCISION OF LEFT ATRIUM, OPEN APPROACH: ICD-10-PCS | Performed by: THORACIC SURGERY (CARDIOTHORACIC VASCULAR SURGERY)

## 2024-05-13 PROCEDURE — 94002 VENT MGMT INPAT INIT DAY: CPT

## 2024-05-13 PROCEDURE — 27200966 HC CLOSED SUCTION SYSTEM

## 2024-05-13 PROCEDURE — 88307 TISSUE EXAM BY PATHOLOGIST: CPT | Mod: 26,,, | Performed by: PATHOLOGY

## 2024-05-13 PROCEDURE — C9399 UNCLASSIFIED DRUGS OR BIOLOG: HCPCS

## 2024-05-13 PROCEDURE — 63600175 PHARM REV CODE 636 W HCPCS: Performed by: NURSE ANESTHETIST, CERTIFIED REGISTERED

## 2024-05-13 PROCEDURE — 25000003 PHARM REV CODE 250

## 2024-05-13 PROCEDURE — 99900035 HC TECH TIME PER 15 MIN (STAT)

## 2024-05-13 PROCEDURE — 82330 ASSAY OF CALCIUM: CPT

## 2024-05-13 PROCEDURE — 33120 EXC ICAR TUM RESC W/CARD BYP: CPT | Mod: ,,, | Performed by: THORACIC SURGERY (CARDIOTHORACIC VASCULAR SURGERY)

## 2024-05-13 PROCEDURE — 85014 HEMATOCRIT: CPT

## 2024-05-13 PROCEDURE — 85347 COAGULATION TIME ACTIVATED: CPT | Mod: 59 | Performed by: EMERGENCY MEDICINE

## 2024-05-13 PROCEDURE — 36000712 HC OR TIME LEV V 1ST 15 MIN: Performed by: THORACIC SURGERY (CARDIOTHORACIC VASCULAR SURGERY)

## 2024-05-13 PROCEDURE — 85384 FIBRINOGEN ACTIVITY: CPT | Performed by: THORACIC SURGERY (CARDIOTHORACIC VASCULAR SURGERY)

## 2024-05-13 PROCEDURE — 25000242 PHARM REV CODE 250 ALT 637 W/ HCPCS: Performed by: THORACIC SURGERY (CARDIOTHORACIC VASCULAR SURGERY)

## 2024-05-13 PROCEDURE — 37000009 HC ANESTHESIA EA ADD 15 MINS: Performed by: THORACIC SURGERY (CARDIOTHORACIC VASCULAR SURGERY)

## 2024-05-13 PROCEDURE — 80053 COMPREHEN METABOLIC PANEL: CPT | Performed by: THORACIC SURGERY (CARDIOTHORACIC VASCULAR SURGERY)

## 2024-05-13 PROCEDURE — 25000003 PHARM REV CODE 250: Performed by: FAMILY MEDICINE

## 2024-05-13 PROCEDURE — P9045 ALBUMIN (HUMAN), 5%, 250 ML: HCPCS | Mod: JZ,JG | Performed by: THORACIC SURGERY (CARDIOTHORACIC VASCULAR SURGERY)

## 2024-05-13 PROCEDURE — C1729 CATH, DRAINAGE: HCPCS | Performed by: THORACIC SURGERY (CARDIOTHORACIC VASCULAR SURGERY)

## 2024-05-13 PROCEDURE — 94761 N-INVAS EAR/PLS OXIMETRY MLT: CPT | Mod: XB

## 2024-05-13 PROCEDURE — 83735 ASSAY OF MAGNESIUM: CPT | Mod: 91 | Performed by: THORACIC SURGERY (CARDIOTHORACIC VASCULAR SURGERY)

## 2024-05-13 PROCEDURE — 88331 PATH CONSLTJ SURG 1 BLK 1SPC: CPT | Mod: 26,,, | Performed by: PATHOLOGY

## 2024-05-13 PROCEDURE — 5A1221Z PERFORMANCE OF CARDIAC OUTPUT, CONTINUOUS: ICD-10-PCS | Performed by: THORACIC SURGERY (CARDIOTHORACIC VASCULAR SURGERY)

## 2024-05-13 PROCEDURE — 99900017 HC EXTUBATION W/PARAMETERS (STAT)

## 2024-05-13 PROCEDURE — 25000003 PHARM REV CODE 250: Performed by: NURSE ANESTHETIST, CERTIFIED REGISTERED

## 2024-05-13 PROCEDURE — 27100108

## 2024-05-13 PROCEDURE — 85576 BLOOD PLATELET AGGREGATION: CPT | Performed by: THORACIC SURGERY (CARDIOTHORACIC VASCULAR SURGERY)

## 2024-05-13 PROCEDURE — 85384 FIBRINOGEN ACTIVITY: CPT | Mod: 59 | Performed by: EMERGENCY MEDICINE

## 2024-05-13 PROCEDURE — 84132 ASSAY OF SERUM POTASSIUM: CPT

## 2024-05-13 PROCEDURE — 20000000 HC ICU ROOM

## 2024-05-13 PROCEDURE — C1713 ANCHOR/SCREW BN/BN,TIS/BN: HCPCS | Performed by: THORACIC SURGERY (CARDIOTHORACIC VASCULAR SURGERY)

## 2024-05-13 PROCEDURE — 88307 TISSUE EXAM BY PATHOLOGIST: CPT | Performed by: PATHOLOGY

## 2024-05-13 PROCEDURE — 27200667 HC PACEMAKER, TEMPORARY MONITORING, PER SHIFT

## 2024-05-13 PROCEDURE — 94640 AIRWAY INHALATION TREATMENT: CPT

## 2024-05-13 PROCEDURE — 36000713 HC OR TIME LEV V EA ADD 15 MIN: Performed by: THORACIC SURGERY (CARDIOTHORACIC VASCULAR SURGERY)

## 2024-05-13 PROCEDURE — 27201423 OPTIME MED/SURG SUP & DEVICES STERILE SUPPLY: Performed by: THORACIC SURGERY (CARDIOTHORACIC VASCULAR SURGERY)

## 2024-05-13 PROCEDURE — 85384 FIBRINOGEN ACTIVITY: CPT | Mod: 91 | Performed by: THORACIC SURGERY (CARDIOTHORACIC VASCULAR SURGERY)

## 2024-05-13 PROCEDURE — 63600175 PHARM REV CODE 636 W HCPCS: Performed by: FAMILY MEDICINE

## 2024-05-13 PROCEDURE — S0017 INJECTION, AMINOCAPROIC ACID: HCPCS | Performed by: NURSE ANESTHETIST, CERTIFIED REGISTERED

## 2024-05-13 PROCEDURE — 25000242 PHARM REV CODE 250 ALT 637 W/ HCPCS: Performed by: ANESTHESIOLOGY

## 2024-05-13 PROCEDURE — 99233 SBSQ HOSP IP/OBS HIGH 50: CPT | Mod: ,,, | Performed by: INTERNAL MEDICINE

## 2024-05-13 DEVICE — WIRE 6495F TEMP PACE BIP MYO 6PK 26L
Type: IMPLANTABLE DEVICE | Site: HEART | Status: FUNCTIONAL
Brand: STREAMLINE™

## 2024-05-13 DEVICE — BONE HEMOSTASIS MATERIAL - 2.5G
Type: IMPLANTABLE DEVICE | Site: STERNUM | Status: FUNCTIONAL
Brand: OSTENE

## 2024-05-13 DEVICE — VASCU-GUARD IS PREPARED FROM BOVINE PERICARDIUM CROSS-LINKED WITH GLUTARALDEHYDE, AND TREATED WITH 1 MOLAR SODIUM HYDROXIDE FOR A MINIMUM OF 60 MINUTES AT 20-25 DEGREES C. VASCU-GUARD IS TERMINALLY STERILIZED USING GAMMA IRRADIATION. AND PACKAGED WITHIN A DOUBLE-POUCH SYSTEM. THE CONTENTS OF THE UNOPENED, UNDAMAGED OUTER POUCH ARE STERILE.
Type: IMPLANTABLE DEVICE | Site: HEART | Status: FUNCTIONAL
Brand: VASCU-GUARD

## 2024-05-13 RX ORDER — LANOLIN ALCOHOL/MO/W.PET/CERES
1 CREAM (GRAM) TOPICAL DAILY
Status: DISCONTINUED | OUTPATIENT
Start: 2024-05-14 | End: 2024-05-17 | Stop reason: HOSPADM

## 2024-05-13 RX ORDER — FOLIC ACID 1 MG/1
1 TABLET ORAL DAILY
Status: DISCONTINUED | OUTPATIENT
Start: 2024-05-15 | End: 2024-05-17 | Stop reason: HOSPADM

## 2024-05-13 RX ORDER — ACETAMINOPHEN 325 MG/1
650 TABLET ORAL EVERY 6 HOURS
Status: DISPENSED | OUTPATIENT
Start: 2024-05-14 | End: 2024-05-17

## 2024-05-13 RX ORDER — MIDAZOLAM HYDROCHLORIDE 1 MG/ML
INJECTION INTRAMUSCULAR; INTRAVENOUS
Status: DISCONTINUED | OUTPATIENT
Start: 2024-05-13 | End: 2024-05-13

## 2024-05-13 RX ORDER — CEFAZOLIN SODIUM 2 G/50ML
2 SOLUTION INTRAVENOUS
Status: DISCONTINUED | OUTPATIENT
Start: 2024-05-13 | End: 2024-05-13 | Stop reason: HOSPADM

## 2024-05-13 RX ORDER — ACETAMINOPHEN 10 MG/ML
1000 INJECTION, SOLUTION INTRAVENOUS EVERY 8 HOURS
Status: COMPLETED | OUTPATIENT
Start: 2024-05-13 | End: 2024-05-13

## 2024-05-13 RX ORDER — POTASSIUM CHLORIDE 14.9 MG/ML
20 INJECTION INTRAVENOUS
Status: DISCONTINUED | OUTPATIENT
Start: 2024-05-13 | End: 2024-05-17 | Stop reason: HOSPADM

## 2024-05-13 RX ORDER — VANCOMYCIN HYDROCHLORIDE 1 G/20ML
INJECTION, POWDER, LYOPHILIZED, FOR SOLUTION INTRAVENOUS
Status: DISCONTINUED | OUTPATIENT
Start: 2024-05-13 | End: 2024-05-13 | Stop reason: HOSPADM

## 2024-05-13 RX ORDER — AMINOCAPROIC ACID 250 MG/ML
INJECTION, SOLUTION INTRAVENOUS
Status: DISCONTINUED | OUTPATIENT
Start: 2024-05-13 | End: 2024-05-13

## 2024-05-13 RX ORDER — FUROSEMIDE 10 MG/ML
40 INJECTION INTRAMUSCULAR; INTRAVENOUS 2 TIMES DAILY
Status: DISCONTINUED | OUTPATIENT
Start: 2024-05-14 | End: 2024-05-15

## 2024-05-13 RX ORDER — IPRATROPIUM BROMIDE AND ALBUTEROL SULFATE 2.5; .5 MG/3ML; MG/3ML
3 SOLUTION RESPIRATORY (INHALATION)
Status: COMPLETED | OUTPATIENT
Start: 2024-05-13 | End: 2024-05-13

## 2024-05-13 RX ORDER — SODIUM CHLORIDE, SODIUM LACTATE, POTASSIUM CHLORIDE, CALCIUM CHLORIDE 600; 310; 30; 20 MG/100ML; MG/100ML; MG/100ML; MG/100ML
1000 INJECTION, SOLUTION INTRAVENOUS
Status: DISCONTINUED | OUTPATIENT
Start: 2024-05-13 | End: 2024-05-17 | Stop reason: HOSPADM

## 2024-05-13 RX ORDER — METOPROLOL TARTRATE 25 MG/1
25 TABLET, FILM COATED ORAL 2 TIMES DAILY
Status: DISCONTINUED | OUTPATIENT
Start: 2024-05-14 | End: 2024-05-15

## 2024-05-13 RX ORDER — OXYCODONE HYDROCHLORIDE 5 MG/1
10 TABLET ORAL EVERY 4 HOURS PRN
Status: DISCONTINUED | OUTPATIENT
Start: 2024-05-13 | End: 2024-05-17 | Stop reason: HOSPADM

## 2024-05-13 RX ORDER — CHLORHEXIDINE GLUCONATE ORAL RINSE 1.2 MG/ML
10 SOLUTION DENTAL 2 TIMES DAILY
Qty: 150 ML | Refills: 0 | Status: DISCONTINUED | OUTPATIENT
Start: 2024-05-13 | End: 2024-05-17 | Stop reason: HOSPADM

## 2024-05-13 RX ORDER — ONDANSETRON HYDROCHLORIDE 2 MG/ML
4 INJECTION, SOLUTION INTRAVENOUS EVERY 12 HOURS PRN
Status: DISCONTINUED | OUTPATIENT
Start: 2024-05-13 | End: 2024-05-17 | Stop reason: HOSPADM

## 2024-05-13 RX ORDER — MAGNESIUM SULFATE HEPTAHYDRATE 40 MG/ML
4 INJECTION, SOLUTION INTRAVENOUS
Status: DISCONTINUED | OUTPATIENT
Start: 2024-05-13 | End: 2024-05-17 | Stop reason: HOSPADM

## 2024-05-13 RX ORDER — CALCIUM GLUCONATE 20 MG/ML
3 INJECTION, SOLUTION INTRAVENOUS
Status: DISCONTINUED | OUTPATIENT
Start: 2024-05-13 | End: 2024-05-15

## 2024-05-13 RX ORDER — ACETAMINOPHEN 325 MG/1
650 TABLET ORAL EVERY 6 HOURS PRN
Status: DISCONTINUED | OUTPATIENT
Start: 2024-05-13 | End: 2024-05-17 | Stop reason: HOSPADM

## 2024-05-13 RX ORDER — DOCUSATE SODIUM 100 MG/1
100 CAPSULE, LIQUID FILLED ORAL 2 TIMES DAILY
Status: DISCONTINUED | OUTPATIENT
Start: 2024-05-14 | End: 2024-05-17 | Stop reason: HOSPADM

## 2024-05-13 RX ORDER — FENTANYL CITRATE 50 UG/ML
50 INJECTION, SOLUTION INTRAMUSCULAR; INTRAVENOUS
Status: DISCONTINUED | OUTPATIENT
Start: 2024-05-13 | End: 2024-05-15 | Stop reason: HOSPADM

## 2024-05-13 RX ORDER — PROTAMINE SULFATE 10 MG/ML
INJECTION, SOLUTION INTRAVENOUS
Status: DISCONTINUED | OUTPATIENT
Start: 2024-05-13 | End: 2024-05-13

## 2024-05-13 RX ORDER — FAMOTIDINE 10 MG/ML
20 INJECTION INTRAVENOUS 2 TIMES DAILY
Status: DISCONTINUED | OUTPATIENT
Start: 2024-05-13 | End: 2024-05-14

## 2024-05-13 RX ORDER — DEXMEDETOMIDINE HYDROCHLORIDE 100 UG/ML
INJECTION, SOLUTION INTRAVENOUS CONTINUOUS PRN
Status: DISCONTINUED | OUTPATIENT
Start: 2024-05-13 | End: 2024-05-13

## 2024-05-13 RX ORDER — VASOPRESSIN IN DEXTROSE 5 % 25/250 ML
0.04 PLASTIC BAG, INJECTION (ML) INTRAVENOUS CONTINUOUS
Status: DISCONTINUED | OUTPATIENT
Start: 2024-05-13 | End: 2024-05-15 | Stop reason: HOSPADM

## 2024-05-13 RX ORDER — HEPARIN SODIUM 1000 [USP'U]/ML
INJECTION, SOLUTION INTRAVENOUS; SUBCUTANEOUS
Status: DISCONTINUED | OUTPATIENT
Start: 2024-05-13 | End: 2024-05-13 | Stop reason: HOSPADM

## 2024-05-13 RX ORDER — AMIODARONE HYDROCHLORIDE 200 MG/1
200 TABLET ORAL ONCE
Status: COMPLETED | OUTPATIENT
Start: 2024-05-13 | End: 2024-05-13

## 2024-05-13 RX ORDER — ADHESIVE BANDAGE
30 BANDAGE TOPICAL DAILY
Status: DISCONTINUED | OUTPATIENT
Start: 2024-05-14 | End: 2024-05-17 | Stop reason: HOSPADM

## 2024-05-13 RX ORDER — POTASSIUM CHLORIDE 14.9 MG/ML
60 INJECTION INTRAVENOUS
Status: DISCONTINUED | OUTPATIENT
Start: 2024-05-13 | End: 2024-05-17 | Stop reason: HOSPADM

## 2024-05-13 RX ORDER — BUPIVACAINE HYDROCHLORIDE 2.5 MG/ML
INJECTION, SOLUTION EPIDURAL; INFILTRATION; INTRACAUDAL
Status: DISCONTINUED | OUTPATIENT
Start: 2024-05-13 | End: 2024-05-13 | Stop reason: HOSPADM

## 2024-05-13 RX ORDER — METOCLOPRAMIDE HYDROCHLORIDE 5 MG/ML
5 INJECTION INTRAMUSCULAR; INTRAVENOUS EVERY 6 HOURS PRN
Status: DISCONTINUED | OUTPATIENT
Start: 2024-05-13 | End: 2024-05-17 | Stop reason: HOSPADM

## 2024-05-13 RX ORDER — ASPIRIN 81 MG/1
81 TABLET ORAL DAILY
Status: DISCONTINUED | OUTPATIENT
Start: 2024-05-14 | End: 2024-05-17 | Stop reason: HOSPADM

## 2024-05-13 RX ORDER — NITROGLYCERIN 5 MG/ML
INJECTION, SOLUTION INTRAVENOUS
Status: DISCONTINUED | OUTPATIENT
Start: 2024-05-13 | End: 2024-05-13

## 2024-05-13 RX ORDER — KETAMINE HCL IN 0.9 % NACL 50 MG/5 ML
SYRINGE (ML) INTRAVENOUS
Status: DISCONTINUED | OUTPATIENT
Start: 2024-05-13 | End: 2024-05-13

## 2024-05-13 RX ORDER — LIDOCAINE HYDROCHLORIDE 20 MG/ML
INJECTION INTRAVENOUS
Status: DISCONTINUED | OUTPATIENT
Start: 2024-05-13 | End: 2024-05-13

## 2024-05-13 RX ORDER — POTASSIUM CHLORIDE 29.8 MG/ML
40 INJECTION INTRAVENOUS
Status: DISCONTINUED | OUTPATIENT
Start: 2024-05-13 | End: 2024-05-17 | Stop reason: HOSPADM

## 2024-05-13 RX ORDER — CEFAZOLIN SODIUM 1 G/3ML
INJECTION, POWDER, FOR SOLUTION INTRAMUSCULAR; INTRAVENOUS
Status: DISCONTINUED | OUTPATIENT
Start: 2024-05-13 | End: 2024-05-13 | Stop reason: HOSPADM

## 2024-05-13 RX ORDER — OXYCODONE HYDROCHLORIDE 5 MG/1
5 TABLET ORAL EVERY 4 HOURS PRN
Status: DISCONTINUED | OUTPATIENT
Start: 2024-05-13 | End: 2024-05-17 | Stop reason: HOSPADM

## 2024-05-13 RX ORDER — CALCIUM GLUCONATE 20 MG/ML
2 INJECTION, SOLUTION INTRAVENOUS
Status: DISCONTINUED | OUTPATIENT
Start: 2024-05-13 | End: 2024-05-15

## 2024-05-13 RX ORDER — PROPOFOL 10 MG/ML
VIAL (ML) INTRAVENOUS
Status: DISCONTINUED | OUTPATIENT
Start: 2024-05-13 | End: 2024-05-13

## 2024-05-13 RX ORDER — IPRATROPIUM BROMIDE AND ALBUTEROL SULFATE 2.5; .5 MG/3ML; MG/3ML
3 SOLUTION RESPIRATORY (INHALATION) EVERY 4 HOURS
Status: COMPLETED | OUTPATIENT
Start: 2024-05-13 | End: 2024-05-14

## 2024-05-13 RX ORDER — VASOPRESSIN IN DEXTROSE 5 % 25/250 ML
0.01 PLASTIC BAG, INJECTION (ML) INTRAVENOUS CONTINUOUS
Status: DISCONTINUED | OUTPATIENT
Start: 2024-05-13 | End: 2024-05-13

## 2024-05-13 RX ORDER — NICARDIPINE HYDROCHLORIDE 0.2 MG/ML
0-15 INJECTION INTRAVENOUS CONTINUOUS PRN
Status: DISCONTINUED | OUTPATIENT
Start: 2024-05-13 | End: 2024-05-15 | Stop reason: HOSPADM

## 2024-05-13 RX ORDER — DEXTROSE, SODIUM CHLORIDE, SODIUM LACTATE, POTASSIUM CHLORIDE, AND CALCIUM CHLORIDE 5; .6; .31; .03; .02 G/100ML; G/100ML; G/100ML; G/100ML; G/100ML
INJECTION, SOLUTION INTRAVENOUS CONTINUOUS
Status: ACTIVE | OUTPATIENT
Start: 2024-05-13 | End: 2024-05-14

## 2024-05-13 RX ORDER — NOREPINEPHRINE BITARTRATE/D5W 4MG/250ML
0-3 PLASTIC BAG, INJECTION (ML) INTRAVENOUS CONTINUOUS
Status: DISCONTINUED | OUTPATIENT
Start: 2024-05-13 | End: 2024-05-15 | Stop reason: HOSPADM

## 2024-05-13 RX ORDER — POTASSIUM CHLORIDE 20 MEQ/1
20 TABLET, EXTENDED RELEASE ORAL EVERY 12 HOURS
Status: DISCONTINUED | OUTPATIENT
Start: 2024-05-14 | End: 2024-05-15

## 2024-05-13 RX ORDER — MIDAZOLAM HYDROCHLORIDE 1 MG/ML
1 INJECTION, SOLUTION INTRAMUSCULAR; INTRAVENOUS
Status: ACTIVE | OUTPATIENT
Start: 2024-05-13 | End: 2024-05-14

## 2024-05-13 RX ORDER — FENTANYL CITRATE 50 UG/ML
25 INJECTION, SOLUTION INTRAMUSCULAR; INTRAVENOUS
Status: DISCONTINUED | OUTPATIENT
Start: 2024-05-13 | End: 2024-05-15 | Stop reason: HOSPADM

## 2024-05-13 RX ORDER — MAGNESIUM SULFATE HEPTAHYDRATE 40 MG/ML
INJECTION, SOLUTION INTRAVENOUS
Status: DISCONTINUED | OUTPATIENT
Start: 2024-05-13 | End: 2024-05-13

## 2024-05-13 RX ORDER — CLOPIDOGREL BISULFATE 75 MG/1
75 TABLET ORAL DAILY
Status: DISCONTINUED | OUTPATIENT
Start: 2024-05-14 | End: 2024-05-17 | Stop reason: HOSPADM

## 2024-05-13 RX ORDER — LANOLIN ALCOHOL/MO/W.PET/CERES
1000 CREAM (GRAM) TOPICAL DAILY
Status: DISCONTINUED | OUTPATIENT
Start: 2024-05-15 | End: 2024-05-16

## 2024-05-13 RX ORDER — ALBUMIN HUMAN 50 G/1000ML
25 SOLUTION INTRAVENOUS
Status: DISCONTINUED | OUTPATIENT
Start: 2024-05-13 | End: 2024-05-17 | Stop reason: HOSPADM

## 2024-05-13 RX ORDER — ASCORBIC ACID 500 MG
500 TABLET ORAL 2 TIMES DAILY
Status: DISCONTINUED | OUTPATIENT
Start: 2024-05-14 | End: 2024-05-17 | Stop reason: HOSPADM

## 2024-05-13 RX ORDER — HEPARIN SODIUM 1000 [USP'U]/ML
INJECTION, SOLUTION INTRAVENOUS; SUBCUTANEOUS
Status: DISCONTINUED | OUTPATIENT
Start: 2024-05-13 | End: 2024-05-13

## 2024-05-13 RX ORDER — FENTANYL CITRATE 50 UG/ML
INJECTION, SOLUTION INTRAMUSCULAR; INTRAVENOUS
Status: DISCONTINUED | OUTPATIENT
Start: 2024-05-13 | End: 2024-05-13

## 2024-05-13 RX ORDER — LIDOCAINE HYDROCHLORIDE ANHYDROUS AND DEXTROSE MONOHYDRATE .8; 5 G/100ML; G/100ML
INJECTION, SOLUTION INTRAVENOUS CONTINUOUS PRN
Status: DISCONTINUED | OUTPATIENT
Start: 2024-05-13 | End: 2024-05-13

## 2024-05-13 RX ORDER — ONDANSETRON 2 MG/ML
INJECTION INTRAMUSCULAR; INTRAVENOUS
Status: DISCONTINUED | OUTPATIENT
Start: 2024-05-13 | End: 2024-05-13

## 2024-05-13 RX ORDER — CEFAZOLIN SODIUM 1 G/3ML
INJECTION, POWDER, FOR SOLUTION INTRAMUSCULAR; INTRAVENOUS
Status: DISCONTINUED | OUTPATIENT
Start: 2024-05-13 | End: 2024-05-13

## 2024-05-13 RX ORDER — CALCIUM GLUCONATE 20 MG/ML
1 INJECTION, SOLUTION INTRAVENOUS
Status: DISCONTINUED | OUTPATIENT
Start: 2024-05-13 | End: 2024-05-15

## 2024-05-13 RX ORDER — DEXMEDETOMIDINE HYDROCHLORIDE 4 UG/ML
0-1.4 INJECTION INTRAVENOUS CONTINUOUS
Status: DISCONTINUED | OUTPATIENT
Start: 2024-05-13 | End: 2024-05-15 | Stop reason: HOSPADM

## 2024-05-13 RX ORDER — ROCURONIUM BROMIDE 10 MG/ML
INJECTION, SOLUTION INTRAVENOUS
Status: DISCONTINUED | OUTPATIENT
Start: 2024-05-13 | End: 2024-05-13

## 2024-05-13 RX ADMIN — ROCURONIUM BROMIDE 20 MG: 10 INJECTION, SOLUTION INTRAVENOUS at 08:05

## 2024-05-13 RX ADMIN — FENTANYL CITRATE 25 MCG: 50 INJECTION INTRAMUSCULAR; INTRAVENOUS at 01:05

## 2024-05-13 RX ADMIN — EPINEPHRINE 0.02 MCG/KG/MIN: 1 INJECTION INTRAMUSCULAR; INTRAVENOUS; SUBCUTANEOUS at 10:05

## 2024-05-13 RX ADMIN — NITROGLYCERIN 40 MCG: 5 INJECTION, SOLUTION INTRAVENOUS at 10:05

## 2024-05-13 RX ADMIN — ACETAMINOPHEN 1000 MG: 10 INJECTION, SOLUTION INTRAVENOUS at 11:05

## 2024-05-13 RX ADMIN — OXYCODONE HYDROCHLORIDE 10 MG: 5 TABLET ORAL at 08:05

## 2024-05-13 RX ADMIN — POTASSIUM CHLORIDE 40 MEQ: 29.8 INJECTION, SOLUTION INTRAVENOUS at 11:05

## 2024-05-13 RX ADMIN — FENTANYL CITRATE 50 MCG: 50 INJECTION, SOLUTION INTRAMUSCULAR; INTRAVENOUS at 07:05

## 2024-05-13 RX ADMIN — FAMOTIDINE 20 MG: 10 INJECTION, SOLUTION INTRAVENOUS at 12:05

## 2024-05-13 RX ADMIN — MAGNESIUM SULFATE HEPTAHYDRATE 2 G: 2 INJECTION, SOLUTION INTRAVENOUS at 07:05

## 2024-05-13 RX ADMIN — IPRATROPIUM BROMIDE AND ALBUTEROL SULFATE 3 ML: 2.5; .5 SOLUTION RESPIRATORY (INHALATION) at 03:05

## 2024-05-13 RX ADMIN — FENTANYL CITRATE 50 MCG: 50 INJECTION INTRAMUSCULAR; INTRAVENOUS at 06:05

## 2024-05-13 RX ADMIN — NOREPINEPHRINE BITARTRATE 0.04 MCG/KG/MIN: 1 INJECTION, SOLUTION, CONCENTRATE INTRAVENOUS at 10:05

## 2024-05-13 RX ADMIN — SODIUM CHLORIDE, SODIUM LACTATE, POTASSIUM CHLORIDE, CALCIUM CHLORIDE AND DEXTROSE MONOHYDRATE: 5; 600; 310; 30; 20 INJECTION, SOLUTION INTRAVENOUS at 11:05

## 2024-05-13 RX ADMIN — CEFAZOLIN 2 G: 330 INJECTION, POWDER, FOR SOLUTION INTRAMUSCULAR; INTRAVENOUS at 07:05

## 2024-05-13 RX ADMIN — Medication 20 MG: at 10:05

## 2024-05-13 RX ADMIN — SODIUM CHLORIDE 1 UNITS/HR: 9 INJECTION, SOLUTION INTRAVENOUS at 12:05

## 2024-05-13 RX ADMIN — ROCURONIUM BROMIDE 80 MG: 10 INJECTION, SOLUTION INTRAVENOUS at 07:05

## 2024-05-13 RX ADMIN — HEPARIN SODIUM 40000 UNITS: 1000 INJECTION, SOLUTION INTRAVENOUS; SUBCUTANEOUS at 08:05

## 2024-05-13 RX ADMIN — FENTANYL CITRATE 50 MCG: 50 INJECTION INTRAMUSCULAR; INTRAVENOUS at 07:05

## 2024-05-13 RX ADMIN — IPRATROPIUM BROMIDE AND ALBUTEROL SULFATE 3 ML: 2.5; .5 SOLUTION RESPIRATORY (INHALATION) at 11:05

## 2024-05-13 RX ADMIN — ROCURONIUM BROMIDE 30 MG: 10 INJECTION, SOLUTION INTRAVENOUS at 09:05

## 2024-05-13 RX ADMIN — MIDAZOLAM HYDROCHLORIDE 2 MG: 1 INJECTION, SOLUTION INTRAMUSCULAR; INTRAVENOUS at 07:05

## 2024-05-13 RX ADMIN — NOREPINEPHRINE BITARTRATE 0.06 MCG/KG/MIN: 4 INJECTION, SOLUTION INTRAVENOUS at 11:05

## 2024-05-13 RX ADMIN — SODIUM CHLORIDE, SODIUM LACTATE, POTASSIUM CHLORIDE, AND CALCIUM CHLORIDE: .6; .31; .03; .02 INJECTION, SOLUTION INTRAVENOUS at 07:05

## 2024-05-13 RX ADMIN — LIDOCAINE HYDROCHLORIDE 100 MG: 20 INJECTION INTRAVENOUS at 07:05

## 2024-05-13 RX ADMIN — SODIUM CHLORIDE, POTASSIUM CHLORIDE, SODIUM LACTATE AND CALCIUM CHLORIDE 1000 ML: 600; 310; 30; 20 INJECTION, SOLUTION INTRAVENOUS at 01:05

## 2024-05-13 RX ADMIN — VANCOMYCIN HYDROCHLORIDE 1000 MG: 1 INJECTION, POWDER, LYOPHILIZED, FOR SOLUTION INTRAVENOUS at 07:05

## 2024-05-13 RX ADMIN — ACETAMINOPHEN 1000 MG: 10 INJECTION, SOLUTION INTRAVENOUS at 03:05

## 2024-05-13 RX ADMIN — CHLORHEXIDINE GLUCONATE 0.12% ORAL RINSE 10 ML: 1.2 LIQUID ORAL at 09:05

## 2024-05-13 RX ADMIN — MONTELUKAST 10 MG: 10 TABLET, FILM COATED ORAL at 05:05

## 2024-05-13 RX ADMIN — PROTAMINE SULFATE 350 MG: 10 INJECTION, SOLUTION INTRAVENOUS at 10:05

## 2024-05-13 RX ADMIN — IPRATROPIUM BROMIDE AND ALBUTEROL SULFATE 3 ML: 2.5; .5 SOLUTION RESPIRATORY (INHALATION) at 06:05

## 2024-05-13 RX ADMIN — IPRATROPIUM BROMIDE AND ALBUTEROL SULFATE 3 ML: 2.5; .5 SOLUTION RESPIRATORY (INHALATION) at 07:05

## 2024-05-13 RX ADMIN — CALCIUM CHLORIDE 0.5 G: 100 INJECTION, SOLUTION INTRAVENOUS at 09:05

## 2024-05-13 RX ADMIN — LIDOCAINE HYDROCHLORIDE 2 MG/MIN: 8 INJECTION, SOLUTION INTRAVENOUS at 07:05

## 2024-05-13 RX ADMIN — INSULIN HUMAN 5 UNITS: 100 INJECTION, SOLUTION PARENTERAL at 10:05

## 2024-05-13 RX ADMIN — AMINOCAPROIC ACID 5 G: 250 INJECTION, SOLUTION INTRAVENOUS at 08:05

## 2024-05-13 RX ADMIN — FENTANYL CITRATE 50 MCG: 50 INJECTION, SOLUTION INTRAMUSCULAR; INTRAVENOUS at 08:05

## 2024-05-13 RX ADMIN — INSULIN HUMAN 5 UNITS: 100 INJECTION, SOLUTION PARENTERAL at 09:05

## 2024-05-13 RX ADMIN — CEFAZOLIN 2 G: 2 INJECTION, POWDER, FOR SOLUTION INTRAMUSCULAR; INTRAVENOUS at 04:05

## 2024-05-13 RX ADMIN — FENTANYL CITRATE 25 MCG: 50 INJECTION INTRAMUSCULAR; INTRAVENOUS at 04:05

## 2024-05-13 RX ADMIN — AMINOCAPROIC ACID 5 G: 250 INJECTION, SOLUTION INTRAVENOUS at 10:05

## 2024-05-13 RX ADMIN — FAMOTIDINE 20 MG: 10 INJECTION, SOLUTION INTRAVENOUS at 09:05

## 2024-05-13 RX ADMIN — PROPOFOL 50 MG: 10 INJECTION, EMULSION INTRAVENOUS at 07:05

## 2024-05-13 RX ADMIN — MAGNESIUM SULFATE HEPTAHYDRATE 4 G: 40 INJECTION, SOLUTION INTRAVENOUS at 06:05

## 2024-05-13 RX ADMIN — ALBUMIN (HUMAN) 25 G: 2.5 SOLUTION INTRAVENOUS at 12:05

## 2024-05-13 RX ADMIN — DEXMEDETOMIDINE HYDROCHLORIDE 0.5 MCG/KG/HR: 100 INJECTION, SOLUTION INTRAVENOUS at 10:05

## 2024-05-13 RX ADMIN — PROPOFOL 150 MG: 10 INJECTION, EMULSION INTRAVENOUS at 07:05

## 2024-05-13 RX ADMIN — AMIODARONE HYDROCHLORIDE 200 MG: 200 TABLET ORAL at 06:05

## 2024-05-13 RX ADMIN — VANCOMYCIN HYDROCHLORIDE 1500 MG: 1 INJECTION, POWDER, LYOPHILIZED, FOR SOLUTION INTRAVENOUS at 07:05

## 2024-05-13 NOTE — ASSESSMENT & PLAN NOTE
On chronic immunosuppressives- MTX and prednisone  Not compliant with prednisone  MTX currently on hold, resume when ok with CTS

## 2024-05-13 NOTE — ASSESSMENT & PLAN NOTE
-TTE with large mitral mass, concerning for myxoma  -CTS consulted  -R/LHC planned tmw    5/10/24  -Stable  -R/LHC today    05/13/24  -s/p removal of LA myxoma  -continue ICU supportive care

## 2024-05-13 NOTE — HPI
Information obtained from chart review and discussion with patient.     59-year-old female with a known past medical history of RA (MTX), HTN, anemia, and obesity who was admitted 5/9/2024 for further evaluation of mitral valve mass discovered on outpatient echo earlier that day. She was being evaluated by Cardiology for syncope, episode occurring about 2 weeks prior. She was admitted by hospital medicine with Cardiology and CTS services consulted. She underwent heart cath which did not show any blockages. 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 Sanchez and was transferred to ICU post-operatively for close monitoring.     Upon exam, Ms. Hoskins is lying in bed with her eyes closed, breathing comfortably and appears non-distressed. She opens her eyes and interacts to my voice. She reports post-op pain, awaiting next available dose of pain medication. Otherwise, she has no complaints. Insulin infusion and PRN IVF bolus infusing.

## 2024-05-13 NOTE — OP NOTE
[facility]  Cardiothoracic Surgery  Consult Note    Patient Name: Jake Hoskins  MRN: [id number]  Admission Date: 5/9/2024  Attending Physician: Garo Yancey MD  Referring Provider: Self, Aaareferral    Patient information was obtained from patient, ER records, and primary team.     Consults  Subjective:     Chief Complaint/Reason for Admission:  Syncopal attacks    History of Present Illness: 59 year old female patient with HTN and morbid obesity was evaluated for a syncopal attack 2 weeks ago.  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  No more syncopal attacks since the last week does not have any major cardiac symptoms like chest pain or dyspnea or pedal edema.  No current facility-administered medications on file prior to encounter.     Current Outpatient Medications on File Prior to Encounter   Medication Sig    celecoxib (CELEBREX) 200 MG capsule Take 1 capsule (200 mg total) by mouth once daily.    cyclobenzaprine (FLEXERIL) 10 MG tablet Take 1 tablet (10 mg total) by mouth nightly as needed for Muscle spasms.    ergocalciferol (ERGOCALCIFEROL) 50,000 unit Cap Take 1 capsule (50,000 Units total) by mouth every 7 days.    folic acid (FOLVITE) 1 MG tablet Take 1 tablet (1,000 mcg total) by mouth once daily.    losartan (COZAAR) 25 MG tablet Take 1 tablet (25 mg total) by mouth once daily.    methotrexate 2.5 MG Tab Take 6 tablets (15 mg total) by mouth every 7 days.    montelukast (SINGULAIR) 10 mg tablet Take 1 tablet by mouth once daily    MULTIVITS,CA,MINERALS/IRON/FA (ONE-A-DAY WOMENS FORMULA ORAL) Take by mouth once daily.     predniSONE (DELTASONE) 5 MG tablet Take 1 tablet (5 mg total) by mouth daily as needed (RA flare).    ferrous sulfate, dried (SLOW FE) 160 mg (50 mg iron) TbSR Take 1 tablet (160 mg total) by mouth once daily.    fluticasone propionate (FLONASE)  50 mcg/actuation nasal spray 2 sprays (100 mcg total) by Each Nostril route once daily.    loratadine (CLARITIN REDITABS) 10 mg dissolvable tablet Take 1 tablet (10 mg total) by mouth once daily.    olmesartan (BENICAR) 20 MG tablet Take 20 mg by mouth once daily.    potassium chloride SA (K-DUR,KLOR-CON) 20 MEQ tablet Take 1 tablet (20 mEq total) by mouth once daily.       Review of patient's allergies indicates:   Allergen Reactions    Tramadol Nausea And Vomiting       Past Medical History:   Diagnosis Date    Allergy     Anemia     Hypertension     Plantar fasciitis of left foot      Past Surgical History:   Procedure Laterality Date    COLONOSCOPY N/A 04/09/2021    Procedure: COLONOSCOPY;  Surgeon: Hua Elmore MD;  Location: [facility];  Service: Endoscopy;  Laterality: N/A;    HYSTERECTOMY  05/2021    OOPHORECTOMY  05/2021    ROBOT-ASSISTED LAPAROSCOPIC ABDOMINAL HYSTERECTOMY USING DA ABBEY XI N/A 05/18/2021    Procedure: XI ROBOTIC HYSTERECTOMY;  Surgeon: Christa Davila MD;  Location: [facility] OR;  Service: OB/GYN;  Laterality: N/A;    ROBOT-ASSISTED LAPAROSCOPIC SALPINGO-OOPHORECTOMY USING DA ABBEY XI Bilateral 05/18/2021    Procedure: XI ROBOTIC SALPINGO-OOPHORECTOMY;  Surgeon: Christa Davila MD;  Location: [facility] OR;  Service: OB/GYN;  Laterality: Bilateral;     Family History       Problem Relation (Age of Onset)    Asthma Mother    Cancer Mother    Heart disease Father    Hypertension Mother, Father, Sister          Tobacco Use    Smoking status: Never    Smokeless tobacco: Never   Substance and Sexual Activity    Alcohol use: No    Drug use: No    Sexual activity: Not Currently     Birth control/protection: Surgical     Review of Systems   Constitutional:  Negative for activity change and appetite change.   HENT:  Negative for dental problem, nosebleeds and sore throat.    Eyes:  Negative for discharge and visual disturbance.   Respiratory:  Negative for cough, chest tightness and stridor.     Cardiovascular:  Negative for leg swelling.   Gastrointestinal:  Negative for abdominal distention and abdominal pain.   Genitourinary:  Negative for difficulty urinating and dysuria.   Musculoskeletal:  Negative for arthralgias, back pain and joint swelling.   Allergic/Immunologic: Negative for environmental allergies.   Neurological:  Positive for syncope. Negative for dizziness and headaches.   Hematological:  Does not bruise/bleed easily.   Psychiatric/Behavioral:  Negative for behavioral problems.      Objective:     Vital Signs (Most Recent):  Temp: 98.2 °F (36.8 °C) (05/13/24 0417)  Pulse: 70 (05/13/24 0646)  Resp: 18 (05/13/24 0646)  BP: 115/61 (05/13/24 0417)  SpO2: 99 % (05/13/24 0646) Vital Signs (24h Range):  Temp:  [97.6 °F (36.4 °C)-98.2 °F (36.8 °C)] 98.2 °F (36.8 °C)  Pulse:  [58-94] 70  Resp:  [18] 18  SpO2:  [99 %-100 %] 99 %  BP: (101-134)/(58-70) 115/61     Weight: 102.6 kg (226 lb 3.1 oz)  Body mass index is 38.83 kg/m².    SpO2: 99 %        Intake/Output - Last 3 Shifts         05/11 0700  05/12 0659 05/12 0700  05/13 0659 05/13 0700  05/14 0659    P.O. 240 0     Total Intake(mL/kg) 240 (2.3) 0 (0)     Urine (mL/kg/hr) 100 (0)      Total Output 100      Net +140 0            Urine Occurrence 5 x 2 x              Lines/Drains/Airways       Peripheral Intravenous Line  Duration                  Peripheral IV - Single Lumen 05/09/24 1055 20 G Right Antecubital 3 days                     STS Risk Score: na    Physical Exam  Vitals reviewed.   Constitutional:       Appearance: Normal appearance. She is obese.   HENT:      Head: Normocephalic and atraumatic.      Mouth/Throat:      Mouth: Mucous membranes are moist.   Eyes:      Extraocular Movements: Extraocular movements intact.   Cardiovascular:      Rate and Rhythm: Normal rate and regular rhythm.      Pulses: Normal pulses.      Heart sounds: Normal heart sounds.   Pulmonary:      Effort: Pulmonary effort is normal.      Breath sounds: Normal  breath sounds.   Abdominal:      Palpations: Abdomen is soft.   Musculoskeletal:         General: Normal range of motion.      Cervical back: Normal range of motion and neck supple.   Skin:     General: Skin is warm.      Capillary Refill: Capillary refill takes less than 2 seconds.   Neurological:      General: No focal deficit present.      Mental Status: She is alert and oriented to person, place, and time.   Psychiatric:         Mood and Affect: Mood normal.         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  The patient will need left heart catheterization and coronary angiogram to rule out any significant stenosis.  I discussed the left atrial myxoma resection through median sternotomy under cardiopulmonary bypass with the patient and we will do it during this admission.  NYHA Score: NYHA I: cardiac disease, but without resulting limitations of physical activity    Active Diagnoses:    Diagnosis Date Noted POA    PRINCIPAL PROBLEM:  Mitral valve mass [I05.8] 05/09/2024 Yes    Syncope and collapse [R55] 05/09/2024 Yes    Encephalopathy, metabolic [G93.41] 05/09/2024 Yes    Rheumatoid arthritis involving multiple sites with positive rheumatoid factor [M05.79] 10/07/2021 Yes    Essential hypertension [I10] 07/13/2018 Yes     Chronic      Problems Resolved During this Admission:       The plan was discussed with the patient and the family including the risk of myocardial infarction bleeding death infection possible stroke and after detailed discussion we agreed to  proceed with the resection of the left atrial myxoma under cardiopulmonary bypass.    Chester Sanchez MD  Cardiothoracic Surgery  [facility]

## 2024-05-13 NOTE — SUBJECTIVE & OBJECTIVE
ROS  Objective:     Vital Signs (Most Recent):  Temp: 97.7 °F (36.5 °C) (05/13/24 1215)  Pulse: 68 (05/13/24 1215)  Resp: 20 (05/13/24 1215)  BP: (!) 140/68 (05/13/24 1125)  SpO2: 97 % (05/13/24 1215) Vital Signs (24h Range):  Temp:  [97.6 °F (36.4 °C)-98.2 °F (36.8 °C)] 97.7 °F (36.5 °C)  Pulse:  [58-93] 68  Resp:  [18-26] 20  SpO2:  [96 %-100 %] 97 %  BP: (101-140)/(58-68) 140/68  Arterial Line BP: ()/(56-66) 96/62     Weight: 104.2 kg (229 lb 11.5 oz)  Body mass index is 39.43 kg/m².     SpO2: 97 %         Intake/Output Summary (Last 24 hours) at 5/13/2024 1244  Last data filed at 5/13/2024 1200  Gross per 24 hour   Intake 3189.36 ml   Output 1288 ml   Net 1901.36 ml       Lines/Drains/Airways       Central Venous Catheter Line  Duration             Percutaneous Central Line - Triple Lumen  05/13/24  Internal Jugular Right <1 day              Drain  Duration                  Urethral Catheter 05/13/24 0818 Temperature probe;Straight-tip;Silicone;Non-latex 16 Fr. <1 day         Y Chest Tube 1 and 2 05/13/24 1005 1 Right Mediastinal 24 Fr. 2 Left Mediastinal 24 Fr. <1 day              Airway  Duration                  Airway - Non-Surgical 05/13/24 0716 <1 day              Line  Duration                  Pacer Wires 05/13/24  <1 day              Peripheral Intravenous Line  Duration                  Peripheral IV - Single Lumen 05/09/24 1055 20 G Right Antecubital 4 days         Peripheral IV - Single Lumen 05/13/24  16 G Left Antecubital <1 day                       Physical Exam  Vitals and nursing note reviewed.   Constitutional:       General: She is not in acute distress.     Appearance: She is well-developed. She is not diaphoretic.      Interventions: She is intubated.   HENT:      Head: Normocephalic.   Eyes:      General:         Right eye: No discharge.         Left eye: No discharge.   Cardiovascular:      Rate and Rhythm: Normal rate and regular rhythm.      Heart sounds: Normal heart sounds,  "S1 normal and S2 normal. No murmur heard.  Pulmonary:      Effort: No respiratory distress. She is intubated.      Breath sounds: Normal air entry.   Abdominal:      General: There is no distension.      Tenderness: There is no abdominal tenderness.   Skin:     General: Skin is warm and dry.      Findings: No erythema.            Significant Labs: ABG:   Recent Labs   Lab 05/13/24  0952 05/13/24  1028 05/13/24  1128   PH 7.349* 7.366 7.318*   PCO2 45.3* 37.7 40.4   HCO3 25.0 21.6* 20.7*   POCSATURATED 100 100 98   BE -1 -4* -5*   , Blood Culture: No results for input(s): "LABBLOO" in the last 48 hours., BMP:   Recent Labs   Lab 05/12/24  0738 05/12/24  1756 05/13/24  1126   GLU 96 130* 176*    139 141   K 4.0 3.9 3.5    105 110   CO2 22* 27 22*   BUN 11 12 11   CREATININE 0.8 0.8 0.8   CALCIUM 9.5 9.5 8.7   MG  --   --  2.6   , CMP   Recent Labs   Lab 05/12/24  0738 05/12/24  1756 05/13/24  1126    139 141   K 4.0 3.9 3.5    105 110   CO2 22* 27 22*   GLU 96 130* 176*   BUN 11 12 11   CREATININE 0.8 0.8 0.8   CALCIUM 9.5 9.5 8.7   PROT 7.3 7.2 5.3*   ALBUMIN 3.4* 3.4* 2.6*   BILITOT 0.6 0.4 0.5   ALKPHOS 60 62 49*   AST 13 13 35   ALT 11 12 15   ANIONGAP 9 7* 9   , CBC   Recent Labs   Lab 05/12/24  0738 05/12/24  1756 05/13/24  0749 05/13/24  1126 05/13/24  1128   WBC 5.41 6.05  --  32.08*  --    HGB 12.4 11.9*  --  11.0*  --    HCT 39.9 38.5   < > 34.8* 33*    349  --  306  --     < > = values in this interval not displayed.   , INR   Recent Labs   Lab 05/12/24  1756 05/13/24  1126   INR 1.0 1.1   , Lipid Panel No results for input(s): "CHOL", "HDL", "LDLCALC", "TRIG", "CHOLHDL" in the last 48 hours., and Troponin No results for input(s): "TROPONINI" in the last 48 hours.    Significant Imaging: EKG:    "

## 2024-05-13 NOTE — ASSESSMENT & PLAN NOTE
S/p resection 5/13   Pathology pending  Pain control  Glucose control with insulin infusion   Pressors ordered to maintain acceptable hemodynamics   IS and OOB as able  Post-op plan per CTS

## 2024-05-13 NOTE — PROGRESS NOTES
[facility] - Intensive Care ([facility])  Cardiology  Progress Note    Patient Name: Jake Hoskins  MRN: [id number]  Admission Date: 5/9/2024  Hospital Length of Stay: 4 days  Code Status: Full Code   Attending Physician: Chester Sanchez MD   Primary Care Physician: Angelica Lagunas MD  Expected Discharge Date:   Principal Problem:Mitral valve mass    Subjective:     Hospital Course:   Ms. Hoskins is a 59 year old female patient whose current medical conditions include HTN and morbid obesity who was sent to [facility] from cardiology clinic due to abnormal echo. Patient had previously seen Dr. Buchanan due to syncopal episode two weeks ago and echo was ordered for further evaluation. 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 Cardiology consulted to assist with management. Patient seen and examined today in ED. Stable CV wise. No CP or SOB. No recurrent syncope. No s/s of TIA/CVA. CT surgery consulted. R/[facility] planned tmw.     5/10/24-Patient seen and examined today, resting in bed. Stable. No AEON. No CV complaints. Labs stable. R/LHC today. CTS on board.    05/13/24 saw pt in the icu after removal of LE myxoma. S/p intubated. Chest tube in the place. Off epicardial pacer. Wean off the pressor. VSS.         ROS  Objective:     Vital Signs (Most Recent):  Temp: 97.7 °F (36.5 °C) (05/13/24 1215)  Pulse: 68 (05/13/24 1215)  Resp: 20 (05/13/24 1215)  BP: (!) 140/68 (05/13/24 1125)  SpO2: 97 % (05/13/24 1215) Vital Signs (24h Range):  Temp:  [97.6 °F (36.4 °C)-98.2 °F (36.8 °C)] 97.7 °F (36.5 °C)  Pulse:  [58-93] 68  Resp:  [18-26] 20  SpO2:  [96 %-100 %] 97 %  BP: (101-140)/(58-68) 140/68  Arterial Line BP: ()/(56-66) 96/62     Weight: 104.2 kg (229 lb 11.5 oz)  Body mass index is 39.43 kg/m².     SpO2: 97 %         Intake/Output Summary (Last 24 hours) at 5/13/2024 1244  Last data filed at 5/13/2024 1200  Gross per 24 hour   Intake 3189.36 ml   Output 1288 ml   Net 1901.36 ml  "      Lines/Drains/Airways       Central Venous Catheter Line  Duration             Percutaneous Central Line - Triple Lumen  05/13/24  Internal Jugular Right <1 day              Drain  Duration                  Urethral Catheter 05/13/24 0818 Temperature probe;Straight-tip;Silicone;Non-latex 16 Fr. <1 day         Y Chest Tube 1 and 2 05/13/24 1005 1 Right Mediastinal 24 Fr. 2 Left Mediastinal 24 Fr. <1 day              Airway  Duration                  Airway - Non-Surgical 05/13/24 0716 <1 day              Line  Duration                  Pacer Wires 05/13/24  <1 day              Peripheral Intravenous Line  Duration                  Peripheral IV - Single Lumen 05/09/24 1055 20 G Right Antecubital 4 days         Peripheral IV - Single Lumen 05/13/24  16 G Left Antecubital <1 day                       Physical Exam  Vitals and nursing note reviewed.   Constitutional:       General: She is not in acute distress.     Appearance: She is well-developed. She is not diaphoretic.      Interventions: She is intubated.   HENT:      Head: Normocephalic.   Eyes:      General:         Right eye: No discharge.         Left eye: No discharge.   Cardiovascular:      Rate and Rhythm: Normal rate and regular rhythm.      Heart sounds: Normal heart sounds, S1 normal and S2 normal. No murmur heard.  Pulmonary:      Effort: No respiratory distress. She is intubated.      Breath sounds: Normal air entry.   Abdominal:      General: There is no distension.      Tenderness: There is no abdominal tenderness.   Skin:     General: Skin is warm and dry.      Findings: No erythema.            Significant Labs: ABG:   Recent Labs   Lab 05/13/24  0952 05/13/24  1028 05/13/24  1128   PH 7.349* 7.366 7.318*   PCO2 45.3* 37.7 40.4   HCO3 25.0 21.6* 20.7*   POCSATURATED 100 100 98   BE -1 -4* -5*   , Blood Culture: No results for input(s): "LABBLOO" in the last 48 hours., BMP:   Recent Labs   Lab 05/12/24  0738 05/12/24  1756 05/13/24  1126   GLU 96 " "130* 176*    139 141   K 4.0 3.9 3.5    105 110   CO2 22* 27 22*   BUN 11 12 11   CREATININE 0.8 0.8 0.8   CALCIUM 9.5 9.5 8.7   MG  --   --  2.6   , CMP   Recent Labs   Lab 05/12/24  0738 05/12/24  1756 05/13/24  1126    139 141   K 4.0 3.9 3.5    105 110   CO2 22* 27 22*   GLU 96 130* 176*   BUN 11 12 11   CREATININE 0.8 0.8 0.8   CALCIUM 9.5 9.5 8.7   PROT 7.3 7.2 5.3*   ALBUMIN 3.4* 3.4* 2.6*   BILITOT 0.6 0.4 0.5   ALKPHOS 60 62 49*   AST 13 13 35   ALT 11 12 15   ANIONGAP 9 7* 9   , CBC   Recent Labs   Lab 05/12/24  0738 05/12/24  1756 05/13/24  0749 05/13/24  1126 05/13/24  1128   WBC 5.41 6.05  --  32.08*  --    HGB 12.4 11.9*  --  11.0*  --    HCT 39.9 38.5   < > 34.8* 33*    349  --  306  --     < > = values in this interval not displayed.   , INR   Recent Labs   Lab 05/12/24 1756 05/13/24  1126   INR 1.0 1.1   , Lipid Panel No results for input(s): "CHOL", "HDL", "LDLCALC", "TRIG", "CHOLHDL" in the last 48 hours., and Troponin No results for input(s): "TROPONINI" in the last 48 hours.    Significant Imaging: EKG:    Assessment and Plan:         * Mitral valve mass  -TTE with large mitral mass, concerning for myxoma  -CTS consulted  -R/LHC planned tmw    5/10/24  -Stable  -R/LHC today    05/13/24  -s/p removal of LA myxoma  -continue ICU supportive care    Syncope and collapse  -Episode two weeks ago  -No recurrence  -Continue to monitor    Rheumatoid arthritis involving multiple sites with positive rheumatoid factor  -Mgmt as per primary team    Essential hypertension  -Continue home meds        VTE Risk Mitigation (From admission, onward)           Ordered     Reason for No Pharmacological VTE Prophylaxis  Once        Question:  Reasons:  Answer:  Physician Provided (leave comment)  Comment:  possible surgical intervention    05/09/24 1216     IP VTE HIGH RISK PATIENT  Once         05/09/24 1216     Place sequential compression device  Until discontinued         05/09/24 " 1216                    Jose Winkler MD  Cardiology  [facility] - Intensive Care ([facility])

## 2024-05-13 NOTE — ANESTHESIA PROCEDURE NOTES
Central Line    Diagnosis: atrial myxoma  Patient location during procedure: done in OR  Procedure start time: 5/13/2024 7:20 AM  Timeout: 5/13/2024 7:20 AM  Procedure end time: 5/13/2024 7:30 AM    Staffing  Authorizing Provider: Lauri Pierre II, MD  Performing Provider: Lauri Pierre II, MD    Staffing  Performed: anesthesiologist   Anesthesiologist: Lauri Pierre II, MD  Performed by: Lauri Pierre II, MD  Authorized by: Lauri Pierre II, MD    Anesthesiologist was present at the time of the procedure.  Preanesthetic Checklist  Completed: patient identified, IV checked, site marked, risks and benefits discussed, surgical consent, monitors and equipment checked, pre-op evaluation, timeout performed and anesthesia consent given  Indication   Indication: hemodynamic monitoring, vascular access, med administration     Anesthesia   general anesthesia    Central Line   Skin Prep: skin prepped with ChloraPrep, skin prep agent completely dried prior to procedure  Sterile Barriers Followed: Yes    All five maximal barriers used- gloves, gown, cap, mask, and large sterile sheet    hand hygiene performed prior to central venous catheter insertion  Location: right internal jugular.   Catheter type: triple lumen  Catheter Size: 7 Fr  Inserted Catheter Length: 16 cm  Ultrasound: vascular probe with ultrasound   Vessel Caliber: large, patent  Vascular Doppler:  not done, compressibility normal  Needle advanced into vessel with real time Ultrasound guidance.  Guidewire confirmed in vessel.  Image recorded and saved.  sterile gel and probe cover used in ultrasound-guided central venous catheter insertion   Manometry: Venous cannualation confirmed by visual estimation of blood vessel pressure using manometry.  Insertion Attempts: 1   Securement:line sutured, chlorhexidine patch, sterile dressing applied and blood return through all ports    Post-Procedure    Adverse Events:none      Guidewire Guidewire removed  intact.

## 2024-05-13 NOTE — PLAN OF CARE
Discussed poc with pt, pt verbalized understanding.  VSS.  AAOx3 spheres    Remains free of injuries, fall precautions in place. Call light within reach with the bed locked in lowest position.  NPO orders initiated at midnight    Hibiclens shower taken. Pre-Op prep completed.  Pt denies pain/nausea  Cardiac monitor in use, tele box #[id number], NSR    Chart check complete  Denies additional needs at this time  Will cont POC      Problem: Adult Inpatient Plan of Care  Goal: Absence of Hospital-Acquired Illness or Injury  Outcome: Progressing     Problem: Adult Inpatient Plan of Care  Goal: Optimal Comfort and Wellbeing  Outcome: Progressing     Problem: Fall Injury Risk  Goal: Absence of Fall and Fall-Related Injury  Outcome: Progressing

## 2024-05-13 NOTE — CONSULTS
[facility] - Intensive Care ([facility])  Critical Care Medicine  Consult Note    Patient Name: Jake Hoskins  MRN: [id number]  Admission Date: 5/9/2024  Hospital Length of Stay: 4 days  Code Status: Full Code  Attending Physician: Chester Sanchez MD   Primary Care Provider: Angelica Lagunas MD   Principal Problem: Mitral valve mass    Consults  Subjective:     HPI:  Information obtained from chart review and discussion with patient.     59-year-old female with a known past medical history of RA (MTX), HTN, anemia, and obesity who was admitted 5/9/2024 for further evaluation of mitral valve mass discovered on outpatient echo earlier that day. She was being evaluated by Cardiology for syncope, episode occurring about 2 weeks prior. She was admitted by hospital medicine with Cardiology and CTS services consulted. She underwent heart cath which did not show any blockages. 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 Sanchez and was transferred to ICU post-operatively for close monitoring.     Upon exam, Ms. Hoskins is lying in bed with her eyes closed, breathing comfortably and appears non-distressed. She opens her eyes and interacts to my voice. She reports post-op pain, awaiting next available dose of pain medication. Otherwise, she has no complaints. Insulin infusion and PRN IVF bolus infusing.     Hospital/ICU Course:  No notes on file    Past Medical History:   Diagnosis Date    Allergy     Anemia     Hypertension     Plantar fasciitis of left foot        Past Surgical History:   Procedure Laterality Date    CATHETERIZATION OF BOTH LEFT AND RIGHT HEART N/A 5/10/2024    Procedure: CATHETERIZATION, HEART, BOTH LEFT AND RIGHT;  Surgeon: Alison Buchanan MD;  Location: [facility] CATH LAB;  Service: Cardiology;  Laterality: N/A;    COLONOSCOPY N/A 04/09/2021    Procedure: COLONOSCOPY;  Surgeon: Hua Elmore MD;  Location: [facility] ENDO;  Service: Endoscopy;  Laterality: N/A;    HYSTERECTOMY  05/2021    OOPHORECTOMY   05/2021    ROBOT-ASSISTED LAPAROSCOPIC ABDOMINAL HYSTERECTOMY USING DA ABBEY XI N/A 05/18/2021    Procedure: XI ROBOTIC HYSTERECTOMY;  Surgeon: Christa Davila MD;  Location: [facility] OR;  Service: OB/GYN;  Laterality: N/A;    ROBOT-ASSISTED LAPAROSCOPIC SALPINGO-OOPHORECTOMY USING DA ABBEY XI Bilateral 05/18/2021    Procedure: XI ROBOTIC SALPINGO-OOPHORECTOMY;  Surgeon: Christa Davila MD;  Location: [facility] OR;  Service: OB/GYN;  Laterality: Bilateral;       Review of patient's allergies indicates:   Allergen Reactions    Tramadol Nausea And Vomiting       Family History       Problem Relation (Age of Onset)    Asthma Mother    Cancer Mother    Heart disease Father    Hypertension Mother, Father, Sister          Tobacco Use    Smoking status: Never    Smokeless tobacco: Never   Substance and Sexual Activity    Alcohol use: No    Drug use: No    Sexual activity: Not Currently     Birth control/protection: Surgical         Review of Systems   Constitutional:  Negative for chills and fever.   HENT:  Negative for congestion and sore throat.    Eyes:  Negative for photophobia and visual disturbance.   Respiratory:  Negative for cough and shortness of breath.    Cardiovascular:  Negative for chest pain and leg swelling.        Post-op, incisional pain   Genitourinary:  Negative for difficulty urinating and dysuria.   Musculoskeletal:  Negative for back pain and gait problem.   Neurological:  Negative for dizziness and headaches.   Psychiatric/Behavioral:  Negative for sleep disturbance. The patient is not nervous/anxious.      Objective:     Vital Signs (Most Recent):  Temp: 97.5 °F (36.4 °C) (05/13/24 1800)  Pulse: 67 (05/13/24 1800)  Resp: 15 (05/13/24 1800)  BP: 101/62 (05/13/24 1800)  SpO2: 96 % (05/13/24 1800) Vital Signs (24h Range):  Temp:  [96.8 °F (36 °C)-98.2 °F (36.8 °C)] 97.5 °F (36.4 °C)  Pulse:  [56-93] 67  Resp:  [11-27] 15  SpO2:  [92 %-100 %] 96 %  BP: ()/(53-68) 101/62  Arterial Line BP:  ()/(46-69) 100/60     Weight: 104.2 kg (229 lb 11.5 oz)  Body mass index is 39.43 kg/m².      Intake/Output Summary (Last 24 hours) at 5/13/2024 1809  Last data filed at 5/13/2024 1800  Gross per 24 hour   Intake 4107.12 ml   Output 1835 ml   Net 2272.12 ml        Physical Exam  Vitals reviewed.   Constitutional:       General: She is sleeping.      Appearance: She is ill-appearing.   HENT:      Head: Normocephalic and atraumatic.      Mouth/Throat:      Mouth: Mucous membranes are moist.      Pharynx: Oropharynx is clear.   Eyes:      Extraocular Movements: Extraocular movements intact.      Conjunctiva/sclera: Conjunctivae normal.   Cardiovascular:      Rate and Rhythm: Normal rate and regular rhythm.      Comments: Midsternal incision with dressing in place. CT x 2.   Pulmonary:      Effort: Pulmonary effort is normal.      Breath sounds: No wheezing or rhonchi.      Comments: Generally diminished   Abdominal:      General: Bowel sounds are normal. There is no distension.      Palpations: Abdomen is soft.   Musculoskeletal:         General: No deformity.      Cervical back: Normal range of motion and neck supple.      Right lower leg: No edema.      Left lower leg: No edema.   Skin:     General: Skin is warm and dry.   Neurological:      General: No focal deficit present.      Mental Status: She is oriented to person, place, and time and easily aroused.   Psychiatric:         Behavior: Behavior is cooperative.           Lines/Drains/Airways       Central Venous Catheter Line  Duration             Percutaneous Central Line - Triple Lumen  05/13/24  Internal Jugular Right <1 day              Drain  Duration                  Urethral Catheter 05/13/24 0818 Temperature probe;Straight-tip;Silicone;Non-latex 16 Fr. <1 day         Y Chest Tube 1 and 2 05/13/24 1005 1 Right Mediastinal 24 Fr. 2 Left Mediastinal 24 Fr. <1 day              Arterial Line  Duration             Arterial Line 05/13/24 0709 Right Radial  <1 day              Line  Duration                  Pacer Wires 05/13/24  <1 day              Peripheral Intravenous Line  Duration                  Peripheral IV - Single Lumen 05/09/24 1055 20 G Right Antecubital 4 days         Peripheral IV - Single Lumen 05/13/24  16 G Left Antecubital <1 day                    Significant Labs:    CBC/Anemia Profile:  Recent Labs   Lab 05/12/24  0738 05/12/24  1756 05/13/24  0749 05/13/24  1126 05/13/24  1128 05/13/24  1444   WBC 5.41 6.05  --  32.08*  --   --    HGB 12.4 11.9*  --  11.0*  --   --    HCT 39.9 38.5   < > 34.8* 33* 32*    349  --  306  --   --    MCV 81* 81*  --  81*  --   --    RDW 15.1* 14.9*  --  14.9*  --   --     < > = values in this interval not displayed.        Chemistries:  Recent Labs   Lab 05/12/24  0738 05/12/24  1756 05/13/24  1126 05/13/24  1704    139 141 140   K 4.0 3.9 3.5 4.4    105 110 112*   CO2 22* 27 22* 19*   BUN 11 12 11 10   CREATININE 0.8 0.8 0.8 0.7   CALCIUM 9.5 9.5 8.7 8.3*   ALBUMIN 3.4* 3.4* 2.6*  --    PROT 7.3 7.2 5.3*  --    BILITOT 0.6 0.4 0.5  --    ALKPHOS 60 62 49*  --    ALT 11 12 15  --    AST 13 13 35  --    MG  --   --  2.6 2.1       All pertinent labs within the past 24 hours have been reviewed.    Significant Imaging:   I have reviewed all pertinent imaging results/findings within the past 24 hours.    ABG  Recent Labs   Lab 05/13/24  1444   PH 7.361   PO2 84   PCO2 39.8   HCO3 22.5*   BE -3*     Assessment/Plan:     Cardiac/Vascular  * Mitral valve mass  S/p resection 5/13   Pathology pending  Pain control  Glucose control with insulin infusion   Pressors ordered to maintain acceptable hemodynamics   IS and OOB as able  Post-op plan per CTS    Essential hypertension  Was requiring pressors initially after surgery but have been weaned off  IVF bolus ordered PRN   Monitor closely and hold anti-hypertensives if hypotensive    Immunology/Multi System  Rheumatoid arthritis involving multiple sites with  positive rheumatoid factor  On chronic immunosuppressives- MTX and prednisone  Not compliant with prednisone  MTX currently on hold, resume when ok with CTS      Other  Syncope and collapse  Most likely from MV mass   No recurrence since first episode beginning of May      DVT prophylaxis: SCDs, chemical ppx when ok with surgery   GI prophylaxis: pepcid  Code status: Full   Disposition: ICU care        Tatum Cortez NP  Critical Care Medicine  [facility] - Intensive Care ([facility])

## 2024-05-13 NOTE — SUBJECTIVE & OBJECTIVE
Past Medical History:   Diagnosis Date    Allergy     Anemia     Hypertension     Plantar fasciitis of left foot        Past Surgical History:   Procedure Laterality Date    CATHETERIZATION OF BOTH LEFT AND RIGHT HEART N/A 5/10/2024    Procedure: CATHETERIZATION, HEART, BOTH LEFT AND RIGHT;  Surgeon: Alison Buchanan MD;  Location: [facility] CATH LAB;  Service: Cardiology;  Laterality: N/A;    COLONOSCOPY N/A 04/09/2021    Procedure: COLONOSCOPY;  Surgeon: Hua Elmore MD;  Location: [facility] ENDO;  Service: Endoscopy;  Laterality: N/A;    HYSTERECTOMY  05/2021    OOPHORECTOMY  05/2021    ROBOT-ASSISTED LAPAROSCOPIC ABDOMINAL HYSTERECTOMY USING DA ABBEY XI N/A 05/18/2021    Procedure: XI ROBOTIC HYSTERECTOMY;  Surgeon: Christa Davila MD;  Location: [facility] OR;  Service: OB/GYN;  Laterality: N/A;    ROBOT-ASSISTED LAPAROSCOPIC SALPINGO-OOPHORECTOMY USING DA ABBEY XI Bilateral 05/18/2021    Procedure: XI ROBOTIC SALPINGO-OOPHORECTOMY;  Surgeon: Christa Davila MD;  Location: [facility] OR;  Service: OB/GYN;  Laterality: Bilateral;       Review of patient's allergies indicates:   Allergen Reactions    Tramadol Nausea And Vomiting       Family History       Problem Relation (Age of Onset)    Asthma Mother    Cancer Mother    Heart disease Father    Hypertension Mother, Father, Sister          Tobacco Use    Smoking status: Never    Smokeless tobacco: Never   Substance and Sexual Activity    Alcohol use: No    Drug use: No    Sexual activity: Not Currently     Birth control/protection: Surgical         Review of Systems   Constitutional:  Negative for chills and fever.   HENT:  Negative for congestion and sore throat.    Eyes:  Negative for photophobia and visual disturbance.   Respiratory:  Negative for cough and shortness of breath.    Cardiovascular:  Negative for chest pain and leg swelling.        Post-op, incisional pain   Genitourinary:  Negative for difficulty urinating and dysuria.   Musculoskeletal:  Negative for back  pain and gait problem.   Neurological:  Negative for dizziness and headaches.   Psychiatric/Behavioral:  Negative for sleep disturbance. The patient is not nervous/anxious.      Objective:     Vital Signs (Most Recent):  Temp: 97.5 °F (36.4 °C) (05/13/24 1800)  Pulse: 67 (05/13/24 1800)  Resp: 15 (05/13/24 1800)  BP: 101/62 (05/13/24 1800)  SpO2: 96 % (05/13/24 1800) Vital Signs (24h Range):  Temp:  [96.8 °F (36 °C)-98.2 °F (36.8 °C)] 97.5 °F (36.4 °C)  Pulse:  [56-93] 67  Resp:  [11-27] 15  SpO2:  [92 %-100 %] 96 %  BP: ()/(53-68) 101/62  Arterial Line BP: ()/(46-69) 100/60     Weight: 104.2 kg (229 lb 11.5 oz)  Body mass index is 39.43 kg/m².      Intake/Output Summary (Last 24 hours) at 5/13/2024 1809  Last data filed at 5/13/2024 1800  Gross per 24 hour   Intake 4107.12 ml   Output 1835 ml   Net 2272.12 ml        Physical Exam  Vitals reviewed.   Constitutional:       General: She is sleeping.      Appearance: She is ill-appearing.   HENT:      Head: Normocephalic and atraumatic.      Mouth/Throat:      Mouth: Mucous membranes are moist.      Pharynx: Oropharynx is clear.   Eyes:      Extraocular Movements: Extraocular movements intact.      Conjunctiva/sclera: Conjunctivae normal.   Cardiovascular:      Rate and Rhythm: Normal rate and regular rhythm.      Comments: Midsternal incision with dressing in place. CT x 2.   Pulmonary:      Effort: Pulmonary effort is normal.      Breath sounds: No wheezing or rhonchi.      Comments: Generally diminished   Abdominal:      General: Bowel sounds are normal. There is no distension.      Palpations: Abdomen is soft.   Musculoskeletal:         General: No deformity.      Cervical back: Normal range of motion and neck supple.      Right lower leg: No edema.      Left lower leg: No edema.   Skin:     General: Skin is warm and dry.   Neurological:      General: No focal deficit present.      Mental Status: She is oriented to person, place, and time and easily  aroused.   Psychiatric:         Behavior: Behavior is cooperative.           Lines/Drains/Airways       Central Venous Catheter Line  Duration             Percutaneous Central Line - Triple Lumen  05/13/24  Internal Jugular Right <1 day              Drain  Duration                  Urethral Catheter 05/13/24 0818 Temperature probe;Straight-tip;Silicone;Non-latex 16 Fr. <1 day         Y Chest Tube 1 and 2 05/13/24 1005 1 Right Mediastinal 24 Fr. 2 Left Mediastinal 24 Fr. <1 day              Arterial Line  Duration             Arterial Line 05/13/24 0709 Right Radial <1 day              Line  Duration                  Pacer Wires 05/13/24  <1 day              Peripheral Intravenous Line  Duration                  Peripheral IV - Single Lumen 05/09/24 1055 20 G Right Antecubital 4 days         Peripheral IV - Single Lumen 05/13/24  16 G Left Antecubital <1 day                    Significant Labs:    CBC/Anemia Profile:  Recent Labs   Lab 05/12/24  0738 05/12/24  1756 05/13/24  0749 05/13/24  1126 05/13/24  1128 05/13/24  1444   WBC 5.41 6.05  --  32.08*  --   --    HGB 12.4 11.9*  --  11.0*  --   --    HCT 39.9 38.5   < > 34.8* 33* 32*    349  --  306  --   --    MCV 81* 81*  --  81*  --   --    RDW 15.1* 14.9*  --  14.9*  --   --     < > = values in this interval not displayed.        Chemistries:  Recent Labs   Lab 05/12/24  0738 05/12/24  1756 05/13/24  1126 05/13/24  1704    139 141 140   K 4.0 3.9 3.5 4.4    105 110 112*   CO2 22* 27 22* 19*   BUN 11 12 11 10   CREATININE 0.8 0.8 0.8 0.7   CALCIUM 9.5 9.5 8.7 8.3*   ALBUMIN 3.4* 3.4* 2.6*  --    PROT 7.3 7.2 5.3*  --    BILITOT 0.6 0.4 0.5  --    ALKPHOS 60 62 49*  --    ALT 11 12 15  --    AST 13 13 35  --    MG  --   --  2.6 2.1       All pertinent labs within the past 24 hours have been reviewed.    Significant Imaging:   I have reviewed all pertinent imaging results/findings within the past 24 hours.

## 2024-05-13 NOTE — ANESTHESIA PROCEDURE NOTES
Arterial    Diagnosis: atrial myxoma    Patient location during procedure: done in OR  Timeout: 5/13/2024 7:08 AM  Procedure end time: 5/13/2024 7:14 AM    Staffing  Authorizing Provider: Lauri Pierre II, MD  Performing Provider: Lauri Pierre II, MD    Staffing  Performed by: Lauri Pierre II, MD  Authorized by: Lauri Pierre II, MD    Anesthesiologist was present at the time of the procedure.    Preanesthetic Checklist  Completed: patient identified, IV checked, site marked, risks and benefits discussed, surgical consent, monitors and equipment checked, pre-op evaluation, timeout performed and anesthesia consent givenArterial  Skin Prep: chlorhexidine gluconate  Orientation: right  Location: radial    Catheter Size: 20 G  Catheter placement by Ultrasound guidance. Heme positive aspiration all ports.   Vessel Caliber: medium, patent, compressibility normal  Needle advanced into vessel with real time Ultrasound guidance.  Guidewire confirmed in vessel.  Sterile sheath used.  Image recorded and saved.Insertion Attempts: 1  Assessment  Dressing: sutured in place and taped and tegaderm  Patient: Tolerated well

## 2024-05-13 NOTE — OP NOTE
[facility] - Surgery ([facility])  Cardiothoracic Surgery  Operative Note    SUMMARY     Date of Procedure: 5/13/2024     Procedure: Procedure(s) (LRB):  RESECTION, MYXOMA, CARDIAC ATRIUM (N/A)  BLOCK, NERVE, INTERCOSTAL, 2 OR MORE (N/A)  ECHOCARDIOGRAM,TRANSESOPHAGEAL (N/A)    Surgeons and Role:     * Chester Sanchez MD - Primary    Assisting Surgeon: None    Pre-Operative Diagnosis: Near syncope [R55]  Hypertension  Obesity  Sleep apnea  Hypercholesterolemia  Supraventricular tachycardia    Post-Operative Diagnosis: Post-Op Diagnosis Codes:     * Near syncope [R55]    Anesthesia: General    Operative Findings (including complications, if any):  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  Friable tissue frozen section x2 the 2nd 1 came back negative.    Description of Technical Procedures:  The patient was taken to the operating room placed in a supine position endotracheal general anesthesia was given monitoring lines were placed preoperative antibiotics were given surgical time-out was done surgical site was prepared and draped with chlorhexidine x2..  A Hess catheter was introduced under aseptic precautions a triple-lumen neck line in the right internal jugular vein was placed by anesthesia.  Started my midline sternotomy skin incision subcutaneous tissue was taken down with the Bovie cautery Sternal was split with the oscillating sternal saw.  The pericardium was opened pericardium stay sutures were placed to create a pericardial well inspection of the heart revealed no abnormalities with normal drainage venous.  Heparin was given ACT was more than 700.  Cannulation was done with a 20 [place] to the aortic cannula an 18 into the superior vena cava with a pledgeted pursestrings and a three-stage venous cannula was placed in the inferior vena cava.  Caval tapes were encircled with a Rumel tourniquets after cannulation the subacute was  connected to the cardiopulmonary bypass and bypass was initiated.  Antegrade cannula was placed in the ascending aorta.  Before we started the procedure a transesophageal echocardiogram shows a large left atrial myxoma with prolapsing into the mitral valve apparatus into the left ventricular with every heartbeat preserved heart function.    Aorta was crossclamped cardioplegia was given through the antegrade high potassium at 4° centigrade to arrest the heart in diastole.  Caval tapes were snared down and the right atrium was opened.  Suction catheter was placed near the coronary sinus the fossa ovalis was identified and it was inside in a longitudinal fashion.  As soon as the incision was made it was extended both superior and inferior and the left atrial myxoma was gently started prolapsing into the right atrium through the gap.  At this time we gently retracted to identify the stalk which was about a cm2 attachment on the intra-atrial septum which was involving the posterior mitral annulus also.  At this time with the help of a 15 blade I started shaving the attachment from the atrial wall as well as from the mitral annulus in a gradual fashion.  Care was taken not to manipulate the tumor since it was friable.  After most of the stalk was shaved off I held it with a pair of Allis forceps the base and finally completed the resection of the myxoma.  At this time we identified 2 loose fragments of myxoma tissue in the left atrium which was gently suctioned out and sent for pathology.  I then resected some more of the intra-aortic year septum and sent it for separate pathology which came back positive for tumor margin frozen section which again 1 more resection was done board deeper tissue and little wide of the previous attachment and sent it for testing which came back as negative frozen section for tumor.  At this time we washed the left atrial cavity with copious amount of saline to suction out everything  possible.  Aortic root cardioplegia was given to arrest the heart and maintain the electromechanical silence.  After that I placed a pledgeted suture with 4-0 Prolene at the base of the resection margin which was then tied down and used a pericardial patch to close the defect in the atrial septum in a circumferential fashion with 4-0 Prolene.  Before the conclusion of the patch the ventricle was de-aired with Valsalva maneuver as well as filling of the heart and also we used carbon dioxide irrigation in the pericardial well throughout the procedure.  After tying down the last sutures the patch was then irrigated and checked for any leak found to be none.  At this time I started closing the right atrium with 4-0 Prolene in 2 layers and at the conclusion of this aortic crossclamp was released the patient head was lowered and used the root went to higher board to suck all the air.  The heart started spontaneously copy of sinus rhythm and there were lot of air bubbles in the ventricle which was slowly suctioned out with the root suction until they were completely count.  At this time we checked for the septum found to be good.  There was mild to moderate central mitral regurgitation jet which was left alone at that time.  .  To twenty-eight Turkish Markus drains were placed with the pericardial cavity 2 ventricular pacing wires were placed,  Protamine sulfate was given the patient was decannulated.  Hemostasis was found to be adequate.  Intercostal nerve block was given with 0.25% Marcaine with Exparel.  Chest was closed with 3 stainless steel wires for the top and 4 zip ties for the bottom,  The patient was transferred to the ICU in a critical intubated state.  Significant Surgical Tasks Conducted by the Assistant(s), if Applicable:     Estimated Blood Loss (EBL): * No values recorded between 5/13/2024  8:02 AM and 5/13/2024 11:03 AM *           Implants:   Implant Name Type Inv. Item Serial No.  Lot No. LRB  No. Used Action   HEMOSTASIS OSTENE MAT 2.5GM - SN/A  HEMOSTASIS OSTENE MAT 2.5GM N/A [employer] [id number] N/A 1 Implanted   LEAD PACING BIPOLAR TEMPORARY - SN/A  LEAD PACING BIPOLAR TEMPORARY N/A [employer] [facility] [id number] N/A 1 Implanted   PATCH BOVINE 1 X 6 - SN/A  PATCH BOVINE 1 X 6 N/A [employer] [id number] N/A 1 Implanted   STERNAL ZIPFIX WITH NEEDLE STERILE 5 PACK   N/A  [id number] N/A 1 Implanted       Specimens:   Specimen (24h ago, onward)       Start     Ordered    05/13/24 1008  Specimen to Pathology, Surgery Cardiovascular  Once        Comments: Pre-op Diagnosis: Near syncope [R55]Procedure(s):     RESECTION, MYXOMA, CARDIAC ATRIUM; BLOCK, NERVE, INTERCOSTAL, 2 OR MORE; ECHOCARDIOGRAM,TRANSESOPHAGEAL Number of specimens: 3Name of specimens: 1.  MYXOMA MARGIN - frozen/perm2.  NEW MYXOMA MARGIN - frozen/perm3.  MYXOMA - perm     References:    Click here for ordering Quick Tip   Question Answer Comment   Procedure Type: Cardiovascular    Specimen Class: Routine/Screening    Release to patient Immediate        05/13/24 1010                            Condition: Stable    Disposition: ICU - intubated and critically ill.    Attestation: I was present and scrubbed for the entire procedure.

## 2024-05-13 NOTE — ANESTHESIA POSTPROCEDURE EVALUATION
Anesthesia Post Evaluation    Patient: Jake Hoskins    Procedure(s) Performed: Procedure(s) (LRB):  RESECTION, MYXOMA, CARDIAC ATRIUM (N/A)  BLOCK, NERVE, INTERCOSTAL, 2 OR MORE (N/A)  ECHOCARDIOGRAM,TRANSESOPHAGEAL (N/A)    Final Anesthesia Type: general      Patient location during evaluation: PACU  Patient participation: Yes- Able to Participate  Level of consciousness: awake and alert  Post-procedure vital signs: reviewed and stable  Pain management: adequate  Airway patency: patent  QI mitigation strategies: Verification of full reversal of neuromuscular block  PONV status at discharge: No PONV  Anesthetic complications: no      Cardiovascular status: hemodynamically stable  Respiratory status: spontaneous ventilation  Hydration status: euvolemic  Follow-up not needed.              Vitals Value Taken Time   /60 05/13/24 1303   Temp 36.3 °C (97.34 °F) 05/13/24 1318   Pulse 60 05/13/24 1318   Resp 20 05/13/24 1318   SpO2 100 % 05/13/24 1318   Vitals shown include unfiled device data.      No case tracking events are documented in the log.      Pain/Nick Score: No data recorded

## 2024-05-13 NOTE — TRANSFER OF CARE
"Anesthesia Transfer of Care Note    Patient: Jake Hoskins    Procedure(s) Performed: Procedure(s) (LRB):  RESECTION, MYXOMA, CARDIAC ATRIUM (N/A)  BLOCK, NERVE, INTERCOSTAL, 2 OR MORE (N/A)  ECHOCARDIOGRAM,TRANSESOPHAGEAL (N/A)    Patient location: ICU    Anesthesia Type: general    Transport from OR: Transported from OR intubated on 100% O2  with adequate ventilation controlled by transport ventilator. Continuous ECG monitoring in transport. Continuous SpO2 monitoring in transport. Upon arrival to PACU/ICU, patient attached to ventilator and auscultated to confirm bilateral breath sounds and adequate TV. Continuos invasive BP monitoring in transport    Post pain: adequate analgesia    Post assessment: no apparent anesthetic complications and tolerated procedure well    Post vital signs: stable    Level of consciousness: sedated    Nausea/Vomiting: no nausea/vomiting    Complications: none    Transfer of care protocol was followed      Last vitals: Visit Vitals  /61 (BP Location: Left arm, Patient Position: Lying)   Pulse 70   Temp 36.8 °C (98.2 °F) (Oral)   Resp 18   Ht 5' 4" (1.626 m)   Wt 102.6 kg (226 lb 3.1 oz)   LMP 12/26/2020   SpO2 99%   Breastfeeding No   BMI 38.83 kg/m²     "

## 2024-05-13 NOTE — ANESTHESIA PROCEDURE NOTES
Monitor KSENIA    Diagnosis: atrial myxoma  Patient location during procedure: OR    Staffing  Authorizing Provider: Lauri Pierre II, MD  Performing Provider: Lauri Pierre II, MD    Staffing  Anesthesiologist Present  Yes  Setup & Induction  Patient preparation: bite block inserted  Probe Insertion: easy

## 2024-05-13 NOTE — ASSESSMENT & PLAN NOTE
Was requiring pressors initially after surgery but have been weaned off  IVF bolus ordered PRN   Monitor closely and hold anti-hypertensives if hypotensive

## 2024-05-13 NOTE — ANESTHESIA PROCEDURE NOTES
Intubation    Date/Time: 5/13/2024 7:16 AM    Performed by: Hua Carrillo CRNA  Authorized by: Lauri Pierre II, MD    Intubation:     Induction:  Intravenous    Intubated:  Postinduction    Mask Ventilation:  Easy mask    Attempts:  1    Attempted By:  CRNA    Method of Intubation:  Direct    Blade:  Demetri 3    Laryngeal View Grade: Grade I - full view of cords      Difficult Airway Encountered?: No      Complications:  None    Airway Device:  Oral endotracheal tube    Airway Device Size:  8.0    Style/Cuff Inflation:  Cuffed (inflated to minimal occlusive pressure)    Tube secured:  22    Secured at:  The lips    Placement Verified By:  Capnometry    Complicating Factors:  None    Findings Post-Intubation:  BS equal bilateral and atraumatic/condition of teeth unchanged

## 2024-05-14 LAB
ALBUMIN SERPL BCP-MCNC: 3.6 G/DL (ref 3.5–5.2)
ALP SERPL-CCNC: 37 U/L (ref 55–135)
ALT SERPL W/O P-5'-P-CCNC: 11 U/L (ref 10–44)
ANION GAP SERPL CALC-SCNC: 10 MMOL/L (ref 8–16)
ANION GAP SERPL CALC-SCNC: 5 MMOL/L (ref 8–16)
APTT PPP: 30.5 SEC (ref 21–32)
AST SERPL-CCNC: 24 U/L (ref 10–40)
BILIRUB SERPL-MCNC: 0.3 MG/DL (ref 0.1–1)
BUN SERPL-MCNC: 12 MG/DL (ref 6–20)
BUN SERPL-MCNC: 14 MG/DL (ref 6–20)
CALCIUM SERPL-MCNC: 8.3 MG/DL (ref 8.7–10.5)
CALCIUM SERPL-MCNC: 9 MG/DL (ref 8.7–10.5)
CHLORIDE SERPL-SCNC: 106 MMOL/L (ref 95–110)
CHLORIDE SERPL-SCNC: 109 MMOL/L (ref 95–110)
CO2 SERPL-SCNC: 21 MMOL/L (ref 23–29)
CO2 SERPL-SCNC: 26 MMOL/L (ref 23–29)
CREAT SERPL-MCNC: 0.7 MG/DL (ref 0.5–1.4)
CREAT SERPL-MCNC: 0.9 MG/DL (ref 0.5–1.4)
ERYTHROCYTE [DISTWIDTH] IN BLOOD BY AUTOMATED COUNT: 14.9 % (ref 11.5–14.5)
EST. GFR  (NO RACE VARIABLE): >60 ML/MIN/1.73 M^2
EST. GFR  (NO RACE VARIABLE): >60 ML/MIN/1.73 M^2
GLUCOSE SERPL-MCNC: 127 MG/DL (ref 70–110)
GLUCOSE SERPL-MCNC: 156 MG/DL (ref 70–110)
HCT VFR BLD AUTO: 29.8 % (ref 37–48.5)
HGB BLD-MCNC: 9.4 G/DL (ref 12–16)
INR PPP: 1.1 (ref 0.8–1.2)
MAGNESIUM SERPL-MCNC: 2.4 MG/DL (ref 1.6–2.6)
MAGNESIUM SERPL-MCNC: 2.6 MG/DL (ref 1.6–2.6)
MCH RBC QN AUTO: 25.4 PG (ref 27–31)
MCHC RBC AUTO-ENTMCNC: 31.5 G/DL (ref 32–36)
MCV RBC AUTO: 81 FL (ref 82–98)
PLATELET # BLD AUTO: 272 K/UL (ref 150–450)
PMV BLD AUTO: 10.5 FL (ref 9.2–12.9)
POCT GLUCOSE: 116 MG/DL (ref 70–110)
POCT GLUCOSE: 119 MG/DL (ref 70–110)
POCT GLUCOSE: 120 MG/DL (ref 70–110)
POCT GLUCOSE: 126 MG/DL (ref 70–110)
POCT GLUCOSE: 129 MG/DL (ref 70–110)
POCT GLUCOSE: 152 MG/DL (ref 70–110)
POCT GLUCOSE: 166 MG/DL (ref 70–110)
POCT GLUCOSE: 200 MG/DL (ref 70–110)
POCT GLUCOSE: 205 MG/DL (ref 70–110)
POCT GLUCOSE: 206 MG/DL (ref 70–110)
POTASSIUM SERPL-SCNC: 3.2 MMOL/L (ref 3.5–5.1)
POTASSIUM SERPL-SCNC: 4.6 MMOL/L (ref 3.5–5.1)
POTASSIUM SERPL-SCNC: 4.7 MMOL/L (ref 3.5–5.1)
PROT SERPL-MCNC: 5.5 G/DL (ref 6–8.4)
PROTHROMBIN TIME: 12.2 SEC (ref 9–12.5)
RBC # BLD AUTO: 3.7 M/UL (ref 4–5.4)
SODIUM SERPL-SCNC: 137 MMOL/L (ref 136–145)
SODIUM SERPL-SCNC: 140 MMOL/L (ref 136–145)
WBC # BLD AUTO: 15.84 K/UL (ref 3.9–12.7)

## 2024-05-14 PROCEDURE — 25000003 PHARM REV CODE 250: Performed by: THORACIC SURGERY (CARDIOTHORACIC VASCULAR SURGERY)

## 2024-05-14 PROCEDURE — 97530 THERAPEUTIC ACTIVITIES: CPT

## 2024-05-14 PROCEDURE — 85027 COMPLETE CBC AUTOMATED: CPT | Performed by: THORACIC SURGERY (CARDIOTHORACIC VASCULAR SURGERY)

## 2024-05-14 PROCEDURE — 25000003 PHARM REV CODE 250: Performed by: FAMILY MEDICINE

## 2024-05-14 PROCEDURE — 84132 ASSAY OF SERUM POTASSIUM: CPT | Performed by: THORACIC SURGERY (CARDIOTHORACIC VASCULAR SURGERY)

## 2024-05-14 PROCEDURE — 97166 OT EVAL MOD COMPLEX 45 MIN: CPT

## 2024-05-14 PROCEDURE — 94640 AIRWAY INHALATION TREATMENT: CPT

## 2024-05-14 PROCEDURE — 80048 BASIC METABOLIC PNL TOTAL CA: CPT | Mod: XB | Performed by: THORACIC SURGERY (CARDIOTHORACIC VASCULAR SURGERY)

## 2024-05-14 PROCEDURE — 99232 SBSQ HOSP IP/OBS MODERATE 35: CPT | Mod: ,,, | Performed by: INTERNAL MEDICINE

## 2024-05-14 PROCEDURE — 97162 PT EVAL MOD COMPLEX 30 MIN: CPT

## 2024-05-14 PROCEDURE — 99900035 HC TECH TIME PER 15 MIN (STAT)

## 2024-05-14 PROCEDURE — 80053 COMPREHEN METABOLIC PANEL: CPT | Performed by: THORACIC SURGERY (CARDIOTHORACIC VASCULAR SURGERY)

## 2024-05-14 PROCEDURE — 63600175 PHARM REV CODE 636 W HCPCS: Performed by: THORACIC SURGERY (CARDIOTHORACIC VASCULAR SURGERY)

## 2024-05-14 PROCEDURE — 27000221 HC OXYGEN, UP TO 24 HOURS

## 2024-05-14 PROCEDURE — 85730 THROMBOPLASTIN TIME PARTIAL: CPT | Performed by: THORACIC SURGERY (CARDIOTHORACIC VASCULAR SURGERY)

## 2024-05-14 PROCEDURE — 85610 PROTHROMBIN TIME: CPT | Performed by: THORACIC SURGERY (CARDIOTHORACIC VASCULAR SURGERY)

## 2024-05-14 PROCEDURE — 25000242 PHARM REV CODE 250 ALT 637 W/ HCPCS: Performed by: THORACIC SURGERY (CARDIOTHORACIC VASCULAR SURGERY)

## 2024-05-14 PROCEDURE — 83735 ASSAY OF MAGNESIUM: CPT | Mod: 91 | Performed by: THORACIC SURGERY (CARDIOTHORACIC VASCULAR SURGERY)

## 2024-05-14 PROCEDURE — 27200667 HC PACEMAKER, TEMPORARY MONITORING, PER SHIFT

## 2024-05-14 PROCEDURE — 94761 N-INVAS EAR/PLS OXIMETRY MLT: CPT

## 2024-05-14 PROCEDURE — P9045 ALBUMIN (HUMAN), 5%, 250 ML: HCPCS | Mod: JZ,JG | Performed by: THORACIC SURGERY (CARDIOTHORACIC VASCULAR SURGERY)

## 2024-05-14 PROCEDURE — 20000000 HC ICU ROOM

## 2024-05-14 PROCEDURE — 94799 UNLISTED PULMONARY SVC/PX: CPT

## 2024-05-14 RX ORDER — INSULIN ASPART 100 [IU]/ML
0-10 INJECTION, SOLUTION INTRAVENOUS; SUBCUTANEOUS
Status: DISCONTINUED | OUTPATIENT
Start: 2024-05-14 | End: 2024-05-17 | Stop reason: HOSPADM

## 2024-05-14 RX ORDER — FAMOTIDINE 20 MG/1
20 TABLET, FILM COATED ORAL 2 TIMES DAILY
Status: DISCONTINUED | OUTPATIENT
Start: 2024-05-14 | End: 2024-05-17 | Stop reason: HOSPADM

## 2024-05-14 RX ADMIN — IPRATROPIUM BROMIDE AND ALBUTEROL SULFATE 3 ML: 2.5; .5 SOLUTION RESPIRATORY (INHALATION) at 07:05

## 2024-05-14 RX ADMIN — POTASSIUM CHLORIDE 40 MEQ: 29.8 INJECTION, SOLUTION INTRAVENOUS at 05:05

## 2024-05-14 RX ADMIN — OXYCODONE HYDROCHLORIDE 5 MG: 5 TABLET ORAL at 07:05

## 2024-05-14 RX ADMIN — OXYCODONE HYDROCHLORIDE 10 MG: 5 TABLET ORAL at 03:05

## 2024-05-14 RX ADMIN — FAMOTIDINE 20 MG: 20 TABLET ORAL at 08:05

## 2024-05-14 RX ADMIN — FAMOTIDINE 20 MG: 20 TABLET ORAL at 09:05

## 2024-05-14 RX ADMIN — OXYCODONE HYDROCHLORIDE AND ACETAMINOPHEN 500 MG: 500 TABLET ORAL at 09:05

## 2024-05-14 RX ADMIN — METOCLOPRAMIDE 5 MG: 5 INJECTION, SOLUTION INTRAMUSCULAR; INTRAVENOUS at 07:05

## 2024-05-14 RX ADMIN — OXYCODONE HYDROCHLORIDE 10 MG: 5 TABLET ORAL at 07:05

## 2024-05-14 RX ADMIN — ACETAMINOPHEN 650 MG: 325 TABLET ORAL at 05:05

## 2024-05-14 RX ADMIN — ALBUMIN (HUMAN) 25 G: 2.5 SOLUTION INTRAVENOUS at 04:05

## 2024-05-14 RX ADMIN — POTASSIUM CHLORIDE 20 MEQ: 1500 TABLET, EXTENDED RELEASE ORAL at 08:05

## 2024-05-14 RX ADMIN — ACETAMINOPHEN 650 MG: 325 TABLET ORAL at 11:05

## 2024-05-14 RX ADMIN — DOCUSATE SODIUM 100 MG: 100 CAPSULE, LIQUID FILLED ORAL at 08:05

## 2024-05-14 RX ADMIN — CLOPIDOGREL BISULFATE 75 MG: 75 TABLET ORAL at 09:05

## 2024-05-14 RX ADMIN — FUROSEMIDE 40 MG: 10 INJECTION, SOLUTION INTRAMUSCULAR; INTRAVENOUS at 09:05

## 2024-05-14 RX ADMIN — MONTELUKAST 10 MG: 10 TABLET, FILM COATED ORAL at 09:05

## 2024-05-14 RX ADMIN — OXYCODONE HYDROCHLORIDE AND ACETAMINOPHEN 500 MG: 500 TABLET ORAL at 08:05

## 2024-05-14 RX ADMIN — ASPIRIN 81 MG: 81 TABLET, COATED ORAL at 09:05

## 2024-05-14 RX ADMIN — CEFAZOLIN 2 G: 2 INJECTION, POWDER, FOR SOLUTION INTRAMUSCULAR; INTRAVENOUS at 12:05

## 2024-05-14 RX ADMIN — DOCUSATE SODIUM 100 MG: 100 CAPSULE, LIQUID FILLED ORAL at 09:05

## 2024-05-14 RX ADMIN — ALBUMIN (HUMAN) 25 G: 2.5 SOLUTION INTRAVENOUS at 08:05

## 2024-05-14 RX ADMIN — IPRATROPIUM BROMIDE AND ALBUTEROL SULFATE 3 ML: 2.5; .5 SOLUTION RESPIRATORY (INHALATION) at 03:05

## 2024-05-14 RX ADMIN — CHLORHEXIDINE GLUCONATE 0.12% ORAL RINSE 10 ML: 1.2 LIQUID ORAL at 09:05

## 2024-05-14 RX ADMIN — OXYCODONE HYDROCHLORIDE 10 MG: 5 TABLET ORAL at 11:05

## 2024-05-14 RX ADMIN — POTASSIUM CHLORIDE 20 MEQ: 1500 TABLET, EXTENDED RELEASE ORAL at 09:05

## 2024-05-14 RX ADMIN — ONDANSETRON 4 MG: 2 INJECTION INTRAMUSCULAR; INTRAVENOUS at 05:05

## 2024-05-14 RX ADMIN — ONDANSETRON 4 MG: 2 INJECTION INTRAMUSCULAR; INTRAVENOUS at 04:05

## 2024-05-14 RX ADMIN — CHLORHEXIDINE GLUCONATE 0.12% ORAL RINSE 10 ML: 1.2 LIQUID ORAL at 08:05

## 2024-05-14 RX ADMIN — OXYCODONE HYDROCHLORIDE 5 MG: 5 TABLET ORAL at 11:05

## 2024-05-14 RX ADMIN — MAGNESIUM HYDROXIDE 2400 MG: 400 SUSPENSION ORAL at 09:05

## 2024-05-14 RX ADMIN — FERROUS SULFATE TAB 325 MG (65 MG ELEMENTAL FE) 1 EACH: 325 (65 FE) TAB at 09:05

## 2024-05-14 RX ADMIN — FUROSEMIDE 40 MG: 10 INJECTION, SOLUTION INTRAMUSCULAR; INTRAVENOUS at 08:05

## 2024-05-14 RX ADMIN — METOPROLOL TARTRATE 25 MG: 25 TABLET, FILM COATED ORAL at 08:05

## 2024-05-14 NOTE — PLAN OF CARE
Nutrition Recommendations 5/14/24:  1. Recommend pt continues on Cardiac diet   2. Recommend Boost plus TID until PO intake > 50% to assist fillingin nutritional gaps   3. Recommend Del BID for wound healing   4. Recommend pt continues on bowel regiemn (No BM x 5 days)   5. Recommend pt continues on anti-nausea medication as warranted   6. Encourage PO intake   7. Weigh twice weekly  8. Collaboration by nutrition professional with other providers     Goals:   1. Pt will tolerate and consume > 75% EEN and EPN prior to RD follow up   2. Pt will consume Del BID prior to RD follow up   3. Pt chiara have BM prior to RD follow up   4. Pt's nausea will improve prior to RD follow up    Jessica Webb, Registration Eligible, Provisional LDN

## 2024-05-14 NOTE — HOSPITAL COURSE
5/14: extubated yesterday per protocol post-op, this AM she was seen up in the chair on RA in NAD, she is alert, reports pain is there but well controlled, some nausea  5/15: sitting up in chair with heart pillow against chest, breakfast and IS in front of her on tray. Ms. Hoskins reports dizziness with movement and explains this worries her even though dr. Sanchez explained this would get better with time. Pain with coughing and deep breathing- discussed importance of doing so and using IS, getting OOB, and ambulating to reduce risk of post-op pneumonia. Notes poor appetite since surgery. CVC removed this morning. Labs and hemodynamics stable.

## 2024-05-14 NOTE — ASSESSMENT & PLAN NOTE
- s/p resection 5/13 with CTS, pathology pending  - pain controlled  - glucose controlled with SSI  - PT/OT, IS and OOB   - post-op plan per CTS, discussed with Dr. Sanchez this AM

## 2024-05-14 NOTE — PT/OT/SLP EVAL
Physical Therapy Evaluation    Patient Name:  Jake Hoskins   MRN:  [id number]    Recommendations:     Discharge Recommendations: Low Intensity Therapy (WITH 24 HOUR CARE FOR SAFETY)   Discharge Equipment Recommendations: bath bench, walker, rolling   Barriers to discharge: None-GOOD FAMILY SUPPORT    Assessment:     Jake Hoskins is a 59 y.o. female admitted with a medical diagnosis of Mitral valve mass.  She presents with the following impairments/functional limitations: weakness, impaired endurance, impaired functional mobility, gait instability, impaired balance, pain, decreased safety awareness, decreased lower extremity function, decreased coordination.    Rehab Prognosis: Good; patient would benefit from acute skilled PT services to address these deficits and reach maximum level of function.    Recent Surgery: Procedure(s) (LRB):  RESECTION, MYXOMA, CARDIAC ATRIUM (N/A)  BLOCK, NERVE, INTERCOSTAL, 2 OR MORE (N/A)  ECHOCARDIOGRAM,TRANSESOPHAGEAL (N/A) 1 Day Post-Op    Plan:     During this hospitalization, patient to be seen 3 x/week to address the identified rehab impairments via gait training, therapeutic activities, therapeutic exercises and progress toward the following goals:    Plan of Care Expires:  05/28/24    Subjective     Chief Complaint: NONE, AGREEABLE TO PARTICIPATE  Patient/Family Comments/goals: GET STRONGER  Pain/Comfort:  Pain Rating 1: 6/10  Location 1: chest (INCISION SITE)    Patients cultural, spiritual, Caodaism conflicts given the current situation:      Living Environment:  PT LIVES ALONE BUT WILL HAVE MULTIPLE FAMILY MEMBERS STAYING TO CARE FOR HER AFTER D/C, LIVES IN MOBILE HOME, NO STEPS, RAMP TO ENTER, PT AMB INDEP COMMUNITY DISTANCES, DRIVES, WORKS, INDEP WITH ADL'S  Prior to admission, patients level of function was INDEP.  Equipment used at home: none.  DME owned (not currently used): none.  Upon discharge, patient will have assistance from FAMILY.    Objective:  "    Communicated with NURSE MACKAY prior to session.  Patient found up in chair with blood pressure cuff, pulse ox (continuous), telemetry, oxygen, peripheral IV, wound vac, moreno catheter, chest tube, central line  upon PT entry to room.    General Precautions: Standard, fall, respiratory, sternal  Orthopedic Precautions:N/A   Braces: N/A  Respiratory Status: Nasal cannula, flow 2 L/min    Exams:  Cognitive Exam:  Patient is oriented to Person, Place, Time, and Situation  Postural Exam:  Patient presented with the following abnormalities:    -       Rounded shoulders  Sensation:    -       Intact  RLE ROM: WFL  RLE Strength: WFL  LLE ROM: WFL  LLE Strength: WFL    Functional Mobility:  Bed Mobility:     Scooting: minimum assistance  Transfers:     Sit to Stand:  moderate assistance and of 2 persons with rolling walker  Gait: PT AMB 50' WITH RW AND KAMILA, CUES FOR UPRIGHT POSTURE AND RW SAFETY, QUICK TO FATIGUE BUT GOOD EFFORT, CHAIR IN TOW FOR SAFETY, VITALS STABLE ON PORTABLE MONITOR, REPORTS MILD DIZZINESS THAT RESOLVED OVER TIME  Balance: FAIR SITTING BALANCE, POOR+ DYNAMIC BALANCE DURING GAIT  PT EDUCATED IN BLE THEREX TO PERFORM WHILE SEATED IN CHAIR: HIP FLEX/EXT, LAQ, AP'S    AM-PAC 6 CLICK MOBILITY  Total Score:11     Treatment & Education:  PT EDUCATION:  - ROLE OF P.T. AND POC IN ACUTE CARE HOSPITAL SETTING  - RW USE AND SAFETY DURING TF'S AND GAIT  - EDUCATION ON STERNAL PRECAUTION IN RELATION TO MOBILITY  - ENCOURAGED TO INCREASE TIME OOB IN CHAIR TO TOLERANCE   - TO CONTINUE THERAPUETIC EXERCISES THROUGHOUT THE DAY TO INCREASE ACTIVITY TOLERANCE AND DECREASE RISK FOR PNEUMONIA AND BLOOD CLOTS: HIP FLEX/EXT, HIP ABD/ADD, QUAD SET, HEEL SLIDE, AP  - RISK FOR FALLS DUE TO GENERALIZED WEAKNESS, EDUCATED ON "CALL DON'T FALL", ENCOURAGED TO CALL FOR ASSISTANCE WITH ALL NEEDS SUCH AS BED<>CHAIR TRANSFERS OR TRIPS TO BATHROOM, PT AGREEABLE TO SAFETY PRECAUTIONS    Patient left up in chair with all lines intact, " call button in reach, chair alarm on, NURSE notified, and NURSE present.    GOALS:   Multidisciplinary Problems       Physical Therapy Goals          Problem: Physical Therapy    Goal Priority Disciplines Outcome Goal Variances Interventions   Physical Therapy Goal     PT, PT/OT      Description: LTG'S TO BE MET IN 14 DAYS (5-28-24)  PT WILL REQUIRE SPV FOR BED MOBILITY  PT WILL REQUIRE SPV FOR BED<>CHAIR TF'S  PT WILL  FEET WITH OR WITHOUT RW AND SPV  PT WILL INC AMPAC SCORE BY 2 POINTS TO PROGRESS GROSS FUNC MOBILITY                         History:     Past Medical History:   Diagnosis Date    Allergy     Anemia     Hypertension     Plantar fasciitis of left foot        Past Surgical History:   Procedure Laterality Date    CATHETERIZATION OF BOTH LEFT AND RIGHT HEART N/A 5/10/2024    Procedure: CATHETERIZATION, HEART, BOTH LEFT AND RIGHT;  Surgeon: Alison Buchanan MD;  Location: [facility] CATH LAB;  Service: Cardiology;  Laterality: N/A;    COLONOSCOPY N/A 04/09/2021    Procedure: COLONOSCOPY;  Surgeon: Hua Elmore MD;  Location: [facility] ENDO;  Service: Endoscopy;  Laterality: N/A;    ECHOCARDIOGRAM,TRANSESOPHAGEAL N/A 5/13/2024    Procedure: ECHOCARDIOGRAM,TRANSESOPHAGEAL;  Surgeon: Chester Sanchez MD;  Location: [facility] OR;  Service: Cardiovascular;  Laterality: N/A;    HYSTERECTOMY  05/2021    INJECTION OF ANESTHETIC AGENT AROUND MULTIPLE INTERCOSTAL NERVES N/A 5/13/2024    Procedure: BLOCK, NERVE, INTERCOSTAL, 2 OR MORE;  Surgeon: Chester Sanchez MD;  Location: [facility] OR;  Service: Cardiovascular;  Laterality: N/A;    OOPHORECTOMY  05/2021    RESECTION OF ATRIAL MYXOMA N/A 5/13/2024    Procedure: RESECTION, MYXOMA, CARDIAC ATRIUM;  Surgeon: Chester Sanchez MD;  Location: [facility] OR;  Service: Cardiovascular;  Laterality: N/A;    ROBOT-ASSISTED LAPAROSCOPIC ABDOMINAL HYSTERECTOMY USING DA ABBEY XI N/A 05/18/2021    Procedure: XI ROBOTIC HYSTERECTOMY;  Surgeon: Christa Davila MD;  Location: [facility] OR;   Service: OB/GYN;  Laterality: N/A;    ROBOT-ASSISTED LAPAROSCOPIC SALPINGO-OOPHORECTOMY USING DA ABBEY XI Bilateral 05/18/2021    Procedure: XI ROBOTIC SALPINGO-OOPHORECTOMY;  Surgeon: Christa Davila MD;  Location: [facility];  Service: OB/GYN;  Laterality: Bilateral;       Time Tracking:     PT Received On: 05/14/24  PT Start Time: 1030     PT Stop Time: 1100  PT Total Time (min): 30 min     Billable Minutes: Evaluation 15 and Therapeutic Activity 15    05/14/2024

## 2024-05-14 NOTE — ASSESSMENT & PLAN NOTE
-TTE with large mitral mass, concerning for myxoma  -CTS consulted  -R/LHC planned tmw    5/10/24  -Stable  -R/LHC today    05/13/24  -s/p removal of LA myxoma  -continue ICU supportive care    05/14/24  Stable s/p LA myxoma removal  Continue icu supportive care

## 2024-05-14 NOTE — ASSESSMENT & PLAN NOTE
- on some lose dose epi this AM  - getting albumin this AM per direction of primary team  - hold BP meds this AM  - monitor trends

## 2024-05-14 NOTE — PLAN OF CARE
P.T. EVAL COMPLETE.  PT CURRENTLY REQUIRES KAMILA FOR SEATED SCOOT IN CHAIR, MODA X 2 FOR SIT>STAND TF, KAMILA FOR GAIT WITH RW.  P.T. RECOMMENDS LOW INTENSITY THERAPY UPON DISCHARGE WITH 24 HOUR CARE FOR SAFETY

## 2024-05-14 NOTE — PLAN OF CARE
Pt AAOx4. NSR on telemetry. Off vasopressors. Arterial line Dc'd. Pacing wires Dc'd. Mediastinal chest tubes Dc'd. RR even and unlabored on 2L NC. Intermittent nausea today. Zofran and Reglan PRN. Voiding per moreno catheter. -965ml UOP this shift. Prevena wound vac to midsternal incision. Walked with PT/OT today. Up to chair with meals. Pain well controled with PRN PO medication. Bed low, wheels locked, alarms audible. Call light in reach. POC reviewed with pt today.

## 2024-05-14 NOTE — SUBJECTIVE & OBJECTIVE
ROS  Objective:     Vital Signs (Most Recent):  Temp: 97.9 °F (36.6 °C) (05/14/24 1521)  Pulse: 70 (05/14/24 1800)  Resp: 18 (05/14/24 1800)  BP: 105/60 (05/14/24 1800)  SpO2: 96 % (05/14/24 1800) Vital Signs (24h Range):  Temp:  [97.7 °F (36.5 °C)-99.9 °F (37.7 °C)] 97.9 °F (36.6 °C)  Pulse:  [60-87] 70  Resp:  [10-26] 18  SpO2:  [88 %-100 %] 96 %  BP: ()/(53-66) 105/60  Arterial Line BP: ()/(49-85) 103/49     Weight: 105.3 kg (232 lb 2.3 oz)  Body mass index is 39.85 kg/m².     SpO2: 96 %         Intake/Output Summary (Last 24 hours) at 5/14/2024 1809  Last data filed at 5/14/2024 1800  Gross per 24 hour   Intake 1587.52 ml   Output 1800 ml   Net -212.48 ml       Lines/Drains/Airways       Central Venous Catheter Line  Duration             Percutaneous Central Line - Triple Lumen  05/13/24  Internal Jugular Right 1 day              Drain  Duration                  Urethral Catheter 05/13/24 0818 Temperature probe;Straight-tip;Silicone;Non-latex 16 Fr. 1 day              Peripheral Intravenous Line  Duration                  Peripheral IV - Single Lumen 05/09/24 1055 20 G Right Antecubital 5 days         Peripheral IV - Single Lumen 05/13/24  16 G Left Antecubital 1 day                       Physical Exam  Vitals and nursing note reviewed.   Constitutional:       General: She is not in acute distress.     Appearance: She is well-developed. She is not diaphoretic.      Interventions: She is intubated.   HENT:      Head: Normocephalic.   Eyes:      General:         Right eye: No discharge.         Left eye: No discharge.   Cardiovascular:      Rate and Rhythm: Normal rate and regular rhythm.      Heart sounds: Normal heart sounds, S1 normal and S2 normal. No murmur heard.  Pulmonary:      Effort: No respiratory distress. She is intubated.      Breath sounds: Normal air entry.   Abdominal:      General: There is no distension.      Tenderness: There is no abdominal tenderness.   Skin:     General: Skin  "is warm and dry.      Findings: No erythema.            Significant Labs: ABG:   Recent Labs   Lab 05/13/24  1028 05/13/24  1128 05/13/24  1444   PH 7.366 7.318* 7.361   PCO2 37.7 40.4 39.8   HCO3 21.6* 20.7* 22.5*   POCSATURATED 100 98 96   BE -4* -5* -3*   , Blood Culture: No results for input(s): "LABBLOO" in the last 48 hours., BMP:   Recent Labs   Lab 05/13/24 1126 05/13/24  1704 05/14/24  0500 05/14/24  1251   * 206* 156*  --     140 140  --    K 3.5 4.4 3.2* 4.6    112* 109  --    CO2 22* 19* 21*  --    BUN 11 10 12  --    CREATININE 0.8 0.7 0.7  --    CALCIUM 8.7 8.3* 8.3*  --    MG 2.6 2.1 2.6  --    , CMP   Recent Labs   Lab 05/13/24  1126 05/13/24  1704 05/14/24  0500 05/14/24  1251    140 140  --    K 3.5 4.4 3.2* 4.6    112* 109  --    CO2 22* 19* 21*  --    * 206* 156*  --    BUN 11 10 12  --    CREATININE 0.8 0.7 0.7  --    CALCIUM 8.7 8.3* 8.3*  --    PROT 5.3*  --  5.5*  --    ALBUMIN 2.6*  --  3.6  --    BILITOT 0.5  --  0.3  --    ALKPHOS 49*  --  37*  --    AST 35  --  24  --    ALT 15  --  11  --    ANIONGAP 9 9 10  --    , CBC   Recent Labs   Lab 05/13/24 1126 05/13/24  1128 05/14/24  0500   WBC 32.08*  --  15.84*   HGB 11.0*  --  9.4*   HCT 34.8*   < > 29.8*     --  272    < > = values in this interval not displayed.   , INR   Recent Labs   Lab 05/13/24 1126 05/14/24  0500   INR 1.1 1.1   , Lipid Panel No results for input(s): "CHOL", "HDL", "LDLCALC", "TRIG", "CHOLHDL" in the last 48 hours., and Troponin No results for input(s): "TROPONINI" in the last 48 hours.    Significant Imaging: EKG:    "

## 2024-05-14 NOTE — SUBJECTIVE & OBJECTIVE
ROS complete and negative unless stated in the interval HPI     Objective:     Vital Signs (Most Recent):  Temp: 99 °F (37.2 °C) (05/14/24 0745)  Pulse: 74 (05/14/24 0745)  Resp: 18 (05/14/24 0745)  BP: (!) 103/56 (05/14/24 0700)  SpO2: (!) 89 % (05/14/24 0745) Vital Signs (24h Range):  Temp:  [96.8 °F (36 °C)-99 °F (37.2 °C)] 99 °F (37.2 °C)  Pulse:  [56-93] 74  Resp:  [10-27] 18  SpO2:  [89 %-100 %] 89 %  BP: ()/(53-68) 103/56  Arterial Line BP: ()/(46-85) 107/50     Weight: 105.3 kg (232 lb 2.3 oz)  Body mass index is 39.85 kg/m².      Intake/Output Summary (Last 24 hours) at 5/14/2024 0759  Last data filed at 5/14/2024 0600  Gross per 24 hour   Intake 5353.63 ml   Output 2540 ml   Net 2813.63 ml        Physical Exam  Vitals reviewed.   Constitutional:       General: She is awake. She is not in acute distress.     Appearance: She is obese.   Cardiovascular:      Rate and Rhythm: Normal rate.      Pulses:           Radial pulses are 1+ on the right side and 1+ on the left side.      Comments: On some low dose epi via gtt  Pulmonary:      Effort: Pulmonary effort is normal.      Breath sounds: Decreased breath sounds present. No wheezing, rhonchi or rales.      Comments: On RA  Chest:      Comments: Midsternal incision with dressing in place. CT x 2.   Abdominal:      General: There is no distension.      Palpations: Abdomen is soft.      Comments: Obese abd   Musculoskeletal:      Right lower leg: Edema present.      Left lower leg: Edema present.      Comments: COCHRAN   Skin:     General: Skin is warm and dry.   Neurological:      General: No focal deficit present.      Mental Status: She is alert.   Psychiatric:         Behavior: Behavior is cooperative.               Vents:  Vent Mode: (S) Spont (05/13/24 1354)  Ventilator Initiated: Yes (05/13/24 1125)  Set Rate: 20 BPM (05/13/24 1300)  Vt Set: 400 mL (05/13/24 1300)  Pressure Support: (S) 7 cmH20 (05/13/24 1354)  PEEP/CPAP: 5 cmH20 (05/13/24  1354)  Oxygen Concentration (%): 36 (05/13/24 1504)  Peak Airway Pressure: 12 cmH20 (05/13/24 1354)  Plateau Pressure: 0 cmH20 (05/13/24 1354)  Total Ve: 7.14 L/m (05/13/24 1354)  Negative Inspiratory Force (cm H2O): 0 (05/13/24 1354)  F/VT Ratio<105 (RSBI): (!) 47.09 (05/13/24 1354)    Lines/Drains/Airways       Central Venous Catheter Line  Duration             Percutaneous Central Line - Triple Lumen  05/13/24  Internal Jugular Right 1 day              Drain  Duration                  Urethral Catheter 05/13/24 0818 Temperature probe;Straight-tip;Silicone;Non-latex 16 Fr. <1 day         Y Chest Tube 1 and 2 05/13/24 1005 1 Right Mediastinal 24 Fr. 2 Left Mediastinal 24 Fr. <1 day              Arterial Line  Duration             Arterial Line 05/13/24 0709 Right Radial 1 day              Line  Duration                  Pacer Wires 05/13/24  1 day              Peripheral Intravenous Line  Duration                  Peripheral IV - Single Lumen 05/09/24 1055 20 G Right Antecubital 4 days         Peripheral IV - Single Lumen 05/13/24  16 G Left Antecubital 1 day                    Significant Labs:    CBC/Anemia Profile:  Recent Labs   Lab 05/12/24 1756 05/13/24  0749 05/13/24  1126 05/13/24  1128 05/13/24  1444 05/14/24  0500   WBC 6.05  --  32.08*  --   --  15.84*   HGB 11.9*  --  11.0*  --   --  9.4*   HCT 38.5   < > 34.8* 33* 32* 29.8*     --  306  --   --  272   MCV 81*  --  81*  --   --  81*   RDW 14.9*  --  14.9*  --   --  14.9*    < > = values in this interval not displayed.        Chemistries:  Recent Labs   Lab 05/12/24 1756 05/13/24  1126 05/13/24  1704 05/14/24  0500    141 140 140   K 3.9 3.5 4.4 3.2*    110 112* 109   CO2 27 22* 19* 21*   BUN 12 11 10 12   CREATININE 0.8 0.8 0.7 0.7   CALCIUM 9.5 8.7 8.3* 8.3*   ALBUMIN 3.4* 2.6*  --  3.6   PROT 7.2 5.3*  --  5.5*   BILITOT 0.4 0.5  --  0.3   ALKPHOS 62 49*  --  37*   ALT 12 15  --  11   AST 13 35  --  24   MG  --  2.6 2.1 2.6        All pertinent labs within the past 24 hours have been reviewed.    Significant Imaging:  I have reviewed all pertinent imaging results/findings within the past 24 hours.

## 2024-05-14 NOTE — PLAN OF CARE
Pt did well overnight. Hemodynamically stable w/epi gtt infusing. NSR w/1 deg AVB on monitor. Tmax 99.1. MSI w/Prevena WNL. CTs avg 10mL/hr OP, becoming more serosanguinous this AM. Pain controlled well w/PO oxycodone. Pt doris ice/H2O well. Insulin titrated per Q1H CBGs/nomogram. Adequate UOP per moreno. AM K 3.2, 40mEq KCl infusing per orders. Q2 turns, heels floated. Full CBG bath and up to chair x 1 assist this AM. Fall precautions in place, bed alarm on. POC discussed w/pt and family, MARIA T

## 2024-05-14 NOTE — PROGRESS NOTES
[facility] - Int[facility] Care ([facility])  Cardiology  Progress Note    Patient Name: Jake Hoskins  MRN: [id number]  Admission Date: 5/9/2024  Hospital Length of Stay: 5 days  Code Status: Full Code   Attending Physician: Chester Sanchez MD   Primary Care Physician: Angelica Lagunas MD  Expected Discharge Date:   Principal Problem:Mitral valve mass    Subjective:     Hospital Course:   Ms. Hoskins is a 59 year old female patient whose current medical conditions include HTN and morbid obesity who was sent to [facility] from cardiology clinic due to abnormal echo. Patient had previously seen Dr. Buchanan due to syncopal episode two weeks ago and echo was ordered for further evaluation. 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 Cardiology consulted to assist with management. Patient seen and examined today in ED. Stable CV wise. No CP or SOB. No recurrent syncope. No s/s of TIA/CVA. CT surgery consulted. R/[facility] planned tmw.     5/10/24-Patient seen and examined today, resting in bed. Stable. No AEON. No CV complaints. Labs stable. R/LHC today. CTS on board.    05/13/24 saw pt in the icu after removal of LE myxoma. S/p intubated. Chest tube in the place. Off epicardial pacer. Wean off the pressor. VSS.     05/14/24 extubated, feels ok. The lab reviewed and VSS.         ROS  Objective:     Vital Signs (Most Recent):  Temp: 97.9 °F (36.6 °C) (05/14/24 1521)  Pulse: 70 (05/14/24 1800)  Resp: 18 (05/14/24 1800)  BP: 105/60 (05/14/24 1800)  SpO2: 96 % (05/14/24 1800) Vital Signs (24h Range):  Temp:  [97.7 °F (36.5 °C)-99.9 °F (37.7 °C)] 97.9 °F (36.6 °C)  Pulse:  [60-87] 70  Resp:  [10-26] 18  SpO2:  [88 %-100 %] 96 %  BP: ()/(53-66) 105/60  Arterial Line BP: ()/(49-85) 103/49     Weight: 105.3 kg (232 lb 2.3 oz)  Body mass index is 39.85 kg/m².     SpO2: 96 %         Intake/Output Summary (Last 24 hours) at 5/14/2024 1809  Last data filed at 5/14/2024 1800  Gross per 24 hour   Intake  "1587.52 ml   Output 1800 ml   Net -212.48 ml       Lines/Drains/Airways       Central Venous Catheter Line  Duration             Percutaneous Central Line - Triple Lumen  05/13/24  Internal Jugular Right 1 day              Drain  Duration                  Urethral Catheter 05/13/24 0818 Temperature probe;Straight-tip;Silicone;Non-latex 16 Fr. 1 day              Peripheral Intravenous Line  Duration                  Peripheral IV - Single Lumen 05/09/24 1055 20 G Right Antecubital 5 days         Peripheral IV - Single Lumen 05/13/24  16 G Left Antecubital 1 day                       Physical Exam  Vitals and nursing note reviewed.   Constitutional:       General: She is not in acute distress.     Appearance: She is well-developed. She is not diaphoretic.      Interventions: She is intubated.   HENT:      Head: Normocephalic.   Eyes:      General:         Right eye: No discharge.         Left eye: No discharge.   Cardiovascular:      Rate and Rhythm: Normal rate and regular rhythm.      Heart sounds: Normal heart sounds, S1 normal and S2 normal. No murmur heard.  Pulmonary:      Effort: No respiratory distress. She is intubated.      Breath sounds: Normal air entry.   Abdominal:      General: There is no distension.      Tenderness: There is no abdominal tenderness.   Skin:     General: Skin is warm and dry.      Findings: No erythema.            Significant Labs: ABG:   Recent Labs   Lab 05/13/24  1028 05/13/24  1128 05/13/24  1444   PH 7.366 7.318* 7.361   PCO2 37.7 40.4 39.8   HCO3 21.6* 20.7* 22.5*   POCSATURATED 100 98 96   BE -4* -5* -3*   , Blood Culture: No results for input(s): "LABBLOO" in the last 48 hours., BMP:   Recent Labs   Lab 05/13/24  1126 05/13/24  1704 05/14/24  0500 05/14/24  1251   * 206* 156*  --     140 140  --    K 3.5 4.4 3.2* 4.6    112* 109  --    CO2 22* 19* 21*  --    BUN 11 10 12  --    CREATININE 0.8 0.7 0.7  --    CALCIUM 8.7 8.3* 8.3*  --    MG 2.6 2.1 2.6  --    , " "CMP   Recent Labs   Lab 05/13/24  1126 05/13/24  1704 05/14/24  0500 05/14/24  1251    140 140  --    K 3.5 4.4 3.2* 4.6    112* 109  --    CO2 22* 19* 21*  --    * 206* 156*  --    BUN 11 10 12  --    CREATININE 0.8 0.7 0.7  --    CALCIUM 8.7 8.3* 8.3*  --    PROT 5.3*  --  5.5*  --    ALBUMIN 2.6*  --  3.6  --    BILITOT 0.5  --  0.3  --    ALKPHOS 49*  --  37*  --    AST 35  --  24  --    ALT 15  --  11  --    ANIONGAP 9 9 10  --    , CBC   Recent Labs   Lab 05/13/24  1126 05/13/24  1128 05/14/24  0500   WBC 32.08*  --  15.84*   HGB 11.0*  --  9.4*   HCT 34.8*   < > 29.8*     --  272    < > = values in this interval not displayed.   , INR   Recent Labs   Lab 05/13/24  1126 05/14/24  0500   INR 1.1 1.1   , Lipid Panel No results for input(s): "CHOL", "HDL", "LDLCALC", "TRIG", "CHOLHDL" in the last 48 hours., and Troponin No results for input(s): "TROPONINI" in the last 48 hours.    Significant Imaging: EKG:    Assessment and Plan:       * Mitral valve mass  -TTE with large mitral mass, concerning for myxoma  -CTS consulted  -R/LHC planned tmw    5/10/24  -Stable  -R/LHC today    05/13/24  -s/p removal of LA myxoma  -continue ICU supportive care    05/14/24  Stable s/p LA myxoma removal  Continue icu supportive care    Syncope and collapse  -Episode two weeks ago  -No recurrence  -Continue to monitor    Rheumatoid arthritis involving multiple sites with positive rheumatoid factor  -Mgmt as per primary team    Essential hypertension  -Continue home meds        VTE Risk Mitigation (From admission, onward)           Ordered     Reason for No Pharmacological VTE Prophylaxis  Once        Question:  Reasons:  Answer:  Physician Provided (leave comment)  Comment:  possible surgical intervention    05/09/24 1216     IP VTE HIGH RISK PATIENT  Once         05/09/24 1216     Place sequential compression device  Until discontinued         05/09/24 1216                    Jose Winkler, " MD  Cardiology  [facility] - Intensive Care ([facility])

## 2024-05-14 NOTE — PT/OT/SLP EVAL
"Occupational Therapy Evaluation and Treatment    Name: Jake Hoskins  MRN: [id number]  Admitting Diagnosis: Mitral valve mass  Recent Surgery: Procedure(s) (LRB):  RESECTION, MYXOMA, CARDIAC ATRIUM (N/A)  BLOCK, NERVE, INTERCOSTAL, 2 OR MORE (N/A)  ECHOCARDIOGRAM,TRANSESOPHAGEAL (N/A) 1 Day Post-Op    Recommendations:     Discharge Recommendations: Low Intensity Therapy (24/7 SPV and A)  Level of Assistance Recommended: 24 hours light assistance  Discharge Equipment Recommendations: bath bench  Barriers to discharge: None    Assessment:     Jake Hoskins is a 59 y.o. female with a medical diagnosis of Mitral valve mass. She presents with performance deficits affecting function including weakness, impaired endurance, impaired self care skills, impaired functional mobility, impaired balance, impaired cardiopulmonary response to activity, pain, decreased safety awareness (sternal precautions).    Rehab Prognosis: Good; patient would benefit from acute OT services to address these deficits and reach maximum level of function.    Plan:     Patient to be seen 2 x/week to address the above listed problems via self-care/home management, therapeutic activities, therapeutic exercises  Plan of Care Expires: 05/28/24  Plan of Care Reviewed with: patient    Subjective     Chief Complaint: Reported "I am doing ok."  Patient Comments/Goals: increase independence  Pain/Comfort:  Pain Rating 1: 6/10  Location 1: chest  Pain Addressed 1:  (activity pacing)    Social History:  Living Environment: Patient lives alone in a single story home with  a ramp to enter.  Prior Level of Function: Prior to admission, patient was independent with ADLs and community distance ambulation. Shower/tub combo.  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  Equipment Used at Home: none  DME owned (not currently used): none  Assistance Upon Discharge:  reports excellent " support of sisters and daughters at d/c    Objective:     Communicated with nurse, Ava, prior to session. Patient found up in chair with blood pressure cuff, pulse ox (continuous), telemetry, central line, chest tube, moreno catheter, wound vac, peripheral IV, oxygen upon OT entry to room.    General Precautions: Standard, fall, sternal, respiratory   Orthopedic Precautions: N/A   Braces: N/A    Respiratory Status: Nasal cannula, flow 2 L/min    Occupational Performance    Bed Mobility:   OOB in chair at entry and exit    Functional Mobility/Transfers:  Sit <> Stand Transfer with minimum assistance with rolling walker  Functional Mobility: Patient completed x40ft functional mobility with RW and min A to increase dynamic standing balance and activity tolerance needed for ADL completion.  Provided with v/c for technique with transfers to increase safety and independence with completion  V/c for sternal precaution compliance throughout  Stand>sit with min A  Chair follow in tow throughout mobility trial  Hypotensive, but stable throughout. Mild dizziness that resolved with seated rest break.     Activities of Daily Living:  Grooming: minimum assistance  Upper Body Dressing: minimum assistance    Cognitive/Visual Perceptual:  Cognitive/Psychosocial Skills:    -     Oriented to: Person, Place, Time, Situation  -     Follows Commands/attention: Follows one-step commands    Physical Exam:  Balance:    -     Sitting: contact guard assistance  -     Standing: minimum assistance  Gross assessment of B UE AROM was WFL- formal assessment limited due to complex ICU lines remaining in place including chest tube resulting in patient guarding of arms. Will continue to assess as able  Declined sensation deficits in B UE    AMPAC 6 Click ADL:  AMPAC Total Score: 16    Treatment & Education:  Therapist provided facilitation and instruction of proper body mechanics, energy conservation, and fall prevention strategies during tasks  listed above  Patient educated on role of OT, POC, and goals for therapy  Patient educated on importance of OOB activities with staff member assistance and sitting OOB majority of the day  Educated on sternal precautions and their functional implications. Will require reinforcement.    Patient left up in chair with all lines intact, call button in reach, and RN present.    GOALS:   Multidisciplinary Problems       Occupational Therapy Goals          Problem: Occupational Therapy    Goal Priority Disciplines Outcome Interventions   Occupational Therapy Goal     OT, PT/OT     Description: Goals to be met by: 5/28/24     Patient will increase functional independence with ADLs by performing:    Toileting from toilet with Stand-by Assistance for hygiene and clothing management.   Toilet transfer to toilet with Stand-by Assistance.  Demonstrates 100% functional compliance with sternal precautions.                         History:     Past Medical History:   Diagnosis Date    Allergy     Anemia     Hypertension     Plantar fasciitis of left foot          Past Surgical History:   Procedure Laterality Date    CATHETERIZATION OF BOTH LEFT AND RIGHT HEART N/A 5/10/2024    Procedure: CATHETERIZATION, HEART, BOTH LEFT AND RIGHT;  Surgeon: Alison Buchanan MD;  Location: [facility] CATH LAB;  Service: Cardiology;  Laterality: N/A;    COLONOSCOPY N/A 04/09/2021    Procedure: COLONOSCOPY;  Surgeon: Hua Elmore MD;  Location: [facility] ENDO;  Service: Endoscopy;  Laterality: N/A;    ECHOCARDIOGRAM,TRANSESOPHAGEAL N/A 5/13/2024    Procedure: ECHOCARDIOGRAM,TRANSESOPHAGEAL;  Surgeon: Chester Sanchez MD;  Location: [facility] OR;  Service: Cardiovascular;  Laterality: N/A;    HYSTERECTOMY  05/2021    INJECTION OF ANESTHETIC AGENT AROUND MULTIPLE INTERCOSTAL NERVES N/A 5/13/2024    Procedure: BLOCK, NERVE, INTERCOSTAL, 2 OR MORE;  Surgeon: Chester Sanchez MD;  Location: [facility] OR;  Service: Cardiovascular;  Laterality: N/A;    OOPHORECTOMY   05/2021    RESECTION OF ATRIAL MYXOMA N/A 5/13/2024    Procedure: RESECTION, MYXOMA, CARDIAC ATRIUM;  Surgeon: Chester Sanchez MD;  Location: [facility] OR;  Service: Cardiovascular;  Laterality: N/A;    ROBOT-ASSISTED LAPAROSCOPIC ABDOMINAL HYSTERECTOMY USING DA ABBEY XI N/A 05/18/2021    Procedure: XI ROBOTIC HYSTERECTOMY;  Surgeon: Christa Davila MD;  Location: [facility] OR;  Service: OB/GYN;  Laterality: N/A;    ROBOT-ASSISTED LAPAROSCOPIC SALPINGO-OOPHORECTOMY USING DA ABBEY XI Bilateral 05/18/2021    Procedure: XI ROBOTIC SALPINGO-OOPHORECTOMY;  Surgeon: Christa Davila MD;  Location: [facility] ER;  Service: OB/GYN;  Laterality: Bilateral;       Time Tracking:     OT Date of Treatment: 05/14/24  OT Start Time: 1005  OT Stop Time: 1045  OT Total Time (min): 40 min    Billable Minutes: Evaluation 15 and Therapeutic Activity 25    Sydni Whitney, OT  5/14/2024

## 2024-05-14 NOTE — PLAN OF CARE
OT brennan completed. Sit>stand min A, functional mobility x40ft with RW and min A, stand>sit min A. Recommending low intensity intervention with 24/7 SPV and A at d/c.

## 2024-05-14 NOTE — PROGRESS NOTES
[facility] ([facility])  Critical Care Medicine  Progress Note    Patient Name: Jake Hoskins  MRN: [id number]  Admission Date: 5/9/2024  Hospital Length of Stay: 5 days  Code Status: Full Code  Attending Provider: Chester Sanchez MD  Primary Care Provider: Angelica Lagunas MD   Principal Problem: Mitral valve mass    Subjective:     HPI:  Information obtained from chart review and discussion with patient.     59-year-old female with a known past medical history of RA (MTX), HTN, anemia, and obesity who was admitted 5/9/2024 for further evaluation of mitral valve mass discovered on outpatient echo earlier that day. She was being evaluated by Cardiology for syncope, episode occurring about 2 weeks prior. She was admitted by hospital medicine with Cardiology and CTS services consulted. She underwent heart cath which did not show any blockages. 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 Sanchez and was transferred to ICU post-operatively for close monitoring.     Upon exam, Ms. Hoskins is lying in bed with her eyes closed, breathing comfortably and appears non-distressed. She opens her eyes and interacts to my voice. She reports post-op pain, awaiting next available dose of pain medication. Otherwise, she has no complaints. Insulin infusion and PRN IVF bolus infusing.     Hospital/ICU Course:  5/14: extubated yesterday per protocol post-op, this AM she was seen up in the chair on RA in NAD, she is alert, reports pain is there but well controlled, some nausea    ROS complete and negative unless stated in the interval HPI     Objective:     Vital Signs (Most Recent):  Temp: 99 °F (37.2 °C) (05/14/24 0745)  Pulse: 74 (05/14/24 0745)  Resp: 18 (05/14/24 0745)  BP: (!) 103/56 (05/14/24 0700)  SpO2: (!) 89 % (05/14/24 0745) Vital Signs (24h Range):  Temp:  [96.8 °F (36 °C)-99 °F (37.2 °C)] 99 °F (37.2 °C)  Pulse:  [56-93] 74  Resp:  [10-27] 18  SpO2:  [89 %-100 %] 89 %  BP: ()/(53-68) 103/56  Arterial  Line BP: ()/(46-85) 107/50     Weight: 105.3 kg (232 lb 2.3 oz)  Body mass index is 39.85 kg/m².      Intake/Output Summary (Last 24 hours) at 5/14/2024 0759  Last data filed at 5/14/2024 0600  Gross per 24 hour   Intake 5353.63 ml   Output 2540 ml   Net 2813.63 ml        Physical Exam  Vitals reviewed.   Constitutional:       General: She is awake. She is not in acute distress.     Appearance: She is obese.   Cardiovascular:      Rate and Rhythm: Normal rate.      Pulses:           Radial pulses are 1+ on the right side and 1+ on the left side.      Comments: On some low dose epi via gtt  Pulmonary:      Effort: Pulmonary effort is normal.      Breath sounds: Decreased breath sounds present. No wheezing, rhonchi or rales.      Comments: On RA  Chest:      Comments: Midsternal incision with dressing in place. CT x 2.   Abdominal:      General: There is no distension.      Palpations: Abdomen is soft.      Comments: Obese abd   Musculoskeletal:      Right lower leg: Edema present.      Left lower leg: Edema present.      Comments: COCHRAN   Skin:     General: Skin is warm and dry.   Neurological:      General: No focal deficit present.      Mental Status: She is alert.   Psychiatric:         Behavior: Behavior is cooperative.               Vents:  Vent Mode: (S) Spont (05/13/24 1354)  Ventilator Initiated: Yes (05/13/24 1125)  Set Rate: 20 BPM (05/13/24 1300)  Vt Set: 400 mL (05/13/24 1300)  Pressure Support: (S) 7 cmH20 (05/13/24 1354)  PEEP/CPAP: 5 cmH20 (05/13/24 1354)  Oxygen Concentration (%): 36 (05/13/24 1504)  Peak Airway Pressure: 12 cmH20 (05/13/24 1354)  Plateau Pressure: 0 cmH20 (05/13/24 1354)  Total Ve: 7.14 L/m (05/13/24 1354)  Negative Inspiratory Force (cm H2O): 0 (05/13/24 1354)  F/VT Ratio<105 (RSBI): (!) 47.09 (05/13/24 1354)    Lines/Drains/Airways       Central Venous Catheter Line  Duration             Percutaneous Central Line - Triple Lumen  05/13/24  Internal Jugular Right 1 day               Drain  Duration                  Urethral Catheter 05/13/24 0818 Temperature probe;Straight-tip;Silicone;Non-latex 16 Fr. <1 day         Y Chest Tube 1 and 2 05/13/24 1005 1 Right Mediastinal 24 Fr. 2 Left Mediastinal 24 Fr. <1 day              Arterial Line  Duration             Arterial Line 05/13/24 0709 Right Radial 1 day              Line  Duration                  Pacer Wires 05/13/24  1 day              Peripheral Intravenous Line  Duration                  Peripheral IV - Single Lumen 05/09/24 1055 20 G Right Antecubital 4 days         Peripheral IV - Single Lumen 05/13/24  16 G Left Antecubital 1 day                    Significant Labs:    CBC/Anemia Profile:  Recent Labs   Lab 05/12/24 1756 05/13/24  0749 05/13/24  1126 05/13/24  1128 05/13/24  1444 05/14/24  0500   WBC 6.05  --  32.08*  --   --  15.84*   HGB 11.9*  --  11.0*  --   --  9.4*   HCT 38.5   < > 34.8* 33* 32* 29.8*     --  306  --   --  272   MCV 81*  --  81*  --   --  81*   RDW 14.9*  --  14.9*  --   --  14.9*    < > = values in this interval not displayed.        Chemistries:  Recent Labs   Lab 05/12/24 1756 05/13/24  1126 05/13/24  1704 05/14/24  0500    141 140 140   K 3.9 3.5 4.4 3.2*    110 112* 109   CO2 27 22* 19* 21*   BUN 12 11 10 12   CREATININE 0.8 0.8 0.7 0.7   CALCIUM 9.5 8.7 8.3* 8.3*   ALBUMIN 3.4* 2.6*  --  3.6   PROT 7.2 5.3*  --  5.5*   BILITOT 0.4 0.5  --  0.3   ALKPHOS 62 49*  --  37*   ALT 12 15  --  11   AST 13 35  --  24   MG  --  2.6 2.1 2.6       All pertinent labs within the past 24 hours have been reviewed.    Significant Imaging:  I have reviewed all pertinent imaging results/findings within the past 24 hours.    ABG  Recent Labs   Lab 05/13/24  1444   PH 7.361   PO2 84   PCO2 39.8   HCO3 22.5*   BE -3*     Assessment/Plan:     Cardiac/Vascular  * Mitral valve mass  - s/p resection 5/13 with CTS, pathology pending  - pain controlled  - glucose controlled with SSI  - PT/OT, IS and OOB   -  post-op plan per CTS, discussed with Dr. Sanchez this AM    Essential hypertension  - on some lose dose epi this AM  - getting albumin this AM per direction of primary team  - hold BP meds this AM  - monitor trends     Immunology/Multi System  Rheumatoid arthritis involving multiple sites with positive rheumatoid factor  - on chronic immunosuppressives with MTX and prednisone, however, reported non compliance with prednisone  - MTX currently on hold, resume when ok with CTS  - supportive care     Endocrine  BMI 39.0-39.9,adult  - pt would benefit greatly from weight loss and lifestyle modifications      Other  Syncope and collapse  - suspected s/t MV mass   - see plan for MV mass    Prophylaxis Measures:  GI ppx: Famotidine  VTE ppx: SCDs  Glucose control: Insulin subcutaneous    Code Status: Full Code      Munir Valverde NP  Critical Care Medicine  [facility] ([facility])

## 2024-05-14 NOTE — CONSULTS
[facility] ([facility])  Adult Nutrition  Consult Note    SUMMARY     Recommendations    Recommendation/Intervention:   1. Recommend pt continues on Cardiac diet   2. Recommend Boost plus TID until PO intake > 50% to assist fillingin nutritional gaps   3. Recommend Del BID for wound healing   4. Recommend pt continues on bowel regiemn (No BM x 5 days)   5. Recommend pt continues on anti-nausea medication as warranted   6. Encourage PO intake   7. Weigh twice weekly    Goals:   1. Pt will tolerate and consume > 75% EEN and EPN prior to RD follow up   2. Pt will consume Del BID prior to RD follow up   3. Pt chiara have BM prior to RD follow up   4. Pt's nausea will improve prior to RD follow up  Nutrition Goal Status: new  Communication of RD Recs: other (comment) (POC, sticky note)    Assessment and Plan    Nutrition Problem  Inadequate protein-energy intake   Increased protein needs   Altered GI tract     Related to (etiology):   Decreased ability to consume sufficient protein/energy   Increased demand for nutrition   Alteration in GI tract structure and/or function     Signs and Symptoms (as evidenced by):   Nausea, Estimated intake of food less than estimated needs   Surgery, Wound healing needs   Constipation, Nausea     Interventions/Recommendations (treatment strategy):  1. Sodium and fat modified diet  2. Commercial beverage medical food supplement therapy   3. Amino acids medical food supplement therapy for wound healing   4. Management of nutrition related prescription medications   5. Collaboration by nutrition professional with other providers     Nutrition Diagnosis Status:   New       Malnutrition Assessment     Skin (Micronutrient): dry, wounds unhealed, edema (Polo score = 14 (moderate risk)                                 Reason for Assessment    Reason For Assessment: consult (Post Op)  Diagnosis:  (Mitral valve mass)  Relevant Medical History: HTN, Rheumotoid arthritis, Syncope and  "collapse, Obese  General Information Comments:   5/14/24: 59 y.o. Female admitted for Mitral valve mass. Pt currently on Cardiac diet, in the ICU. RD consulted for Post op. H&P noted that the pt presented to the ED after a syncopal episode x 2 weeks ago, pt reported she had a similar episode x 10 years ago and required CPR by EMS, at admit pt had uncontrolled BP but no sx, pt was anxious about surgery. EMR noted S/p resection yesterday on 5/13 with CTS and extubated yesterday. Visited pt at bedside, pt sitting up in chair, attempting to eat lunch. Pt reported that she was unable to eat breakfast and having difficulty w/ lunch d/t nausea, pt confirmed she is not experiencing any N/V/D, abd pain or difficulty chewing/swalloing, provided pt with a menu to help encourage pt preferred food choices. Pt reported her UBW is 225 lbs, Visual NFPE performed, pt appeared well nourished. Provided pt with nutrition education handout "Heart Health Nutrition Therapy" per the NCM, pt not up to discuss education at this time, pt confirmed she will read and we will discuss and answer and questions/cocerns she may have at the RD follow up, encouraged pt to use RD contact information on the handouts prior to follow up if warranted. Reviewed chart: LBM 5/9 (x 5 day no BM); Skin: dry, incision chest WDL; Polo score: 14 (moderate risk): Edema: 1+ trace dependent. Labs, meds, weight reviewed. Note docusate, vitamin C, ondansetron. Weight charted 2/23 222 lbs, 5/14 232 lbs (BMI 39.85, Obese),+ 10 lb wt gain x 3 months. RD will continue to follow and monitor pt's nutrition status during admit.    Nutrition Discharge Planning: Cardiac diet + Del BID + Boost plus as warranted    Nutrition Risk Screen    Nutrition Risk Screen: no indicators present    Nutrition Related Social Determinants of Health: SDOH: Unable to assess at this time.       Nutrition/Diet History    Spiritual, Cultural Beliefs, Baptist Practices, Values that Affect Care: " "no  Food Allergies: NKFA  Factors Affecting Nutritional Intake: decreased appetite, nausea/vomiting    Anthropometrics    Temp: 99.7 °F (37.6 °C)  Height Method: Stated  Height: 5' 4" (162.6 cm)  Height (inches): 64 in  Weight Method: Bed Scale  Weight: 105.3 kg (232 lb 2.3 oz)  Weight (lb): 232.15 lb  Ideal Body Weight (IBW), Female: 120 lb  % Ideal Body Weight, Female (lb): 193.46 %  BMI (Calculated): 39.8  BMI Grade: 35 - 39.9 - obesity - grade II     Wt Readings from Last 15 Encounters:   05/14/24 105.3 kg (232 lb 2.3 oz)   05/09/24 102.1 kg (225 lb)   05/02/24 102.1 kg (225 lb 1.4 oz)   04/29/24 102 kg (224 lb 13.9 oz)   02/23/24 101 kg (222 lb 10.6 oz)   01/19/24 101.9 kg (224 lb 12.1 oz)   09/18/23 101.2 kg (223 lb 1.7 oz)   05/17/23 103.4 kg (227 lb 15.3 oz)   05/10/23 104.3 kg (229 lb 15 oz)   09/09/22 106.1 kg (233 lb 14.5 oz)   05/06/22 107.8 kg (237 lb 10.5 oz)   01/14/22 107.1 kg (236 lb 1.8 oz)   12/13/21 104.5 kg (230 lb 4.3 oz)   09/16/21 106.1 kg (233 lb 14.5 oz)   06/18/21 104.7 kg (230 lb 13.2 oz)     Lab/Procedures/Meds    Pertinent Labs Reviewed: reviewed  Pertinent Labs Comments: K+ (L), Calcium (L), Total protein (L), Glu (H)  Pertinent Medications Reviewed: reviewed  Pertinent Medications Comments: potassium chloride, montelukast, Lopressor, magesium hydroxide, losartan, furosemide, ferrous sulfate, famotidine, docusate, clopidogrel, aspirin, vitamin C, acetamenophen  BMP  Lab Results   Component Value Date     05/14/2024    K 3.2 (L) 05/14/2024     05/14/2024    CO2 21 (L) 05/14/2024    BUN 12 05/14/2024    CREATININE 0.7 05/14/2024    CALCIUM 8.3 (L) 05/14/2024    ANIONGAP 10 05/14/2024    EGFRNORACEVR >60 05/14/2024     Lab Results   Component Value Date    ALBUMIN 3.6 05/14/2024     Lab Results   Component Value Date    ALT 11 05/14/2024    AST 24 05/14/2024    ALKPHOS 37 (L) 05/14/2024    BILITOT 0.3 05/14/2024     Recent Labs   Lab 05/14/24  1121   POCTGLUCOSE 126*     Lab " Results   Component Value Date    HGBA1C 5.7 (H) 05/11/2024     Lab Results   Component Value Date    WBC 15.84 (H) 05/14/2024    HGB 9.4 (L) 05/14/2024    HCT 29.8 (L) 05/14/2024    MCV 81 (L) 05/14/2024     05/14/2024       Scheduled Meds:   acetaminophen  650 mg Oral Q6H    ascorbic acid (vitamin C)  500 mg Oral BID    aspirin  81 mg Oral Daily    chlorhexidine  10 mL Mouth/Throat BID    clopidogreL  75 mg Oral Daily    [START ON 5/15/2024] cyanocobalamin  1,000 mcg Oral Daily    docusate sodium  100 mg Oral BID    famotidine  20 mg Oral BID    ferrous sulfate  1 tablet Oral Daily    [START ON 5/15/2024] folic acid  1 mg Oral Daily    furosemide (LASIX) injection  40 mg Intravenous BID    losartan  25 mg Oral Daily    magnesium hydroxide 400 mg/5 ml  30 mL Oral Daily    metoprolol tartrate  25 mg Oral BID    montelukast  10 mg Oral Daily    potassium chloride  20 mEq Oral Q12H     Continuous Infusions:   dexmedeTOMIDine (Precedex) infusion (titrating)  0-1.4 mcg/kg/hr Intravenous Continuous   Stopped at 05/13/24 1328    EPINEPHrine  0-0.5 mcg/kg/min Intravenous Continuous   Stopped at 05/14/24 0959    nicardipine  0-15 mg/hr Intravenous Continuous PRN        NORepinephrine bitartrate-D5W  0-3 mcg/kg/min Intravenous Continuous   Stopped at 05/13/24 1602    vasopressin  0.04 Units/min Intravenous Continuous   Held at 05/13/24 1130     PRN Meds:.  Current Facility-Administered Medications:     0.9%  NaCl infusion (for blood administration), , Intravenous, Q24H PRN    acetaminophen, 650 mg, Oral, Q6H PRN    albumin human 5%, 25 g, Intravenous, PRN    calcium gluconate IVPB, 1 g, Intravenous, PRN    calcium gluconate IVPB, 2 g, Intravenous, PRN    calcium gluconate IVPB, 3 g, Intravenous, PRN    dextrose 10%, 12.5 g, Intravenous, PRN    dextrose 10%, 25 g, Intravenous, PRN    fentaNYL, 25 mcg, Intravenous, Q1H PRN    fentaNYL, 50 mcg, Intravenous, Q1H PRN    glucagon (human recombinant), 1 mg, Intramuscular,  PRN    glucose, 16 g, Oral, PRN    glucose, 24 g, Oral, PRN    hydrALAZINE, 10 mg, Intravenous, Q6H PRN    insulin aspart U-100, 0-10 Units, Subcutaneous, QID (AC + HS) PRN    lactated ringers, 1,000 mL, Intravenous, PRN    magnesium sulfate IVPB, 4 g, Intravenous, PRN    melatonin, 6 mg, Oral, Nightly PRN    metoclopramide, 5 mg, Intravenous, Q6H PRN    naloxone, 0.02 mg, Intravenous, PRN    nicardipine, 0-15 mg/hr, Intravenous, Continuous PRN    ondansetron, 4 mg, Intravenous, Q12H PRN    oxyCODONE, 10 mg, Oral, Q4H PRN    oxyCODONE, 5 mg, Oral, Q4H PRN    potassium chloride in water, 20 mEq, Intravenous, PRN    potassium chloride in water, 60 mEq, Intravenous, PRN    potassium chloride in water, 40 mEq, Intravenous, PRN    senna, 8.6 mg, Oral, Daily PRN    sodium chloride 0.9%, 10 mL, Intravenous, Q12H PRN    Physical Findings/Assessment         Estimated/Assessed Needs    Weight Used For Calorie Calculations: 105.3 kg (232 lb 2.3 oz)  Energy Calorie Requirements (kcal): 1608 kcals (MSJ x no AF (Obese, BMI 39.85)  Energy Need Method: [place]  Protein Requirements:  g (0.8-1.0 g/kg ABW (Obese, BMI > 30)  Weight Used For Protein Calculations: 105.3 kg (232 lb 2.3 oz)  Fluid Requirements (mL): 1608 mL (1 mL/kcal)  Estimated Fluid Requirement Method: RDA Method  RDA Method (mL): 1608  CHO Requirement: 201 g (1608 kcals/8)      Nutrition Prescription Ordered    Current Diet Order: Cardiac diet    Evaluation of Received Nutrient/Fluid Intake  I/O: (Net since admit)   5/14: +3424.6 mL    Energy Calories Required: not meeting needs  Protein Required: not meeting needs  Fluid Required: exceeds needs  Total Fluid Intake (mL): 5399.6  Tolerance: not tolerating (nausea)  % Intake of Estimated Energy Needs: 0 - 25 %  % Meal Intake: 0 - 25 %    Nutrition Risk    Level of Risk/Frequency of Follow-up: high (F/u x 2 weekly)       Monitor and Evaluation    Food and Nutrient Intake: energy intake, food and beverage  intake  Food and Nutrient Adminstration: diet order  Knowledge/Beliefs/Attitudes: food and nutrition knowledge/skill, beliefs and attitudes  Anthropometric Measurements: weight, weight change, body mass index  Biochemical Data, Medical Tests and Procedures: electrolyte and renal panel, gastrointestinal profile, glucose/endocrine profile, inflammatory profile  Nutrition-Focused Physical Findings: overall appearance       Nutrition Follow-Up    RD Follow-up?: Yes  Jessica Webb, Registration Eligible, Provisional LDN

## 2024-05-14 NOTE — PROGRESS NOTES
Subjective:      Patient ID: Jake Hoskins is a 59 y.o. female.    Chief Complaint: Abnormal Lab (Pt sent by Dr. Hernandez for large mass on mitral valve. Pt states she feels fine, denies chest pain and SOB. )    HPI:  59 year old female patient with HTN and morbid obesity was evaluated for a syncopal attack 2 weeks ago.  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  No more syncopal attacks since the last week does not have any major cardiac symptoms like chest pain or dyspnea or pedal edema.  No current facility-administered medications on file prior to encounter.   Date of Procedure: 5/13/2024      Procedure: Procedure(s) (LRB):  RESECTION, MYXOMA, CARDIAC ATRIUM (N/A)  BLOCK, NERVE, INTERCOSTAL, 2 OR MORE (N/A)  ECHOCARDIOGRAM,TRANSESOPHAGEAL (N/A)     Surgeons and Role:     * Chester Sanchez MD - Primary     Assisting Surgeon: None     Pre-Operative Diagnosis: Near syncope [R55]  Hypertension  Obesity  Sleep apnea  Hypercholesterolemia  Supraventricular tachycardia    The patient was transferred to the ICU intubated she woke up neurologically intact on minimal dose of epinephrine stable overnight chest tube output is decreasing clear urine in the Hess catheter pain well controlled overnight.    Review of patient's allergies indicates:   Allergen Reactions    Tramadol Nausea And Vomiting       Past Medical History:   Diagnosis Date    Allergy     Anemia     Hypertension     Plantar fasciitis of left foot        Family History   Problem Relation Name Age of Onset    Hypertension Mother      Asthma Mother      Cancer Mother          unknown    Hypertension Father      Heart disease Father      Hypertension Sister         Social History     Socioeconomic History    Marital status:     Number of children: 2   Occupational History    Occupation: [employer]     Employer: [employer]    Tobacco Use    Smoking status: Never    Smokeless tobacco: Never   Substance and Sexual Activity    Alcohol use: No    Drug use: No    Sexual activity: Not Currently     Birth control/protection: Surgical     Social Determinants of Health     Financial Resource Strain: Patient Declined (5/9/2024)    Overall Financial Resource Strain (CARDIA)     Difficulty of Paying Living Expenses: Patient declined   Food Insecurity: Patient Declined (5/9/2024)    Hunger Vital Sign     Worried About Running Out of Food in the Last Year: Patient declined     Ran Out of Food in the Last Year: Patient declined   Transportation Needs: Patient Declined (5/9/2024)    TRANSPORTATION NEEDS     Transportation : Patient declined   Physical Activity: Sufficiently Active (5/10/2023)    Exercise Vital Sign     Days of Exercise per Week: 5 days     Minutes of Exercise per Session: 150+ min   Stress: Patient Declined (5/9/2024)    [employer] - Occupational Stress Questionnaire     Feeling of Stress : Patient declined   Housing Stability: Patient Declined (5/9/2024)    Housing Stability Vital Sign     Unable to Pay for Housing in the Last Year: Patient declined     Homeless in the Last Year: Patient declined       Past Surgical History:   Procedure Laterality Date    CATHETERIZATION OF BOTH LEFT AND RIGHT HEART N/A 5/10/2024    Procedure: CATHETERIZATION, HEART, BOTH LEFT AND RIGHT;  Surgeon: Alison Buchanan MD;  Location: [facility] CATH LAB;  Service: Cardiology;  Laterality: N/A;    COLONOSCOPY N/A 04/09/2021    Procedure: COLONOSCOPY;  Surgeon: Hua Elmore MD;  Location: [facility] ENDO;  Service: Endoscopy;  Laterality: N/A;    HYSTERECTOMY  05/2021    OOPHORECTOMY  05/2021    ROBOT-ASSISTED LAPAROSCOPIC ABDOMINAL HYSTERECTOMY USING DA ABBEY XI N/A 05/18/2021    Procedure: XI ROBOTIC HYSTERECTOMY;  Surgeon: Christa Davila MD;  Location: [facility] OR;  Service: OB/GYN;  Laterality: N/A;    ROBOT-ASSISTED LAPAROSCOPIC  "SALPINGO-OOPHORECTOMY USING DA ABBEY XI Bilateral 05/18/2021    Procedure: XI ROBOTIC SALPINGO-OOPHORECTOMY;  Surgeon: Christa Davila MD;  Location: [facility];  Service: OB/GYN;  Laterality: Bilateral;       Review of Systems   Neurological:  Positive for syncope and light-headedness.          Objective:   BP (!) 103/56   Pulse 74   Temp 99 °F (37.2 °C)   Resp 18   Ht 5' 4" (1.626 m)   Wt 105.3 kg (232 lb 2.3 oz)   LMP 12/26/2020   SpO2 (!) 89%   Breastfeeding No   BMI 39.85 kg/m²     X-Ray Chest AP Portable  Narrative: EXAMINATION:  XR CHEST AP PORTABLE    CLINICAL HISTORY:  Benign neoplasm of heart.Post-op; Evaluation for fitting and adjustment of nonvascular catheter    COMPARISON:  May 13, 2024    FINDINGS:  Multiple life-saving devices overlie the chest.  There is been interval removal of the endotracheal tube.  Stable right jugular central line in good position.  Persistent markedly low lung volumes without new pulmonary opacities.  Left effusion again seen.  Mild vascular congestion again seen.  The hilar and mediastinal contours and osseous structures are unchanged.  Impression: 1.  Overall, no significant interval change has occurred considering there is been interval removal of the endotracheal tube.    Electronically signed by: Frank Osman MD  Date:    05/14/2024  Time:    07:41         Physical Exam  Vitals reviewed.   Constitutional:       Appearance: Normal appearance. She is obese.      Comments: Sitting up in a chair comfortable   HENT:      Head: Normocephalic and atraumatic.      Mouth/Throat:      Mouth: Mucous membranes are moist.   Eyes:      Extraocular Movements: Extraocular movements intact.   Cardiovascular:      Rate and Rhythm: Normal rate and regular rhythm.      Pulses: Normal pulses.      Heart sounds:      Friction rub present.      Comments: Midline sternotomy incision stable right IJ triple-lumen 2 mediastinal chest tubes draining serosanguineous fluid.  Hess catheter " draining clear urine  Pulmonary:      Effort: Pulmonary effort is normal.      Breath sounds: Rhonchi and rales present.   Abdominal:      Palpations: Abdomen is soft.   Musculoskeletal:         General: Normal range of motion.      Cervical back: Normal range of motion and neck supple.   Skin:     General: Skin is warm.      Capillary Refill: Capillary refill takes less than 2 seconds.   Neurological:      General: No focal deficit present.      Mental Status: She is alert and oriented to person, place, and time.   Psychiatric:         Mood and Affect: Mood normal.     Chest x-ray shows postsurgical changes    Assessment:     1. Near syncope    2. Chest pain    3. Mitral valve mass    4. Essential hypertension    5. Abnormal echocardiogram findings without diagnosis    6. Atrial myxoma    7. Pre-operative cardiovascular examination    8. Post-operative state          Plan   Postop day 1 left atrial myxoma excision.  Neuro awake alert pain control with oxycodone  Cardiovascular on low-dose epinephrine wean the epinephrine start aspirin and beta-blocker and Plavix  We will discontinue the pacing wires today  Respiratory aggressive pulmonary toilet out of bed nebulizers as needed incentive spirometry  GI cardiac diet  Renal creatinine back to baseline leave the Hess today  DVT and GI prophylaxis with Pepcid and bilateral SCDs  PTOT  Hyperglycemia on insulin sliding scale  Electrolyte replacement protocol  We will plan to discontinue the chest tubes today and the arterial line and flow trach after the epi is off  Spent 30 minutes rounding of the patient and taking care of the patient at the bedside in the ICU formulating plans          Chester Sanchez MD  [facility]  [place]

## 2024-05-15 LAB
ALBUMIN SERPL BCP-MCNC: 3.6 G/DL (ref 3.5–5.2)
ALP SERPL-CCNC: 36 U/L (ref 55–135)
ALT SERPL W/O P-5'-P-CCNC: 9 U/L (ref 10–44)
ANION GAP SERPL CALC-SCNC: 5 MMOL/L (ref 8–16)
AST SERPL-CCNC: 21 U/L (ref 10–40)
BILIRUB SERPL-MCNC: 0.6 MG/DL (ref 0.1–1)
BUN SERPL-MCNC: 17 MG/DL (ref 6–20)
CALCIUM SERPL-MCNC: 9.4 MG/DL (ref 8.7–10.5)
CHLORIDE SERPL-SCNC: 106 MMOL/L (ref 95–110)
CO2 SERPL-SCNC: 25 MMOL/L (ref 23–29)
COMMENT: NORMAL
CREAT SERPL-MCNC: 0.8 MG/DL (ref 0.5–1.4)
ERYTHROCYTE [DISTWIDTH] IN BLOOD BY AUTOMATED COUNT: 15.5 % (ref 11.5–14.5)
EST. GFR  (NO RACE VARIABLE): >60 ML/MIN/1.73 M^2
FINAL PATHOLOGIC DIAGNOSIS: NORMAL
FROZEN SECTION DIAGNOSIS: NORMAL
FROZEN SECTION FOOTNOTE: NORMAL
GLUCOSE SERPL-MCNC: 122 MG/DL (ref 70–110)
GROSS: NORMAL
HCT VFR BLD AUTO: 28.4 % (ref 37–48.5)
HGB BLD-MCNC: 8.9 G/DL (ref 12–16)
Lab: NORMAL
MAGNESIUM SERPL-MCNC: 2.3 MG/DL (ref 1.6–2.6)
MAGNESIUM SERPL-MCNC: 2.3 MG/DL (ref 1.6–2.6)
MCH RBC QN AUTO: 25.5 PG (ref 27–31)
MCHC RBC AUTO-ENTMCNC: 31.3 G/DL (ref 32–36)
MCV RBC AUTO: 81 FL (ref 82–98)
PLATELET # BLD AUTO: 182 K/UL (ref 150–450)
PMV BLD AUTO: 10.1 FL (ref 9.2–12.9)
POCT GLUCOSE: 102 MG/DL (ref 70–110)
POCT GLUCOSE: 114 MG/DL (ref 70–110)
POCT GLUCOSE: 92 MG/DL (ref 70–110)
POCT GLUCOSE: 93 MG/DL (ref 70–110)
POCT GLUCOSE: 99 MG/DL (ref 70–110)
POTASSIUM SERPL-SCNC: 4.7 MMOL/L (ref 3.5–5.1)
PROT SERPL-MCNC: 6.1 G/DL (ref 6–8.4)
RBC # BLD AUTO: 3.49 M/UL (ref 4–5.4)
SODIUM SERPL-SCNC: 136 MMOL/L (ref 136–145)
WBC # BLD AUTO: 14.77 K/UL (ref 3.9–12.7)

## 2024-05-15 PROCEDURE — 25000003 PHARM REV CODE 250: Performed by: NURSE PRACTITIONER

## 2024-05-15 PROCEDURE — 99900035 HC TECH TIME PER 15 MIN (STAT)

## 2024-05-15 PROCEDURE — 27000221 HC OXYGEN, UP TO 24 HOURS

## 2024-05-15 PROCEDURE — 97530 THERAPEUTIC ACTIVITIES: CPT

## 2024-05-15 PROCEDURE — 94799 UNLISTED PULMONARY SVC/PX: CPT

## 2024-05-15 PROCEDURE — 63600175 PHARM REV CODE 636 W HCPCS: Performed by: THORACIC SURGERY (CARDIOTHORACIC VASCULAR SURGERY)

## 2024-05-15 PROCEDURE — 25000003 PHARM REV CODE 250: Performed by: THORACIC SURGERY (CARDIOTHORACIC VASCULAR SURGERY)

## 2024-05-15 PROCEDURE — 36415 COLL VENOUS BLD VENIPUNCTURE: CPT | Performed by: THORACIC SURGERY (CARDIOTHORACIC VASCULAR SURGERY)

## 2024-05-15 PROCEDURE — 25000003 PHARM REV CODE 250: Performed by: FAMILY MEDICINE

## 2024-05-15 PROCEDURE — 83735 ASSAY OF MAGNESIUM: CPT | Mod: 91 | Performed by: THORACIC SURGERY (CARDIOTHORACIC VASCULAR SURGERY)

## 2024-05-15 PROCEDURE — 99233 SBSQ HOSP IP/OBS HIGH 50: CPT | Mod: ,,, | Performed by: INTERNAL MEDICINE

## 2024-05-15 PROCEDURE — 85027 COMPLETE CBC AUTOMATED: CPT | Performed by: THORACIC SURGERY (CARDIOTHORACIC VASCULAR SURGERY)

## 2024-05-15 PROCEDURE — 94761 N-INVAS EAR/PLS OXIMETRY MLT: CPT

## 2024-05-15 PROCEDURE — 80053 COMPREHEN METABOLIC PANEL: CPT | Performed by: THORACIC SURGERY (CARDIOTHORACIC VASCULAR SURGERY)

## 2024-05-15 PROCEDURE — 97116 GAIT TRAINING THERAPY: CPT

## 2024-05-15 PROCEDURE — 21400001 HC TELEMETRY ROOM

## 2024-05-15 RX ORDER — POTASSIUM CHLORIDE 20 MEQ/1
40 TABLET, EXTENDED RELEASE ORAL DAILY
Status: DISCONTINUED | OUTPATIENT
Start: 2024-05-15 | End: 2024-05-15

## 2024-05-15 RX ORDER — FUROSEMIDE 10 MG/ML
40 INJECTION INTRAMUSCULAR; INTRAVENOUS DAILY
Status: DISCONTINUED | OUTPATIENT
Start: 2024-05-15 | End: 2024-05-17 | Stop reason: HOSPADM

## 2024-05-15 RX ORDER — POTASSIUM CHLORIDE 20 MEQ/1
20 TABLET, EXTENDED RELEASE ORAL DAILY
Status: DISCONTINUED | OUTPATIENT
Start: 2024-05-15 | End: 2024-05-17 | Stop reason: HOSPADM

## 2024-05-15 RX ADMIN — ACETAMINOPHEN 650 MG: 325 TABLET ORAL at 12:05

## 2024-05-15 RX ADMIN — POTASSIUM CHLORIDE 20 MEQ: 1500 TABLET, EXTENDED RELEASE ORAL at 08:05

## 2024-05-15 RX ADMIN — CHLORHEXIDINE GLUCONATE 0.12% ORAL RINSE 10 ML: 1.2 LIQUID ORAL at 09:05

## 2024-05-15 RX ADMIN — MONTELUKAST 10 MG: 10 TABLET, FILM COATED ORAL at 08:05

## 2024-05-15 RX ADMIN — FOLIC ACID 1 MG: 1 TABLET ORAL at 08:05

## 2024-05-15 RX ADMIN — ACETAMINOPHEN 650 MG: 325 TABLET ORAL at 05:05

## 2024-05-15 RX ADMIN — CLOPIDOGREL BISULFATE 75 MG: 75 TABLET ORAL at 08:05

## 2024-05-15 RX ADMIN — CHLORHEXIDINE GLUCONATE 0.12% ORAL RINSE 10 ML: 1.2 LIQUID ORAL at 08:05

## 2024-05-15 RX ADMIN — FAMOTIDINE 20 MG: 20 TABLET ORAL at 08:05

## 2024-05-15 RX ADMIN — Medication 6 MG: at 09:05

## 2024-05-15 RX ADMIN — FUROSEMIDE 40 MG: 10 INJECTION, SOLUTION INTRAMUSCULAR; INTRAVENOUS at 08:05

## 2024-05-15 RX ADMIN — MAGNESIUM HYDROXIDE 2400 MG: 400 SUSPENSION ORAL at 08:05

## 2024-05-15 RX ADMIN — ASPIRIN 81 MG: 81 TABLET, COATED ORAL at 08:05

## 2024-05-15 RX ADMIN — OXYCODONE HYDROCHLORIDE AND ACETAMINOPHEN 500 MG: 500 TABLET ORAL at 08:05

## 2024-05-15 RX ADMIN — METOPROLOL TARTRATE 25 MG: 25 TABLET, FILM COATED ORAL at 08:05

## 2024-05-15 RX ADMIN — FERROUS SULFATE TAB 325 MG (65 MG ELEMENTAL FE) 1 EACH: 325 (65 FE) TAB at 08:05

## 2024-05-15 RX ADMIN — OXYCODONE HYDROCHLORIDE 10 MG: 5 TABLET ORAL at 09:05

## 2024-05-15 RX ADMIN — OXYCODONE HYDROCHLORIDE 10 MG: 5 TABLET ORAL at 12:05

## 2024-05-15 RX ADMIN — FAMOTIDINE 20 MG: 20 TABLET ORAL at 09:05

## 2024-05-15 RX ADMIN — CYANOCOBALAMIN TAB 1000 MCG 1000 MCG: 1000 TAB at 08:05

## 2024-05-15 RX ADMIN — OXYCODONE HYDROCHLORIDE AND ACETAMINOPHEN 500 MG: 500 TABLET ORAL at 09:05

## 2024-05-15 RX ADMIN — DOCUSATE SODIUM 100 MG: 100 CAPSULE, LIQUID FILLED ORAL at 09:05

## 2024-05-15 RX ADMIN — DOCUSATE SODIUM 100 MG: 100 CAPSULE, LIQUID FILLED ORAL at 08:05

## 2024-05-15 RX ADMIN — OXYCODONE HYDROCHLORIDE 5 MG: 5 TABLET ORAL at 03:05

## 2024-05-15 RX ADMIN — LOSARTAN POTASSIUM 25 MG: 25 TABLET, FILM COATED ORAL at 08:05

## 2024-05-15 NOTE — PT/OT/SLP PROGRESS
Physical Therapy Treatment    Patient Name:  Jake Hoskins   MRN:  [id number]    Recommendations:     Discharge Recommendations: Low Intensity Therapy (WITH 24 HOUR CARE FOR SAFETY)  Discharge Equipment Recommendations: bath bench, walker, rolling  Barriers to discharge: None    Assessment:     Jake Hoskins is a 59 y.o. female admitted with a medical diagnosis of Mitral valve mass.  She presents with the following impairments/functional limitations: weakness, impaired endurance, impaired functional mobility, gait instability, impaired balance, pain, decreased safety awareness, decreased lower extremity function, decreased coordination.    Rehab Prognosis: Good; patient would benefit from acute skilled PT services to address these deficits and reach maximum level of function.    Recent Surgery: Procedure(s) (LRB):  RESECTION, MYXOMA, CARDIAC ATRIUM (N/A)  BLOCK, NERVE, INTERCOSTAL, 2 OR MORE (N/A)  ECHOCARDIOGRAM,TRANSESOPHAGEAL (N/A) 2 Days Post-Op    Plan:     During this hospitalization, patient to be seen 3 x/week to address the identified rehab impairments via gait training, therapeutic activities, therapeutic exercises and progress toward the following goals:    Plan of Care Expires:  05/28/24    Subjective     Chief Complaint: NONE, EAGER TO WALK  Patient/Family Comments/goals:   Pain/Comfort:  Pain Rating 1: 3/10  Location 1: chest      Objective:     Communicated with NURSE ROWE prior to session.  Patient found supine with blood pressure cuff, pulse ox (continuous), telemetry, peripheral IV, oxygen, wound vac upon PT entry to room.     General Precautions: Standard, fall, sternal  Orthopedic Precautions: N/A  Braces: N/A  Respiratory Status: Nasal cannula, flow 2 L/min     Functional Mobility:  Bed Mobility:     Scooting: minimum assistance  Supine to Sit: moderate assistance  Transfers:     Sit to Stand:  contact guard assistance with rolling walker  Bed to Chair: contact guard assistance with   "rolling walker  using  Step Transfer  Gait: PT AMB 70' X 2 TRIALS WITH RW AND SBA, CUES FOR UPRIGHT POSTURE AND RW SAFETY, C/O MILD DIZZINESS  Balance: FAIR SITTING BALANCE, FAIR DYNAMIC BALANCE DURING GAIT    AM-PAC 6 CLICK MOBILITY  Turning over in bed (including adjusting bedclothes, sheets and blankets)?: 3  Sitting down on and standing up from a chair with arms (e.g., wheelchair, bedside commode, etc.): 2  Moving from lying on back to sitting on the side of the bed?: 3  Moving to and from a bed to a chair (including a wheelchair)?: 3  Need to walk in hospital room?: 4  Climbing 3-5 steps with a railing?: 1  Basic Mobility Total Score: 16     Treatment & Education:  PT EDUCATION:  - ROLE OF P.T. AND POC IN ACUTE CARE HOSPITAL SETTING  - RW USE AND SAFETY DURING TF'S AND GAIT  - REVIEW STERNAL PRECAUTIONS IN RELATION TO MOBILITY  - ENCOURAGED TO INCREASE TIME OOB IN CHAIR TO TOLERANCE   - TO CONTINUE THERAPUETIC EXERCISES THROUGHOUT THE DAY TO INCREASE ACTIVITY TOLERANCE AND DECREASE RISK FOR PNEUMONIA AND BLOOD CLOTS: HIP FLEX/EXT, HIP ABD/ADD, QUAD SET, HEEL SLIDE, AP  - RISK FOR FALLS DUE TO GENERALIZED WEAKNESS, EDUCATED ON "CALL DON'T FALL", ENCOURAGED TO CALL FOR ASSISTANCE WITH ALL NEEDS SUCH AS BED<>CHAIR TRANSFERS OR TRIPS TO BATHROOM, PT AGREEABLE TO SAFETY PRECAUTIONS    Patient left up in chair with all lines intact, call button in reach, chair alarm on, and NURSE notified..    GOALS:   Multidisciplinary Problems       Physical Therapy Goals          Problem: Physical Therapy    Goal Priority Disciplines Outcome Goal Variances Interventions   Physical Therapy Goal     PT, PT/OT Progressing     Description: LTG'S TO BE MET IN 14 DAYS (5-28-24)  PT WILL REQUIRE SPV FOR BED MOBILITY  PT WILL REQUIRE SPV FOR BED<>CHAIR TF'S  PT WILL  FEET WITH OR WITHOUT RW AND SPV  PT WILL INC AMPAC SCORE BY 2 POINTS TO PROGRESS GROSS FUNC MOBILITY                         Time Tracking:     PT Received On: " 05/15/24  PT Start Time: 1035     PT Stop Time: 1105  PT Total Time (min): 30 min     Billable Minutes: Gait Training 15 and Therapeutic Activity 15    Treatment Type: Treatment  PT/PTA: PT     Number of PTA visits since last PT visit: 0     05/15/2024

## 2024-05-15 NOTE — SUBJECTIVE & OBJECTIVE
Review of Systems   Constitutional: Positive for malaise/fatigue.   HENT: Negative.     Eyes: Negative.    Cardiovascular:  Positive for chest pain (incisional).   Respiratory: Negative.     Endocrine: Negative.    Hematologic/Lymphatic: Negative.    Skin: Negative.    Musculoskeletal: Negative.    Gastrointestinal: Negative.    Neurological:  Positive for weakness.   Psychiatric/Behavioral: Negative.     Allergic/Immunologic: Negative.      Objective:     Vital Signs (Most Recent):  Temp: 99 °F (37.2 °C) (05/15/24 1249)  Pulse: 73 (05/15/24 1249)  Resp: 18 (05/15/24 1249)  BP: (!) 101/59 (05/15/24 1249)  SpO2: (!) 92 % (05/15/24 1249) Vital Signs (24h Range):  Temp:  [97.7 °F (36.5 °C)-99 °F (37.2 °C)] 99 °F (37.2 °C)  Pulse:  [57-73] 73  Resp:  [15-24] 18  SpO2:  [92 %-100 %] 92 %  BP: (100-122)/(50-76) 101/59     Weight: 105.3 kg (232 lb 2.3 oz)  Body mass index is 39.85 kg/m².     SpO2: (!) 92 %         Intake/Output Summary (Last 24 hours) at 5/15/2024 1308  Last data filed at 5/15/2024 0600  Gross per 24 hour   Intake --   Output 1160 ml   Net -1160 ml       Lines/Drains/Airways       Peripheral Intravenous Line  Duration                  Peripheral IV - Single Lumen 05/13/24  16 G Left Antecubital 2 days         Peripheral IV - Single Lumen 05/14/24 0000 20 G Right Antecubital 1 day                       Physical Exam  Vitals and nursing note reviewed.   Constitutional:       General: She is not in acute distress.     Appearance: Normal appearance. She is well-developed. She is not diaphoretic.   HENT:      Head: Normocephalic and atraumatic.   Eyes:      General:         Right eye: No discharge.         Left eye: No discharge.      Pupils: Pupils are equal, round, and reactive to light.   Cardiovascular:      Rate and Rhythm: Normal rate and regular rhythm.      Heart sounds: Normal heart sounds, S1 normal and S2 normal. No murmur heard.     Comments: Sternotomy site C/D/I; no bleeding erythema or  "drainage, wound vac in place  Pulmonary:      Effort: Pulmonary effort is normal.   Musculoskeletal:      Right lower leg: No edema.      Left lower leg: No edema.   Skin:     General: Skin is warm and dry.      Findings: No erythema.   Neurological:      General: No focal deficit present.      Mental Status: She is alert and oriented to person, place, and time.   Psychiatric:         Mood and Affect: Mood normal.         Behavior: Behavior normal.            Significant Labs: CMP   Recent Labs   Lab 05/14/24  0500 05/14/24  1251 05/14/24  1718 05/15/24  0502     --  137 136   K 3.2* 4.6 4.7 4.7     --  106 106   CO2 21*  --  26 25   *  --  127* 122*   BUN 12  --  14 17   CREATININE 0.7  --  0.9 0.8   CALCIUM 8.3*  --  9.0 9.4   PROT 5.5*  --   --  6.1   ALBUMIN 3.6  --   --  3.6   BILITOT 0.3  --   --  0.6   ALKPHOS 37*  --   --  36*   AST 24  --   --  21   ALT 11  --   --  9*   ANIONGAP 10  --  5* 5*   , CBC   Recent Labs   Lab 05/14/24  0500 05/15/24  0502   WBC 15.84* 14.77*   HGB 9.4* 8.9*   HCT 29.8* 28.4*    182   , Troponin No results for input(s): "TROPONINI" in the last 48 hours., and All pertinent lab results from the last 24 hours have been reviewed.    Significant Imaging: Echocardiogram: Transthoracic echo (TTE) complete (Cupid Only):   Results for orders placed or performed during the hospital encounter of 05/09/24   Echo   Result Value Ref Range    BSA 2.15 m2    LVOT stroke volume 76.30 cm3    LVIDd 4.97 3.5 - 6.0 cm    LV Systolic Volume 41.28 mL    LV Systolic Volume Index 20.0 mL/m2    LVIDs 3.21 2.1 - 4.0 cm    LV Diastolic Volume 116.46 mL    LV Diastolic Volume Index 56.53 mL/m2    IVS 0.86 0.6 - 1.1 cm    LVOT diameter 2.00 cm    LVOT area 3.1 cm2    FS 35 28 - 44 %    Left Ventricle Relative Wall Thickness 0.43 cm    Posterior Wall 1.06 0.6 - 1.1 cm    LV mass 170.59 g    LV Mass Index 83 g/m2    MV Peak E Talon 1.03 m/s    TDI LATERAL 0.08 m/s    TDI SEPTAL 0.06 m/s "    E/E' ratio 14.71 m/s    MV Peak A Talon 1.49 m/s    TR Max Talon 2.60 m/s    E/A ratio 0.69     IVRT 91.34 msec    E wave deceleration time 340.08 msec    LV SEPTAL E/E' RATIO 17.17 m/s    LV LATERAL E/E' RATIO 12.88 m/s    PV Peak S Talon 0.56 m/s    PV Peak D Talon 0.40 m/s    Pulm vein S/D ratio 1.40     LVOT peak talon 1.11 m/s    Left Ventricular Outflow Tract Mean Velocity 0.81 cm/s    Left Ventricular Outflow Tract Mean Gradient 2.88 mmHg    RVDD 4.52 cm    RVOT peak VTI 21.3 cm    TAPSE 2.02 cm    RV/LV Ratio 0.91 cm    LA size 4.14 cm    Left Atrium Minor Axis 6.40 cm    Left Atrium Major Axis 5.65 cm    LA volume (mod) 87.26 cm3    LA Volume Index (Mod) 42.4 mL/m2    RA Major Axis 5.80 cm    RA Width 4.46 cm    AV mean gradient 4 mmHg    AV peak gradient 7 mmHg    Ao peak talon 1.31 m/s    Ao VTI 28.60 cm    LVOT peak VTI 24.30 cm    AV valve area 2.67 cm²    AV Velocity Ratio 0.85     AV index (prosthetic) 0.85     BÁRBARA by Velocity Ratio 2.66 cm²    MV stenosis pressure 1/2 time 98.62 ms    MV valve area p 1/2 method 2.23 cm2    Triscuspid Valve Regurgitation Peak Gradient 27 mmHg    PV mean gradient 2 mmHg    PV PEAK VELOCITY 0.95 m/s    PV peak gradient 4 mmHg    Pulmonary Valve Mean Velocity 0.68 m/s    RVOT peak talon 0.77 m/s    Ao root annulus 2.70 cm    Sinus 2.68 cm    STJ 2.73 cm    Ascending aorta 3.17 cm    IVC diameter 1.26 cm    Mean e' 0.07 m/s    ZLVIDS -1.35     ZLVIDD -2.24     LA Volume Index 49.2 mL/m2    LA volume 101.38 cm3    LA WIDTH 4.8 cm    TV resting pulmonary artery pressure 30 mmHg    RV TB RVSP 6 mmHg    Est. RA pres 3 mmHg    Narrative      Left Ventricle: The left ventricle is normal in size. Normal wall   thickness. There is concentric remodeling. There is normal systolic   function with a visually estimated ejection fraction of 60 - 65%. There is   diastolic dysfunction.    Right Ventricle: Normal right ventricular cavity size. Wall thickness   is normal. Systolic function is  normal.    Left Atrium: Left atrium is severely dilated. There is a 3.4 cm x 2.6   cm fixed irregular mass that appears to be myxoma attached to anterior   mitral valve leaflet.    Mitral Valve: There is mild regurgitation.    Pulmonary Artery: The estimated pulmonary artery systolic pressure is   30 mmHg.    IVC/SVC: Normal venous pressure at 3 mmHg.    There is a 3.4 cm x 2.6 cm fixed irregular mass that appears to be   myxoma attached to anterior mitral valve leaflet.      and EKG: Reviewed

## 2024-05-15 NOTE — PROGRESS NOTES
[facility] ([facility])  Critical Care Medicine  Progress Note    Patient Name: Jake Hoskins  MRN: [id number]  Admission Date: 5/9/2024  Hospital Length of Stay: 6 days  Code Status: Full Code  Attending Provider: Chester Sanchez MD  Primary Care Provider: Angelica Lagunas MD   Principal Problem: Mitral valve mass    Subjective:     HPI:  Information obtained from chart review and discussion with patient.     59-year-old female with a known past medical history of RA (MTX), HTN, anemia, and obesity who was admitted 5/9/2024 for further evaluation of mitral valve mass discovered on outpatient echo earlier that day. She was being evaluated by Cardiology for syncope, episode occurring about 2 weeks prior. She was admitted by hospital medicine with Cardiology and CTS services consulted. She underwent heart cath which did not show any blockages. 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 Sanchez and was transferred to ICU post-operatively for close monitoring.     Upon exam, Ms. Hoskins is lying in bed with her eyes closed, breathing comfortably and appears non-distressed. She opens her eyes and interacts to my voice. She reports post-op pain, awaiting next available dose of pain medication. Otherwise, she has no complaints. Insulin infusion and PRN IVF bolus infusing.     Hospital/ICU Course:  5/14: extubated yesterday per protocol post-op, this AM she was seen up in the chair on RA in NAD, she is alert, reports pain is there but well controlled, some nausea  5/15: sitting up in chair with heart pillow against chest, breakfast and IS in front of her on tray. Ms. Hoskins reports dizziness with movement and explains this worries her even though dr. Sanchez explained this would get better with time. Pain with coughing and deep breathing- discussed importance of doing so and using IS, getting OOB, and ambulating to reduce risk of post-op pneumonia. Notes poor appetite since surgery. CVC removed this morning.  Labs and hemodynamics stable.       Objective:     Vital Signs (Most Recent):  Temp: 97.7 °F (36.5 °C) (05/15/24 0315)  Pulse: 68 (05/15/24 0637)  Resp: 18 (05/15/24 0637)  BP: 100/60 (05/15/24 0637)  SpO2: 99 % (05/15/24 0637) Vital Signs (24h Range):  Temp:  [97.7 °F (36.5 °C)-99.9 °F (37.7 °C)] 97.7 °F (36.5 °C)  Pulse:  [57-78] 68  Resp:  [11-24] 18  SpO2:  [96 %-100 %] 99 %  BP: (100-122)/(53-66) 100/60     Weight: 105.3 kg (232 lb 2.3 oz)  Body mass index is 39.85 kg/m².      Intake/Output Summary (Last 24 hours) at 5/15/2024 0812  Last data filed at 5/15/2024 0600  Gross per 24 hour   Intake 499.8 ml   Output 2055 ml   Net -1555.2 ml        Physical Exam  Constitutional:       General: She is not in acute distress.     Appearance: She is obese.   HENT:      Head: Normocephalic and atraumatic.      Mouth/Throat:      Mouth: Mucous membranes are moist.      Pharynx: Oropharynx is clear.   Eyes:      Extraocular Movements: Extraocular movements intact.      Conjunctiva/sclera: Conjunctivae normal.   Cardiovascular:      Rate and Rhythm: Normal rate and regular rhythm.      Pulses: Normal pulses.   Pulmonary:      Comments: Decreased inspiratory effort. Diminished in bases, otherwise clear. NC  Abdominal:      General: Bowel sounds are normal.      Palpations: Abdomen is soft.   Musculoskeletal:         General: No deformity.      Cervical back: Normal range of motion and neck supple.      Right lower leg: No edema.      Left lower leg: No edema.   Skin:     General: Skin is warm and dry.   Neurological:      General: No focal deficit present.      Mental Status: She is alert and oriented to person, place, and time.   Psychiatric:      Comments: Dysphoric mood, flat affect           Review of Systems  Completed and negative except per hospital course/interval history       Lines/Drains/Airways       Peripheral Intravenous Line  Duration                  Peripheral IV - Single Lumen 05/13/24  16 G Left Antecubital  2 days                    Significant Labs:    CBC/Anemia Profile:  Recent Labs   Lab 05/13/24  1126 05/13/24  1128 05/13/24  1444 05/14/24  0500 05/15/24  0502   WBC 32.08*  --   --  15.84* 14.77*   HGB 11.0*  --   --  9.4* 8.9*   HCT 34.8*   < > 32* 29.8* 28.4*     --   --  272 182   MCV 81*  --   --  81* 81*   RDW 14.9*  --   --  14.9* 15.5*    < > = values in this interval not displayed.        Chemistries:  Recent Labs   Lab 05/13/24  1126 05/13/24  1704 05/14/24  0500 05/14/24  1251 05/14/24  1718 05/15/24  0502      < > 140  --  137 136   K 3.5   < > 3.2* 4.6 4.7 4.7      < > 109  --  106 106   CO2 22*   < > 21*  --  26 25   BUN 11   < > 12  --  14 17   CREATININE 0.8   < > 0.7  --  0.9 0.8   CALCIUM 8.7   < > 8.3*  --  9.0 9.4   ALBUMIN 2.6*  --  3.6  --   --  3.6   PROT 5.3*  --  5.5*  --   --  6.1   BILITOT 0.5  --  0.3  --   --  0.6   ALKPHOS 49*  --  37*  --   --  36*   ALT 15  --  11  --   --  9*   AST 35  --  24  --   --  21   MG 2.6   < > 2.6  --  2.4 2.3    < > = values in this interval not displayed.       All pertinent labs within the past 24 hours have been reviewed.    Significant Imaging:  I have reviewed all pertinent imaging results/findings within the past 24 hours.    ABG  Recent Labs   Lab 05/13/24  1444   PH 7.361   PO2 84   PCO2 39.8   HCO3 22.5*   BE -3*     Assessment/Plan:     Cardiac/Vascular  * Mitral valve mass  - s/p resection 5/13 with CTS, pathology pending  - pain controlled  - glucose controlled with SSI  - PT/OT, IS and OOB   - post-op plan per CTS    Essential hypertension  - off all pressors  - meds resumed  - monitor    Immunology/Multi System  Rheumatoid arthritis involving multiple sites with positive rheumatoid factor  - on chronic immunosuppressives with MTX and prednisone, however, reported non compliance with prednisone  - MTX currently on hold, resume when ok with CTS  - supportive care     Endocrine  BMI 39.0-39.9,adult  - pt would benefit greatly  from weight loss and lifestyle modifications      Other  Syncope and collapse  - see plan for MV mass      DVT prophylaxis: per CTS  GI prophylaxis: pepcid  Code status: Full   Disposition: per CTS. ICU team will continue to follow while in ICU.        Tatum Cortez NP  Critical Care Medicine  [facility] ([facility])

## 2024-05-15 NOTE — ASSESSMENT & PLAN NOTE
-TTE with large mitral mass, concerning for myxoma  -CTS consulted  -R/LHC planned tmw    5/10/24  -Stable  -R/LHC today    05/13/24  -s/p removal of LA myxoma  -continue ICU supportive care    05/14/24  Stable s/p LA myxoma removal  Continue icu supportive care    5/15/24  -Remains stable CV wise  -Continue current post-op care and mgmt as per CTS

## 2024-05-15 NOTE — PROGRESS NOTES
Subjective:      Patient ID: Jake Hoskins is a 59 y.o. female.    Chief Complaint: Abnormal Lab (Pt sent by Dr. Hernandez for large mass on mitral valve. Pt states she feels fine, denies chest pain and SOB. )    HPI:  59 year old female patient with HTN and morbid obesity was evaluated for a syncopal attack 2 weeks ago.  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  No more syncopal attacks since the last week does not have any major cardiac symptoms like chest pain or dyspnea or pedal edema.  No current facility-administered medications on file prior to encounter.   Date of Procedure: 5/13/2024      Procedure: Procedure(s) (LRB):  RESECTION, MYXOMA, CARDIAC ATRIUM (N/A)  BLOCK, NERVE, INTERCOSTAL, 2 OR MORE (N/A)  ECHOCARDIOGRAM,TRANSESOPHAGEAL (N/A)     Surgeons and Role:     * Chester Sanchez MD - Primary     Assisting Surgeon: None     Pre-Operative Diagnosis: Near syncope [R55]  Hypertension  Obesity  Sleep apnea  Hypercholesterolemia  Supraventricular tachycardia    The patient was transferred to the ICU intubated she woke up neurologically intact on minimal dose of epinephrine stable overnight chest tube output is decreasing clear urine in the Hess catheter pain well controlled overnight.    05/15/2024 the patient is sitting up in a chair her chest tubes were removed yesterday she had a good night sleep hemodynamically stable pain well controlled nausea is getting better has not had a bowel movement.    Review of patient's allergies indicates:   Allergen Reactions    Tramadol Nausea And Vomiting       Past Medical History:   Diagnosis Date    Allergy     Anemia     Hypertension     Plantar fasciitis of left foot        Family History   Problem Relation Name Age of Onset    Hypertension Mother      Asthma Mother      Cancer Mother          unknown    Hypertension Father      Heart  disease Father      Hypertension Sister         Social History     Socioeconomic History    Marital status:     Number of children: 2   Occupational History    Occupation: [employer]     Employer: [employer]   Tobacco Use    Smoking status: Never    Smokeless tobacco: Never   Substance and Sexual Activity    Alcohol use: No    Drug use: No    Sexual activity: Not Currently     Birth control/protection: Surgical     Social Determinants of Health     Financial Resource Strain: Patient Declined (5/9/2024)    Overall Financial Resource Strain (CARDIA)     Difficulty of Paying Living Expenses: Patient declined   Food Insecurity: Patient Declined (5/9/2024)    Hunger Vital Sign     Worried About Running Out of Food in the Last Year: Patient declined     Ran Out of Food in the Last Year: Patient declined   Transportation Needs: Patient Declined (5/9/2024)    TRANSPORTATION NEEDS     Transportation : Patient declined   Physical Activity: Sufficiently Active (5/10/2023)    Exercise Vital Sign     Days of Exercise per Week: 5 days     Minutes of Exercise per Session: 150+ min   Stress: Patient Declined (5/9/2024)    [employer] [place] [employer] - Occupational Stress Questionnaire     Feeling of Stress : Patient declined   Housing Stability: Patient Declined (5/9/2024)    Housing Stability Vital Sign     Unable to Pay for Housing in the Last Year: Patient declined     Homeless in the Last Year: Patient declined       Past Surgical History:   Procedure Laterality Date    CATHETERIZATION OF BOTH LEFT AND RIGHT HEART N/A 5/10/2024    Procedure: CATHETERIZATION, HEART, BOTH LEFT AND RIGHT;  Surgeon: Alison Buchanan MD;  Location: [facility], [place] CATH LAB;  Service: Cardiology;  Laterality: N/A;    COLONOSCOPY N/A 04/09/2021    Procedure: COLONOSCOPY;  Surgeon: Hua Elmore MD;  Location: [facility], [place] ENDO;  Service: Endoscopy;  Laterality: N/A;    ECHOCARDIOGRAM,TRANSESOPHAGEAL N/A 5/13/2024    Procedure:  "ECHOCARDIOGRAM,TRANSESOPHAGEAL;  Surgeon: Chester Sanchez MD;  Location: [facility] OR;  Service: Cardiovascular;  Laterality: N/A;    HYSTERECTOMY  05/2021    INJECTION OF ANESTHETIC AGENT AROUND MULTIPLE INTERCOSTAL NERVES N/A 5/13/2024    Procedure: BLOCK, NERVE, INTERCOSTAL, 2 OR MORE;  Surgeon: Chester Sanchez MD;  Location: [facility] OR;  Service: Cardiovascular;  Laterality: N/A;    OOPHORECTOMY  05/2021    RESECTION OF ATRIAL MYXOMA N/A 5/13/2024    Procedure: RESECTION, MYXOMA, CARDIAC ATRIUM;  Surgeon: Chester Sanchze MD;  Location: [facility] OR;  Service: Cardiovascular;  Laterality: N/A;    ROBOT-ASSISTED LAPAROSCOPIC ABDOMINAL HYSTERECTOMY USING DA ABBEY XI N/A 05/18/2021    Procedure: XI ROBOTIC HYSTERECTOMY;  Surgeon: Christa Davila MD;  Location: [facility] [place] OR;  Service: OB/GYN;  Laterality: N/A;    ROBOT-ASSISTED LAPAROSCOPIC SALPINGO-OOPHORECTOMY USING DA ABBEY XI Bilateral 05/18/2021    Procedure: XI ROBOTIC SALPINGO-OOPHORECTOMY;  Surgeon: Christa Davila MD;  Location: [facility] [place] OR;  Service: OB/GYN;  Laterality: Bilateral;       Review of Systems   Neurological:  Positive for syncope and light-headedness.          Objective:   /60   Pulse 68   Temp 97.7 °F (36.5 °C) (Oral)   Resp 18   Ht 5' 4" (1.626 m)   Wt 105.3 kg (232 lb 2.3 oz)   LMP 12/26/2020   SpO2 99%   Breastfeeding No   BMI 39.85 kg/m²     X-Ray Chest AP Portable  Narrative: EXAMINATION:  XR CHEST AP PORTABLE    CLINICAL HISTORY:  Benign neoplasm of heart.Post-op; Evaluation for fitting and adjustment of nonvascular catheter    COMPARISON:  May 13, 2024    FINDINGS:  Multiple life-saving devices overlie the chest.  There is been interval removal of the endotracheal tube.  Stable right jugular central line in good position.  Persistent markedly low lung volumes without new pulmonary opacities.  Left effusion again seen.  Mild vascular congestion again seen.  The hilar and mediastinal contours and osseous structures are " unchanged.  Impression: 1.  Overall, no significant interval change has occurred considering there is been interval removal of the endotracheal tube.    Electronically signed by: Frank Osman MD  Date:    05/14/2024  Time:    07:41         Physical Exam  Vitals reviewed.   Constitutional:       Appearance: Normal appearance. She is obese.      Comments: Sitting up in a chair comfortable   HENT:      Head: Normocephalic and atraumatic.      Mouth/Throat:      Mouth: Mucous membranes are moist.   Eyes:      Extraocular Movements: Extraocular movements intact.   Cardiovascular:      Rate and Rhythm: Normal rate and regular rhythm.      Pulses: Normal pulses.      Heart sounds:      Friction rub present.      Comments: Midline sternotomy incision stable right IJ triple-lumen 2 mediastinal chest tubes draining serosanguineous fluid.  Hess catheter draining clear urine  Pulmonary:      Effort: Pulmonary effort is normal.      Breath sounds: Rhonchi and rales present.   Abdominal:      Palpations: Abdomen is soft.   Musculoskeletal:         General: Normal range of motion.      Cervical back: Normal range of motion and neck supple.   Skin:     General: Skin is warm.      Capillary Refill: Capillary refill takes less than 2 seconds.   Neurological:      General: No focal deficit present.      Mental Status: She is alert and oriented to person, place, and time.   Psychiatric:         Mood and Affect: Mood normal.     Chest x-ray shows postsurgical changes    Assessment:     1. Near syncope    2. Chest pain    3. Mitral valve mass    4. Essential hypertension    5. Abnormal echocardiogram findings without diagnosis    6. Atrial myxoma    7. Pre-operative cardiovascular examination    8. Post-operative state          Plan   Postop day 2 left atrial myxoma excision.  Neuro awake alert pain control with oxycodone  Cardiovascular start aspirin and beta-blocker and Plavix  We will discontinue the pacing wires today  Respiratory  aggressive pulmonary toilet out of bed nebulizers as needed incentive spirometry  GI cardiac diet  Renal creatinine back to baseline   DVT and GI prophylaxis with Pepcid and bilateral SCDs  PTOT  Hyperglycemia on insulin sliding scale  Electrolyte replacement protocol  DC the Hess and the central line.  Transfer the patient to the telemetry  DC planning  Home tomorrow        Chester Sanchez MD  [facility]  [place]

## 2024-05-15 NOTE — ASSESSMENT & PLAN NOTE
- s/p resection 5/13 with CTS, pathology pending  - pain controlled  - glucose controlled with SSI  - PT/OT, IS and OOB   - post-op plan per CTS

## 2024-05-15 NOTE — PROGRESS NOTES
[facility] Tele[facility] ([facility])  Cardiology  Progress Note    Patient Name: Jake Hoskins  MRN: [id number]  Admission Date: 5/9/2024  Hospital Length of Stay: 6 days  Code Status: Full Code   Attending Physician: Chester Sanchez MD   Primary Care Physician: Angelica Lagunas MD  Expected Discharge Date:   Principal Problem:Mitral valve mass    Subjective:     Hospital Course:   Ms. Hoskins is a 59 year old female patient whose current medical conditions include HTN and morbid obesity who was sent to [facility] from cardiology clinic due to abnormal echo. Patient had previously seen Dr. Buchanan due to syncopal episode two weeks ago and echo was ordered for further evaluation. 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 Cardiology consulted to assist with management. Patient seen and examined today in ED. Stable CV wise. No CP or SOB. No recurrent syncope. No s/s of TIA/CVA. CT surgery consulted. R/[facility] planned tmw.     5/10/24-Patient seen and examined today, resting in bed. Stable. No AEON. No CV complaints. Labs stable. R/[facility] today. CTS on board.    05/13/24 saw pt in the icu after removal of LE myxoma. S/p intubated. Chest tube in the place. Off epicardial pacer. Wean off the pressor. VSS.     05/14/24 extubated, feels ok. The lab reviewed and VSS.     5/15/24-Patient seen and examined today, resting in bed. Feels ok. Admits to fatigue/incisional soreness. Remains in SR. Labs reviewed. H/H 8.9/28.4.        Review of Systems   Constitutional: Positive for malaise/fatigue.   HENT: Negative.     Eyes: Negative.    Cardiovascular:  Positive for chest pain (incisional).   Respiratory: Negative.     Endocrine: Negative.    Hematologic/Lymphatic: Negative.    Skin: Negative.    Musculoskeletal: Negative.    Gastrointestinal: Negative.    Neurological:  Positive for weakness.   Psychiatric/Behavioral: Negative.     Allergic/Immunologic: Negative.      Objective:     Vital Signs (Most  Recent):  Temp: 99 °F (37.2 °C) (05/15/24 1249)  Pulse: 73 (05/15/24 1249)  Resp: 18 (05/15/24 1249)  BP: (!) 101/59 (05/15/24 1249)  SpO2: (!) 92 % (05/15/24 1249) Vital Signs (24h Range):  Temp:  [97.7 °F (36.5 °C)-99 °F (37.2 °C)] 99 °F (37.2 °C)  Pulse:  [57-73] 73  Resp:  [15-24] 18  SpO2:  [92 %-100 %] 92 %  BP: (100-122)/(50-76) 101/59     Weight: 105.3 kg (232 lb 2.3 oz)  Body mass index is 39.85 kg/m².     SpO2: (!) 92 %         Intake/Output Summary (Last 24 hours) at 5/15/2024 1308  Last data filed at 5/15/2024 0600  Gross per 24 hour   Intake --   Output 1160 ml   Net -1160 ml       Lines/Drains/Airways       Peripheral Intravenous Line  Duration                  Peripheral IV - Single Lumen 05/13/24  16 G Left Antecubital 2 days         Peripheral IV - Single Lumen 05/14/24 0000 20 G Right Antecubital 1 day                       Physical Exam  Vitals and nursing note reviewed.   Constitutional:       General: She is not in acute distress.     Appearance: Normal appearance. She is well-developed. She is not diaphoretic.   HENT:      Head: Normocephalic and atraumatic.   Eyes:      General:         Right eye: No discharge.         Left eye: No discharge.      Pupils: Pupils are equal, round, and reactive to light.   Cardiovascular:      Rate and Rhythm: Normal rate and regular rhythm.      Heart sounds: Normal heart sounds, S1 normal and S2 normal. No murmur heard.     Comments: Sternotomy site C/D/I; no bleeding erythema or drainage, wound vac in place  Pulmonary:      Effort: Pulmonary effort is normal.   Musculoskeletal:      Right lower leg: No edema.      Left lower leg: No edema.   Skin:     General: Skin is warm and dry.      Findings: No erythema.   Neurological:      General: No focal deficit present.      Mental Status: She is alert and oriented to person, place, and time.   Psychiatric:         Mood and Affect: Mood normal.         Behavior: Behavior normal.            Significant Labs: CMP  "  Recent Labs   Lab 05/14/24  0500 05/14/24  1251 05/14/24  1718 05/15/24  0502     --  137 136   K 3.2* 4.6 4.7 4.7     --  106 106   CO2 21*  --  26 25   *  --  127* 122*   BUN 12  --  14 17   CREATININE 0.7  --  0.9 0.8   CALCIUM 8.3*  --  9.0 9.4   PROT 5.5*  --   --  6.1   ALBUMIN 3.6  --   --  3.6   BILITOT 0.3  --   --  0.6   ALKPHOS 37*  --   --  36*   AST 24  --   --  21   ALT 11  --   --  9*   ANIONGAP 10  --  5* 5*   , CBC   Recent Labs   Lab 05/14/24  0500 05/15/24  0502   WBC 15.84* 14.77*   HGB 9.4* 8.9*   HCT 29.8* 28.4*    182   , Troponin No results for input(s): "TROPONINI" in the last 48 hours., and All pertinent lab results from the last 24 hours have been reviewed.    Significant Imaging: Echocardiogram: Transthoracic echo (TTE) complete (Cupid Only):   Results for orders placed or performed during the hospital encounter of 05/09/24   Echo   Result Value Ref Range    BSA 2.15 m2    LVOT stroke volume 76.30 cm3    LVIDd 4.97 3.5 - 6.0 cm    LV Systolic Volume 41.28 mL    LV Systolic Volume Index 20.0 mL/m2    LVIDs 3.21 2.1 - 4.0 cm    LV Diastolic Volume 116.46 mL    LV Diastolic Volume Index 56.53 mL/m2    IVS 0.86 0.6 - 1.1 cm    LVOT diameter 2.00 cm    LVOT area 3.1 cm2    FS 35 28 - 44 %    Left Ventricle Relative Wall Thickness 0.43 cm    Posterior Wall 1.06 0.6 - 1.1 cm    LV mass 170.59 g    LV Mass Index 83 g/m2    MV Peak E Talon 1.03 m/s    TDI LATERAL 0.08 m/s    TDI SEPTAL 0.06 m/s    E/E' ratio 14.71 m/s    MV Peak A Talon 1.49 m/s    TR Max Talon 2.60 m/s    E/A ratio 0.69     IVRT 91.34 msec    E wave deceleration time 340.08 msec    LV SEPTAL E/E' RATIO 17.17 m/s    LV LATERAL E/E' RATIO 12.88 m/s    PV Peak S Talon 0.56 m/s    PV Peak D Talon 0.40 m/s    Pulm vein S/D ratio 1.40     LVOT peak talon 1.11 m/s    Left Ventricular Outflow Tract Mean Velocity 0.81 cm/s    Left Ventricular Outflow Tract Mean Gradient 2.88 mmHg    RVDD 4.52 cm    RVOT peak VTI 21.3 cm    " TAPSE 2.02 cm    RV/LV Ratio 0.91 cm    LA size 4.14 cm    Left Atrium Minor Axis 6.40 cm    Left Atrium Major Axis 5.65 cm    LA volume (mod) 87.26 cm3    LA Volume Index (Mod) 42.4 mL/m2    RA Major Axis 5.80 cm    RA Width 4.46 cm    AV mean gradient 4 mmHg    AV peak gradient 7 mmHg    Ao peak edmund 1.31 m/s    Ao VTI 28.60 cm    LVOT peak VTI 24.30 cm    AV valve area 2.67 cm²    AV Velocity Ratio 0.85     AV index (prosthetic) 0.85     BÁRBARA by Velocity Ratio 2.66 cm²    MV stenosis pressure 1/2 time 98.62 ms    MV valve area p 1/2 method 2.23 cm2    Triscuspid Valve Regurgitation Peak Gradient 27 mmHg    PV mean gradient 2 mmHg    PV PEAK VELOCITY 0.95 m/s    PV peak gradient 4 mmHg    Pulmonary Valve Mean Velocity 0.68 m/s    RVOT peak edmund 0.77 m/s    Ao root annulus 2.70 cm    Sinus 2.68 cm    STJ 2.73 cm    Ascending aorta 3.17 cm    IVC diameter 1.26 cm    Mean e' 0.07 m/s    ZLVIDS -1.35     ZLVIDD -2.24     LA Volume Index 49.2 mL/m2    LA volume 101.38 cm3    LA WIDTH 4.8 cm    TV resting pulmonary artery pressure 30 mmHg    RV TB RVSP 6 mmHg    Est. RA pres 3 mmHg    Narrative      Left Ventricle: The left ventricle is normal in size. Normal wall   thickness. There is concentric remodeling. There is normal systolic   function with a visually estimated ejection fraction of 60 - 65%. There is   diastolic dysfunction.    Right Ventricle: Normal right ventricular cavity size. Wall thickness   is normal. Systolic function is normal.    Left Atrium: Left atrium is severely dilated. There is a 3.4 cm x 2.6   cm fixed irregular mass that appears to be myxoma attached to anterior   mitral valve leaflet.    Mitral Valve: There is mild regurgitation.    Pulmonary Artery: The estimated pulmonary artery systolic pressure is   30 mmHg.    IVC/SVC: Normal venous pressure at 3 mmHg.    There is a 3.4 cm x 2.6 cm fixed irregular mass that appears to be   myxoma attached to anterior mitral valve leaflet.      and EKG:  Reviewed  Assessment and Plan:   Patient recovering well s/p myxoma resection. Continue current post-op care and mgmt as per CTS.    * Mitral valve mass  -TTE with large mitral mass, concerning for myxoma  -CTS consulted  -R/[facility] planned tmw    5/10/24  -Stable  -R/[facility] today    05/13/24  -s/p removal of LA myxoma  -continue ICU supportive care    05/14/24  Stable s/p LA myxoma removal  Continue icu supportive care    5/15/24  -Remains stable CV wise  -Continue current post-op care and mgmt as per CTS    BMI 39.0-39.9,adult  -Weight loss    Syncope and collapse  -Episode two weeks ago  -No recurrence  -Continue to monitor    Rheumatoid arthritis involving multiple sites with positive rheumatoid factor  -Mgmt as per primary team    Essential hypertension  -Continue home meds        VTE Risk Mitigation (From admission, onward)           Ordered     Reason for No Pharmacological VTE Prophylaxis  Once        Question:  Reasons:  Answer:  Physician Provided (leave comment)  Comment:  possible surgical intervention    05/09/24 1216     IP VTE HIGH RISK PATIENT  Once         05/09/24 1216     Place sequential compression device  Until discontinued         05/09/24 1216                    Aurea Mchugh PA-C  Cardiology  [facility] - Telemetry ([facility])

## 2024-05-15 NOTE — PLAN OF CARE
05/15/24 1406   Post-Acute Status   Post-Acute Authorization Home Health   Home Health Status Referrals Sent   Discharge Plan   Discharge Plan A Home Health     Discussed home health with patient at bedside. Referrals sent. Patient would also like a rolling walker and bath bench prior to DC. Advised insurance would not cover the bath bench but the RW can be ordered and delivered to bedside prior to DC. Verbalized understanding.

## 2024-05-15 NOTE — ASSESSMENT & PLAN NOTE
- on chronic immunosuppressives with MTX and prednisone, however, reported non compliance with prednisone  - MTX currently on hold, resume when ok with CTS  - supportive care

## 2024-05-15 NOTE — SUBJECTIVE & OBJECTIVE
Objective:     Vital Signs (Most Recent):  Temp: 97.7 °F (36.5 °C) (05/15/24 0315)  Pulse: 68 (05/15/24 0637)  Resp: 18 (05/15/24 0637)  BP: 100/60 (05/15/24 0637)  SpO2: 99 % (05/15/24 0637) Vital Signs (24h Range):  Temp:  [97.7 °F (36.5 °C)-99.9 °F (37.7 °C)] 97.7 °F (36.5 °C)  Pulse:  [57-78] 68  Resp:  [11-24] 18  SpO2:  [96 %-100 %] 99 %  BP: (100-122)/(53-66) 100/60     Weight: 105.3 kg (232 lb 2.3 oz)  Body mass index is 39.85 kg/m².      Intake/Output Summary (Last 24 hours) at 5/15/2024 0812  Last data filed at 5/15/2024 0600  Gross per 24 hour   Intake 499.8 ml   Output 2055 ml   Net -1555.2 ml        Physical Exam  Constitutional:       General: She is not in acute distress.     Appearance: She is obese.   HENT:      Head: Normocephalic and atraumatic.      Mouth/Throat:      Mouth: Mucous membranes are moist.      Pharynx: Oropharynx is clear.   Eyes:      Extraocular Movements: Extraocular movements intact.      Conjunctiva/sclera: Conjunctivae normal.   Cardiovascular:      Rate and Rhythm: Normal rate and regular rhythm.      Pulses: Normal pulses.   Pulmonary:      Comments: Decreased inspiratory effort. Diminished in bases, otherwise clear. NC  Abdominal:      General: Bowel sounds are normal.      Palpations: Abdomen is soft.   Musculoskeletal:         General: No deformity.      Cervical back: Normal range of motion and neck supple.      Right lower leg: No edema.      Left lower leg: No edema.   Skin:     General: Skin is warm and dry.   Neurological:      General: No focal deficit present.      Mental Status: She is alert and oriented to person, place, and time.   Psychiatric:      Comments: Dysphoric mood, flat affect           Review of Systems  Completed and negative except per hospital course/interval history       Lines/Drains/Airways       Peripheral Intravenous Line  Duration                  Peripheral IV - Single Lumen 05/13/24  16 G Left Antecubital 2 days                     Significant Labs:    CBC/Anemia Profile:  Recent Labs   Lab 05/13/24  1126 05/13/24  1128 05/13/24  1444 05/14/24  0500 05/15/24  0502   WBC 32.08*  --   --  15.84* 14.77*   HGB 11.0*  --   --  9.4* 8.9*   HCT 34.8*   < > 32* 29.8* 28.4*     --   --  272 182   MCV 81*  --   --  81* 81*   RDW 14.9*  --   --  14.9* 15.5*    < > = values in this interval not displayed.        Chemistries:  Recent Labs   Lab 05/13/24  1126 05/13/24  1704 05/14/24  0500 05/14/24  1251 05/14/24  1718 05/15/24  0502      < > 140  --  137 136   K 3.5   < > 3.2* 4.6 4.7 4.7      < > 109  --  106 106   CO2 22*   < > 21*  --  26 25   BUN 11   < > 12  --  14 17   CREATININE 0.8   < > 0.7  --  0.9 0.8   CALCIUM 8.7   < > 8.3*  --  9.0 9.4   ALBUMIN 2.6*  --  3.6  --   --  3.6   PROT 5.3*  --  5.5*  --   --  6.1   BILITOT 0.5  --  0.3  --   --  0.6   ALKPHOS 49*  --  37*  --   --  36*   ALT 15  --  11  --   --  9*   AST 35  --  24  --   --  21   MG 2.6   < > 2.6  --  2.4 2.3    < > = values in this interval not displayed.       All pertinent labs within the past 24 hours have been reviewed.    Significant Imaging:  I have reviewed all pertinent imaging results/findings within the past 24 hours.

## 2024-05-15 NOTE — PLAN OF CARE
A250/A250 DIANEIsaura Hoskins is a 59 y.o.female admitted on 5/9/2024 for Mitral valve mass   Code Status: Full Code MRN: [id number]   Review of patient's allergies indicates:   Allergen Reactions    Tramadol Nausea And Vomiting     Past Medical History:   Diagnosis Date    Allergy     Anemia     Hypertension     Plantar fasciitis of left foot       PRN meds    acetaminophen, 650 mg, Q6H PRN  albumin human 5%, 25 g, PRN  dextrose 10%, 12.5 g, PRN  dextrose 10%, 25 g, PRN  glucagon (human recombinant), 1 mg, PRN  glucose, 16 g, PRN  glucose, 24 g, PRN  hydrALAZINE, 10 mg, Q6H PRN  insulin aspart U-100, 0-10 Units, QID (AC + HS) PRN  lactated ringers, 1,000 mL, PRN  magnesium sulfate IVPB, 4 g, PRN  melatonin, 6 mg, Nightly PRN  metoclopramide, 5 mg, Q6H PRN  naloxone, 0.02 mg, PRN  ondansetron, 4 mg, Q12H PRN  oxyCODONE, 10 mg, Q4H PRN  oxyCODONE, 5 mg, Q4H PRN  potassium chloride in water, 20 mEq, PRN  potassium chloride in water, 60 mEq, PRN  potassium chloride in water, 40 mEq, PRN  senna, 8.6 mg, Daily PRN  sodium chloride 0.9%, 10 mL, Q12H PRN      Chart check completed. Will continue plan of care.      Orientation: oriented x 4  Oxford Coma Scale Score: 15     Lead Monitored: Lead II Rhythm: normal sinus rhythm    Cardiac/Telemetry Box Number: [id number]  VTE Required Core Measure: (SCDs) Sequential compression device initiated/maintained Last Bowel Movement: 05/09/24  Diet Cardiac Standard Tray  Voiding Characteristics: urethral catheter (bladder)  Polo Score: 20  Fall Risk Score: 8  Accucheck []   Freq?      Lines/Drains/Airways       Peripheral Intravenous Line  Duration                  Peripheral IV - Single Lumen 05/13/24  16 G Left Antecubital 2 days         Peripheral IV - Single Lumen 05/14/24 0000 20 G Right Antecubital 1 day

## 2024-05-15 NOTE — PT/OT/SLP PROGRESS
"Occupational Therapy   Treatment    Name: Jake Hoskins  MRN: [id number]  Admitting Diagnosis:  Mitral valve mass  2 Days Post-Op    Recommendations:     Discharge Recommendations: Low Intensity Therapy (24/7 SPV and A)  Discharge Equipment Recommendations:  bath bench, walker, rolling  Barriers to discharge:  None    Assessment:     Jake Hoskins is a 59 y.o. female with a medical diagnosis of Mitral valve mass.  She presents with the following performance deficits affecting function are weakness, impaired endurance, impaired self care skills, impaired functional mobility, impaired balance, impaired cardiopulmonary response to activity, pain (sternal precautions).     Rehab Prognosis:  Good; patient would benefit from acute skilled OT services to address these deficits and reach maximum level of function.       Patient tolerated session well. Improvements in activity tolerance and dynamic standing balance noted this date. Decreased ICU lines allowing for improved active engagement in therex.    Plan:     Patient to be seen 2 x/week to address the above listed problems via self-care/home management, therapeutic activities, therapeutic exercises  Plan of Care Expires: 05/28/24  Plan of Care Reviewed with: patient    Subjective     Chief Complaint: Reported "I was waiting on y'all."  Patient/Family Comments/goals: increase independence  Pain/Comfort:  Pain Rating 1:  (did not rate, c/o discomfort with mobility that improves with rest)  Pain Addressed 1:  (activity pacing)    Objective:     Communicated with: Nurse, Sadia, prior to session.  Patient found supine with blood pressure cuff, pulse ox (continuous), telemetry, oxygen, peripheral IV, wound vac upon OT entry to room.    General Precautions: Standard, fall, sternal    Orthopedic Precautions:N/A  Braces: N/A  Respiratory Status: Nasal cannula, flow 2 L/min     Occupational Performance:     Bed Mobility:    Patient completed Supine to Sit with moderate " "assistance   Provided with v/c for technique and sternal precaution compliance. Completed via log rolling.    Functional Mobility/Transfers:  Patient completed Sit <> Stand Transfer with contact guard assistance  with  rolling walker   Patient completed Bed <> Chair Transfer using Step Transfer technique with contact guard assistance with rolling walker  Functional Mobility: Patient completed x70ft x2 trials functional mobility with RW and CGA to increase dynamic standing balance and activity tolerance needed for ADL completion.  Provided with v/c for technique with transfers to increase safety and independence with completion  Significant focus on keeping arms "within tube" with push from sit>stand    [facility] 6 Click ADL: 17    Treatment & Education:  Encouraged completion of B UE AROM therex throughout the day to increase functional strength and activity tolerance needed for ADL completion. Educated on importance of allowing pain to guide movement outside "of the tube". Provided with [facility] document including therex and educated on it's contents. Will continue to reinforce education as needed.     Patient in agreement with POC and in agreement to call for A with all transfers.    Patient left up in chair with all lines intact, call button in reach, chair alarm on, and nurse notified    GOALS:   Multidisciplinary Problems       Occupational Therapy Goals          Problem: Occupational Therapy    Goal Priority Disciplines Outcome Interventions   Occupational Therapy Goal     OT, PT/OT Progressing    Description: Goals to be met by: 5/28/24     Patient will increase functional independence with ADLs by performing:    Toileting from toilet with Stand-by Assistance for hygiene and clothing management.   Toilet transfer to toilet with Stand-by Assistance.  Demonstrates 100% functional compliance with sternal precautions.                         Time Tracking:     OT Date of Treatment: 05/15/24  OT Start Time: 1020  OT " Stop Time: 1045  OT Total Time (min): 25 min    Billable Minutes:Therapeutic Activity 25    Sydni Whitney, OT  5/15/2024

## 2024-05-16 PROBLEM — R55 SYNCOPE AND COLLAPSE: Status: RESOLVED | Noted: 2024-05-09 | Resolved: 2024-05-16

## 2024-05-16 LAB
ALBUMIN SERPL BCP-MCNC: 3.5 G/DL (ref 3.5–5.2)
ALP SERPL-CCNC: 44 U/L (ref 55–135)
ALT SERPL W/O P-5'-P-CCNC: 11 U/L (ref 10–44)
ANION GAP SERPL CALC-SCNC: 12 MMOL/L (ref 8–16)
AST SERPL-CCNC: 19 U/L (ref 10–40)
BILIRUB SERPL-MCNC: 0.5 MG/DL (ref 0.1–1)
BLD PROD TYP BPU: NORMAL
BLOOD UNIT EXPIRATION DATE: NORMAL
BLOOD UNIT TYPE CODE: 5100
BLOOD UNIT TYPE: NORMAL
BUN SERPL-MCNC: 24 MG/DL (ref 6–20)
CALCIUM SERPL-MCNC: 9.5 MG/DL (ref 8.7–10.5)
CHLORIDE SERPL-SCNC: 103 MMOL/L (ref 95–110)
CO2 SERPL-SCNC: 25 MMOL/L (ref 23–29)
CODING SYSTEM: NORMAL
CREAT SERPL-MCNC: 0.7 MG/DL (ref 0.5–1.4)
CROSSMATCH INTERPRETATION: NORMAL
DISPENSE STATUS: NORMAL
ERYTHROCYTE [DISTWIDTH] IN BLOOD BY AUTOMATED COUNT: 15.5 % (ref 11.5–14.5)
EST. GFR  (NO RACE VARIABLE): >60 ML/MIN/1.73 M^2
GLUCOSE SERPL-MCNC: 91 MG/DL (ref 70–110)
HCT VFR BLD AUTO: 30.7 % (ref 37–48.5)
HGB BLD-MCNC: 9.3 G/DL (ref 12–16)
MAGNESIUM SERPL-MCNC: 2.1 MG/DL (ref 1.6–2.6)
MAGNESIUM SERPL-MCNC: 2.2 MG/DL (ref 1.6–2.6)
MCH RBC QN AUTO: 25.2 PG (ref 27–31)
MCHC RBC AUTO-ENTMCNC: 30.3 G/DL (ref 32–36)
MCV RBC AUTO: 83 FL (ref 82–98)
NUM UNITS TRANS PACKED RBC: NORMAL
PLATELET # BLD AUTO: 264 K/UL (ref 150–450)
PLATELET BLD QL SMEAR: NORMAL
PMV BLD AUTO: 10.3 FL (ref 9.2–12.9)
POCT GLUCOSE: 109 MG/DL (ref 70–110)
POCT GLUCOSE: 111 MG/DL (ref 70–110)
POCT GLUCOSE: 77 MG/DL (ref 70–110)
POCT GLUCOSE: 91 MG/DL (ref 70–110)
POTASSIUM SERPL-SCNC: 4.2 MMOL/L (ref 3.5–5.1)
PROT SERPL-MCNC: 6.6 G/DL (ref 6–8.4)
RBC # BLD AUTO: 3.69 M/UL (ref 4–5.4)
SODIUM SERPL-SCNC: 140 MMOL/L (ref 136–145)
WBC # BLD AUTO: 11.4 K/UL (ref 3.9–12.7)

## 2024-05-16 PROCEDURE — 25000003 PHARM REV CODE 250: Performed by: EMERGENCY MEDICINE

## 2024-05-16 PROCEDURE — 85027 COMPLETE CBC AUTOMATED: CPT | Performed by: THORACIC SURGERY (CARDIOTHORACIC VASCULAR SURGERY)

## 2024-05-16 PROCEDURE — 63600175 PHARM REV CODE 636 W HCPCS: Performed by: EMERGENCY MEDICINE

## 2024-05-16 PROCEDURE — 97530 THERAPEUTIC ACTIVITIES: CPT

## 2024-05-16 PROCEDURE — 83735 ASSAY OF MAGNESIUM: CPT | Mod: 91 | Performed by: THORACIC SURGERY (CARDIOTHORACIC VASCULAR SURGERY)

## 2024-05-16 PROCEDURE — 36415 COLL VENOUS BLD VENIPUNCTURE: CPT | Performed by: THORACIC SURGERY (CARDIOTHORACIC VASCULAR SURGERY)

## 2024-05-16 PROCEDURE — 25000003 PHARM REV CODE 250: Performed by: THORACIC SURGERY (CARDIOTHORACIC VASCULAR SURGERY)

## 2024-05-16 PROCEDURE — 25000003 PHARM REV CODE 250: Performed by: FAMILY MEDICINE

## 2024-05-16 PROCEDURE — 97116 GAIT TRAINING THERAPY: CPT

## 2024-05-16 PROCEDURE — 99232 SBSQ HOSP IP/OBS MODERATE 35: CPT | Mod: ,,, | Performed by: PHYSICIAN ASSISTANT

## 2024-05-16 PROCEDURE — 94618 PULMONARY STRESS TESTING: CPT

## 2024-05-16 PROCEDURE — 94799 UNLISTED PULMONARY SVC/PX: CPT

## 2024-05-16 PROCEDURE — 80053 COMPREHEN METABOLIC PANEL: CPT | Performed by: THORACIC SURGERY (CARDIOTHORACIC VASCULAR SURGERY)

## 2024-05-16 PROCEDURE — 21400001 HC TELEMETRY ROOM

## 2024-05-16 PROCEDURE — 27000221 HC OXYGEN, UP TO 24 HOURS

## 2024-05-16 PROCEDURE — 99900035 HC TECH TIME PER 15 MIN (STAT)

## 2024-05-16 PROCEDURE — 63600175 PHARM REV CODE 636 W HCPCS: Performed by: THORACIC SURGERY (CARDIOTHORACIC VASCULAR SURGERY)

## 2024-05-16 PROCEDURE — 94761 N-INVAS EAR/PLS OXIMETRY MLT: CPT

## 2024-05-16 RX ORDER — CYANOCOBALAMIN 1000 UG/ML
1000 INJECTION, SOLUTION INTRAMUSCULAR; SUBCUTANEOUS DAILY
Status: COMPLETED | OUTPATIENT
Start: 2024-05-16 | End: 2024-05-17

## 2024-05-16 RX ORDER — THIAMINE HCL 100 MG
100 TABLET ORAL DAILY
Status: DISCONTINUED | OUTPATIENT
Start: 2024-05-16 | End: 2024-05-17 | Stop reason: HOSPADM

## 2024-05-16 RX ORDER — BISACODYL 10 MG/1
10 SUPPOSITORY RECTAL DAILY PRN
Status: DISCONTINUED | OUTPATIENT
Start: 2024-05-16 | End: 2024-05-17 | Stop reason: HOSPADM

## 2024-05-16 RX ORDER — LACTULOSE 10 G/15ML
15 SOLUTION ORAL 3 TIMES DAILY
Status: DISCONTINUED | OUTPATIENT
Start: 2024-05-16 | End: 2024-05-17 | Stop reason: HOSPADM

## 2024-05-16 RX ADMIN — ACETAMINOPHEN 650 MG: 325 TABLET ORAL at 11:05

## 2024-05-16 RX ADMIN — LACTULOSE 15 G: 20 SOLUTION ORAL at 08:05

## 2024-05-16 RX ADMIN — ACETAMINOPHEN 650 MG: 325 TABLET ORAL at 05:05

## 2024-05-16 RX ADMIN — CLOPIDOGREL BISULFATE 75 MG: 75 TABLET ORAL at 08:05

## 2024-05-16 RX ADMIN — OXYCODONE HYDROCHLORIDE AND ACETAMINOPHEN 500 MG: 500 TABLET ORAL at 08:05

## 2024-05-16 RX ADMIN — FAMOTIDINE 20 MG: 20 TABLET ORAL at 08:05

## 2024-05-16 RX ADMIN — OXYCODONE HYDROCHLORIDE 5 MG: 5 TABLET ORAL at 09:05

## 2024-05-16 RX ADMIN — CHLORHEXIDINE GLUCONATE 0.12% ORAL RINSE 10 ML: 1.2 LIQUID ORAL at 08:05

## 2024-05-16 RX ADMIN — MONTELUKAST 10 MG: 10 TABLET, FILM COATED ORAL at 08:05

## 2024-05-16 RX ADMIN — CYANOCOBALAMIN TAB 1000 MCG 1000 MCG: 1000 TAB at 08:05

## 2024-05-16 RX ADMIN — CYANOCOBALAMIN 1000 MCG: 1000 INJECTION INTRAMUSCULAR; SUBCUTANEOUS at 11:05

## 2024-05-16 RX ADMIN — LACTULOSE 15 G: 20 SOLUTION ORAL at 02:05

## 2024-05-16 RX ADMIN — ASPIRIN 81 MG: 81 TABLET, COATED ORAL at 08:05

## 2024-05-16 RX ADMIN — FUROSEMIDE 40 MG: 10 INJECTION, SOLUTION INTRAMUSCULAR; INTRAVENOUS at 08:05

## 2024-05-16 RX ADMIN — POTASSIUM CHLORIDE 20 MEQ: 1500 TABLET, EXTENDED RELEASE ORAL at 08:05

## 2024-05-16 RX ADMIN — FOLIC ACID 1 MG: 1 TABLET ORAL at 08:05

## 2024-05-16 RX ADMIN — Medication 1 TABLET: at 11:05

## 2024-05-16 RX ADMIN — DOCUSATE SODIUM 100 MG: 100 CAPSULE, LIQUID FILLED ORAL at 08:05

## 2024-05-16 RX ADMIN — Medication 100 MG: at 11:05

## 2024-05-16 RX ADMIN — FERROUS SULFATE TAB 325 MG (65 MG ELEMENTAL FE) 1 EACH: 325 (65 FE) TAB at 08:05

## 2024-05-16 RX ADMIN — OXYCODONE HYDROCHLORIDE 5 MG: 5 TABLET ORAL at 04:05

## 2024-05-16 RX ADMIN — THERA TABS 1 TABLET: TAB at 11:05

## 2024-05-16 RX ADMIN — MAGNESIUM HYDROXIDE 2400 MG: 400 SUSPENSION ORAL at 08:05

## 2024-05-16 RX ADMIN — ONDANSETRON 4 MG: 2 INJECTION INTRAMUSCULAR; INTRAVENOUS at 07:05

## 2024-05-16 RX ADMIN — OXYCODONE HYDROCHLORIDE 5 MG: 5 TABLET ORAL at 05:05

## 2024-05-16 NOTE — SUBJECTIVE & OBJECTIVE
Review of Systems   Constitutional: Positive for malaise/fatigue.   HENT: Negative.     Eyes: Negative.    Cardiovascular:  Positive for chest pain (incisional).   Respiratory: Negative.     Endocrine: Negative.    Hematologic/Lymphatic: Negative.    Skin: Negative.    Musculoskeletal: Negative.    Gastrointestinal: Negative.    Genitourinary: Negative.    Neurological: Negative.    Psychiatric/Behavioral: Negative.     Allergic/Immunologic: Negative.      Objective:     Vital Signs (Most Recent):  Temp: 98.7 °F (37.1 °C) (05/16/24 1226)  Pulse: 70 (05/16/24 1226)  Resp: 16 (05/16/24 1226)  BP: 116/63 (05/16/24 1226)  SpO2: 98 % (05/16/24 1226) Vital Signs (24h Range):  Temp:  [98 °F (36.7 °C)-99.2 °F (37.3 °C)] 98.7 °F (37.1 °C)  Pulse:  [61-79] 70  Resp:  [16-20] 16  SpO2:  [94 %-99 %] 98 %  BP: ()/(50-63) 116/63     Weight: 105.4 kg (232 lb 5.8 oz)  Body mass index is 39.89 kg/m².     SpO2: 98 %         Intake/Output Summary (Last 24 hours) at 5/16/2024 1405  Last data filed at 5/16/2024 1036  Gross per 24 hour   Intake 240 ml   Output 1 ml   Net 239 ml       Lines/Drains/Airways       Peripheral Intravenous Line  Duration                  Peripheral IV - Single Lumen 05/13/24  16 G Left Antecubital 3 days         Peripheral IV - Single Lumen 05/14/24 0000 20 G Right Antecubital 2 days                       Physical Exam  Vitals and nursing note reviewed.   Constitutional:       General: She is not in acute distress.     Appearance: Normal appearance. She is well-developed. She is obese. She is not diaphoretic.   HENT:      Head: Normocephalic and atraumatic.   Eyes:      General:         Right eye: No discharge.         Left eye: No discharge.      Pupils: Pupils are equal, round, and reactive to light.   Cardiovascular:      Rate and Rhythm: Normal rate and regular rhythm.      Heart sounds: Normal heart sounds, S1 normal and S2 normal. No murmur heard.     Comments: Sternotomy site C/D/I  Pulmonary:  "     Effort: Pulmonary effort is normal.   Abdominal:      General: There is no distension.   Musculoskeletal:      Right lower leg: Edema present.      Left lower leg: Edema present.   Skin:     General: Skin is warm and dry.      Findings: No erythema.   Neurological:      General: No focal deficit present.      Mental Status: She is alert and oriented to person, place, and time.   Psychiatric:         Mood and Affect: Mood normal.         Behavior: Behavior normal.            Significant Labs: CMP   Recent Labs   Lab 05/14/24  1718 05/15/24  0502 05/16/24  0505    136 140   K 4.7 4.7 4.2    106 103   CO2 26 25 25   * 122* 91   BUN 14 17 24*   CREATININE 0.9 0.8 0.7   CALCIUM 9.0 9.4 9.5   PROT  --  6.1 6.6   ALBUMIN  --  3.6 3.5   BILITOT  --  0.6 0.5   ALKPHOS  --  36* 44*   AST  --  21 19   ALT  --  9* 11   ANIONGAP 5* 5* 12   , CBC   Recent Labs   Lab 05/15/24  0502 05/16/24  0505   WBC 14.77* 11.40   HGB 8.9* 9.3*   HCT 28.4* 30.7*    264   , Troponin No results for input(s): "TROPONINI" in the last 48 hours., and All pertinent lab results from the last 24 hours have been reviewed.    Significant Imaging: Echocardiogram: Transthoracic echo (TTE) complete (Cupid Only):   Results for orders placed or performed during the hospital encounter of 05/09/24   Echo   Result Value Ref Range    BSA 2.15 m2    LVOT stroke volume 76.30 cm3    LVIDd 4.97 3.5 - 6.0 cm    LV Systolic Volume 41.28 mL    LV Systolic Volume Index 20.0 mL/m2    LVIDs 3.21 2.1 - 4.0 cm    LV Diastolic Volume 116.46 mL    LV Diastolic Volume Index 56.53 mL/m2    IVS 0.86 0.6 - 1.1 cm    LVOT diameter 2.00 cm    LVOT area 3.1 cm2    FS 35 28 - 44 %    Left Ventricle Relative Wall Thickness 0.43 cm    Posterior Wall 1.06 0.6 - 1.1 cm    LV mass 170.59 g    LV Mass Index 83 g/m2    MV Peak E Talon 1.03 m/s    TDI LATERAL 0.08 m/s    TDI SEPTAL 0.06 m/s    E/E' ratio 14.71 m/s    MV Peak A Talon 1.49 m/s    TR Max Talon 2.60 m/s    " E/A ratio 0.69     IVRT 91.34 msec    E wave deceleration time 340.08 msec    LV SEPTAL E/E' RATIO 17.17 m/s    LV LATERAL E/E' RATIO 12.88 m/s    PV Peak S Talon 0.56 m/s    PV Peak D Talon 0.40 m/s    Pulm vein S/D ratio 1.40     LVOT peak talon 1.11 m/s    Left Ventricular Outflow Tract Mean Velocity 0.81 cm/s    Left Ventricular Outflow Tract Mean Gradient 2.88 mmHg    RVDD 4.52 cm    RVOT peak VTI 21.3 cm    TAPSE 2.02 cm    RV/LV Ratio 0.91 cm    LA size 4.14 cm    Left Atrium Minor Axis 6.40 cm    Left Atrium Major Axis 5.65 cm    LA volume (mod) 87.26 cm3    LA Volume Index (Mod) 42.4 mL/m2    RA Major Axis 5.80 cm    RA Width 4.46 cm    AV mean gradient 4 mmHg    AV peak gradient 7 mmHg    Ao peak talon 1.31 m/s    Ao VTI 28.60 cm    LVOT peak VTI 24.30 cm    AV valve area 2.67 cm²    AV Velocity Ratio 0.85     AV index (prosthetic) 0.85     BÁRBARA by Velocity Ratio 2.66 cm²    MV stenosis pressure 1/2 time 98.62 ms    MV valve area p 1/2 method 2.23 cm2    Triscuspid Valve Regurgitation Peak Gradient 27 mmHg    PV mean gradient 2 mmHg    PV PEAK VELOCITY 0.95 m/s    PV peak gradient 4 mmHg    Pulmonary Valve Mean Velocity 0.68 m/s    RVOT peak talon 0.77 m/s    Ao root annulus 2.70 cm    Sinus 2.68 cm    STJ 2.73 cm    Ascending aorta 3.17 cm    IVC diameter 1.26 cm    Mean e' 0.07 m/s    ZLVIDS -1.35     ZLVIDD -2.24     LA Volume Index 49.2 mL/m2    LA volume 101.38 cm3    LA WIDTH 4.8 cm    TV resting pulmonary artery pressure 30 mmHg    RV TB RVSP 6 mmHg    Est. RA pres 3 mmHg    Narrative      Left Ventricle: The left ventricle is normal in size. Normal wall   thickness. There is concentric remodeling. There is normal systolic   function with a visually estimated ejection fraction of 60 - 65%. There is   diastolic dysfunction.    Right Ventricle: Normal right ventricular cavity size. Wall thickness   is normal. Systolic function is normal.    Left Atrium: Left atrium is severely dilated. There is a 3.4 cm x 2.6    cm fixed irregular mass that appears to be myxoma attached to anterior   mitral valve leaflet.    Mitral Valve: There is mild regurgitation.    Pulmonary Artery: The estimated pulmonary artery systolic pressure is   30 mmHg.    IVC/SVC: Normal venous pressure at 3 mmHg.    There is a 3.4 cm x 2.6 cm fixed irregular mass that appears to be   myxoma attached to anterior mitral valve leaflet.     , EKG: Reviewed, and X-Ray: CXR: X-Ray Chest 1 View (CXR): No results found for this visit on 05/09/24. and X-Ray Chest PA and Lateral (CXR): No results found for this visit on 05/09/24.

## 2024-05-16 NOTE — ASSESSMENT & PLAN NOTE
-On chronic immunosuppression  -Takes MTX regularly but not compliant with prednisone.   -hold immunosuppression until cleared by surgery.    stable

## 2024-05-16 NOTE — ASSESSMENT & PLAN NOTE
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

## 2024-05-16 NOTE — PT/OT/SLP PROGRESS
"Occupational Therapy   Treatment    Name: Jake Hoskins  MRN: [id number]  Admitting Diagnosis:  Mitral valve mass  3 Days Post-Op    Recommendations:     Discharge Recommendations: Low Intensity Therapy (24/7 SPV and A)  Discharge Equipment Recommendations:  bath bench, walker, rolling  Barriers to discharge:  None    Assessment:     Jake Hoskins is a 59 y.o. female with a medical diagnosis of Mitral valve mass.  She presents with the following performance deficits affecting function are weakness, impaired endurance, impaired self care skills, impaired functional mobility, impaired balance, impaired cardiopulmonary response to activity (sternal precautions).     Rehab Prognosis:  Good; patient would benefit from acute skilled OT services to address these deficits and reach maximum level of function.       Patient tolerated session well. Daily improvements in activity tolerance and mobility independence. Highly motivated to return to [facility].    Plan:     Patient to be seen 2 x/week to address the above listed problems via self-care/home management, therapeutic activities, therapeutic exercises  Plan of Care Expires: 05/28/24  Plan of Care Reviewed with: patient, sibling    Subjective     Chief Complaint: Reported "I am doing better."  Patient/Family Comments/goals: increase independence  Pain/Comfort:  Pain Rating 1: 0/10    Objective:     Communicated with: NurseFatimah, prior to session.  Patient found up in chair with peripheral IV, oxygen, telemetry upon OT entry to room.    General Precautions: Standard, fall, sternal    Orthopedic Precautions:N/A  Braces: N/A  Respiratory Status: Nasal cannula, flow 2 L/min     Occupational Performance:     Bed Mobility:    OOB in chair at start and end of session.    Functional Mobility/Transfers:  Patient completed Sit <> Stand Transfer with supervision  with  rolling walker   Patient completed Bed <> Chair Transfer using Step Transfer technique with supervision with " rolling walker  Functional Mobility: Patient completed x420ft functional mobility with RW and SBA to increase dynamic standing balance and activity tolerance needed for ADL completion.  Intermittent cueing for sternal precautions compliance. Sister present and educated on KYMITT protocol.  Education     Activities of Daily Living:  Upper Body Dressing: setup donned gown as robe while seated in bedside chair    [facility] 6 Click ADL: 21    Treatment & Education:  Encouraged completion of B UE AROM therex throughout the day to increase functional strength and activity tolerance needed for ADL completion. Reviewed x3 planes of motion with education on importance of allowing pain to guide movement. Educated on benefits of OOB activity and importance of calling for A to transfer within room. Patient and sister stated understanding and in agreement with POC.    Patient left up in chair with all lines intact, call button in reach, and sister present    GOALS:   Multidisciplinary Problems       Occupational Therapy Goals          Problem: Occupational Therapy    Goal Priority Disciplines Outcome Interventions   Occupational Therapy Goal     OT, PT/OT Progressing    Description: Goals to be met by: 5/28/24     Patient will increase functional independence with ADLs by performing:    Toileting from toilet with Stand-by Assistance for hygiene and clothing management.   Toilet transfer to toilet with Stand-by Assistance.  Demonstrates 100% functional compliance with sternal precautions.                         Time Tracking:     OT Date of Treatment: 05/16/24  OT Start Time: 1300  OT Stop Time: 1325  OT Total Time (min): 25 min    Billable Minutes:Therapeutic Activity 25    Sydni Whitney OT  5/16/2024

## 2024-05-16 NOTE — PLAN OF CARE
05/16/24 1350   Post-Acute Status   Post-Acute Authorization Home Health;HME   HME Status Set-up Complete/Auth obtained   Home Health Status Set-up Complete/Auth obtained   Discharge Delays None known at this time   Discharge Plan   Discharge Plan A Home Health;Home with family   Discharge Plan B Home with family;Home Health     [facility] accepting  [facility] D[facility] approved and delivery RW to bedside.  DC pending.

## 2024-05-16 NOTE — RESPIRATORY THERAPY
Home Oxygen Evaluation - [facility] - Cardiopulmonary Department      Date Performed: 5/16/2024      1) Patient's Home O2 Sat on room air, while at rest: Room Air SpO2 At Rest: 97 %        If O2 sats on room air at rest are 88% or below, patient qualifies.  Document O2 liter flow needed in Step 2.  If O2 sats are 89% or above, complete Step 3.        2)  If patient is not ambulated and O2 sats are <88%, what is the O2 liter flow required to meet ordered saturation?      If O2 sats on room air while exercising remain 89% or above patient does not qualify, no further testing needed Document N/A in step 3. If O2 sats on room air while exercising are 88% or below, continue to Step 4.    3) Patient's O2 Sat on room air while exercising: Room Air SpO2 During Ambulation: 94 %        4) Patient's O2 Sat while exercising on O2:   at           (Must show improvement from #4 for patients to qualify)

## 2024-05-16 NOTE — ASSESSMENT & PLAN NOTE
Chronic, uncontrolled. Latest blood pressure and vitals reviewed-     Temp:  [98 °F (36.7 °C)-99.2 °F (37.3 °C)]   Pulse:  [61-79]   Resp:  [16-20]   BP: ()/(50-62)   SpO2:  [92 %-100 %] .   Home meds for hypertension were reviewed and noted below.   Hypertension Medications               losartan (COZAAR) 25 MG tablet Take 1 tablet (25 mg total) by mouth once daily.    olmesartan (BENICAR) 20 MG tablet Take 20 mg by mouth.            While in the hospital, will manage blood pressure as follows; Adjust home antihypertensive regimen as follows- See orders    Will utilize p.r.n. blood pressure medication only if patient's blood pressure greater than 140/90 and she develops symptoms such as worsening chest pain or shortness of breath.

## 2024-05-16 NOTE — PT/OT/SLP PROGRESS
Physical Therapy Treatment    Patient Name:  Jake Hoskins   MRN:  [id number]    Recommendations:     Discharge Recommendations: Low Intensity Therapy (with 24 hour care for safety)  Discharge Equipment Recommendations: bath bench, walker, rolling  Barriers to discharge: None    Assessment:     Jake Hoskins is a 59 y.o. female admitted with a medical diagnosis of Mitral valve mass.  She presents with the following impairments/functional limitations: weakness, impaired endurance, impaired functional mobility, gait instability, impaired balance, decreased safety awareness, decreased lower extremity function, decreased coordination.    Rehab Prognosis: Good; patient would benefit from acute skilled PT services to address these deficits and reach maximum level of function.    Recent Surgery: Procedure(s) (LRB):  RESECTION, MYXOMA, CARDIAC ATRIUM (N/A)  BLOCK, NERVE, INTERCOSTAL, 2 OR MORE (N/A)  ECHOCARDIOGRAM,TRANSESOPHAGEAL (N/A) 3 Days Post-Op    Plan:     During this hospitalization, patient to be seen 3 x/week to address the identified rehab impairments via gait training, therapeutic activities, therapeutic exercises and progress toward the following goals:    Plan of Care Expires:  05/28/24    Subjective     Chief Complaint: Pt is motivated to participate   Patient/Family Comments/goals: none stated  Pain/Comfort:  Pain Rating 1: 0/10      Objective:     Communicated with nurse Sheridan and epic chart review prior to session.  Patient found ambulatory in room/ceron going to bedside chair with peripheral IV, oxygen, telemetry upon PT entry to room.     General Precautions: Standard, fall, sternal  Orthopedic Precautions: N/A  Braces: N/A  Respiratory Status: Nasal cannula, flow 2 L/min     Functional Mobility:  Gait belt applied - Yes  Bed Mobility  Seated in chair at start of session and returned to chair  Transfers  Sit to Stand: contact guard assistance with rolling walker  Gait  Patient ambulated 200ft x2  "with rolling walker and stand by assistance. Patient demonstrates steady gait. No c/o dizziness or SOB, no LOB. All lines remained intact throughout ambulation trail and portable Supplemental O2 2L utilized.  Balance  Sitting: stand by assistance  Standing: stand by assistance    AM-PAC 6 CLICK MOBILITY  Turning over in bed (including adjusting bedclothes, sheets and blankets)?: 1 (NT)  Sitting down on and standing up from a chair with arms (e.g., wheelchair, bedside commode, etc.): 3  Moving from lying on back to sitting on the side of the bed?: 1 (NT)  Moving to and from a bed to a chair (including a wheelchair)?: 3  Need to walk in hospital room?: 3  Climbing 3-5 steps with a railing?: 1 (NT)  Basic Mobility Total Score: 12       Treatment & Education:  Reviewed role of PT in acute care and POC. Pt tolerated interventions well. Reviewed sternal precautions. Reviewed importance of OOB activities, activity pacing, and HEP (marching/hip flex, hip abd, heel slides/LAQ, quad sets, ankle pumps) in order to maintain/regain strength. Encouraged to sit up in chair for all meals. Reviewed proper use of RW for safety and to reduce risk of falling. Reviewed "call don't fall" policy and increased risk of falling due to weakness, instructed to utilize call bell for assistance with all transfers. Pt agreeable to all requests.    Patient left up in chair with all lines intact, call button in reach, and family present.    GOALS:   Multidisciplinary Problems       Physical Therapy Goals          Problem: Physical Therapy    Goal Priority Disciplines Outcome Goal Variances Interventions   Physical Therapy Goal     PT, PT/OT Progressing     Description: LTG'S TO BE MET IN 14 DAYS (5-28-24)  PT WILL REQUIRE SPV FOR BED MOBILITY  PT WILL REQUIRE SPV FOR BED<>CHAIR TF'S  PT WILL  FEET WITH OR WITHOUT RW AND SPV  PT WILL INC AMPAC SCORE BY 2 POINTS TO PROGRESS GROSS FUNC MOBILITY                         Time Tracking:     PT " Received On: 05/16/24  PT Start Time: 1305     PT Stop Time: 1330  PT Total Time (min): 25 min     Billable Minutes: Gait Training 17min and Therapeutic Activity 8min    Treatment Type: Treatment  PT/PTA: PT     Number of PTA visits since last PT visit: 0     05/16/2024

## 2024-05-16 NOTE — PLAN OF CARE
Problem: Adult Inpatient Plan of Care  Goal: Plan of Care Review  Outcome: Progressing  Goal: Patient-Specific Goal (Individualized)  Outcome: Progressing  Goal: Absence of Hospital-Acquired Illness or Injury  Outcome: Progressing  Goal: Optimal Comfort and Wellbeing  Outcome: Progressing  Goal: Readiness for Transition of Care  Outcome: Progressing     Problem: Pain Acute  Goal: Optimal Pain Control and Function  Outcome: Progressing     Problem: Fall Injury Risk  Goal: Absence of Fall and Fall-Related Injury  Outcome: Progressing     Problem: Cardiovascular Surgery  Goal: Improved Activity Tolerance  Outcome: Progressing  Goal: Optimal Coping with Heart Surgery  Outcome: Progressing  Goal: Absence of Bleeding  Outcome: Progressing  Goal: Effective Bowel Elimination  Outcome: Progressing  Goal: Effective Cardiac Function  Outcome: Progressing  Goal: Optimal Cerebral Tissue Perfusion  Outcome: Progressing  Goal: Fluid and Electrolyte Balance  Outcome: Progressing  Goal: Blood Glucose Level Within Targeted Range  Outcome: Progressing  Goal: Absence of Infection Signs and Symptoms  Outcome: Progressing  Goal: Anesthesia/Sedation Recovery  Outcome: Progressing  Goal: Acceptable Pain Control  Outcome: Progressing  Goal: Nausea and Vomiting Relief  Outcome: Progressing  Goal: Effective Urinary Elimination  Outcome: Progressing  Goal: Effective Oxygenation and Ventilation  Outcome: Progressing

## 2024-05-16 NOTE — PROGRESS NOTES
Subjective:      Patient ID: Jake Hoskins is a 59 y.o. female.    Chief Complaint: Abnormal Lab (Pt sent by Dr. Hernandez for large mass on mitral valve. Pt states she feels fine, denies chest pain and SOB. )    HPI:  59 year old female patient with HTN and morbid obesity was evaluated for a syncopal attack 2 weeks ago.  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  No more syncopal attacks since the last week does not have any major cardiac symptoms like chest pain or dyspnea or pedal edema.  No current facility-administered medications on file prior to encounter.   Date of Procedure: 5/13/2024      Procedure: Procedure(s) (LRB):  RESECTION, MYXOMA, CARDIAC ATRIUM (N/A)  BLOCK, NERVE, INTERCOSTAL, 2 OR MORE (N/A)  ECHOCARDIOGRAM,TRANSESOPHAGEAL (N/A)     Surgeons and Role:     * Chester Sanchez MD - Primary     Assisting Surgeon: None     Pre-Operative Diagnosis: Near syncope [R55]  Hypertension  Obesity  Sleep apnea  Hypercholesterolemia  Supraventricular tachycardia    The patient was transferred to the ICU intubated she woke up neurologically intact on minimal dose of epinephrine stable overnight chest tube output is decreasing clear urine in the Hess catheter pain well controlled overnight.    05/15/2024 the patient is sitting up in a chair her chest tubes were removed yesterday she had a good night sleep hemodynamically stable pain well controlled nausea is getting better has not had a bowel movement.    05/16/2024 patient had a quiet night pain issues settling  on 2 L nasal cannula   Appetite is back. Has not had a bowel movement yet.      Review of patient's allergies indicates:   Allergen Reactions    Tramadol Nausea And Vomiting       Past Medical History:   Diagnosis Date    Allergy     Anemia     Hypertension     Plantar fasciitis of left foot        Family History   Problem  Relation Name Age of Onset    Hypertension Mother      Asthma Mother      Cancer Mother          unknown    Hypertension Father      Heart disease Father      Hypertension Sister         Social History     Socioeconomic History    Marital status:     Number of children: 2   Occupational History    Occupation: [employer]     Employer: [employer]   Tobacco Use    Smoking status: Never    Smokeless tobacco: Never   Substance and Sexual Activity    Alcohol use: No    Drug use: No    Sexual activity: Not Currently     Birth control/protection: Surgical     Social Determinants of Health     Financial Resource Strain: Patient Declined (5/9/2024)    Overall Financial Resource Strain (CARDIA)     Difficulty of Paying Living Expenses: Patient declined   Food Insecurity: Patient Declined (5/9/2024)    Hunger Vital Sign     Worried About Running Out of Food in the Last Year: Patient declined     Ran Out of Food in the Last Year: Patient declined   Transportation Needs: Patient Declined (5/9/2024)    TRANSPORTATION NEEDS     Transportation : Patient declined   Physical Activity: Sufficiently Active (5/10/2023)    Exercise Vital Sign     Days of Exercise per Week: 5 days     Minutes of Exercise per Session: 150+ min   Stress: Patient Declined (5/9/2024)    Citizen of [place] of Occupational Health - Occupational Stress Questionnaire     Feeling of Stress : Patient declined   Housing Stability: Patient Declined (5/9/2024)    Housing Stability Vital Sign     Unable to Pay for Housing in the Last Year: Patient declined     Homeless in the Last Year: Patient declined       Past Surgical History:   Procedure Laterality Date    CATHETERIZATION OF BOTH LEFT AND RIGHT HEART N/A 5/10/2024    Procedure: CATHETERIZATION, HEART, BOTH LEFT AND RIGHT;  Surgeon: Alison Buchanan MD;  Location: [facility] CATH LAB;  Service: Cardiology;  Laterality: N/A;    COLONOSCOPY N/A 04/09/2021    Procedure: COLONOSCOPY;  Surgeon: Hua Elmore MD;   "Location: [facility] ENDO;  Service: Endoscopy;  Laterality: N/A;    ECHOCARDIOGRAM,TRANSESOPHAGEAL N/A 5/13/2024    Procedure: ECHOCARDIOGRAM,TRANSESOPHAGEAL;  Surgeon: Chester Sanchez MD;  Location: [facility] OR;  Service: Cardiovascular;  Laterality: N/A;    HYSTERECTOMY  05/2021    INJECTION OF ANESTHETIC AGENT AROUND MULTIPLE INTERCOSTAL NERVES N/A 5/13/2024    Procedure: BLOCK, NERVE, INTERCOSTAL, 2 OR MORE;  Surgeon: Chester Sanchez MD;  Location: [facility] OR;  Service: Cardiovascular;  Laterality: N/A;    OOPHORECTOMY  05/2021    RESECTION OF ATRIAL MYXOMA N/A 5/13/2024    Procedure: RESECTION, MYXOMA, CARDIAC ATRIUM;  Surgeon: Chester Sancehz MD;  Location: [facility] OR;  Service: Cardiovascular;  Laterality: N/A;    ROBOT-ASSISTED LAPAROSCOPIC ABDOMINAL HYSTERECTOMY USING DA ABBEY XI N/A 05/18/2021    Procedure: XI ROBOTIC HYSTERECTOMY;  Surgeon: Christa Davila MD;  Location: [facility] OR;  Service: OB/GYN;  Laterality: N/A;    ROBOT-ASSISTED LAPAROSCOPIC SALPINGO-OOPHORECTOMY USING DA ABBEY XI Bilateral 05/18/2021    Procedure: XI ROBOTIC SALPINGO-OOPHORECTOMY;  Surgeon: Christa Davila MD;  Location: [facility] OR;  Service: OB/GYN;  Laterality: Bilateral;       Review of Systems   Neurological:  Positive for syncope and light-headedness.          Objective:   BP (!) 108/56   Pulse 74   Temp 98 °F (36.7 °C)   Resp 18   Ht 5' 4" (1.626 m)   Wt 105.4 kg (232 lb 5.8 oz)   LMP 12/26/2020   SpO2 97%   Breastfeeding No   BMI 39.89 kg/m²     X-Ray Chest AP Portable  Narrative: EXAMINATION:  XR CHEST AP PORTABLE    CLINICAL HISTORY:  Benign neoplasm of heartPost-op;    COMPARISON:  May 14, 2024    FINDINGS:  EKG leads overlie the chest, which is kyphotic in position.  There are improved lung volumes.  Stable patchy left lower lobe infiltrate with adjacent effusion.  Stable right jugular central line with tip in the high right atrium.  Negative for new pulmonary opacities.  The hilar and mediastinal contours and osseous " structures are unchanged.  Impression: 1.  Overall, no significant interval change has occurred considering there are improved lung volumes on the current study    Electronically signed by: Frank Osman MD  Date:    05/15/2024  Time:    07:57         Physical Exam  Vitals reviewed.   Constitutional:       Appearance: Normal appearance. She is obese.      Comments: Sitting up in a chair comfortable   HENT:      Head: Normocephalic and atraumatic.      Mouth/Throat:      Mouth: Mucous membranes are moist.   Eyes:      Extraocular Movements: Extraocular movements intact.   Cardiovascular:      Rate and Rhythm: Normal rate and regular rhythm.      Pulses: Normal pulses.      Heart sounds:      Friction rub present.      Comments: Midline sternotomy incision stable right IJ triple-lumen 2 mediastinal chest tubes draining serosanguineous fluid.  Hess catheter draining clear urine  Pulmonary:      Effort: Pulmonary effort is normal.      Breath sounds: Rhonchi and rales present.   Abdominal:      Palpations: Abdomen is soft.   Musculoskeletal:         General: Normal range of motion.      Cervical back: Normal range of motion and neck supple.   Skin:     General: Skin is warm.      Capillary Refill: Capillary refill takes less than 2 seconds.   Neurological:      General: No focal deficit present.      Mental Status: She is alert and oriented to person, place, and time.   Psychiatric:         Mood and Affect: Mood normal.     Chest x-ray shows postsurgical changes    Assessment:     1. Mitral valve mass    2. Near syncope    3. Chest pain    4. Essential hypertension    5. Abnormal echocardiogram findings without diagnosis    6. Atrial myxoma    7. Pre-operative cardiovascular examination    8. Post-operative state          Plan   Postop day 3 left atrial myxoma excision.  Neuro awake alert pain control with oxycodone  Cardiovascular start aspirin and beta-blocker and Plavix  Respiratory aggressive pulmonary toilet out  of bed nebulizers as needed incentive spirometry  GI cardiac diet  Renal creatinine back to baseline   DVT and GI prophylaxis with Pepcid and bilateral SCDs  PTOT  Hyperglycemia on insulin sliding scale  Electrolyte replacement protocol  DC planning  today after the bowel movement.          Chester Sanchez MD  [facility]  [place]

## 2024-05-16 NOTE — SUBJECTIVE & OBJECTIVE
Interval History: pt transferred out of ICU last evening. looks and feels better, still fatigued but getting stronger, walked with PT this am. Still has no BM but passing gas. Appetite also improving. Getting IV lasix and leg swelling also decreased. WBC down to 11, H/h 9/28, electrolytes normal. Making good progress overall. May be d/rich home soon. Will start triple Vitamins and give Inj b12 IM.    Review of Systems   Constitutional:  Positive for activity change, appetite change and fatigue.   HENT: Negative.     Eyes: Negative.  Negative for visual disturbance.   Respiratory: Negative.  Negative for cough, shortness of breath and wheezing.    Cardiovascular: Negative.    Gastrointestinal:  Positive for constipation.   Endocrine: Negative.    Genitourinary: Negative.    Musculoskeletal:  Negative for arthralgias and gait problem.   Skin: Negative.    Allergic/Immunologic: Negative.    Neurological:  Positive for weakness.   Hematological: Negative.    Psychiatric/Behavioral: Negative.       Objective:     Vital Signs (Most Recent):  Temp: 98 °F (36.7 °C) (05/16/24 0728)  Pulse: 79 (05/16/24 0858)  Resp: 18 (05/16/24 0729)  BP: (!) 108/56 (05/16/24 0728)  SpO2: 97 % (05/16/24 0729) Vital Signs (24h Range):  Temp:  [98 °F (36.7 °C)-99.2 °F (37.3 °C)] 98 °F (36.7 °C)  Pulse:  [61-79] 79  Resp:  [16-20] 18  SpO2:  [92 %-100 %] 97 %  BP: ()/(50-62) 108/56     Weight: 105.4 kg (232 lb 5.8 oz)  Body mass index is 39.89 kg/m².    Intake/Output Summary (Last 24 hours) at 5/16/2024 1120  Last data filed at 5/16/2024 1036  Gross per 24 hour   Intake 240 ml   Output 1 ml   Net 239 ml         Physical Exam  Vitals and nursing note reviewed.   Constitutional:       General: She is not in acute distress.     Appearance: She is obese. She is not toxic-appearing.   HENT:      Head: Normocephalic and atraumatic.      Mouth/Throat:      Mouth: Mucous membranes are moist.      Pharynx: Oropharynx is clear.   Eyes:       Extraocular Movements: Extraocular movements intact.      Conjunctiva/sclera: Conjunctivae normal.   Cardiovascular:      Rate and Rhythm: Normal rate and regular rhythm.      Pulses: Normal pulses.      Heart sounds: Normal heart sounds.   Pulmonary:      Effort: Pulmonary effort is normal.      Breath sounds: Normal breath sounds.      Comments: Decreased inspiratory effort. Diminished in bases, otherwise clear. NC  Abdominal:      General: Bowel sounds are normal.      Palpations: Abdomen is soft.   Musculoskeletal:         General: No swelling, tenderness or deformity. Normal range of motion.      Cervical back: Normal range of motion and neck supple.      Right lower leg: Edema present.      Left lower leg: Edema present.   Skin:     General: Skin is warm and dry.   Neurological:      General: No focal deficit present.      Mental Status: She is alert and oriented to person, place, and time.   Psychiatric:      Comments: Dysphoric mood, flat affect             Significant Labs: All pertinent labs within the past 24 hours have been reviewed.  BMP:   Recent Labs   Lab 05/16/24  0505 05/16/24  0506   GLU 91  --      --    K 4.2  --      --    CO2 25  --    BUN 24*  --    CREATININE 0.7  --    CALCIUM 9.5  --    MG  --  2.2     CBC:   Recent Labs   Lab 05/15/24  0502 05/16/24  0505   WBC 14.77* 11.40   HGB 8.9* 9.3*   HCT 28.4* 30.7*    264     CMP:   Recent Labs   Lab 05/14/24  1718 05/15/24  0502 05/16/24  0505    136 140   K 4.7 4.7 4.2    106 103   CO2 26 25 25   * 122* 91   BUN 14 17 24*   CREATININE 0.9 0.8 0.7   CALCIUM 9.0 9.4 9.5   PROT  --  6.1 6.6   ALBUMIN  --  3.6 3.5   BILITOT  --  0.6 0.5   ALKPHOS  --  36* 44*   AST  --  21 19   ALT  --  9* 11   ANIONGAP 5* 5* 12     Magnesium:   Recent Labs   Lab 05/15/24  0502 05/15/24  1702 05/16/24  0506   MG 2.3 2.3 2.2       Significant Imaging: I have reviewed all pertinent imaging results/findings within the past 24  hours.

## 2024-05-16 NOTE — ASSESSMENT & PLAN NOTE
-Likely a myxoma, Obstructive physiology  -cardiology planning R/left heart catheterization on 05/10/2024   -CT surgery planning intervention on 05/13/2024    POD 3 s/p Myxoma removal

## 2024-05-16 NOTE — PROGRESS NOTES
[facility] - Tele[facility] ([facility])  Cardiology  Progress Note    Patient Name: Jake Hoskins  MRN: [id number]  Admission Date: 5/9/2024  Hospital Length of Stay: 7 days  Code Status: Full Code   Attending Physician: Chester Sanchez MD   Primary Care Physician: Angelica Lagunas MD  Expected Discharge Date:   Principal Problem:Mitral valve mass    Subjective:     Hospital Course:   Ms. Hoskins is a 59 year old female patient whose current medical conditions include HTN and morbid obesity who was sent to [facility] from cardiology clinic due to abnormal echo. Patient had previously seen Dr. Buchanan due to syncopal episode two weeks ago and echo was ordered for further evaluation. 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 Cardiology consulted to assist with management. Patient seen and examined today in ED. Stable CV wise. No CP or SOB. No recurrent syncope. No s/s of TIA/CVA. CT surgery consulted. R/[facility] planned tmw.     5/10/24-Patient seen and examined today, resting in bed. Stable. No AEON. No CV complaints. Labs stable. R/[facility] today. CTS on board.    05/13/24 saw pt in the icu after removal of LE myxoma. S/p intubated. Chest tube in the place. Off epicardial pacer. Wean off the pressor. VSS.     05/14/24 extubated, feels ok. The lab reviewed and VSS.     5/15/24-Patient seen and examined today, resting in bed. Feels ok. Admits to fatigue/incisional soreness. Remains in SR. Labs reviewed. H/H 8.9/28.4.    5/16/24-Patient seen and examined today. No AEON. Stable CV wise. Labs reviewed. Working with PT/OT.        Review of Systems   Constitutional: Positive for malaise/fatigue.   HENT: Negative.     Eyes: Negative.    Cardiovascular:  Positive for chest pain (incisional).   Respiratory: Negative.     Endocrine: Negative.    Hematologic/Lymphatic: Negative.    Skin: Negative.    Musculoskeletal: Negative.    Gastrointestinal: Negative.    Genitourinary: Negative.    Neurological: Negative.     Psychiatric/Behavioral: Negative.     Allergic/Immunologic: Negative.      Objective:     Vital Signs (Most Recent):  Temp: 98.7 °F (37.1 °C) (05/16/24 1226)  Pulse: 70 (05/16/24 1226)  Resp: 16 (05/16/24 1226)  BP: 116/63 (05/16/24 1226)  SpO2: 98 % (05/16/24 1226) Vital Signs (24h Range):  Temp:  [98 °F (36.7 °C)-99.2 °F (37.3 °C)] 98.7 °F (37.1 °C)  Pulse:  [61-79] 70  Resp:  [16-20] 16  SpO2:  [94 %-99 %] 98 %  BP: ()/(50-63) 116/63     Weight: 105.4 kg (232 lb 5.8 oz)  Body mass index is 39.89 kg/m².     SpO2: 98 %         Intake/Output Summary (Last 24 hours) at 5/16/2024 1405  Last data filed at 5/16/2024 1036  Gross per 24 hour   Intake 240 ml   Output 1 ml   Net 239 ml       Lines/Drains/Airways       Peripheral Intravenous Line  Duration                  Peripheral IV - Single Lumen 05/13/24  16 G Left Antecubital 3 days         Peripheral IV - Single Lumen 05/14/24 0000 20 G Right Antecubital 2 days                       Physical Exam  Vitals and nursing note reviewed.   Constitutional:       General: She is not in acute distress.     Appearance: Normal appearance. She is well-developed. She is obese. She is not diaphoretic.   HENT:      Head: Normocephalic and atraumatic.   Eyes:      General:         Right eye: No discharge.         Left eye: No discharge.      Pupils: Pupils are equal, round, and reactive to light.   Cardiovascular:      Rate and Rhythm: Normal rate and regular rhythm.      Heart sounds: Normal heart sounds, S1 normal and S2 normal. No murmur heard.     Comments: Sternotomy site C/D/I  Pulmonary:      Effort: Pulmonary effort is normal.   Abdominal:      General: There is no distension.   Musculoskeletal:      Right lower leg: Edema present.      Left lower leg: Edema present.   Skin:     General: Skin is warm and dry.      Findings: No erythema.   Neurological:      General: No focal deficit present.      Mental Status: She is alert and oriented to person, place, and time.  "  Psychiatric:         Mood and Affect: Mood normal.         Behavior: Behavior normal.            Significant Labs: CMP   Recent Labs   Lab 05/14/24  1718 05/15/24  0502 05/16/24  0505    136 140   K 4.7 4.7 4.2    106 103   CO2 26 25 25   * 122* 91   BUN 14 17 24*   CREATININE 0.9 0.8 0.7   CALCIUM 9.0 9.4 9.5   PROT  --  6.1 6.6   ALBUMIN  --  3.6 3.5   BILITOT  --  0.6 0.5   ALKPHOS  --  36* 44*   AST  --  21 19   ALT  --  9* 11   ANIONGAP 5* 5* 12   , CBC   Recent Labs   Lab 05/15/24  0502 05/16/24  0505   WBC 14.77* 11.40   HGB 8.9* 9.3*   HCT 28.4* 30.7*    264   , Troponin No results for input(s): "TROPONINI" in the last 48 hours., and All pertinent lab results from the last 24 hours have been reviewed.    Significant Imaging: Echocardiogram: Transthoracic echo (TTE) complete (Cupid Only):   Results for orders placed or performed during the hospital encounter of 05/09/24   Echo   Result Value Ref Range    BSA 2.15 m2    LVOT stroke volume 76.30 cm3    LVIDd 4.97 3.5 - 6.0 cm    LV Systolic Volume 41.28 mL    LV Systolic Volume Index 20.0 mL/m2    LVIDs 3.21 2.1 - 4.0 cm    LV Diastolic Volume 116.46 mL    LV Diastolic Volume Index 56.53 mL/m2    IVS 0.86 0.6 - 1.1 cm    LVOT diameter 2.00 cm    LVOT area 3.1 cm2    FS 35 28 - 44 %    Left Ventricle Relative Wall Thickness 0.43 cm    Posterior Wall 1.06 0.6 - 1.1 cm    LV mass 170.59 g    LV Mass Index 83 g/m2    MV Peak E Talon 1.03 m/s    TDI LATERAL 0.08 m/s    TDI SEPTAL 0.06 m/s    E/E' ratio 14.71 m/s    MV Peak A Talon 1.49 m/s    TR Max Talon 2.60 m/s    E/A ratio 0.69     IVRT 91.34 msec    E wave deceleration time 340.08 msec    LV SEPTAL E/E' RATIO 17.17 m/s    LV LATERAL E/E' RATIO 12.88 m/s    PV Peak S Talon 0.56 m/s    PV Peak D Talon 0.40 m/s    Pulm vein S/D ratio 1.40     LVOT peak talon 1.11 m/s    Left Ventricular Outflow Tract Mean Velocity 0.81 cm/s    Left Ventricular Outflow Tract Mean Gradient 2.88 mmHg    RVDD 4.52 cm "    RVOT peak VTI 21.3 cm    TAPSE 2.02 cm    RV/LV Ratio 0.91 cm    LA size 4.14 cm    Left Atrium Minor Axis 6.40 cm    Left Atrium Major Axis 5.65 cm    LA volume (mod) 87.26 cm3    LA Volume Index (Mod) 42.4 mL/m2    RA Major Axis 5.80 cm    RA Width 4.46 cm    AV mean gradient 4 mmHg    AV peak gradient 7 mmHg    Ao peak edmund 1.31 m/s    Ao VTI 28.60 cm    LVOT peak VTI 24.30 cm    AV valve area 2.67 cm²    AV Velocity Ratio 0.85     AV index (prosthetic) 0.85     BÁRBARA by Velocity Ratio 2.66 cm²    MV stenosis pressure 1/2 time 98.62 ms    MV valve area p 1/2 method 2.23 cm2    Triscuspid Valve Regurgitation Peak Gradient 27 mmHg    PV mean gradient 2 mmHg    PV PEAK VELOCITY 0.95 m/s    PV peak gradient 4 mmHg    Pulmonary Valve Mean Velocity 0.68 m/s    RVOT peak edmund 0.77 m/s    Ao root annulus 2.70 cm    Sinus 2.68 cm    STJ 2.73 cm    Ascending aorta 3.17 cm    IVC diameter 1.26 cm    Mean e' 0.07 m/s    ZLVIDS -1.35     ZLVIDD -2.24     LA Volume Index 49.2 mL/m2    LA volume 101.38 cm3    LA WIDTH 4.8 cm    TV resting pulmonary artery pressure 30 mmHg    RV TB RVSP 6 mmHg    Est. RA pres 3 mmHg    Narrative      Left Ventricle: The left ventricle is normal in size. Normal wall   thickness. There is concentric remodeling. There is normal systolic   function with a visually estimated ejection fraction of 60 - 65%. There is   diastolic dysfunction.    Right Ventricle: Normal right ventricular cavity size. Wall thickness   is normal. Systolic function is normal.    Left Atrium: Left atrium is severely dilated. There is a 3.4 cm x 2.6   cm fixed irregular mass that appears to be myxoma attached to anterior   mitral valve leaflet.    Mitral Valve: There is mild regurgitation.    Pulmonary Artery: The estimated pulmonary artery systolic pressure is   30 mmHg.    IVC/SVC: Normal venous pressure at 3 mmHg.    There is a 3.4 cm x 2.6 cm fixed irregular mass that appears to be   myxoma attached to anterior mitral valve  leaflet.     , EKG: Reviewed, and X-Ray: CXR: X-Ray Chest 1 View (CXR): No results found for this visit on 05/09/24. and X-Ray Chest PA and Lateral (CXR): No results found for this visit on 05/09/24.  Assessment and Plan:   Patient recovering well s/p myxoma resection. Stable CV wise. Continue current post-op care and mgmt as per CTS.    * Mitral valve mass  -TTE with large mitral mass, concerning for myxoma  -CTS consulted  -R/LHC planned tmw    5/10/24  -Stable  -R/LHC today    05/13/24  -s/p removal of LA myxoma  -continue ICU supportive care    05/14/24  Stable s/p LA myxoma removal  Continue icu supportive care    5/15/24  -Remains stable CV wise  -Continue current post-op care and mgmt as per CTS    5/16/24  -No AEON, stable CV wise  -Continue current post-op care and mgmt as per CTS  -Continue PT/OT    BMI 39.0-39.9,adult  -Weight loss    Rheumatoid arthritis involving multiple sites with positive rheumatoid factor  -Mgmt as per primary team    Essential hypertension  -Continue home meds        VTE Risk Mitigation (From admission, onward)           Ordered     Reason for No Pharmacological VTE Prophylaxis  Once        Question:  Reasons:  Answer:  Physician Provided (leave comment)  Comment:  possible surgical intervention    05/09/24 1216     IP VTE HIGH RISK PATIENT  Once         05/09/24 1216     Place sequential compression device  Until discontinued         05/09/24 1216                    Aurea Mchugh PA-C  Cardiology  [facility] - Telemetry ([facility])

## 2024-05-16 NOTE — PLAN OF CARE
A250/A250 DIANEIsaura Hoskins is a 59 y.o.female admitted on 5/9/2024 for Mitral valve mass   Code Status: Full Code MRN: [id number]   Review of patient's allergies indicates:   Allergen Reactions    Tramadol Nausea And Vomiting     Past Medical History:   Diagnosis Date    Allergy     Anemia     Hypertension     Plantar fasciitis of left foot       PRN meds    acetaminophen, 650 mg, Q6H PRN  albumin human 5%, 25 g, PRN  bisacodyL, 10 mg, Daily PRN  dextrose 10%, 12.5 g, PRN  dextrose 10%, 25 g, PRN  glucagon (human recombinant), 1 mg, PRN  glucose, 16 g, PRN  glucose, 24 g, PRN  hydrALAZINE, 10 mg, Q6H PRN  insulin aspart U-100, 0-10 Units, QID (AC + HS) PRN  lactated ringers, 1,000 mL, PRN  magnesium sulfate IVPB, 4 g, PRN  melatonin, 6 mg, Nightly PRN  metoclopramide, 5 mg, Q6H PRN  naloxone, 0.02 mg, PRN  ondansetron, 4 mg, Q12H PRN  oxyCODONE, 10 mg, Q4H PRN  oxyCODONE, 5 mg, Q4H PRN  potassium chloride in water, 20 mEq, PRN  potassium chloride in water, 60 mEq, PRN  potassium chloride in water, 40 mEq, PRN  senna, 8.6 mg, Daily PRN  sodium chloride 0.9%, 10 mL, Q12H PRN      Chart check completed. Will continue plan of care.      Orientation: oriented x 4  Eloisa Coma Scale Score: 15     Lead Monitored: Lead II Rhythm: normal sinus rhythm    Cardiac/Telemetry Box Number: [id number]  VTE Required Core Measure: Pharmacological prophylaxis initiated/maintained Last Bowel Movement: 05/13/24  Diet Cardiac Standard Tray  Voiding Characteristics: urethral catheter (bladder)  Polo Score: 20  Fall Risk Score: 10  Accucheck []   Freq?      Lines/Drains/Airways       Peripheral Intravenous Line  Duration                  Peripheral IV - Single Lumen 05/13/24  16 G Left Antecubital 3 days         Peripheral IV - Single Lumen 05/14/24 0000 20 G Right Antecubital 2 days

## 2024-05-16 NOTE — PROGRESS NOTES
[facility] Medicine  Progress Note    Patient Name: Jake Hoskins  MRN: [id number]  Patient Class: IP- Inpatient   Admission Date: 5/9/2024  Length of Stay: 7 days  Attending Physician: Chester Sanchez MD  Primary Care Provider: Angelica Lagunas MD        Subjective:     Principal Problem:Mitral valve mass        HPI:  59yr old with seropositive RA, HTN, Anemia on chronic immunosuppression with MTX and prednisone (Not taking it) presents after a syncopal episode 2 weeks ago. She had a similar episode 10 years ago and required CPR by EMS. She had stress test and other work up but not an echo.  At this time she has uncontrolled BP but no symptoms. Her  had CABG and developed a fib requiring amiodarone which then resulted in ILD and his demise. She is anxious about surgery. Her sister who weighs half as much is at the bedside. I looked at the echo and explained to them the significance of the findings and need for surgery input.    Overview/Hospital Course:  Patient is sent from Cardiology office for a large mass on the mitral valve.  Patient is planned for a right/left heart catheterization today with Dr. Buchanan.  Appreciate CT surgery assistance as well.  Plan for OR with Dr. Sanchez on 05/13/2024.    5/16- pt transferred out of ICU last evening. looks and feels better, still fatigued but getting stronger, walked with PT this am. Still has no BM but passing gas. Appetite also improving. Getting IV lasix and leg swelling also decreased. WBC down to 11, H/H 9.3/3, electrolytes normal. Making good progress overall. May be d/rich home soon. Will start triple Vitamins and give Inj b12 IM.    Interval History: pt transferred out of ICU last evening. looks and feels better, still fatigued but getting stronger, walked with PT this am. Still has no BM but passing gas. Appetite also improving. Getting IV lasix and leg swelling also decreased. WBC down to 11, H/h 9/28, electrolytes  normal. Making good progress overall. May be d/rich home soon. Will start triple Vitamins and give Inj b12 IM.    Review of Systems   Constitutional:  Positive for activity change, appetite change and fatigue.   HENT: Negative.     Eyes: Negative.  Negative for visual disturbance.   Respiratory: Negative.  Negative for cough, shortness of breath and wheezing.    Cardiovascular: Negative.    Gastrointestinal:  Positive for constipation.   Endocrine: Negative.    Genitourinary: Negative.    Musculoskeletal:  Negative for arthralgias and gait problem.   Skin: Negative.    Allergic/Immunologic: Negative.    Neurological:  Positive for weakness.   Hematological: Negative.    Psychiatric/Behavioral: Negative.       Objective:     Vital Signs (Most Recent):  Temp: 98 °F (36.7 °C) (05/16/24 0728)  Pulse: 79 (05/16/24 0858)  Resp: 18 (05/16/24 0729)  BP: (!) 108/56 (05/16/24 0728)  SpO2: 97 % (05/16/24 0729) Vital Signs (24h Range):  Temp:  [98 °F (36.7 °C)-99.2 °F (37.3 °C)] 98 °F (36.7 °C)  Pulse:  [61-79] 79  Resp:  [16-20] 18  SpO2:  [92 %-100 %] 97 %  BP: ()/(50-62) 108/56     Weight: 105.4 kg (232 lb 5.8 oz)  Body mass index is 39.89 kg/m².    Intake/Output Summary (Last 24 hours) at 5/16/2024 1120  Last data filed at 5/16/2024 1036  Gross per 24 hour   Intake 240 ml   Output 1 ml   Net 239 ml         Physical Exam  Vitals and nursing note reviewed.   Constitutional:       General: She is not in acute distress.     Appearance: She is obese. She is not toxic-appearing.   HENT:      Head: Normocephalic and atraumatic.      Mouth/Throat:      Mouth: Mucous membranes are moist.      Pharynx: Oropharynx is clear.   Eyes:      Extraocular Movements: Extraocular movements intact.      Conjunctiva/sclera: Conjunctivae normal.   Cardiovascular:      Rate and Rhythm: Normal rate and regular rhythm.      Pulses: Normal pulses.      Heart sounds: Normal heart sounds.   Pulmonary:      Effort: Pulmonary effort is normal.       Breath sounds: Normal breath sounds.      Comments: Decreased inspiratory effort. Diminished in bases, otherwise clear. NC  Abdominal:      General: Bowel sounds are normal.      Palpations: Abdomen is soft.   Musculoskeletal:         General: No swelling, tenderness or deformity. Normal range of motion.      Cervical back: Normal range of motion and neck supple.      Right lower leg: Edema present.      Left lower leg: Edema present.   Skin:     General: Skin is warm and dry.   Neurological:      General: No focal deficit present.      Mental Status: She is alert and oriented to person, place, and time.   Psychiatric:      Comments: Dysphoric mood, flat affect             Significant Labs: All pertinent labs within the past 24 hours have been reviewed.  BMP:   Recent Labs   Lab 05/16/24  0505 05/16/24  0506   GLU 91  --      --    K 4.2  --      --    CO2 25  --    BUN 24*  --    CREATININE 0.7  --    CALCIUM 9.5  --    MG  --  2.2     CBC:   Recent Labs   Lab 05/15/24  0502 05/16/24  0505   WBC 14.77* 11.40   HGB 8.9* 9.3*   HCT 28.4* 30.7*    264     CMP:   Recent Labs   Lab 05/14/24  1718 05/15/24  0502 05/16/24  0505    136 140   K 4.7 4.7 4.2    106 103   CO2 26 25 25   * 122* 91   BUN 14 17 24*   CREATININE 0.9 0.8 0.7   CALCIUM 9.0 9.4 9.5   PROT  --  6.1 6.6   ALBUMIN  --  3.6 3.5   BILITOT  --  0.6 0.5   ALKPHOS  --  36* 44*   AST  --  21 19   ALT  --  9* 11   ANIONGAP 5* 5* 12     Magnesium:   Recent Labs   Lab 05/15/24  0502 05/15/24  1702 05/16/24  0506   MG 2.3 2.3 2.2       Significant Imaging: I have reviewed all pertinent imaging results/findings within the past 24 hours.    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   -hold immunosuppression until cleared by surgery.    stable    Essential hypertension  Chronic, uncontrolled. Latest blood pressure and vitals reviewed-     Temp:  [98 °F (36.7 °C)-99.2 °F (37.3 °C)]   Pulse:  [61-79]   Resp:  [16-20]   BP: ()/(50-62)   SpO2:  [92 %-100 %] .   Home meds for hypertension were reviewed and noted below.   Hypertension Medications               losartan (COZAAR) 25 MG tablet Take 1 tablet (25 mg total) by mouth once daily.    olmesartan (BENICAR) 20 MG tablet Take 20 mg by mouth.            While in the hospital, will manage blood pressure as follows; Adjust home antihypertensive regimen as follows- See orders    Will utilize p.r.n. blood pressure medication only if patient's blood pressure greater than 140/90 and she develops symptoms such as worsening chest pain or shortness of breath.      VTE Risk Mitigation (From admission, onward)           Ordered     Reason for No Pharmacological VTE Prophylaxis  Once        Question:  Reasons:  Answer:  Physician Provided (leave comment)  Comment:  possible surgical intervention    05/09/24 1216     IP VTE HIGH RISK PATIENT  Once         05/09/24 1216     Place sequential compression device  Until discontinued         05/09/24 1216                    Discharge Planning   MITCHELL:      Code Status: Full Code   Is the patient medically ready for discharge?:     Reason for patient still in hospital (select all that apply): Patient trending condition, Laboratory test, Treatment, Imaging, Consult recommendations, PT / OT recommendations, and Pending disposition  Discharge Plan A: Home Health          Vannesa Raygoza MD  Department of Hospital Medicine   '[place] - Telemetry ([facility])

## 2024-05-17 ENCOUNTER — PATIENT MESSAGE (OUTPATIENT)
Dept: RHEUMATOLOGY | Facility: CLINIC | Age: 59
End: 2024-05-17
Payer: COMMERCIAL

## 2024-05-17 VITALS
RESPIRATION RATE: 18 BRPM | BODY MASS INDEX: 39.67 KG/M2 | SYSTOLIC BLOOD PRESSURE: 110 MMHG | HEART RATE: 76 BPM | OXYGEN SATURATION: 98 % | DIASTOLIC BLOOD PRESSURE: 58 MMHG | TEMPERATURE: 99 F | HEIGHT: 64 IN | WEIGHT: 232.38 LBS

## 2024-05-17 PROBLEM — R93.1 ABNORMAL ECHOCARDIOGRAM FINDINGS WITHOUT DIAGNOSIS: Status: ACTIVE | Noted: 2024-05-17

## 2024-05-17 PROBLEM — D15.1 ATRIAL MYXOMA: Status: ACTIVE | Noted: 2024-05-17

## 2024-05-17 LAB — MAGNESIUM SERPL-MCNC: 2 MG/DL (ref 1.6–2.6)

## 2024-05-17 PROCEDURE — 25000003 PHARM REV CODE 250: Performed by: FAMILY MEDICINE

## 2024-05-17 PROCEDURE — 99900035 HC TECH TIME PER 15 MIN (STAT)

## 2024-05-17 PROCEDURE — 97530 THERAPEUTIC ACTIVITIES: CPT

## 2024-05-17 PROCEDURE — 94761 N-INVAS EAR/PLS OXIMETRY MLT: CPT

## 2024-05-17 PROCEDURE — 63600175 PHARM REV CODE 636 W HCPCS: Performed by: EMERGENCY MEDICINE

## 2024-05-17 PROCEDURE — 36415 COLL VENOUS BLD VENIPUNCTURE: CPT | Performed by: THORACIC SURGERY (CARDIOTHORACIC VASCULAR SURGERY)

## 2024-05-17 PROCEDURE — 83735 ASSAY OF MAGNESIUM: CPT | Performed by: THORACIC SURGERY (CARDIOTHORACIC VASCULAR SURGERY)

## 2024-05-17 PROCEDURE — 25000003 PHARM REV CODE 250: Performed by: THORACIC SURGERY (CARDIOTHORACIC VASCULAR SURGERY)

## 2024-05-17 PROCEDURE — 25000003 PHARM REV CODE 250: Performed by: EMERGENCY MEDICINE

## 2024-05-17 PROCEDURE — 97116 GAIT TRAINING THERAPY: CPT

## 2024-05-17 PROCEDURE — 94799 UNLISTED PULMONARY SVC/PX: CPT

## 2024-05-17 RX ORDER — METOPROLOL SUCCINATE 25 MG/1
25 TABLET, EXTENDED RELEASE ORAL DAILY
Qty: 90 TABLET | Refills: 3 | Status: SHIPPED | OUTPATIENT
Start: 2024-05-17 | End: 2024-06-18

## 2024-05-17 RX ORDER — HYDROCODONE BITARTRATE AND ACETAMINOPHEN 5; 325 MG/1; MG/1
1 TABLET ORAL EVERY 6 HOURS PRN
Qty: 10 TABLET | Refills: 0 | Status: SHIPPED | OUTPATIENT
Start: 2024-05-17 | End: 2024-05-23 | Stop reason: SDUPTHER

## 2024-05-17 RX ORDER — ASPIRIN 81 MG/1
81 TABLET ORAL DAILY
Qty: 90 TABLET | Refills: 3 | Status: SHIPPED | OUTPATIENT
Start: 2024-05-17 | End: 2025-05-17

## 2024-05-17 RX ADMIN — CHLORHEXIDINE GLUCONATE 0.12% ORAL RINSE 10 ML: 1.2 LIQUID ORAL at 09:05

## 2024-05-17 RX ADMIN — Medication 1 TABLET: at 09:05

## 2024-05-17 RX ADMIN — Medication 100 MG: at 09:05

## 2024-05-17 RX ADMIN — OXYCODONE HYDROCHLORIDE 5 MG: 5 TABLET ORAL at 08:05

## 2024-05-17 RX ADMIN — POTASSIUM CHLORIDE 20 MEQ: 1500 TABLET, EXTENDED RELEASE ORAL at 09:05

## 2024-05-17 RX ADMIN — DOCUSATE SODIUM 100 MG: 100 CAPSULE, LIQUID FILLED ORAL at 09:05

## 2024-05-17 RX ADMIN — FAMOTIDINE 20 MG: 20 TABLET ORAL at 09:05

## 2024-05-17 RX ADMIN — CLOPIDOGREL BISULFATE 75 MG: 75 TABLET ORAL at 09:05

## 2024-05-17 RX ADMIN — ACETAMINOPHEN 650 MG: 325 TABLET ORAL at 06:05

## 2024-05-17 RX ADMIN — FERROUS SULFATE TAB 325 MG (65 MG ELEMENTAL FE) 1 EACH: 325 (65 FE) TAB at 09:05

## 2024-05-17 RX ADMIN — MONTELUKAST 10 MG: 10 TABLET, FILM COATED ORAL at 09:05

## 2024-05-17 RX ADMIN — MAGNESIUM HYDROXIDE 2400 MG: 400 SUSPENSION ORAL at 09:05

## 2024-05-17 RX ADMIN — CYANOCOBALAMIN 1000 MCG: 1000 INJECTION INTRAMUSCULAR; SUBCUTANEOUS at 09:05

## 2024-05-17 RX ADMIN — ACETAMINOPHEN 650 MG: 325 TABLET ORAL at 12:05

## 2024-05-17 RX ADMIN — FOLIC ACID 1 MG: 1 TABLET ORAL at 09:05

## 2024-05-17 RX ADMIN — THERA TABS 1 TABLET: TAB at 09:05

## 2024-05-17 RX ADMIN — OXYCODONE HYDROCHLORIDE AND ACETAMINOPHEN 500 MG: 500 TABLET ORAL at 09:05

## 2024-05-17 RX ADMIN — OXYCODONE HYDROCHLORIDE 5 MG: 5 TABLET ORAL at 02:05

## 2024-05-17 RX ADMIN — ASPIRIN 81 MG: 81 TABLET, COATED ORAL at 09:05

## 2024-05-17 NOTE — PT/OT/SLP PROGRESS
"Occupational Therapy   Treatment    Name: Jake Hoskins  MRN: [id number]  Admitting Diagnosis:  Mitral valve mass  4 Days Post-Op    Recommendations:     Discharge Recommendations: Low Intensity Therapy (24/7 SPV and A)  Discharge Equipment Recommendations:  bath bench, walker, rolling  Barriers to discharge:  None    Assessment:     Jake Hoskins is a 59 y.o. female with a medical diagnosis of Mitral valve mass.  She presents with the following performance deficits affecting function are weakness, impaired endurance, impaired self care skills, impaired functional mobility, impaired balance, impaired cardiopulmonary response to activity (sternal precautions).     Rehab Prognosis:  Good; patient would benefit from acute skilled OT services to address these deficits and reach maximum level of function.       Tolerated session well. Oxygen stable throughout. Improvements in BP with activity, nurse informed.    Plan:     Patient to be seen 2 x/week to address the above listed problems via self-care/home management, therapeutic activities, therapeutic exercises  Plan of Care Expires: 05/28/24  Plan of Care Reviewed with: patient, daughter    Subjective     Chief Complaint: Reported "I am doing good today."  Patient/Family Comments/goals: increase independence  Pain/Comfort:  Pain Rating 1: 0/10    Objective:     Communicated with: NurseEryn, prior to session.  Patient found up in chair with peripheral IV, oxygen, telemetry upon OT entry to room.    General Precautions: Standard, fall, sternal    Orthopedic Precautions:N/A  Braces: N/A  Respiratory Status: Room air. O2 sats 94% or > throughout session     Occupational Performance:     Functional Mobility/Transfers:  Patient completed Sit <> Stand Transfer with supervision  with  rolling walker   Patient completed Bed <> Chair Transfer using Step Transfer technique with supervision with rolling walker  Functional Mobility: Patient completed x420ft functional " mobility with RW and SBA to increase dynamic standing balance and activity tolerance needed for ADL completion.  Cueing for sternal precaution compliance  Education on activity pacing and energy conservation  Initially reporting dizziness that improved over course of session     Activities of Daily Living:  Upper Body Dressing: minimum assistance donning gown as tigist    [facility] 6 Click ADL: 21    Treatment & Education:  Daughters present. Educated on sternal precautions and their functional implications. Reviewed recommendations for discharge including Q hour ambulation and active engagement in all ADLs, including seated showers. Encouraged completion of B UE AROM therex throughout the day to increase functional strength and activity tolerance needed for ADL completion. Educated on benefits of OOB activity and importance of calling for A to transfer back to bed. Patient and daughters stated understanding and in agreement with POC.    Patient left up in chair with all lines intact, call button in reach, and daughter present    GOALS:   Multidisciplinary Problems       Occupational Therapy Goals          Problem: Occupational Therapy    Goal Priority Disciplines Outcome Interventions   Occupational Therapy Goal     OT, PT/OT Progressing    Description: Goals to be met by: 5/28/24     Patient will increase functional independence with ADLs by performing:    Toileting from toilet with Stand-by Assistance for hygiene and clothing management.   Toilet transfer to toilet with Stand-by Assistance.  Demonstrates 100% functional compliance with sternal precautions.                         Time Tracking:     OT Date of Treatment: 05/17/24  OT Start Time: 0945  OT Stop Time: 1010  OT Total Time (min): 25 min    Billable Minutes:Therapeutic Activity 25    Sydni Whitney OT  5/17/2024

## 2024-05-17 NOTE — DISCHARGE SUMMARY
[facility] - Telemetry (Hospital)  Discharge Note    Procedure(s) (LRB):  RESECTION, MYXOMA, CARDIAC ATRIUM (N/A)  BLOCK, NERVE, INTERCOSTAL, 2 OR MORE (N/A)  ECHOCARDIOGRAM,TRANSESOPHAGEAL (N/A)    Chief Complaint: Abnormal Lab (Pt sent by Dr. Hernandez for large mass on mitral valve. Pt states she feels fine, denies chest pain and SOB. )     HPI:  59 year old female patient with HTN and morbid obesity was evaluated for a syncopal attack 2 weeks ago.  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  No more syncopal attacks since the last week does not have any major cardiac symptoms like chest pain or dyspnea or pedal edema.  No current facility-administered medications on file prior to encounter.   Date of Procedure: 5/13/2024      Procedure: Procedure(s) (LRB):  RESECTION, MYXOMA, CARDIAC ATRIUM (N/A)  BLOCK, NERVE, INTERCOSTAL, 2 OR MORE (N/A)  ECHOCARDIOGRAM,TRANSESOPHAGEAL (N/A)     Surgeons and Role:     * Chester Sanchez MD - Primary     Assisting Surgeon: None     Pre-Operative Diagnosis: Near syncope [R55]  Hypertension  Obesity  Sleep apnea  Hypercholesterolemia  Supraventricular tachycardia     The patient was transferred to the ICU intubated she woke up neurologically intact on minimal dose of epinephrine stable overnight chest tube output is decreasing clear urine in the Hess catheter pain well controlled overnight.     05/15/2024 the patient is sitting up in a chair her chest tubes were removed yesterday she had a good night sleep hemodynamically stable pain well controlled nausea is getting better has not had a bowel movement.     05/16/2024 patient had a quiet night pain issues settling  on 2 L nasal cannula   Appetite is back. Has not had a bowel movement yet.    05/17/2024 the patient had a bowel movement yesterday he is she is weaned off her oxygen ambulating well comfortable  "discussed about the sternal precautions ready to go  home. Given DME home use.      OUTCOME: Patient tolerated treatment/procedure well without complication and is now ready for discharge.    DISPOSITION: Home-Health Care [facility]    FINAL DIAGNOSIS:  Mitral valve mass    FOLLOWUP: In clinic    DISCHARGE INSTRUCTIONS:    Discharge Procedure Orders   WALKER FOR HOME USE     Order Specific Question Answer Comments   Type of Walker: Adult (5'4"-6'6")    With wheels? Yes    Height: 5' 4" (1.626 m)    Weight: 105.4 kg (232 lb 5.8 oz)    Length of need (1-99 months): 99    Does patient have medical equipment at home? none    Please check all that apply: Patient is unable to safely ambulate without equipment.      Ambulatory referral/consult to Home Health   Standing Status: Future   Referral Priority: Routine Referral Type: Home Health   Referral Reason: Specialty Services Required   Requested Specialty: Home Health Services   Number of Visits Requested: 1     Ambulatory referral/consult to Cardiac Rehab   Standing Status: Future   Referral Priority: Routine Referral Type: Consultation   Referral Reason: Specialty Services Required   Requested Specialty: Cardiac Rehabilitation   Number of Visits Requested: 1     Diet Cardiac     No dressing needed     Activity as tolerated         Clinical Reference Documents Added to Patient Instructions         Document    HEART HEALTHY DIET (ENGLISH)            TIME SPENT ON DISCHARGE: 35 minutes  "

## 2024-05-17 NOTE — PLAN OF CARE
[facility] - Telemetry (Hospital)  Discharge Final Note    Primary Care Provider: Angelica Lagunas MD    Expected Discharge Date: 5/17/2024    Final Discharge Note (most recent)       Final Note - 05/17/24 0907          Final Note    Assessment Type Final Discharge Note     Anticipated Discharge Disposition Home-Health Care [facility]     Hospital Resources/Appts/Education Provided Post-Acute resouces added to AVS;Appointments scheduled and added to AVS        Post-Acute Status    Post-Acute Authorization Home Health;HME     HME Status Set-up Complete/Auth obtained     Home Health Status Set-up Complete/Auth obtained     Discharge Delays None known at this time                   Pt to discharge home today. [facility] accepting and notified of discharge today. AVS updated with agency contact information. [facility] D[facility] rep delivered rolling walker to bedside yesterday.   Hospital follow up scheduled on AVS:  Established Patient Visit with Angelica Lagunas MD  Monday May 20, 2024 8:20 AM    No additional CM needs for discharge.     Important Message from Medicare              Follow-up providers       [facility]   Specialty: Home Health Services    [address]  [facility] [address]   Phone: [phone]       Next Steps: Follow up    Instructions: Home Health              After-discharge care                Durable Medical Equipment       [facility]   Service: Durable Medical Equipment    [address] [place] [address]   Phone: [phone]                 Home Medical Care       [facility]   Service: Home Health Services    [address]  [place] [address]   Phone: [phone]

## 2024-05-17 NOTE — PT/OT/SLP PROGRESS
Physical Therapy Treatment    Patient Name:  Jake Hoskins   MRN:  [id number]    Recommendations:     Discharge Recommendations: Low Intensity Therapy  Discharge Equipment Recommendations: bath bench, walker, rolling  Barriers to discharge: None    Assessment:     Jake Hoskins is a 59 y.o. female admitted with a medical diagnosis of Mitral valve mass.  She presents with the following impairments/functional limitations: weakness, impaired endurance, impaired functional mobility, gait instability, impaired balance, decreased safety awareness, decreased lower extremity function, decreased coordination.    Rehab Prognosis: Good; patient would benefit from acute skilled PT services to address these deficits and reach maximum level of function.    Recent Surgery: Procedure(s) (LRB):  RESECTION, MYXOMA, CARDIAC ATRIUM (N/A)  BLOCK, NERVE, INTERCOSTAL, 2 OR MORE (N/A)  ECHOCARDIOGRAM,TRANSESOPHAGEAL (N/A) 4 Days Post-Op    Plan:     During this hospitalization, patient to be seen 3 x/week to address the identified rehab impairments via gait training, therapeutic activities, therapeutic exercises and progress toward the following goals:    Plan of Care Expires:  05/28/24    Subjective     Chief Complaint: NONE, EAGER TO PARTICIPATE  Patient/Family Comments/goals:   Pain/Comfort:  Pain Rating 1: 0/10      Objective:     Communicated with NURSE MTZ prior to session.  Patient found up in chair with telemetry, peripheral IV upon PT entry to room.     General Precautions: Standard, fall, sternal  Orthopedic Precautions: N/A  Braces: N/A  Respiratory Status: Room air     Functional Mobility:  Bed Mobility:     Scooting: stand by assistance  Transfers:     Sit to Stand:  supervision with no AD  Gait: PT ' WITH RW AND SBA, NO LOB OR SOB ON ROOM AIR, BP AND O2 SAT STABLE THROUGHOUT ACTIVITY  Balance: GOOD SITTING BALANCE, FAIR+ DYNAMIC BALANCE DURING GAIT    AM-PAC 6 CLICK MOBILITY  Turning over in bed (including  "adjusting bedclothes, sheets and blankets)?: 3  Sitting down on and standing up from a chair with arms (e.g., wheelchair, bedside commode, etc.): 4  Moving from lying on back to sitting on the side of the bed?: 3  Moving to and from a bed to a chair (including a wheelchair)?: 4  Need to walk in hospital room?: 4  Climbing 3-5 steps with a railing?: 1  Basic Mobility Total Score: 19     Treatment & Education:  PT EDUCATION:  - ROLE OF P.T. AND POC IN ACUTE CARE HOSPITAL SETTING  - RW USE AND SAFETY DURING TF'S AND GAIT  - REVIEW STERNAL PRECAUTIONS  - ENCOURAGED TO INCREASE TIME OOB IN CHAIR TO TOLERANCE   - TO CONTINUE THERAPUETIC EXERCISES THROUGHOUT THE DAY TO INCREASE ACTIVITY TOLERANCE AND DECREASE RISK FOR PNEUMONIA AND BLOOD CLOTS: HIP FLEX/EXT, HIP ABD/ADD, QUAD SET, HEEL SLIDE, AP  - RISK FOR FALLS DUE TO GENERALIZED WEAKNESS, EDUCATED ON "CALL DON'T FALL", ENCOURAGED TO CALL FOR ASSISTANCE WITH ALL NEEDS SUCH AS BED<>CHAIR TRANSFERS OR TRIPS TO BATHROOM, PT AGREEABLE TO SAFETY PRECAUTIONS    Patient left up in chair with all lines intact, call button in reach, chair alarm on, NURSE notified, and FAMILY present..    GOALS:   Multidisciplinary Problems       Physical Therapy Goals          Problem: Physical Therapy    Goal Priority Disciplines Outcome Goal Variances Interventions   Physical Therapy Goal     PT, PT/OT Progressing     Description: LTG'S TO BE MET IN 14 DAYS (5-28-24)  PT WILL REQUIRE SPV FOR BED MOBILITY  PT WILL REQUIRE SPV FOR BED<>CHAIR TF'S  PT WILL  FEET WITH OR WITHOUT RW AND SPV  PT WILL INC AMPAC SCORE BY 2 POINTS TO PROGRESS GROSS FUNC MOBILITY                         Time Tracking:     PT Received On: 05/17/24  PT Start Time: 0950     PT Stop Time: 1020  PT Total Time (min): 30 min     Billable Minutes: Gait Training 15 and Therapeutic Activity 15    Treatment Type: Treatment  PT/PTA: PT     Number of PTA visits since last PT visit: 0     05/17/2024  "

## 2024-05-17 NOTE — PLAN OF CARE
A250/A250 DIANEIsaura Hoskins is a 59 y.o.female admitted on 5/9/2024 for Mitral valve mass   Code Status: Full Code MRN: [id number]   Review of patient's allergies indicates:   Allergen Reactions    Tramadol Nausea And Vomiting     Past Medical History:   Diagnosis Date    Allergy     Anemia     Hypertension     Plantar fasciitis of left foot       PRN meds    acetaminophen, 650 mg, Q6H PRN  albumin human 5%, 25 g, PRN  bisacodyL, 10 mg, Daily PRN  dextrose 10%, 12.5 g, PRN  dextrose 10%, 25 g, PRN  glucagon (human recombinant), 1 mg, PRN  glucose, 16 g, PRN  glucose, 24 g, PRN  hydrALAZINE, 10 mg, Q6H PRN  insulin aspart U-100, 0-10 Units, QID (AC + HS) PRN  lactated ringers, 1,000 mL, PRN  magnesium sulfate IVPB, 4 g, PRN  melatonin, 6 mg, Nightly PRN  metoclopramide, 5 mg, Q6H PRN  naloxone, 0.02 mg, PRN  ondansetron, 4 mg, Q12H PRN  oxyCODONE, 10 mg, Q4H PRN  oxyCODONE, 5 mg, Q4H PRN  potassium chloride in water, 20 mEq, PRN  potassium chloride in water, 60 mEq, PRN  potassium chloride in water, 40 mEq, PRN  senna, 8.6 mg, Daily PRN  sodium chloride 0.9%, 10 mL, Q12H PRN      AVS Discharge instructions received and reviewed with pt and family at bedside.  Pt voiced understanding and all questions answered to satisfaction.  Stressed importance to making and keeping all follow up appointments.  Medications at bedside and reviewed with pt.  Tele monitor removed and brought to monitor tech.  IV d/c'd with tip intact, pressure dressing applied.  Pt will call when ready to be transported to front of hospital via w/c to be discharged home.      Orientation: oriented x 4  [place] Coma Scale Score: 15     Lead Monitored: Lead II Rhythm: normal sinus rhythm    Cardiac/Telemetry Box Number: [id number]  VTE Required Core Measure: Pharmacological prophylaxis initiated/maintained Last Bowel Movement: 05/16/24  Diet Cardiac Standard Tray  Diet Cardiac  Voiding Characteristics: voids spontaneously without difficulty  [place] Score:  20  Fall Risk Score: 8  Accucheck []   Freq?      Lines/Drains/Airways       None

## 2024-05-18 ENCOUNTER — PATIENT MESSAGE (OUTPATIENT)
Dept: CARDIOTHORACIC SURGERY | Facility: CLINIC | Age: 59
End: 2024-05-18
Payer: COMMERCIAL

## 2024-05-18 ENCOUNTER — HOSPITAL ENCOUNTER (EMERGENCY)
Facility: HOSPITAL | Age: 59
Discharge: HOME | End: 2024-05-18
Attending: EMERGENCY MEDICINE
Payer: COMMERCIAL

## 2024-05-18 VITALS
SYSTOLIC BLOOD PRESSURE: 116 MMHG | RESPIRATION RATE: 15 BRPM | TEMPERATURE: 99 F | DIASTOLIC BLOOD PRESSURE: 82 MMHG | HEART RATE: 71 BPM | OXYGEN SATURATION: 100 %

## 2024-05-18 DIAGNOSIS — Z86.018 HISTORY OF ATRIAL MYXOMA: ICD-10-CM

## 2024-05-18 DIAGNOSIS — R60.0 PEDAL EDEMA: Primary | ICD-10-CM

## 2024-05-18 DIAGNOSIS — R06.00 DYSPNEA: ICD-10-CM

## 2024-05-18 LAB
ALBUMIN SERPL BCP-MCNC: 3.5 G/DL (ref 3.5–5.2)
ALP SERPL-CCNC: 47 U/L (ref 55–135)
ALT SERPL W/O P-5'-P-CCNC: 19 U/L (ref 10–44)
ANION GAP SERPL CALC-SCNC: 11 MMOL/L (ref 8–16)
AST SERPL-CCNC: 21 U/L (ref 10–40)
BASOPHILS # BLD AUTO: 0.07 K/UL (ref 0–0.2)
BASOPHILS NFR BLD: 1 % (ref 0–1.9)
BILIRUB SERPL-MCNC: 0.4 MG/DL (ref 0.1–1)
BNP SERPL-MCNC: 280 PG/ML (ref 0–99)
BUN SERPL-MCNC: 10 MG/DL (ref 6–20)
CALCIUM SERPL-MCNC: 9.7 MG/DL (ref 8.7–10.5)
CHLORIDE SERPL-SCNC: 106 MMOL/L (ref 95–110)
CO2 SERPL-SCNC: 25 MMOL/L (ref 23–29)
CREAT SERPL-MCNC: 0.7 MG/DL (ref 0.5–1.4)
DIFFERENTIAL METHOD BLD: ABNORMAL
EOSINOPHIL # BLD AUTO: 0.2 K/UL (ref 0–0.5)
EOSINOPHIL NFR BLD: 3.2 % (ref 0–8)
ERYTHROCYTE [DISTWIDTH] IN BLOOD BY AUTOMATED COUNT: 14.9 % (ref 11.5–14.5)
EST. GFR  (NO RACE VARIABLE): >60 ML/MIN/1.73 M^2
GLUCOSE SERPL-MCNC: 117 MG/DL (ref 70–110)
HCT VFR BLD AUTO: 30.2 % (ref 37–48.5)
HGB BLD-MCNC: 9.5 G/DL (ref 12–16)
IMM GRANULOCYTES # BLD AUTO: 0.18 K/UL (ref 0–0.04)
IMM GRANULOCYTES NFR BLD AUTO: 2.6 % (ref 0–0.5)
LYMPHOCYTES # BLD AUTO: 1.3 K/UL (ref 1–4.8)
LYMPHOCYTES NFR BLD: 19.1 % (ref 18–48)
MCH RBC QN AUTO: 25.2 PG (ref 27–31)
MCHC RBC AUTO-ENTMCNC: 31.5 G/DL (ref 32–36)
MCV RBC AUTO: 80 FL (ref 82–98)
MONOCYTES # BLD AUTO: 0.7 K/UL (ref 0.3–1)
MONOCYTES NFR BLD: 10 % (ref 4–15)
NEUTROPHILS # BLD AUTO: 4.4 K/UL (ref 1.8–7.7)
NEUTROPHILS NFR BLD: 64.1 % (ref 38–73)
NRBC BLD-RTO: 0 /100 WBC
PLATELET # BLD AUTO: 366 K/UL (ref 150–450)
PLATELET BLD QL SMEAR: ABNORMAL
PMV BLD AUTO: 9.2 FL (ref 9.2–12.9)
POTASSIUM SERPL-SCNC: 3.9 MMOL/L (ref 3.5–5.1)
PROT SERPL-MCNC: 6.9 G/DL (ref 6–8.4)
RBC # BLD AUTO: 3.77 M/UL (ref 4–5.4)
SODIUM SERPL-SCNC: 142 MMOL/L (ref 136–145)
TROPONIN I SERPL DL<=0.01 NG/ML-MCNC: 0.3 NG/ML (ref 0–0.03)
TROPONIN I SERPL DL<=0.01 NG/ML-MCNC: 0.32 NG/ML (ref 0–0.03)
WBC # BLD AUTO: 6.91 K/UL (ref 3.9–12.7)

## 2024-05-18 PROCEDURE — 85025 COMPLETE CBC W/AUTO DIFF WBC: CPT | Performed by: EMERGENCY MEDICINE

## 2024-05-18 PROCEDURE — 25000003 PHARM REV CODE 250: Performed by: EMERGENCY MEDICINE

## 2024-05-18 PROCEDURE — 93010 ELECTROCARDIOGRAM REPORT: CPT | Mod: ,,, | Performed by: INTERNAL MEDICINE

## 2024-05-18 PROCEDURE — 80053 COMPREHEN METABOLIC PANEL: CPT | Performed by: EMERGENCY MEDICINE

## 2024-05-18 PROCEDURE — 99285 EMERGENCY DEPT VISIT HI MDM: CPT | Mod: 25

## 2024-05-18 PROCEDURE — 83880 ASSAY OF NATRIURETIC PEPTIDE: CPT | Performed by: EMERGENCY MEDICINE

## 2024-05-18 PROCEDURE — 63600175 PHARM REV CODE 636 W HCPCS: Performed by: EMERGENCY MEDICINE

## 2024-05-18 PROCEDURE — 84484 ASSAY OF TROPONIN QUANT: CPT | Performed by: EMERGENCY MEDICINE

## 2024-05-18 PROCEDURE — 96374 THER/PROPH/DIAG INJ IV PUSH: CPT

## 2024-05-18 PROCEDURE — 93005 ELECTROCARDIOGRAM TRACING: CPT

## 2024-05-18 RX ORDER — HYDROCODONE BITARTRATE AND ACETAMINOPHEN 5; 325 MG/1; MG/1
1 TABLET ORAL
Status: COMPLETED | OUTPATIENT
Start: 2024-05-18 | End: 2024-05-18

## 2024-05-18 RX ORDER — FUROSEMIDE 40 MG/1
40 TABLET ORAL DAILY
Qty: 14 TABLET | Refills: 0 | Status: SHIPPED | OUTPATIENT
Start: 2024-05-18 | End: 2024-05-23 | Stop reason: SDUPTHER

## 2024-05-18 RX ORDER — FUROSEMIDE 10 MG/ML
40 INJECTION INTRAMUSCULAR; INTRAVENOUS
Status: COMPLETED | OUTPATIENT
Start: 2024-05-18 | End: 2024-05-18

## 2024-05-18 RX ADMIN — FUROSEMIDE 40 MG: 10 INJECTION, SOLUTION INTRAMUSCULAR; INTRAVENOUS at 11:05

## 2024-05-18 RX ADMIN — HYDROCODONE BITARTRATE AND ACETAMINOPHEN 1 TABLET: 5; 325 TABLET ORAL at 01:05

## 2024-05-18 NOTE — ED PROVIDER NOTES
SCRIBE #1 NOTE: I, Me-Judson Guerra, am scribing for, and in the presence of, Higinio Logan MD. I have scribed the entire note.       History     Chief Complaint   Patient presents with    Leg Swelling     Bilateral leg swelling started last night. 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 Surgery done here at [facility]. + shortness of breath. Denies chest pain.      Review of patient's allergies indicates:   Allergen Reactions    Tramadol Nausea And Vomiting         History of Present Illness     HPI    5/18/2024, 11:19 AM  History obtained from the patient      History of Present Illness: Jake Hoskins is a 59 y.o. female patient with a PMHx of anemia, HTN, and plantar fasciitis of L foot who presents to the Emergency Department for evaluation of bilateral leg swelling which onset last night. Pt had an open heart surgery on 5/9/2024 at [facility] and was discharged yesterday. Today, pt states that BLE swelling is worsening in severity.  Last night, pt also states that she started coughing a lot and becoming short of breath. Pt has to sleep upright to mitigate SOB, but she comments that she always sleeps upright. Pt has had some CP since her discharge but denies any worsening CP. Symptoms are constant and moderate in severity. No other mitigating or exacerbating factors reported. Patient denies all other sxs at this time. Pt was given IV LASIX during her hospitalization but was not discharged with any LASIX. No other prior Tx reported. No further complaints or concerns at this time.       Arrival mode: Personal vehicle    PCP: Angelica Lagunas MD        Past Medical History:  Past Medical History:   Diagnosis Date    Allergy     Anemia     Hypertension     Plantar fasciitis of left foot        Past Surgical History:  Past Surgical History:   Procedure Laterality Date    CATHETERIZATION OF BOTH LEFT AND RIGHT HEART N/A 5/10/2024    Procedure: CATHETERIZATION, HEART, BOTH LEFT AND RIGHT;   Surgeon: Alison Buchanan MD;  Location: [facility] CATH LAB;  Service: Cardiology;  Laterality: N/A;    COLONOSCOPY N/A 04/09/2021    Procedure: COLONOSCOPY;  Surgeon: Hua Elmore MD;  Location: [facility] ENDO;  Service: Endoscopy;  Laterality: N/A;    ECHOCARDIOGRAM,TRANSESOPHAGEAL N/A 5/13/2024    Procedure: ECHOCARDIOGRAM,TRANSESOPHAGEAL;  Surgeon: Chester Sanchez MD;  Location: [facility] OR;  Service: Cardiovascular;  Laterality: N/A;    HYSTERECTOMY  05/2021    INJECTION OF ANESTHETIC AGENT AROUND MULTIPLE INTERCOSTAL NERVES N/A 5/13/2024    Procedure: BLOCK, NERVE, INTERCOSTAL, 2 OR MORE;  Surgeon: Chester Sanchez MD;  Location: [facility] OR;  Service: Cardiovascular;  Laterality: N/A;    OOPHORECTOMY  05/2021    RESECTION OF ATRIAL MYXOMA N/A 5/13/2024    Procedure: RESECTION, MYXOMA, CARDIAC ATRIUM;  Surgeon: Chester Sanchez MD;  Location: [facility] OR;  Service: Cardiovascular;  Laterality: N/A;    ROBOT-ASSISTED LAPAROSCOPIC ABDOMINAL HYSTERECTOMY USING DA ABBEY XI N/A 05/18/2021    Procedure: XI ROBOTIC HYSTERECTOMY;  Surgeon: Christa Davila MD;  Location: [facility] OR;  Service: OB/GYN;  Laterality: N/A;    ROBOT-ASSISTED LAPAROSCOPIC SALPINGO-OOPHORECTOMY USING DA ABBEY XI Bilateral 05/18/2021    Procedure: XI ROBOTIC SALPINGO-OOPHORECTOMY;  Surgeon: Christa Davila MD;  Location: [facility] OR;  Service: OB/GYN;  Laterality: Bilateral;         Family History:  Family History   Problem Relation Name Age of Onset    Hypertension Mother      Asthma Mother      Cancer Mother          unknown    Hypertension Father      Heart disease Father      Hypertension Sister         Social History:  Social History     Tobacco Use    Smoking status: Never    Smokeless tobacco: Never   Substance and Sexual Activity    Alcohol use: No    Drug use: No    Sexual activity: Not Currently     Birth control/protection: Surgical        Review of Systems     Review of Systems   Respiratory:  Positive for cough and shortness of breath.     Cardiovascular:  Positive for chest pain and leg swelling (bilateral).   All other systems reviewed and are negative.       Physical Exam     Initial Vitals [05/18/24 1038]   BP Pulse Resp Temp SpO2   125/77 78 (!) 24 98.5 °F (36.9 °C) 97 %      MAP       --          Physical Exam  Nursing Notes and Vital Signs Reviewed.  Constitutional: Patient is in no acute distress. Well-developed and well-nourished.  Head: Atraumatic. Normocephalic.  Eyes: PERRL. EOM intact. Conjunctivae are not pale. No scleral icterus.  ENT: Mucous membranes are moist.   Neck: Supple. Full ROM.   Cardiovascular: Regular rate. Regular rhythm. No murmurs, rubs, or gallops. Distal pulses are 2+ and symmetric. Bedside ultrasound shows no pericardial effusion.   Pulmonary/Chest: No respiratory distress. Clear to auscultation bilaterally. No wheezing or rales. Midline chest wall incision intact with no signs of infection.  Abdominal: Soft and non-distended.  There is no tenderness.  No rebound, guarding, or rigidity.   Genitourinary: No CVA tenderness  Musculoskeletal: Moves all extremities.  BLE edema.  Skin: Warm and dry.  Neurological:  Alert, awake, and appropriate.  Normal speech.  No acute focal neurological deficits are appreciated.  Psychiatric: Normal affect. Good eye contact. Appropriate in content.     ED Course   Procedures  ED Vital Signs:  Vitals:    05/18/24 1038 05/18/24 1120 05/18/24 1122 05/18/24 1335   BP: 125/77 120/76     Pulse: 78 74     Resp: (!) 24   18   Temp: 98.5 °F (36.9 °C)      TempSrc: Oral      SpO2: 97% 98% 99%     05/18/24 1403   BP: 116/82   Pulse: 71   Resp: 15   Temp:    TempSrc:    SpO2: 100%       Abnormal Lab Results:  Labs Reviewed   CBC W/ AUTO DIFFERENTIAL - Abnormal; Notable for the following components:       Result Value    RBC 3.77 (*)     Hemoglobin 9.5 (*)     Hematocrit 30.2 (*)     MCV 80 (*)     MCH 25.2 (*)     MCHC 31.5 (*)     RDW 14.9 (*)     Immature Granulocytes 2.6 (*)     Immature Grans  (Abs) 0.18 (*)     All other components within normal limits   COMPREHENSIVE METABOLIC PANEL - Abnormal; Notable for the following components:    Glucose 117 (*)     Alkaline Phosphatase 47 (*)     All other components within normal limits   B-TYPE NATRIURETIC PEPTIDE - Abnormal; Notable for the following components:     (*)     All other components within normal limits   TROPONIN I - Abnormal; Notable for the following components:    Troponin I 0.318 (*)     All other components within normal limits   TROPONIN I - Abnormal; Notable for the following components:    Troponin I 0.297 (*)     All other components within normal limits        All Lab Results:  Results for orders placed or performed during the hospital encounter of 05/18/24   CBC auto differential   Result Value Ref Range    WBC 6.91 3.90 - 12.70 K/uL    RBC 3.77 (L) 4.00 - 5.40 M/uL    Hemoglobin 9.5 (L) 12.0 - 16.0 g/dL    Hematocrit 30.2 (L) 37.0 - 48.5 %    MCV 80 (L) 82 - 98 fL    MCH 25.2 (L) 27.0 - 31.0 pg    MCHC 31.5 (L) 32.0 - 36.0 g/dL    RDW 14.9 (H) 11.5 - 14.5 %    Platelets 366 150 - 450 K/uL    MPV 9.2 9.2 - 12.9 fL    Immature Granulocytes 2.6 (H) 0.0 - 0.5 %    Gran # (ANC) 4.4 1.8 - 7.7 K/uL    Immature Grans (Abs) 0.18 (H) 0.00 - 0.04 K/uL    Lymph # 1.3 1.0 - 4.8 K/uL    Mono # 0.7 0.3 - 1.0 K/uL    Eos # 0.2 0.0 - 0.5 K/uL    Baso # 0.07 0.00 - 0.20 K/uL    nRBC 0 0 /100 WBC    Gran % 64.1 38.0 - 73.0 %    Lymph % 19.1 18.0 - 48.0 %    Mono % 10.0 4.0 - 15.0 %    Eosinophil % 3.2 0.0 - 8.0 %    Basophil % 1.0 0.0 - 1.9 %    Platelet Estimate Appears normal     Differential Method Automated    Comprehensive metabolic panel   Result Value Ref Range    Sodium 142 136 - 145 mmol/L    Potassium 3.9 3.5 - 5.1 mmol/L    Chloride 106 95 - 110 mmol/L    CO2 25 23 - 29 mmol/L    Glucose 117 (H) 70 - 110 mg/dL    BUN 10 6 - 20 mg/dL    Creatinine 0.7 0.5 - 1.4 mg/dL    Calcium 9.7 8.7 - 10.5 mg/dL    Total Protein 6.9 6.0 - 8.4 g/dL     Albumin 3.5 3.5 - 5.2 g/dL    Total Bilirubin 0.4 0.1 - 1.0 mg/dL    Alkaline Phosphatase 47 (L) 55 - 135 U/L    AST 21 10 - 40 U/L    ALT 19 10 - 44 U/L    eGFR >60 >60 mL/min/1.73 m^2    Anion Gap 11 8 - 16 mmol/L   Brain natriuretic peptide   Result Value Ref Range     (H) 0 - 99 pg/mL   Troponin I   Result Value Ref Range    Troponin I 0.318 (H) 0.000 - 0.026 ng/mL   Troponin I   Result Value Ref Range    Troponin I 0.297 (H) 0.000 - 0.026 ng/mL         Imaging Results:  Imaging Results              X-Ray Chest AP Portable (Final result)  Result time 05/18/24 11:17:14      Final result by Federico Jean MD (05/18/24 11:17:14)                   Impression:      No acute abnormality.      Electronically signed by: Federico Jean  Date:    05/18/2024  Time:    11:17               Narrative:    EXAMINATION:  XR CHEST AP PORTABLE    CLINICAL HISTORY:  dyspnea;    TECHNIQUE:  Single frontal view of the chest was performed.    COMPARISON:  Multiple priors.    FINDINGS:  The lungs are clear, with normal appearance of pulmonary vasculature and no pleural effusion or pneumothorax.    The cardiac silhouette is normal in size. The hilar and mediastinal contours are unremarkable.    Bones are intact.                                       The EKG was ordered, reviewed, and independently interpreted by the ED provider.  Interpretation time: 10:39  Rate: 76 BPM  Rhythm: normal sinus rhythm  Interpretation: Nonspecific T wave abnormality. No STEMI.           The Emergency Provider reviewed the vital signs and test results, which are outlined above.     ED Discussion     12:55 PM: Discussed pt's case with Dr. Chester Sanchez MD (Cardiothoradic Surgery) who recommends to send pt home with LASIX 40 mg. Dr. Sanchez will see pt in office on Thursday.     2:16 PM: Reassessed pt at this time. Discussed with pt all pertinent ED information and results. Discussed pt dx and plan of tx. Gave pt all f/u and return to the ED  instructions. All questions and concerns were addressed at this time. Pt expresses understanding of information and instructions, and is comfortable with plan to discharge. Pt is stable for discharge.    I discussed with patient and/or family/caretaker that evaluation in the ED does not suggest any emergent or life threatening medical conditions requiring immediate intervention beyond what was provided in the ED, and I believe patient is safe for discharge.  Regardless, an unremarkable evaluation in the ED does not preclude the development or presence of a serious of life threatening condition. As such, patient was instructed to return immediately for any worsening or change in current symptoms.      ED Course as of 05/18/24 1416   Sat May 18, 2024   1110 Chart reviewed.  Patient had recent myxoma resection with Dr. Trivedi [DP]      ED Course User Index  [DP] Higinio Logan MD     Medical Decision Making  59-year-old female with a recent surgical resection of an atrial myxoma.  She was discharged yesterday.  She was receiving Lasix in the emergency department, but was not discharged home with any.  She presents back to the emergency department today with bilateral lower extremity edema.  She also notes some coughing and some shortness of breath.  Chest x-ray as above.  Pedal edema treated with IV Lasix with good urinary output.  Initial troponin elevated, but expected given the recent surgery.  Second troponin trending downward.  Consulted patient's Cardiothoracic surgeon for recommendations.  He advised sending patient home with Lasix 40 mg daily and outpatient follow-up this week.  Patient agreeable.  ER return precautions provided.  Patient discharged home in stable/improved condition.  Postoperative pain treated in the emergency department with her postoperative pain prescription hydrocodone    Amount and/or Complexity of Data Reviewed  External Data Reviewed: notes.  Labs: ordered. Decision-making details  documented in ED Course.  Radiology: ordered and independent interpretation performed. Decision-making details documented in ED Course.  ECG/medicine tests: ordered and independent interpretation performed. Decision-making details documented in ED Course.    Risk  OTC drugs.  Prescription drug management.                ED Medication(s):  Medications   furosemide injection 40 mg (40 mg Intravenous Given 5/18/24 1127)   HYDROcodone-acetaminophen 5-325 mg per tablet 1 tablet (1 tablet Oral Given 5/18/24 1335)       New Prescriptions    FUROSEMIDE (LASIX) 40 MG TABLET    Take 1 tablet (40 mg total) by mouth once daily.        Follow-up Information       Chester Sanchez MD On 5/23/2024.    Specialty: Cardiothoracic Surgery  Contact information:  [id number] [place] [address] [phone]               [facility] - Emergency Dept..    Specialty: Emergency Medicine  Why: As needed, If symptoms worsen  Contact information:  [id number]  [facility] [id number] [phone]                               Scribe Attestation:   Scribe #1: I performed the above scribed service and the documentation accurately describes the services I performed. I attest to the accuracy of the note.     Attending:   Physician Attestation Statement for Scribe #1: I, Higinio Logan MD, personally performed the services described in this documentation, as scribed by Herson Guerra, in my presence, and it is both accurate and complete.           Clinical Impression       ICD-10-CM ICD-9-CM   1. Pedal edema  R60.0 782.3   2. Dyspnea  R06.00 786.09   3. History of atrial myxoma  Z86.018 V12.59       Disposition:   Disposition: Discharged  Condition: Stable         Higinio Logan MD  05/18/24 6657

## 2024-05-19 DIAGNOSIS — M05.79 RHEUMATOID ARTHRITIS INVOLVING MULTIPLE SITES WITH POSITIVE RHEUMATOID FACTOR: ICD-10-CM

## 2024-05-19 DIAGNOSIS — E55.9 VITAMIN D DEFICIENCY: ICD-10-CM

## 2024-05-20 ENCOUNTER — TELEPHONE (OUTPATIENT)
Dept: CARDIOTHORACIC SURGERY | Facility: CLINIC | Age: 59
End: 2024-05-20
Payer: COMMERCIAL

## 2024-05-20 DIAGNOSIS — I05.8 MITRAL VALVE MASS: Primary | ICD-10-CM

## 2024-05-20 LAB
OHS QRS DURATION: 86 MS
OHS QTC CALCULATION: 452 MS

## 2024-05-20 RX ORDER — FOLIC ACID 1 MG/1
1000 TABLET ORAL
Qty: 90 TABLET | Refills: 0 | Status: SHIPPED | OUTPATIENT
Start: 2024-05-20

## 2024-05-20 RX ORDER — ERGOCALCIFEROL 1.25 MG/1
50000 CAPSULE ORAL
Qty: 21 CAPSULE | Refills: 0 | OUTPATIENT
Start: 2024-05-20

## 2024-05-20 NOTE — TELEPHONE ENCOUNTER
Patient contacted and was advised that we would ask the provider to give her a medication RX for pain. The patient  stated understanding with no questions. Patient confirmed appointment time and location.

## 2024-05-22 DIAGNOSIS — E55.9 VITAMIN D DEFICIENCY: ICD-10-CM

## 2024-05-22 RX ORDER — ERGOCALCIFEROL 1.25 MG/1
50000 CAPSULE ORAL
Qty: 12 CAPSULE | Refills: 4 | OUTPATIENT
Start: 2024-05-22

## 2024-05-23 ENCOUNTER — OFFICE VISIT (OUTPATIENT)
Dept: CARDIOTHORACIC SURGERY | Facility: CLINIC | Age: 59
End: 2024-05-23
Payer: COMMERCIAL

## 2024-05-23 ENCOUNTER — OFFICE VISIT (OUTPATIENT)
Dept: INTERNAL MEDICINE | Facility: CLINIC | Age: 59
End: 2024-05-23
Payer: COMMERCIAL

## 2024-05-23 VITALS — OXYGEN SATURATION: 99 % | DIASTOLIC BLOOD PRESSURE: 64 MMHG | HEART RATE: 73 BPM | SYSTOLIC BLOOD PRESSURE: 98 MMHG

## 2024-05-23 VITALS
BODY MASS INDEX: 39.67 KG/M2 | HEIGHT: 64 IN | HEART RATE: 84 BPM | TEMPERATURE: 98 F | WEIGHT: 232.38 LBS | OXYGEN SATURATION: 97 %

## 2024-05-23 DIAGNOSIS — Z90.722 S/P TOTAL HYSTERECTOMY AND BSO (BILATERAL SALPINGO-OOPHORECTOMY): ICD-10-CM

## 2024-05-23 DIAGNOSIS — M72.2 PLANTAR FASCIITIS OF LEFT FOOT: ICD-10-CM

## 2024-05-23 DIAGNOSIS — D15.1 ATRIAL MYXOMA: Primary | ICD-10-CM

## 2024-05-23 DIAGNOSIS — R93.1 ABNORMAL ECHOCARDIOGRAM FINDINGS WITHOUT DIAGNOSIS: ICD-10-CM

## 2024-05-23 DIAGNOSIS — I05.8 MITRAL VALVE MASS: Primary | ICD-10-CM

## 2024-05-23 DIAGNOSIS — Z09 HOSPITAL DISCHARGE FOLLOW-UP: ICD-10-CM

## 2024-05-23 DIAGNOSIS — D15.1 ATRIAL MYXOMA: ICD-10-CM

## 2024-05-23 DIAGNOSIS — Z90.710 S/P TOTAL HYSTERECTOMY AND BSO (BILATERAL SALPINGO-OOPHORECTOMY): ICD-10-CM

## 2024-05-23 DIAGNOSIS — I10 ESSENTIAL HYPERTENSION: Chronic | ICD-10-CM

## 2024-05-23 DIAGNOSIS — Z90.79 S/P TOTAL HYSTERECTOMY AND BSO (BILATERAL SALPINGO-OOPHORECTOMY): ICD-10-CM

## 2024-05-23 DIAGNOSIS — I05.8 MITRAL VALVE MASS: ICD-10-CM

## 2024-05-23 PROCEDURE — 99999 PR PBB SHADOW E&M-EST. PATIENT-LVL V: CPT | Mod: PBBFAC,,, | Performed by: FAMILY MEDICINE

## 2024-05-23 PROCEDURE — 1111F DSCHRG MED/CURRENT MED MERGE: CPT | Mod: CPTII,S$GLB,, | Performed by: FAMILY MEDICINE

## 2024-05-23 PROCEDURE — 4010F ACE/ARB THERAPY RXD/TAKEN: CPT | Mod: CPTII,S$GLB,, | Performed by: THORACIC SURGERY (CARDIOTHORACIC VASCULAR SURGERY)

## 2024-05-23 PROCEDURE — 99214 OFFICE O/P EST MOD 30 MIN: CPT | Mod: S$GLB,,, | Performed by: FAMILY MEDICINE

## 2024-05-23 PROCEDURE — 99024 POSTOP FOLLOW-UP VISIT: CPT | Mod: S$GLB,,, | Performed by: THORACIC SURGERY (CARDIOTHORACIC VASCULAR SURGERY)

## 2024-05-23 PROCEDURE — 99999 PR PBB SHADOW E&M-EST. PATIENT-LVL IV: CPT | Mod: PBBFAC,,, | Performed by: THORACIC SURGERY (CARDIOTHORACIC VASCULAR SURGERY)

## 2024-05-23 PROCEDURE — 4010F ACE/ARB THERAPY RXD/TAKEN: CPT | Mod: CPTII,S$GLB,, | Performed by: FAMILY MEDICINE

## 2024-05-23 PROCEDURE — 3044F HG A1C LEVEL LT 7.0%: CPT | Mod: CPTII,S$GLB,, | Performed by: FAMILY MEDICINE

## 2024-05-23 PROCEDURE — 1159F MED LIST DOCD IN RCRD: CPT | Mod: CPTII,S$GLB,, | Performed by: FAMILY MEDICINE

## 2024-05-23 PROCEDURE — 3074F SYST BP LT 130 MM HG: CPT | Mod: CPTII,S$GLB,, | Performed by: THORACIC SURGERY (CARDIOTHORACIC VASCULAR SURGERY)

## 2024-05-23 PROCEDURE — 3044F HG A1C LEVEL LT 7.0%: CPT | Mod: CPTII,S$GLB,, | Performed by: THORACIC SURGERY (CARDIOTHORACIC VASCULAR SURGERY)

## 2024-05-23 PROCEDURE — 3078F DIAST BP <80 MM HG: CPT | Mod: CPTII,S$GLB,, | Performed by: THORACIC SURGERY (CARDIOTHORACIC VASCULAR SURGERY)

## 2024-05-23 RX ORDER — POTASSIUM CHLORIDE 20 MEQ/1
20 TABLET, EXTENDED RELEASE ORAL DAILY
Qty: 30 TABLET | Refills: 0 | Status: SHIPPED | OUTPATIENT
Start: 2024-05-23

## 2024-05-23 RX ORDER — HYDROCODONE BITARTRATE AND ACETAMINOPHEN 5; 325 MG/1; MG/1
1 TABLET ORAL EVERY 6 HOURS PRN
Qty: 10 TABLET | Refills: 0 | Status: SHIPPED | OUTPATIENT
Start: 2024-05-23

## 2024-05-23 RX ORDER — FUROSEMIDE 40 MG/1
40 TABLET ORAL DAILY
Qty: 30 TABLET | Refills: 0 | Status: SHIPPED | OUTPATIENT
Start: 2024-05-23

## 2024-05-23 RX ORDER — HYDROCODONE BITARTRATE AND ACETAMINOPHEN 5; 325 MG/1; MG/1
1 TABLET ORAL EVERY 6 HOURS PRN
Qty: 15 TABLET | Refills: 0 | Status: SHIPPED | OUTPATIENT
Start: 2024-05-23

## 2024-05-23 NOTE — PROGRESS NOTES
Subjective:      Patient ID: Jake Hoskins is a 59 y.o. female.    Chief Complaint: No chief complaint on file.    HPI:  59-year-old female who had a left atrial myxoma resection is here for the 2 weeks follow-up visit patient does not have any major complaints except bilateral feet swelling no history of dyspnea no history of syncope at this time incisional pain occasionally    Review of patient's allergies indicates:   Allergen Reactions    Tramadol Nausea And Vomiting       Review of Systems   Constitutional:  Negative for activity change and appetite change.   HENT:  Negative for dental problem, nosebleeds and sore throat.    Eyes:  Negative for discharge and visual disturbance.   Respiratory:  Negative for cough, chest tightness and stridor.    Cardiovascular:  Negative for leg swelling.   Gastrointestinal:  Negative for abdominal distention and abdominal pain.   Genitourinary:  Negative for difficulty urinating and dysuria.   Musculoskeletal:  Negative for arthralgias, back pain and joint swelling.   Allergic/Immunologic: Negative for environmental allergies.   Neurological:  Negative for dizziness, syncope and headaches.   Hematological:  Does not bruise/bleed easily.   Psychiatric/Behavioral:  Negative for behavioral problems.           Objective:   BP 98/64   Pulse 73   LMP 12/26/2020   SpO2 99%     X-Ray Chest AP Portable  Narrative: EXAMINATION:  XR CHEST AP PORTABLE    CLINICAL HISTORY:  dyspnea;    TECHNIQUE:  Single frontal view of the chest was performed.    COMPARISON:  Multiple priors.    FINDINGS:  The lungs are clear, with normal appearance of pulmonary vasculature and no pleural effusion or pneumothorax.    The cardiac silhouette is normal in size. The hilar and mediastinal contours are unremarkable.    Bones are intact.  Impression: No acute abnormality.    Electronically signed by: Federico Jean  Date:    05/18/2024  Time:    11:17         Physical Exam  Vitals reviewed.    Constitutional:       Appearance: Normal appearance.   HENT:      Head: Normocephalic and atraumatic.      Mouth/Throat:      Mouth: Mucous membranes are moist.   Eyes:      Extraocular Movements: Extraocular movements intact.   Cardiovascular:      Rate and Rhythm: Normal rate and regular rhythm.      Pulses: Normal pulses.      Heart sounds: Normal heart sounds.   Pulmonary:      Effort: Pulmonary effort is normal.      Breath sounds: Rhonchi and rales present.   Abdominal:      Palpations: Abdomen is soft.   Musculoskeletal:         General: Normal range of motion.      Cervical back: Normal range of motion and neck supple.   Skin:     General: Skin is warm.      Capillary Refill: Capillary refill takes less than 2 seconds.   Neurological:      General: No focal deficit present.      Mental Status: She is alert and oriented to person, place, and time.   Psychiatric:         Mood and Affect: Mood normal.      Chest incision clean and dry    Assessment:     1. Atrial myxoma    2. Essential hypertension    3. Mitral valve mass    4. Abnormal echocardiogram findings without diagnosis    5. S/P total hysterectomy and BSO (bilateral salpingo-oophorectomy)    6. BMI 39.0-39.9,adult    7. Plantar fasciitis of left foot          Plan    Continue aspirin Plavix beta-blocker.   Continue outpatient cardiopulmonary rehabilitat  Ion   Gentle diuresis with Lasix.   Follow up with Dr. Wen   Elevate the feet and used compression stocking.   I will see her back in 1 month's time after the echocardiogram is done/          Chester Sanchez MD  [facility]  [place]

## 2024-05-23 NOTE — PROGRESS NOTES
Jake Hoskins  05/23/2024  [id number]    Angelica Lagunas MD  Patient Care Team:  Angelica Lagunas MD as PCP - General (Family Medicine)  Phil Baires LPN (Inactive) as Care Coordinator (Internal Medicine)    Transitional Care Note    Family and/or Caretaker present at visit?  Yes.  Diagnostic tests reviewed/disposition: I have reviewed all completed as well as pending diagnostic tests at the time of discharge.  Disease/illness education: [facility] MASS  Home health/community services discussion/referrals: Patient has home health established at [facility] .   Establishment or re-establishment of referral orders for community resources: No other necessary community resources.   Discussion with other health care providers: No discussion with other health care providers necessary.            Visit Type:a scheduled routine follow-up visit    Chief Complaint:  No chief complaint on file.      History of Present Illness:Hosp Follow up    Chief Complaint: Abnormal Lab (Pt sent by Dr. Hernandez for large mass on mitral valve. Pt states she feels fine, denies chest pain and SOB. )     HPI:  59 year old female patient with HTN and morbid obesity was evaluated for a syncopal attack 2 weeks ago.  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  No more syncopal attacks since the last week does not have any major cardiac symptoms like chest pain or dyspnea or pedal edema.  No current facility-administered medications on file prior to encounter.   Date of Procedure: 5/13/2024      Procedure: Procedure(s) (LRB):  RESECTION, MYXOMA, CARDIAC ATRIUM (N/A)  BLOCK, NERVE, INTERCOSTAL, 2 OR MORE (N/A)  ECHOCARDIOGRAM,TRANSESOPHAGEAL (N/A)     Surgeons and Role:     * Chester Sanchez MD - Primary     Assisting Surgeon: None     Pre-Operative Diagnosis: Near syncope [R55]  Hypertension  Obesity  Sleep  apnea  Hypercholesterolemia  Supraventricular tachycardia     The patient was transferred to the ICU intubated she woke up neurologically intact on minimal dose of epinephrine stable overnight chest tube output is decreasing clear urine in the Hess catheter pain well controlled overnight.     05/15/2024 the patient is sitting up in a chair her chest tubes were removed yesterday she had a good night sleep hemodynamically stable pain well controlled nausea is getting better has not had a bowel movement.     05/16/2024 patient had a quiet night pain issues settling  on 2 L nasal cannula   Appetite is back. Has not had a bowel movement yet.     05/17/2024 the patient had a bowel movement yesterday he is she is weaned off her oxygen ambulating well comfortable discussed about the sternal precautions ready to go  home. Given DME home use.    Went back to ED day after discharge for SOB and swelling  Discharged from ED with Lasix.      History:  Past Medical History:   Diagnosis Date    Allergy     Anemia     Hypertension     Plantar fasciitis of left foot      Past Surgical History:   Procedure Laterality Date    CATHETERIZATION OF BOTH LEFT AND RIGHT HEART N/A 5/10/2024    Procedure: CATHETERIZATION, HEART, BOTH LEFT AND RIGHT;  Surgeon: Alison Buchanan MD;  Location: [facility] CATH LAB;  Service: Cardiology;  Laterality: N/A;    COLONOSCOPY N/A 04/09/2021    Procedure: COLONOSCOPY;  Surgeon: Hua Elmore MD;  Location: [facility] ENDO;  Service: Endoscopy;  Laterality: N/A;    ECHOCARDIOGRAM,TRANSESOPHAGEAL N/A 5/13/2024    Procedure: ECHOCARDIOGRAM,TRANSESOPHAGEAL;  Surgeon: Chester Sanchez MD;  Location: [facility] OR;  Service: Cardiovascular;  Laterality: N/A;    HYSTERECTOMY  05/2021    INJECTION OF ANESTHETIC AGENT AROUND MULTIPLE INTERCOSTAL NERVES N/A 5/13/2024    Procedure: BLOCK, NERVE, INTERCOSTAL, 2 OR MORE;  Surgeon: Chester Sanchez MD;  Location: [facility] OR;  Service: Cardiovascular;  Laterality: N/A;     OOPHORECTOMY  05/2021    RESECTION OF ATRIAL MYXOMA N/A 5/13/2024    Procedure: RESECTION, MYXOMA, CARDIAC ATRIUM;  Surgeon: Chester Sanchez MD;  Location: [facility] OR;  Service: Cardiovascular;  Laterality: N/A;    ROBOT-ASSISTED LAPAROSCOPIC ABDOMINAL HYSTERECTOMY USING DA ABBEY XI N/A 05/18/2021    Procedure: XI ROBOTIC HYSTERECTOMY;  Surgeon: Christa Davila MD;  Location: [facility] OR;  Service: OB/GYN;  Laterality: N/A;    ROBOT-ASSISTED LAPAROSCOPIC SALPINGO-OOPHORECTOMY USING DA ABBEY XI Bilateral 05/18/2021    Procedure: XI ROBOTIC SALPINGO-OOPHORECTOMY;  Surgeon: Christa Davila MD;  Location: [facility] OR;  Service: OB/GYN;  Laterality: Bilateral;     Family History   Problem Relation Name Age of Onset    Hypertension Mother      Asthma Mother      Cancer Mother          unknown    Hypertension Father      Heart disease Father      Hypertension Sister       Social History     Socioeconomic History    Marital status:     Number of children: 2   Occupational History    Occupation: [employer]     Employer: [employer]   Tobacco Use    Smoking status: Never    Smokeless tobacco: Never   Substance and Sexual Activity    Alcohol use: No    Drug use: No    Sexual activity: Not Currently     Birth control/protection: Surgical     Social Determinants of Health     Financial Resource Strain: Patient Declined (5/9/2024)    Overall Financial Resource Strain (CARDIA)     Difficulty of Paying Living Expenses: Patient declined   Food Insecurity: Patient Declined (5/9/2024)    Hunger Vital Sign     Worried About Running Out of Food in the Last Year: Patient declined     Ran Out of Food in the Last Year: Patient declined   Transportation Needs: Patient Declined (5/9/2024)    TRANSPORTATION NEEDS     Transportation : Patient declined   Physical Activity: Sufficiently Active (5/10/2023)    Exercise Vital Sign     Days of Exercise per Week: 5 days     Minutes of Exercise per Session: 150+ min   Stress: Patient Declined (5/9/2024)     [facility] Occupational [facility] - Occupational Stress Questionnaire     Feeling of Stress : Patient declined   Housing Stability: Patient Declined (5/9/2024)    Housing Stability Vital Sign     Unable to Pay for Housing in the Last Year: Patient declined     Homeless in the Last Year: Patient declined     Patient Active Problem List   Diagnosis    Anemia    Plantar fasciitis of left foot    Essential hypertension    Cervical polyp    S/P total hysterectomy and BSO (bilateral salpingo-oophorectomy)    Rheumatoid arthritis involving multiple sites with positive rheumatoid factor    Breast mass, left    Mitral valve mass    BMI 39.0-39.9,adult    Atrial myxoma    Abnormal echocardiogram findings without diagnosis     Review of patient's allergies indicates:   Allergen Reactions    Tramadol Nausea And Vomiting       The following were reviewed at this visit: active problem list, medication list, allergies, family history, social history, and health maintenance.    Medications:  Current Outpatient Medications on File Prior to Visit   Medication Sig Dispense Refill    aspirin (ECOTRIN) 81 MG EC tablet Take 1 tablet (81 mg total) by mouth once daily. 90 tablet 3    celecoxib (CELEBREX) 200 MG capsule Take 1 capsule (200 mg total) by mouth once daily. 90 capsule 1    cyclobenzaprine (FLEXERIL) 10 MG tablet Take 1 tablet (10 mg total) by mouth nightly as needed for Muscle spasms. 90 tablet 1    ergocalciferol (ERGOCALCIFEROL) 50,000 unit Cap Take 1 capsule (50,000 Units total) by mouth every 7 days. 12 capsule 4    ferrous sulfate, dried (SLOW FE) 160 mg (50 mg iron) TbSR Take 1 tablet (160 mg total) by mouth once daily. 90 tablet 3    fluticasone propionate (FLONASE) 50 mcg/actuation nasal spray 2 sprays (100 mcg total) by Each Nostril route once daily. 16 g 3    folic acid (FOLVITE) 1 MG tablet Take 1 tablet by mouth once daily 90 tablet 0    furosemide (LASIX) 40 MG tablet Take 1 tablet (40 mg total) by mouth  once daily. 30 tablet 0    HYDROcodone-acetaminophen (NORCO) 5-325 mg per tablet Take 1 tablet by mouth every 6 (six) hours as needed for Pain. 15 tablet 0    HYDROcodone-acetaminophen (NORCO) 5-325 mg per tablet Take 1 tablet by mouth every 6 (six) hours as needed for Pain. 10 tablet 0    loratadine (CLARITIN REDITABS) 10 mg dissolvable tablet Take 1 tablet (10 mg total) by mouth once daily. (Patient not taking: Reported on 5/23/2024) 30 tablet 11    losartan (COZAAR) 25 MG tablet Take 1 tablet (25 mg total) by mouth once daily. 30 tablet 11    metoprolol succinate (TOPROL-XL) 25 MG 24 hr tablet Take 1 tablet (25 mg total) by mouth once daily. 90 tablet 3    montelukast (SINGULAIR) 10 mg tablet Take 1 tablet by mouth once daily 90 tablet 0    MULTIVITS,CA,MINERALS/IRON/FA (ONE-A-DAY WOMENS FORMULA ORAL) Take by mouth once daily.       olmesartan (BENICAR) 20 MG tablet Take 20 mg by mouth once daily.      potassium chloride SA (K-DUR,KLOR-CON) 20 MEQ tablet Take 1 tablet (20 mEq total) by mouth once daily. 30 tablet 0    [DISCONTINUED] furosemide (LASIX) 40 MG tablet Take 1 tablet (40 mg total) by mouth once daily. 14 tablet 0    [DISCONTINUED] HYDROcodone-acetaminophen (NORCO) 5-325 mg per tablet Take 1 tablet by mouth every 6 (six) hours as needed for Pain. 10 tablet 0    [DISCONTINUED] potassium chloride SA (K-DUR,KLOR-CON) 20 MEQ tablet Take 1 tablet (20 mEq total) by mouth once daily. 2 tablet 0     No current facility-administered medications on file prior to visit.       Medications have been reviewed and reconciled with patient at this visit.  Barriers to medications reviewed with patient.    Adverse reactions to current medications reviewed with patient..    Over the counter medications reviewed and reconciled with patient.    Exam:  Wt Readings from Last 3 Encounters:   05/16/24 105.4 kg (232 lb 5.8 oz)   05/09/24 102.1 kg (225 lb)   05/02/24 102.1 kg (225 lb 1.4 oz)     Temp Readings from Last 3  Encounters:   05/18/24 98.5 °F (36.9 °C) (Oral)   05/17/24 98.7 °F (37.1 °C) (Oral)   04/29/24 98.1 °F (36.7 °C) (Tympanic)     BP Readings from Last 3 Encounters:   05/23/24 98/64   05/18/24 116/82   05/17/24 (!) 110/58     Pulse Readings from Last 3 Encounters:   05/23/24 73   05/18/24 71   05/17/24 76     There is no height or weight on file to calculate BMI.      Review of Systems   Constitutional: Negative.  Negative for chills and fever.   HENT: Negative.  Negative for congestion, sinus pain and sore throat.    Eyes:  Negative for blurred vision and double vision.   Respiratory:  Negative for cough, sputum production, shortness of breath and wheezing.    Cardiovascular:  Positive for chest pain and leg swelling. Negative for palpitations.   Gastrointestinal:  Negative for abdominal pain, constipation, diarrhea, heartburn, nausea and vomiting.   Genitourinary: Negative.    Musculoskeletal: Negative.    Skin: Negative.  Negative for rash.   Neurological: Negative.    Endo/Heme/Allergies: Negative.  Negative for polydipsia. Does not bruise/bleed easily.   Psychiatric/Behavioral:  Negative for depression and substance abuse.      Physical Exam  Nursing note reviewed.   Cardiovascular:      Rate and Rhythm: Normal rate and regular rhythm.      Comments: Sternotomy  Healing well    Pulmonary:      Effort: Pulmonary effort is normal. No respiratory distress.   Neurological:      Mental Status: She is alert and oriented to person, place, and time.   Psychiatric:         Mood and Affect: Mood normal.         Behavior: Behavior normal.         Thought Content: Thought content normal.         Judgment: Judgment normal.         Laboratory Reviewed ({Yes)  Lab Results   Component Value Date    WBC 6.91 05/18/2024    HGB 9.5 (L) 05/18/2024    HCT 30.2 (L) 05/18/2024     05/18/2024    CHOL 179 05/12/2023    TRIG 98 05/12/2023    HDL 61 05/12/2023    ALT 19 05/18/2024    AST 21 05/18/2024     05/18/2024    K 3.9  05/18/2024     05/18/2024    CREATININE 0.7 05/18/2024    BUN 10 05/18/2024    CO2 25 05/18/2024    TSH 0.730 04/29/2024    INR 1.1 05/14/2024    HGBA1C 5.7 (H) 05/11/2024       Diagnoses and all orders for this visit:    Mitral valve mass  Path reviewed  Essential hypertension  Bp lower side  Hold Benicar and metoprolol for now  Labs Monday  Check cr and CBC  Atrial myxoma  Path reviewed  Hospital discharge follow-up  Has HH, PT  Monitor vitals  Water    Feeling okay.  Echo tomorrow  Okay for lasix PRN swelling.            Over 75 percent of primary cardiac tumors are benign [[id number]]. In adults, the majority of benign lesions are myxomas; other common benign lesions include papillary fibroelastomas and lipomas.    1. MARGIN:   FOCAL MYXOMA PRESENT     2. NEW MARGIN:   MYOCARDIAL TISSUE WITH NO MYXOMA IDENTIFIED     3. MASS FROM LEFT ATRIUM:   CARDIAC MYXOMA       Care Plan/Goals: Reviewed    Goals    None         Follow up: No follow-ups on file.    After visit summary was printed and given to patient upon discharge today.  Patient goals and care plan are included in After Visit Summary.

## 2024-05-24 ENCOUNTER — HOSPITAL ENCOUNTER (OUTPATIENT)
Dept: CARDIOLOGY | Facility: HOSPITAL | Age: 59
Discharge: HOME | End: 2024-05-24
Attending: THORACIC SURGERY (CARDIOTHORACIC VASCULAR SURGERY)
Payer: COMMERCIAL

## 2024-05-24 VITALS
WEIGHT: 232 LBS | SYSTOLIC BLOOD PRESSURE: 98 MMHG | BODY MASS INDEX: 39.61 KG/M2 | HEIGHT: 64 IN | DIASTOLIC BLOOD PRESSURE: 64 MMHG

## 2024-05-24 DIAGNOSIS — D15.1 ATRIAL MYXOMA: ICD-10-CM

## 2024-05-24 DIAGNOSIS — J30.1 ACUTE SEASONAL ALLERGIC RHINITIS DUE TO POLLEN: ICD-10-CM

## 2024-05-24 DIAGNOSIS — R05.9 COUGH: ICD-10-CM

## 2024-05-24 PROBLEM — Z98.890 POST-OPERATIVE STATE: Status: ACTIVE | Noted: 2024-05-24

## 2024-05-24 LAB
AORTIC ROOT ANNULUS: 3.26 CM
ASCENDING AORTA: 3.4 CM
AV INDEX (PROSTH): 0.68
AV MEAN GRADIENT: 5 MMHG
AV PEAK GRADIENT: 10 MMHG
AV VALVE AREA BY VELOCITY RATIO: 2.03 CM²
AV VALVE AREA: 2.08 CM²
AV VELOCITY RATIO: 0.67
BSA FOR ECHO PROCEDURE: 2.18 M2
CV ECHO LV RWT: 0.52 CM
DOP CALC AO PEAK VEL: 1.59 M/S
DOP CALC AO VTI: 32.4 CM
DOP CALC LVOT AREA: 3 CM2
DOP CALC LVOT DIAMETER: 1.97 CM
DOP CALC LVOT PEAK VEL: 1.06 M/S
DOP CALC LVOT STROKE VOLUME: 67.33 CM3
DOP CALC RVOT PEAK VEL: 0.91 M/S
DOP CALC RVOT VTI: 20.3 CM
DOP CALCLVOT PEAK VEL VTI: 22.1 CM
E WAVE DECELERATION TIME: 237.46 MSEC
E/A RATIO: 0.93
E/E' RATIO: 8.6 M/S
ECHO LV POSTERIOR WALL: 1.14 CM (ref 0.6–1.1)
EJECTION FRACTION: 55 %
FRACTIONAL SHORTENING: 32 % (ref 28–44)
INTERVENTRICULAR SEPTUM: 1.11 CM (ref 0.6–1.1)
IVRT: 71.36 MSEC
LA MAJOR: 5.25 CM
LA MINOR: 5.99 CM
LA WIDTH: 4.5 CM
LEFT ATRIUM SIZE: 3.42 CM
LEFT ATRIUM VOLUME INDEX MOD: 30.8 ML/M2
LEFT ATRIUM VOLUME INDEX: 35.2 ML/M2
LEFT ATRIUM VOLUME MOD: 64.04 CM3
LEFT ATRIUM VOLUME: 73.2 CM3
LEFT INTERNAL DIMENSION IN SYSTOLE: 2.96 CM (ref 2.1–4)
LEFT VENTRICLE DIASTOLIC VOLUME INDEX: 41.79 ML/M2
LEFT VENTRICLE DIASTOLIC VOLUME: 86.92 ML
LEFT VENTRICLE MASS INDEX: 83 G/M2
LEFT VENTRICLE SYSTOLIC VOLUME INDEX: 16.3 ML/M2
LEFT VENTRICLE SYSTOLIC VOLUME: 33.97 ML
LEFT VENTRICULAR INTERNAL DIMENSION IN DIASTOLE: 4.38 CM (ref 3.5–6)
LEFT VENTRICULAR MASS: 173.16 G
LV LATERAL E/E' RATIO: 7.82 M/S
LV SEPTAL E/E' RATIO: 9.56 M/S
LVOT MG: 2.42 MMHG
LVOT MV: 0.73 CM/S
MV PEAK A VEL: 0.92 M/S
MV PEAK E VEL: 0.86 M/S
OHS CV RV/LV RATIO: 0.91 CM
PISA TR MAX VEL: 2.4 M/S
PV MEAN GRADIENT: 2 MMHG
PV MV: 0.72 M/S
PV PEAK GRADIENT: 4 MMHG
PV PEAK VELOCITY: 1.04 M/S
RA MAJOR: 5.64 CM
RA PRESSURE ESTIMATED: 3 MMHG
RA WIDTH: 4.08 CM
RIGHT VENTRICULAR END-DIASTOLIC DIMENSION: 3.99 CM
RV TB RVSP: 5 MMHG
SINUS: 2.85 CM
STJ: 2.86 CM
TDI LATERAL: 0.11 M/S
TDI SEPTAL: 0.09 M/S
TDI: 0.1 M/S
TR MAX PG: 23 MMHG
TRICUSPID ANNULAR PLANE SYSTOLIC EXCURSION: 1.85 CM
TV REST PULMONARY ARTERY PRESSURE: 26 MMHG
Z-SCORE OF LEFT VENTRICULAR DIMENSION IN END DIASTOLE: -3.74
Z-SCORE OF LEFT VENTRICULAR DIMENSION IN END SYSTOLE: -2.17

## 2024-05-24 PROCEDURE — 93306 TTE W/DOPPLER COMPLETE: CPT

## 2024-05-24 PROCEDURE — 93306 TTE W/DOPPLER COMPLETE: CPT | Mod: 26,,, | Performed by: INTERNAL MEDICINE

## 2024-05-24 RX ORDER — FLUTICASONE PROPIONATE 50 MCG
SPRAY, SUSPENSION (ML) NASAL
Qty: 48 G | Refills: 3 | Status: SHIPPED | OUTPATIENT
Start: 2024-05-24

## 2024-05-25 NOTE — TELEPHONE ENCOUNTER
Refill Decision Note   Shellypatrick Gato  is requesting a refill authorization.  Brief Assessment and Rationale for Refill:  Approve     Medication Therapy Plan:         Comments:     Note composed:9:16 PM 05/24/2024

## 2024-05-25 NOTE — TELEPHONE ENCOUNTER
No care due was identified.  [facility] Embedded Care Due Messages. Reference number: [id number].   5/24/2024 7:34:56 PM CDT

## 2024-05-27 ENCOUNTER — LAB VISIT (OUTPATIENT)
Dept: LAB | Facility: HOSPITAL | Age: 59
End: 2024-05-27
Attending: FAMILY MEDICINE
Payer: COMMERCIAL

## 2024-05-27 DIAGNOSIS — Z09 HOSPITAL DISCHARGE FOLLOW-UP: ICD-10-CM

## 2024-05-27 LAB
ALBUMIN SERPL BCP-MCNC: 3.5 G/DL (ref 3.5–5.2)
ALP SERPL-CCNC: 64 U/L (ref 55–135)
ALT SERPL W/O P-5'-P-CCNC: 14 U/L (ref 10–44)
ANION GAP SERPL CALC-SCNC: 8 MMOL/L (ref 8–16)
AST SERPL-CCNC: 23 U/L (ref 10–40)
BASOPHILS # BLD AUTO: 0.07 K/UL (ref 0–0.2)
BASOPHILS NFR BLD: 1.2 % (ref 0–1.9)
BILIRUB SERPL-MCNC: 0.2 MG/DL (ref 0.1–1)
BUN SERPL-MCNC: 10 MG/DL (ref 6–20)
CALCIUM SERPL-MCNC: 9.5 MG/DL (ref 8.7–10.5)
CHLORIDE SERPL-SCNC: 107 MMOL/L (ref 95–110)
CO2 SERPL-SCNC: 27 MMOL/L (ref 23–29)
CREAT SERPL-MCNC: 0.8 MG/DL (ref 0.5–1.4)
DIFFERENTIAL METHOD BLD: ABNORMAL
EOSINOPHIL # BLD AUTO: 0.3 K/UL (ref 0–0.5)
EOSINOPHIL NFR BLD: 5.5 % (ref 0–8)
ERYTHROCYTE [DISTWIDTH] IN BLOOD BY AUTOMATED COUNT: 16.4 % (ref 11.5–14.5)
EST. GFR  (NO RACE VARIABLE): >60 ML/MIN/1.73 M^2
GLUCOSE SERPL-MCNC: 77 MG/DL (ref 70–110)
HCT VFR BLD AUTO: 33.3 % (ref 37–48.5)
HGB BLD-MCNC: 9.9 G/DL (ref 12–16)
IMM GRANULOCYTES # BLD AUTO: 0.06 K/UL (ref 0–0.04)
IMM GRANULOCYTES NFR BLD AUTO: 1 % (ref 0–0.5)
LYMPHOCYTES # BLD AUTO: 1.6 K/UL (ref 1–4.8)
LYMPHOCYTES NFR BLD: 26.1 % (ref 18–48)
MCH RBC QN AUTO: 24.9 PG (ref 27–31)
MCHC RBC AUTO-ENTMCNC: 29.7 G/DL (ref 32–36)
MCV RBC AUTO: 84 FL (ref 82–98)
MONOCYTES # BLD AUTO: 0.6 K/UL (ref 0.3–1)
MONOCYTES NFR BLD: 10.2 % (ref 4–15)
NEUTROPHILS # BLD AUTO: 3.3 K/UL (ref 1.8–7.7)
NEUTROPHILS NFR BLD: 56 % (ref 38–73)
NRBC BLD-RTO: 0 /100 WBC
PLATELET # BLD AUTO: 426 K/UL (ref 150–450)
PMV BLD AUTO: 10.6 FL (ref 9.2–12.9)
POTASSIUM SERPL-SCNC: 3.7 MMOL/L (ref 3.5–5.1)
PROT SERPL-MCNC: 7.1 G/DL (ref 6–8.4)
RBC # BLD AUTO: 3.97 M/UL (ref 4–5.4)
SODIUM SERPL-SCNC: 142 MMOL/L (ref 136–145)
WBC # BLD AUTO: 5.97 K/UL (ref 3.9–12.7)

## 2024-05-27 PROCEDURE — 36415 COLL VENOUS BLD VENIPUNCTURE: CPT | Mod: PN | Performed by: FAMILY MEDICINE

## 2024-05-27 PROCEDURE — 80053 COMPREHEN METABOLIC PANEL: CPT | Performed by: FAMILY MEDICINE

## 2024-05-27 PROCEDURE — 85025 COMPLETE CBC W/AUTO DIFF WBC: CPT | Performed by: FAMILY MEDICINE

## 2024-05-28 ENCOUNTER — PATIENT MESSAGE (OUTPATIENT)
Dept: INTERNAL MEDICINE | Facility: CLINIC | Age: 59
End: 2024-05-28
Payer: COMMERCIAL

## 2024-05-29 ENCOUNTER — PATIENT MESSAGE (OUTPATIENT)
Dept: CARDIOTHORACIC SURGERY | Facility: CLINIC | Age: 59
End: 2024-05-29
Payer: COMMERCIAL

## 2024-05-31 ENCOUNTER — TELEPHONE (OUTPATIENT)
Dept: CARDIOTHORACIC SURGERY | Facility: CLINIC | Age: 59
End: 2024-05-31
Payer: COMMERCIAL

## 2024-05-31 NOTE — TELEPHONE ENCOUNTER
Confirmed fax number was not updated, the patient's daughter was provided the correct information. She stated will send the paperwork over to the clinic.

## 2024-06-04 ENCOUNTER — OFFICE VISIT (OUTPATIENT)
Dept: RHEUMATOLOGY | Facility: CLINIC | Age: 59
End: 2024-06-04
Payer: COMMERCIAL

## 2024-06-04 VITALS
HEART RATE: 64 BPM | WEIGHT: 221.81 LBS | SYSTOLIC BLOOD PRESSURE: 113 MMHG | DIASTOLIC BLOOD PRESSURE: 70 MMHG | BODY MASS INDEX: 37.87 KG/M2 | HEIGHT: 64 IN

## 2024-06-04 DIAGNOSIS — M17.12 PRIMARY OSTEOARTHRITIS OF LEFT KNEE: ICD-10-CM

## 2024-06-04 DIAGNOSIS — M05.79 RHEUMATOID ARTHRITIS INVOLVING MULTIPLE SITES WITH POSITIVE RHEUMATOID FACTOR: Primary | ICD-10-CM

## 2024-06-04 DIAGNOSIS — Z79.899 IMMUNOCOMPROMISED STATE DUE TO DRUG THERAPY: ICD-10-CM

## 2024-06-04 DIAGNOSIS — D84.821 IMMUNOCOMPROMISED STATE DUE TO DRUG THERAPY: ICD-10-CM

## 2024-06-04 DIAGNOSIS — Z51.81 MEDICATION MONITORING ENCOUNTER: ICD-10-CM

## 2024-06-04 DIAGNOSIS — Z79.899 HIGH RISK MEDICATION USE: ICD-10-CM

## 2024-06-04 DIAGNOSIS — Z98.890 HISTORY OF OPEN HEART SURGERY: ICD-10-CM

## 2024-06-04 PROCEDURE — 3044F HG A1C LEVEL LT 7.0%: CPT | Mod: CPTII,S$GLB,, | Performed by: PHYSICIAN ASSISTANT

## 2024-06-04 PROCEDURE — 3008F BODY MASS INDEX DOCD: CPT | Mod: CPTII,S$GLB,, | Performed by: PHYSICIAN ASSISTANT

## 2024-06-04 PROCEDURE — 1160F RVW MEDS BY RX/DR IN RCRD: CPT | Mod: CPTII,S$GLB,, | Performed by: PHYSICIAN ASSISTANT

## 2024-06-04 PROCEDURE — 1111F DSCHRG MED/CURRENT MED MERGE: CPT | Mod: CPTII,S$GLB,, | Performed by: PHYSICIAN ASSISTANT

## 2024-06-04 PROCEDURE — 99999 PR PBB SHADOW E&M-EST. PATIENT-LVL IV: CPT | Mod: PBBFAC,,, | Performed by: PHYSICIAN ASSISTANT

## 2024-06-04 PROCEDURE — 4010F ACE/ARB THERAPY RXD/TAKEN: CPT | Mod: CPTII,S$GLB,, | Performed by: PHYSICIAN ASSISTANT

## 2024-06-04 PROCEDURE — 99214 OFFICE O/P EST MOD 30 MIN: CPT | Mod: S$GLB,,, | Performed by: PHYSICIAN ASSISTANT

## 2024-06-04 PROCEDURE — 1159F MED LIST DOCD IN RCRD: CPT | Mod: CPTII,S$GLB,, | Performed by: PHYSICIAN ASSISTANT

## 2024-06-04 PROCEDURE — 3078F DIAST BP <80 MM HG: CPT | Mod: CPTII,S$GLB,, | Performed by: PHYSICIAN ASSISTANT

## 2024-06-04 PROCEDURE — 3074F SYST BP LT 130 MM HG: CPT | Mod: CPTII,S$GLB,, | Performed by: PHYSICIAN ASSISTANT

## 2024-06-04 RX ORDER — METHOTREXATE 2.5 MG/1
15 TABLET ORAL
Qty: 72 TABLET | Refills: 0 | Status: SHIPPED | OUTPATIENT
Start: 2024-06-04

## 2024-06-04 RX ORDER — CYCLOBENZAPRINE HCL 10 MG
10 TABLET ORAL NIGHTLY PRN
Qty: 90 TABLET | Refills: 0 | Status: SHIPPED | OUTPATIENT
Start: 2024-06-04

## 2024-06-04 RX ORDER — CELECOXIB 200 MG/1
200 CAPSULE ORAL DAILY
Qty: 90 CAPSULE | Refills: 1 | Status: SHIPPED | OUTPATIENT
Start: 2024-06-04

## 2024-06-04 NOTE — PROGRESS NOTES
Subjective:      Patient ID: Jake Hoskins is a 59 y.o. female.    Chief Complaint: Rheumatoid Arthritis    HPI   Jake Hoskins  is a 59 y.o. female here to f/u on seropositive RA.  RA is doing reasonably well at this time.  She denies prolonged morning stiffness.  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  It was apparently benign.  Per cardiology, she is not to resume methotrexate till she is 6 weeks postop which would be on 06/24/2024.  According to the patient they have given her the okay to resume Celebrex.  She has not yet done so.  She typically takes it p.r.n. in addition to Flexeril 10 mg q.h.s. p.r.n..    Has known left knee DJD.  She has required injections in the past.  It has been quite some time since she is had any injections.  Knee pain improved w CSI May 2023.  Eases up after a few steps.  Not bad enough to repeat today.  She is on ergocalciferol 16884 units twice weekly for low D.      Patient denies fevers, chills, photosensitivity, eye pain, shortness of breath, chest pain, hematuria, blood in the stool, rash, sicca symptoms, raynauds, finger ulcerations.  Rheumatologic systems otherwise negative.    MHAQ: 0.3   Serologies/Labs:  +RF 59, +CCP 10.8  Current Treatment:  MTX 15 mg weekly - on hold from recent open heart surgery- plan to resume 6/24/24 (6 weeks post op per cards)  Folic acid 1 mg daily  Flexeril 10 mg qhs prn  Celebrex 200 mg daily prn - ok to continue per cards per pt report  Previous Treatment:   na      Current Outpatient Medications:     aspirin (ECOTRIN) 81 MG EC tablet, Take 1 tablet (81 mg total) by mouth once daily., Disp: 90 tablet, Rfl: 3    celecoxib (CELEBREX) 200 MG capsule, Take 1 capsule (200 mg total) by mouth once daily., Disp: 90 capsule, Rfl: 1    cyclobenzaprine (FLEXERIL) 10 MG tablet, Take 1 tablet (10 mg total) by mouth nightly as needed for Muscle spasms., Disp: 90 tablet, Rfl: 0     ergocalciferol (ERGOCALCIFEROL) 50,000 unit Cap, Take 1 capsule (50,000 Units total) by mouth every 7 days., Disp: 12 capsule, Rfl: 4    ferrous sulfate, dried (SLOW FE) 160 mg (50 mg iron) TbSR, Take 1 tablet (160 mg total) by mouth once daily., Disp: 90 tablet, Rfl: 3    fluticasone propionate (FLONASE) 50 mcg/actuation nasal spray, USE 2 SPRAYS BY EACH NOSTRIL ROUTE ONCE DAILY, Disp: 48 g, Rfl: 3    folic acid (FOLVITE) 1 MG tablet, Take 1 tablet by mouth once daily, Disp: 90 tablet, Rfl: 0    furosemide (LASIX) 40 MG tablet, Take 1 tablet (40 mg total) by mouth once daily., Disp: 30 tablet, Rfl: 0    HYDROcodone-acetaminophen (NORCO) 5-325 mg per tablet, Take 1 tablet by mouth every 6 (six) hours as needed for Pain., Disp: 15 tablet, Rfl: 0    HYDROcodone-acetaminophen (NORCO) 5-325 mg per tablet, Take 1 tablet by mouth every 6 (six) hours as needed for Pain., Disp: 10 tablet, Rfl: 0    loratadine (CLARITIN REDITABS) 10 mg dissolvable tablet, Take 1 tablet (10 mg total) by mouth once daily. (Patient not taking: Reported on 5/23/2024), Disp: 30 tablet, Rfl: 11    losartan (COZAAR) 25 MG tablet, Take 1 tablet (25 mg total) by mouth once daily., Disp: 30 tablet, Rfl: 11    methotrexate 2.5 MG Tab, Take 6 tablets (15 mg total) by mouth every 7 days., Disp: 72 tablet, Rfl: 0    metoprolol succinate (TOPROL-XL) 25 MG 24 hr tablet, Take 1 tablet (25 mg total) by mouth once daily., Disp: 90 tablet, Rfl: 3    montelukast (SINGULAIR) 10 mg tablet, Take 1 tablet by mouth once daily, Disp: 90 tablet, Rfl: 0    MULTIVITS,CA,MINERALS/IRON/FA (ONE-A-DAY WOMENS FORMULA ORAL), Take by mouth once daily. , Disp: , Rfl:     olmesartan (BENICAR) 20 MG tablet, Take 20 mg by mouth once daily., Disp: , Rfl:     potassium chloride SA (K-DUR,KLOR-CON) 20 MEQ tablet, Take 1 tablet (20 mEq total) by mouth once daily., Disp: 30 tablet, Rfl: 0    Past Medical History:   Diagnosis Date    Allergy     Anemia     Hypertension     Plantar  "fasciitis of left foot      Family History   Problem Relation Name Age of Onset    Hypertension Mother      Asthma Mother      Cancer Mother          unknown    Hypertension Father      Heart disease Father      Hypertension Sister       Social History     Socioeconomic History    Marital status:     Number of children: 2   Occupational History    Occupation: [employer]     Employer: [employer]   Tobacco Use    Smoking status: Never    Smokeless tobacco: Never   Substance and Sexual Activity    Alcohol use: No    Drug use: No    Sexual activity: Not Currently     Birth control/protection: Surgical     Social Determinants of Health     Financial Resource Strain: Patient Declined (5/9/2024)    Overall Financial Resource Strain (CARDIA)     Difficulty of Paying Living Expenses: Patient declined   Food Insecurity: Patient Declined (5/9/2024)    Hunger Vital Sign     Worried About Running Out of Food in the Last Year: Patient declined     Ran Out of Food in the Last Year: Patient declined   Transportation Needs: Patient Declined (5/9/2024)    TRANSPORTATION NEEDS     Transportation : Patient declined   Physical Activity: Sufficiently Active (5/10/2023)    Exercise Vital Sign     Days of Exercise per Week: 5 days     Minutes of Exercise per Session: 150+ min   Stress: Patient Declined (5/9/2024)    [employer] - Occupational Stress Questionnaire     Feeling of Stress : Patient declined   Housing Stability: Patient Declined (5/9/2024)    Housing Stability Vital Sign     Unable to Pay for Housing in the Last Year: Patient declined     Homeless in the Last Year: Patient declined     Review of patient's allergies indicates:   Allergen Reactions    Tramadol Nausea And Vomiting       Objective:   /70   Pulse 64   Ht 5' 4" (1.626 m)   Wt 100.6 kg (221 lb 12.5 oz)   LMP 12/26/2020   BMI 38.07 kg/m²   Immunization History   Administered Date(s) Administered    COVID-19, MRNA, LN-S, PF " (Pfizer) (Purple Cap) 03/06/2021, 03/28/2021, 12/26/2021    Hepatitis A / Hepatitis B 09/16/2021, 10/20/2021    Influenza - Quadrivalent - PF *Preferred* (6 months and older) 09/10/2020, 12/13/2021    Influenza - Trivalent (ADULT) 10/29/2008    Influenza Split 10/29/2008    Td (ADULT) 04/29/2008    Tdap 07/13/2018       Physical Exam   Constitutional: She is oriented to person, place, and time. No distress.   HENT:   Head: Normocephalic and atraumatic.   Pulmonary/Chest: Effort normal.   Abdominal: She exhibits no distension.   Musculoskeletal:         General: No swelling or tenderness. Normal range of motion.      Cervical back: Normal range of motion.   Lymphadenopathy:     She has no cervical adenopathy.   Neurological: She is alert and oriented to person, place, and time.   Skin: Skin is warm and dry. No rash noted.   Psychiatric: Mood normal.   Nursing note and vitals reviewed.    No synovitis, dactylitis, no enthesitis  No effusions of large or small joints  100% fist formation  Well preserved ROM    Left knee exam  FLOR  No effusion   TTP med comp  crepitus      Recent Results (from the past 672 hour(s))   Echo    Collection Time: 05/09/24 10:01 AM   Result Value Ref Range    BSA 2.15 m2    LVOT stroke volume 76.30 cm3    LVIDd 4.97 3.5 - 6.0 cm    LV Systolic Volume 41.28 mL    LV Systolic Volume Index 20.0 mL/m2    LVIDs 3.21 2.1 - 4.0 cm    LV Diastolic Volume 116.46 mL    LV Diastolic Volume Index 56.53 mL/m2    IVS 0.86 0.6 - 1.1 cm    LVOT diameter 2.00 cm    LVOT area 3.1 cm2    FS 35 28 - 44 %    Left Ventricle Relative Wall Thickness 0.43 cm    Posterior Wall 1.06 0.6 - 1.1 cm    LV mass 170.59 g    LV Mass Index 83 g/m2    MV Peak E Talon 1.03 m/s    TDI LATERAL 0.08 m/s    TDI SEPTAL 0.06 m/s    E/E' ratio 14.71 m/s    MV Peak A Talon 1.49 m/s    TR Max Talon 2.60 m/s    E/A ratio 0.69     IVRT 91.34 msec    E wave deceleration time 340.08 msec    LV SEPTAL E/E' RATIO 17.17 m/s    LV LATERAL E/E' RATIO  12.88 m/s    PV Peak S Talon 0.56 m/s    PV Peak D Talon 0.40 m/s    Pulm vein S/D ratio 1.40     LVOT peak talon 1.11 m/s    Left Ventricular Outflow Tract Mean Velocity 0.81 cm/s    Left Ventricular Outflow Tract Mean Gradient 2.88 mmHg    RVDD 4.52 cm    RVOT peak VTI 21.3 cm    TAPSE 2.02 cm    RV/LV Ratio 0.91 cm    LA size 4.14 cm    Left Atrium Minor Axis 6.40 cm    Left Atrium Major Axis 5.65 cm    LA volume (mod) 87.26 cm3    LA Volume Index (Mod) 42.4 mL/m2    RA Major Axis 5.80 cm    RA Width 4.46 cm    AV mean gradient 4 mmHg    AV peak gradient 7 mmHg    Ao peak talon 1.31 m/s    Ao VTI 28.60 cm    LVOT peak VTI 24.30 cm    AV valve area 2.67 cm²    AV Velocity Ratio 0.85     AV index (prosthetic) 0.85     BÁRBARA by Velocity Ratio 2.66 cm²    MV stenosis pressure 1/2 time 98.62 ms    MV valve area p 1/2 method 2.23 cm2    Triscuspid Valve Regurgitation Peak Gradient 27 mmHg    PV mean gradient 2 mmHg    PV PEAK VELOCITY 0.95 m/s    PV peak gradient 4 mmHg    Pulmonary Valve Mean Velocity 0.68 m/s    RVOT peak talon 0.77 m/s    Ao root annulus 2.70 cm    Sinus 2.68 cm    STJ 2.73 cm    Ascending aorta 3.17 cm    IVC diameter 1.26 cm    Mean e' 0.07 m/s    ZLVIDS -1.35     ZLVIDD -2.24     LA Volume Index 49.2 mL/m2    LA volume 101.38 cm3    LA WIDTH 4.8 cm    TV resting pulmonary artery pressure 30 mmHg    RV TB RVSP 6 mmHg    Est. RA pres 3 mmHg   HIV 1/2 Ag/Ab (4th Gen)    Collection Time: 05/09/24 10:55 AM   Result Value Ref Range    HIV 1/2 Ag/Ab Negative Negative   Hepatitis C Antibody    Collection Time: 05/09/24 10:55 AM   Result Value Ref Range    Hepatitis C Ab Negative Negative   HCV Virus Hold Specimen    Collection Time: 05/09/24 10:55 AM   Result Value Ref Range    HEP C Virus Hold Specimen Hold for HCV sendout    CBC auto differential    Collection Time: 05/09/24 10:55 AM   Result Value Ref Range    WBC 6.10 3.90 - 12.70 K/uL    RBC 5.18 4.00 - 5.40 M/uL    Hemoglobin 12.7 12.0 - 16.0 g/dL     Hematocrit 41.5 37.0 - 48.5 %    MCV 80 (L) 82 - 98 fL    MCH 24.5 (L) 27.0 - 31.0 pg    MCHC 30.6 (L) 32.0 - 36.0 g/dL    RDW 15.0 (H) 11.5 - 14.5 %    Platelets 371 150 - 450 K/uL    MPV 10.0 9.2 - 12.9 fL    Immature Granulocytes 0.3 0.0 - 0.5 %    Gran # (ANC) 2.9 1.8 - 7.7 K/uL    Immature Grans (Abs) 0.02 0.00 - 0.04 K/uL    Lymph # 2.4 1.0 - 4.8 K/uL    Mono # 0.6 0.3 - 1.0 K/uL    Eos # 0.2 0.0 - 0.5 K/uL    Baso # 0.05 0.00 - 0.20 K/uL    nRBC 0 0 /100 WBC    Gran % 47.9 38.0 - 73.0 %    Lymph % 39.0 18.0 - 48.0 %    Mono % 9.2 4.0 - 15.0 %    Eosinophil % 2.8 0.0 - 8.0 %    Basophil % 0.8 0.0 - 1.9 %    Differential Method Automated    Comprehensive metabolic panel    Collection Time: 05/09/24 10:55 AM   Result Value Ref Range    Sodium 140 136 - 145 mmol/L    Potassium 3.9 3.5 - 5.1 mmol/L    Chloride 106 95 - 110 mmol/L    CO2 24 23 - 29 mmol/L    Glucose 91 70 - 110 mg/dL    BUN 11 6 - 20 mg/dL    Creatinine 0.8 0.5 - 1.4 mg/dL    Calcium 10.2 8.7 - 10.5 mg/dL    Total Protein 7.9 6.0 - 8.4 g/dL    Albumin 3.7 3.5 - 5.2 g/dL    Total Bilirubin 0.4 0.1 - 1.0 mg/dL    Alkaline Phosphatase 70 55 - 135 U/L    AST 17 10 - 40 U/L    ALT 19 10 - 44 U/L    eGFR >60 >60 mL/min/1.73 m^2    Anion Gap 10 8 - 16 mmol/L   Troponin I #1    Collection Time: 05/09/24 10:55 AM   Result Value Ref Range    Troponin I <0.006 0.000 - 0.026 ng/mL   Protime-INR    Collection Time: 05/09/24 10:55 AM   Result Value Ref Range    Prothrombin Time 11.2 9.0 - 12.5 sec    INR 1.0 0.8 - 1.2   APTT    Collection Time: 05/09/24 10:55 AM   Result Value Ref Range    aPTT 31.1 21.0 - 32.0 sec   EKG 12-lead    Collection Time: 05/09/24 12:09 PM   Result Value Ref Range    QRS Duration 86 ms    [facility] QTC Calculation 425 ms   Comprehensive Metabolic Panel (CMP)    Collection Time: 05/10/24  5:59 AM   Result Value Ref Range    Sodium 140 136 - 145 mmol/L    Potassium 4.0 3.5 - 5.1 mmol/L    Chloride 107 95 - 110 mmol/L    CO2 24 23 - 29 mmol/L     Glucose 96 70 - 110 mg/dL    BUN 16 6 - 20 mg/dL    Creatinine 0.7 0.5 - 1.4 mg/dL    Calcium 9.4 8.7 - 10.5 mg/dL    Total Protein 6.6 6.0 - 8.4 g/dL    Albumin 3.4 (L) 3.5 - 5.2 g/dL    Total Bilirubin 0.7 0.1 - 1.0 mg/dL    Alkaline Phosphatase 60 55 - 135 U/L    AST 13 10 - 40 U/L    ALT 13 10 - 44 U/L    eGFR >60 >60 mL/min/1.73 m^2    Anion Gap 9 8 - 16 mmol/L   CBC with Automated Differential    Collection Time: 05/10/24  5:59 AM   Result Value Ref Range    WBC 5.33 3.90 - 12.70 K/uL    RBC 4.62 4.00 - 5.40 M/uL    Hemoglobin 11.6 (L) 12.0 - 16.0 g/dL    Hematocrit 37.2 37.0 - 48.5 %    MCV 81 (L) 82 - 98 fL    MCH 25.1 (L) 27.0 - 31.0 pg    MCHC 31.2 (L) 32.0 - 36.0 g/dL    RDW 14.9 (H) 11.5 - 14.5 %    Platelets 329 150 - 450 K/uL    MPV 10.0 9.2 - 12.9 fL    Immature Granulocytes 0.6 (H) 0.0 - 0.5 %    Gran # (ANC) 2.7 1.8 - 7.7 K/uL    Immature Grans (Abs) 0.03 0.00 - 0.04 K/uL    Lymph # 1.8 1.0 - 4.8 K/uL    Mono # 0.7 0.3 - 1.0 K/uL    Eos # 0.2 0.0 - 0.5 K/uL    Baso # 0.04 0.00 - 0.20 K/uL    nRBC 0 0 /100 WBC    Gran % 50.0 38.0 - 73.0 %    Lymph % 33.0 18.0 - 48.0 %    Mono % 12.4 4.0 - 15.0 %    Eosinophil % 3.2 0.0 - 8.0 %    Basophil % 0.8 0.0 - 1.9 %    Differential Method Automated    ISTAT PROCEDURE    Collection Time: 05/10/24 12:27 PM   Result Value Ref Range    POC PH 7.392 7.35 - 7.45    POC PCO2 40.9 35 - 45 mmHg    POC PO2 40 40 - 60 mmHg    POC HCO3 24.9 24 - 28 mmol/L    POC BE 0 -2 to 2 mmol/L    POC SATURATED O2 75 95 - 100 %    Sample VENOUS     Site Other     Allens Test N/A    Comprehensive Metabolic Panel (CMP)    Collection Time: 05/11/24  6:38 AM   Result Value Ref Range    Sodium 141 136 - 145 mmol/L    Potassium 3.9 3.5 - 5.1 mmol/L    Chloride 107 95 - 110 mmol/L    CO2 26 23 - 29 mmol/L    Glucose 101 70 - 110 mg/dL    BUN 14 6 - 20 mg/dL    Creatinine 0.8 0.5 - 1.4 mg/dL    Calcium 9.4 8.7 - 10.5 mg/dL    Total Protein 7.4 6.0 - 8.4 g/dL    Albumin 3.5 3.5 - 5.2 g/dL     Total Bilirubin 0.7 0.1 - 1.0 mg/dL    Alkaline Phosphatase 63 55 - 135 U/L    AST 13 10 - 40 U/L    ALT 12 10 - 44 U/L    eGFR >60 >60 mL/min/1.73 m^2    Anion Gap 8 8 - 16 mmol/L   CBC with Automated Differential    Collection Time: 05/11/24  6:38 AM   Result Value Ref Range    WBC 6.33 3.90 - 12.70 K/uL    RBC 4.90 4.00 - 5.40 M/uL    Hemoglobin 12.4 12.0 - 16.0 g/dL    Hematocrit 39.8 37.0 - 48.5 %    MCV 81 (L) 82 - 98 fL    MCH 25.3 (L) 27.0 - 31.0 pg    MCHC 31.2 (L) 32.0 - 36.0 g/dL    RDW 15.2 (H) 11.5 - 14.5 %    Platelets 374 150 - 450 K/uL    MPV 9.9 9.2 - 12.9 fL    Immature Granulocytes 0.6 (H) 0.0 - 0.5 %    Gran # (ANC) 3.2 1.8 - 7.7 K/uL    Immature Grans (Abs) 0.04 0.00 - 0.04 K/uL    Lymph # 2.3 1.0 - 4.8 K/uL    Mono # 0.6 0.3 - 1.0 K/uL    Eos # 0.2 0.0 - 0.5 K/uL    Baso # 0.06 0.00 - 0.20 K/uL    nRBC 0 0 /100 WBC    Gran % 51.2 38.0 - 73.0 %    Lymph % 35.7 18.0 - 48.0 %    Mono % 8.8 4.0 - 15.0 %    Eosinophil % 2.8 0.0 - 8.0 %    Basophil % 0.9 0.0 - 1.9 %    Differential Method Automated    Hemoglobin A1c    Collection Time: 05/11/24  6:38 AM   Result Value Ref Range    Hemoglobin A1C 5.7 (H) 4.0 - 5.6 %    Estimated Avg Glucose 117 68 - 131 mg/dL   APTT    Collection Time: 05/11/24  9:03 AM   Result Value Ref Range    aPTT 28.5 21.0 - 32.0 sec   Urinalysis, Reflex to Urine Culture Urine, Clean Catch    Collection Time: 05/11/24  2:49 PM    Specimen: Urine   Result Value Ref Range    Specimen UA Urine, Clean Catch     Color, UA Yellow Yellow, Straw, Cata    Appearance, UA Clear Clear    pH, UA 6.0 5.0 - 8.0    Specific Gravity, UA 1.020 1.005 - 1.030    Protein, UA Negative Negative    Glucose, UA Negative Negative    Ketones, UA Negative Negative    Bilirubin (UA) Negative Negative    Occult Blood UA Negative Negative    Nitrite, UA Negative Negative    Urobilinogen, UA Negative <2.0 EU/dL    Leukocytes, UA 1+ (A) Negative   Urinalysis Microscopic    Collection Time: 05/11/24  2:49 PM    Result Value Ref Range    RBC, UA 1 0 - 4 /hpf    WBC, UA 2 0 - 5 /hpf    Squam Epithel, UA 2 /hpf    Unclass Rosanne UA Rare None-Moderate    Microscopic Comment SEE COMMENT    Comprehensive Metabolic Panel (CMP)    Collection Time: 05/12/24  7:38 AM   Result Value Ref Range    Sodium 140 136 - 145 mmol/L    Potassium 4.0 3.5 - 5.1 mmol/L    Chloride 109 95 - 110 mmol/L    CO2 22 (L) 23 - 29 mmol/L    Glucose 96 70 - 110 mg/dL    BUN 11 6 - 20 mg/dL    Creatinine 0.8 0.5 - 1.4 mg/dL    Calcium 9.5 8.7 - 10.5 mg/dL    Total Protein 7.3 6.0 - 8.4 g/dL    Albumin 3.4 (L) 3.5 - 5.2 g/dL    Total Bilirubin 0.6 0.1 - 1.0 mg/dL    Alkaline Phosphatase 60 55 - 135 U/L    AST 13 10 - 40 U/L    ALT 11 10 - 44 U/L    eGFR >60 >60 mL/min/1.73 m^2    Anion Gap 9 8 - 16 mmol/L   CBC with Automated Differential    Collection Time: 05/12/24  7:38 AM   Result Value Ref Range    WBC 5.41 3.90 - 12.70 K/uL    RBC 4.93 4.00 - 5.40 M/uL    Hemoglobin 12.4 12.0 - 16.0 g/dL    Hematocrit 39.9 37.0 - 48.5 %    MCV 81 (L) 82 - 98 fL    MCH 25.2 (L) 27.0 - 31.0 pg    MCHC 31.1 (L) 32.0 - 36.0 g/dL    RDW 15.1 (H) 11.5 - 14.5 %    Platelets 348 150 - 450 K/uL    MPV 10.4 9.2 - 12.9 fL    Immature Granulocytes 0.4 0.0 - 0.5 %    Gran # (ANC) 2.8 1.8 - 7.7 K/uL    Immature Grans (Abs) 0.02 0.00 - 0.04 K/uL    Lymph # 1.7 1.0 - 4.8 K/uL    Mono # 0.6 0.3 - 1.0 K/uL    Eos # 0.2 0.0 - 0.5 K/uL    Baso # 0.04 0.00 - 0.20 K/uL    nRBC 0 0 /100 WBC    Gran % 52.5 38.0 - 73.0 %    Lymph % 32.2 18.0 - 48.0 %    Mono % 10.7 4.0 - 15.0 %    Eosinophil % 3.5 0.0 - 8.0 %    Basophil % 0.7 0.0 - 1.9 %    Differential Method Automated    Prepare RBC 2 Units; cabg    Collection Time: 05/12/24  7:40 AM   Result Value Ref Range    UNIT NUMBER [id number]     Product Code Y9932M19     DISPENSE STATUS RETURNED     CODING SYSTEM [id number]     Unit Blood Type Code 5100     Unit Blood Type O POS     Unit Expiration [id number]     CROSSMATCH INTERPRETATION  Compatible     UNIT NUMBER [id number]     Product Code N8552R61     DISPENSE STATUS RETURNED     CODING SYSTEM [id number]     Unit Blood Type Code 5100     Unit Blood Type O POS     Unit Expiration [id number]     CROSSMATCH INTERPRETATION Compatible    Type & Screen    Collection Time: 05/12/24  7:40 AM   Result Value Ref Range    Group & Rh O POS     Indirect Francy NEG     Specimen Outdate 05/15/2024 23:59    Prepare RBC    Collection Time: 05/12/24  7:40 AM   Result Value Ref Range    UNIT NUMBER [id number]     Product Code H6378E87     DISPENSE STATUS RETURNED     CODING SYSTEM VHYM919     Unit Blood Type Code 5100     Unit Blood Type O POS     Unit Expiration [id number]     CROSSMATCH INTERPRETATION Compatible     UNIT NUMBER [id number]     Product Code U9119D23     DISPENSE STATUS RETURNED     CODING SYSTEM [id number]     Unit Blood Type Code 5100     Unit Blood Type O POS     Unit Expiration [id number]     CROSSMATCH INTERPRETATION Compatible    Comprehensive metabolic panel    Collection Time: 05/12/24  5:56 PM   Result Value Ref Range    Sodium 139 136 - 145 mmol/L    Potassium 3.9 3.5 - 5.1 mmol/L    Chloride 105 95 - 110 mmol/L    CO2 27 23 - 29 mmol/L    Glucose 130 (H) 70 - 110 mg/dL    BUN 12 6 - 20 mg/dL    Creatinine 0.8 0.5 - 1.4 mg/dL    Calcium 9.5 8.7 - 10.5 mg/dL    Total Protein 7.2 6.0 - 8.4 g/dL    Albumin 3.4 (L) 3.5 - 5.2 g/dL    Total Bilirubin 0.4 0.1 - 1.0 mg/dL    Alkaline Phosphatase 62 55 - 135 U/L    AST 13 10 - 40 U/L    ALT 12 10 - 44 U/L    eGFR >60 >60 mL/min/1.73 m^2    Anion Gap 7 (L) 8 - 16 mmol/L   CBC auto differential    Collection Time: 05/12/24  5:56 PM   Result Value Ref Range    WBC 6.05 3.90 - 12.70 K/uL    RBC 4.75 4.00 - 5.40 M/uL    Hemoglobin 11.9 (L) 12.0 - 16.0 g/dL    Hematocrit 38.5 37.0 - 48.5 %    MCV 81 (L) 82 - 98 fL    MCH 25.1 (L) 27.0 - 31.0 pg    MCHC 30.9 (L) 32.0 - 36.0 g/dL    RDW 14.9 (H) 11.5 - 14.5 %    Platelets 349 150 - 450 K/uL    MPV  10.3 9.2 - 12.9 fL    Immature Granulocytes 0.3 0.0 - 0.5 %    Gran # (ANC) 3.4 1.8 - 7.7 K/uL    Immature Grans (Abs) 0.02 0.00 - 0.04 K/uL    Lymph # 1.9 1.0 - 4.8 K/uL    Mono # 0.5 0.3 - 1.0 K/uL    Eos # 0.2 0.0 - 0.5 K/uL    Baso # 0.04 0.00 - 0.20 K/uL    nRBC 0 0 /100 WBC    Gran % 56.2 38.0 - 73.0 %    Lymph % 30.9 18.0 - 48.0 %    Mono % 8.6 4.0 - 15.0 %    Eosinophil % 3.3 0.0 - 8.0 %    Basophil % 0.7 0.0 - 1.9 %    Differential Method Automated    Protime-INR    Collection Time: 05/12/24  5:56 PM   Result Value Ref Range    Prothrombin Time 11.3 9.0 - 12.5 sec    INR 1.0 0.8 - 1.2   ISTAT PROCEDURE    Collection Time: 05/12/24  6:32 PM   Result Value Ref Range    POC PH 7.433 7.35 - 7.45    POC PCO2 36.7 35 - 45 mmHg    POC PO2 80 80 - 100 mmHg    POC HCO3 24.6 24 - 28 mmol/L    POC BE 0 -2 to 2 mmol/L    POC SATURATED O2 96 95 - 100 %    Sample ARTERIAL     Site RR     Allens Test Pass     DelSys Room Air     Mode SPONT    ISTAT ACT-K    Collection Time: 05/13/24  7:49 AM   Result Value Ref Range    POC ACTIVATED CLOTTING TIME K 158 (H) 74 - 137 sec    Sample unknown    ISTAT PROCEDURE    Collection Time: 05/13/24  7:49 AM   Result Value Ref Range    POC PH 7.407 7.35 - 7.45    POC PCO2 39.3 35 - 45 mmHg    POC PO2 315 (H) 80 - 100 mmHg    POC HCO3 24.7 24 - 28 mmol/L    POC BE 0 -2 to 2 mmol/L    POC SATURATED O2 100 95 - 100 %    POC Glucose 120 (H) 70 - 110 mg/dL    POC Sodium 141 136 - 145 mmol/L    POC Potassium 3.3 (L) 3.5 - 5.1 mmol/L    POC Ionized Calcium 1.26 1.06 - 1.42 mmol/L    POC Hematocrit 35 (L) 36 - 54 %PCV    Sample ARTERIAL     Site [place]     Allens Test N/A    TEG    Collection Time: 05/13/24  7:50 AM   Result Value Ref Range    Citrated Kaolin R 5.900 Min    Citrated Kaolin K 0.900 Min    Citrated Kaolin Angle 77.30 deg    Citrated Kaolin MA 68 mm    Citrated Rapid TEG MA 68.90 mm    Citrated Kaolin Heparinase R 6.500 Min    Citrated Functional Fibrinogen MA 33.60 mm     Citrated Functional Fibrinogen FLEV 613.10 mg/dL   ISTAT PROCEDURE    Collection Time: 05/13/24  8:31 AM   Result Value Ref Range    POC PH 7.404 7.35 - 7.45    POC PCO2 36.2 35 - 45 mmHg    POC PO2 325 (H) 80 - 100 mmHg    POC HCO3 22.6 (L) 24 - 28 mmol/L    POC BE -2 -2 to 2 mmol/L    POC SATURATED O2 100 95 - 100 %    POC Glucose 128 (H) 70 - 110 mg/dL    POC Sodium 142 136 - 145 mmol/L    POC Potassium 3.2 (L) 3.5 - 5.1 mmol/L    POC Ionized Calcium 1.13 1.06 - 1.42 mmol/L    POC Hematocrit 32 (L) 36 - 54 %PCV    Sample ARTERIAL     Site [place]     Allens Test N/A    ISTAT ACT-K    Collection Time: 05/13/24  8:32 AM   Result Value Ref Range    POC ACTIVATED CLOTTING TIME K 601 (H) 74 - 137 sec    Sample unknown    ISTAT ACT-K    Collection Time: 05/13/24  9:09 AM   Result Value Ref Range    POC ACTIVATED CLOTTING TIME K 590 (H) 74 - 137 sec    Sample unknown    ISTAT PROCEDURE    Collection Time: 05/13/24  9:10 AM   Result Value Ref Range    POC PH 7.435 7.35 - 7.45    POC PCO2 32.6 (L) 35 - 45 mmHg    POC PO2 425 (H) 80 - 100 mmHg    POC HCO3 21.9 (L) 24 - 28 mmol/L    POC BE -2 -2 to 2 mmol/L    POC SATURATED O2 100 95 - 100 %    POC Glucose 181 (H) 70 - 110 mg/dL    POC Sodium 136 136 - 145 mmol/L    POC Potassium 4.2 3.5 - 5.1 mmol/L    POC Ionized Calcium 1.08 1.06 - 1.42 mmol/L    POC Hematocrit 26 (L) 36 - 54 %PCV    Sample ARTERIAL     Site [facility]     Allens Test N/A    Specimen to Pathology, Surgery Cardiovascular    Collection Time: 05/13/24  9:30 AM   Result Value Ref Range    Final Pathologic Diagnosis       1. MARGIN:  FOCAL MYXOMA PRESENT    2. NEW MARGIN:  MYOCARDIAL TISSUE WITH NO MYXOMA IDENTIFIED    3. 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    Frozen Section Diagnosis       1. Atrium myxoma margin - focal myxomatous tissue; consistent with residual  cardiac myxoma    2. New myxoma margin - no evidence of myxoma    Dr. Annelise Alas reviewed the F/S and concurs in the diagnosis      Gross       1: Surgery ID:  [id number]    Pathology ID:  [id number]  1. Received fresh and subsequently placed in formalin labeled &quot;myxoma margin&quot; are multiple fragments of tan-pink tissue aggregating to 0.5 x 0.5 x 0.3 cm.  The specimen is submitted entirely for frozen section with the frozen section remnant   submitted in cassette 1A.    ZIL--[id number]        Grossed by: Gonzalez Cervantes MS, PA(ASCP)   2: Surgery ID:  [id number]    Pathology ID:  [id number]  2. Received fresh and subsequently placed in formalin labeled &quot;new myxoma margin&quot; is a 0.9 x 0.5 x 0.3 cm portion of tan-pink, rubbery tissue.  The specimen is submitted entirely for frozen section with the frozen section remnant submitted in   cassette 2A.    RUT--2-A-FS        Grossed by: Gonzalez Cervantes MS PA(ASCP)   3: Surgery ID:  [id number]   Pathology ID:  [id number]  3. Received in formalin labeled &quot;myxoma&quot; is an 11 g, 3.42.9 x 2.4 cm mottled tan-brown to pink-red, nodular, soft to gelatinous portion of tissue.  Se ctioning reveals a tan-pink to pink-red, soft cut surface.  Representative sections are   submitted in cassettes 3A-3D.    AIR--3-A  YRX--3-B  SBH--3-C  ADU--3-D        Grossed by: Gonzalez Cervantes MS, PA(ASCP)      Frozen Section Footnote       Frozen section performed at [facility], [address] [place] [address] [place], [id number]    Disclaimer       Unless the case is a 'gross only' or additional testing only, the final diagnosis for each specimen is based on a microscopic examination of appropriate tissue sections.   ISTAT ACT-K    Collection Time: 05/13/24  9:51 AM   Result Value Ref Range    POC ACTIVATED CLOTTING TIME K 450 (H) 74 - 137 sec    Sample unknown    ISTAT PROCEDURE    Collection Time: 05/13/24  9:52 AM   Result Value Ref Range    POC  PH 7.349 (L) 7.35 - 7.45    POC PCO2 45.3 (H) 35 - 45 mmHg    POC PO2 375 (H) 80 - 100 mmHg    POC HCO3 25.0 24 - 28 mmol/L    POC BE -1 -2 to 2 mmol/L    POC SATURATED O2 100 95 - 100 %    POC Glucose 254 (H) 70 - 110 mg/dL    POC Sodium 138 136 - 145 mmol/L    POC Potassium 4.1 3.5 - 5.1 mmol/L    POC Ionized Calcium 1.10 1.06 - 1.42 mmol/L    POC Hematocrit 23 (L) 36 - 54 %PCV    Sample ARTERIAL     Site Other     Allens Test N/A    Bedside Pulmonary Spirometry - RT Once    Collection Time: 05/13/24 10:25 AM   Result Value Ref Range    Interpretation       Spirometry shows mild restriction based on the reduction in FVC.   Â   Notes:  Â   The vital capacity may be underestimated due to the short expiratory time. Consider lung volume determination to confirm the degree of restriction.   Â   flow loops poor quality      Pre FVC 1.98 (L) 2.00 - 3.45 L    Pre FEV1 1.40 (L) 1.57 - 2.71 L    Pre FEV1 FVC 70.59 68.05 - 89.78 %    Pre FEF 25 75 1.35 0.86 - 3.62 L/s    Pre PEF 1.88 (L) 3.60 - 7.61 L/s    Pre  4.62 sec    FVC Ref 2.71     FVC LLN 2.00     FVC Pre Ref 73.1 %    FEV1 Ref 2.15     FEV1 LLN 1.57     FEV1 Pre Ref 65.0 %    FEV1 FVC Ref 80     FEV1 FVC LLN 68     FEV1 FVC Pre Ref 88.5 %    FEF 25 75 Ref 2.00     FEF 25 75 LLN 0.86     FEF 25 75 Pre Ref 67.6 %    PEF Ref 5.60     PEF LLN 3.60     PEF Pre Ref 33.6 %   TEG    Collection Time: 05/13/24 10:26 AM   Result Value Ref Range    Citrated Kaolin R 6.700 Min    Citrated Kaolin K 0.800 Min    Citrated Kaolin Angle 77.50 deg    Citrated Kaolin MA 68 mm    Citrated Rapid TEG MA 66.80 mm    Citrated Kaolin Heparinase R 5.000 Min    Citrated Functional Fibrinogen MA 28.60 mm    Citrated Functional Fibrinogen FLEV 521.90 mg/dL   ISTAT ACT-K    Collection Time: 05/13/24 10:28 AM   Result Value Ref Range    POC ACTIVATED CLOTTING TIME K 131 74 - 137 sec    Sample unknown    ISTAT PROCEDURE    Collection Time: 05/13/24 10:28 AM   Result Value Ref Range    POC  PH 7.366 7.35 - 7.45    POC PCO2 37.7 35 - 45 mmHg    POC PO2 342 (H) 80 - 100 mmHg    POC HCO3 21.6 (L) 24 - 28 mmol/L    POC BE -4 (L) -2 to 2 mmol/L    POC SATURATED O2 100 95 - 100 %    POC Glucose 222 (H) 70 - 110 mg/dL    POC Sodium 140 136 - 145 mmol/L    POC Potassium 3.4 (L) 3.5 - 5.1 mmol/L    POC Ionized Calcium 2.07 (H) 1.06 - 1.42 mmol/L    POC Hematocrit 24 (L) 36 - 54 %PCV    Sample ARTERIAL     Site Other     Allens Test N/A    POCT glucose    Collection Time: 05/13/24 11:25 AM   Result Value Ref Range    POCT Glucose 160 (H) 70 - 110 mg/dL   Magnesium    Collection Time: 05/13/24 11:26 AM   Result Value Ref Range    Magnesium 2.6 1.6 - 2.6 mg/dL   CBC Without Differential    Collection Time: 05/13/24 11:26 AM   Result Value Ref Range    WBC 32.08 (H) 3.90 - 12.70 K/uL    RBC 4.28 4.00 - 5.40 M/uL    Hemoglobin 11.0 (L) 12.0 - 16.0 g/dL    Hematocrit 34.8 (L) 37.0 - 48.5 %    MCV 81 (L) 82 - 98 fL    MCH 25.7 (L) 27.0 - 31.0 pg    MCHC 31.6 (L) 32.0 - 36.0 g/dL    RDW 14.9 (H) 11.5 - 14.5 %    Platelets 306 150 - 450 K/uL    MPV 10.4 9.2 - 12.9 fL   Comprehensive Metabolic Panel    Collection Time: 05/13/24 11:26 AM   Result Value Ref Range    Sodium 141 136 - 145 mmol/L    Potassium 3.5 3.5 - 5.1 mmol/L    Chloride 110 95 - 110 mmol/L    CO2 22 (L) 23 - 29 mmol/L    Glucose 176 (H) 70 - 110 mg/dL    BUN 11 6 - 20 mg/dL    Creatinine 0.8 0.5 - 1.4 mg/dL    Calcium 8.7 8.7 - 10.5 mg/dL    Total Protein 5.3 (L) 6.0 - 8.4 g/dL    Albumin 2.6 (L) 3.5 - 5.2 g/dL    Total Bilirubin 0.5 0.1 - 1.0 mg/dL    Alkaline Phosphatase 49 (L) 55 - 135 U/L    AST 35 10 - 40 U/L    ALT 15 10 - 44 U/L    eGFR >60 >60 mL/min/1.73 m^2    Anion Gap 9 8 - 16 mmol/L   Protime-INR    Collection Time: 05/13/24 11:26 AM   Result Value Ref Range    Prothrombin Time 12.4 9.0 - 12.5 sec    INR 1.1 0.8 - 1.2   APTT    Collection Time: 05/13/24 11:26 AM   Result Value Ref Range    aPTT 28.2 21.0 - 32.0 sec   Fibrinogen    Collection  Time: 05/13/24 11:26 AM   Result Value Ref Range    Fibrinogen 324 182 - 400 mg/dL   ISTAT PROCEDURE    Collection Time: 05/13/24 11:28 AM   Result Value Ref Range    POC PH 7.318 (L) 7.35 - 7.45    POC PCO2 40.4 35 - 45 mmHg    POC PO2 118 (H) 80 - 100 mmHg    POC HCO3 20.7 (L) 24 - 28 mmol/L    POC BE -5 (L) -2 to 2 mmol/L    POC SATURATED O2 98 95 - 100 %    POC Glucose 172 (H) 70 - 110 mg/dL    POC Sodium 142 136 - 145 mmol/L    POC Potassium 3.2 (L) 3.5 - 5.1 mmol/L    POC Ionized Calcium 1.30 1.06 - 1.42 mmol/L    POC Hematocrit 33 (L) 36 - 54 %PCV    Rate 20     Sample ARTERIAL     Site Ernesto/UAC     Allens Test N/A     DelSys Adult Vent     Mode AC/PRVC     Vt 400     PEEP 5     FiO2 100    POCT glucose    Collection Time: 05/13/24  1:05 PM   Result Value Ref Range    POCT Glucose 162 (H) 70 - 110 mg/dL   POCT glucose    Collection Time: 05/13/24  2:01 PM   Result Value Ref Range    POCT Glucose 146 (H) 70 - 110 mg/dL   ISTAT PROCEDURE    Collection Time: 05/13/24  2:44 PM   Result Value Ref Range    POC PH 7.361 7.35 - 7.45    POC PCO2 39.8 35 - 45 mmHg    POC PO2 84 80 - 100 mmHg    POC HCO3 22.5 (L) 24 - 28 mmol/L    POC BE -3 (L) -2 to 2 mmol/L    POC SATURATED O2 96 95 - 100 %    POC Glucose 161 (H) 70 - 110 mg/dL    POC Sodium 142 136 - 145 mmol/L    POC Potassium 4.5 3.5 - 5.1 mmol/L    POC Ionized Calcium 1.23 1.06 - 1.42 mmol/L    POC Hematocrit 32 (L) 36 - 54 %PCV    Sample ARTERIAL     Site Ernesto/UAC     Allens Test N/A     DelSys Adult Vent     Mode PSV     PEEP 5     PS 7     FiO2 40    POCT glucose    Collection Time: 05/13/24  3:02 PM   Result Value Ref Range    POCT Glucose 157 (H) 70 - 110 mg/dL   POCT glucose    Collection Time: 05/13/24  3:59 PM   Result Value Ref Range    POCT Glucose 157 (H) 70 - 110 mg/dL   Magnesium    Collection Time: 05/13/24  5:04 PM   Result Value Ref Range    Magnesium 2.1 1.6 - 2.6 mg/dL   Basic metabolic panel    Collection Time: 05/13/24  5:04 PM   Result Value  Ref Range    Sodium 140 136 - 145 mmol/L    Potassium 4.4 3.5 - 5.1 mmol/L    Chloride 112 (H) 95 - 110 mmol/L    CO2 19 (L) 23 - 29 mmol/L    Glucose 206 (H) 70 - 110 mg/dL    BUN 10 6 - 20 mg/dL    Creatinine 0.7 0.5 - 1.4 mg/dL    Calcium 8.3 (L) 8.7 - 10.5 mg/dL    Anion Gap 9 8 - 16 mmol/L    eGFR >60 >60 mL/min/1.73 m^2   POCT glucose    Collection Time: 05/13/24  5:06 PM   Result Value Ref Range    POCT Glucose 190 (H) 70 - 110 mg/dL   POCT glucose    Collection Time: 05/13/24  6:00 PM   Result Value Ref Range    POCT Glucose 219 (H) 70 - 110 mg/dL   POCT glucose    Collection Time: 05/13/24  7:32 PM   Result Value Ref Range    POCT Glucose 144 (H) 70 - 110 mg/dL   POCT glucose    Collection Time: 05/13/24  8:29 PM   Result Value Ref Range    POCT Glucose 170 (H) 70 - 110 mg/dL   POCT glucose    Collection Time: 05/13/24  9:38 PM   Result Value Ref Range    POCT Glucose 215 (H) 70 - 110 mg/dL   POCT glucose    Collection Time: 05/13/24 11:02 PM   Result Value Ref Range    POCT Glucose 198 (H) 70 - 110 mg/dL   POCT glucose    Collection Time: 05/14/24 12:27 AM   Result Value Ref Range    POCT Glucose 200 (H) 70 - 110 mg/dL   POCT glucose    Collection Time: 05/14/24  2:04 AM   Result Value Ref Range    POCT Glucose 206 (H) 70 - 110 mg/dL   POCT glucose    Collection Time: 05/14/24  3:06 AM   Result Value Ref Range    POCT Glucose 205 (H) 70 - 110 mg/dL   POCT glucose    Collection Time: 05/14/24  4:18 AM   Result Value Ref Range    POCT Glucose 166 (H) 70 - 110 mg/dL   CBC Without Differential    Collection Time: 05/14/24  5:00 AM   Result Value Ref Range    WBC 15.84 (H) 3.90 - 12.70 K/uL    RBC 3.70 (L) 4.00 - 5.40 M/uL    Hemoglobin 9.4 (L) 12.0 - 16.0 g/dL    Hematocrit 29.8 (L) 37.0 - 48.5 %    MCV 81 (L) 82 - 98 fL    MCH 25.4 (L) 27.0 - 31.0 pg    MCHC 31.5 (L) 32.0 - 36.0 g/dL    RDW 14.9 (H) 11.5 - 14.5 %    Platelets 272 150 - 450 K/uL    MPV 10.5 9.2 - 12.9 fL   Comprehensive Metabolic Panel     Collection Time: 05/14/24  5:00 AM   Result Value Ref Range    Sodium 140 136 - 145 mmol/L    Potassium 3.2 (L) 3.5 - 5.1 mmol/L    Chloride 109 95 - 110 mmol/L    CO2 21 (L) 23 - 29 mmol/L    Glucose 156 (H) 70 - 110 mg/dL    BUN 12 6 - 20 mg/dL    Creatinine 0.7 0.5 - 1.4 mg/dL    Calcium 8.3 (L) 8.7 - 10.5 mg/dL    Total Protein 5.5 (L) 6.0 - 8.4 g/dL    Albumin 3.6 3.5 - 5.2 g/dL    Total Bilirubin 0.3 0.1 - 1.0 mg/dL    Alkaline Phosphatase 37 (L) 55 - 135 U/L    AST 24 10 - 40 U/L    ALT 11 10 - 44 U/L    eGFR >60 >60 mL/min/1.73 m^2    Anion Gap 10 8 - 16 mmol/L   Protime-INR    Collection Time: 05/14/24  5:00 AM   Result Value Ref Range    Prothrombin Time 12.2 9.0 - 12.5 sec    INR 1.1 0.8 - 1.2   APTT    Collection Time: 05/14/24  5:00 AM   Result Value Ref Range    aPTT 30.5 21.0 - 32.0 sec   Magnesium    Collection Time: 05/14/24  5:00 AM   Result Value Ref Range    Magnesium 2.6 1.6 - 2.6 mg/dL   POCT glucose    Collection Time: 05/14/24  5:01 AM   Result Value Ref Range    POCT Glucose 152 (H) 70 - 110 mg/dL   POCT glucose    Collection Time: 05/14/24  6:07 AM   Result Value Ref Range    POCT Glucose 119 (H) 70 - 110 mg/dL   POCT glucose    Collection Time: 05/14/24  7:19 AM   Result Value Ref Range    POCT Glucose 129 (H) 70 - 110 mg/dL   POCT glucose    Collection Time: 05/14/24 11:21 AM   Result Value Ref Range    POCT Glucose 126 (H) 70 - 110 mg/dL   Potassium    Collection Time: 05/14/24 12:51 PM   Result Value Ref Range    Potassium 4.6 3.5 - 5.1 mmol/L   Magnesium    Collection Time: 05/14/24  5:18 PM   Result Value Ref Range    Magnesium 2.4 1.6 - 2.6 mg/dL   Basic Metabolic Panel    Collection Time: 05/14/24  5:18 PM   Result Value Ref Range    Sodium 137 136 - 145 mmol/L    Potassium 4.7 3.5 - 5.1 mmol/L    Chloride 106 95 - 110 mmol/L    CO2 26 23 - 29 mmol/L    Glucose 127 (H) 70 - 110 mg/dL    BUN 14 6 - 20 mg/dL    Creatinine 0.9 0.5 - 1.4 mg/dL    Calcium 9.0 8.7 - 10.5 mg/dL    Anion  Gap 5 (L) 8 - 16 mmol/L    eGFR >60 >60 mL/min/1.73 m^2   POCT glucose    Collection Time: 05/14/24  5:44 PM   Result Value Ref Range    POCT Glucose 116 (H) 70 - 110 mg/dL   POCT glucose    Collection Time: 05/14/24  8:46 PM   Result Value Ref Range    POCT Glucose 120 (H) 70 - 110 mg/dL   CBC Without Differential    Collection Time: 05/15/24  5:02 AM   Result Value Ref Range    WBC 14.77 (H) 3.90 - 12.70 K/uL    RBC 3.49 (L) 4.00 - 5.40 M/uL    Hemoglobin 8.9 (L) 12.0 - 16.0 g/dL    Hematocrit 28.4 (L) 37.0 - 48.5 %    MCV 81 (L) 82 - 98 fL    MCH 25.5 (L) 27.0 - 31.0 pg    MCHC 31.3 (L) 32.0 - 36.0 g/dL    RDW 15.5 (H) 11.5 - 14.5 %    Platelets 182 150 - 450 K/uL    MPV 10.1 9.2 - 12.9 fL   Comprehensive Metabolic Panel    Collection Time: 05/15/24  5:02 AM   Result Value Ref Range    Sodium 136 136 - 145 mmol/L    Potassium 4.7 3.5 - 5.1 mmol/L    Chloride 106 95 - 110 mmol/L    CO2 25 23 - 29 mmol/L    Glucose 122 (H) 70 - 110 mg/dL    BUN 17 6 - 20 mg/dL    Creatinine 0.8 0.5 - 1.4 mg/dL    Calcium 9.4 8.7 - 10.5 mg/dL    Total Protein 6.1 6.0 - 8.4 g/dL    Albumin 3.6 3.5 - 5.2 g/dL    Total Bilirubin 0.6 0.1 - 1.0 mg/dL    Alkaline Phosphatase 36 (L) 55 - 135 U/L    AST 21 10 - 40 U/L    ALT 9 (L) 10 - 44 U/L    eGFR >60 >60 mL/min/1.73 m^2    Anion Gap 5 (L) 8 - 16 mmol/L   Magnesium    Collection Time: 05/15/24  5:02 AM   Result Value Ref Range    Magnesium 2.3 1.6 - 2.6 mg/dL   POCT glucose    Collection Time: 05/15/24  6:25 AM   Result Value Ref Range    POCT Glucose 99 70 - 110 mg/dL   POCT glucose    Collection Time: 05/15/24  7:30 AM   Result Value Ref Range    POCT Glucose 102 70 - 110 mg/dL   POCT glucose    Collection Time: 05/15/24 11:32 AM   Result Value Ref Range    POCT Glucose 92 70 - 110 mg/dL   POCT glucose    Collection Time: 05/15/24  4:04 PM   Result Value Ref Range    POCT Glucose 93 70 - 110 mg/dL   Magnesium    Collection Time: 05/15/24  5:02 PM   Result Value Ref Range    Magnesium  2.3 1.6 - 2.6 mg/dL   POCT glucose    Collection Time: 05/15/24  8:58 PM   Result Value Ref Range    POCT Glucose 114 (H) 70 - 110 mg/dL   CBC Without Differential    Collection Time: 05/16/24  5:05 AM   Result Value Ref Range    WBC 11.40 3.90 - 12.70 K/uL    RBC 3.69 (L) 4.00 - 5.40 M/uL    Hemoglobin 9.3 (L) 12.0 - 16.0 g/dL    Hematocrit 30.7 (L) 37.0 - 48.5 %    MCV 83 82 - 98 fL    MCH 25.2 (L) 27.0 - 31.0 pg    MCHC 30.3 (L) 32.0 - 36.0 g/dL    RDW 15.5 (H) 11.5 - 14.5 %    Platelets 264 150 - 450 K/uL    MPV 10.3 9.2 - 12.9 fL   Comprehensive Metabolic Panel    Collection Time: 05/16/24  5:05 AM   Result Value Ref Range    Sodium 140 136 - 145 mmol/L    Potassium 4.2 3.5 - 5.1 mmol/L    Chloride 103 95 - 110 mmol/L    CO2 25 23 - 29 mmol/L    Glucose 91 70 - 110 mg/dL    BUN 24 (H) 6 - 20 mg/dL    Creatinine 0.7 0.5 - 1.4 mg/dL    Calcium 9.5 8.7 - 10.5 mg/dL    Total Protein 6.6 6.0 - 8.4 g/dL    Albumin 3.5 3.5 - 5.2 g/dL    Total Bilirubin 0.5 0.1 - 1.0 mg/dL    Alkaline Phosphatase 44 (L) 55 - 135 U/L    AST 19 10 - 40 U/L    ALT 11 10 - 44 U/L    eGFR >60 >60 mL/min/1.73 m^2    Anion Gap 12 8 - 16 mmol/L   Platelet Review    Collection Time: 05/16/24  5:05 AM   Result Value Ref Range    Platelet Estimate Appears normal    Magnesium    Collection Time: 05/16/24  5:06 AM   Result Value Ref Range    Magnesium 2.2 1.6 - 2.6 mg/dL   POCT glucose    Collection Time: 05/16/24  5:32 AM   Result Value Ref Range    POCT Glucose 77 70 - 110 mg/dL   POCT glucose    Collection Time: 05/16/24 11:17 AM   Result Value Ref Range    POCT Glucose 91 70 - 110 mg/dL   POCT glucose    Collection Time: 05/16/24  4:09 PM   Result Value Ref Range    POCT Glucose 111 (H) 70 - 110 mg/dL   Magnesium    Collection Time: 05/16/24  5:17 PM   Result Value Ref Range    Magnesium 2.1 1.6 - 2.6 mg/dL   POCT glucose    Collection Time: 05/16/24  8:48 PM   Result Value Ref Range    POCT Glucose 109 70 - 110 mg/dL   Magnesium    Collection  Time: 05/17/24  6:42 AM   Result Value Ref Range    Magnesium 2.0 1.6 - 2.6 mg/dL   EKG 12-LEAD    Collection Time: 05/18/24 10:39 AM   Result Value Ref Range    QRS Duration 86 ms    OHS QTC Calculation 452 ms   CBC auto differential    Collection Time: 05/18/24 11:22 AM   Result Value Ref Range    WBC 6.91 3.90 - 12.70 K/uL    RBC 3.77 (L) 4.00 - 5.40 M/uL    Hemoglobin 9.5 (L) 12.0 - 16.0 g/dL    Hematocrit 30.2 (L) 37.0 - 48.5 %    MCV 80 (L) 82 - 98 fL    MCH 25.2 (L) 27.0 - 31.0 pg    MCHC 31.5 (L) 32.0 - 36.0 g/dL    RDW 14.9 (H) 11.5 - 14.5 %    Platelets 366 150 - 450 K/uL    MPV 9.2 9.2 - 12.9 fL    Immature Granulocytes 2.6 (H) 0.0 - 0.5 %    Gran # (ANC) 4.4 1.8 - 7.7 K/uL    Immature Grans (Abs) 0.18 (H) 0.00 - 0.04 K/uL    Lymph # 1.3 1.0 - 4.8 K/uL    Mono # 0.7 0.3 - 1.0 K/uL    Eos # 0.2 0.0 - 0.5 K/uL    Baso # 0.07 0.00 - 0.20 K/uL    nRBC 0 0 /100 WBC    Gran % 64.1 38.0 - 73.0 %    Lymph % 19.1 18.0 - 48.0 %    Mono % 10.0 4.0 - 15.0 %    Eosinophil % 3.2 0.0 - 8.0 %    Basophil % 1.0 0.0 - 1.9 %    Platelet Estimate Appears normal     Differential Method Automated    Comprehensive metabolic panel    Collection Time: 05/18/24 11:22 AM   Result Value Ref Range    Sodium 142 136 - 145 mmol/L    Potassium 3.9 3.5 - 5.1 mmol/L    Chloride 106 95 - 110 mmol/L    CO2 25 23 - 29 mmol/L    Glucose 117 (H) 70 - 110 mg/dL    BUN 10 6 - 20 mg/dL    Creatinine 0.7 0.5 - 1.4 mg/dL    Calcium 9.7 8.7 - 10.5 mg/dL    Total Protein 6.9 6.0 - 8.4 g/dL    Albumin 3.5 3.5 - 5.2 g/dL    Total Bilirubin 0.4 0.1 - 1.0 mg/dL    Alkaline Phosphatase 47 (L) 55 - 135 U/L    AST 21 10 - 40 U/L    ALT 19 10 - 44 U/L    eGFR >60 >60 mL/min/1.73 m^2    Anion Gap 11 8 - 16 mmol/L   Brain natriuretic peptide    Collection Time: 05/18/24 11:22 AM   Result Value Ref Range     (H) 0 - 99 pg/mL   Troponin I    Collection Time: 05/18/24 11:22 AM   Result Value Ref Range    Troponin I 0.318 (H) 0.000 - 0.026 ng/mL   Troponin  I    Collection Time: 05/18/24  1:37 PM   Result Value Ref Range    Troponin I 0.297 (H) 0.000 - 0.026 ng/mL   Echo    Collection Time: 05/24/24  3:38 PM   Result Value Ref Range    BSA 2.18 m2    LVOT stroke volume 67.33 cm3    LVIDd 4.38 3.5 - 6.0 cm    LV Systolic Volume 33.97 mL    LV Systolic Volume Index 16.3 mL/m2    LVIDs 2.96 2.1 - 4.0 cm    LV Diastolic Volume 86.92 mL    LV Diastolic Volume Index 41.79 mL/m2    IVS 1.11 (A) 0.6 - 1.1 cm    LVOT diameter 1.97 cm    LVOT area 3.0 cm2    FS 32 28 - 44 %    Left Ventricle Relative Wall Thickness 0.52 cm    Posterior Wall 1.14 (A) 0.6 - 1.1 cm    LV mass 173.16 g    LV Mass Index 83 g/m2    MV Peak E Talon 0.86 m/s    TDI LATERAL 0.11 m/s    TDI SEPTAL 0.09 m/s    E/E' ratio 8.60 m/s    MV Peak A Talon 0.92 m/s    TR Max Talon 2.4 m/s    E/A ratio 0.93     IVRT 71.36 msec    E wave deceleration time 237.46 msec    LV SEPTAL E/E' RATIO 9.56 m/s    LV LATERAL E/E' RATIO 7.82 m/s    LVOT peak talon 1.06 m/s    Left Ventricular Outflow Tract Mean Velocity 0.73 cm/s    Left Ventricular Outflow Tract Mean Gradient 2.42 mmHg    RVDD 3.99 cm    RVOT peak VTI 20.3 cm    TAPSE 1.85 cm    RV/LV Ratio 0.91 cm    LA size 3.42 cm    Left Atrium Minor Axis 5.99 cm    Left Atrium Major Axis 5.25 cm    LA volume (mod) 64.04 cm3    LA Volume Index (Mod) 30.8 mL/m2    RA Major Axis 5.64 cm    RA Width 4.08 cm    AV mean gradient 5 mmHg    AV peak gradient 10 mmHg    Ao peak talon 1.59 m/s    Ao VTI 32.40 cm    LVOT peak VTI 22.10 cm    AV valve area 2.08 cm²    AV Velocity Ratio 0.67     AV index (prosthetic) 0.68     BÁRBARA by Velocity Ratio 2.03 cm²    Triscuspid Valve Regurgitation Peak Gradient 23 mmHg    PV mean gradient 2 mmHg    PV PEAK VELOCITY 1.04 m/s    PV peak gradient 4 mmHg    Pulmonary Valve Mean Velocity 0.72 m/s    RVOT peak talon 0.91 m/s    Ao root annulus 3.26 cm    Sinus 2.85 cm    STJ 2.86 cm    Ascending aorta 3.40 cm    Mean e' 0.10 m/s    ZLVIDS -2.17     ZLVIDD -3.74      LA Volume Index 35.2 mL/m2    LA volume 73.20 cm3    LA WIDTH 4.5 cm    TV resting pulmonary artery pressure 26 mmHg    RV TB RVSP 5 mmHg    Est. RA pres 3 mmHg    EF 55 %   CBC Auto Differential    Collection Time: 05/27/24  8:30 AM   Result Value Ref Range    WBC 5.97 3.90 - 12.70 K/uL    RBC 3.97 (L) 4.00 - 5.40 M/uL    Hemoglobin 9.9 (L) 12.0 - 16.0 g/dL    Hematocrit 33.3 (L) 37.0 - 48.5 %    MCV 84 82 - 98 fL    MCH 24.9 (L) 27.0 - 31.0 pg    MCHC 29.7 (L) 32.0 - 36.0 g/dL    RDW 16.4 (H) 11.5 - 14.5 %    Platelets 426 150 - 450 K/uL    MPV 10.6 9.2 - 12.9 fL    Immature Granulocytes 1.0 (H) 0.0 - 0.5 %    Gran # (ANC) 3.3 1.8 - 7.7 K/uL    Immature Grans (Abs) 0.06 (H) 0.00 - 0.04 K/uL    Lymph # 1.6 1.0 - 4.8 K/uL    Mono # 0.6 0.3 - 1.0 K/uL    Eos # 0.3 0.0 - 0.5 K/uL    Baso # 0.07 0.00 - 0.20 K/uL    nRBC 0 0 /100 WBC    Gran % 56.0 38.0 - 73.0 %    Lymph % 26.1 18.0 - 48.0 %    Mono % 10.2 4.0 - 15.0 %    Eosinophil % 5.5 0.0 - 8.0 %    Basophil % 1.2 0.0 - 1.9 %    Differential Method Automated    Comprehensive Metabolic Panel    Collection Time: 05/27/24  8:30 AM   Result Value Ref Range    Sodium 142 136 - 145 mmol/L    Potassium 3.7 3.5 - 5.1 mmol/L    Chloride 107 95 - 110 mmol/L    CO2 27 23 - 29 mmol/L    Glucose 77 70 - 110 mg/dL    BUN 10 6 - 20 mg/dL    Creatinine 0.8 0.5 - 1.4 mg/dL    Calcium 9.5 8.7 - 10.5 mg/dL    Total Protein 7.1 6.0 - 8.4 g/dL    Albumin 3.5 3.5 - 5.2 g/dL    Total Bilirubin 0.2 0.1 - 1.0 mg/dL    Alkaline Phosphatase 64 55 - 135 U/L    AST 23 10 - 40 U/L    ALT 14 10 - 44 U/L    eGFR >60.0 >60 mL/min/1.73 m^2    Anion Gap 8 8 - 16 mmol/L         Lab Results   Component Value Date    TBGOLDPLUS Negative 12/14/2020      Lab Results   Component Value Date    HEPAIGM Negative 03/08/2019    HEPBIGM Negative 03/08/2019    HEPBCAB Negative 12/14/2020    HEPCAB Negative 05/09/2024      Assessment:     1. Rheumatoid arthritis involving multiple sites with positive rheumatoid  factor    2. Immunocompromised state due to drug therapy    3. Medication monitoring encounter    4. High risk medication use    5. Primary osteoarthritis of left knee    6. History of open heart surgery          Plan:     Jake was seen today for rheumatoid arthritis.    Diagnoses and all orders for this visit:    Rheumatoid arthritis involving multiple sites with positive rheumatoid factor  -     cyclobenzaprine (FLEXERIL) 10 MG tablet; Take 1 tablet (10 mg total) by mouth nightly as needed for Muscle spasms.  -     celecoxib (CELEBREX) 200 MG capsule; Take 1 capsule (200 mg total) by mouth once daily.  -     methotrexate 2.5 MG Tab; Take 6 tablets (15 mg total) by mouth every 7 days.    Immunocompromised state due to drug therapy    Medication monitoring encounter    High risk medication use    Primary osteoarthritis of left knee    History of open heart surgery      Seropositive RA   CMP/CBC reviewed as above  Mild anemia - improving  CMP stable  Low disease activity  Hold MTX per Cards  May resume when cleared to do so by cards - 6/24/24  Concern for RA flare off meds but currently stable  Try to avoid steroids for now w recent heart surgery  Celebrex and Flexeril refilled today - both prn  Per pt, cards advised her to continue celebrex  We will continue to monitor renal function.   Osteoarthritis left knee  Improved after CSI  Home exercise program for knee strengthening  C/w Celebrex as above  Consider viscosupplementation vs ortho down the road if pain worsens  Avoid weight gain  Discussed wt loss shown to improve symptoms  Drug therapy requiring intensive monitoring for toxicity  High Risk Medication Monitoring encounter  No current medication related issues, no evidence of toxicity  I ordered labs for toxicity monitoring, have personally reviewed the findings, and discussed them with the patient.  Pending labs will be sent via the portal  Compromised immune system secondary to autoimmune disease and/or use  of immunosuppressive drugs.  Monitor carefully for infections.  Advised patient to get immediate medical care if any infection arises.  Also advised strict adherence age-appropriate vaccinations and cancer screenings with PCP.  Patient advised to hold DMARD and/or biologic therapy for signs of infection or for surgery. If you are unsure what to do please call our office for instruction.[facility] [phone]  Return to clinic: Reg 4/Vit D in 15 weeks, f/u me one week later w xray hands/feet same day    No follow-ups on file.    The patient understands, chooses and consents to this plan and accepts all the risks which include but are not limited to the risks mentioned above.     Disclaimer: This note was prepared using a voice recognition system and is likely to have sound alike errors within the text.

## 2024-06-05 ENCOUNTER — PATIENT MESSAGE (OUTPATIENT)
Dept: CARDIOTHORACIC SURGERY | Facility: CLINIC | Age: 59
End: 2024-06-05
Payer: COMMERCIAL

## 2024-06-06 ENCOUNTER — TELEPHONE (OUTPATIENT)
Dept: CARDIOTHORACIC SURGERY | Facility: CLINIC | Age: 59
End: 2024-06-06
Payer: COMMERCIAL

## 2024-06-06 NOTE — TELEPHONE ENCOUNTER
Daughter contacted and was advised that information has been sent and received. The patient's daughter stated understanding with no questions.

## 2024-06-10 ENCOUNTER — TELEPHONE (OUTPATIENT)
Dept: CARDIOLOGY | Facility: CLINIC | Age: 59
End: 2024-06-10
Payer: COMMERCIAL

## 2024-06-10 NOTE — TELEPHONE ENCOUNTER
Leave paperwork - sent to 6/3 leave paperwork   From [email or web address]   Faxed to wrong number gave correct number:  [phone]    Please be on look out      ----- Message from Alissa Edward sent at 6/10/2024  7:38 AM CDT -----  Regarding: FMLA Paperwork  Contact: Shellypatrick  Type:  Sooner Apoointment Request    Caller is requesting a sooner appointment.  Caller declined first available appointment listed below.  Caller will not accept being placed on the waitlist and is requesting a message be sent to doctor.  Name of Caller: Jake  When is the first available appointment?  Symptoms:  Would the patient rather a call back or a response via [facility]? call  Best Call Back Number: [phone] (home)    Additional Information: Jake is requesting a callback from the nurse today in regard to the [facility] paperwork that was faxed from [email or web address] on June 3rd. Please complete and sign the paperwork as soon as possible because it is predicated on the patient getting paidduring her short term disability.

## 2024-06-12 ENCOUNTER — TELEPHONE (OUTPATIENT)
Dept: CARDIOLOGY | Facility: CLINIC | Age: 59
End: 2024-06-12
Payer: COMMERCIAL

## 2024-06-12 NOTE — TELEPHONE ENCOUNTER
Have paperwork - will get to DR Sanchez to fill out      ----- Message from Sahra Muller sent at 6/12/2024 11:42 AM CDT -----  Contact: Jake   Jake is calling about the paper work that was sent twice for FMLA for short term disability she would like to get it filled out so she can start getting her benefits

## 2024-06-17 ENCOUNTER — TELEPHONE (OUTPATIENT)
Dept: CARDIOLOGY | Facility: CLINIC | Age: 59
End: 2024-06-17
Payer: COMMERCIAL

## 2024-06-17 NOTE — TELEPHONE ENCOUNTER
Patient contacted Patient and Provider Advocacy to inquire if Dr. Sanchez was able to sign her FMLA paperwork. Patient shared that she submitted her paperwork to Dr. PAN Sanchez. Patient shared that she has sent the paperwork twice. Is there an approximate time the patient can expect Dr. Sanchez to complete her paperwork? Please reach out to the patient and provide feedback.     Thank you,     Rosa JENNINGS LM:  We have paperwork- waiting on her post-op visit with DR Sanchez this Thursday for answers to the paperwork so we can fax over after the visit

## 2024-06-18 ENCOUNTER — OFFICE VISIT (OUTPATIENT)
Dept: CARDIOLOGY | Facility: CLINIC | Age: 59
End: 2024-06-18
Payer: COMMERCIAL

## 2024-06-18 ENCOUNTER — TELEPHONE (OUTPATIENT)
Dept: CARDIOTHORACIC SURGERY | Facility: CLINIC | Age: 59
End: 2024-06-18
Payer: COMMERCIAL

## 2024-06-18 VITALS
SYSTOLIC BLOOD PRESSURE: 120 MMHG | BODY MASS INDEX: 38.26 KG/M2 | OXYGEN SATURATION: 97 % | DIASTOLIC BLOOD PRESSURE: 70 MMHG | WEIGHT: 224.13 LBS | HEART RATE: 67 BPM | HEIGHT: 64 IN

## 2024-06-18 DIAGNOSIS — G62.9 NEUROPATHY: Primary | ICD-10-CM

## 2024-06-18 DIAGNOSIS — Z98.890 HISTORY OF CORONARY ANGIOGRAM: ICD-10-CM

## 2024-06-18 PROCEDURE — 99999 PR PBB SHADOW E&M-EST. PATIENT-LVL IV: CPT | Mod: PBBFAC,,, | Performed by: INTERNAL MEDICINE

## 2024-06-18 PROCEDURE — 99214 OFFICE O/P EST MOD 30 MIN: CPT | Mod: S$GLB,,, | Performed by: INTERNAL MEDICINE

## 2024-06-18 PROCEDURE — 3078F DIAST BP <80 MM HG: CPT | Mod: CPTII,S$GLB,, | Performed by: INTERNAL MEDICINE

## 2024-06-18 PROCEDURE — 3008F BODY MASS INDEX DOCD: CPT | Mod: CPTII,S$GLB,, | Performed by: INTERNAL MEDICINE

## 2024-06-18 PROCEDURE — 1159F MED LIST DOCD IN RCRD: CPT | Mod: CPTII,S$GLB,, | Performed by: INTERNAL MEDICINE

## 2024-06-18 PROCEDURE — 4010F ACE/ARB THERAPY RXD/TAKEN: CPT | Mod: CPTII,S$GLB,, | Performed by: INTERNAL MEDICINE

## 2024-06-18 PROCEDURE — 3074F SYST BP LT 130 MM HG: CPT | Mod: CPTII,S$GLB,, | Performed by: INTERNAL MEDICINE

## 2024-06-18 PROCEDURE — 3044F HG A1C LEVEL LT 7.0%: CPT | Mod: CPTII,S$GLB,, | Performed by: INTERNAL MEDICINE

## 2024-06-18 NOTE — TELEPHONE ENCOUNTER
Patient contacted, OSMANY to inform her that we sent the signed paperwork to the [employer] fax number. They have received the information. Signed paperwork was loaded back in the patient chart for reference.

## 2024-06-18 NOTE — PROGRESS NOTES
Subjective:   Patient ID:  Jake Hoskins is a 59 y.o. female who presents for evaluation of No chief complaint on file.      HPI  6.18.2024  COMES IN FOR FOLLOW-UP AFTER MYXOMA RESECTION THAT WAS FOUND ON ECHOCARDIOGRAM AFTER LAST VISIT.    SHE ALSO HAD A NORMAL CORONARY ANGIOGRAM.    NORMAL CAROTID ULTRASOUND.    SHE COMPLAINS OF DAY AND NIGHT EPISODES OF TINGLING TO BOTH LEGS.    SHE ALSO HAS RHEUMATOID ARTHRITIS.    NO CLEAR CLAUDICATION.      5.2.2024  59-year-old female, with history of hypertension, morbid obesity.    Comes in for evaluation of syncope   She states that a week ago she was feeling dizzy she stood up and she passed out.  She was loss of consciousness.  Her family started doing chest compressions.  EMS arrived and her vitals were normal EKG was normal , fingerstick 105.     Denies any exertional angina.  She is on HCTZ.    Her pressure is elevated today however her blood pressure readings are usually normal to mildly elevated..    Denies palpitations.    She states that she had a similar episode about 10 years ago and she had a complete workup that was negative including stress test echo in monitor.        Past Medical History:   Diagnosis Date    Allergy     Anemia     Hypertension     Plantar fasciitis of left foot        Past Surgical History:   Procedure Laterality Date    CATHETERIZATION OF BOTH LEFT AND RIGHT HEART N/A 5/10/2024    Procedure: CATHETERIZATION, HEART, BOTH LEFT AND RIGHT;  Surgeon: Alison Buchanan MD;  Location: [facility] CATH LAB;  Service: Cardiology;  Laterality: N/A;    COLONOSCOPY N/A 04/09/2021    Procedure: COLONOSCOPY;  Surgeon: Hua Elmore MD;  Location: [facility] ENDO;  Service: Endoscopy;  Laterality: N/A;    ECHOCARDIOGRAM,TRANSESOPHAGEAL N/A 5/13/2024    Procedure: ECHOCARDIOGRAM,TRANSESOPHAGEAL;  Surgeon: Chester Sanchez MD;  Location: [facility] OR;  Service: Cardiovascular;  Laterality: N/A;    HYSTERECTOMY  05/2021    INJECTION OF ANESTHETIC AGENT AROUND  MULTIPLE INTERCOSTAL NERVES N/A 5/13/2024    Procedure: BLOCK, NERVE, INTERCOSTAL, 2 OR MORE;  Surgeon: Chester Sanchez MD;  Location: [facility] OR;  Service: Cardiovascular;  Laterality: N/A;    OOPHORECTOMY  05/2021    RESECTION OF ATRIAL MYXOMA N/A 5/13/2024    Procedure: RESECTION, MYXOMA, CARDIAC ATRIUM;  Surgeon: Chester Sanchez MD;  Location: [facility] OR;  Service: Cardiovascular;  Laterality: N/A;    ROBOT-ASSISTED LAPAROSCOPIC ABDOMINAL HYSTERECTOMY USING DA ABBEY XI N/A 05/18/2021    Procedure: XI ROBOTIC HYSTERECTOMY;  Surgeon: Christa Davila MD;  Location: [facility] OR;  Service: OB/GYN;  Laterality: N/A;    ROBOT-ASSISTED LAPAROSCOPIC SALPINGO-OOPHORECTOMY USING DA ABBEY XI Bilateral 05/18/2021    Procedure: XI ROBOTIC SALPINGO-OOPHORECTOMY;  Surgeon: Christa Davila MD;  Location: [facility] OR;  Service: OB/GYN;  Laterality: Bilateral;       Social History     Tobacco Use    Smoking status: Never    Smokeless tobacco: Never   Substance Use Topics    Alcohol use: No    Drug use: No       Family History   Problem Relation Name Age of Onset    Hypertension Mother      Asthma Mother      Cancer Mother          unknown    Hypertension Father      Heart disease Father      Hypertension Sister         Review of Systems   Cardiovascular:  Negative for chest pain, dyspnea on exertion, palpitations and syncope.   Genitourinary: Negative.    Neurological: Negative.        Current Outpatient Medications on File Prior to Visit   Medication Sig    aspirin (ECOTRIN) 81 MG EC tablet Take 1 tablet (81 mg total) by mouth once daily.    celecoxib (CELEBREX) 200 MG capsule Take 1 capsule (200 mg total) by mouth once daily.    cyclobenzaprine (FLEXERIL) 10 MG tablet Take 1 tablet (10 mg total) by mouth nightly as needed for Muscle spasms.    ergocalciferol (ERGOCALCIFEROL) 50,000 unit Cap Take 1 capsule (50,000 Units total) by mouth every 7 days.    ferrous sulfate, dried (SLOW FE) 160 mg (50 mg iron) TbSR Take 1 tablet (160 mg total)  by mouth once daily.    fluticasone propionate (FLONASE) 50 mcg/actuation nasal spray USE 2 SPRAYS BY EACH NOSTRIL ROUTE ONCE DAILY    folic acid (FOLVITE) 1 MG tablet Take 1 tablet by mouth once daily    furosemide (LASIX) 40 MG tablet Take 1 tablet (40 mg total) by mouth once daily.    HYDROcodone-acetaminophen (NORCO) 5-325 mg per tablet Take 1 tablet by mouth every 6 (six) hours as needed for Pain.    HYDROcodone-acetaminophen (NORCO) 5-325 mg per tablet Take 1 tablet by mouth every 6 (six) hours as needed for Pain.    methotrexate 2.5 MG Tab Take 6 tablets (15 mg total) by mouth every 7 days.    montelukast (SINGULAIR) 10 mg tablet Take 1 tablet by mouth once daily    MULTIVITS,CA,MINERALS/IRON/FA (ONE-A-DAY WOMENS FORMULA ORAL) Take by mouth once daily.     olmesartan (BENICAR) 20 MG tablet Take 20 mg by mouth once daily.    potassium chloride SA (K-DUR,KLOR-CON) 20 MEQ tablet Take 1 tablet (20 mEq total) by mouth once daily.    [DISCONTINUED] losartan (COZAAR) 25 MG tablet Take 1 tablet (25 mg total) by mouth once daily.    [DISCONTINUED] metoprolol succinate (TOPROL-XL) 25 MG 24 hr tablet Take 1 tablet (25 mg total) by mouth once daily.    loratadine (CLARITIN REDITABS) 10 mg dissolvable tablet Take 1 tablet (10 mg total) by mouth once daily. (Patient not taking: Reported on 5/23/2024)     No current facility-administered medications on file prior to visit.       Objective:   Objective:  Wt Readings from Last 3 Encounters:   06/18/24 101.6 kg (224 lb 1.6 oz)   06/04/24 100.6 kg (221 lb 12.5 oz)   05/24/24 105.2 kg (232 lb)     Temp Readings from Last 3 Encounters:   05/23/24 98.4 °F (36.9 °C) (Tympanic)   05/18/24 98.5 °F (36.9 °C) (Oral)   05/17/24 98.7 °F (37.1 °C) (Oral)     BP Readings from Last 3 Encounters:   06/18/24 120/70   06/04/24 113/70   05/24/24 98/64     Pulse Readings from Last 3 Encounters:   06/18/24 67   06/04/24 64   05/23/24 84       Physical Exam  Vitals reviewed.   Constitutional:        Appearance: She is well-developed.   Neck:      Vascular: No carotid bruit.   Cardiovascular:      Rate and Rhythm: Normal rate and regular rhythm.      Pulses: Intact distal pulses.      Heart sounds: Normal heart sounds. No murmur heard.  Pulmonary:      Breath sounds: Normal breath sounds.   Neurological:      Mental Status: She is oriented to person, place, and time.         Lab Results   Component Value Date    CHOL 179 05/12/2023    CHOL 185 07/06/2018    CHOL 149 03/23/2015     Lab Results   Component Value Date    HDL 61 05/12/2023    HDL 59 07/06/2018    HDL 43 03/23/2015     Lab Results   Component Value Date    LDLCALC 98.4 05/12/2023    LDLCALC 107.8 07/06/2018    LDLCALC 87.8 03/23/2015     Lab Results   Component Value Date    TRIG 98 05/12/2023    TRIG 91 07/06/2018    TRIG 91 03/23/2015     Lab Results   Component Value Date    CHOLHDL 34.1 05/12/2023    CHOLHDL 31.9 07/06/2018    CHOLHDL 28.9 03/23/2015       Chemistry        Component Value Date/Time     05/27/2024 0830    K 3.7 05/27/2024 0830     05/27/2024 0830    CO2 27 05/27/2024 0830    BUN 10 05/27/2024 0830    CREATININE 0.8 05/27/2024 0830    GLU 77 05/27/2024 0830        Component Value Date/Time    CALCIUM 9.5 05/27/2024 0830    ALKPHOS 64 05/27/2024 0830    AST 23 05/27/2024 0830    ALT 14 05/27/2024 0830    BILITOT 0.2 05/27/2024 0830    ESTGFRAFRICA >60 05/06/2022 1547    EGFRNONAA >60 05/06/2022 1547          Lab Results   Component Value Date    TSH 0.730 04/29/2024     Lab Results   Component Value Date    INR 1.1 05/14/2024    INR 1.1 05/13/2024    INR 1.0 05/12/2024     Lab Results   Component Value Date    WBC 5.97 05/27/2024    HGB 9.9 (L) 05/27/2024    HCT 33.3 (L) 05/27/2024    MCV 84 05/27/2024     05/27/2024     BNP  @LABRCNTIP(BNP,BNPTRIAGEBLO)@  CrCl cannot be calculated (Patient's most recent lab result is older than the maximum 7 days allowed.).     Imaging:  ======    No results found for this or  any previous visit.    No results found for this or any previous visit.    No results found for this or any previous visit.    No results found for this or any previous visit.    No valid procedures specified.    No results found for this or any previous visit.      No results found for this or any previous visit.      No results found for this or any previous visit.      Diagnostic Results:  ECG: Reviewed    The 10-year ASCVD risk score (Titus VEGAS, et al., 2019) is: 4.8%    Values used to calculate the score:      Age: 59 years      Sex: Female      Is Non- : Yes      Diabetic: No      Tobacco smoker: No      Systolic Blood Pressure: 120 mmHg      Is BP treated: Yes      HDL Cholesterol: 61 mg/dL      Total Cholesterol: 179 mg/dL        Assessment and Plan:   Essential hypertension  RESOLVED.  OFF BLOOD PRESSURE MEDICATION  Syncope, unspecified syncope type  -  WAS LIKELY SECONDARY TO HER MYXOMA  Hypertension, unspecified type    Morbid obesity    Rheumatoid arthritis involving multiple sites with positive rheumatoid factor    Neuropathy  -     Ankle Brachial Indices (RAN); Future  -     X-Ray Lumbar Spine AP And Lateral; Future; Expected date: 06/18/2024    History of coronary angiogram  Comments:  NORMAL MAY 2024        We will stop her antihypertensive as blood pressure is normal.    Her neuropathy could be secondary to her rheumatoid arthritis possible extra-articular manifestations versus possible spinal stenosis.  We will also do an RAN.  Reviewed all tests and above medical conditions with patient in detail and formulated treatment plan.  Risk factor modification discussed.   Cardiac low salt diet discussed.  Maintaining healthy weight and weight loss goals were discussed in clinic.    Follow up in SIX-MONTH

## 2024-06-19 ENCOUNTER — PATIENT MESSAGE (OUTPATIENT)
Dept: CARDIOLOGY | Facility: HOSPITAL | Age: 59
End: 2024-06-19
Payer: COMMERCIAL

## 2024-06-20 ENCOUNTER — OFFICE VISIT (OUTPATIENT)
Dept: CARDIOTHORACIC SURGERY | Facility: CLINIC | Age: 59
End: 2024-06-20
Payer: COMMERCIAL

## 2024-06-20 ENCOUNTER — TELEPHONE (OUTPATIENT)
Dept: CARDIOLOGY | Facility: CLINIC | Age: 59
End: 2024-06-20
Payer: COMMERCIAL

## 2024-06-20 VITALS
HEIGHT: 64 IN | HEART RATE: 89 BPM | WEIGHT: 222.69 LBS | RESPIRATION RATE: 16 BRPM | DIASTOLIC BLOOD PRESSURE: 75 MMHG | SYSTOLIC BLOOD PRESSURE: 112 MMHG | OXYGEN SATURATION: 98 % | BODY MASS INDEX: 38.02 KG/M2

## 2024-06-20 DIAGNOSIS — D15.1 ATRIAL MYXOMA: Primary | ICD-10-CM

## 2024-06-20 DIAGNOSIS — Z90.722 S/P TOTAL HYSTERECTOMY AND BSO (BILATERAL SALPINGO-OOPHORECTOMY): ICD-10-CM

## 2024-06-20 DIAGNOSIS — I10 ESSENTIAL HYPERTENSION: ICD-10-CM

## 2024-06-20 DIAGNOSIS — D50.0 IRON DEFICIENCY ANEMIA DUE TO CHRONIC BLOOD LOSS: ICD-10-CM

## 2024-06-20 DIAGNOSIS — Z90.710 S/P TOTAL HYSTERECTOMY AND BSO (BILATERAL SALPINGO-OOPHORECTOMY): ICD-10-CM

## 2024-06-20 DIAGNOSIS — I05.8 MITRAL VALVE MASS: ICD-10-CM

## 2024-06-20 DIAGNOSIS — Z90.79 S/P TOTAL HYSTERECTOMY AND BSO (BILATERAL SALPINGO-OOPHORECTOMY): ICD-10-CM

## 2024-06-20 DIAGNOSIS — R93.1 ABNORMAL ECHOCARDIOGRAM FINDINGS WITHOUT DIAGNOSIS: ICD-10-CM

## 2024-06-20 DIAGNOSIS — M72.2 PLANTAR FASCIITIS OF LEFT FOOT: ICD-10-CM

## 2024-06-20 PROCEDURE — 99999 PR PBB SHADOW E&M-EST. PATIENT-LVL IV: CPT | Mod: PBBFAC,,, | Performed by: THORACIC SURGERY (CARDIOTHORACIC VASCULAR SURGERY)

## 2024-06-20 NOTE — TELEPHONE ENCOUNTER
Needs a root canal - can triazolam be given and how long do they need to wait before they can do a root canal    Will fax a sheet as well    Please advise            ----- Message from Merissa Chen sent at 6/20/2024 11:34 AM CDT -----  Contact: Stephanie/ [facility]  Stephanie is calling in regards to getting a call back from the nurse to discuss medication they can give the pt and can she get a root canal.  Please call back at [phone]      Thanks

## 2024-06-20 NOTE — PROGRESS NOTES
"Subjective:      Patient ID: Jake Hoskins is a 59 y.o. female.    Chief Complaint: No chief complaint on file.    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  Occasional chest discomfort over the incision    Review of patient's allergies indicates:   Allergen Reactions    Tramadol Nausea And Vomiting       Review of Systems   Constitutional:  Negative for activity change and appetite change.   HENT:  Negative for dental problem, nosebleeds and sore throat.    Eyes:  Negative for discharge and visual disturbance.   Respiratory:  Negative for cough, chest tightness and stridor.    Cardiovascular:  Negative for leg swelling.   Gastrointestinal:  Negative for abdominal distention and abdominal pain.   Genitourinary:  Negative for difficulty urinating and dysuria.   Musculoskeletal:  Negative for arthralgias, back pain and joint swelling.   Allergic/Immunologic: Negative for environmental allergies.   Neurological:  Negative for dizziness, syncope and headaches.   Hematological:  Does not bruise/bleed easily.   Psychiatric/Behavioral:  Negative for behavioral problems.           Objective:   /75   Pulse 89   Resp 16   Ht 5' 4" (1.626 m)   Wt 101 kg (222 lb 10.6 oz)   LMP 12/26/2020   SpO2 98%   BMI 38.22 kg/m²     Echo    Left Ventricle: The left ventricle is normal in size. Ventricular mass   is normal. Mildly increased wall thickness. There is concentric   remodeling. There is normal systolic function with a visually estimated   ejection fraction of 55 - 60%. Ejection fraction by visual approximation   is 55%. There is normal diastolic function.    Right Ventricle: Normal right ventricular cavity size. Wall thickness   is normal. Systolic function is normal.    Left Atrium: Left atrium is mildly dilated.    Tricuspid Valve: There is mild to moderate " regurgitation.    Pulmonic Valve: There is mild regurgitation.    Pulmonary Artery: The estimated pulmonary artery systolic pressure is   26 mmHg.    IVC/SVC: Normal venous pressure at 3 mmHg.         Physical Exam  Vitals reviewed.   Constitutional:       Appearance: Normal appearance.   HENT:      Head: Normocephalic and atraumatic.      Mouth/Throat:      Mouth: Mucous membranes are moist.   Eyes:      Extraocular Movements: Extraocular movements intact.   Cardiovascular:      Rate and Rhythm: Normal rate and regular rhythm.      Pulses: Normal pulses.      Heart sounds: Normal heart sounds.      Comments: Incisions clean and dry sternum is stable  Pulmonary:      Effort: Pulmonary effort is normal.      Breath sounds: Normal breath sounds.   Abdominal:      Palpations: Abdomen is soft.   Musculoskeletal:         General: Normal range of motion.      Cervical back: Normal range of motion and neck supple.   Skin:     General: Skin is warm.      Capillary Refill: Capillary refill takes less than 2 seconds.   Neurological:      General: No focal deficit present.      Mental Status: She is alert and oriented to person, place, and time.   Psychiatric:         Mood and Affect: Mood normal.         Assessment:     1. Atrial myxoma    2. Essential hypertension    3. Mitral valve mass    4. Abnormal echocardiogram findings without diagnosis    5. S/P total hysterectomy and BSO (bilateral salpingo-oophorectomy)    6. BMI 39.0-39.9,adult    7. Plantar fasciitis of left foot    8. Iron deficiency anemia due to chronic blood loss          Plan   Status post left atrial myxoma resection postop follow-up.  Continue outpatient cardiopulmonary rehabilitation  Follow-up with Cardiology  I will discharge the patient from my care today.          Chester Sanchez MD  [facility]  [place]

## 2024-06-21 ENCOUNTER — PATIENT MESSAGE (OUTPATIENT)
Dept: CARDIOLOGY | Facility: HOSPITAL | Age: 59
End: 2024-06-21
Payer: COMMERCIAL

## 2024-06-24 ENCOUNTER — HOSPITAL ENCOUNTER (OUTPATIENT)
Dept: CARDIOLOGY | Facility: HOSPITAL | Age: 59
Discharge: HOME | End: 2024-06-24
Attending: INTERNAL MEDICINE
Payer: COMMERCIAL

## 2024-06-24 ENCOUNTER — HOSPITAL ENCOUNTER (OUTPATIENT)
Dept: RADIOLOGY | Facility: HOSPITAL | Age: 59
Discharge: HOME | End: 2024-06-24
Attending: INTERNAL MEDICINE
Payer: COMMERCIAL

## 2024-06-24 VITALS
BODY MASS INDEX: 37.9 KG/M2 | SYSTOLIC BLOOD PRESSURE: 131 MMHG | WEIGHT: 222 LBS | HEIGHT: 64 IN | DIASTOLIC BLOOD PRESSURE: 72 MMHG

## 2024-06-24 DIAGNOSIS — G62.9 NEUROPATHY: ICD-10-CM

## 2024-06-24 LAB
LEFT ABI: 1.13
LEFT ARM BP: 134 MMHG
LEFT DORSALIS PEDIS: 151 MMHG
LEFT POSTERIOR TIBIAL: 151 MMHG
LEFT TBI: 0.92
LEFT TOE PRESSURE: 123 MMHG
RIGHT ABI: 1.12
RIGHT ARM BP: 131 MMHG
RIGHT DORSALIS PEDIS: 133 MMHG
RIGHT POSTERIOR TIBIAL: 150 MMHG
RIGHT TBI: 0.93
RIGHT TOE PRESSURE: 124 MMHG

## 2024-06-24 PROCEDURE — 93922 UPR/L XTREMITY ART 2 LEVELS: CPT | Mod: 26,,, | Performed by: INTERNAL MEDICINE

## 2024-06-24 PROCEDURE — 93922 UPR/L XTREMITY ART 2 LEVELS: CPT

## 2024-06-24 PROCEDURE — 72100 X-RAY EXAM L-S SPINE 2/3 VWS: CPT | Mod: 26,,, | Performed by: RADIOLOGY

## 2024-06-24 PROCEDURE — 72100 X-RAY EXAM L-S SPINE 2/3 VWS: CPT | Mod: TC

## 2024-06-27 ENCOUNTER — TELEPHONE (OUTPATIENT)
Dept: CARDIOLOGY | Facility: HOSPITAL | Age: 59
End: 2024-06-27
Payer: COMMERCIAL

## 2024-07-01 ENCOUNTER — OFFICE VISIT (OUTPATIENT)
Dept: INTERNAL MEDICINE | Facility: CLINIC | Age: 59
End: 2024-07-01
Payer: COMMERCIAL

## 2024-07-01 VITALS
WEIGHT: 222.25 LBS | BODY MASS INDEX: 37.94 KG/M2 | OXYGEN SATURATION: 100 % | DIASTOLIC BLOOD PRESSURE: 74 MMHG | HEIGHT: 64 IN | HEART RATE: 73 BPM | TEMPERATURE: 97 F | SYSTOLIC BLOOD PRESSURE: 120 MMHG

## 2024-07-01 DIAGNOSIS — D15.1 ATRIAL MYXOMA: ICD-10-CM

## 2024-07-01 DIAGNOSIS — G62.9 NEUROPATHY: ICD-10-CM

## 2024-07-01 DIAGNOSIS — M54.9 BACK PAIN, UNSPECIFIED BACK LOCATION, UNSPECIFIED BACK PAIN LATERALITY, UNSPECIFIED CHRONICITY: Primary | ICD-10-CM

## 2024-07-01 PROCEDURE — G2211 COMPLEX E/M VISIT ADD ON: HCPCS | Mod: S$GLB,,, | Performed by: FAMILY MEDICINE

## 2024-07-01 PROCEDURE — 3008F BODY MASS INDEX DOCD: CPT | Mod: CPTII,S$GLB,, | Performed by: FAMILY MEDICINE

## 2024-07-01 PROCEDURE — 99999 PR PBB SHADOW E&M-EST. PATIENT-LVL V: CPT | Mod: PBBFAC,,, | Performed by: FAMILY MEDICINE

## 2024-07-01 PROCEDURE — 4010F ACE/ARB THERAPY RXD/TAKEN: CPT | Mod: CPTII,S$GLB,, | Performed by: FAMILY MEDICINE

## 2024-07-01 PROCEDURE — 3074F SYST BP LT 130 MM HG: CPT | Mod: CPTII,S$GLB,, | Performed by: FAMILY MEDICINE

## 2024-07-01 PROCEDURE — 3078F DIAST BP <80 MM HG: CPT | Mod: CPTII,S$GLB,, | Performed by: FAMILY MEDICINE

## 2024-07-01 PROCEDURE — 3044F HG A1C LEVEL LT 7.0%: CPT | Mod: CPTII,S$GLB,, | Performed by: FAMILY MEDICINE

## 2024-07-01 PROCEDURE — 99214 OFFICE O/P EST MOD 30 MIN: CPT | Mod: S$GLB,,, | Performed by: FAMILY MEDICINE

## 2024-07-01 PROCEDURE — 1159F MED LIST DOCD IN RCRD: CPT | Mod: CPTII,S$GLB,, | Performed by: FAMILY MEDICINE

## 2024-07-01 RX ORDER — GABAPENTIN 100 MG/1
100 CAPSULE ORAL NIGHTLY
Qty: 30 CAPSULE | Refills: 11 | Status: SHIPPED | OUTPATIENT
Start: 2024-07-01 | End: 2025-07-01

## 2024-07-01 NOTE — PROGRESS NOTES
Jake Hoskins  07/01/2024  [id number]    Angelica Lagunas MD  Patient Care Team:  Angelica Lagunas MD as PCP - General (Family Medicine)  Phil Baires LPN (Inactive) as Care Coordinator (Internal Medicine)          Visit Type:an urgent visit for an existing problem     Chief Complaint:  Chief Complaint   Patient presents with    Follow-up     X-ray results       History of Present Illness:  59 year old  Back pain    Of note, she had Myxoma resection that was found on Echo.  Normal angiogram of heart.  She c/o tingline to both legs. KNown RA per history  Cards ordered xray lower spine  RAN is normal    Xray  CLINICAL HISTORY:  Polyneuropathy, unspecified     TECHNIQUE:  AP, lateral and spot images were performed of the lumbar spine.     COMPARISON:  None     FINDINGS:  Vertebral body heights maintained.  Trace retrolisthesis of L3 on L4.  Disc spaces relatively maintained.  Facet arthropathy noted.  SI joint degenerative findings, greater on the left.  No acute abnormality.     Impression:     Degenerative findings.      History:  Past Medical History:   Diagnosis Date    Allergy     Anemia     Hypertension     Plantar fasciitis of left foot      Past Surgical History:   Procedure Laterality Date    CATHETERIZATION OF BOTH LEFT AND RIGHT HEART N/A 5/10/2024    Procedure: CATHETERIZATION, HEART, BOTH LEFT AND RIGHT;  Surgeon: Alison Buchanan MD;  Location: [facility] CATH LAB;  Service: Cardiology;  Laterality: N/A;    COLONOSCOPY N/A 04/09/2021    Procedure: COLONOSCOPY;  Surgeon: Hua Elmore MD;  Location: [facility] ENDO;  Service: Endoscopy;  Laterality: N/A;    ECHOCARDIOGRAM,TRANSESOPHAGEAL N/A 5/13/2024    Procedure: ECHOCARDIOGRAM,TRANSESOPHAGEAL;  Surgeon: Chester Sanchez MD;  Location: [facility] OR;  Service: Cardiovascular;  Laterality: N/A;    HYSTERECTOMY  05/2021    INJECTION OF ANESTHETIC AGENT AROUND MULTIPLE INTERCOSTAL NERVES N/A 5/13/2024    Procedure: BLOCK, NERVE, INTERCOSTAL, 2 OR MORE;   Surgeon: Chester Sanchez MD;  Location: [facility] OR;  Service: Cardiovascular;  Laterality: N/A;    OOPHORECTOMY  05/2021    RESECTION OF ATRIAL MYXOMA N/A 5/13/2024    Procedure: RESECTION, MYXOMA, CARDIAC ATRIUM;  Surgeon: Chester Sanchez MD;  Location: [facility] OR;  Service: Cardiovascular;  Laterality: N/A;    ROBOT-ASSISTED LAPAROSCOPIC ABDOMINAL HYSTERECTOMY USING DA ABBEY XI N/A 05/18/2021    Procedure: XI ROBOTIC HYSTERECTOMY;  Surgeon: Christa Davila MD;  Location: [facility] OR;  Service: OB/GYN;  Laterality: N/A;    ROBOT-ASSISTED LAPAROSCOPIC SALPINGO-OOPHORECTOMY USING DA ABBEY XI Bilateral 05/18/2021    Procedure: XI ROBOTIC SALPINGO-OOPHORECTOMY;  Surgeon: Christa Davila MD;  Location: [facility] OR;  Service: OB/GYN;  Laterality: Bilateral;     Family History   Problem Relation Name Age of Onset    Hypertension Mother      Asthma Mother      Cancer Mother          unknown    Hypertension Father      Heart disease Father      Hypertension Sister       Social History     Socioeconomic History    Marital status:     Number of children: 2   Occupational History    Occupation: [employer]     Employer: [employer]   Tobacco Use    Smoking status: Never    Smokeless tobacco: Never   Substance and Sexual Activity    Alcohol use: No    Drug use: No    Sexual activity: Not Currently     Birth control/protection: Surgical     Social Determinants of Health     Financial Resource Strain: Patient Declined (5/9/2024)    Overall Financial Resource Strain (CARDIA)     Difficulty of Paying Living Expenses: Patient declined   Food Insecurity: Patient Declined (5/9/2024)    Hunger Vital Sign     Worried About Running Out of Food in the Last Year: Patient declined     Ran Out of Food in the Last Year: Patient declined   Transportation Needs: Patient Declined (5/9/2024)    TRANSPORTATION NEEDS     Transportation : Patient declined   Physical Activity: Sufficiently Active (5/10/2023)    Exercise Vital Sign     Days of Exercise per  Week: 5 days     Minutes of Exercise per Session: 150+ min   Stress: Patient Declined (5/9/2024)    Citizen of [place] of Occupational Health - Occupational Stress Questionnaire     Feeling of Stress : Patient declined   Housing Stability: Patient Declined (5/9/2024)    Housing Stability Vital Sign     Unable to Pay for Housing in the Last Year: Patient declined     Homeless in the Last Year: Patient declined     Patient Active Problem List   Diagnosis    Anemia    Plantar fasciitis of left foot    Essential hypertension    Cervical polyp    S/P total hysterectomy and BSO (bilateral salpingo-oophorectomy)    Rheumatoid arthritis involving multiple sites with positive rheumatoid factor    Breast mass, left    Mitral valve mass    BMI 39.0-39.9,adult    Atrial myxoma    Abnormal echocardiogram findings without diagnosis    Post-operative state     Review of patient's allergies indicates:   Allergen Reactions    Tramadol Nausea And Vomiting       The following were reviewed at this visit: active problem list, medication list, allergies, family history, social history, and health maintenance.    Medications:  Current Outpatient Medications on File Prior to Visit   Medication Sig Dispense Refill    aspirin (ECOTRIN) 81 MG EC tablet Take 1 tablet (81 mg total) by mouth once daily. 90 tablet 3    celecoxib (CELEBREX) 200 MG capsule Take 1 capsule (200 mg total) by mouth once daily. 90 capsule 1    cyclobenzaprine (FLEXERIL) 10 MG tablet Take 1 tablet (10 mg total) by mouth nightly as needed for Muscle spasms. 90 tablet 0    ergocalciferol (ERGOCALCIFEROL) 50,000 unit Cap Take 1 capsule (50,000 Units total) by mouth every 7 days. 12 capsule 4    ferrous sulfate, dried (SLOW FE) 160 mg (50 mg iron) TbSR Take 1 tablet (160 mg total) by mouth once daily. 90 tablet 3    fluticasone propionate (FLONASE) 50 mcg/actuation nasal spray USE 2 SPRAYS BY EACH NOSTRIL ROUTE ONCE DAILY 48 g 3    folic acid (FOLVITE) 1 MG tablet Take 1  tablet by mouth once daily 90 tablet 0    HYDROcodone-acetaminophen (NORCO) 5-325 mg per tablet TAKE 1 TABLET BY MOUTH 30 MINUTES PRIOR TO DENTAL APPOINTMENT AND THEN TAKE 1 TABLET BY MOUTH EVERY 4 TO 6 HOURS AS NEEDED FOR PAIN. DO NOT DRIVE 12 tablet 0    methotrexate 2.5 MG Tab Take 6 tablets (15 mg total) by mouth every 7 days. 72 tablet 0    montelukast (SINGULAIR) 10 mg tablet Take 1 tablet by mouth once daily 90 tablet 0    MULTIVITS,CA,MINERALS/IRON/FA (ONE-A-DAY WOMENS FORMULA ORAL) Take by mouth once daily.       potassium chloride SA (K-DUR,KLOR-CON) 20 MEQ tablet Take 1 tablet (20 mEq total) by mouth once daily. 30 tablet 0    furosemide (LASIX) 40 MG tablet Take 1 tablet (40 mg total) by mouth once daily. (Patient not taking: Reported on 7/1/2024) 30 tablet 0    HYDROcodone-acetaminophen (NORCO) 5-325 mg per tablet Take 1 tablet by mouth every 6 (six) hours as needed for Pain. (Patient not taking: Reported on 7/1/2024) 15 tablet 0    HYDROcodone-acetaminophen (NORCO) 5-325 mg per tablet Take 1 tablet by mouth every 6 (six) hours as needed for Pain. (Patient not taking: Reported on 7/1/2024) 10 tablet 0    loratadine (CLARITIN REDITABS) 10 mg dissolvable tablet Take 1 tablet (10 mg total) by mouth once daily. (Patient not taking: Reported on 5/23/2024) 30 tablet 11    olmesartan (BENICAR) 20 MG tablet Take 20 mg by mouth once daily. (Patient not taking: Reported on 7/1/2024)       No current facility-administered medications on file prior to visit.       Medications have been reviewed and reconciled with patient at this visit.  Barriers to medications reviewed with patient.    Adverse reactions to current medications reviewed with patient..    Over the counter medications reviewed and reconciled with patient.    Exam:  Wt Readings from Last 3 Encounters:   07/01/24 100.8 kg (222 lb 3.6 oz)   06/24/24 100.7 kg (222 lb)   06/20/24 101 kg (222 lb 10.6 oz)     Temp Readings from Last 3 Encounters:   07/01/24  96.5 °F (35.8 °C) (Tympanic)   05/23/24 98.4 °F (36.9 °C) (Tympanic)   05/18/24 98.5 °F (36.9 °C) (Oral)     BP Readings from Last 3 Encounters:   07/01/24 120/74   06/24/24 131/72   06/20/24 112/75     Pulse Readings from Last 3 Encounters:   07/01/24 73   06/20/24 89   06/18/24 67     Body mass index is 38.14 kg/m².      Review of Systems   Musculoskeletal:  Positive for back pain and joint pain.   Neurological:  Positive for tingling.     Physical Exam  Nursing note reviewed.   Cardiovascular:      Rate and Rhythm: Normal rate and regular rhythm.   Pulmonary:      Effort: Pulmonary effort is normal. No respiratory distress.   Neurological:      Mental Status: She is alert and oriented to person, place, and time.   Psychiatric:         Mood and Affect: Mood normal.         Behavior: Behavior normal.         Thought Content: Thought content normal.         Judgment: Judgment normal.         Laboratory Reviewed ({Yes)  Lab Results   Component Value Date    WBC 5.97 05/27/2024    HGB 9.9 (L) 05/27/2024    HCT 33.3 (L) 05/27/2024     05/27/2024    CHOL 179 05/12/2023    TRIG 98 05/12/2023    HDL 61 05/12/2023    ALT 14 05/27/2024    AST 23 05/27/2024     05/27/2024    K 3.7 05/27/2024     05/27/2024    CREATININE 0.8 05/27/2024    BUN 10 05/27/2024    CO2 27 05/27/2024    TSH 0.730 04/29/2024    INR 1.1 05/14/2024    HGBA1C 5.7 (H) 05/11/2024       Jake was seen today for follow-up.    Diagnoses and all orders for this visit:    Back pain, unspecified back location, unspecified back pain laterality, unspecified chronicity  -     EMG W/ ULTRASOUND AND NERVE CONDUCTION TEST 2 Extremities; Future  -     Ambulatory referral/consult to Pain Clinic; Future  -     Ambulatory referral/consult to Physical Medicine Rehab; Future    Neuropathy  -     EMG W/ ULTRASOUND AND NERVE CONDUCTION TEST 2 Extremities; Future  -     Ambulatory referral/consult to Pain Clinic; Future  -     Ambulatory referral/consult to  Physical Medicine Rehab; Future    Atrial myxoma  Progressing well  Cardiac rehab    Other orders  -     gabapentin (NEURONTIN) 100 MG capsule; Take 1 capsule (100 mg total) by mouth every evening.                Care Plan/Goals: Reviewed    Goals    None         Follow up: No follow-ups on file.    After visit summary was printed and given to patient upon discharge today.  Patient goals and care plan are included in After Visit Summary.

## 2024-07-08 ENCOUNTER — OFFICE VISIT (OUTPATIENT)
Dept: PHYSICAL MEDICINE AND REHAB | Facility: CLINIC | Age: 59
End: 2024-07-08
Payer: COMMERCIAL

## 2024-07-08 ENCOUNTER — PATIENT MESSAGE (OUTPATIENT)
Dept: INTERNAL MEDICINE | Facility: CLINIC | Age: 59
End: 2024-07-08
Payer: COMMERCIAL

## 2024-07-08 VITALS — RESPIRATION RATE: 13 BRPM | WEIGHT: 222.25 LBS | BODY MASS INDEX: 37.94 KG/M2 | HEIGHT: 64 IN

## 2024-07-08 DIAGNOSIS — M54.16 LUMBAR RADICULOPATHY: ICD-10-CM

## 2024-07-08 DIAGNOSIS — Z98.890 POST-OPERATIVE STATE: ICD-10-CM

## 2024-07-08 DIAGNOSIS — M54.9 BACK PAIN, UNSPECIFIED BACK LOCATION, UNSPECIFIED BACK PAIN LATERALITY, UNSPECIFIED CHRONICITY: Primary | ICD-10-CM

## 2024-07-08 DIAGNOSIS — D15.1 ATRIAL MYXOMA: ICD-10-CM

## 2024-07-08 DIAGNOSIS — I05.8 MITRAL VALVE MASS: Primary | ICD-10-CM

## 2024-07-08 PROCEDURE — 99499 UNLISTED E&M SERVICE: CPT | Mod: S$GLB,,, | Performed by: PHYSICAL MEDICINE & REHABILITATION

## 2024-07-08 PROCEDURE — 99999 PR PBB SHADOW E&M-EST. PATIENT-LVL III: CPT | Mod: PBBFAC,,, | Performed by: PHYSICAL MEDICINE & REHABILITATION

## 2024-07-08 PROCEDURE — 95908 NRV CNDJ TST 3-4 STUDIES: CPT | Mod: S$GLB,,, | Performed by: PHYSICAL MEDICINE & REHABILITATION

## 2024-07-08 PROCEDURE — 95886 MUSC TEST DONE W/N TEST COMP: CPT | Mod: S$GLB,,, | Performed by: PHYSICAL MEDICINE & REHABILITATION

## 2024-07-08 PROCEDURE — 95885 MUSC TST DONE W/NERV TST LIM: CPT | Mod: 59,S$GLB,, | Performed by: PHYSICAL MEDICINE & REHABILITATION

## 2024-07-08 NOTE — PROGRESS NOTES
[facility]   [id number] [facility]  [place] [address]  Phone: [phone]        Full Name: Jake Hoskins YOB: 1965  Patient ID: [id number]      Visit Date: 7/8/2024 07:52  Age: 59 Years 4 Months Old  Examining Physician: Aster Dsouza M.D.  Referring Physician: Dr Lagunas  Reason for Referral: lower ext    Chief Complaint   Patient presents with    Back Pain     Into legs       HPI: This is a 59 y.o.  female being seen in clinic today for evaluation of chronic lower leg burning, throbbing in her anterior lower legs that is worse after prolonged standing/walking. Rest provides some relief.  She denies weakness.  She is s/p cardiac surgery for myxoma in May and has plans to do cardiac rehab.  History obtained from patient    Past family, medical, social, and surgical history reviewed in chart    Review of Systems:     General- denies lethargy, weight change, fever, chills  Head/neck- denies swallowing difficulties  ENT- denies hearing changes  Cardiovascular-denies chest pain  Pulmonary- denies shortness of breath  GI- denies constipation or bowel incontinence  - denies bladder incontinence  Skin- denies wounds or rashes  Musculoskeletal- denies weakness, + pain  Neurologic- denies numbness and tingling  Psychiatric- denies depressive or psychotic features, denies anxiety  Lymphatic-denies swelling  Endocrine- denies hypoglycemic symptoms/DM history  All other pertinent systems negative     Physical Examination:  General: Well developed, well nourished female, NAD  HEENT:NCAT EOMI bilaterally   Pulmonary:Normal respirations    Spinal Examination: CERVICAL  Active ROM is within normal limits.  Inspection: No deformity of spinal alignment.    Spinal Examination: LUMBAR or THORACIC  Active ROM is within normal limits.  Inspection: No deformity of spinal alignment.    Slr neg         Bilateral Upper and Lower Extremities:  Pulses are 2+ at radial bilaterally.  Shoulder/Elbow/Wrist/Hand ROM    Hip/Knee/Ankle ROM wnl  Bilateral Extremities show normal capillary refill.  No signs of cyanosis, rubor, edema, skin changes, or dysvascular changes of appendages.  Nails appear intact.    Neurological Exam:  Cranial Nerves:  II-XII grossly intact    Manual Muscle Testing: (Motor 5=normal)  5/5 strength bilateral lower extremities    No focal atrophy is noted of either upper or lower extremity.    Bilateral Reflexes:  Hypo at patellar  No clonus at knee or ankle.    Sensation: tested to light touch  - intact in legs    Gait: Narrow base and good arm swing.      Entire procedure explained to patient prior to proceeding.  Verbal consent obtained  SNC      Nerve / Sites Rec. Site Onset Lat Peak Lat Amp Segments Distance Velocity     ms ms µV  mm m/s   L Sural - Ankle (Calf)      Calf Ankle 2.1 3.4 8.9 Calf - Ankle 140 66   L Superficial peroneal - Ankle      Lat leg Ankle 2.4 3.2 9.6 Lat leg - Ankle 140 58       MNC      Nerve / Sites Muscle Latency Amplitude Duration Rel Amp Segments Distance Lat Diff Velocity     ms mV ms %  mm ms m/s   L Peroneal - EDB      Ankle EDB 3.0 3.7 4.8 100 Ankle - EDB 80        Fibular head EDB 8.9 2.6 5.5 70.6 Fibular head - Ankle 330 5.8 57      Pop fossa EDB 10.0 2.8 5.3 106 Pop fossa - Fibular head 70 1.1 61   L Tibial - AH      Ankle AH 4.7 4.9 4.3 100 Ankle - Ankle 80        Pop fossa AH 14.5 0.8 5.4 17 Pop fossa - Ankle 390 9.8 40       EMG         EMG Summary Table     Spontaneous MUAP Recruitment   Muscle IA Fib PSW Fasc Other Dur. Dur Amp Dur Polys Pattern Effort   L. Rectus femoris N None None None . _NFT_ _NFT_ N N N N Max   L. Gastrocnemius (Medial head) Incr None 1+ None . _NFT_ _NFT_ N N 1+ sl red Max   L. Tibialis anterior N None None None . _NFT_ _NFT_ N N N N Max   L. Extensor digitorum brevis N None None None . _NFT_ _NFT_ N N 1+ sl red Max   L. Biceps femoris (short head) Incr None None None . _NFT_ _NFT_ N N 1+ sl red Max   R. Extensor digitorum brevis Incr 2+ None  None . _NFT_ _NFT_ N N 1+ sl red Max   R. Abductor hallucis Incr 3+ 1+ None . _NFT_ _NFT_ N Sl Incr 1+ Reduced Max   R. Tibialis anterior N None None None . _NFT_ _NFT_ N N 1+ Reduced Max   R. Rectus femoris N None None None . _NFT_ _NFT_ N N 1+ Reduced Max           INTERPRETATION  -Left superficial peroneal sensory nerve conduction study showed normal peak latency and amplitude  -Left sural sensory nerve conduction study showed normal peak latency and amplitude  -Left peroneal motor nerve conduction study showed normal latency, amplitude, and conduction velocity  -Left tibial motor nerve conduction study showed normal latency, amplitude, and conduction velocity  -Needle EMG examination performed to above mentioned muscles       IMPRESSION  ABNORMAL Study  2. There is electrodiagnostic evidence of an acute on chronic radiculopathy of the right L5 and bilateral S1 nerve roots. There is a chronic radiculopathy of the right L4 nerve root    PLAN  Discussed in detail for greater than 30 minutes about diagnosis and treatment plan    1. Follow up with referring provider: Dr. Angelica Lagunas  2. Handouts on lumbar radic, exercises provided. Rec referral to PT for both lumbar radic and cardiac/endurance--focus on core/lower ext strengthening, endurance, gait, traction, modalities   3. This study is good for one year. If symptoms worsen or do not improve, please re-consult.    Aster Dsouza M.D.  Physical Medicine and Rehab

## 2024-07-10 ENCOUNTER — PATIENT MESSAGE (OUTPATIENT)
Dept: INTERNAL MEDICINE | Facility: CLINIC | Age: 59
End: 2024-07-10
Payer: COMMERCIAL

## 2024-07-11 ENCOUNTER — DOCUMENT SCAN (OUTPATIENT)
Dept: HOME HEALTH SERVICES | Facility: HOSPITAL | Age: 59
End: 2024-07-11
Payer: COMMERCIAL

## 2024-07-15 ENCOUNTER — LAB VISIT (OUTPATIENT)
Dept: LAB | Facility: HOSPITAL | Age: 59
End: 2024-07-15
Attending: THORACIC SURGERY (CARDIOTHORACIC VASCULAR SURGERY)
Payer: COMMERCIAL

## 2024-07-15 DIAGNOSIS — D15.1 ATRIAL MYXOMA: ICD-10-CM

## 2024-07-15 DIAGNOSIS — I05.8 MITRAL VALVE MASS: ICD-10-CM

## 2024-07-15 LAB
BASOPHILS # BLD AUTO: 0.05 K/UL (ref 0–0.2)
BASOPHILS NFR BLD: 1 % (ref 0–1.9)
CHOLEST SERPL-MCNC: 169 MG/DL (ref 120–199)
CHOLEST/HDLC SERPL: 3.1 {RATIO} (ref 2–5)
CRP SERPL-MCNC: 1.8 MG/L (ref 0–8.2)
DIFFERENTIAL METHOD BLD: ABNORMAL
EOSINOPHIL # BLD AUTO: 0.3 K/UL (ref 0–0.5)
EOSINOPHIL NFR BLD: 6.7 % (ref 0–8)
ERYTHROCYTE [DISTWIDTH] IN BLOOD BY AUTOMATED COUNT: 15.2 % (ref 11.5–14.5)
GLUCOSE SERPL-MCNC: 85 MG/DL (ref 70–110)
HCT VFR BLD AUTO: 41.8 % (ref 37–48.5)
HDLC SERPL-MCNC: 55 MG/DL (ref 40–75)
HDLC SERPL: 32.5 % (ref 20–50)
HGB BLD-MCNC: 12.6 G/DL (ref 12–16)
IMM GRANULOCYTES # BLD AUTO: 0.02 K/UL (ref 0–0.04)
IMM GRANULOCYTES NFR BLD AUTO: 0.4 % (ref 0–0.5)
LDLC SERPL CALC-MCNC: 92.2 MG/DL (ref 63–159)
LYMPHOCYTES # BLD AUTO: 2.1 K/UL (ref 1–4.8)
LYMPHOCYTES NFR BLD: 42.6 % (ref 18–48)
MCH RBC QN AUTO: 25.2 PG (ref 27–31)
MCHC RBC AUTO-ENTMCNC: 30.1 G/DL (ref 32–36)
MCV RBC AUTO: 84 FL (ref 82–98)
MONOCYTES # BLD AUTO: 0.4 K/UL (ref 0.3–1)
MONOCYTES NFR BLD: 8.5 % (ref 4–15)
NEUTROPHILS # BLD AUTO: 2 K/UL (ref 1.8–7.7)
NEUTROPHILS NFR BLD: 40.8 % (ref 38–73)
NONHDLC SERPL-MCNC: 114 MG/DL
NRBC BLD-RTO: 0 /100 WBC
PLATELET # BLD AUTO: 293 K/UL (ref 150–450)
PMV BLD AUTO: 11.5 FL (ref 9.2–12.9)
RBC # BLD AUTO: 5 M/UL (ref 4–5.4)
TRIGL SERPL-MCNC: 109 MG/DL (ref 30–150)
WBC # BLD AUTO: 4.81 K/UL (ref 3.9–12.7)

## 2024-07-15 PROCEDURE — 85025 COMPLETE CBC W/AUTO DIFF WBC: CPT | Performed by: THORACIC SURGERY (CARDIOTHORACIC VASCULAR SURGERY)

## 2024-07-15 PROCEDURE — 86140 C-REACTIVE PROTEIN: CPT | Performed by: THORACIC SURGERY (CARDIOTHORACIC VASCULAR SURGERY)

## 2024-07-15 PROCEDURE — 82947 ASSAY GLUCOSE BLOOD QUANT: CPT | Performed by: THORACIC SURGERY (CARDIOTHORACIC VASCULAR SURGERY)

## 2024-07-15 PROCEDURE — 36415 COLL VENOUS BLD VENIPUNCTURE: CPT | Mod: PN | Performed by: THORACIC SURGERY (CARDIOTHORACIC VASCULAR SURGERY)

## 2024-07-15 PROCEDURE — 80061 LIPID PANEL: CPT | Performed by: THORACIC SURGERY (CARDIOTHORACIC VASCULAR SURGERY)

## 2024-07-17 ENCOUNTER — HOSPITAL ENCOUNTER (OUTPATIENT)
Dept: CARDIOLOGY | Facility: HOSPITAL | Age: 59
Discharge: HOME | End: 2024-07-17
Attending: THORACIC SURGERY (CARDIOTHORACIC VASCULAR SURGERY)
Payer: COMMERCIAL

## 2024-07-17 VITALS
WEIGHT: 222 LBS | HEART RATE: 84 BPM | DIASTOLIC BLOOD PRESSURE: 97 MMHG | HEIGHT: 64 IN | BODY MASS INDEX: 37.9 KG/M2 | SYSTOLIC BLOOD PRESSURE: 161 MMHG

## 2024-07-17 DIAGNOSIS — I05.8 MITRAL VALVE MASS: ICD-10-CM

## 2024-07-17 DIAGNOSIS — D15.1 ATRIAL MYXOMA: ICD-10-CM

## 2024-07-17 LAB
CV STRESS BASE HR: 84 BPM
DIASTOLIC BLOOD PRESSURE: 97 MMHG
OHS CV CPX 1 MINUTE RECOVERY HEART RATE: 114 BPM
OHS CV CPX 85 PERCENT MAX PREDICTED HEART RATE MALE: 136
OHS CV CPX ESTIMATED METS: 3
OHS CV CPX MAX PREDICTED HEART RATE: 160
OHS CV CPX PATIENT IS FEMALE: 1
OHS CV CPX PATIENT IS MALE: 0
OHS CV CPX PEAK DIASTOLIC BLOOD PRESSURE: 98 MMHG
OHS CV CPX PEAK HEAR RATE: 129 BPM
OHS CV CPX PEAK RATE PRESSURE PRODUCT: NORMAL
OHS CV CPX PEAK SYSTOLIC BLOOD PRESSURE: 169 MMHG
OHS CV CPX PERCENT MAX PREDICTED HEART RATE ACHIEVED: 84
OHS CV CPX RATE PRESSURE PRODUCT PRESENTING: NORMAL
STRESS ECHO POST EXERCISE DUR MIN: 4 MINUTES
STRESS ECHO POST EXERCISE DUR SEC: 24 SECONDS
SYSTOLIC BLOOD PRESSURE: 161 MMHG

## 2024-07-17 PROCEDURE — 93016 CV STRESS TEST SUPVJ ONLY: CPT | Mod: ,,, | Performed by: INTERNAL MEDICINE

## 2024-07-17 PROCEDURE — 93017 CV STRESS TEST TRACING ONLY: CPT

## 2024-07-17 PROCEDURE — 93018 CV STRESS TEST I&R ONLY: CPT | Mod: ,,, | Performed by: INTERNAL MEDICINE

## 2024-07-18 ENCOUNTER — CLINICAL SUPPORT (OUTPATIENT)
Dept: REHABILITATION | Facility: HOSPITAL | Age: 59
End: 2024-07-18
Payer: COMMERCIAL

## 2024-07-18 DIAGNOSIS — M54.9 BACK PAIN, UNSPECIFIED BACK LOCATION, UNSPECIFIED BACK PAIN LATERALITY, UNSPECIFIED CHRONICITY: ICD-10-CM

## 2024-07-18 PROCEDURE — 97530 THERAPEUTIC ACTIVITIES: CPT | Mod: PN | Performed by: PHYSICAL THERAPIST

## 2024-07-18 NOTE — PROGRESS NOTES
[facility]   Physical Therapy Initial Evaluation      Name: Jake Hoskins  Clinic Number: [id number]    Therapy Diagnosis:   Encounter Diagnosis   Name Primary?    Back pain, unspecified back location, unspecified back pain laterality, unspecified chronicity         Physician: Angelica Lagunas MD    Physician Orders: PT Eval and Treat   Medical Diagnosis from Referral: M54.9 (ICD-10-CM) - Back pain, unspecified back location, unspecified back pain laterality, unspecified chronicity  Evaluation Date: 7/18/2024  Authorization Period Expiration: 12/31/2024  Plan of Care Expiration: 08/30/2024  Progress Note Due: 30 days  Visit # / Visits authorized: 1/ 1   FOTO: 1/ 3    Precautions: Standard and cardiac rehab      Time In: 9:25am  Time Out: 10:30am  Total Billable Time: 55 minutes    Subjective     Date of onset: several years now    History of current condition - Jake reports: she has a pinched nerve in her lower back and it's affected her legs, knees down.  She reports having this pain for a while but just started to address it when she had her heart surgery 9 weeks ago.  Standing and sitting for prolonged periods bothers it the most.     Falls: 0    Imaging: Xrays of lumbar spine:  Vertebral body heights maintained.  Trace retrolisthesis of L3 on L4.  Disc spaces relatively maintained.  Facet arthropathy noted.  SI joint degenerative findings, greater on the left.  No acute abnormality.  Impression:  Degenerative findings.    EMG findings:  ABNORMAL Study  2. There is electrodiagnostic evidence of an acute on chronic radiculopathy of the right L5 and bilateral S1 nerve roots. There is a chronic radiculopathy of the right L4 nerve root    Prior Therapy: had home health therapy  Social History:  lives with their family  Occupation: currently on medical leave, stock overnight at [employer]  Prior Level of Function: WNL  Current Level of Function: Difficulty with prolonged walking/standing  and sitting    Pain:  Current 4/10, worst 10/10,    Location: bilateral back , knee , and lower legs   Description: Throbbing  Aggravating Factors: Sitting and Standing  Easing Factors: rest    Patients goals: Pt would like to not have pain in her legs.      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    Medications:   Jake has a current medication list which includes the following prescription(s): aspirin, celecoxib, cyclobenzaprine, ergocalciferol, ferrous sulfate, dried, fluticasone propionate, folic acid, furosemide, gabapentin, hydrocodone-acetaminophen, hydrocodone-acetaminophen, hydrocodone-acetaminophen, loratadine, methotrexate, montelukast, multivit,calc,mins/iron/folic, olmesartan, and potassium chloride sa.    Allergies:   Review of patient's allergies indicates:   Allergen Reactions    Tramadol Nausea And Vomiting        Objective      Gait Observation:  antalgic gait pattern favoring the LLE    Posture Observation: increased thoracic kyphosis, decreased lumbar lordosis    AROM:    Thoracolumbar  (single inclinometer at L4/5) AROM  (degrees) Pain/Dysfunction with Movement   Flexion 50% Limited from recent heart issues   Extension 25%    Right side bending 25%    Left side bending 50%    Right rotation 50%    Left rotation 75%       AROM:  B hips/knees/ankles WNL in all directions    Strength:     L/E MMT Right Left Pain/Dysfunction with Movement   Hip Flexion 4+/5 4+/5    Hip Extension 4-/5 4-/5    Hip Abduction 4-/5 4-/5    Hip Adduction 4+/5 4+/5    Hip IR 4+/5 4+/5    Hip ER 4+/5 4+/5    Knee Flexion 4/5 4/5    Knee Extension 4/5 4/5    Ankle DF 4+/5 4+/5    Ankle PF 4-/5 4-/5         Intake Outcome Measure for FOTO Lumbar Survey    Therapist reviewed FOTO scores for Jake Hoskins on 7/18/2024.   FOTO report - see Media section or FOTO account episode details.    Intake Score: 34/100         Treatment     Total Treatment time (time-based codes) separate from Evaluation: 39 minutes     Jake received the treatments listed below:      therapeutic activities to improve functional performance for 39  minutes, including:  HEP: Straight Leg Raise, quad sets, LTR, and LAQ  Education:  plan of care, diagnosis, and expectations.       Patient Education and Home Exercises     Education provided:   - HEP and plan of care    Written Home Exercises Provided: yes. Exercises were reviewed and Jake was able to demonstrate them prior to the end of the session.  Jake demonstrated good  understanding of the education provided. See EMR under Patient Instructions for exercises provided during therapy sessions.    Assessment     Jake is a 59 y.o. female referred to outpatient Physical Therapy with a medical diagnosis of low back pain with radicular symptoms. Patient presents with decreased lumbar AROM, decreased back and BLE strength, difficulty with gait, pain in the back and in the legs, and difficulty with daily activities.     Patient prognosis is Good.   Patient will benefit from skilled outpatient Physical Therapy to address the deficits stated above and in the chart below, provide patient /family education, and to maximize patientt's level of independence.     Plan of care discussed with patient: Yes  Patient's spiritual, cultural and  educational needs considered and patient is agreeable to the plan of care and goals as stated below:     Anticipated Barriers for therapy: none    Medical Necessity is demonstrated by the following  History  Co-morbidities and personal factors that may impact the plan of care [] LOW: no personal factors / co-morbidities  [x] MODERATE: 1-2 personal factors / co-morbidities  [] HIGH: 3+ personal factors / co-morbidities    Moderate / High Support Documentation:   Co-morbidities affecting plan of care: See PMHx    Personal Factors:   no deficits     Examination  Body Structures and Functions, activity limitations and participation restrictions that may impact the plan of care [] LOW: addressing 1-2 elements  [x] MODERATE: 3+ elements  [] HIGH: 4+ elements (please support below)    Moderate / High Support Documentation: See FOTO documentation     Clinical Presentation [] LOW: stable  [x] MODERATE: Evolving  [] HIGH: Unstable     Decision Making/ Complexity Score: moderate       Goals:  Short Term Goals: 2 weeks   Pt will be 50% independent with HEP.     Long Term Goals: 6 weeks   Pt will be 100% independent with HEP.  Pt will be able to perform sit to stands without difficulty.   Pt will be able to get in and out of the bath tub without difficulty.  Pt will be able to perform her usual household activities without difficulty.   Plan     Plan of care Certification: 7/18/2024 to 08/30/2024.    Outpatient Physical Therapy 2 times weekly for 6 weeks to include the following interventions: Cervical/Lumbar Traction, Electrical Stimulation PRN, Gait Training, Manual Therapy, Moist Heat/ Ice, Neuromuscular Re-ed, Patient Education, Self Care, Therapeutic Activities, Therapeutic Exercise, and FDN .     Ana Maria Helms PT        Physician's Signature: _________________________________________ Date: ________________

## 2024-07-18 NOTE — PLAN OF CARE
[facility]   Physical Therapy Initial Evaluation      Name: Jake Hoskins  Clinic Number: [id number]    Therapy Diagnosis:   Encounter Diagnosis   Name Primary?    Back pain, unspecified back location, unspecified back pain laterality, unspecified chronicity         Physician: Angelica Lagunas MD    Physician Orders: PT Eval and Treat   Medical Diagnosis from Referral: M54.9 (ICD-10-CM) - Back pain, unspecified back location, unspecified back pain laterality, unspecified chronicity  Evaluation Date: 7/18/2024  Authorization Period Expiration: 12/31/2024  Plan of Care Expiration: 08/30/2024  Progress Note Due: 30 days  Visit # / Visits authorized: 1/ 1   FOTO: 1/ 3    Precautions: Standard and cardiac rehab     Time In: 9:25am  Time Out: 10:30am  Total Billable Time: 55 minutes    Subjective     Date of onset: several years now    History of current condition - Jake reports: she has a pinched nerve in her lower back and it's affected her legs, knees down.  She reports having this pain for a while but just started to address it when she had her heart surgery 9 weeks ago.  Standing and sitting for prolonged periods bothers it the most.     Falls: 0    Imaging: Xrays of lumbar spine:  Vertebral body heights maintained.  Trace retrolisthesis of L3 on L4.  Disc spaces relatively maintained.  Facet arthropathy noted.  SI joint degenerative findings, greater on the left.  No acute abnormality.  Impression:  Degenerative findings.    EMG findings:  ABNORMAL Study  2. There is electrodiagnostic evidence of an acute on chronic radiculopathy of the right L5 and bilateral S1 nerve roots. There is a chronic radiculopathy of the right L4 nerve root    Prior Therapy: had home health therapy  Social History:  lives with their family  Occupation: currently on medical leave, stock overnight at [employer]  Prior Level of Function: WNL  Current Level of Function: Difficulty with prolonged walking/standing  and sitting    Pain:  Current 4/10, worst 10/10,    Location: bilateral back , knee , and lower legs   Description: Throbbing  Aggravating Factors: Sitting and Standing  Easing Factors: rest    Patients goals: Pt would like to not have pain in her legs.      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    Medications:   Jake has a current medication list which includes the following prescription(s): aspirin, celecoxib, cyclobenzaprine, ergocalciferol, ferrous sulfate, dried, fluticasone propionate, folic acid, furosemide, gabapentin, hydrocodone-acetaminophen, hydrocodone-acetaminophen, hydrocodone-acetaminophen, loratadine, methotrexate, montelukast, multivit,calc,mins/iron/folic, olmesartan, and potassium chloride sa.    Allergies:   Review of patient's allergies indicates:   Allergen Reactions    Tramadol Nausea And Vomiting        Objective      Gait Observation:  antalgic gait pattern favoring the LLE    Posture Observation: increased thoracic kyphosis, decreased lumbar lordosis    AROM:    Thoracolumbar  (single inclinometer at L4/5) AROM  (degrees) Pain/Dysfunction with Movement   Flexion 50% Limited from recent heart issues   Extension 25%    Right side bending 25%    Left side bending 50%    Right rotation 50%    Left rotation 75%       AROM:  B hips/knees/ankles WNL in all directions    Strength:     L/E MMT Right Left Pain/Dysfunction with Movement   Hip Flexion 4+/5 4+/5    Hip Extension 4-/5 4-/5    Hip Abduction 4-/5 4-/5    Hip Adduction 4+/5 4+/5    Hip IR 4+/5 4+/5    Hip ER 4+/5 4+/5    Knee Flexion 4/5 4/5    Knee Extension 4/5 4/5    Ankle DF 4+/5 4+/5    Ankle PF 4-/5 4-/5         Intake Outcome Measure for FOTO Lumbar Survey    Therapist reviewed FOTO scores for Jake Hoskins on 7/18/2024.   FOTO report - see Media section or FOTO account episode details.    Intake Score: 34/100         Treatment     Total Treatment time (time-based codes) separate from Evaluation: 39 minutes     Jake received the treatments listed below:      therapeutic activities to improve functional performance for 39  minutes, including:  HEP: Straight Leg Raise, quad sets, LTR, and LAQ  Education:  plan of care, diagnosis, and expectations.       Patient Education and Home Exercises     Education provided:   - HEP and plan of care    Written Home Exercises Provided: yes. Exercises were reviewed and Jake was able to demonstrate them prior to the end of the session.  Jake demonstrated good  understanding of the education provided. See EMR under Patient Instructions for exercises provided during therapy sessions.    Assessment     Jake is a 59 y.o. female referred to outpatient Physical Therapy with a medical diagnosis of low back pain with radicular symptoms. Patient presents with decreased lumbar AROM, decreased back and BLE strength, difficulty with gait, pain in the back and in the legs, and difficulty with daily activities.     Patient prognosis is Good.   Patient will benefit from skilled outpatient Physical Therapy to address the deficits stated above and in the chart below, provide patient /family education, and to maximize patientt's level of independence.     Plan of care discussed with patient: Yes  Patient's spiritual, cultural and  educational needs considered and patient is agreeable to the plan of care and goals as stated below:     Anticipated Barriers for therapy: none    Medical Necessity is demonstrated by the following  History  Co-morbidities and personal factors that may impact the plan of care [] LOW: no personal factors / co-morbidities  [x] MODERATE: 1-2 personal factors / co-morbidities  [] HIGH: 3+ personal factors / co-morbidities    Moderate / High Support Documentation:   Co-morbidities affecting plan of care: See PMHx    Personal Factors:   no deficits     Examination  Body Structures and Functions, activity limitations and participation restrictions that may impact the plan of care [] LOW: addressing 1-2 elements  [x] MODERATE: 3+ elements  [] HIGH: 4+ elements (please support below)    Moderate / High Support Documentation: See FOTO documentation     Clinical Presentation [] LOW: stable  [x] MODERATE: Evolving  [] HIGH: Unstable     Decision Making/ Complexity Score: moderate       Goals:  Short Term Goals: 2 weeks   Pt will be 50% independent with HEP.     Long Term Goals: 6 weeks   Pt will be 100% independent with HEP.  Pt will be able to perform sit to stands without difficulty.   Pt will be able to get in and out of the bath tub without difficulty.  Pt will be able to perform her usual household activities without difficulty.   Plan     Plan of care Certification: 7/18/2024 to 08/30/2024.    Outpatient Physical Therapy 2 times weekly for 6 weeks to include the following interventions: Cervical/Lumbar Traction, Electrical Stimulation PRN, Gait Training, Manual Therapy, Moist Heat/ Ice, Neuromuscular Re-ed, Patient Education, Self Care, Therapeutic Activities, Therapeutic Exercise, and FDN.     Ana Maria Helms PT        Physician's Signature: _________________________________________ Date: ________________

## 2024-07-23 ENCOUNTER — CLINICAL SUPPORT (OUTPATIENT)
Dept: REHABILITATION | Facility: HOSPITAL | Age: 59
End: 2024-07-23
Payer: COMMERCIAL

## 2024-07-23 ENCOUNTER — CLINICAL SUPPORT (OUTPATIENT)
Dept: CARDIAC REHAB | Facility: HOSPITAL | Age: 59
End: 2024-07-23
Attending: THORACIC SURGERY (CARDIOTHORACIC VASCULAR SURGERY)
Payer: COMMERCIAL

## 2024-07-23 VITALS
RESPIRATION RATE: 16 BRPM | WEIGHT: 225.44 LBS | SYSTOLIC BLOOD PRESSURE: 126 MMHG | HEIGHT: 64 IN | DIASTOLIC BLOOD PRESSURE: 74 MMHG | HEART RATE: 76 BPM | BODY MASS INDEX: 38.49 KG/M2 | OXYGEN SATURATION: 98 %

## 2024-07-23 DIAGNOSIS — I05.8 MITRAL VALVE MASS: ICD-10-CM

## 2024-07-23 DIAGNOSIS — M54.16 LUMBAR RADICULOPATHY: Primary | ICD-10-CM

## 2024-07-23 PROCEDURE — 97110 THERAPEUTIC EXERCISES: CPT | Mod: PN | Performed by: PHYSICAL THERAPIST

## 2024-07-23 PROCEDURE — 97112 NEUROMUSCULAR REEDUCATION: CPT | Mod: PN | Performed by: PHYSICAL THERAPIST

## 2024-07-23 RX ORDER — CALCIUM CARB, CITRATE, MALATE 250 MG
1 CAPSULE ORAL DAILY
COMMUNITY

## 2024-07-23 NOTE — PROGRESS NOTES
"  [facility]   Physical Therapy Treatment Note      Name: Jake Hoskins  Clinic Number: [id number]    Therapy Diagnosis:   Encounter Diagnosis   Name Primary?    Lumbar radiculopathy Yes     Physician: Angelica Lagunas MD    Visit Date: 7/23/2024  Physician Orders: PT Eval and Treat   Medical Diagnosis from Referral: M54.9 (ICD-10-CM) - Back pain, unspecified back location, unspecified back pain laterality, unspecified chronicity  Evaluation Date: 7/18/2024  Authorization Period Expiration: 06/25/2025  Plan of Care Expiration: 08/30/2024  Progress Note Due: 30 days  Visit # / Visits authorized: 1/20   FOTO: 1/3     Precautions: Standard and cardiac rehab      Time In: 10:30am  Time Out: 11:20am  Total Billable Time: 50 minutes    PTA Visit #: --/5       Subjective     Patient reports: that her leg is bothering her.  She was compliant with home exercise program.  Response to previous treatment: leg continues to hurt  Functional change: nothing noted    Pain: 4/10  Location: left Leg      Objective      Objective Measures updated at progress report unless specified.     Treatment     Jake received the treatments listed below:           CPT Intervention  BLE and back Duration / Intensity  07/23/2024 Tech notes   TE Bike 6' for ROM And stretching     NR Shuttle DL 5B 2', SL 3B 1' ea      NR Heel raises 3 x 10     TE Calf stretch on stairs 20" x 4     NR LAQ  20x ea     NR Quad sets 5" x 20     NR SLR 2 x 10     NR Low bridge 20x     TE LTR 10x ea side     TE SKTC 10" x 5                                                                           PLAN               CPT Codes available for Billing:   (--) minutes of Manual therapy (MT) to improve pain and ROM.  (15) minutes of Therapeutic Exercise (TE) to develop   strength, endurance, range of motion, and flexibility.  (35) minutes of Neuromuscular Re-Education (NR)  to improve: Balance, Coordination, Kinesthetic, Sense, Proprioception, and " Posture.  (--) minutes of Therapeutic Activities (TA) to improve functional performance.  Unattended Electrical Stimulation (ES) for muscle performance or pain modulation.  Vasopneumatic Device Therapy () for management of swelling/edema. (83366)  BFR: Blood flow restriction applied during exercise  NP or (-): Not Performed    Patient Education and Home Exercises       Education provided:   - HEP and plan of care    Written Home Exercises Provided: yes. Exercises were reviewed and Jake was able to demonstrate them prior to the end of the session.  Jake demonstrated good  understanding of the education provided. See Electronic Medical Record under Patient Instructions for exercises provided during therapy sessions    Assessment     Jake presents with pain in her leg.  She has weakness in her legs and hips.  Therapy focused on strengthening to BLEs.  She tolerated her treatment well.  She needs continued work on BLE strengthening.     Jake Is progressing well towards her goals.   Patient prognosis is Good.     Patient will continue to benefit from skilled outpatient physical therapy to address the deficits listed in the problem list box on initial evaluation, provide pt/family education and to maximize pt's level of independence in the home and community environment.     Patient's spiritual, cultural and educational needs considered and pt agreeable to plan of care and goals.     Anticipated barriers to physical therapy: none    Goals: Short Term Goals: 2 weeks   Pt will be 50% independent with HEP.  Progressing     Long Term Goals: 6 weeks   Pt will be 100% independent with HEP. Progressing  Pt will be able to perform sit to stands without difficulty.  Progressing  Pt will be able to get in and out of the bath tub without difficulty. Progressing  Pt will be able to perform her usual household activities without difficulty. Progressing    Plan     Plan of care Certification: 7/18/2024 to 08/30/2024.      Outpatient Physical Therapy 2 times weekly for 6 weeks to include the following interventions: Cervical/Lumbar Traction, Electrical Stimulation PRN, Gait Training, Manual Therapy, Moist Heat/ Ice, Neuromuscular Re-ed, Patient Education, Self Care, Therapeutic Activities, Therapeutic Exercise, and FDN.     Ana Maria Helms, PT

## 2024-07-23 NOTE — PROGRESS NOTES
Patient oriented to cardiac rehab program. Questions answered and explained, consents signed, and tour of facilities given.  Patient v/u.

## 2024-07-24 ENCOUNTER — CLINICAL SUPPORT (OUTPATIENT)
Dept: CARDIAC REHAB | Facility: HOSPITAL | Age: 59
End: 2024-07-24
Attending: THORACIC SURGERY (CARDIOTHORACIC VASCULAR SURGERY)
Payer: COMMERCIAL

## 2024-07-24 DIAGNOSIS — I05.8 MITRAL VALVE MASS: ICD-10-CM

## 2024-07-24 PROCEDURE — 93798 PHYS/QHP OP CAR RHAB W/ECG: CPT | Performed by: INTERNAL MEDICINE

## 2024-07-25 ENCOUNTER — CLINICAL SUPPORT (OUTPATIENT)
Dept: REHABILITATION | Facility: HOSPITAL | Age: 59
End: 2024-07-25
Payer: COMMERCIAL

## 2024-07-25 DIAGNOSIS — M54.16 LUMBAR RADICULOPATHY: Primary | ICD-10-CM

## 2024-07-25 PROCEDURE — 97110 THERAPEUTIC EXERCISES: CPT | Mod: PN | Performed by: PHYSICAL THERAPIST

## 2024-07-25 PROCEDURE — 97112 NEUROMUSCULAR REEDUCATION: CPT | Mod: PN | Performed by: PHYSICAL THERAPIST

## 2024-07-25 NOTE — PROGRESS NOTES
"  [facility]   Physical Therapy Treatment Note      Name: Jake Hoskins  Clinic Number: [id number]    Therapy Diagnosis:   Encounter Diagnosis   Name Primary?    Lumbar radiculopathy Yes     Physician: Angelica Lagunas MD    Visit Date: 7/25/2024  Physician Orders: PT Eval and Treat   Medical Diagnosis from Referral: M54.9 (ICD-10-CM) - Back pain, unspecified back location, unspecified back pain laterality, unspecified chronicity  Evaluation Date: 7/18/2024  Authorization Period Expiration: 06/25/2025  Plan of Care Expiration: 08/30/2024  Progress Note Due: 30 days  Visit # / Visits authorized: 2/20   FOTO: 1/3     Precautions: Standard and cardiac rehab      Time In: 9:30am  Time Out: 10:30am  Total Billable Time: 55 minutes    PTA Visit #: --/5       Subjective     Patient reports: that her left knee is bothering her a bit today.   She was compliant with home exercise program.  Response to previous treatment: leg continues to hurt  Functional change: nothing noted    Pain: 4/10  Location: left Leg      Objective      Objective Measures updated at progress report unless specified.     Treatment     Jake received the treatments listed below:         CPT Intervention  BLE and back Duration / Intensity  07/23/2024 Tech notes   TE Bike 6' for ROM And stretching     NR Shuttle DL 5B 2', SL 3B 1' ea     TA Sit to stand 24" 10x     NR Standing hip abd and ext 10x ea leg      NR Heel raises 3 x 10     TE Calf stretch on stairs 20" x 4     NR LAQ  20x ea     NR Quad sets 5" x 20     NR SLR 2 x 10     NR  bridge 20x     TE LTR 10x ea side     TE SKTC 10" x 5     NR PPT 20x 5 " hold      NR Knee squeeze 5" x 20                                                       PLAN             CPT Codes available for Billing:   (--) minutes of Manual therapy (MT) to improve pain and ROM.  (15) minutes of Therapeutic Exercise (TE) to develop   strength, endurance, range of motion, and flexibility.  (35) " minutes of Neuromuscular Re-Education (NR)  to improve: Balance, Coordination, Kinesthetic, Sense, Proprioception, and Posture.  (5) minutes of Therapeutic Activities (TA) to improve functional performance.  Unattended Electrical Stimulation (ES) for muscle performance or pain modulation.  Vasopneumatic Device Therapy () for management of swelling/edema. (95633)  BFR: Blood flow restriction applied during exercise  NP or (-): Not Performed    Patient Education and Home Exercises       Education provided:   - HEP and plan of care    Written Home Exercises Provided: yes. Exercises were reviewed and Jake was able to demonstrate them prior to the end of the session.  Jake demonstrated good  understanding of the education provided. See Electronic Medical Record under Patient Instructions for exercises provided during therapy sessions    Assessment     Jake presents with pain in primarily her left knee.  She has weakness in her legs and hips.  Added hip and knee strengthening exercises this visit.  She tolerated her treatment well.  She needs continued work on BLE strengthening.     Jake Is progressing well towards her goals.   Patient prognosis is Good.     Patient will continue to benefit from skilled outpatient physical therapy to address the deficits listed in the problem list box on initial evaluation, provide pt/family education and to maximize pt's level of independence in the home and community environment.     Patient's spiritual, cultural and educational needs considered and pt agreeable to plan of care and goals.     Anticipated barriers to physical therapy: none    Goals: Short Term Goals: 2 weeks   Pt will be 50% independent with HEP.  Progressing     Long Term Goals: 6 weeks   Pt will be 100% independent with HEP. Progressing  Pt will be able to perform sit to stands without difficulty.  Progressing  Pt will be able to get in and out of the bath tub without difficulty. Progressing  Pt will be able to  perform her usual household activities without difficulty. Progressing    Plan     Plan of care Certification: 7/18/2024 to 08/30/2024.     Outpatient Physical Therapy 2 times weekly for 6 weeks to include the following interventions: Cervical/Lumbar Traction, Electrical Stimulation PRN, Gait Training, Manual Therapy, Moist Heat/ Ice, Neuromuscular Re-ed, Patient Education, Self Care, Therapeutic Activities, Therapeutic Exercise, and FDN.     Ana Maria Helms, PT

## 2024-07-26 ENCOUNTER — CLINICAL SUPPORT (OUTPATIENT)
Dept: CARDIAC REHAB | Facility: HOSPITAL | Age: 59
End: 2024-07-26
Attending: THORACIC SURGERY (CARDIOTHORACIC VASCULAR SURGERY)
Payer: COMMERCIAL

## 2024-07-26 DIAGNOSIS — I05.8 MITRAL VALVE MASS: ICD-10-CM

## 2024-07-26 DIAGNOSIS — D15.1 ATRIAL MYXOMA: Primary | ICD-10-CM

## 2024-07-26 PROCEDURE — 93798 PHYS/QHP OP CAR RHAB W/ECG: CPT | Performed by: INTERNAL MEDICINE

## 2024-07-29 ENCOUNTER — CLINICAL SUPPORT (OUTPATIENT)
Dept: CARDIAC REHAB | Facility: HOSPITAL | Age: 59
End: 2024-07-29
Payer: COMMERCIAL

## 2024-07-29 DIAGNOSIS — D15.1 ATRIAL MYXOMA: Primary | ICD-10-CM

## 2024-07-29 PROCEDURE — 93798 PHYS/QHP OP CAR RHAB W/ECG: CPT | Performed by: NURSE PRACTITIONER

## 2024-07-30 ENCOUNTER — CLINICAL SUPPORT (OUTPATIENT)
Dept: REHABILITATION | Facility: HOSPITAL | Age: 59
End: 2024-07-30
Payer: COMMERCIAL

## 2024-07-30 DIAGNOSIS — M54.16 LUMBAR RADICULOPATHY: Primary | ICD-10-CM

## 2024-07-30 PROCEDURE — 97110 THERAPEUTIC EXERCISES: CPT | Mod: PN | Performed by: PHYSICAL THERAPIST

## 2024-07-30 PROCEDURE — 97530 THERAPEUTIC ACTIVITIES: CPT | Mod: PN | Performed by: PHYSICAL THERAPIST

## 2024-07-30 PROCEDURE — 97112 NEUROMUSCULAR REEDUCATION: CPT | Mod: PN | Performed by: PHYSICAL THERAPIST

## 2024-07-30 NOTE — PROGRESS NOTES
"  [facility]   Physical Therapy Treatment Note      Name: Jake Hoskins  Clinic Number: [id number]    Therapy Diagnosis:   Encounter Diagnosis   Name Primary?    Lumbar radiculopathy Yes     Physician: Angelica Lagunas MD    Visit Date: 7/30/2024  Physician Orders: PT Eval and Treat   Medical Diagnosis from Referral: M54.9 (ICD-10-CM) - Back pain, unspecified back location, unspecified back pain laterality, unspecified chronicity  Evaluation Date: 7/18/2024  Authorization Period Expiration: 06/25/2025  Plan of Care Expiration: 08/30/2024  Progress Note Due: 30 days  Visit # / Visits authorized: 3/20   FOTO: 1/3     Precautions: Standard and cardiac rehab      Time In: 9:30am  Time Out: 10:30am  Total Billable Time: 58 minutes    PTA Visit #: --/5       Subjective     Patient reports: that she is a little sore today but otherwise feeling fine.   She was compliant with home exercise program.  Response to previous treatment: leg continues to hurt  Functional change: nothing noted    Pain: 4/10  Location: left Leg      Objective      Objective Measures updated at progress report unless specified.     Treatment     Jake received the treatments listed below:            CPT Intervention  BLE and back Duration / Intensity  07/30/2024 Tech notes   TE Bike 6' for ROM And stretching     NR Shuttle DL 5B 2', SL 3B 1' ea     TA Sit to stand 24" x10, 20" x10 7.5#  NV all 2x10 20" 7.5#   NR Standing hip abd and ext 10x ea leg ??NV   Said felt the exercise in her knee she was standing but felt nothing in the abductors     NR Heel raises 3 x 10     TE Calf stretch on stairs 20" x 4     NR LAQ  2# 20x ea     NR Quad sets 5" x 20     NR SLR  x 10 Pain in left knee    NR  bridge 20x     TE LTR 10x ea side     TE SKTC 10" x 5     NR PPT 20x 5 " hold      NR Knee squeeze 5" x 20                                                       PLAN                  CPT Codes available for Billing:   (--) minutes of " Manual therapy (MT) to improve pain and ROM.  (15) minutes of Therapeutic Exercise (TE) to develop   strength, endurance, range of motion, and flexibility.  (35) minutes of Neuromuscular Re-Education (NR)  to improve: Balance, Coordination, Kinesthetic, Sense, Proprioception, and Posture.  (8) minutes of Therapeutic Activities (TA) to improve functional performance.  Unattended Electrical Stimulation (ES) for muscle performance or pain modulation.  Vasopneumatic Device Therapy () for management of swelling/edema. (56080)  BFR: Blood flow restriction applied during exercise  NP or (-): Not Performed    Patient Education and Home Exercises       Education provided:   - HEP and plan of care    Written Home Exercises Provided: yes. Exercises were reviewed and Jake was able to demonstrate them prior to the end of the session.  Jake demonstrated good  understanding of the education provided. See Electronic Medical Record under Patient Instructions for exercises provided during therapy sessions    Assessment     Jake presents with pain in primarily her left knee.  She was able to tolerate increasing weights with her exercises today.  She felt fine after her treatment, no increased soreness noted.   She needs continued work on BLE strengthening.     Jake Is progressing well towards her goals.   Patient prognosis is Good.     Patient will continue to benefit from skilled outpatient physical therapy to address the deficits listed in the problem list box on initial evaluation, provide pt/family education and to maximize pt's level of independence in the home and community environment.     Patient's spiritual, cultural and educational needs considered and pt agreeable to plan of care and goals.     Anticipated barriers to physical therapy: none    Goals: Short Term Goals: 2 weeks   Pt will be 50% independent with HEP.  Progressing     Long Term Goals: 6 weeks   Pt will be 100% independent with HEP. Progressing  Pt will be  able to perform sit to stands without difficulty.  Progressing  Pt will be able to get in and out of the bath tub without difficulty. Progressing  Pt will be able to perform her usual household activities without difficulty. Progressing    Plan     Plan of care Certification: 7/18/2024 to 08/30/2024.     Outpatient Physical Therapy 2 times weekly for 6 weeks to include the following interventions: Cervical/Lumbar Traction, Electrical Stimulation PRN, Gait Training, Manual Therapy, Moist Heat/ Ice, Neuromuscular Re-ed, Patient Education, Self Care, Therapeutic Activities, Therapeutic Exercise, and FDN.     Ana Maria Helms, PT

## 2024-07-31 ENCOUNTER — CLINICAL SUPPORT (OUTPATIENT)
Dept: CARDIAC REHAB | Facility: HOSPITAL | Age: 59
End: 2024-07-31
Payer: COMMERCIAL

## 2024-07-31 DIAGNOSIS — D15.1 ATRIAL MYXOMA: Primary | ICD-10-CM

## 2024-07-31 PROCEDURE — 93798 PHYS/QHP OP CAR RHAB W/ECG: CPT | Performed by: INTERNAL MEDICINE

## 2024-08-01 ENCOUNTER — CLINICAL SUPPORT (OUTPATIENT)
Dept: REHABILITATION | Facility: HOSPITAL | Age: 59
End: 2024-08-01
Payer: COMMERCIAL

## 2024-08-01 DIAGNOSIS — M54.16 LUMBAR RADICULOPATHY: Primary | ICD-10-CM

## 2024-08-01 PROCEDURE — 97112 NEUROMUSCULAR REEDUCATION: CPT | Mod: PN | Performed by: PHYSICAL THERAPIST

## 2024-08-01 PROCEDURE — 97110 THERAPEUTIC EXERCISES: CPT | Mod: PN | Performed by: PHYSICAL THERAPIST

## 2024-08-01 PROCEDURE — 97530 THERAPEUTIC ACTIVITIES: CPT | Mod: PN | Performed by: PHYSICAL THERAPIST

## 2024-08-01 NOTE — PROGRESS NOTES
"  [facility]   Physical Therapy Treatment Note      Name: Jake Hoskins  Clinic Number: [id number]    Therapy Diagnosis:   Encounter Diagnosis   Name Primary?    Lumbar radiculopathy Yes     Physician: Angelica Lagunas MD    Visit Date: 8/1/2024  Physician Orders: PT Eval and Treat   Medical Diagnosis from Referral: M54.9 (ICD-10-CM) - Back pain, unspecified back location, unspecified back pain laterality, unspecified chronicity  Evaluation Date: 7/18/2024  Authorization Period Expiration: 06/25/2025  Plan of Care Expiration: 08/30/2024  Progress Note Due: 30 days  Visit # / Visits authorized: 4/20   FOTO: 1/3     Precautions: Standard and cardiac rehab      Time In: 9:20am  Time Out: 10:45am  Total Billable Time: 63 minutes    PTA Visit #: --/5       Subjective     Patient reports:  she is doing well and having a little pain.   She was compliant with home exercise program.  Response to previous treatment: leg continues to hurt  Functional change: nothing noted    Pain: 3/10  Location: left Leg      Objective      Objective Measures updated at progress report unless specified.     Treatment     Jake received the treatments listed below:          CPT Intervention  BLE and back Duration / Intensity  08/1/2024 Duration / Intensity  07/30/2024 Tech notes   TE Bike Nustep 6' 6' for ROM And stretching     NR Shuttle DL 5B 2', SL 3B 1' ea DL 5B 2', SL 3B 1' ea     TA Sit to stand 20"box  2x10 24" x10, 20" x10 7.5#      NR Standing hip abd and ext 10x ea leg 10x ea leg      NR Heel raises 3 x 10 3 x 10     TE Calf stretch on stairs 20" x 4 20" x 4     NR LAQ  2# 20x ea 2# 20x ea     NR Quad sets 5" x 20 5" x 20     NR SLR X15 B  x 10     NR  bridge 20x 20x     TE LTR 10x ea side 10x ea side     TE SKTC 10" x 5 10" x 5     NR PPT 20x 5 " hold 20x 5 " hold      NR Knee squeeze 5" x 20 5" x 20     NR Ankle 4way B GTB 3x10                                                       PLAN            " Heat on L knee (8/1)      CPT Codes available for Billing:   (--) minutes of Manual therapy (MT) to improve pain and ROM.  (15) minutes of Therapeutic Exercise (TE) to develop   strength, endurance, range of motion, and flexibility.  (39) minutes of Neuromuscular Re-Education (NR)  to improve: Balance, Coordination, Kinesthetic, Sense, Proprioception, and Posture.  (10) minutes of Therapeutic Activities (TA) to improve functional performance.  Unattended Electrical Stimulation (ES) for muscle performance or pain modulation.  Vasopneumatic Device Therapy () for management of swelling/edema. (99220)  BFR: Blood flow restriction applied during exercise  NP or (-): Not Performed    Patient Education and Home Exercises       Education provided:   - HEP and plan of care    Written Home Exercises Provided: yes. Exercises were reviewed and Jake was able to demonstrate them prior to the end of the session.  Jake demonstrated good  understanding of the education provided. See Electronic Medical Record under Patient Instructions for exercises provided during therapy sessions    Assessment     Jake presents with pain in primarily her left knee.  She was able to tolerate increasing weights with her exercises today. Ended with MHP to reduce soreness in the knee.  She needs continued work on BLE strengthening.     Jake Is progressing well towards her goals.   Patient prognosis is Good.     Patient will continue to benefit from skilled outpatient physical therapy to address the deficits listed in the problem list box on initial evaluation, provide pt/family education and to maximize pt's level of independence in the home and community environment.     Patient's spiritual, cultural and educational needs considered and pt agreeable to plan of care and goals.     Anticipated barriers to physical therapy: none    Goals: Short Term Goals: 2 weeks   Pt will be 50% independent with HEP.  Progressing     Long Term Goals: 6 weeks   Pt  will be 100% independent with HEP. Progressing  Pt will be able to perform sit to stands without difficulty.  Progressing  Pt will be able to get in and out of the bath tub without difficulty. Progressing  Pt will be able to perform her usual household activities without difficulty. Progressing    Plan     Plan of care Certification: 7/18/2024 to 08/30/2024.     Outpatient Physical Therapy 2 times weekly for 6 weeks to include the following interventions: Cervical/Lumbar Traction, Electrical Stimulation PRN, Gait Training, Manual Therapy, Moist Heat/ Ice, Neuromuscular Re-ed, Patient Education, Self Care, Therapeutic Activities, Therapeutic Exercise, and FDN.     Ana Maria Helms, PT

## 2024-08-02 ENCOUNTER — CLINICAL SUPPORT (OUTPATIENT)
Dept: CARDIAC REHAB | Facility: HOSPITAL | Age: 59
End: 2024-08-02
Payer: COMMERCIAL

## 2024-08-02 DIAGNOSIS — D15.1 ATRIAL MYXOMA: Primary | ICD-10-CM

## 2024-08-02 PROCEDURE — 93798 PHYS/QHP OP CAR RHAB W/ECG: CPT | Performed by: INTERNAL MEDICINE

## 2024-08-05 ENCOUNTER — CLINICAL SUPPORT (OUTPATIENT)
Dept: CARDIAC REHAB | Facility: HOSPITAL | Age: 59
End: 2024-08-05
Payer: COMMERCIAL

## 2024-08-05 DIAGNOSIS — D15.1 ATRIAL MYXOMA: Primary | ICD-10-CM

## 2024-08-05 PROCEDURE — 93798 PHYS/QHP OP CAR RHAB W/ECG: CPT | Performed by: INTERNAL MEDICINE

## 2024-08-06 ENCOUNTER — CLINICAL SUPPORT (OUTPATIENT)
Dept: REHABILITATION | Facility: HOSPITAL | Age: 59
End: 2024-08-06
Payer: COMMERCIAL

## 2024-08-06 DIAGNOSIS — M54.16 LUMBAR RADICULOPATHY: Primary | ICD-10-CM

## 2024-08-06 PROCEDURE — 97112 NEUROMUSCULAR REEDUCATION: CPT | Mod: PN

## 2024-08-06 PROCEDURE — 97530 THERAPEUTIC ACTIVITIES: CPT | Mod: PN

## 2024-08-06 PROCEDURE — 97110 THERAPEUTIC EXERCISES: CPT | Mod: PN

## 2024-08-07 ENCOUNTER — CLINICAL SUPPORT (OUTPATIENT)
Dept: CARDIAC REHAB | Facility: HOSPITAL | Age: 59
End: 2024-08-07
Payer: COMMERCIAL

## 2024-08-07 DIAGNOSIS — D15.1 ATRIAL MYXOMA: Primary | ICD-10-CM

## 2024-08-07 PROCEDURE — 93798 PHYS/QHP OP CAR RHAB W/ECG: CPT | Performed by: STUDENT IN AN ORGANIZED HEALTH CARE EDUCATION/TRAINING PROGRAM

## 2024-08-09 ENCOUNTER — CLINICAL SUPPORT (OUTPATIENT)
Dept: CARDIAC REHAB | Facility: HOSPITAL | Age: 59
End: 2024-08-09
Payer: COMMERCIAL

## 2024-08-09 DIAGNOSIS — D15.1 ATRIAL MYXOMA: Primary | ICD-10-CM

## 2024-08-09 PROCEDURE — 93798 PHYS/QHP OP CAR RHAB W/ECG: CPT | Performed by: INTERNAL MEDICINE

## 2024-08-12 ENCOUNTER — CLINICAL SUPPORT (OUTPATIENT)
Dept: CARDIAC REHAB | Facility: HOSPITAL | Age: 59
End: 2024-08-12
Payer: COMMERCIAL

## 2024-08-12 DIAGNOSIS — D15.1 ATRIAL MYXOMA: Primary | ICD-10-CM

## 2024-08-12 PROCEDURE — 93798 PHYS/QHP OP CAR RHAB W/ECG: CPT | Performed by: INTERNAL MEDICINE

## 2024-08-13 ENCOUNTER — CLINICAL SUPPORT (OUTPATIENT)
Dept: REHABILITATION | Facility: HOSPITAL | Age: 59
End: 2024-08-13
Payer: COMMERCIAL

## 2024-08-13 DIAGNOSIS — J30.1 ACUTE SEASONAL ALLERGIC RHINITIS DUE TO POLLEN: ICD-10-CM

## 2024-08-13 DIAGNOSIS — M54.16 LUMBAR RADICULOPATHY: Primary | ICD-10-CM

## 2024-08-13 DIAGNOSIS — M05.79 RHEUMATOID ARTHRITIS INVOLVING MULTIPLE SITES WITH POSITIVE RHEUMATOID FACTOR: ICD-10-CM

## 2024-08-13 PROCEDURE — 97530 THERAPEUTIC ACTIVITIES: CPT | Mod: PN | Performed by: PHYSICAL THERAPIST

## 2024-08-13 PROCEDURE — 97110 THERAPEUTIC EXERCISES: CPT | Mod: PN | Performed by: PHYSICAL THERAPIST

## 2024-08-13 PROCEDURE — 97112 NEUROMUSCULAR REEDUCATION: CPT | Mod: PN | Performed by: PHYSICAL THERAPIST

## 2024-08-13 RX ORDER — MONTELUKAST SODIUM 10 MG/1
10 TABLET ORAL
Qty: 90 TABLET | Refills: 3 | Status: SHIPPED | OUTPATIENT
Start: 2024-08-13

## 2024-08-13 RX ORDER — FOLIC ACID 1 MG/1
1000 TABLET ORAL DAILY
Qty: 90 TABLET | Refills: 0 | Status: SHIPPED | OUTPATIENT
Start: 2024-08-13

## 2024-08-13 NOTE — PROGRESS NOTES
"  [facility]   Physical Therapy Treatment Note      Name: Jake Hoskins  Clinic Number: [id number]    Therapy Diagnosis:   Encounter Diagnosis   Name Primary?    Lumbar radiculopathy Yes       Physician: Angelica Lagunas MD    Visit Date: 8/13/2024  Physician Orders: PT Eval and Treat   Medical Diagnosis from Referral: M54.9 (ICD-10-CM) - Back pain, unspecified back location, unspecified back pain laterality, unspecified chronicity  Evaluation Date: 7/18/2024  Authorization Period Expiration: 06/25/2025  Plan of Care Expiration: 08/30/2024  Progress Note Due: 30 days  Visit # / Visits authorized: 6/20   FOTO: 1/3     Precautions: Standard and cardiac rehab      Time In: 0930 am  Time Out: 1030am  Total Billable Time: 60 minutes    PTA Visit #: --/5       Subjective     Patient reports: she has continued low back and knee pain.   She was compliant with home exercise program.  Response to previous treatment: leg continues to hurt  Functional change: nothing noted    Pain: 3/10  Location: left knee    Objective      Objective Measures updated at progress report unless specified.     Treatment     Jake received the treatments listed below:          CPT Intervention  BLE and back Duration / Intensity  08/13/2024 Duration / Intensity  08/1/2024 Tech notes   TE Bike Bike 7' Nustep 6'     NR Shuttle DL 6B 2', SL 4B 1' ea DL 5B 2', SL 3B 1' ea     TA Sit to stand 20"box  2x10 20"box  2x10     NR Standing hip abd and ext 10x ea leg 10x ea leg      NR Heel raises 3 x 10 3 x 10     TE Calf stretch on stairs 20" x 4 20" x 4     NR LAQ  2# 20x ea 2# 20x ea     NR Quad sets 5" x 20 5" x 20     NR SLR X15 B X15 B     NR  bridge 20x 20x     TE LTR 10x ea side 10x ea side     TE SKTC 10" x 5 10" x 5     NR PPT 20x 5 " hold 20x 5 " hold      NR Knee squeeze 5" x 20 5" x 20     NR Ankle 4way B GTB 3x10 GTB 3x10                                                     PLAN               Heat on L knee (8/13)  "     CPT Codes available for Billing:   (--) minutes of Manual therapy (MT) to improve pain and ROM.  (12) minutes of Therapeutic Exercise (TE) to develop   strength, endurance, range of motion, and flexibility.  (38) minutes of Neuromuscular Re-Education (NR)  to improve: Balance, Coordination, Kinesthetic, Sense, Proprioception, and Posture.  (9) minutes of Therapeutic Activities (TA) to improve functional performance.  Unattended Electrical Stimulation (ES) for muscle performance or pain modulation.  Vasopneumatic Device Therapy () for management of swelling/edema. (50965)  BFR: Blood flow restriction applied during exercise  NP or (--): Not Performed    Patient Education and Home Exercises       Education provided:   - HEP and plan of care    Written Home Exercises Provided: yes. Exercises were reviewed and Jake was able to demonstrate them prior to the end of the session.  Jake demonstrated good  understanding of the education provided. See Electronic Medical Record under Patient Instructions for exercises provided during therapy sessions    Assessment     Jake presents with continued pain in her low back and knee.  She demonstrates continued decreased ankle strength and has difficulty with t-band exercises.  Continue with BLE strengthening and stability training to improve her balance.     Jake Is progressing well towards her goals.   Patient prognosis is Good.     Patient will continue to benefit from skilled outpatient physical therapy to address the deficits listed in the problem list box on initial evaluation, provide pt/family education and to maximize pt's level of independence in the home and community environment.     Patient's spiritual, cultural and educational needs considered and pt agreeable to plan of care and goals.     Anticipated barriers to physical therapy: none    Goals: Short Term Goals: 2 weeks   Pt will be 50% independent with HEP.  Progressing     Long Term Goals: 6 weeks   Pt will  be 100% independent with HEP. Progressing  Pt will be able to perform sit to stands without difficulty.  Progressing  Pt will be able to get in and out of the bath tub without difficulty. Progressing  Pt will be able to perform her usual household activities without difficulty. Progressing    Plan     Plan of care Certification: 7/18/2024 to 08/30/2024.     Outpatient Physical Therapy 2 times weekly for 6 weeks to include the following interventions: Cervical/Lumbar Traction, Electrical Stimulation PRN, Gait Training, Manual Therapy, Moist Heat/ Ice, Neuromuscular Re-ed, Patient Education, Self Care, Therapeutic Activities, Therapeutic Exercise, and FDN.     Ana Maria Helms, PT

## 2024-08-13 NOTE — TELEPHONE ENCOUNTER
Refill Decision Note   Elenahugo Gato  is requesting a refill authorization.  Brief Assessment and Rationale for Refill:  Approve     Medication Therapy Plan:         Comments:     Note composed:4:05 PM 08/13/2024

## 2024-08-13 NOTE — TELEPHONE ENCOUNTER
No care due was identified.  [facility] Embedded Care Due Messages. Reference number: [id number].   8/13/2024 6:44:14 AM CDT

## 2024-08-14 ENCOUNTER — CLINICAL SUPPORT (OUTPATIENT)
Dept: CARDIAC REHAB | Facility: HOSPITAL | Age: 59
End: 2024-08-14
Payer: COMMERCIAL

## 2024-08-14 DIAGNOSIS — D15.1 ATRIAL MYXOMA: Primary | ICD-10-CM

## 2024-08-14 PROCEDURE — 93798 PHYS/QHP OP CAR RHAB W/ECG: CPT | Performed by: INTERNAL MEDICINE

## 2024-08-15 ENCOUNTER — CLINICAL SUPPORT (OUTPATIENT)
Dept: REHABILITATION | Facility: HOSPITAL | Age: 59
End: 2024-08-15
Payer: COMMERCIAL

## 2024-08-15 DIAGNOSIS — M54.16 LUMBAR RADICULOPATHY: Primary | ICD-10-CM

## 2024-08-15 PROCEDURE — 97110 THERAPEUTIC EXERCISES: CPT | Mod: PN | Performed by: PHYSICAL THERAPIST

## 2024-08-15 PROCEDURE — 97112 NEUROMUSCULAR REEDUCATION: CPT | Mod: PN | Performed by: PHYSICAL THERAPIST

## 2024-08-15 PROCEDURE — 97530 THERAPEUTIC ACTIVITIES: CPT | Mod: PN | Performed by: PHYSICAL THERAPIST

## 2024-08-15 NOTE — PROGRESS NOTES
"  [facility]   Physical Therapy Treatment Note      Name: Jake Hoskins  Clinic Number: [id number]    Therapy Diagnosis:   Encounter Diagnosis   Name Primary?    Lumbar radiculopathy Yes       Physician: Angelica Lagunas MD    Visit Date: 8/15/2024  Physician Orders: PT Eval and Treat   Medical Diagnosis from Referral: M54.9 (ICD-10-CM) - Back pain, unspecified back location, unspecified back pain laterality, unspecified chronicity  Evaluation Date: 7/18/2024  Authorization Period Expiration: 06/25/2025  Plan of Care Expiration: 08/30/2024  Progress Note Due: 30 days  Visit # / Visits authorized: 7/20   FOTO: 2/3     Precautions: Standard and cardiac rehab      Time In: 0910 am  Time Out: 10:15am  Total Billable Time: 60 minutes    PTA Visit #: --/5       Subjective     Patient reports: she is doing ok today.   She is still having trouble with bending her knees.  She has trouble with picking things up off of the floor because she feels like she cannot bend her knee.s   She was compliant with home exercise program.  Response to previous treatment: leg continues to hurt  Functional change: nothing noted    Pain: 3/10  Location: left knee    Objective      Objective Measures updated at progress report unless specified.     Treatment     Jake received the treatments listed below:           CPT Intervention  BLE and back Duration / Intensity  08/15/2024 Duration / Intensity  08/13/2024 Tech notes   TE Bike 6' Bike 7' ??   NR Shuttle DL 6B 2'  SL 4B 1' DL 6B 2', SL 4B 1' ea ??   TA Sit to stand 20"box 20"box  2x10 ??   NR Standing hip abd and ext 10x 2 ea leg 10x 2 ea leg ??    NR Heel raises 3 x 10 3 x 10 ??   TE Calf stretch on stairs 20" x 4 20" x 4 ??   NR LAQ  2# 20x ea  3 x 10 2# 20x ea ??   NR Quad sets 5" 20 5" x 20 ??   NR SLR X15 B X15 B ??   NR  bridge 20x 20x ??   TE LTR 10x 10x ea side ??   TE SKTC   10" x 5     NR PPT 20x 20x 5 " hold ??    NR Knee squeeze   5" x 20     NR " Ankle 4way B GTB 3 x 10 GTB 3x10 ??       Need to work on picking things off of the floor and bending the knees at next visit (8/15)                                           PLAN                 CPT Codes available for Billing:   (--) minutes of Manual therapy (MT) to improve pain and ROM.  (12) minutes of Therapeutic Exercise (TE) to develop   strength, endurance, range of motion, and flexibility.  (38) minutes of Neuromuscular Re-Education (NR)  to improve: Balance, Coordination, Kinesthetic, Sense, Proprioception, and Posture.  (9) minutes of Therapeutic Activities (TA) to improve functional performance.  Unattended Electrical Stimulation (ES) for muscle performance or pain modulation.  Vasopneumatic Device Therapy () for management of swelling/edema. ([id number])  BFR: Blood flow restriction applied during exercise  NP or (--): Not Performed    Patient Education and Home Exercises       Education provided:   - HEP and plan of care    Written Home Exercises Provided: yes. Exercises were reviewed and Jake was able to demonstrate them prior to the end of the session.  Jake demonstrated good  understanding of the education provided. See Electronic Medical Record under Patient Instructions for exercises provided during therapy sessions    Assessment     Jake presents with continued limited Range of motion in her knees.  She reports that she still struggles with getting things off of the floor.  She will compensate for her knees and bend more at the waist.  Continue with BLE strengthening and stability training to improve her balance and work on functional activities.     Jake Is progressing well towards her goals.   Patient prognosis is Good.     Patient will continue to benefit from skilled outpatient physical therapy to address the deficits listed in the problem list box on initial evaluation, provide pt/family education and to maximize pt's level of independence in the home and community environment.      Patient's spiritual, cultural and educational needs considered and pt agreeable to plan of care and goals.     Anticipated barriers to physical therapy: none    Goals: Short Term Goals: 2 weeks   Pt will be 50% independent with HEP.  Progressing     Long Term Goals: 6 weeks   Pt will be 100% independent with HEP. Progressing  Pt will be able to perform sit to stands without difficulty.  Progressing  Pt will be able to get in and out of the bath tub without difficulty. Progressing  Pt will be able to perform her usual household activities without difficulty. Progressing    Plan     Plan of care Certification: 7/18/2024 to 08/30/2024.     Outpatient Physical Therapy 2 times weekly for 6 weeks to include the following interventions: Cervical/Lumbar Traction, Electrical Stimulation PRN, Gait Training, Manual Therapy, Moist Heat/ Ice, Neuromuscular Re-ed, Patient Education, Self Care, Therapeutic Activities, Therapeutic Exercise, and FDN.     Ana Maria Helms, PT

## 2024-08-16 ENCOUNTER — CLINICAL SUPPORT (OUTPATIENT)
Dept: CARDIAC REHAB | Facility: HOSPITAL | Age: 59
End: 2024-08-16
Payer: COMMERCIAL

## 2024-08-16 DIAGNOSIS — D15.1 ATRIAL MYXOMA: Primary | ICD-10-CM

## 2024-08-16 PROCEDURE — 93798 PHYS/QHP OP CAR RHAB W/ECG: CPT | Performed by: INTERNAL MEDICINE

## 2024-08-20 ENCOUNTER — CLINICAL SUPPORT (OUTPATIENT)
Dept: REHABILITATION | Facility: HOSPITAL | Age: 59
End: 2024-08-20
Payer: COMMERCIAL

## 2024-08-20 DIAGNOSIS — M54.16 LUMBAR RADICULOPATHY: Primary | ICD-10-CM

## 2024-08-20 PROCEDURE — 97110 THERAPEUTIC EXERCISES: CPT | Mod: PN | Performed by: PHYSICAL THERAPIST

## 2024-08-20 PROCEDURE — 97530 THERAPEUTIC ACTIVITIES: CPT | Mod: PN | Performed by: PHYSICAL THERAPIST

## 2024-08-20 PROCEDURE — 97112 NEUROMUSCULAR REEDUCATION: CPT | Mod: PN | Performed by: PHYSICAL THERAPIST

## 2024-08-21 ENCOUNTER — CLINICAL SUPPORT (OUTPATIENT)
Dept: CARDIAC REHAB | Facility: HOSPITAL | Age: 59
End: 2024-08-21
Payer: COMMERCIAL

## 2024-08-21 DIAGNOSIS — D15.1 ATRIAL MYXOMA: Primary | ICD-10-CM

## 2024-08-21 PROCEDURE — 93798 PHYS/QHP OP CAR RHAB W/ECG: CPT | Performed by: STUDENT IN AN ORGANIZED HEALTH CARE EDUCATION/TRAINING PROGRAM

## 2024-08-21 NOTE — PROGRESS NOTES
"  [facility]   Physical Therapy Treatment Note      Name: Jake Hoskins  Clinic Number: [id number]    Therapy Diagnosis:   Encounter Diagnosis   Name Primary?    Lumbar radiculopathy Yes       Physician: Angelica Lagunas MD    Visit Date: 8/20/2024  Physician Orders: PT Eval and Treat   Medical Diagnosis from Referral: M54.9 (ICD-10-CM) - Back pain, unspecified back location, unspecified back pain laterality, unspecified chronicity  Evaluation Date: 7/18/2024  Authorization Period Expiration: 06/25/2025  Plan of Care Expiration: 08/30/2024  Progress Note Due: 30 days  Visit # / Visits authorized: 7/20   FOTO: 2/3     Precautions: Standard and cardiac rehab      Time In: 0910 am  Time Out: 10:15am  Total Billable Time: 60 minutes    PTA Visit #: --/5       Subjective     Patient reports: she is doing ok today.  She does still have back pain.  She was compliant with home exercise program.  Response to previous treatment: leg continues to hurt  Functional change: nothing noted    Pain: 3/10  Location: left knee    Objective      Objective Measures updated at progress report unless specified.     Treatment     Jake received the treatments listed below:              CPT Intervention  BLE and back Duration / Intensity  08/20/2024 Duration / Intensity  08/13/2024 Tech notes   TE Bike 6' Bike 7'     NR Shuttle DL 6B 2'  SL 4B 1' DL 6B 2', SL 4B 1' ea     TA Sit to stand 20"box 20"box  2x10     NR TRX squats to 18" box  3 x 5       NR Standing hip abd and ext 10x 2 ea leg 10x 2 ea leg     NR Modfied RDL 5# 2 x 10 ea side        NR Heel raises 3 x 10 3 x 10     TE Calf stretch on stairs 20" x 4 20" x 4     NR LAQ  2# 20x ea  3 x 10 2# 20x ea     NR Quad sets 5" 20 5" x 20     NR SLR X15 B X15 B     NR  bridge 20x 20x     TE LTR 10x 10x ea side     TE SKTC   10" x 5     NR PPT 20x 20x 5 " hold      NR Knee squeeze   5" x 20     NR Ankle 4way B GTB 3 x 10 GTB 3x10                                 "                     PLAN             CPT Codes available for Billing:   (--) minutes of Manual therapy (MT) to improve pain and ROM.  (10) minutes of Therapeutic Exercise (TE) to develop   strength, endurance, range of motion, and flexibility.  (38) minutes of Neuromuscular Re-Education (NR)  to improve: Balance, Coordination, Kinesthetic, Sense, Proprioception, and Posture.  (12) minutes of Therapeutic Activities (TA) to improve functional performance.  Unattended Electrical Stimulation (ES) for muscle performance or pain modulation.  Vasopneumatic Device Therapy () for management of swelling/edema. (24263)  BFR: Blood flow restriction applied during exercise  NP or (--): Not Performed    Patient Education and Home Exercises       Education provided:   - HEP and plan of care    Written Home Exercises Provided: yes. Exercises were reviewed and Jake was able to demonstrate them prior to the end of the session.  Jake demonstrated good  understanding of the education provided. See Electronic Medical Record under Patient Instructions for exercises provided during therapy sessions    Assessment     Jake presents with continued limited Range of motion in her knees. She has weakness in her knee and that can cause difficulty with picking things up off of the floor without UE help.  Worked on picking things up off of the floor which demonstrates weakness in the knee.    Continue with BLE strengthening and stability training to improve her balance and work on functional activities.     Jake Is progressing well towards her goals.   Patient prognosis is Good.     Patient will continue to benefit from skilled outpatient physical therapy to address the deficits listed in the problem list box on initial evaluation, provide pt/family education and to maximize pt's level of independence in the home and community environment.     Patient's spiritual, cultural and educational needs considered and pt agreeable to plan of care and  goals.     Anticipated barriers to physical therapy: none    Goals: Short Term Goals: 2 weeks   Pt will be 50% independent with HEP.  Progressing     Long Term Goals: 6 weeks   Pt will be 100% independent with HEP. Progressing  Pt will be able to perform sit to stands without difficulty.  Progressing  Pt will be able to get in and out of the bath tub without difficulty. Progressing  Pt will be able to perform her usual household activities without difficulty. Progressing    Plan     Plan of care Certification: 7/18/2024 to 08/30/2024.     Outpatient Physical Therapy 2 times weekly for 6 weeks to include the following interventions: Cervical/Lumbar Traction, Electrical Stimulation PRN, Gait Training, Manual Therapy, Moist Heat/ Ice, Neuromuscular Re-ed, Patient Education, Self Care, Therapeutic Activities, Therapeutic Exercise, and FDN.     Ana Maria Helms, PT

## 2024-08-22 ENCOUNTER — CLINICAL SUPPORT (OUTPATIENT)
Dept: REHABILITATION | Facility: HOSPITAL | Age: 59
End: 2024-08-22
Payer: COMMERCIAL

## 2024-08-22 DIAGNOSIS — M54.16 LUMBAR RADICULOPATHY: Primary | ICD-10-CM

## 2024-08-22 PROCEDURE — 97530 THERAPEUTIC ACTIVITIES: CPT | Mod: PN | Performed by: PHYSICAL THERAPIST

## 2024-08-22 PROCEDURE — 97112 NEUROMUSCULAR REEDUCATION: CPT | Mod: PN | Performed by: PHYSICAL THERAPIST

## 2024-08-22 PROCEDURE — 97110 THERAPEUTIC EXERCISES: CPT | Mod: PN | Performed by: PHYSICAL THERAPIST

## 2024-08-22 NOTE — PROGRESS NOTES
"  [facility]   Physical Therapy Treatment Note      Name: Jake Hoskins  Clinic Number: [id number]    Therapy Diagnosis:   Encounter Diagnosis   Name Primary?    Lumbar radiculopathy Yes       Physician: Angelica Lagunas MD    Visit Date: 8/22/2024  Physician Orders: PT Eval and Treat   Medical Diagnosis from Referral: M54.9 (ICD-10-CM) - Back pain, unspecified back location, unspecified back pain laterality, unspecified chronicity  Evaluation Date: 7/18/2024  Authorization Period Expiration: 06/26/2025  Plan of Care Expiration: 08/30/2024  Progress Note Due: 30 days  Visit # / Visits authorized: 9/20   FOTO: 2/3     Precautions: Standard and cardiac rehab      Time In: 09:30am  Time Out: 10:30am  Total Billable Time: 60 minutes    PTA Visit #: --/5       Subjective     Patient reports: she is doing ok today.  She does still have back pain.  She was compliant with home exercise program.  Response to previous treatment: leg continues to hurt  Functional change: nothing noted    Pain: 3/10  Location: left knee    Objective      Objective Measures updated at progress report unless specified.     Treatment     Jake received the treatments listed below:           CPT Intervention  BLE and back Duration / Intensity  08/22/2024 Duration / Intensity  08/13/2024 Tech notes   TE Bike 6' Bike 7' ??   NR Shuttle DL 6B 2'  SL 4B 1' DL 6B 2', SL 4B 1' ea ??   TA Sit to stand 20"box  7.5# 2 x 10 20"box  2x10 ??   NR TRX squats to 18" box  3 x 5   ??   NR Standing hip abd and ext 10x 2 ea leg 10x 2 ea leg ??   NR Modfied RDL L 5# 2 x 10 ea side   ??    NR Heel raises 3 x 10 3 x 10 ??   TE Calf stretch on stairs 20" x 4 20" x 4 ??   NR LAQ  2#   3 x 10 2# 20x ea ??   NR Quad sets   5" x 20     NR SLR   X15 B     NR  bridge 20x 20x     TE LTR   10x ea side     TE SKTC   10" x 5     NR PPT   20x 5 " hold      NR Knee squeeze 5" x 20 5" x 20     NR Ankle 4way B   GTB 3x10     NR TKE W/ ball L 2x10   ?? "                                       PLAN               CPT Codes available for Billing:   (--) minutes of Manual therapy (MT) to improve pain and ROM.  (10) minutes of Therapeutic Exercise (TE) to develop   strength, endurance, range of motion, and flexibility.  (38) minutes of Neuromuscular Re-Education (NR)  to improve: Balance, Coordination, Kinesthetic, Sense, Proprioception, and Posture.  (12) minutes of Therapeutic Activities (TA) to improve functional performance.  Unattended Electrical Stimulation (ES) for muscle performance or pain modulation.  Vasopneumatic Device Therapy () for management of swelling/edema. (23486)  BFR: Blood flow restriction applied during exercise  NP or (--): Not Performed    Patient Education and Home Exercises       Education provided:   - HEP and plan of care    Written Home Exercises Provided: yes. Exercises were reviewed and Jake was able to demonstrate them prior to the end of the session.  Jake demonstrated good  understanding of the education provided. See Electronic Medical Record under Patient Instructions for exercises provided during therapy sessions    Assessment     Jake presents with continued limited Range of motion in her knees. Her knees continue to have weakness and causing difficulty with functional tasks.   Continue with BLE strengthening and stability training to improve her balance and work on functional activities.     Jake Is progressing well towards her goals.   Patient prognosis is Good.     Patient will continue to benefit from skilled outpatient physical therapy to address the deficits listed in the problem list box on initial evaluation, provide pt/family education and to maximize pt's level of independence in the home and community environment.     Patient's spiritual, cultural and educational needs considered and pt agreeable to plan of care and goals.     Anticipated barriers to physical therapy: none    Goals: Short Term Goals: 2 weeks   Pt  will be 50% independent with HEP.  Progressing     Long Term Goals: 6 weeks   Pt will be 100% independent with HEP. Progressing  Pt will be able to perform sit to stands without difficulty.  Progressing  Pt will be able to get in and out of the bath tub without difficulty. Progressing  Pt will be able to perform her usual household activities without difficulty. Progressing    Plan     Plan of care Certification: 7/18/2024 to 08/30/2024.     Outpatient Physical Therapy 2 times weekly for 6 weeks to include the following interventions: Cervical/Lumbar Traction, Electrical Stimulation PRN, Gait Training, Manual Therapy, Moist Heat/ Ice, Neuromuscular Re-ed, Patient Education, Self Care, Therapeutic Activities, Therapeutic Exercise, and FDN.     Ana Maria Helms, PT

## 2024-08-23 ENCOUNTER — CLINICAL SUPPORT (OUTPATIENT)
Dept: CARDIAC REHAB | Facility: HOSPITAL | Age: 59
End: 2024-08-23
Payer: COMMERCIAL

## 2024-08-23 DIAGNOSIS — D15.1 ATRIAL MYXOMA: Primary | ICD-10-CM

## 2024-08-23 PROCEDURE — 93798 PHYS/QHP OP CAR RHAB W/ECG: CPT | Performed by: INTERNAL MEDICINE

## 2024-08-26 ENCOUNTER — CLINICAL SUPPORT (OUTPATIENT)
Dept: CARDIAC REHAB | Facility: HOSPITAL | Age: 59
End: 2024-08-26
Payer: COMMERCIAL

## 2024-08-26 DIAGNOSIS — D15.1 ATRIAL MYXOMA: Primary | ICD-10-CM

## 2024-08-26 PROCEDURE — 93798 PHYS/QHP OP CAR RHAB W/ECG: CPT | Performed by: INTERNAL MEDICINE

## 2024-08-27 ENCOUNTER — CLINICAL SUPPORT (OUTPATIENT)
Dept: REHABILITATION | Facility: HOSPITAL | Age: 59
End: 2024-08-27
Payer: COMMERCIAL

## 2024-08-27 ENCOUNTER — TELEPHONE (OUTPATIENT)
Dept: CARDIOLOGY | Facility: CLINIC | Age: 59
End: 2024-08-27
Payer: COMMERCIAL

## 2024-08-27 DIAGNOSIS — M54.16 LUMBAR RADICULOPATHY: Primary | ICD-10-CM

## 2024-08-27 PROCEDURE — 97112 NEUROMUSCULAR REEDUCATION: CPT | Mod: PN | Performed by: PHYSICAL THERAPIST

## 2024-08-27 PROCEDURE — 97530 THERAPEUTIC ACTIVITIES: CPT | Mod: PN | Performed by: PHYSICAL THERAPIST

## 2024-08-27 PROCEDURE — 97110 THERAPEUTIC EXERCISES: CPT | Mod: PN | Performed by: PHYSICAL THERAPIST

## 2024-08-27 NOTE — TELEPHONE ENCOUNTER
LVM for staff in regards cardiac clearance.                 ----- Message from Milly Baires sent at 8/27/2024  8:44 AM CDT -----  Contact: Carmen/ Dr. Carmen Morales is calling to speak to the nurse regarding a cardiac clearance , please give her a call at , she stated she have a few questions    Thanks  LJ

## 2024-08-27 NOTE — PROGRESS NOTES
"  [facility]   Physical Therapy Treatment Note      Name: Jake Hoskins  Clinic Number: [id number]    Therapy Diagnosis:   Encounter Diagnosis   Name Primary?    Lumbar radiculopathy Yes       Physician: Angelica Lagunas MD    Visit Date: 8/27/2024  Physician Orders: PT Eval and Treat   Medical Diagnosis from Referral: M54.9 (ICD-10-CM) - Back pain, unspecified back location, unspecified back pain laterality, unspecified chronicity  Evaluation Date: 7/18/2024  Authorization Period Expiration: 06/26/2025  Plan of Care Expiration: 08/30/2024  Progress Note Due: 30 days  Visit # / Visits authorized: 10/20   FOTO: 2/3     Precautions: Standard and cardiac rehab      Time In: 09:30am  Time Out: 10:30am  Total Billable Time: 60 minutes    PTA Visit #: --/5       Subjective     Patient reports: she continues with left knee pain.  She was compliant with home exercise program.  Response to previous treatment: leg continues to hurt  Functional change: nothing noted    Pain: 3/10  Location: left knee    Objective      Objective Measures updated at progress report unless specified.     Treatment     Jake received the treatments listed below:         CPT Intervention  BLE and back Duration / Intensity  08/27/2024 Duration / Intensity  08/13/2024 Tech notes     Progress HEP next visit!!!         TE Bike 6' Bike 7'     NR Shuttle DL 6B 2'  SL 4B 1' DL 6B 2', SL 4B 1' ea     TA Sit to stand 20"box  10# 2 x 10 20"box  2x10     NR TRX squats to 18" box         NR Standing hip abd and ext   10x 2 ea leg     NR Modfied RDL L          NR Heel raises 3 x 10 3 x 10     TE Calf stretch on stairs 20" x 4 20" x 4     NR LAQ  3#   3 x 10 2# 20x ea     NR Quad sets   5" x 20     NR SLR 3 x 10 X15 B     NR  bridge 20x 20x     NR SAQ  3# 3 x 10       TE LTR   10x ea side     TE SKTC   10" x 5     NR PPT 20x 20x 5 " hold      NR Knee squeeze 5" x 20 5" x 20     NR Ankle 4way B   GTB 3x10     NR TKE W/ ball L 2x10  " "      TA Step ups 4" fwd/lat 2 x 10 ea                               PLAN             CPT Codes available for Billing:   (--) minutes of Manual therapy (MT) to improve pain and ROM.  (10) minutes of Therapeutic Exercise (TE) to develop   strength, endurance, range of motion, and flexibility.  (38) minutes of Neuromuscular Re-Education (NR)  to improve: Balance, Coordination, Kinesthetic, Sense, Proprioception, and Posture.  (12) minutes of Therapeutic Activities (TA) to improve functional performance.  Unattended Electrical Stimulation (ES) for muscle performance or pain modulation.  Vasopneumatic Device Therapy () for management of swelling/edema. (31511)  BFR: Blood flow restriction applied during exercise  NP or (--): Not Performed    Patient Education and Home Exercises       Education provided:   - HEP and plan of care    Written Home Exercises Provided: yes. Exercises were reviewed and Jake was able to demonstrate them prior to the end of the session.  Jake demonstrated good  understanding of the education provided. See Electronic Medical Record under Patient Instructions for exercises provided during therapy sessions    Assessment     Jake presents with continued pain in the left knee and has some low back pain.  She has weakness in the L knee and left lower extremity and needs continued work on strengthening to the left lower extremity to improve her functional mobility.  She also needs continued work on core strengthening to improve her lumbar stability and improve her pain.     Jake Is progressing well towards her goals.   Patient prognosis is Good.     Patient will continue to benefit from skilled outpatient physical therapy to address the deficits listed in the problem list box on initial evaluation, provide pt/family education and to maximize pt's level of independence in the home and community environment.     Patient's spiritual, cultural and educational needs considered and pt agreeable to plan " of care and goals.     Anticipated barriers to physical therapy: none    Goals: Short Term Goals: 2 weeks   Pt will be 50% independent with HEP.  Progressing     Long Term Goals: 6 weeks   Pt will be 100% independent with HEP. Progressing  Pt will be able to perform sit to stands without difficulty.  Progressing  Pt will be able to get in and out of the bath tub without difficulty. Progressing  Pt will be able to perform her usual household activities without difficulty. Progressing    Plan     Plan of care Certification: 7/18/2024 to 08/30/2024.     Outpatient Physical Therapy 2 times weekly for 6 weeks to include the following interventions: Cervical/Lumbar Traction, Electrical Stimulation PRN, Gait Training, Manual Therapy, Moist Heat/ Ice, Neuromuscular Re-ed, Patient Education, Self Care, Therapeutic Activities, Therapeutic Exercise, and FDN.     Ana Maria Helms, PT

## 2024-08-28 ENCOUNTER — CLINICAL SUPPORT (OUTPATIENT)
Dept: CARDIAC REHAB | Facility: HOSPITAL | Age: 59
End: 2024-08-28
Payer: COMMERCIAL

## 2024-08-28 DIAGNOSIS — D15.1 ATRIAL MYXOMA: Primary | ICD-10-CM

## 2024-08-28 PROCEDURE — 93798 PHYS/QHP OP CAR RHAB W/ECG: CPT | Performed by: NURSE PRACTITIONER

## 2024-08-29 ENCOUNTER — CLINICAL SUPPORT (OUTPATIENT)
Dept: REHABILITATION | Facility: HOSPITAL | Age: 59
End: 2024-08-29
Payer: COMMERCIAL

## 2024-08-29 DIAGNOSIS — M54.16 LUMBAR RADICULOPATHY: Primary | ICD-10-CM

## 2024-08-29 PROCEDURE — 97110 THERAPEUTIC EXERCISES: CPT | Mod: PN | Performed by: PHYSICAL THERAPIST

## 2024-08-29 PROCEDURE — 97530 THERAPEUTIC ACTIVITIES: CPT | Mod: PN | Performed by: PHYSICAL THERAPIST

## 2024-08-29 PROCEDURE — 97112 NEUROMUSCULAR REEDUCATION: CPT | Mod: PN | Performed by: PHYSICAL THERAPIST

## 2024-08-29 NOTE — PROGRESS NOTES
"  [facility]   Physical Therapy Treatment Note      Name: Jake Hoskins  Clinic Number: [id number]    Therapy Diagnosis:   Encounter Diagnosis   Name Primary?    Lumbar radiculopathy Yes       Physician: Angelica Lagunas MD    Visit Date: 8/29/2024  Physician Orders: PT Eval and Treat   Medical Diagnosis from Referral: M54.9 (ICD-10-CM) - Back pain, unspecified back location, unspecified back pain laterality, unspecified chronicity  Evaluation Date: 7/18/2024  Authorization Period Expiration: 06/26/2025  Plan of Care Expiration: 08/30/2024  Progress Note Due: 30 days  Visit # / Visits authorized: 11/20   FOTO: 2/3     Precautions: Standard and cardiac rehab      Time In: 09:30am  Time Out: 10:30am  Total Billable Time: 60 minutes    PTA Visit #: --/5       Subjective     Patient reports: left knee pain continues.   She was compliant with home exercise program.  Response to previous treatment: leg continues to hurt  Functional change: nothing noted    Pain: 3/10  Location: left knee    Objective      Objective Measures updated at progress report unless specified.     Treatment     Jake received the treatments listed below:          CPT Intervention  BLE and back Duration / Intensity  08/29/2024 Duration / Intensity  08/13/2024 Tech notes               TE Bike 6' Bike 7'     NR Shuttle DL 6B 2'  SL 4B 1' DL 6B 2', SL 4B 1' ea     TA Sit to stand 20"box  10# 2 x 10 20"box  2x10     NR TRX squats to 18" box         NR Standing hip abd and ext 2 x 10 10x 2 ea leg     NR Modfied RDL L          NR Heel raises 3 x 10 3 x 10     TE Calf stretch on stairs 20" x 4 20" x 4     NR LAQ  3#   3 x 10 2# 20x ea     NR Quad sets   5" x 20     NR SLR 3 x 10 X15 B     NR  bridge 20x 20x     NR SAQ  3# 3 x 10       TE LTR   10x ea side     TE SKTC   10" x 5     NR PPT 20x 20x 5 " hold      NR Knee squeeze 5" x 20 5" x 20     NR Ankle 4way B   GTB 3x10     NR TKE W/ ball L 2x10        TA Step ups 4" " fwd/lat 2 x 10 ea                               PLAN           CPT Codes available for Billing:   (--) minutes of Manual therapy (MT) to improve pain and ROM.  (10) minutes of Therapeutic Exercise (TE) to develop   strength, endurance, range of motion, and flexibility.  (38) minutes of Neuromuscular Re-Education (NR)  to improve: Balance, Coordination, Kinesthetic, Sense, Proprioception, and Posture.  (12) minutes of Therapeutic Activities (TA) to improve functional performance.  Unattended Electrical Stimulation (ES) for muscle performance or pain modulation.  Vasopneumatic Device Therapy () for management of swelling/edema. (04653)  BFR: Blood flow restriction applied during exercise  NP or (--): Not Performed    Patient Education and Home Exercises       Education provided:   - HEP and plan of care    Written Home Exercises Provided: yes. Exercises were reviewed and Jake was able to demonstrate them prior to the end of the session.  Jake demonstrated good  understanding of the education provided. See Electronic Medical Record under Patient Instructions for exercises provided during therapy sessions    Assessment     Jake presents with continued pain in the left knee.  She is progressing with improving the strength in the L Knee but she needs continued work on this.   She also needs continued work on core strengthening to improve her lumbar stability and improve her pain.     Jake Is progressing well towards her goals.   Patient prognosis is Good.     Patient will continue to benefit from skilled outpatient physical therapy to address the deficits listed in the problem list box on initial evaluation, provide pt/family education and to maximize pt's level of independence in the home and community environment.     Patient's spiritual, cultural and educational needs considered and pt agreeable to plan of care and goals.     Anticipated barriers to physical therapy: none    Goals: Short Term Goals: 2 weeks   Pt  will be 50% independent with HEP.  Progressing     Long Term Goals: 6 weeks   Pt will be 100% independent with HEP. Progressing  Pt will be able to perform sit to stands without difficulty.  Progressing  Pt will be able to get in and out of the bath tub without difficulty. Progressing  Pt will be able to perform her usual household activities without difficulty. Progressing    Plan     Plan of care Certification: 7/18/2024 to 08/30/2024.     Outpatient Physical Therapy 2 times weekly for 6 weeks to include the following interventions: Cervical/Lumbar Traction, Electrical Stimulation PRN, Gait Training, Manual Therapy, Moist Heat/ Ice, Neuromuscular Re-ed, Patient Education, Self Care, Therapeutic Activities, Therapeutic Exercise, and FDN.     Ana Maria Helms, PT

## 2024-08-30 ENCOUNTER — CLINICAL SUPPORT (OUTPATIENT)
Dept: CARDIAC REHAB | Facility: HOSPITAL | Age: 59
End: 2024-08-30
Payer: COMMERCIAL

## 2024-08-30 DIAGNOSIS — D15.1 ATRIAL MYXOMA: Primary | ICD-10-CM

## 2024-08-30 PROCEDURE — 93798 PHYS/QHP OP CAR RHAB W/ECG: CPT | Performed by: INTERNAL MEDICINE

## 2024-09-04 ENCOUNTER — CLINICAL SUPPORT (OUTPATIENT)
Dept: CARDIAC REHAB | Facility: HOSPITAL | Age: 59
End: 2024-09-04
Payer: COMMERCIAL

## 2024-09-04 ENCOUNTER — CLINICAL SUPPORT (OUTPATIENT)
Dept: REHABILITATION | Facility: HOSPITAL | Age: 59
End: 2024-09-04
Payer: COMMERCIAL

## 2024-09-04 DIAGNOSIS — M54.16 LUMBAR RADICULOPATHY: Primary | ICD-10-CM

## 2024-09-04 DIAGNOSIS — D15.1 ATRIAL MYXOMA: Primary | ICD-10-CM

## 2024-09-04 PROCEDURE — 97110 THERAPEUTIC EXERCISES: CPT | Mod: PN | Performed by: PHYSICAL THERAPIST

## 2024-09-04 PROCEDURE — 97530 THERAPEUTIC ACTIVITIES: CPT | Mod: PN | Performed by: PHYSICAL THERAPIST

## 2024-09-04 PROCEDURE — 93798 PHYS/QHP OP CAR RHAB W/ECG: CPT | Performed by: INTERNAL MEDICINE

## 2024-09-04 PROCEDURE — 97112 NEUROMUSCULAR REEDUCATION: CPT | Mod: PN | Performed by: PHYSICAL THERAPIST

## 2024-09-04 NOTE — PROGRESS NOTES
"  [facility]   Physical Therapy Treatment Note      Name: Jake Hoskins  Clinic Number: [id number]    Therapy Diagnosis:   Encounter Diagnosis   Name Primary?    Lumbar radiculopathy Yes       Physician: Angelica Lagunas MD    Visit Date: 9/4/2024  Physician Orders: PT Eval and Treat   Medical Diagnosis from Referral: M54.9 (ICD-10-CM) - Back pain, unspecified back location, unspecified back pain laterality, unspecified chronicity  Evaluation Date: 7/18/2024  Authorization Period Expiration: 06/26/2025  Plan of Care Expiration: 08/30/2024  Progress Note Due: 30 days  Visit # / Visits authorized: 12/20   FOTO: 2/3     Precautions: Standard and cardiac rehab      Time In: 10:16am  Time Out: 11:15am  Total Billable Time: 59 minutes    PTA Visit #: --/5       Subjective     Patient reports: her back is doing ok today and her left knee is about 3/10 today.    She was compliant with home exercise program.  Response to previous treatment: leg continues to hurt  Functional change: nothing noted    Pain: 3/10  Location: left knee    Objective      Objective Measures updated at progress report unless specified.     Treatment     Jake received the treatments listed below:            CPT Intervention  BLE and back Duration / Intensity  09/04/2024 Duration / Intensity  08/13/2024 Tech notes               TE Bike 6' Bike 7'     NR Shuttle DL 6B 2'  SL 4B 1' DL 6B 2', SL 4B 1' ea     TA Sit to stand 20"box  10# 2 x 10 20"box  2x10     NR TRX squats to 18" box         NR Standing hip abd and ext 2 x 10 10x 2 ea leg     NR Modfied RDL L 10# 2 x 10 ea side        NR Heel raises 3 x 10 3 x 10     TE Calf stretch on stairs 20" x 4 20" x 4     NR LAQ  3#   3 x 10 2# 20x ea     NR Quad sets   5" x 20     NR SLR 3 x 10 X15 B     NR  bridge 20x 20x     NR SAQ  3# 3 x 10       TE LTR   10x ea side     TE SKTC   10" x 5     NR PPT 20x 20x 5 " hold      NR Knee squeeze 5" x 20 5" x 20     NR Ankle 4way B   GTB " "3x10     NR TKE W/ ball L 2x10        TA Step ups 4" fwd/lat 2 x 10 ea        NR Standing DF 20x                   PLAN                 CPT Codes available for Billing:   (--) minutes of Manual therapy (MT) to improve pain and ROM.  (10) minutes of Therapeutic Exercise (TE) to develop   strength, endurance, range of motion, and flexibility.  (38) minutes of Neuromuscular Re-Education (NR)  to improve: Balance, Coordination, Kinesthetic, Sense, Proprioception, and Posture.  (11) minutes of Therapeutic Activities (TA) to improve functional performance.  Unattended Electrical Stimulation (ES) for muscle performance or pain modulation.  Vasopneumatic Device Therapy () for management of swelling/edema. (23341)  BFR: Blood flow restriction applied during exercise  NP or (--): Not Performed    Patient Education and Home Exercises       Education provided:   - HEP and plan of care    Written Home Exercises Provided: yes. Exercises were reviewed and Jake was able to demonstrate them prior to the end of the session.  Jake demonstrated good  understanding of the education provided. See Electronic Medical Record under Patient Instructions for exercises provided during therapy sessions    Assessment     Jake presents with continued pain in the left knee but improved today.  She has weakness in her left lower extremity especially in the upper leg musculature.     She also needs continued work on core strengthening to improve her lumbar stability and improve her pain.     Jake Is progressing well towards her goals.   Patient prognosis is Good.     Patient will continue to benefit from skilled outpatient physical therapy to address the deficits listed in the problem list box on initial evaluation, provide pt/family education and to maximize pt's level of independence in the home and community environment.     Patient's spiritual, cultural and educational needs considered and pt agreeable to plan of care and goals.   "   Anticipated barriers to physical therapy: none    Goals: Short Term Goals: 2 weeks   Pt will be 50% independent with HEP.  Progressing     Long Term Goals: 6 weeks   Pt will be 100% independent with HEP. Progressing  Pt will be able to perform sit to stands without difficulty.  Progressing  Pt will be able to get in and out of the bath tub without difficulty. Progressing  Pt will be able to perform her usual household activities without difficulty. Progressing    Plan     Plan of care Certification: 7/18/2024 to 08/30/2024.     Outpatient Physical Therapy 2 times weekly for 6 weeks to include the following interventions: Cervical/Lumbar Traction, Electrical Stimulation PRN, Gait Training, Manual Therapy, Moist Heat/ Ice, Neuromuscular Re-ed, Patient Education, Self Care, Therapeutic Activities, Therapeutic Exercise, and FDN.     Ana Maria Helms, PT

## 2024-09-06 ENCOUNTER — CLINICAL SUPPORT (OUTPATIENT)
Dept: CARDIAC REHAB | Facility: HOSPITAL | Age: 59
End: 2024-09-06
Payer: COMMERCIAL

## 2024-09-06 DIAGNOSIS — D15.1 ATRIAL MYXOMA: Primary | ICD-10-CM

## 2024-09-06 PROCEDURE — 93798 PHYS/QHP OP CAR RHAB W/ECG: CPT | Performed by: INTERNAL MEDICINE

## 2024-09-09 ENCOUNTER — CLINICAL SUPPORT (OUTPATIENT)
Dept: CARDIAC REHAB | Facility: HOSPITAL | Age: 59
End: 2024-09-09
Payer: COMMERCIAL

## 2024-09-09 DIAGNOSIS — D15.1 ATRIAL MYXOMA: Primary | ICD-10-CM

## 2024-09-09 PROCEDURE — 93798 PHYS/QHP OP CAR RHAB W/ECG: CPT | Performed by: INTERNAL MEDICINE

## 2024-09-10 ENCOUNTER — TELEPHONE (OUTPATIENT)
Dept: CARDIAC REHAB | Facility: HOSPITAL | Age: 59
End: 2024-09-10
Payer: COMMERCIAL

## 2024-09-13 ENCOUNTER — PATIENT MESSAGE (OUTPATIENT)
Dept: CARDIOLOGY | Facility: CLINIC | Age: 59
End: 2024-09-13
Payer: COMMERCIAL

## 2024-09-13 ENCOUNTER — CLINICAL SUPPORT (OUTPATIENT)
Dept: CARDIAC REHAB | Facility: HOSPITAL | Age: 59
End: 2024-09-13
Payer: COMMERCIAL

## 2024-09-13 ENCOUNTER — CLINICAL SUPPORT (OUTPATIENT)
Dept: REHABILITATION | Facility: HOSPITAL | Age: 59
End: 2024-09-13
Payer: COMMERCIAL

## 2024-09-13 DIAGNOSIS — M54.16 LUMBAR RADICULOPATHY: Primary | ICD-10-CM

## 2024-09-13 DIAGNOSIS — D15.1 ATRIAL MYXOMA: Primary | ICD-10-CM

## 2024-09-13 PROCEDURE — 97112 NEUROMUSCULAR REEDUCATION: CPT | Mod: PN | Performed by: PHYSICAL THERAPIST

## 2024-09-13 PROCEDURE — 97110 THERAPEUTIC EXERCISES: CPT | Mod: PN | Performed by: PHYSICAL THERAPIST

## 2024-09-13 PROCEDURE — 93798 PHYS/QHP OP CAR RHAB W/ECG: CPT | Performed by: INTERNAL MEDICINE

## 2024-09-13 PROCEDURE — 97530 THERAPEUTIC ACTIVITIES: CPT | Mod: PN | Performed by: PHYSICAL THERAPIST

## 2024-09-15 NOTE — PROGRESS NOTES
"  [facility]   Physical Therapy Treatment Note      Name: Jake Hoskins  Clinic Number: [id number]    Therapy Diagnosis:   Encounter Diagnosis   Name Primary?    Lumbar radiculopathy Yes       Physician: Angelica Lagunas MD    Visit Date: 9/13/2024  Physician Orders: PT Eval and Treat   Medical Diagnosis from Referral: M54.9 (ICD-10-CM) - Back pain, unspecified back location, unspecified back pain laterality, unspecified chronicity  Evaluation Date: 7/18/2024  Authorization Period Expiration: 06/26/2025  Plan of Care Expiration: 10/898074  Progress Note Due: 30 days  Visit # / Visits authorized: 13/20   FOTO: 2/3     Precautions: Standard and cardiac rehab      Time In: 10:16am  Time Out: 11:15am  Total Billable Time: 59 minutes    PTA Visit #: --/5       Subjective     Patient reports: knee is still hurting.     She was compliant with home exercise program.  Response to previous treatment: leg continues to hurt  Functional change: nothing noted    Pain: 3/10  Location: left knee    Objective      Objective Measures updated at progress report unless specified.     Treatment        CPT Intervention  BLE and back Duration / Intensity  09/13/2024 Tech notes             TE Bike 6'     TA Sit to stand 20"box  10# 2 x 10     NR Standing hip abd and ext 2 x 10     NR Modfied RDL L 10# 2 x 10 ea side     NR Heel raises 3 x 10     TE Calf stretch on stairs 20" x 4     TA Step ups  6" step 2 x 20     NR Standing B DF 2 x 20     NR LAQ  2#   3 x 10     NR bridge 20x     NR SAQ  3# 3 x 10     TE LTR 10x ea     TE SKTC 15" x 4 ea     NR PPT 20x     NR Dead bug hold 3" 2 x 10     NR Hooklying hip flexion christiano 5" x 10 ea side                          NR Knee squeeze 5" x 20     NR Ankle 4way B       NR TKE W/ ball L 2x10               PLAN           Jake received the treatments listed below:    CPT Codes available for Billing:   (--) minutes of Manual therapy (MT) to improve pain and ROM.  (10) " minutes of Therapeutic Exercise (TE) to develop   strength, endurance, range of motion, and flexibility.  (38) minutes of Neuromuscular Re-Education (NR)  to improve: Balance, Coordination, Kinesthetic, Sense, Proprioception, and Posture.  (11) minutes of Therapeutic Activities (TA) to improve functional performance.  Unattended Electrical Stimulation (ES) for muscle performance or pain modulation.  Vasopneumatic Device Therapy () for management of swelling/edema. (13389)  BFR: Blood flow restriction applied during exercise  NP or (--): Not Performed    Patient Education and Home Exercises       Education provided:   - HEP and plan of care    Written Home Exercises Provided: yes. Exercises were reviewed and Jake was able to demonstrate them prior to the end of the session.  Jake demonstrated good  understanding of the education provided. See Electronic Medical Record under Patient Instructions for exercises provided during therapy sessions    Assessment     Jake presents with continued pain in the left knee but improved today. She is still having weakness in her core and instability to the lumbar spine which is contributing to radicular pain.  She needs continued work on core and BLE strengthening.   She also needs continued work on core strengthening to improve her lumbar stability and improve her pain.     Jake Is progressing well towards her goals.   Patient prognosis is Good.     Patient will continue to benefit from skilled outpatient physical therapy to address the deficits listed in the problem list box on initial evaluation, provide pt/family education and to maximize pt's level of independence in the home and community environment.     Patient's spiritual, cultural and educational needs considered and pt agreeable to plan of care and goals.     Anticipated barriers to physical therapy: none    Goals: Short Term Goals: 2 weeks   Pt will be 50% independent with HEP.  Progressing     Long Term Goals: 6  weeks   Pt will be 100% independent with HEP. Progressing  Pt will be able to perform sit to stands without difficulty.  Progressing  Pt will be able to get in and out of the bath tub without difficulty. Progressing  Pt will be able to perform her usual household activities without difficulty. Progressing    Plan   Updated Certification Period: 09/04/2024 to 10/01/2024   Recommended Treatment Plan: 2 times per week for 4 weeks:  Cervical/Lumbar Traction, Electrical Stimulation PRN, Gait Training, Manual Therapy, Moist Heat/ Ice, Neuromuscular Re-ed, Patient Education, Self Care, Therapeutic Activities, Therapeutic Exercise, and FDN     Ana Maria Helms, PT

## 2024-09-16 ENCOUNTER — CLINICAL SUPPORT (OUTPATIENT)
Dept: CARDIAC REHAB | Facility: HOSPITAL | Age: 59
End: 2024-09-16
Attending: FAMILY MEDICINE
Payer: COMMERCIAL

## 2024-09-16 DIAGNOSIS — D15.1 ATRIAL MYXOMA: Primary | ICD-10-CM

## 2024-09-16 PROCEDURE — 93798 PHYS/QHP OP CAR RHAB W/ECG: CPT | Performed by: STUDENT IN AN ORGANIZED HEALTH CARE EDUCATION/TRAINING PROGRAM

## 2024-09-17 ENCOUNTER — CLINICAL SUPPORT (OUTPATIENT)
Dept: REHABILITATION | Facility: HOSPITAL | Age: 59
End: 2024-09-17
Payer: COMMERCIAL

## 2024-09-17 DIAGNOSIS — M54.16 LUMBAR RADICULOPATHY: Primary | ICD-10-CM

## 2024-09-17 PROCEDURE — 97530 THERAPEUTIC ACTIVITIES: CPT | Mod: PN | Performed by: PHYSICAL THERAPIST

## 2024-09-17 PROCEDURE — 97110 THERAPEUTIC EXERCISES: CPT | Mod: PN | Performed by: PHYSICAL THERAPIST

## 2024-09-17 PROCEDURE — 97112 NEUROMUSCULAR REEDUCATION: CPT | Mod: PN | Performed by: PHYSICAL THERAPIST

## 2024-09-17 NOTE — PROGRESS NOTES
"  [facility]   Physical Therapy Treatment Note      Name: Jake Hoskins  Clinic Number: [id number]    Therapy Diagnosis:   Encounter Diagnosis   Name Primary?    Lumbar radiculopathy Yes       Physician: Angelica Lagunas MD    Visit Date: 9/17/2024  Physician Orders: PT Eval and Treat   Medical Diagnosis from Referral: M54.9 (ICD-10-CM) - Back pain, unspecified back location, unspecified back pain laterality, unspecified chronicity  Evaluation Date: 7/18/2024  Authorization Period Expiration: 06/26/2025  Plan of Care Expiration: [id number]  Progress Note Due: 30 days  Visit # / Visits authorized: 13/20   FOTO: 2/3     Precautions: Standard and cardiac rehab      Time In: 9:25am  Time Out: 10:25am  Total Billable Time: 60 minutes    PTA Visit #: --/5       Subjective     Patient reports: knee is doing better today    She was compliant with home exercise program.  Response to previous treatment: leg continues to hurt  Functional change: nothing noted    Pain: 3/10  Location: left knee    Objective      Objective Measures updated at progress report unless specified.     Treatment          CPT Intervention  BLE and back Duration / Intensity  09/17/2024 Tech notes   TE Nustep for ROM and stretching 6'     TA Sit to stand 20"box  10# 3 x 10     TA SLED push 2z     NR Standing hip abd and ext 2 x 10     NR Modfied RDL L 10# 2 x 10 ea side     NR Heel raises 3 x 10     TE Calf stretch on stairs 20" x 4     TA Step ups  6" step 2 x 20     NR Standing B DF 2 x 20     NR LAQ  2#   3 x 10     NR bridge 20x     NR SAQ  3# 3 x 10     TE LTR 10x ea     TE SKTC       NR PPT 20x     NR Dead bug hold       NR Hooklying hip flexion christiano 5" x 10 ea side                          NR Knee squeeze 5" x 20     NR Ankle 4way B       NR TKE W/ ball L 2x10               PLAN             Jake received the treatments listed below:    CPT Codes available for Billing:   (--) minutes of Manual therapy (MT) to improve " pain and ROM.  (10) minutes of Therapeutic Exercise (TE) to develop   strength, endurance, range of motion, and flexibility.  (38) minutes of Neuromuscular Re-Education (NR)  to improve: Balance, Coordination, Kinesthetic, Sense, Proprioception, and Posture.  (12) minutes of Therapeutic Activities (TA) to improve functional performance.  Unattended Electrical Stimulation (ES) for muscle performance or pain modulation.  Vasopneumatic Device Therapy () for management of swelling/edema. (42869)  BFR: Blood flow restriction applied during exercise  NP or (--): Not Performed    Patient Education and Home Exercises       Education provided:   - HEP and plan of care    Written Home Exercises Provided: yes. Exercises were reviewed and Jake was able to demonstrate them prior to the end of the session.  Jake demonstrated good  understanding of the education provided. See Electronic Medical Record under Patient Instructions for exercises provided during therapy sessions    Assessment     Jake presents with continued pain in the left knee but improved today. She is progressing with her knee and core strengthening.  She also needs continued work on core strengthening to improve her lumbar stability and improve her pain.     Jake Is progressing well towards her goals.   Patient prognosis is Good.     Patient will continue to benefit from skilled outpatient physical therapy to address the deficits listed in the problem list box on initial evaluation, provide pt/family education and to maximize pt's level of independence in the home and community environment.     Patient's spiritual, cultural and educational needs considered and pt agreeable to plan of care and goals.     Anticipated barriers to physical therapy: none    Goals: Short Term Goals: 2 weeks   Pt will be 50% independent with HEP.  Progressing     Long Term Goals: 6 weeks   Pt will be 100% independent with HEP. Progressing  Pt will be able to perform sit to stands  without difficulty.  Progressing  Pt will be able to get in and out of the bath tub without difficulty. Progressing  Pt will be able to perform her usual household activities without difficulty. Progressing    Plan   Updated Certification Period: 09/04/2024 to 10/01/2024   Recommended Treatment Plan: 2 times per week for 4 weeks:  Cervical/Lumbar Traction, Electrical Stimulation PRN, Gait Training, Manual Therapy, Moist Heat/ Ice, Neuromuscular Re-ed, Patient Education, Self Care, Therapeutic Activities, Therapeutic Exercise, and FDN     Ana Maria Helms, PT

## 2024-09-18 ENCOUNTER — OFFICE VISIT (OUTPATIENT)
Dept: INTERNAL MEDICINE | Facility: CLINIC | Age: 59
End: 2024-09-18
Payer: COMMERCIAL

## 2024-09-18 ENCOUNTER — OFFICE VISIT (OUTPATIENT)
Dept: CARDIOLOGY | Facility: CLINIC | Age: 59
End: 2024-09-18
Payer: COMMERCIAL

## 2024-09-18 ENCOUNTER — LAB VISIT (OUTPATIENT)
Dept: LAB | Facility: HOSPITAL | Age: 59
End: 2024-09-18
Payer: COMMERCIAL

## 2024-09-18 VITALS
BODY MASS INDEX: 39.8 KG/M2 | DIASTOLIC BLOOD PRESSURE: 82 MMHG | OXYGEN SATURATION: 99 % | HEIGHT: 64 IN | SYSTOLIC BLOOD PRESSURE: 127 MMHG | WEIGHT: 233.13 LBS | HEART RATE: 86 BPM

## 2024-09-18 VITALS
BODY MASS INDEX: 39.63 KG/M2 | DIASTOLIC BLOOD PRESSURE: 70 MMHG | SYSTOLIC BLOOD PRESSURE: 122 MMHG | HEIGHT: 64 IN | WEIGHT: 232.13 LBS | OXYGEN SATURATION: 100 % | HEART RATE: 74 BPM

## 2024-09-18 DIAGNOSIS — R55 SYNCOPE, UNSPECIFIED SYNCOPE TYPE: Primary | ICD-10-CM

## 2024-09-18 DIAGNOSIS — I10 ESSENTIAL HYPERTENSION: ICD-10-CM

## 2024-09-18 DIAGNOSIS — E66.01 SEVERE OBESITY (BMI 35.0-35.9 WITH COMORBIDITY): ICD-10-CM

## 2024-09-18 DIAGNOSIS — R73.09 ELEVATED RANDOM BLOOD GLUCOSE LEVEL: ICD-10-CM

## 2024-09-18 DIAGNOSIS — I10 ESSENTIAL HYPERTENSION: Primary | Chronic | ICD-10-CM

## 2024-09-18 DIAGNOSIS — D15.1 ATRIAL MYXOMA: ICD-10-CM

## 2024-09-18 DIAGNOSIS — R55 SYNCOPE, UNSPECIFIED SYNCOPE TYPE: ICD-10-CM

## 2024-09-18 DIAGNOSIS — I05.8 MITRAL VALVE MASS: ICD-10-CM

## 2024-09-18 LAB
ALBUMIN SERPL BCP-MCNC: 3.8 G/DL (ref 3.5–5.2)
ALP SERPL-CCNC: 69 U/L (ref 55–135)
ALT SERPL W/O P-5'-P-CCNC: 22 U/L (ref 10–44)
ANION GAP SERPL CALC-SCNC: 7 MMOL/L (ref 8–16)
AST SERPL-CCNC: 23 U/L (ref 10–40)
BASOPHILS # BLD AUTO: 0.05 K/UL (ref 0–0.2)
BASOPHILS NFR BLD: 0.9 % (ref 0–1.9)
BILIRUB SERPL-MCNC: 0.4 MG/DL (ref 0.1–1)
BUN SERPL-MCNC: 16 MG/DL (ref 6–20)
CALCIUM SERPL-MCNC: 9.8 MG/DL (ref 8.7–10.5)
CHLORIDE SERPL-SCNC: 106 MMOL/L (ref 95–110)
CO2 SERPL-SCNC: 26 MMOL/L (ref 23–29)
CREAT SERPL-MCNC: 0.8 MG/DL (ref 0.5–1.4)
DIFFERENTIAL METHOD BLD: ABNORMAL
EOSINOPHIL # BLD AUTO: 0.2 K/UL (ref 0–0.5)
EOSINOPHIL NFR BLD: 3.1 % (ref 0–8)
ERYTHROCYTE [DISTWIDTH] IN BLOOD BY AUTOMATED COUNT: 17.3 % (ref 11.5–14.5)
EST. GFR  (NO RACE VARIABLE): >60 ML/MIN/1.73 M^2
ESTIMATED AVG GLUCOSE: 108 MG/DL (ref 68–131)
GLUCOSE SERPL-MCNC: 88 MG/DL (ref 70–110)
HBA1C MFR BLD: 5.4 % (ref 4–5.6)
HCT VFR BLD AUTO: 41.5 % (ref 37–48.5)
HGB BLD-MCNC: 12.5 G/DL (ref 12–16)
IMM GRANULOCYTES # BLD AUTO: 0.02 K/UL (ref 0–0.04)
IMM GRANULOCYTES NFR BLD AUTO: 0.4 % (ref 0–0.5)
LYMPHOCYTES # BLD AUTO: 2.3 K/UL (ref 1–4.8)
LYMPHOCYTES NFR BLD: 40.7 % (ref 18–48)
MCH RBC QN AUTO: 24.4 PG (ref 27–31)
MCHC RBC AUTO-ENTMCNC: 30.1 G/DL (ref 32–36)
MCV RBC AUTO: 81 FL (ref 82–98)
MONOCYTES # BLD AUTO: 0.5 K/UL (ref 0.3–1)
MONOCYTES NFR BLD: 9.7 % (ref 4–15)
NEUTROPHILS # BLD AUTO: 2.5 K/UL (ref 1.8–7.7)
NEUTROPHILS NFR BLD: 45.2 % (ref 38–73)
NRBC BLD-RTO: 0 /100 WBC
PLATELET # BLD AUTO: 296 K/UL (ref 150–450)
PMV BLD AUTO: 11.2 FL (ref 9.2–12.9)
POTASSIUM SERPL-SCNC: 4.3 MMOL/L (ref 3.5–5.1)
PROT SERPL-MCNC: 7.2 G/DL (ref 6–8.4)
RBC # BLD AUTO: 5.13 M/UL (ref 4–5.4)
SODIUM SERPL-SCNC: 139 MMOL/L (ref 136–145)
TSH SERPL DL<=0.005 MIU/L-ACNC: 0.58 UIU/ML (ref 0.4–4)
WBC # BLD AUTO: 5.55 K/UL (ref 3.9–12.7)

## 2024-09-18 PROCEDURE — 3078F DIAST BP <80 MM HG: CPT | Mod: CPTII,S$GLB,,

## 2024-09-18 PROCEDURE — 3044F HG A1C LEVEL LT 7.0%: CPT | Mod: CPTII,S$GLB,,

## 2024-09-18 PROCEDURE — 4010F ACE/ARB THERAPY RXD/TAKEN: CPT | Mod: CPTII,S$GLB,,

## 2024-09-18 PROCEDURE — G2211 COMPLEX E/M VISIT ADD ON: HCPCS | Mod: S$GLB,,,

## 2024-09-18 PROCEDURE — 99214 OFFICE O/P EST MOD 30 MIN: CPT | Mod: S$GLB,,,

## 2024-09-18 PROCEDURE — 3074F SYST BP LT 130 MM HG: CPT | Mod: CPTII,S$GLB,,

## 2024-09-18 PROCEDURE — 80053 COMPREHEN METABOLIC PANEL: CPT

## 2024-09-18 PROCEDURE — 99999 PR PBB SHADOW E&M-EST. PATIENT-LVL IV: CPT | Mod: PBBFAC,,,

## 2024-09-18 PROCEDURE — 83036 HEMOGLOBIN GLYCOSYLATED A1C: CPT

## 2024-09-18 PROCEDURE — 3079F DIAST BP 80-89 MM HG: CPT | Mod: CPTII,S$GLB,,

## 2024-09-18 PROCEDURE — 1160F RVW MEDS BY RX/DR IN RCRD: CPT | Mod: CPTII,S$GLB,,

## 2024-09-18 PROCEDURE — 1159F MED LIST DOCD IN RCRD: CPT | Mod: CPTII,S$GLB,,

## 2024-09-18 PROCEDURE — 85025 COMPLETE CBC W/AUTO DIFF WBC: CPT

## 2024-09-18 PROCEDURE — 3008F BODY MASS INDEX DOCD: CPT | Mod: CPTII,S$GLB,,

## 2024-09-18 PROCEDURE — 84443 ASSAY THYROID STIM HORMONE: CPT

## 2024-09-18 PROCEDURE — 99999 PR PBB SHADOW E&M-EST. PATIENT-LVL V: CPT | Mod: PBBFAC,,,

## 2024-09-18 RX ORDER — AMOXICILLIN 500 MG/1
TABLET, FILM COATED ORAL
COMMUNITY
Start: 2024-08-19 | End: 2024-09-18

## 2024-09-18 RX ORDER — ACETAMINOPHEN AND CODEINE PHOSPHATE 300; 30 MG/1; MG/1
1 TABLET ORAL EVERY 6 HOURS PRN
COMMUNITY
Start: 2024-08-19

## 2024-09-18 NOTE — PROGRESS NOTES
Jake Hoskins  09/18/2024  [id number]    Angelica Lagunas MD  Patient Care Team:  Angelica Lagunas MD as PCP - General (Family Medicine)  Phil Baires LPN (Inactive) as Care Coordinator (Internal Medicine)          Visit Type:a scheduled routine follow-up visit    Chief Complaint:  Chief Complaint   Patient presents with    Syncope     Would like blood work to check for diabetes          History of Present Illness:    History of Present Illness    CHIEF COMPLAINT:  Ms. Hoskins presents today for follow up after a syncopal episode.    RECENT SYNCOPAL EPISODE:  She experienced a syncopal episode yesterday, preceded by nausea, lightheadedness, and sweating. She lost bladder control during the event. The incident occurred after breakfast at 7:30 AM, physical therapy at 9:30 AM, running errands, and meeting a coworker. She forgot to eat a banana after rehabilitation. She felt better within 10 minutes after drinking orange juice. Her blood pressure was 105/50 approximately 20 minutes after the episode, later recorded as 111/50.    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 She is currently attending cardiac rehabilitation sessions several times a week. She experienced high blood sugar during her recent heart surgery, requiring insulin administration for 3-4 days post-operatively. She denies any prior diagnosis of diabetes.    CURRENT SYMPTOMS:  She denies experiencing dizziness, chest pain, or shortness of breath today.    PHYSICAL THERAPY:  She attends both cardiac rehabilitation and physical therapy, with sessions five days a week, indicating a busy schedule.      HOME MONITORING:  She reports checking her blood pressure at home.      ROS:  General: -fever, -chills, -fatigue, -weight gain, -weight loss  Eyes: -vision changes, -redness, -discharge  ENT: -ear pain, -nasal congestion, -sore  throat  Cardiovascular: -chest pain, -palpitations, -lower extremity edema  Respiratory: -cough, -shortness of breath  Gastrointestinal: -abdominal pain, +nausea, -vomiting, -diarrhea, -constipation, -blood in stool  Genitourinary: -dysuria, -hematuria, -frequency  Musculoskeletal: -joint pain, -muscle pain  Skin: -rash, -lesion  Neurological: -headache, -dizziness, -numbness, -tingling  Psychiatric: -anxiety, -depression, -sleep difficulty            History:  Past Medical History:   Diagnosis Date    Allergy     Anemia     Hypertension     Plantar fasciitis of left foot      Past Surgical History:   Procedure Laterality Date    CATHETERIZATION OF BOTH LEFT AND RIGHT HEART N/A 5/10/2024    Procedure: CATHETERIZATION, HEART, BOTH LEFT AND RIGHT;  Surgeon: Alison Buchanan MD;  Location: [facility] CATH LAB;  Service: Cardiology;  Laterality: N/A;    COLONOSCOPY N/A 04/09/2021    Procedure: COLONOSCOPY;  Surgeon: Hua Elmore MD;  Location: [facility] ENDO;  Service: Endoscopy;  Laterality: N/A;    ECHOCARDIOGRAM,TRANSESOPHAGEAL N/A 5/13/2024    Procedure: ECHOCARDIOGRAM,TRANSESOPHAGEAL;  Surgeon: Chester Sanchez MD;  Location: [facility] OR;  Service: Cardiovascular;  Laterality: N/A;    HYSTERECTOMY  05/2021    INJECTION OF ANESTHETIC AGENT AROUND MULTIPLE INTERCOSTAL NERVES N/A 5/13/2024    Procedure: BLOCK, NERVE, INTERCOSTAL, 2 OR MORE;  Surgeon: Chester Sanchez MD;  Location: [facility] OR;  Service: Cardiovascular;  Laterality: N/A;    OOPHORECTOMY  05/2021    RESECTION OF ATRIAL MYXOMA N/A 5/13/2024    Procedure: RESECTION, MYXOMA, CARDIAC ATRIUM;  Surgeon: Chester Sanchez MD;  Location: [facility] OR;  Service: Cardiovascular;  Laterality: N/A;    ROBOT-ASSISTED LAPAROSCOPIC ABDOMINAL HYSTERECTOMY USING DA ABBEY XI N/A 05/18/2021    Procedure: XI ROBOTIC HYSTERECTOMY;  Surgeon: Christa Davila MD;  Location: [facility] OR;  Service: OB/GYN;  Laterality: N/A;    ROBOT-ASSISTED LAPAROSCOPIC SALPINGO-OOPHORECTOMY USING DA ABBEY  XI Bilateral 05/18/2021    Procedure: XI ROBOTIC SALPINGO-OOPHORECTOMY;  Surgeon: Christa Davila MD;  Location: [facility];  Service: OB/GYN;  Laterality: Bilateral;     Family History   Problem Relation Name Age of Onset    Hypertension Mother      Asthma Mother      Cancer Mother          unknown    Hypertension Father      Heart disease Father      Hypertension Sister       Social History     Socioeconomic History    Marital status:     Number of children: 2   Occupational History    Occupation: [employer]     Employer: [employer]   Tobacco Use    Smoking status: Never    Smokeless tobacco: Never   Substance and Sexual Activity    Alcohol use: No    Drug use: No    Sexual activity: Not Currently     Birth control/protection: Surgical     Social Determinants of Health     Financial Resource Strain: Patient Declined (5/9/2024)    Overall Financial Resource Strain (CARDIA)     Difficulty of Paying Living Expenses: Patient declined   Food Insecurity: Patient Declined (5/9/2024)    Hunger Vital Sign     Worried About Running Out of Food in the Last Year: Patient declined     Ran Out of Food in the Last Year: Patient declined   Transportation Needs: Patient Declined (5/9/2024)    TRANSPORTATION NEEDS     Transportation : Patient declined   Physical Activity: Sufficiently Active (5/10/2023)    Exercise Vital Sign     Days of Exercise per Week: 5 days     Minutes of Exercise per Session: 150+ min   Stress: Patient Declined (5/9/2024)    [place] [employer] - Occupational Stress Questionnaire     Feeling of Stress : Patient declined   Housing Stability: Patient Declined (5/9/2024)    Housing Stability Vital Sign     Unable to Pay for Housing in the Last Year: Patient declined     Homeless in the Last Year: Patient declined     Patient Active Problem List   Diagnosis    Anemia    Plantar fasciitis of left foot    Essential hypertension    Cervical polyp    S/P total hysterectomy and BSO (bilateral  salpingo-oophorectomy)    Rheumatoid arthritis involving multiple sites with positive rheumatoid factor    Breast mass, left    Mitral valve mass    BMI 39.0-39.9,adult    Atrial myxoma    Abnormal echocardiogram findings without diagnosis    Post-operative state    Lumbar radiculopathy     Review of patient's allergies indicates:   Allergen Reactions    Tramadol Nausea And Vomiting       The following were reviewed at this visit: active problem list, medication list, allergies, family history, social history, and health maintenance.    Medications:  Current Outpatient Medications on File Prior to Visit   Medication Sig Dispense Refill    aspirin (ECOTRIN) 81 MG EC tablet Take 1 tablet (81 mg total) by mouth once daily. 90 tablet 3    celecoxib (CELEBREX) 200 MG capsule Take 1 capsule (200 mg total) by mouth once daily. 90 capsule 1    cyclobenzaprine (FLEXERIL) 10 MG tablet Take 1 tablet (10 mg total) by mouth nightly as needed for Muscle spasms. 90 tablet 0    ergocalciferol (ERGOCALCIFEROL) 50,000 unit Cap Take 1 capsule (50,000 Units total) by mouth every 7 days. 12 capsule 4    ferrous sulfate, dried (SLOW FE) 160 mg (50 mg iron) TbSR Take 1 tablet (160 mg total) by mouth once daily. 90 tablet 3    fluticasone propionate (FLONASE) 50 mcg/actuation nasal spray USE 2 SPRAYS BY EACH NOSTRIL ROUTE ONCE DAILY 48 g 3    folic acid (FOLVITE) 1 MG tablet Take 1 tablet (1,000 mcg total) by mouth once daily. 90 tablet 0    gabapentin (NEURONTIN) 100 MG capsule Take 1 capsule (100 mg total) by mouth every evening. 30 capsule 11    methotrexate 2.5 MG Tab Take 6 tablets (15 mg total) by mouth every 7 days. 72 tablet 0    montelukast (SINGULAIR) 10 mg tablet Take 1 tablet by mouth once daily 90 tablet 3    MULTIVITS,CA,MINERALS/IRON/FA (ONE-A-DAY WOMENS FORMULA ORAL) Take by mouth once daily.       potassium chloride SA (K-DUR,KLOR-CON) 20 MEQ tablet Take 1 tablet (20 mEq total) by mouth once daily. 30 tablet 0    potassium  citrate 99 mg Cap Take 1 capsule by mouth once daily.      acetaminophen-codeine 300-30mg (TYLENOL #3) 300-30 mg Tab Take 1 tablet by mouth every 6 (six) hours as needed. (Patient not taking: Reported on 9/18/2024)      [DISCONTINUED] amoxicillin (AMOXIL) 500 MG Tab TAKE 1 TABLET BY MOUTH 4 TIMES DAILY UNTIL GONE (Patient not taking: Reported on 9/18/2024)      [DISCONTINUED] furosemide (LASIX) 40 MG tablet Take 1 tablet (40 mg total) by mouth once daily. (Patient not taking: Reported on 7/1/2024) 30 tablet 0    [DISCONTINUED] HYDROcodone-acetaminophen (NORCO) 5-325 mg per tablet Take 1 tablet by mouth every 6 (six) hours as needed for Pain. (Patient not taking: Reported on 7/1/2024) 15 tablet 0    [DISCONTINUED] HYDROcodone-acetaminophen (NORCO) 5-325 mg per tablet Take 1 tablet by mouth every 6 (six) hours as needed for Pain. (Patient not taking: Reported on 7/1/2024) 10 tablet 0    [DISCONTINUED] HYDROcodone-acetaminophen (NORCO) 5-325 mg per tablet TAKE 1 TABLET BY MOUTH 30 MINUTES PRIOR TO DENTAL APPOINTMENT AND THEN TAKE 1 TABLET BY MOUTH EVERY 4 TO 6 HOURS AS NEEDED FOR PAIN. DO NOT DRIVE 12 tablet 0    [DISCONTINUED] loratadine (CLARITIN REDITABS) 10 mg dissolvable tablet Take 1 tablet (10 mg total) by mouth once daily. (Patient not taking: Reported on 5/23/2024) 30 tablet 11    [DISCONTINUED] olmesartan (BENICAR) 20 MG tablet Take 20 mg by mouth once daily. (Patient not taking: Reported on 7/1/2024)       No current facility-administered medications on file prior to visit.       Medications have been reviewed and reconciled with patient at this visit.  Barriers to medications reviewed with patient.    Adverse reactions to current medications reviewed with patient..    Over the counter medications reviewed and reconciled with patient.    Exam:  Wt Readings from Last 3 Encounters:   09/18/24 105.3 kg (232 lb 2.3 oz)   07/23/24 102.3 kg (225 lb 6.7 oz)   07/17/24 100.7 kg (222 lb)     Temp Readings from Last 3  Encounters:   07/01/24 96.5 °F (35.8 °C) (Tympanic)   05/23/24 98.4 °F (36.9 °C) (Tympanic)   05/18/24 98.5 °F (36.9 °C) (Oral)     BP Readings from Last 3 Encounters:   09/18/24 122/70   07/23/24 126/74   07/17/24 (!) 161/97     Pulse Readings from Last 3 Encounters:   09/18/24 74   07/23/24 76   07/17/24 84     Body mass index is 39.85 kg/m².    Physical Exam  Nursing note reviewed.   Constitutional:       Appearance: She is obese.   HENT:      Head: Normocephalic and atraumatic.   Cardiovascular:      Rate and Rhythm: Normal rate and regular rhythm.      Heart sounds: Normal heart sounds.   Pulmonary:      Effort: Pulmonary effort is normal. No respiratory distress.      Breath sounds: Normal breath sounds.   Neurological:      Mental Status: She is alert and oriented to person, place, and time.   Psychiatric:         Mood and Affect: Mood normal.         Behavior: Behavior normal.         Thought Content: Thought content normal.         Judgment: Judgment normal.           Laboratory Reviewed ({Yes)  Lab Results   Component Value Date    WBC 4.81 07/15/2024    HGB 12.6 07/15/2024    HCT 41.8 07/15/2024     07/15/2024    CHOL 169 07/15/2024    TRIG 109 07/15/2024    HDL 55 07/15/2024    ALT 14 05/27/2024    AST 23 05/27/2024     05/27/2024    K 3.7 05/27/2024     05/27/2024    CREATININE 0.8 05/27/2024    BUN 10 05/27/2024    CO2 27 05/27/2024    TSH 0.730 04/29/2024    INR 1.1 05/14/2024    GLUF 85 07/15/2024    HGBA1C 5.7 (H) 05/11/2024       Jake was seen today for syncope.    Diagnoses and all orders for this visit:    Syncope, unspecified syncope type  -     CBC Auto Differential; Future  -     COMPREHENSIVE METABOLIC PANEL; Future  -     TSH; Future    Atrial myxoma  -     CBC Auto Differential; Future    Elevated random blood glucose level  -     HEMOGLOBIN A1C; Future    Essential hypertension  -     CBC Auto Differential; Future    Severe obesity (BMI 35.0-35.9 with comorbidity)  -      CBC Auto Differential; Future  -     COMPREHENSIVE METABOLIC PANEL; Future  -     HEMOGLOBIN A1C; Future  -     TSH; Future          Assessment & Plan    SYNCOPE:  - Evaluated recent syncopal episode, considering potential cardiac and metabolic causes.  - Considered hypoglycemia or hypotension as cause of syncope, given timing relative to meals and physical activity.  - Will investigate further with lab work to rule out underlying causes.  - Referred to cardiology for evaluation of syncopal episode.    POST-CARDIAC SURGERY:  - Assessed recovery from recent heart surgery, including removal of tumor on left valve.  - Reviewed cardiac rehab and physical therapy regimen.  - Discussed importance of maintaining cardiac rehab program for recovery.  - Ms. Hoskins to continue attending cardiac rehab and physical therapy sessions as scheduled.  - Explained that post-surgical elevated blood sugar is common due to body's stress response.    BLOOD PRESSURE MONITORING:  - Ms. Hoskins to monitor blood pressure at home and maintain a log.    BLOOD SUGAR MANAGEMENT:  - Recommend eating regularly to maintain blood sugar, especially before and after physical activity.    LABS:  - Ordered CBC, A1C level, and CMP to check blood count, assess kidney function, liver function, and electrolytes.    FOLLOW UP:  - Follow up to schedule labs today at checkout.  - Follow up to schedule appointment with cardiologist as soon as possible.        Visit today included increased complexity associated with the care of the episodic problem syncope , which was addressed while instituting co-management of the longitudinal care of the patient due to the serious and/or complex managed problem(s) .    I have evaluated and discussed management associated with medical care services that serve as the continuing focal point for all needed health care services and/or with medical care services that are part of ongoing care related to my patient's single, serious  condition or a complex condition(s).    I am providing ongoing care and I am the primary care provider for this patient, and they are being managed, monitored, and/or observed for their chronic conditions over time.     I have addressed their ongoing health maintenance requirements and needs for all health care services and reviewed co-management plans provided by specialty providers when available.    Health Maintenance Due   Topic Date Due    Pneumococcal Vaccines (Age 0-64) (1 of 2 - PCV) Never done    Shingles Vaccine (1 of 2) Never done    RSV Vaccine (Age 60+ and Pregnant patients) (1 - 1-dose 60+ series) Never done    Influenza Vaccine (1) 09/01/2024    COVID-19 Vaccine (4 - 2023-24 season) 09/01/2024        Care Plan/Goals: Reviewed    Goals    None         Follow up: No follow-ups on file.    After visit summary was printed and given to patient upon discharge today.  Patient goals and care plan are included in After Visit Summary.

## 2024-09-18 NOTE — PROGRESS NOTES
Subjective:   Patient ID:  Jake Hoskins is a 59 y.o. female who presents for evaluation of No chief complaint on file.      HPI 60y/o AA F with PMHx of atrial myxoma s/p resection in May 24', HTN, obesity, syncope followed by Dr. Buchanan in clinic. Pt states she was outside talking with a neighbor yesterday afternoon, had not eaten since 7am and started feeling weak, diaphoretic and passed out. Denies any CP or palpitations prior to episode.     Past Medical History:   Diagnosis Date    Allergy     Anemia     Hypertension     Plantar fasciitis of left foot        Past Surgical History:   Procedure Laterality Date    CATHETERIZATION OF BOTH LEFT AND RIGHT HEART N/A 5/10/2024    Procedure: CATHETERIZATION, HEART, BOTH LEFT AND RIGHT;  Surgeon: Alison Buchanan MD;  Location: [facility] CATH LAB;  Service: Cardiology;  Laterality: N/A;    COLONOSCOPY N/A 04/09/2021    Procedure: COLONOSCOPY;  Surgeon: Hua Elmore MD;  Location: [facility] ENDO;  Service: Endoscopy;  Laterality: N/A;    ECHOCARDIOGRAM,TRANSESOPHAGEAL N/A 5/13/2024    Procedure: ECHOCARDIOGRAM,TRANSESOPHAGEAL;  Surgeon: Chester Sanchez MD;  Location: [facility] OR;  Service: Cardiovascular;  Laterality: N/A;    HYSTERECTOMY  05/2021    INJECTION OF ANESTHETIC AGENT AROUND MULTIPLE INTERCOSTAL NERVES N/A 5/13/2024    Procedure: BLOCK, NERVE, INTERCOSTAL, 2 OR MORE;  Surgeon: Chester Sanchez MD;  Location: [facility] OR;  Service: Cardiovascular;  Laterality: N/A;    OOPHORECTOMY  05/2021    RESECTION OF ATRIAL MYXOMA N/A 5/13/2024    Procedure: RESECTION, MYXOMA, CARDIAC ATRIUM;  Surgeon: Chester Sanchez MD;  Location: [facility] OR;  Service: Cardiovascular;  Laterality: N/A;    ROBOT-ASSISTED LAPAROSCOPIC ABDOMINAL HYSTERECTOMY USING DA ABBEY XI N/A 05/18/2021    Procedure: XI ROBOTIC HYSTERECTOMY;  Surgeon: Christa Davila MD;  Location: [facility] OR;  Service: OB/GYN;  Laterality: N/A;    ROBOT-ASSISTED LAPAROSCOPIC SALPINGO-OOPHORECTOMY USING DA ABBEY XI  Bilateral 05/18/2021    Procedure: XI ROBOTIC SALPINGO-OOPHORECTOMY;  Surgeon: Christa Davila MD;  Location: [facility];  Service: OB/GYN;  Laterality: Bilateral;       Social History     Tobacco Use    Smoking status: Never    Smokeless tobacco: Never   Substance Use Topics    Alcohol use: No    Drug use: No       Family History   Problem Relation Name Age of Onset    Hypertension Mother      Asthma Mother      Cancer Mother          unknown    Hypertension Father      Heart disease Father      Hypertension Sister         Current Outpatient Medications on File Prior to Visit   Medication Sig Dispense Refill    aspirin (ECOTRIN) 81 MG EC tablet Take 1 tablet (81 mg total) by mouth once daily. 90 tablet 3    celecoxib (CELEBREX) 200 MG capsule Take 1 capsule (200 mg total) by mouth once daily. 90 capsule 1    ergocalciferol (ERGOCALCIFEROL) 50,000 unit Cap Take 1 capsule (50,000 Units total) by mouth every 7 days. 12 capsule 4    ferrous sulfate, dried (SLOW FE) 160 mg (50 mg iron) TbSR Take 1 tablet (160 mg total) by mouth once daily. 90 tablet 3    fluticasone propionate (FLONASE) 50 mcg/actuation nasal spray USE 2 SPRAYS BY EACH NOSTRIL ROUTE ONCE DAILY 48 g 3    folic acid (FOLVITE) 1 MG tablet Take 1 tablet (1,000 mcg total) by mouth once daily. 90 tablet 0    gabapentin (NEURONTIN) 100 MG capsule Take 1 capsule (100 mg total) by mouth every evening. 30 capsule 11    methotrexate 2.5 MG Tab Take 6 tablets (15 mg total) by mouth every 7 days. 72 tablet 0    montelukast (SINGULAIR) 10 mg tablet Take 1 tablet by mouth once daily 90 tablet 3    MULTIVITS,CA,MINERALS/IRON/FA (ONE-A-DAY WOMENS FORMULA ORAL) Take by mouth once daily.       potassium citrate 99 mg Cap Take 1 capsule by mouth once daily.      acetaminophen-codeine 300-30mg (TYLENOL #3) 300-30 mg Tab Take 1 tablet by mouth every 6 (six) hours as needed. (Patient not taking: Reported on 9/18/2024)      cyclobenzaprine (FLEXERIL) 10 MG tablet Take 1 tablet  (10 mg total) by mouth nightly as needed for Muscle spasms. (Patient not taking: Reported on 9/18/2024) 90 tablet 0    potassium chloride SA (K-DUR,KLOR-CON) 20 MEQ tablet Take 1 tablet (20 mEq total) by mouth once daily. (Patient not taking: Reported on 9/18/2024) 30 tablet 0    [DISCONTINUED] amoxicillin (AMOXIL) 500 MG Tab TAKE 1 TABLET BY MOUTH 4 TIMES DAILY UNTIL GONE (Patient not taking: Reported on 9/18/2024)      [DISCONTINUED] furosemide (LASIX) 40 MG tablet Take 1 tablet (40 mg total) by mouth once daily. (Patient not taking: Reported on 7/1/2024) 30 tablet 0    [DISCONTINUED] HYDROcodone-acetaminophen (NORCO) 5-325 mg per tablet Take 1 tablet by mouth every 6 (six) hours as needed for Pain. (Patient not taking: Reported on 7/1/2024) 15 tablet 0    [DISCONTINUED] HYDROcodone-acetaminophen (NORCO) 5-325 mg per tablet Take 1 tablet by mouth every 6 (six) hours as needed for Pain. (Patient not taking: Reported on 7/1/2024) 10 tablet 0    [DISCONTINUED] HYDROcodone-acetaminophen (NORCO) 5-325 mg per tablet TAKE 1 TABLET BY MOUTH 30 MINUTES PRIOR TO DENTAL APPOINTMENT AND THEN TAKE 1 TABLET BY MOUTH EVERY 4 TO 6 HOURS AS NEEDED FOR PAIN. DO NOT DRIVE 12 tablet 0    [DISCONTINUED] loratadine (CLARITIN REDITABS) 10 mg dissolvable tablet Take 1 tablet (10 mg total) by mouth once daily. (Patient not taking: Reported on 5/23/2024) 30 tablet 11    [DISCONTINUED] olmesartan (BENICAR) 20 MG tablet Take 20 mg by mouth once daily. (Patient not taking: Reported on 7/1/2024)       No current facility-administered medications on file prior to visit.      Wt Readings from Last 3 Encounters:   09/18/24 105.7 kg (233 lb 2.2 oz)   09/18/24 105.3 kg (232 lb 2.3 oz)   07/23/24 102.3 kg (225 lb 6.7 oz)     Temp Readings from Last 3 Encounters:   07/01/24 96.5 °F (35.8 °C) (Tympanic)   05/23/24 98.4 °F (36.9 °C) (Tympanic)   05/18/24 98.5 °F (36.9 °C) (Oral)     BP Readings from Last 3 Encounters:   09/18/24 127/82   09/18/24  122/70   07/23/24 126/74     Pulse Readings from Last 3 Encounters:   09/18/24 86   09/18/24 74   07/23/24 76        Review of Systems   Constitutional: Positive for diaphoresis and malaise/fatigue.   HENT: Negative.     Eyes: Negative.    Cardiovascular:  Positive for syncope.   Respiratory: Negative.     Skin: Negative.    Musculoskeletal: Negative.    Gastrointestinal: Negative.    Genitourinary: Negative.    Neurological:  Positive for weakness.   Psychiatric/Behavioral: Negative.         Objective:   Physical Exam  Vitals and nursing note reviewed.   Constitutional:       Appearance: Normal appearance.   HENT:      Head: Normocephalic.   Eyes:      Pupils: Pupils are equal, round, and reactive to light.   Cardiovascular:      Rate and Rhythm: Normal rate and regular rhythm.      Heart sounds: Normal heart sounds, S1 normal and S2 normal. No murmur heard.     No S3 or S4 sounds.   Pulmonary:      Effort: Pulmonary effort is normal.      Breath sounds: Normal breath sounds.   Abdominal:      General: Bowel sounds are normal.      Palpations: Abdomen is soft.   Musculoskeletal:         General: Normal range of motion.      Cervical back: Normal range of motion.   Skin:     Capillary Refill: Capillary refill takes less than 2 seconds.   Neurological:      General: No focal deficit present.      Mental Status: She is alert and oriented to person, place, and time.   Psychiatric:         Mood and Affect: Mood normal.         Behavior: Behavior normal.         Thought Content: Thought content normal.         Lab Results   Component Value Date    CHOL 169 07/15/2024    CHOL 179 05/12/2023    CHOL 185 07/06/2018     Lab Results   Component Value Date    HDL 55 07/15/2024    HDL 61 05/12/2023    HDL 59 07/06/2018     Lab Results   Component Value Date    LDLCALC 92.2 07/15/2024    LDLCALC 98.4 05/12/2023    LDLCALC 107.8 07/06/2018     Lab Results   Component Value Date    TRIG 109 07/15/2024    TRIG 98 05/12/2023    TRIG  91 07/06/2018     Lab Results   Component Value Date    CHOLHDL 32.5 07/15/2024    CHOLHDL 34.1 05/12/2023    CHOLHDL 31.9 07/06/2018       Chemistry        Component Value Date/Time     05/27/2024 0830    K 3.7 05/27/2024 0830     05/27/2024 0830    CO2 27 05/27/2024 0830    BUN 10 05/27/2024 0830    CREATININE 0.8 05/27/2024 0830    GLU 77 05/27/2024 0830        Component Value Date/Time    CALCIUM 9.5 05/27/2024 0830    ALKPHOS 64 05/27/2024 0830    AST 23 05/27/2024 0830    ALT 14 05/27/2024 0830    BILITOT 0.2 05/27/2024 0830    ESTGFRAFRICA >60 05/06/2022 1547    EGFRNONAA >60 05/06/2022 1547          Lab Results   Component Value Date    TSH 0.730 04/29/2024     Lab Results   Component Value Date    INR 1.1 05/14/2024    INR 1.1 05/13/2024    INR 1.0 05/12/2024     @RESUFAST(WBC,HGB,HCT,MCV,PLT)  @LABRCNTIP(BNP,BNPTRIAGEBLO)@  CrCl cannot be calculated (Patient's most recent lab result is older than the maximum 7 days allowed.).     Results for orders placed during the hospital encounter of 05/24/24    Echo    Interpretation Summary    Left Ventricle: The left ventricle is normal in size. Ventricular mass is normal. Mildly increased wall thickness. There is concentric remodeling. There is normal systolic function with a visually estimated ejection fraction of 55 - 60%. Ejection fraction by visual approximation is 55%. There is normal diastolic function.    Right Ventricle: Normal right ventricular cavity size. Wall thickness is normal. Systolic function is normal.    Left Atrium: Left atrium is mildly dilated.    Tricuspid Valve: There is mild to moderate regurgitation.    Pulmonic Valve: There is mild regurgitation.    Pulmonary Artery: The estimated pulmonary artery systolic pressure is 26 mmHg.    IVC/SVC: Normal venous pressure at 3 mmHg.     Results for orders placed during the hospital encounter of 07/17/24    Exercise Stress - EKG    Interpretation Summary    The ECG portion of the study is  negative for ischemia.    The patient reported no chest pain during the stress test.    The blood pressure response to stress was normal.    There were no arrhythmias during stress.    Entry of cardiac rehab, patient states that she is off all bp meds and that her bp is normal at home.     Assessment:      1. Essential hypertension    2. Mitral valve mass    3. BMI 39.0-39.9,adult        Plan:   Essential hypertension  -     Echo; Future  -     Cardiac Monitor - 3-15 Day Adult (Cupid Only); Future    Mitral valve mass    BMI 39.0-39.9,adult      Cont current CV medications  RF modifications  Hydration, good PO intake  Daily exercise as tolerated  Holter and echo    RTC as scheduled or sooner if needed    Courtney Guillot, FNP-C [facility], Cardiology

## 2024-09-19 ENCOUNTER — PATIENT MESSAGE (OUTPATIENT)
Dept: INTERNAL MEDICINE | Facility: CLINIC | Age: 59
End: 2024-09-19
Payer: COMMERCIAL

## 2024-09-19 ENCOUNTER — PATIENT MESSAGE (OUTPATIENT)
Dept: CARDIOLOGY | Facility: CLINIC | Age: 59
End: 2024-09-19
Payer: COMMERCIAL

## 2024-09-23 ENCOUNTER — CLINICAL SUPPORT (OUTPATIENT)
Dept: CARDIAC REHAB | Facility: HOSPITAL | Age: 59
End: 2024-09-23
Payer: COMMERCIAL

## 2024-09-23 DIAGNOSIS — D15.1 ATRIAL MYXOMA: Primary | ICD-10-CM

## 2024-09-23 PROCEDURE — 93798 PHYS/QHP OP CAR RHAB W/ECG: CPT | Performed by: STUDENT IN AN ORGANIZED HEALTH CARE EDUCATION/TRAINING PROGRAM

## 2024-09-24 ENCOUNTER — OFFICE VISIT (OUTPATIENT)
Dept: RHEUMATOLOGY | Facility: CLINIC | Age: 59
End: 2024-09-24
Payer: COMMERCIAL

## 2024-09-24 ENCOUNTER — TELEPHONE (OUTPATIENT)
Dept: SPORTS MEDICINE | Facility: CLINIC | Age: 59
End: 2024-09-24
Payer: COMMERCIAL

## 2024-09-24 VITALS
HEART RATE: 71 BPM | SYSTOLIC BLOOD PRESSURE: 120 MMHG | HEIGHT: 64 IN | DIASTOLIC BLOOD PRESSURE: 87 MMHG | BODY MASS INDEX: 38.66 KG/M2 | WEIGHT: 226.44 LBS

## 2024-09-24 DIAGNOSIS — D84.821 ENCOUNTER FOR MONITORING IMMUNOSUPPRESSIVE MEDICATION THERAPY CAUSING IMMUNODEFICIENCY: ICD-10-CM

## 2024-09-24 DIAGNOSIS — Z79.899 HIGH RISK MEDICATION USE: ICD-10-CM

## 2024-09-24 DIAGNOSIS — Z51.81 ENCOUNTER FOR MONITORING IMMUNOSUPPRESSIVE MEDICATION THERAPY CAUSING IMMUNODEFICIENCY: ICD-10-CM

## 2024-09-24 DIAGNOSIS — Z79.60 ENCOUNTER FOR MONITORING IMMUNOSUPPRESSIVE MEDICATION THERAPY CAUSING IMMUNODEFICIENCY: ICD-10-CM

## 2024-09-24 DIAGNOSIS — D84.821 IMMUNODEFICIENCY DUE TO DRUG THERAPY: ICD-10-CM

## 2024-09-24 DIAGNOSIS — M05.79 RHEUMATOID ARTHRITIS INVOLVING MULTIPLE SITES WITH POSITIVE RHEUMATOID FACTOR: Primary | ICD-10-CM

## 2024-09-24 DIAGNOSIS — M17.0 PRIMARY OSTEOARTHRITIS OF BOTH KNEES: ICD-10-CM

## 2024-09-24 DIAGNOSIS — Z79.899 IMMUNODEFICIENCY DUE TO DRUG THERAPY: ICD-10-CM

## 2024-09-24 PROCEDURE — 3044F HG A1C LEVEL LT 7.0%: CPT | Mod: CPTII,S$GLB,, | Performed by: STUDENT IN AN ORGANIZED HEALTH CARE EDUCATION/TRAINING PROGRAM

## 2024-09-24 PROCEDURE — 3079F DIAST BP 80-89 MM HG: CPT | Mod: CPTII,S$GLB,, | Performed by: STUDENT IN AN ORGANIZED HEALTH CARE EDUCATION/TRAINING PROGRAM

## 2024-09-24 PROCEDURE — 1160F RVW MEDS BY RX/DR IN RCRD: CPT | Mod: CPTII,S$GLB,, | Performed by: STUDENT IN AN ORGANIZED HEALTH CARE EDUCATION/TRAINING PROGRAM

## 2024-09-24 PROCEDURE — 1159F MED LIST DOCD IN RCRD: CPT | Mod: CPTII,S$GLB,, | Performed by: STUDENT IN AN ORGANIZED HEALTH CARE EDUCATION/TRAINING PROGRAM

## 2024-09-24 PROCEDURE — 99999 PR PBB SHADOW E&M-EST. PATIENT-LVL V: CPT | Mod: PBBFAC,,, | Performed by: STUDENT IN AN ORGANIZED HEALTH CARE EDUCATION/TRAINING PROGRAM

## 2024-09-24 PROCEDURE — 3074F SYST BP LT 130 MM HG: CPT | Mod: CPTII,S$GLB,, | Performed by: STUDENT IN AN ORGANIZED HEALTH CARE EDUCATION/TRAINING PROGRAM

## 2024-09-24 PROCEDURE — 4010F ACE/ARB THERAPY RXD/TAKEN: CPT | Mod: CPTII,S$GLB,, | Performed by: STUDENT IN AN ORGANIZED HEALTH CARE EDUCATION/TRAINING PROGRAM

## 2024-09-24 PROCEDURE — 99215 OFFICE O/P EST HI 40 MIN: CPT | Mod: S$GLB,,, | Performed by: STUDENT IN AN ORGANIZED HEALTH CARE EDUCATION/TRAINING PROGRAM

## 2024-09-24 PROCEDURE — 3008F BODY MASS INDEX DOCD: CPT | Mod: CPTII,S$GLB,, | Performed by: STUDENT IN AN ORGANIZED HEALTH CARE EDUCATION/TRAINING PROGRAM

## 2024-09-24 RX ORDER — FOLIC ACID 1 MG/1
1 TABLET ORAL DAILY
Qty: 90 TABLET | Refills: 3 | Status: SHIPPED | OUTPATIENT
Start: 2024-09-24

## 2024-09-24 RX ORDER — METHOTREXATE 2.5 MG/1
15 TABLET ORAL
Qty: 72 TABLET | Refills: 0 | Status: SHIPPED | OUTPATIENT
Start: 2024-09-24

## 2024-09-24 NOTE — TELEPHONE ENCOUNTER
Called and scheduled patient from referral from Dr. Sky.  Patient verbalized understanding of appt date, time and location.   ----- Message from Rosalinda Sky MD sent at 9/24/2024  9:39 AM CDT -----  Please schedule for left knee OA - consider steroid vs gel injection

## 2024-09-24 NOTE — PROGRESS NOTES
"Subjective:      Patient ID: Jake Hoskins is a 59 y.o. female.    Chief Complaint: Rheumatoid arthritis    HPI:   Patient presents for Rheumatology follow up for RA. She's taking MTX 15 mg/wk + folic acid 1 mg daily as well as Celebrex 200 mg daily. Endorses that RA is generally doing well. Gets 10 minutes max morning stiffness. Has dependent edema in the feet and hands progressively throughout the day but denies any particular joint swelling. Left knee has pain from medial compartment OA and she has had steroid injections for this in the past with improvement. Left knee pain is only 3/10 today. She has a history of iron deficiency anemia and takes daily OTC iron supplement. Had colonoscopy 2021 with a few polyps and 5 year recall. She also follows with Cardiology and had a benign valve mass surgically removed earlier this year. She recently has been dealing with an episode of syncope which is being investigated and has echo and monitor coming up. She is in PT with cardiac rehab 5 days a week. She has also been trying to make dietary changes, cutting out processed food and salt from her diet. Denies joint swelling, significant stiffness, skin rashes, oral ulcers, patchy alopecia, sicca symptoms, cardiopulmonary symptoms, eye inflammation, IBD, fevers, weight loss, Raynaud's. Rheumatology review of systems is otherwise negative.    Objective:   BP (!) 142/94 (BP Location: Right forearm, Patient Position: Sitting, BP Method: Small (Automatic))   Pulse 79   Ht 5' 4" (1.626 m)   Wt 102.7 kg (226 lb 6.6 oz)   LMP 12/26/2020   BMI 38.86 kg/m²   Physical Exam  Constitutional:       General: She is not in acute distress.     Appearance: Normal appearance.   HENT:      Head: Normocephalic and atraumatic.      Mouth/Throat:      Mouth: Mucous membranes are moist.      Pharynx: Oropharynx is clear.   Cardiovascular:      Rate and Rhythm: Normal rate and regular rhythm.   Pulmonary:      Effort: Pulmonary effort is " normal.      Breath sounds: Normal breath sounds.   Abdominal:      Palpations: Abdomen is soft.      Tenderness: There is no abdominal tenderness.   Musculoskeletal:         General: No swelling or tenderness.      Cervical back: Normal range of motion. No tenderness.      Right lower leg: Edema present.      Left lower leg: Edema present.   Skin:     General: Skin is warm and dry.   Neurological:      Mental Status: She is alert and oriented to person, place, and time. Mental status is at baseline.             Assessment and Plan:     Problem List Items Addressed This Visit          Unprioritized    Rheumatoid arthritis involving multiple sites with positive rheumatoid factor - Primary    Relevant Medications    methotrexate 2.5 MG Tab    folic acid (FOLVITE) 1 MG tablet    Other Relevant Orders    Hepatitis B Core Antibody, Total    Hepatitis B Surface Antigen    Quantiferon Gold TB    CBC Auto Differential    Comprehensive Metabolic Panel    C-Reactive Protein    Sedimentation rate     Other Visit Diagnoses       Primary osteoarthritis of both knees        Relevant Orders    Ambulatory referral/consult to Sports Medicine    Immunodeficiency due to drug therapy        Relevant Medications    methotrexate 2.5 MG Tab    folic acid (FOLVITE) 1 MG tablet    Other Relevant Orders    Hepatitis B Core Antibody, Total    Hepatitis B Surface Antigen    Quantiferon Gold TB    CBC Auto Differential    Comprehensive Metabolic Panel    C-Reactive Protein    Sedimentation rate    Encounter for monitoring immunosuppressive medication therapy causing immunodeficiency        Relevant Medications    methotrexate 2.5 MG Tab    folic acid (FOLVITE) 1 MG tablet    Other Relevant Orders    Hepatitis B Core Antibody, Total    Hepatitis B Surface Antigen    Quantiferon Gold TB    CBC Auto Differential    Comprehensive Metabolic Panel    C-Reactive Protein    Sedimentation rate    High risk medication use        Relevant Medications     methotrexate 2.5 MG Tab    folic acid (FOLVITE) 1 MG tablet    Other Relevant Orders    Hepatitis B Core Antibody, Total    Hepatitis B Surface Antigen    Quantiferon Gold TB    CBC Auto Differential    Comprehensive Metabolic Panel    C-Reactive Protein    Sedimentation rate            Patient presents for Rheumatology follow up for RA.     Plan:  Rheumatoid arthritis with positive rheumatoid factor. RF+ 59, CCP+ 10.8. Stable disease. Continue same medications. MTX 15 mg/d + FA 1 mg/d.  Safety and disease monitoring labs every 3 months. Update hep B and TB with next labs.  Primary osteoarthritis of both knees. Continue Celebrex up to daily use as needed as long as Cardiology continues to not have objection to this.  Referred to Sports Medicine for medial compartment OA of left knee.  Lower extremity and progressive hand edema. Spend 20 minutes discussing avoid salt and processed food. Heart and immune system healthy diets such as Mediterranean, DASH, etc. Whole foods.       High Risk Medication Monitoring encounter  Drug therapy requiring intensive monitoring for toxicity  No current medication related issues, no evidence of toxicity  Appropriate labs ordered for toxicity monitoring    Compromised immune system secondary to autoimmune disease and/or use of immunosuppressive drugs.  Monitor carefully for infections.  Advised patient to get immediate medical care if any infection arises.  Also advised strict adherence age-appropriate vaccinations and cancer screenings with PCP.    Patient advised to hold DMARD and/or biologic therapy for signs of infection or for surgery. If you are unsure what to do please call our office for instruction.[facility] [phone]          Follow up in about 6 months (around 3/24/2025).       I spent a total of 60 minutes on the day of the visit.  This includes face to face time and non-face to face time preparing to see the patient (eg, review of tests), obtaining  and/or reviewing separately obtained history, documenting clinical information in the electronic or other health record, independently interpreting results and communicating results to the patient/family/caregiver, or care coordinator.

## 2024-09-25 ENCOUNTER — PATIENT MESSAGE (OUTPATIENT)
Dept: OPHTHALMOLOGY | Facility: CLINIC | Age: 59
End: 2024-09-25

## 2024-09-25 ENCOUNTER — CLINICAL SUPPORT (OUTPATIENT)
Dept: CARDIAC REHAB | Facility: HOSPITAL | Age: 59
End: 2024-09-25
Payer: COMMERCIAL

## 2024-09-25 ENCOUNTER — OFFICE VISIT (OUTPATIENT)
Dept: OPHTHALMOLOGY | Facility: CLINIC | Age: 59
End: 2024-09-25
Payer: COMMERCIAL

## 2024-09-25 DIAGNOSIS — D15.1 ATRIAL MYXOMA: Primary | ICD-10-CM

## 2024-09-25 DIAGNOSIS — H25.13 CATARACT, NUCLEAR SCLEROTIC SENILE, BILATERAL: Primary | ICD-10-CM

## 2024-09-25 DIAGNOSIS — H52.7 REFRACTIVE ERRORS: ICD-10-CM

## 2024-09-25 DIAGNOSIS — I10 ESSENTIAL HYPERTENSION: Chronic | ICD-10-CM

## 2024-09-25 PROCEDURE — 3044F HG A1C LEVEL LT 7.0%: CPT | Mod: CPTII,S$GLB,, | Performed by: OPTOMETRIST

## 2024-09-25 PROCEDURE — 92015 DETERMINE REFRACTIVE STATE: CPT | Mod: S$GLB,,, | Performed by: OPTOMETRIST

## 2024-09-25 PROCEDURE — 1159F MED LIST DOCD IN RCRD: CPT | Mod: CPTII,S$GLB,, | Performed by: OPTOMETRIST

## 2024-09-25 PROCEDURE — 4010F ACE/ARB THERAPY RXD/TAKEN: CPT | Mod: CPTII,S$GLB,, | Performed by: OPTOMETRIST

## 2024-09-25 PROCEDURE — 92014 COMPRE OPH EXAM EST PT 1/>: CPT | Mod: S$GLB,,, | Performed by: OPTOMETRIST

## 2024-09-25 PROCEDURE — 99999 PR PBB SHADOW E&M-EST. PATIENT-LVL III: CPT | Mod: PBBFAC,,, | Performed by: OPTOMETRIST

## 2024-09-25 PROCEDURE — 93798 PHYS/QHP OP CAR RHAB W/ECG: CPT | Performed by: STUDENT IN AN ORGANIZED HEALTH CARE EDUCATION/TRAINING PROGRAM

## 2024-09-25 NOTE — PROGRESS NOTES
SUBJECTIVE  Jake Hoskins is 59 y.o. female  Corrected distance visual acuity was 20/25 -2 in the right eye and 20/20 -1 in the left eye. Corrected near visual acuity was J1 in the right eye and J1 in the left eye.   Chief Complaint   Patient presents with    Hypertensive Eye Exam    Eye Exam          HPI    Decrease near visual acuity without glasses  Last eye exam 01/21/2022 TRF.  Update glasses RX.  Last edited by Sahra Carrillo MA on 9/25/2024 10:34 AM.         Assessment /Plan :  1. Cataract, nuclear sclerotic senile, bilateral  Cataracts are not visually significant and not affecting activities of daily living.   Annual observation is recommended at this time. Patient to call or return to clinic with any significant change in vision prior to next visit.      2. Essential hypertension  No HTN Retinopathy, monitor annually.       3. Refractive errors  Dispense Final Rx for glasses.  RTC 1 year  Discussed above and answered questions.

## 2024-09-26 ENCOUNTER — HOSPITAL ENCOUNTER (OUTPATIENT)
Dept: CARDIOLOGY | Facility: HOSPITAL | Age: 59
Discharge: HOME | End: 2024-09-26
Payer: COMMERCIAL

## 2024-09-26 ENCOUNTER — OFFICE VISIT (OUTPATIENT)
Dept: SPORTS MEDICINE | Facility: CLINIC | Age: 59
End: 2024-09-26
Payer: COMMERCIAL

## 2024-09-26 VITALS
DIASTOLIC BLOOD PRESSURE: 87 MMHG | WEIGHT: 226 LBS | SYSTOLIC BLOOD PRESSURE: 120 MMHG | BODY MASS INDEX: 38.58 KG/M2 | HEIGHT: 64 IN

## 2024-09-26 VITALS — BODY MASS INDEX: 38.58 KG/M2 | WEIGHT: 226 LBS | HEIGHT: 64 IN

## 2024-09-26 DIAGNOSIS — I10 ESSENTIAL HYPERTENSION: Chronic | ICD-10-CM

## 2024-09-26 DIAGNOSIS — M17.0 PRIMARY OSTEOARTHRITIS OF BOTH KNEES: ICD-10-CM

## 2024-09-26 LAB
AORTIC ROOT ANNULUS: 2.56 CM
ASCENDING AORTA: 2.97 CM
AV INDEX (PROSTH): 0.75
AV MEAN GRADIENT: 3 MMHG
AV PEAK GRADIENT: 6 MMHG
AV VALVE AREA BY VELOCITY RATIO: 2.36 CM²
AV VALVE AREA: 2.39 CM²
AV VELOCITY RATIO: 0.74
BSA FOR ECHO PROCEDURE: 2.15 M2
CV ECHO LV RWT: 0.48 CM
DOP CALC AO PEAK VEL: 1.18 M/S
DOP CALC AO VTI: 28.7 CM
DOP CALC LVOT AREA: 3.2 CM2
DOP CALC LVOT DIAMETER: 2.02 CM
DOP CALC LVOT PEAK VEL: 0.87 M/S
DOP CALC LVOT STROKE VOLUME: 68.55 CM3
DOP CALC RVOT PEAK VEL: 0.8 M/S
DOP CALC RVOT VTI: 19.6 CM
DOP CALCLVOT PEAK VEL VTI: 21.4 CM
E WAVE DECELERATION TIME: 157.5 MSEC
E/A RATIO: 1.04
E/E' RATIO: 6.08 M/S
ECHO LV POSTERIOR WALL: 1.19 CM (ref 0.6–1.1)
EJECTION FRACTION: 55 %
FRACTIONAL SHORTENING: 35 % (ref 28–44)
INTERVENTRICULAR SEPTUM: 0.9 CM (ref 0.6–1.1)
IVC DIAMETER: 1.08 CM
IVRT: 102.76 MSEC
LA MAJOR: 6.25 CM
LA MINOR: 5.33 CM
LA WIDTH: 4 CM
LEFT ATRIUM AREA SYSTOLIC (APICAL 2 CHAMBER): 18.76 CM2
LEFT ATRIUM AREA SYSTOLIC (APICAL 4 CHAMBER): 21.75 CM2
LEFT ATRIUM SIZE: 4.74 CM
LEFT ATRIUM VOLUME INDEX MOD: 32.1 ML/M2
LEFT ATRIUM VOLUME INDEX: 45 ML/M2
LEFT ATRIUM VOLUME MOD: 66.07 ML
LEFT ATRIUM VOLUME: 92.72 CM3
LEFT INTERNAL DIMENSION IN SYSTOLE: 3.21 CM (ref 2.1–4)
LEFT VENTRICLE DIASTOLIC VOLUME INDEX: 56.55 ML/M2
LEFT VENTRICLE DIASTOLIC VOLUME: 116.49 ML
LEFT VENTRICLE END SYSTOLIC VOLUME APICAL 2 CHAMBER: 56.83 ML
LEFT VENTRICLE END SYSTOLIC VOLUME APICAL 4 CHAMBER: 64.35 ML
LEFT VENTRICLE MASS INDEX: 93 G/M2
LEFT VENTRICLE SYSTOLIC VOLUME INDEX: 20.1 ML/M2
LEFT VENTRICLE SYSTOLIC VOLUME: 41.33 ML
LEFT VENTRICULAR INTERNAL DIMENSION IN DIASTOLE: 4.97 CM (ref 3.5–6)
LEFT VENTRICULAR MASS: 191.24 G
LV LATERAL E/E' RATIO: 4.56 M/S
LV SEPTAL E/E' RATIO: 9.13 M/S
LVED V (TEICH): 116.49 ML
LVES V (TEICH): 41.33 ML
LVOT MG: 1.78 MMHG
LVOT MV: 0.63 CM/S
MV PEAK A VEL: 0.7 M/S
MV PEAK E VEL: 0.73 M/S
OHS CV RV/LV RATIO: 1.01 CM
PISA TR MAX VEL: 2.46 M/S
PV MEAN GRADIENT: 2 MMHG
PV MV: 0.71 M/S
PV PEAK GRADIENT: 4 MMHG
PV PEAK VELOCITY: 0.96 M/S
RA MAJOR: 6.34 CM
RA PRESSURE ESTIMATED: 3 MMHG
RA WIDTH: 4.14 CM
RIGHT VENTRICULAR END-DIASTOLIC DIMENSION: 5 CM
RV TB RVSP: 5 MMHG
SINUS: 2.62 CM
STJ: 2.58 CM
TDI LATERAL: 0.16 M/S
TDI SEPTAL: 0.08 M/S
TDI: 0.12 M/S
TR MAX PG: 24 MMHG
TRICUSPID ANNULAR PLANE SYSTOLIC EXCURSION: 1.23 CM
TV REST PULMONARY ARTERY PRESSURE: 27 MMHG
Z-SCORE OF LEFT VENTRICULAR DIMENSION IN END DIASTOLE: -2.24
Z-SCORE OF LEFT VENTRICULAR DIMENSION IN END SYSTOLE: -1.35

## 2024-09-26 PROCEDURE — 99204 OFFICE O/P NEW MOD 45 MIN: CPT | Mod: S$GLB,,, | Performed by: STUDENT IN AN ORGANIZED HEALTH CARE EDUCATION/TRAINING PROGRAM

## 2024-09-26 PROCEDURE — 4010F ACE/ARB THERAPY RXD/TAKEN: CPT | Mod: CPTII,S$GLB,, | Performed by: STUDENT IN AN ORGANIZED HEALTH CARE EDUCATION/TRAINING PROGRAM

## 2024-09-26 PROCEDURE — G2211 COMPLEX E/M VISIT ADD ON: HCPCS | Mod: S$GLB,,, | Performed by: STUDENT IN AN ORGANIZED HEALTH CARE EDUCATION/TRAINING PROGRAM

## 2024-09-26 PROCEDURE — 3008F BODY MASS INDEX DOCD: CPT | Mod: CPTII,S$GLB,, | Performed by: STUDENT IN AN ORGANIZED HEALTH CARE EDUCATION/TRAINING PROGRAM

## 2024-09-26 PROCEDURE — 99999 PR PBB SHADOW E&M-EST. PATIENT-LVL III: CPT | Mod: PBBFAC,,, | Performed by: STUDENT IN AN ORGANIZED HEALTH CARE EDUCATION/TRAINING PROGRAM

## 2024-09-26 PROCEDURE — 93306 TTE W/DOPPLER COMPLETE: CPT

## 2024-09-26 PROCEDURE — 93306 TTE W/DOPPLER COMPLETE: CPT | Mod: 26,,, | Performed by: INTERNAL MEDICINE

## 2024-09-26 PROCEDURE — 1159F MED LIST DOCD IN RCRD: CPT | Mod: CPTII,S$GLB,, | Performed by: STUDENT IN AN ORGANIZED HEALTH CARE EDUCATION/TRAINING PROGRAM

## 2024-09-26 PROCEDURE — 3044F HG A1C LEVEL LT 7.0%: CPT | Mod: CPTII,S$GLB,, | Performed by: STUDENT IN AN ORGANIZED HEALTH CARE EDUCATION/TRAINING PROGRAM

## 2024-09-26 PROCEDURE — 1160F RVW MEDS BY RX/DR IN RCRD: CPT | Mod: CPTII,S$GLB,, | Performed by: STUDENT IN AN ORGANIZED HEALTH CARE EDUCATION/TRAINING PROGRAM

## 2024-09-26 RX ORDER — DICLOFENAC SODIUM 10 MG/G
2 GEL TOPICAL 4 TIMES DAILY
Qty: 50 G | Refills: 1 | Status: SHIPPED | OUTPATIENT
Start: 2024-09-26

## 2024-09-26 NOTE — PROGRESS NOTES
Patient ID: Jake Hoskins  YOB: 1965  MRN: [id number]    Chief Complaint: Pain of the Left Knee and Pain of the Right Knee      Referred By: self    History of Present Illness: Jake Hoskins is a right-hand dominant 59 y.o. female who presents today with left knee pain,  reports popping and sharp pains from sitting to standing., she also has a  history of left knee DJD. She has required injections in the past. It has been over a year since has had any injections. Knee pain is worsened with weight-bearing and getting up after prolonged inactivity. Eases up after a few steps. Tries steroid Cortizone about a year ago, provided minimal relief. She would like to try gel injection in left knee.    The patient is active in none.  Occupation: none      Past Medical History:   Past Medical History:   Diagnosis Date    Allergy     Anemia     Hypertension     Plantar fasciitis of left foot      Past Surgical History:   Procedure Laterality Date    CATHETERIZATION OF BOTH LEFT AND RIGHT HEART N/A 5/10/2024    Procedure: CATHETERIZATION, HEART, BOTH LEFT AND RIGHT;  Surgeon: Alison Buchanan MD;  Location: [facility] CATH LAB;  Service: Cardiology;  Laterality: N/A;    COLONOSCOPY N/A 04/09/2021    Procedure: COLONOSCOPY;  Surgeon: Hua Elmore MD;  Location: [facility] ENDO;  Service: Endoscopy;  Laterality: N/A;    ECHOCARDIOGRAM,TRANSESOPHAGEAL N/A 5/13/2024    Procedure: ECHOCARDIOGRAM,TRANSESOPHAGEAL;  Surgeon: Chester Sanchez MD;  Location: [facility] OR;  Service: Cardiovascular;  Laterality: N/A;    HYSTERECTOMY  05/2021    INJECTION OF ANESTHETIC AGENT AROUND MULTIPLE INTERCOSTAL NERVES N/A 5/13/2024    Procedure: BLOCK, NERVE, INTERCOSTAL, 2 OR MORE;  Surgeon: Chester Sanchez MD;  Location: [facility] OR;  Service: Cardiovascular;  Laterality: N/A;    OOPHORECTOMY  05/2021    RESECTION OF ATRIAL MYXOMA N/A 5/13/2024    Procedure: RESECTION, MYXOMA, CARDIAC ATRIUM;  Surgeon: Chester Sanchez MD;   Location: [facility] OR;  Service: Cardiovascular;  Laterality: N/A;    ROBOT-ASSISTED LAPAROSCOPIC ABDOMINAL HYSTERECTOMY USING DA ABBEY XI N/A 05/18/2021    Procedure: XI ROBOTIC HYSTERECTOMY;  Surgeon: Christa Davila MD;  Location: [facility] OR;  Service: OB/GYN;  Laterality: N/A;    ROBOT-ASSISTED LAPAROSCOPIC SALPINGO-OOPHORECTOMY USING DA ABBEY XI Bilateral 05/18/2021    Procedure: XI ROBOTIC SALPINGO-OOPHORECTOMY;  Surgeon: Christa Davila MD;  Location: [facility] OR;  Service: OB/GYN;  Laterality: Bilateral;     Family History   Problem Relation Name Age of Onset    Hypertension Mother      Asthma Mother      Cancer Mother          unknown    Cataracts Father      Hypertension Father      Heart disease Father      Hypertension Sister       Social History     Socioeconomic History    Marital status:     Number of children: 2   Occupational History    Occupation: [employer]     Employer: [employer]   Tobacco Use    Smoking status: Never    Smokeless tobacco: Never   Substance and Sexual Activity    Alcohol use: No    Drug use: No    Sexual activity: Not Currently     Birth control/protection: Surgical     Social Determinants of Health     Financial Resource Strain: Patient Declined (5/9/2024)    Overall Financial Resource Strain (CARDIA)     Difficulty of Paying Living Expenses: Patient declined   Food Insecurity: Patient Declined (5/9/2024)    Hunger Vital Sign     Worried About Running Out of Food in the Last Year: Patient declined     Ran Out of Food in the Last Year: Patient declined   Transportation Needs: Patient Declined (5/9/2024)    TRANSPORTATION NEEDS     Transportation : Patient declined   Physical Activity: Sufficiently Active (5/10/2023)    Exercise Vital Sign     Days of Exercise per Week: 5 days     Minutes of Exercise per Session: 150+ min   Stress: Patient Declined (5/9/2024)    [employer] [place] [place] [employer] - Occupational Stress Questionnaire     Feeling of Stress : Patient declined   Housing  Stability: Patient Declined (5/9/2024)    Housing Stability Vital Sign     Unable to Pay for Housing in the Last Year: Patient declined     Homeless in the Last Year: Patient declined     Medication List with Changes/Refills   New Medications    DICLOFENAC SODIUM (VOLTAREN) 1 % GEL    Apply 2 g topically 4 (four) times daily.   Current Medications    ACETAMINOPHEN-CODEINE 300-30MG (TYLENOL #3) 300-30 MG TAB    Take 1 tablet by mouth every 6 (six) hours as needed.    ASPIRIN (ECOTRIN) 81 MG EC TABLET    Take 1 tablet (81 mg total) by mouth once daily.    CELECOXIB (CELEBREX) 200 MG CAPSULE    Take 1 capsule (200 mg total) by mouth once daily.    CYCLOBENZAPRINE (FLEXERIL) 10 MG TABLET    Take 1 tablet (10 mg total) by mouth nightly as needed for Muscle spasms.    ERGOCALCIFEROL (ERGOCALCIFEROL) 50,000 UNIT CAP    Take 1 capsule (50,000 Units total) by mouth every 7 days.    FERROUS SULFATE, DRIED (SLOW FE) 160 MG (50 MG IRON) TBSR    Take 1 tablet (160 mg total) by mouth once daily.    FLUTICASONE PROPIONATE (FLONASE) 50 MCG/ACTUATION NASAL SPRAY    USE 2 SPRAYS BY EACH NOSTRIL ROUTE ONCE DAILY    FOLIC ACID (FOLVITE) 1 MG TABLET    Take 1 tablet (1 mg total) by mouth once daily.    GABAPENTIN (NEURONTIN) 100 MG CAPSULE    Take 1 capsule (100 mg total) by mouth every evening.    METHOTREXATE 2.5 MG TAB    Take 6 tablets (15 mg total) by mouth every 7 days.    MONTELUKAST (SINGULAIR) 10 MG TABLET    Take 1 tablet by mouth once daily    MULTIVITS,CA,MINERALS/IRON/FA (ONE-A-DAY WOMENS FORMULA ORAL)    Take by mouth once daily.     POTASSIUM CHLORIDE SA (K-DUR,KLOR-CON) 20 MEQ TABLET    Take 1 tablet (20 mEq total) by mouth once daily.    POTASSIUM CITRATE 99 MG CAP    Take 1 capsule by mouth once daily.     Review of patient's allergies indicates:   Allergen Reactions    Tramadol Nausea And Vomiting       Physical Exam:   Body mass index is 38.79 kg/m².    GENERAL: Well appearing, in no acute distress.  HEAD:  Normocephalic and atraumatic.  ENT: External ears and nose grossly normal.  EYES: EOMI bilaterally  PULMONARY: Respirations are grossly even and non-labored.  NEURO: Awake, alert, and oriented x 3.  SKIN: No obvious rashes appreciated.  PSYCH: Mood & affect are appropriate.    Detailed MSK exam:     Left knee exam:   -ROM: extension 0, flexion 120  -TTP: Lateral joint line  -effusion: none  -Patellar apprehension negative  -Jose test negative  -stable to varus and valgus stress tests  -Lachman test negative, anterior drawer test negative, posterior drawer test negative    Right knee exam:   -ROM: extension 0, flexion 120  -TTP: Medial joint line  -effusion: none  -Patellar apprehension negative  -Jose test negative  -stable to varus and valgus stress tests  -Lachman test negative, anterior drawer test negative, posterior drawer test negative      Imaging:  Echo    Left Ventricle: The left ventricle is normal in size. Normal wall   thickness. There is concentric remodeling. There is normal systolic   function with a visually estimated ejection fraction of 55 - 60%. Ejection   fraction by visual approximation is 55%. There is normal diastolic   function.    Right Ventricle: Normal right ventricular cavity size. Wall thickness   is normal. Systolic function is normal.    Left Atrium: Left atrium is moderately dilated.    Mitral Valve: There is mild regurgitation.    Tricuspid Valve: There is mild to moderate regurgitation.  No pulmonary   hypertension.    Pulmonary Artery: The estimated pulmonary artery systolic pressure is   27 mmHg.    IVC/SVC: Normal venous pressure at 3 mmHg.        Relevant imaging results were reviewed and interpreted by me and per my read shows mild to moderate arthritic changes left knee, mild arthritic changes right knee.  This was discussed with the patient and / or family today.     Assessment:  Jake Hoskins is a 59 y.o. female presenting with chronic left knee pain.   History,  physical and radiographs are consistent with a likely diagnosis of OA.   Plan: prior auth for gel injections left knee. Ice, voltaren gel as needed. OA info given. Continue conservative management for pain.   Follow up for gel injections. All questions answered.      Primary osteoarthritis of both knees  -     Ambulatory referral/consult to Sports Medicine  -     Prior authorization Order  -     diclofenac sodium (VOLTAREN) 1 % Gel; Apply 2 g topically 4 (four) times daily.  Dispense: 50 g; Refill: 1         Today's visit is associated with current or anticipated ongoing medical care related to this patient's diagnosis of osteoarthritis.  Currently there is no cure of osteoarthritis and the patient will require regular follow up to manage symptoms and progression.  The patient is to return to the office within a minimum of 3-6 months to review symptoms and function at that time.   CPT code      MEDICAL NECESSITY FOR VISCOSUPPLEMENTATION: After thorough evaluation of the patient, I have determined that visco-supplementation is medically necessary. The patient has painful degenerative changes of the knee with failure of conservative treatments including lifestyle modifications and rehabilitation exercises.  Oral analgesis/NSAIDs have not adequately controlled symptoms and there is radiographic evidence of Kellgren Zeus grade 2 or greater osteoarthritic changes, or in lack of radiographic evidence, there is arthroscopic or other evidence of chondrosis.       A copy of today's visit note has been sent to the referring provider.     Electronically signed:  Lauri Berman MD, MPH  09/26/2024  1:36 PM

## 2024-09-26 NOTE — PATIENT INSTRUCTIONS
Assessment:  Jake Hoskins is a 59 y.o. female   Chief Complaint   Patient presents with    Left Knee - Pain    Right Knee - Pain       Encounter Diagnosis   Name Primary?    Primary osteoarthritis of both knees         Plan:  Pre authorization for three series gel injection for left knee - Euflexxa  Apply topical diclofenac (Voltaren) up to 4 times a day to the affected area.  It can be bought over the counter at any local pharmacy.    Patient may ice every 2 hours for 15 minutes as needed to control pain and swelling.   Follow up for gel injections        General Arthritis info:    -shiny white stuff at end of a chicken bones is cartilage    -arthritis is wearing away of the cartilage that lines the end of your bones    -osteoarthritis is thought to be a wear and tear phenomenon    -symptoms are due to inflammation of joint causing stiffness, aching, and sometimes swelling    -occasionally sharp pain will occur causing a give way sensation    -Risk factors: genetic, weight, female > male, age    Treatment options:    -maintain healthy weight (every pound is 4 pounds of pressure on the knee)    -daily moderate exercise (walk, bike, swim 30 minutes per day) to keep joints moving    -daily strengthening exercises (through therapy or on own) to keep muscles supporting joint healthy and strong    -glucosamine 1500mg daily (look for USP label on bottle)    -tylenol as needed for pain (follow directions on the bottle)    -anti-inflammatory medication such as alleve may be helpful- take 1-2 tabs twice daily for 7 days. If it helps your pain, continue. If you do not feel any change, you may stop and then take it as needed.    (you may be given a once daily anti-inflammatory such as MOBIC. If given, avoid other anti-inflammatory medications such as advil, ibuprofen, motrin, naprosyn, alleve, etc)    -if swollen and painful, ice, decrease activity, and take anti-inflammatory daily for 5-7 days and if no relief call your  doctor for further options    -consider cortisone injection (every 3-4 months at most)- anti- inflammatory steroid medication that can be injected directly into the joint to reduce inflammation    -consider hyaluronic acid injections (eufflexxa, hyalgan, synvisc, supartz) (every 6 months at most)- protein injection that helps decrease pain and irritability in the joint. It is best used to help prolong intervals between cortisone injections to minimize steroid injections. These are currently approved for knee injections. Discuss with your doctor if other joint involved. Call to seek approval prior to the injections.    -long-term treatment may include a total joint replacement (keep diary of good days and bad days, then evaluate as to when you are ready)      Follow-up: for gel injections.    Thank you for choosing [facility] and Dr. Lauri Berman for your orthopedic & sports medicine care. It is our goal to provide you with exceptional care that will help keep you healthy, active, and get you back in the game.    Please do not hesitate to reach out to us via email, phone, or MyChart with any questions, concerns, or feedback.    If you felt that you received exemplary care today, please consider leaving us feedback on [employer] at:  [email or web address]    If you are experiencing pain/discomfort ,or have questions after 5pm and would like to be connected to the [facility] on-call team, please call this number and specify which Sports Medicine provider is treating you: [phone]

## 2024-09-27 ENCOUNTER — TELEPHONE (OUTPATIENT)
Dept: SPORTS MEDICINE | Facility: CLINIC | Age: 59
End: 2024-09-27
Payer: COMMERCIAL

## 2024-09-27 ENCOUNTER — CLINICAL SUPPORT (OUTPATIENT)
Dept: CARDIAC REHAB | Facility: HOSPITAL | Age: 59
End: 2024-09-27
Payer: COMMERCIAL

## 2024-09-27 DIAGNOSIS — D15.1 ATRIAL MYXOMA: Primary | ICD-10-CM

## 2024-09-27 PROCEDURE — 93798 PHYS/QHP OP CAR RHAB W/ECG: CPT | Performed by: INTERNAL MEDICINE

## 2024-09-27 NOTE — TELEPHONE ENCOUNTER
LVM for patient to inform her that insurance has denied the gel injections for her knee.  Asked that she contact our office to discuss other options, such as CSI

## 2024-09-30 ENCOUNTER — CLINICAL SUPPORT (OUTPATIENT)
Dept: CARDIAC REHAB | Facility: HOSPITAL | Age: 59
End: 2024-09-30
Payer: COMMERCIAL

## 2024-09-30 DIAGNOSIS — D15.1 ATRIAL MYXOMA: Primary | ICD-10-CM

## 2024-09-30 PROCEDURE — 93798 PHYS/QHP OP CAR RHAB W/ECG: CPT | Performed by: INTERNAL MEDICINE

## 2024-10-01 ENCOUNTER — TELEPHONE (OUTPATIENT)
Dept: SPORTS MEDICINE | Facility: CLINIC | Age: 59
End: 2024-10-01
Payer: COMMERCIAL

## 2024-10-01 NOTE — TELEPHONE ENCOUNTER
Returned patient call.  Patient stated that insurance company provided her with the below information and where to submit for her gel injections.  Let patient know that the information is being forwarded on to pre auth rep that was working her case.    Sent information to Jacky Khan agent working case on 10/1/2024 at 2:00 pm  ----- Message from Teresa sent at 10/1/2024 12:19 PM CDT -----  Contact: Jake  Type:  Patient Requesting a call back     Who Called:Jake with her [facility] insurance on the line  What is the call back request regarding?: Gilbert archer is needing the authorization for Jake's gel injections submitted to [employer]   Would the patient rather a call back or a response via [facility]?call  Best Call Back Number:[phone]   Additional Information: Reference number: [id number]

## 2024-10-02 ENCOUNTER — CLINICAL SUPPORT (OUTPATIENT)
Dept: CARDIAC REHAB | Facility: HOSPITAL | Age: 59
End: 2024-10-02
Payer: COMMERCIAL

## 2024-10-02 ENCOUNTER — PATIENT MESSAGE (OUTPATIENT)
Dept: CARDIOLOGY | Facility: CLINIC | Age: 59
End: 2024-10-02
Payer: COMMERCIAL

## 2024-10-02 ENCOUNTER — TELEPHONE (OUTPATIENT)
Dept: CARDIOLOGY | Facility: CLINIC | Age: 59
End: 2024-10-02
Payer: COMMERCIAL

## 2024-10-02 DIAGNOSIS — I05.8 MITRAL VALVE MASS: Primary | ICD-10-CM

## 2024-10-02 PROCEDURE — 93798 PHYS/QHP OP CAR RHAB W/ECG: CPT | Performed by: STUDENT IN AN ORGANIZED HEALTH CARE EDUCATION/TRAINING PROGRAM

## 2024-10-02 NOTE — TELEPHONE ENCOUNTER
----- Message from Helena Kennedy NP sent at 10/1/2024  9:46 PM CDT -----  Heart ultrasound with nml function mild leakage in mitral valve recommend follow up ultrasound yearly and good bp control

## 2024-10-04 ENCOUNTER — CLINICAL SUPPORT (OUTPATIENT)
Dept: CARDIAC REHAB | Facility: HOSPITAL | Age: 59
End: 2024-10-04
Payer: COMMERCIAL

## 2024-10-04 DIAGNOSIS — D15.1 ATRIAL MYXOMA: Primary | ICD-10-CM

## 2024-10-04 PROCEDURE — 93798 PHYS/QHP OP CAR RHAB W/ECG: CPT | Performed by: INTERNAL MEDICINE

## 2024-10-07 ENCOUNTER — CLINICAL SUPPORT (OUTPATIENT)
Dept: CARDIAC REHAB | Facility: HOSPITAL | Age: 59
End: 2024-10-07
Payer: COMMERCIAL

## 2024-10-07 DIAGNOSIS — D15.1 ATRIAL MYXOMA: Primary | ICD-10-CM

## 2024-10-07 PROCEDURE — 93798 PHYS/QHP OP CAR RHAB W/ECG: CPT | Performed by: INTERNAL MEDICINE

## 2024-10-09 ENCOUNTER — CLINICAL SUPPORT (OUTPATIENT)
Dept: CARDIAC REHAB | Facility: HOSPITAL | Age: 59
End: 2024-10-09
Payer: COMMERCIAL

## 2024-10-09 DIAGNOSIS — D15.1 ATRIAL MYXOMA: Primary | ICD-10-CM

## 2024-10-09 PROCEDURE — 93798 PHYS/QHP OP CAR RHAB W/ECG: CPT | Performed by: INTERNAL MEDICINE

## 2024-10-11 ENCOUNTER — CLINICAL SUPPORT (OUTPATIENT)
Dept: CARDIAC REHAB | Facility: HOSPITAL | Age: 59
End: 2024-10-11
Payer: COMMERCIAL

## 2024-10-11 DIAGNOSIS — D15.1 ATRIAL MYXOMA: Primary | ICD-10-CM

## 2024-10-11 PROCEDURE — 93798 PHYS/QHP OP CAR RHAB W/ECG: CPT | Performed by: INTERNAL MEDICINE

## 2024-10-14 ENCOUNTER — CLINICAL SUPPORT (OUTPATIENT)
Dept: CARDIAC REHAB | Facility: HOSPITAL | Age: 59
End: 2024-10-14
Payer: COMMERCIAL

## 2024-10-14 DIAGNOSIS — D15.1 ATRIAL MYXOMA: Primary | ICD-10-CM

## 2024-10-14 PROCEDURE — 93798 PHYS/QHP OP CAR RHAB W/ECG: CPT | Performed by: INTERNAL MEDICINE

## 2024-10-15 ENCOUNTER — TELEPHONE (OUTPATIENT)
Dept: CARDIOLOGY | Facility: CLINIC | Age: 59
End: 2024-10-15
Payer: COMMERCIAL

## 2024-10-15 DIAGNOSIS — D15.1 ATRIAL MYXOMA: Primary | ICD-10-CM

## 2024-10-15 NOTE — TELEPHONE ENCOUNTER
----- Message from Helena Kennedy NP sent at 10/15/2024  3:10 PM CDT -----  Holter with no concerning arrhythmias

## 2024-10-16 ENCOUNTER — CLINICAL SUPPORT (OUTPATIENT)
Dept: CARDIAC REHAB | Facility: HOSPITAL | Age: 59
End: 2024-10-16
Payer: COMMERCIAL

## 2024-10-16 DIAGNOSIS — D15.1 ATRIAL MYXOMA: Primary | ICD-10-CM

## 2024-10-16 PROCEDURE — 93798 PHYS/QHP OP CAR RHAB W/ECG: CPT | Performed by: STUDENT IN AN ORGANIZED HEALTH CARE EDUCATION/TRAINING PROGRAM

## 2024-10-18 ENCOUNTER — CLINICAL SUPPORT (OUTPATIENT)
Dept: CARDIAC REHAB | Facility: HOSPITAL | Age: 59
End: 2024-10-18
Payer: COMMERCIAL

## 2024-10-18 DIAGNOSIS — D15.1 ATRIAL MYXOMA: Primary | ICD-10-CM

## 2024-10-18 PROCEDURE — 93798 PHYS/QHP OP CAR RHAB W/ECG: CPT | Performed by: INTERNAL MEDICINE

## 2024-10-21 ENCOUNTER — CLINICAL SUPPORT (OUTPATIENT)
Dept: CARDIAC REHAB | Facility: HOSPITAL | Age: 59
End: 2024-10-21
Payer: COMMERCIAL

## 2024-10-21 ENCOUNTER — TELEPHONE (OUTPATIENT)
Dept: SPORTS MEDICINE | Facility: CLINIC | Age: 59
End: 2024-10-21
Payer: COMMERCIAL

## 2024-10-21 ENCOUNTER — HOSPITAL ENCOUNTER (OUTPATIENT)
Dept: CARDIOLOGY | Facility: HOSPITAL | Age: 59
Discharge: HOME | End: 2024-10-21
Attending: INTERNAL MEDICINE
Payer: COMMERCIAL

## 2024-10-21 VITALS
BODY MASS INDEX: 38.41 KG/M2 | DIASTOLIC BLOOD PRESSURE: 89 MMHG | HEIGHT: 64 IN | HEART RATE: 87 BPM | WEIGHT: 225 LBS | SYSTOLIC BLOOD PRESSURE: 126 MMHG

## 2024-10-21 DIAGNOSIS — D15.1 ATRIAL MYXOMA: Primary | ICD-10-CM

## 2024-10-21 DIAGNOSIS — D15.1 ATRIAL MYXOMA: ICD-10-CM

## 2024-10-21 LAB
CV STRESS BASE HR: 87 BPM
DIASTOLIC BLOOD PRESSURE: 89 MMHG
OHS CV CPX 1 MINUTE RECOVERY HEART RATE: 120 BPM
OHS CV CPX 85 PERCENT MAX PREDICTED HEART RATE MALE: 136
OHS CV CPX ESTIMATED METS: 7
OHS CV CPX MAX PREDICTED HEART RATE: 160
OHS CV CPX PATIENT IS FEMALE: 1
OHS CV CPX PATIENT IS MALE: 0
OHS CV CPX PEAK DIASTOLIC BLOOD PRESSURE: 70 MMHG
OHS CV CPX PEAK HEAR RATE: 136 BPM
OHS CV CPX PEAK RATE PRESSURE PRODUCT: NORMAL
OHS CV CPX PEAK SYSTOLIC BLOOD PRESSURE: 196 MMHG
OHS CV CPX PERCENT MAX PREDICTED HEART RATE ACHIEVED: 89
OHS CV CPX RATE PRESSURE PRODUCT PRESENTING: NORMAL
STRESS ECHO POST EXERCISE DUR MIN: 4 MINUTES
STRESS ECHO POST EXERCISE DUR SEC: 40 SECONDS
SYSTOLIC BLOOD PRESSURE: 126 MMHG

## 2024-10-21 PROCEDURE — 93018 CV STRESS TEST I&R ONLY: CPT | Mod: ,,, | Performed by: INTERNAL MEDICINE

## 2024-10-21 PROCEDURE — 93016 CV STRESS TEST SUPVJ ONLY: CPT | Mod: ,,, | Performed by: INTERNAL MEDICINE

## 2024-10-21 PROCEDURE — 93798 PHYS/QHP OP CAR RHAB W/ECG: CPT | Performed by: INTERNAL MEDICINE

## 2024-10-21 PROCEDURE — 93017 CV STRESS TEST TRACING ONLY: CPT

## 2024-10-21 NOTE — TELEPHONE ENCOUNTER
LVM for patient in regards to her gel injection appt for Thursday, October 24.  Let patient know that information that she provided regarding contact number to our pre authorization dept but they have contact [employer] and stated that the gel injections are not covered.  Provided patient with [employer] number to assist with questions regarding authorization. Asked that patient contact our office to discuss further treatment options and let her know that she will not be able to receive the gel injection at her scheduled appt.

## 2024-10-22 ENCOUNTER — PATIENT MESSAGE (OUTPATIENT)
Dept: SPORTS MEDICINE | Facility: CLINIC | Age: 59
End: 2024-10-22
Payer: COMMERCIAL

## 2024-10-22 ENCOUNTER — TELEPHONE (OUTPATIENT)
Dept: CARDIOLOGY | Facility: CLINIC | Age: 59
End: 2024-10-22
Payer: COMMERCIAL

## 2024-10-22 NOTE — TELEPHONE ENCOUNTER
Contacted PT and reviewed results from stress test with NML reading PT stated verbal understanding         ----- Message from Helena Kennedy NP sent at 10/22/2024  3:08 PM CDT -----  Stress test nml

## 2024-10-22 NOTE — TELEPHONE ENCOUNTER
Contacted PT and reviewed that Labs are stable, keep f/u with Dr. Buchanan as scheduled, PT stated verbal understanding            ----- Message from Helena Kennedy NP sent at 10/22/2024  3:19 PM CDT -----  Labs are stable, keep f/u with Dr. Buchanan as scheduled

## 2024-10-23 ENCOUNTER — PATIENT MESSAGE (OUTPATIENT)
Dept: CARDIOLOGY | Facility: CLINIC | Age: 59
End: 2024-10-23
Payer: COMMERCIAL

## 2024-10-23 ENCOUNTER — PATIENT MESSAGE (OUTPATIENT)
Dept: SPORTS MEDICINE | Facility: CLINIC | Age: 59
End: 2024-10-23
Payer: COMMERCIAL

## 2024-10-23 ENCOUNTER — CLINICAL SUPPORT (OUTPATIENT)
Dept: CARDIAC REHAB | Facility: HOSPITAL | Age: 59
End: 2024-10-23
Attending: FAMILY MEDICINE
Payer: COMMERCIAL

## 2024-10-23 DIAGNOSIS — D15.1 ATRIAL MYXOMA: Primary | ICD-10-CM

## 2024-10-23 PROCEDURE — 93798 PHYS/QHP OP CAR RHAB W/ECG: CPT | Performed by: NURSE PRACTITIONER

## 2024-10-25 ENCOUNTER — CLINICAL SUPPORT (OUTPATIENT)
Dept: CARDIAC REHAB | Facility: HOSPITAL | Age: 59
End: 2024-10-25
Payer: COMMERCIAL

## 2024-10-25 VITALS
SYSTOLIC BLOOD PRESSURE: 144 MMHG | WEIGHT: 237 LBS | HEIGHT: 64 IN | DIASTOLIC BLOOD PRESSURE: 92 MMHG | RESPIRATION RATE: 16 BRPM | OXYGEN SATURATION: 99 % | BODY MASS INDEX: 40.46 KG/M2 | HEART RATE: 77 BPM

## 2024-10-25 DIAGNOSIS — D15.1 ATRIAL MYXOMA: Primary | ICD-10-CM

## 2024-10-25 PROCEDURE — 93798 PHYS/QHP OP CAR RHAB W/ECG: CPT | Performed by: INTERNAL MEDICINE

## 2024-10-25 NOTE — PROGRESS NOTES
Pt discharged from cardiac rehab. Surveys given and labs/results reviewed. Progress in program reviewed and pt informed of improvements and maintenance recommendations.

## 2024-10-28 ENCOUNTER — PATIENT MESSAGE (OUTPATIENT)
Dept: RHEUMATOLOGY | Facility: CLINIC | Age: 59
End: 2024-10-28
Payer: COMMERCIAL

## 2024-10-28 ENCOUNTER — PATIENT MESSAGE (OUTPATIENT)
Dept: INTERNAL MEDICINE | Facility: CLINIC | Age: 59
End: 2024-10-28
Payer: COMMERCIAL

## 2024-10-28 ENCOUNTER — PATIENT MESSAGE (OUTPATIENT)
Dept: SPORTS MEDICINE | Facility: CLINIC | Age: 59
End: 2024-10-28
Payer: COMMERCIAL

## 2024-10-28 ENCOUNTER — PATIENT MESSAGE (OUTPATIENT)
Dept: PHYSICAL MEDICINE AND REHAB | Facility: CLINIC | Age: 59
End: 2024-10-28
Payer: COMMERCIAL

## 2024-11-20 NOTE — PLAN OF CARE
Plan of care reviewed with pt and pt's family at bedside.  CCO, CCI, and CVP monitoring continued post op.  Pt extubated this shift by RT without complications.  Pt educated on sternal precautions.  Insulin gtt initiated and titrated per nomogram, hourly accuchecks.  CT x2, prevena wound vac, SCDs, and moreno cath remain in use.  External pacer remains off. SB/NSR on monitor.  Pain well managed with prn meds.   Take your medication as indicated and prescribed.  For pain use acetaminophen (Tylenol) or ibuprofen (Motrin / Advil), unless prescribed medications that have acetaminophen or ibuprofen (or similar medications) in it.  You can take over the counter acetaminophen tablets (1 - 2 tablets of the 500-mg strength every 6 hours) or ibuprofen tablets (2 tablets every 4 hours).    You can use over the counter allergy medication (Allegra, Claritan, Zyrtec, etc) to help with the symptoms.  Drink plenty of water.  Avoid drinking alcohol or drinks that have caffeine.       Placing a humidifier in your room at night may be beneficial for helping with nasal congestion.    PLEASE RETURN TO THE EMERGENCY DEPARTMENT IMMEDIATELY for worsening symptoms, persistent fever, nausea and/or vomiting, or if you develop any concerning symptoms such as: high fever not relieved by acetaminophen (Tylenol) and/or ibuprofen (Motrin / Advil), chills, shortness of breath, chest pain, feeling of your heart fluttering or racing, loss of consciousness, numbness, weakness or tingling in the arms or legs or change in color of the extremities, changes in mental status, persistent headache, blurry vision, loss of bladder / bowel control, unable to follow up with your physician, or other any other care or concern.

## 2024-12-04 ENCOUNTER — OFFICE VISIT (OUTPATIENT)
Dept: PAIN MEDICINE | Facility: CLINIC | Age: 59
End: 2024-12-04
Payer: COMMERCIAL

## 2024-12-04 VITALS
HEIGHT: 64 IN | BODY MASS INDEX: 40.69 KG/M2 | SYSTOLIC BLOOD PRESSURE: 143 MMHG | DIASTOLIC BLOOD PRESSURE: 87 MMHG | HEART RATE: 71 BPM | WEIGHT: 238.31 LBS

## 2024-12-04 DIAGNOSIS — M54.9 BACK PAIN, UNSPECIFIED BACK LOCATION, UNSPECIFIED BACK PAIN LATERALITY, UNSPECIFIED CHRONICITY: ICD-10-CM

## 2024-12-04 DIAGNOSIS — M47.816 LUMBAR SPONDYLOSIS: ICD-10-CM

## 2024-12-04 DIAGNOSIS — G62.9 NEUROPATHY: ICD-10-CM

## 2024-12-04 DIAGNOSIS — M46.1 SACROILIITIS: Primary | ICD-10-CM

## 2024-12-04 PROCEDURE — 3079F DIAST BP 80-89 MM HG: CPT | Mod: CPTII,S$GLB,, | Performed by: PHYSICAL MEDICINE & REHABILITATION

## 2024-12-04 PROCEDURE — 99999 PR PBB SHADOW E&M-EST. PATIENT-LVL IV: CPT | Mod: PBBFAC,,, | Performed by: PHYSICAL MEDICINE & REHABILITATION

## 2024-12-04 PROCEDURE — 3044F HG A1C LEVEL LT 7.0%: CPT | Mod: CPTII,S$GLB,, | Performed by: PHYSICAL MEDICINE & REHABILITATION

## 2024-12-04 PROCEDURE — 3008F BODY MASS INDEX DOCD: CPT | Mod: CPTII,S$GLB,, | Performed by: PHYSICAL MEDICINE & REHABILITATION

## 2024-12-04 PROCEDURE — 3077F SYST BP >= 140 MM HG: CPT | Mod: CPTII,S$GLB,, | Performed by: PHYSICAL MEDICINE & REHABILITATION

## 2024-12-04 PROCEDURE — 1159F MED LIST DOCD IN RCRD: CPT | Mod: CPTII,S$GLB,, | Performed by: PHYSICAL MEDICINE & REHABILITATION

## 2024-12-04 PROCEDURE — 4010F ACE/ARB THERAPY RXD/TAKEN: CPT | Mod: CPTII,S$GLB,, | Performed by: PHYSICAL MEDICINE & REHABILITATION

## 2024-12-04 PROCEDURE — 99204 OFFICE O/P NEW MOD 45 MIN: CPT | Mod: S$GLB,,, | Performed by: PHYSICAL MEDICINE & REHABILITATION

## 2024-12-04 PROCEDURE — G2211 COMPLEX E/M VISIT ADD ON: HCPCS | Mod: S$GLB,,, | Performed by: PHYSICAL MEDICINE & REHABILITATION

## 2024-12-04 NOTE — PROGRESS NOTES
New Patient Chronic Pain Note (Initial Visit)    Referring Physician: Angelica Lagunas MD    PCP: Angelica Lagunas MD    Chief Complaint:   Chief Complaint   Patient presents with    Low-back Pain     Right side down right leg        SUBJECTIVE:    Jake Hoskins is a 59 y.o. female who presents to the clinic for the evaluation of   Back pain. She was referred by her primary care team for further evaluation and management of this pain.  She has past medical history of lumbar radiculopathy, hypertension, rheumatoid arthritis, anemia, and multiple other medical comorbidities as listed in her chart. The pain started about 2 years ago and symptoms have been unchanged.The pain is located in the right lumbosacral area and radiates to the right buttock and extending posteriorly to the knee.  The pain is described as  sharp, stabbing, aching  and is rated as 3/10. The pain is rated with a score of  3/10 on the BEST day and a score of 8/10 on the WORST day.  Symptoms interfere with daily activity. The pain is exacerbated by sit to stand, morning pain, prolonged standing or walking.  The pain is mitigated by rest, repositioning, gabapentin, Celebrex.     Patient denies night fever/night sweats, urinary incontinence, bowel incontinence, significant weight loss, significant motor weakness, and loss of sensations.    Pain Disability Index Review:         12/4/2024    10:19 AM   Last 3 PDI Scores   Pain Disability Index (PDI) 21       Non-Pharmacologic Treatments:  Physical Therapy/Home Exercise: yes  Ice/Heat:yes  TENS: no  Acupuncture: no  Massage: no  Chiropractic: no    Other: no      Pain Medications:  - Opioids: Tylenol 3, Norco  - Adjuvant Medications: gabapentin, Celebrex, Flexeril, Voltaren gel, methotrexate  - Anti-Coagulants: Aspirin     report:  Reviewed and consistent with medication use as prescribed.    Pain Procedures:   Denies      Imaging:    X-ray lumbar spine 06/24/2024:  Vertebral body heights  maintained. Trace retrolisthesis of L3 on L4. Disc spaces relatively maintained. Facet arthropathy noted. SI joint degenerative findings, greater on the left. No acute abnormality.     Past Medical History:   Diagnosis Date    Allergy     Anemia     Hypertension     Plantar fasciitis of left foot      Past Surgical History:   Procedure Laterality Date    CATHETERIZATION OF BOTH LEFT AND RIGHT HEART N/A 5/10/2024    Procedure: CATHETERIZATION, HEART, BOTH LEFT AND RIGHT;  Surgeon: Alison Buchanan MD;  Location: [facility] CATH LAB;  Service: Cardiology;  Laterality: N/A;    COLONOSCOPY N/A 04/09/2021    Procedure: COLONOSCOPY;  Surgeon: Hua Elmore MD;  Location: [facility] ENDO;  Service: Endoscopy;  Laterality: N/A;    ECHOCARDIOGRAM,TRANSESOPHAGEAL N/A 5/13/2024    Procedure: ECHOCARDIOGRAM,TRANSESOPHAGEAL;  Surgeon: Chester Sanchez MD;  Location: [facility] OR;  Service: Cardiovascular;  Laterality: N/A;    HYSTERECTOMY  05/2021    INJECTION OF ANESTHETIC AGENT AROUND MULTIPLE INTERCOSTAL NERVES N/A 5/13/2024    Procedure: BLOCK, NERVE, INTERCOSTAL, 2 OR MORE;  Surgeon: Chester Sanchez MD;  Location: [facility] OR;  Service: Cardiovascular;  Laterality: N/A;    OOPHORECTOMY  05/2021    RESECTION OF ATRIAL MYXOMA N/A 5/13/2024    Procedure: RESECTION, MYXOMA, CARDIAC ATRIUM;  Surgeon: Chester Sanchez MD;  Location: [facility] OR;  Service: Cardiovascular;  Laterality: N/A;    ROBOT-ASSISTED LAPAROSCOPIC ABDOMINAL HYSTERECTOMY USING DA ABBEY XI N/A 05/18/2021    Procedure: XI ROBOTIC HYSTERECTOMY;  Surgeon: Christa Davila MD;  Location: [facility] OR;  Service: OB/GYN;  Laterality: N/A;    ROBOT-ASSISTED LAPAROSCOPIC SALPINGO-OOPHORECTOMY USING DA ABBEY XI Bilateral 05/18/2021    Procedure: XI ROBOTIC SALPINGO-OOPHORECTOMY;  Surgeon: Christa Davila MD;  Location: [facility] OR;  Service: OB/GYN;  Laterality: Bilateral;     Social History     Socioeconomic History    Marital status:     Number of children: 2   Occupational  History    Occupation: [employer]     Employer: [employer]   Tobacco Use    Smoking status: Never    Smokeless tobacco: Never   Substance and Sexual Activity    Alcohol use: No    Drug use: No    Sexual activity: Not Currently     Birth control/protection: Surgical     Social Drivers of Health     Financial Resource Strain: Patient Declined (5/9/2024)    Overall Financial Resource Strain (CARDIA)     Difficulty of Paying Living Expenses: Patient declined   Food Insecurity: Patient Declined (5/9/2024)    Hunger Vital Sign     Worried About Running Out of Food in the Last Year: Patient declined     Ran Out of Food in the Last Year: Patient declined   Transportation Needs: Patient Declined (5/9/2024)    TRANSPORTATION NEEDS     Transportation : Patient declined   Physical Activity: Sufficiently Active (5/10/2023)    Exercise Vital Sign     Days of Exercise per Week: 5 days     Minutes of Exercise per Session: 150+ min   Stress: Patient Declined (5/9/2024)    [place] [employer] - Occupational Stress Questionnaire     Feeling of Stress : Patient declined   Housing Stability: Patient Declined (5/9/2024)    Housing Stability Vital Sign     Unable to Pay for Housing in the Last Year: Patient declined     Homeless in the Last Year: Patient declined     Family History   Problem Relation Name Age of Onset    Hypertension Mother      Asthma Mother      Cancer Mother          unknown    Cataracts Father      Hypertension Father      Heart disease Father      Hypertension Sister         Review of patient's allergies indicates:   Allergen Reactions    Tramadol Nausea And Vomiting       Current Outpatient Medications   Medication Sig    acetaminophen-codeine 300-30mg (TYLENOL #3) 300-30 mg Tab Take 1 tablet by mouth every 6 (six) hours as needed.    aspirin (ECOTRIN) 81 MG EC tablet Take 1 tablet (81 mg total) by mouth once daily.    celecoxib (CELEBREX) 200 MG capsule Take 1 capsule (200 mg total) by mouth once  daily.    cyclobenzaprine (FLEXERIL) 10 MG tablet Take 1 tablet (10 mg total) by mouth nightly as needed for Muscle spasms.    diclofenac sodium (VOLTAREN) 1 % Gel Apply 2 g topically 4 (four) times daily.    ergocalciferol (ERGOCALCIFEROL) 50,000 unit Cap Take 1 capsule (50,000 Units total) by mouth every 7 days.    ferrous sulfate, dried (SLOW FE) 160 mg (50 mg iron) TbSR Take 1 tablet (160 mg total) by mouth once daily.    fluticasone propionate (FLONASE) 50 mcg/actuation nasal spray USE 2 SPRAYS BY EACH NOSTRIL ROUTE ONCE DAILY    folic acid (FOLVITE) 1 MG tablet Take 1 tablet (1 mg total) by mouth once daily.    gabapentin (NEURONTIN) 100 MG capsule Take 1 capsule (100 mg total) by mouth every evening.    methotrexate 2.5 MG Tab Take 6 tablets (15 mg total) by mouth every 7 days.    montelukast (SINGULAIR) 10 mg tablet Take 1 tablet by mouth once daily    MULTIVITS,CA,MINERALS/IRON/FA (ONE-A-DAY WOMENS FORMULA ORAL) Take by mouth once daily.     potassium chloride SA (K-DUR,KLOR-CON) 20 MEQ tablet Take 1 tablet (20 mEq total) by mouth once daily.    potassium citrate 99 mg Cap Take 1 capsule by mouth once daily.     No current facility-administered medications for this visit.       Review of Systems     GENERAL:  No weight loss, malaise or fevers.  HEENT:   No recent changes in vision or hearing  NECK:  Negative for lumps, no difficulty with swallowing.  RESPIRATORY:  Negative for cough, wheezing or shortness of breath, patient denies any recent URI.  CARDIOVASCULAR:  Negative for chest pain, leg swelling or palpitations.  GI:  Negative for abdominal discomfort, blood in stools or black stools or change in bowel habits.  MUSCULOSKELETAL:  See HPI.  SKIN:  Negative for lesions, rash, and itching.  PSYCH:  No mood disorder or recent psychosocial stressors.  Patients sleep is not disturbed secondary to pain.  HEMATOLOGY/LYMPHOLOGY:  Negative for prolonged bleeding, bruising easily or swollen nodes.  Patient is  "currently taking anti-coagulants  NEURO:   No history of headaches, syncope, paralysis, seizures or tremors.  All other reviewed and negative other than HPI.    OBJECTIVE:    BP (!) 143/87   Pulse 71   Ht 5' 4" (1.626 m)   Wt 108.1 kg (238 lb 5.1 oz)   LMP 12/26/2020   BMI 40.91 kg/m²         Physical Exam    GENERAL: Well appearing, in no acute distress, alert and oriented x3.  PSYCH:  Mood and affect appropriate.  SKIN: Skin color, texture, turgor normal, no rashes or lesions.  HEAD/FACE:  Normocephalic, atraumatic. Cranial nerves grossly intact.  CV: RRR with palpation of the radial artery.  PULM: No evidence of respiratory difficulty, symmetric chest rise.  GI:  Soft and non-tender.  BACK: Straight leg raising in the sitting and supine positions is negative to radicular pain.  Mild-to-moderate pain to palpation over the facet joints of the lumbar spine and lumbar paraspinals..  Limited range of motion secondary to body habitus and pain reproduction. Directional preference:  Flexion  EXTREMITIES: Peripheral joint ROM is full and pain free without obvious instability or laxity in all four extremities. No deformities, edema, or skin discoloration. Good capillary refill.  MUSCULOSKELETAL: Shoulder, hip, and knee provocative maneuvers are negative.  There is moderate pain with palpation over the sacroiliac joint on the right.  FABERs test is positive on the right.  Sacral thrust positive.  FADIRs test is equivocal.   Bilateral upper and lower extremity strength is normal and symmetric.  No atrophy or tone abnormalities are noted.  NEURO: Bilateral upper and lower extremity coordination and muscle stretch reflexes are physiologic and symmetric.  Plantar response are downgoing. No clonus.  No loss of sensation is noted.  GAIT:  Slow, antalgic.      LABS:  Lab Results   Component Value Date    WBC 4.77 10/21/2024    HGB 13.2 10/21/2024    HCT 41.5 10/21/2024    MCV 82 10/21/2024     10/21/2024 "       CMP  Sodium   Date Value Ref Range Status   09/18/2024 139 136 - 145 mmol/L Final     Potassium   Date Value Ref Range Status   09/18/2024 4.3 3.5 - 5.1 mmol/L Final     Chloride   Date Value Ref Range Status   09/18/2024 106 95 - 110 mmol/L Final     CO2   Date Value Ref Range Status   09/18/2024 26 23 - 29 mmol/L Final     Glucose   Date Value Ref Range Status   09/18/2024 88 70 - 110 mg/dL Final     BUN   Date Value Ref Range Status   09/18/2024 16 6 - 20 mg/dL Final     Creatinine   Date Value Ref Range Status   09/18/2024 0.8 0.5 - 1.4 mg/dL Final     Calcium   Date Value Ref Range Status   09/18/2024 9.8 8.7 - 10.5 mg/dL Final     Total Protein   Date Value Ref Range Status   09/18/2024 7.2 6.0 - 8.4 g/dL Final     Albumin   Date Value Ref Range Status   09/18/2024 3.8 3.5 - 5.2 g/dL Final     Total Bilirubin   Date Value Ref Range Status   09/18/2024 0.4 0.1 - 1.0 mg/dL Final     Comment:     For infants and newborns, interpretation of results should be based  on gestational age, weight and in agreement with clinical  observations.    Premature Infant recommended reference ranges:  Up to 24 hours.............<8.0 mg/dL  Up to 48 hours............<12.0 mg/dL  3-5 days..................<15.0 mg/dL  6-29 days.................<15.0 mg/dL       Alkaline Phosphatase   Date Value Ref Range Status   09/18/2024 69 55 - 135 U/L Final     AST   Date Value Ref Range Status   09/18/2024 23 10 - 40 U/L Final     ALT   Date Value Ref Range Status   09/18/2024 22 10 - 44 U/L Final     Anion Gap   Date Value Ref Range Status   09/18/2024 7 (L) 8 - 16 mmol/L Final     eGFR if    Date Value Ref Range Status   05/06/2022 >60 >60 mL/min/1.73 m^2 Final     eGFR if non    Date Value Ref Range Status   05/06/2022 >60 >60 mL/min/1.73 m^2 Final     Comment:     Calculation used to obtain the estimated glomerular filtration  rate (eGFR) is the CKD-EPI equation.          Lab Results   Component  Value Date    HGBA1C 5.4 09/18/2024             ASSESSMENT: 59 y.o. year old female with lower back pain, consistent with     1. Sacroiliitis  Case Request-RAD/Other Procedure Area: Right SIJ Injection      2. Back pain, unspecified back location, unspecified back pain laterality, unspecified chronicity  Ambulatory referral/consult to Pain Clinic      3. Neuropathy  Ambulatory referral/consult to Pain Clinic      4. Lumbar spondylosis              PLAN:   - Interventions:  Scheduled for right-sided sacroiliac joint injection under fluoroscopy for diagnostic and therapeutic purposes. . Explained the risks and benefits of the procedure in detail with the patient today in clinic along with alternative treatment options, and the patient elected to pursue the intervention at this time.      - Anticoagulation use: Patient is taking ASA as 1° prevention; no need to hold for SI joint injection    - If no benefit with above procedure, consider  lumbar MBB targeting L4-5 and L5-S1 for diagnostic purposes .     - Medications: I have stressed the importance of physical activity and a home exercise plan to help with pain and improve health. and Patient can continue with medications for now since they are providing benefits, using them appropriately, and without side effects.               - Therapy:  Advised patient continue with activities as tolerated    - Psychological:  Discussed coping mechanisms to help address chronic pain issues    - Labs:  Reviewed    - Imaging: Reviewed available imaging with patient and answered any questions they had regarding study.    - Consults/Referrals:  None at this time    - Records:  Reviewed/Obtain old records from outside physicians and imaging    - Follow up visit: return to clinic in 4-6 weeks post procedure or as needed    - Counseled patient regarding the importance of activity modification and physical therapy    - This condition does not require this patient to take time off of work,  and the primary goal of our Pain Management services is to improve the patient's functional capacity.    - Patient Questions: Answered all of the patient's questions regarding diagnosis, therapy, and treatment        The above plan and management options were discussed at length with patient. Patient is in agreement with the above and verbalized understanding.    I discussed the goals of interventional chronic pain management with the patient on today's visit.  I explained the utility of injections for diagnostic and therapeutic purposes.  We discussed a multimodal approach to pain including treating the patient's given worst pain at any given time.  We will use a systematic approach to addressing pain.  We will also adopt a multimodal approach that includes injections, adjuvant medications, physical therapy, at times psychiatry.  There may be a limited role for opioid use intermittently in the treatment of pain, more particularly for acute pain although no one approach can be used as a sole treatment modality.    I emphasized the importance of regular exercise, core strengthening and stretching, diet and weight loss as a cornerstone of long-term pain management.    Visit today included increased complexity associated with the care of the episodic problem of chronic pain which was addressed and continue to manage the longitudinal care of the patient due to the serious and/or complex managed problem(s) listed above.      Dorian Sumner MD  Interventional Pain Management  [facility]    Disclaimer:  This note was prepared using voice recognition system and is likely to have sound alike errors that may have been overlooked even after proof reading.  Please call me with any questions

## 2024-12-11 NOTE — PRE-PROCEDURE INSTRUCTIONS
Spoke with patient regarding procedure scheduled on 12.17     Arrival time 0800     Has patient been sick with fever or on antibiotics within the last 7 days? No     Does the patient have any open wounds, sores or rashes? No     Does the patient have any recent fractures? no     Has patient received a vaccination within the last 7 days? No     Received the COVID vaccination?      Has the patient stopped all medications as directed? na     Does patient have a pacemaker, defibrillator, or implantable stimulator? No     Does the patient have a ride to and from procedure and someone reliable to remain with patient?   sister     Is the patient diabetic? no     Does the patient have sleep apnea? Or use O2 at home? no     Is the patient receiving sedation?      Is the patient instructed to remain NPO beginning at midnight the night before their procedure? yes     Procedure location confirmed with patient? Yes     Covid- Denies signs/symptoms. Instructed to notify PAT/MD if any changes.

## 2024-12-17 ENCOUNTER — HOSPITAL ENCOUNTER (OUTPATIENT)
Facility: HOSPITAL | Age: 59
Discharge: HOME | End: 2024-12-17
Attending: PHYSICAL MEDICINE & REHABILITATION | Admitting: PHYSICAL MEDICINE & REHABILITATION
Payer: COMMERCIAL

## 2024-12-17 VITALS
SYSTOLIC BLOOD PRESSURE: 111 MMHG | WEIGHT: 235.88 LBS | DIASTOLIC BLOOD PRESSURE: 75 MMHG | TEMPERATURE: 98 F | HEART RATE: 68 BPM | BODY MASS INDEX: 40.27 KG/M2 | OXYGEN SATURATION: 100 % | HEIGHT: 64 IN | RESPIRATION RATE: 16 BRPM

## 2024-12-17 DIAGNOSIS — M46.1 SACROILIITIS: Primary | ICD-10-CM

## 2024-12-17 PROCEDURE — 27096 INJECT SACROILIAC JOINT: CPT | Mod: RT,,, | Performed by: PHYSICAL MEDICINE & REHABILITATION

## 2024-12-17 PROCEDURE — 27096 INJECT SACROILIAC JOINT: CPT | Mod: RT | Performed by: PHYSICAL MEDICINE & REHABILITATION

## 2024-12-17 PROCEDURE — 63600175 PHARM REV CODE 636 W HCPCS: Performed by: PHYSICAL MEDICINE & REHABILITATION

## 2024-12-17 PROCEDURE — 25500020 PHARM REV CODE 255: Performed by: PHYSICAL MEDICINE & REHABILITATION

## 2024-12-17 RX ORDER — MIDAZOLAM HYDROCHLORIDE 1 MG/ML
INJECTION, SOLUTION INTRAMUSCULAR; INTRAVENOUS
Status: DISCONTINUED | OUTPATIENT
Start: 2024-12-17 | End: 2024-12-17 | Stop reason: HOSPADM

## 2024-12-17 RX ORDER — FENTANYL CITRATE 50 UG/ML
INJECTION, SOLUTION INTRAMUSCULAR; INTRAVENOUS
Status: DISCONTINUED | OUTPATIENT
Start: 2024-12-17 | End: 2024-12-17 | Stop reason: HOSPADM

## 2024-12-17 RX ORDER — METHYLPREDNISOLONE ACETATE 40 MG/ML
INJECTION, SUSPENSION INTRA-ARTICULAR; INTRALESIONAL; INTRAMUSCULAR; SOFT TISSUE
Status: DISCONTINUED | OUTPATIENT
Start: 2024-12-17 | End: 2024-12-17 | Stop reason: HOSPADM

## 2024-12-17 RX ORDER — ONDANSETRON HYDROCHLORIDE 2 MG/ML
4 INJECTION, SOLUTION INTRAVENOUS ONCE AS NEEDED
Status: DISCONTINUED | OUTPATIENT
Start: 2024-12-17 | End: 2024-12-17 | Stop reason: HOSPADM

## 2024-12-17 RX ORDER — BUPIVACAINE HYDROCHLORIDE 2.5 MG/ML
INJECTION, SOLUTION EPIDURAL; INFILTRATION; INTRACAUDAL
Status: DISCONTINUED | OUTPATIENT
Start: 2024-12-17 | End: 2024-12-17 | Stop reason: HOSPADM

## 2024-12-17 NOTE — H&P
HPI  Patient presenting for Procedure(s) (LRB):  Right SIJ Injection (Right)     No health changes since previous encounter    Past Medical History:   Diagnosis Date    Allergy     Anemia     Hypertension     Plantar fasciitis of left foot      Past Surgical History:   Procedure Laterality Date    CATHETERIZATION OF BOTH LEFT AND RIGHT HEART N/A 5/10/2024    Procedure: CATHETERIZATION, HEART, BOTH LEFT AND RIGHT;  Surgeon: Alison Buchanan MD;  Location: [facility] CATH LAB;  Service: Cardiology;  Laterality: N/A;    COLONOSCOPY N/A 04/09/2021    Procedure: COLONOSCOPY;  Surgeon: Hua Elmore MD;  Location: [facility] ENDO;  Service: Endoscopy;  Laterality: N/A;    ECHOCARDIOGRAM,TRANSESOPHAGEAL N/A 5/13/2024    Procedure: ECHOCARDIOGRAM,TRANSESOPHAGEAL;  Surgeon: Chester Sanchez MD;  Location: [facility] OR;  Service: Cardiovascular;  Laterality: N/A;    HYSTERECTOMY  05/2021    INJECTION OF ANESTHETIC AGENT AROUND MULTIPLE INTERCOSTAL NERVES N/A 5/13/2024    Procedure: BLOCK, NERVE, INTERCOSTAL, 2 OR MORE;  Surgeon: Chester Sanchez MD;  Location: [facility] OR;  Service: Cardiovascular;  Laterality: N/A;    OOPHORECTOMY  05/2021    RESECTION OF ATRIAL MYXOMA N/A 5/13/2024    Procedure: RESECTION, MYXOMA, CARDIAC ATRIUM;  Surgeon: Chester Sanchez MD;  Location: [facility] OR;  Service: Cardiovascular;  Laterality: N/A;    ROBOT-ASSISTED LAPAROSCOPIC ABDOMINAL HYSTERECTOMY USING DA ABBEY XI N/A 05/18/2021    Procedure: XI ROBOTIC HYSTERECTOMY;  Surgeon: Christa Davila MD;  Location: [facility] OR;  Service: OB/GYN;  Laterality: N/A;    ROBOT-ASSISTED LAPAROSCOPIC SALPINGO-OOPHORECTOMY USING DA ABBEY XI Bilateral 05/18/2021    Procedure: XI ROBOTIC SALPINGO-OOPHORECTOMY;  Surgeon: Christa Davila MD;  Location: [facility] OR;  Service: OB/GYN;  Laterality: Bilateral;     Review of patient's allergies indicates:   Allergen Reactions    Tramadol Nausea And Vomiting        No current facility-administered medications on file prior to  "encounter.     Current Outpatient Medications on File Prior to Encounter   Medication Sig Dispense Refill    aspirin (ECOTRIN) 81 MG EC tablet Take 1 tablet (81 mg total) by mouth once daily. 90 tablet 3    celecoxib (CELEBREX) 200 MG capsule Take 1 capsule (200 mg total) by mouth once daily. 90 capsule 1    ferrous sulfate, dried (SLOW FE) 160 mg (50 mg iron) TbSR Take 1 tablet (160 mg total) by mouth once daily. 90 tablet 3    fluticasone propionate (FLONASE) 50 mcg/actuation nasal spray USE 2 SPRAYS BY EACH NOSTRIL ROUTE ONCE DAILY 48 g 3    folic acid (FOLVITE) 1 MG tablet Take 1 tablet (1 mg total) by mouth once daily. 90 tablet 3    gabapentin (NEURONTIN) 100 MG capsule Take 1 capsule (100 mg total) by mouth every evening. 30 capsule 11    montelukast (SINGULAIR) 10 mg tablet Take 1 tablet by mouth once daily 90 tablet 3    MULTIVITS,CA,MINERALS/IRON/FA (ONE-A-DAY WOMENS FORMULA ORAL) Take by mouth once daily.       potassium chloride SA (K-DUR,KLOR-CON) 20 MEQ tablet Take 1 tablet (20 mEq total) by mouth once daily. 30 tablet 0    acetaminophen-codeine 300-30mg (TYLENOL #3) 300-30 mg Tab Take 1 tablet by mouth every 6 (six) hours as needed.      cyclobenzaprine (FLEXERIL) 10 MG tablet Take 1 tablet (10 mg total) by mouth nightly as needed for Muscle spasms. 90 tablet 0    diclofenac sodium (VOLTAREN) 1 % Gel Apply 2 g topically 4 (four) times daily. 50 g 1    ergocalciferol (ERGOCALCIFEROL) 50,000 unit Cap Take 1 capsule (50,000 Units total) by mouth every 7 days. 12 capsule 4    methotrexate 2.5 MG Tab Take 6 tablets (15 mg total) by mouth every 7 days. 72 tablet 0    potassium citrate 99 mg Cap Take 1 capsule by mouth once daily.          PMHx, PSHx, Allergies, Medications reviewed in epic    ROS negative except pain complaints in HPI    OBJECTIVE:    BP (!) 150/78 (BP Location: Right arm, Patient Position: Sitting)   Pulse 75   Temp 97.6 °F (36.4 °C) (Temporal)   Resp 20   Ht 5' 4" (1.626 m)   Wt 107 " kg (235 lb 14.3 oz)   LMP 12/26/2020   SpO2 100%   Breastfeeding No   BMI 40.49 kg/m²     PHYSICAL EXAMINATION:    GENERAL: Well appearing, in no acute distress, alert and oriented x3.  PSYCH:  Mood and affect appropriate.  SKIN: Skin color, texture, turgor normal, no rashes or lesions which will impact the procedure.  CV: RRR with palpation of the radial artery.  PULM: No evidence of respiratory difficulty, symmetric chest rise. Clear to auscultation.  NEURO: Cranial nerves grossly intact.    Plan:    Proceed with procedure as planned Procedure(s) (LRB):  Right SIJ Injection (Right)    Dorian Sumner MD  12/17/2024

## 2024-12-17 NOTE — DISCHARGE INSTRUCTIONS

## 2024-12-17 NOTE — DISCHARGE SUMMARY
Discharge Note  Short Stay      SUMMARY     Admit Date: 12/17/2024    Attending Physician: Dorian Sumner MD        Discharge Physician: Dorian Sumner MD        Discharge Date: 12/17/2024 9:35 AM    Procedure(s) (LRB):  Right SIJ Injection (Right)    Final Diagnosis: Sacroiliitis [M46.1]    Disposition: Home or self care    Patient Instructions:   Current Discharge Medication List        CONTINUE these medications which have NOT CHANGED    Details   aspirin (ECOTRIN) 81 MG EC tablet Take 1 tablet (81 mg total) by mouth once daily.  Qty: 90 tablet, Refills: 3      celecoxib (CELEBREX) 200 MG capsule Take 1 capsule (200 mg total) by mouth once daily.  Qty: 90 capsule, Refills: 1    Associated Diagnoses: Rheumatoid arthritis involving multiple sites with positive rheumatoid factor      ferrous sulfate, dried (SLOW FE) 160 mg (50 mg iron) TbSR Take 1 tablet (160 mg total) by mouth once daily.  Qty: 90 tablet, Refills: 3    Associated Diagnoses: Anemia, unspecified type      fluticasone propionate (FLONASE) 50 mcg/actuation nasal spray USE 2 SPRAYS BY EACH NOSTRIL ROUTE ONCE DAILY  Qty: 48 g, Refills: 3    Associated Diagnoses: Acute seasonal allergic rhinitis due to pollen; Cough      folic acid (FOLVITE) 1 MG tablet Take 1 tablet (1 mg total) by mouth once daily.  Qty: 90 tablet, Refills: 3    Associated Diagnoses: Rheumatoid arthritis involving multiple sites with positive rheumatoid factor; Immunodeficiency due to drug therapy; Encounter for monitoring immunosuppressive medication therapy causing immunodeficiency; High risk medication use      gabapentin (NEURONTIN) 100 MG capsule Take 1 capsule (100 mg total) by mouth every evening.  Qty: 30 capsule, Refills: 11      montelukast (SINGULAIR) 10 mg tablet Take 1 tablet by mouth once daily  Qty: 90 tablet, Refills: 3    Associated Diagnoses: Acute seasonal allergic rhinitis due to pollen      MULTIVITS,CA,MINERALS/IRON/FA (ONE-A-DAY WOMENS FORMULA ORAL) Take by  mouth once daily.       potassium chloride SA (K-DUR,KLOR-CON) 20 MEQ tablet Take 1 tablet (20 mEq total) by mouth once daily.  Qty: 30 tablet, Refills: 0      acetaminophen-codeine 300-30mg (TYLENOL #3) 300-30 mg Tab Take 1 tablet by mouth every 6 (six) hours as needed.      cyclobenzaprine (FLEXERIL) 10 MG tablet Take 1 tablet (10 mg total) by mouth nightly as needed for Muscle spasms.  Qty: 90 tablet, Refills: 0    Associated Diagnoses: Rheumatoid arthritis involving multiple sites with positive rheumatoid factor      diclofenac sodium (VOLTAREN) 1 % Gel Apply 2 g topically 4 (four) times daily.  Qty: 50 g, Refills: 1    Associated Diagnoses: Primary osteoarthritis of both knees      ergocalciferol (ERGOCALCIFEROL) 50,000 unit Cap Take 1 capsule (50,000 Units total) by mouth every 7 days.  Qty: 12 capsule, Refills: 4    Associated Diagnoses: Vitamin D deficiency      methotrexate 2.5 MG Tab Take 6 tablets (15 mg total) by mouth every 7 days.  Qty: 72 tablet, Refills: 0    Associated Diagnoses: Rheumatoid arthritis involving multiple sites with positive rheumatoid factor; Immunodeficiency due to drug therapy; Encounter for monitoring immunosuppressive medication therapy causing immunodeficiency; High risk medication use      potassium citrate 99 mg Cap Take 1 capsule by mouth once daily.                 Discharge Diagnosis: Sacroiliitis [M46.1]  Condition on Discharge: Stable with no complications to procedure   Diet on Discharge: Same as before.  Activity: as per instruction sheet.  Discharge to: Home with a responsible adult.  Follow up: 2-4 weeks       Please call the office at [phone] if you experience any weakness or loss of sensation, fever > 101.5, pain uncontrolled with oral medications, persistent nausea/vomiting/or diarrhea, redness or drainage from the incisions, or any other worrisome concerns. If physician on call was not reached or could not communicate with our office for any reason please go  to the nearest emergency department

## 2024-12-17 NOTE — OP NOTE
Sacroiliac Joint Injection under Fluoroscopy    Date of procedure 12/17/2024    Time-out taken to identify patient and procedure side prior to starting the procedure.                                                                 PROCEDURE:  right sacroiliac joint injection under fluoroscopy.    REASON FOR PROCEDURE: right Sacroiliitis [M46.1]    PHYSICIAN: Dorian Sumner MD    ASSISTANTS: None    MEDICATIONS INJECTED:  Depo-Medrol 40mg and 4 mL Bupivacaine 0.25%    LOCAL ANESTHETIC USED: Xylocaine 1% 5mL    SEDATION MEDICATIONS: Conscious sedation provided by M.D    The patient was monitored with continuous pulse oximetry, EKG, and intermittent blood pressure monitors, immediately prior to administration of sedation.  The patient was hemodynamically stable throughout the entire process was responsive to voice, and breathing spontaneously.  Supplemental O2 was provided at 2L/min via nasal cannula.  Patient was comfortable for the duration of the procedure.     There was a total of 2mg IV Midazolam and 50mcg Fentanyl titrated for the procedure    Total sedation time was <10 minutes      ESTIMATED BLOOD LOSS:  None.    COMPLICATIONS:  None.    TECHNIQUE:   Laying in the prone position, the patient was prepped and draped in the usual sterile fashion using ChloraPrep and fenestrated drape.  The area was determined under fluoroscopy.  Local Xylocaine was injected by raising a wheel and going down to the periosteum using a 27-gauge hypodermic needle.  The 3.5 inch 22-gauge spinal needle was introduce into the right sacroiliac joint.  Negative pressure applied to confirm no intravascular placement.  Omnipaque was injected to confirm placement and to confirm that there was no vascular runoff.  The medication was then injected slowly.  The patient tolerated the procedure well.      The patient was monitored for approximately 30 minutes after the procedure.  Patient was given post procedure and discharge instructions to  follow at home.  We will see the patient back in two weeks or the patient may call to inform of status. The patient was discharged in a stable condition

## 2024-12-18 ENCOUNTER — OFFICE VISIT (OUTPATIENT)
Dept: CARDIOLOGY | Facility: CLINIC | Age: 59
End: 2024-12-18
Payer: COMMERCIAL

## 2024-12-18 VITALS
HEIGHT: 64 IN | DIASTOLIC BLOOD PRESSURE: 83 MMHG | RESPIRATION RATE: 16 BRPM | WEIGHT: 237.31 LBS | SYSTOLIC BLOOD PRESSURE: 127 MMHG | OXYGEN SATURATION: 100 % | BODY MASS INDEX: 40.51 KG/M2 | HEART RATE: 75 BPM

## 2024-12-18 DIAGNOSIS — L91.0 KELOID OF SKIN: Primary | ICD-10-CM

## 2024-12-18 DIAGNOSIS — E66.01 MORBID OBESITY: ICD-10-CM

## 2024-12-18 DIAGNOSIS — Z98.890 HISTORY OF CORONARY ANGIOGRAM: ICD-10-CM

## 2024-12-18 DIAGNOSIS — M05.79 RHEUMATOID ARTHRITIS INVOLVING MULTIPLE SITES WITH POSITIVE RHEUMATOID FACTOR: ICD-10-CM

## 2024-12-18 PROCEDURE — 3079F DIAST BP 80-89 MM HG: CPT | Mod: CPTII,S$GLB,, | Performed by: INTERNAL MEDICINE

## 2024-12-18 PROCEDURE — 3044F HG A1C LEVEL LT 7.0%: CPT | Mod: CPTII,S$GLB,, | Performed by: INTERNAL MEDICINE

## 2024-12-18 PROCEDURE — 3008F BODY MASS INDEX DOCD: CPT | Mod: CPTII,S$GLB,, | Performed by: INTERNAL MEDICINE

## 2024-12-18 PROCEDURE — 99214 OFFICE O/P EST MOD 30 MIN: CPT | Mod: S$GLB,,, | Performed by: INTERNAL MEDICINE

## 2024-12-18 PROCEDURE — 3074F SYST BP LT 130 MM HG: CPT | Mod: CPTII,S$GLB,, | Performed by: INTERNAL MEDICINE

## 2024-12-18 PROCEDURE — 99999 PR PBB SHADOW E&M-EST. PATIENT-LVL V: CPT | Mod: PBBFAC,,, | Performed by: INTERNAL MEDICINE

## 2024-12-18 PROCEDURE — 1159F MED LIST DOCD IN RCRD: CPT | Mod: CPTII,S$GLB,, | Performed by: INTERNAL MEDICINE

## 2024-12-18 PROCEDURE — 4010F ACE/ARB THERAPY RXD/TAKEN: CPT | Mod: CPTII,S$GLB,, | Performed by: INTERNAL MEDICINE

## 2024-12-18 NOTE — PROGRESS NOTES
Subjective:   Patient ID:  Jake Hoskins is a 59 y.o. female who presents for evaluation of No chief complaint on file.      HPI  12.18.2024  Coumadin for six-month follow-up.    Overall doing well.  Finished cardiac rehab.    She has a keloid on her sternal well.    No report of other cardiac complaints.  Echocardiogram after last visit no recurrence of myxoma    6.18.2024  COMES IN FOR FOLLOW-UP AFTER MYXOMA RESECTION THAT WAS FOUND ON ECHOCARDIOGRAM AFTER LAST VISIT.    SHE ALSO HAD A NORMAL CORONARY ANGIOGRAM.    NORMAL CAROTID ULTRASOUND.    SHE COMPLAINS OF DAY AND NIGHT EPISODES OF TINGLING TO BOTH LEGS.    SHE ALSO HAS RHEUMATOID ARTHRITIS.    NO CLEAR CLAUDICATION.      5.2.2024  59-year-old female, with history of hypertension, morbid obesity.    Comes in for evaluation of syncope   She states that a week ago she was feeling dizzy she stood up and she passed out.  She was loss of consciousness.  Her family started doing chest compressions.  EMS arrived and her vitals were normal EKG was normal , fingerstick 105.     Denies any exertional angina.  She is on HCTZ.    Her pressure is elevated today however her blood pressure readings are usually normal to mildly elevated..    Denies palpitations.    She states that she had a similar episode about 10 years ago and she had a complete workup that was negative including stress test echo in monitor.        Past Medical History:   Diagnosis Date    Allergy     Anemia     Hypertension     Plantar fasciitis of left foot        Past Surgical History:   Procedure Laterality Date    CATHETERIZATION OF BOTH LEFT AND RIGHT HEART N/A 5/10/2024    Procedure: CATHETERIZATION, HEART, BOTH LEFT AND RIGHT;  Surgeon: Alison Buchanan MD;  Location: [facility] CATH LAB;  Service: Cardiology;  Laterality: N/A;    COLONOSCOPY N/A 04/09/2021    Procedure: COLONOSCOPY;  Surgeon: Hua Elmore MD;  Location: [facility] ENDO;  Service: Endoscopy;  Laterality: N/A;     ECHOCARDIOGRAM,TRANSESOPHAGEAL N/A 5/13/2024    Procedure: ECHOCARDIOGRAM,TRANSESOPHAGEAL;  Surgeon: Chester Sanchez MD;  Location: [facility] OR;  Service: Cardiovascular;  Laterality: N/A;    HYSTERECTOMY  05/2021    INJECTION OF ANESTHETIC AGENT AROUND MULTIPLE INTERCOSTAL NERVES N/A 5/13/2024    Procedure: BLOCK, NERVE, INTERCOSTAL, 2 OR MORE;  Surgeon: Chester Sanchez MD;  Location: [facility] OR;  Service: Cardiovascular;  Laterality: N/A;    INJECTION OF ANESTHETIC AGENT INTO SACROILIAC JOINT Right 12/17/2024    Procedure: Right SIJ Injection;  Surgeon: Dorian Sumner MD;  [facility];  Service: Pain Management;  Laterality: Right;    OOPHORECTOMY  05/2021    RESECTION OF ATRIAL MYXOMA N/A 5/13/2024    Procedure: RESECTION, MYXOMA, CARDIAC ATRIUM;  Surgeon: Chester Sanchez MD;  Location: [facility] OR;  Service: Cardiovascular;  Laterality: N/A;    ROBOT-ASSISTED LAPAROSCOPIC ABDOMINAL HYSTERECTOMY USING DA ABBEY XI N/A 05/18/2021    Procedure: XI ROBOTIC HYSTERECTOMY;  Surgeon: Christa Davila MD;  Location: [facility] OR;  Service: OB/GYN;  Laterality: N/A;    ROBOT-ASSISTED LAPAROSCOPIC SALPINGO-OOPHORECTOMY USING DA ABBEY XI Bilateral 05/18/2021    Procedure: XI ROBOTIC SALPINGO-OOPHORECTOMY;  Surgeon: Christa Davila MD;  Location: [facility] OR;  Service: OB/GYN;  Laterality: Bilateral;       Social History     Tobacco Use    Smoking status: Never    Smokeless tobacco: Never   Substance Use Topics    Alcohol use: No    Drug use: No       Family History   Problem Relation Name Age of Onset    Hypertension Mother      Asthma Mother      Cancer Mother          unknown    Cataracts Father      Hypertension Father      Heart disease Father      Hypertension Sister         Review of Systems   Cardiovascular:  Negative for chest pain, dyspnea on exertion, palpitations and syncope.   Genitourinary: Negative.    Neurological: Negative.        Current Outpatient Medications on File Prior to Visit   Medication Sig     acetaminophen-codeine 300-30mg (TYLENOL #3) 300-30 mg Tab Take 1 tablet by mouth every 6 (six) hours as needed.    aspirin (ECOTRIN) 81 MG EC tablet Take 1 tablet (81 mg total) by mouth once daily.    celecoxib (CELEBREX) 200 MG capsule Take 1 capsule (200 mg total) by mouth once daily.    cyclobenzaprine (FLEXERIL) 10 MG tablet Take 1 tablet (10 mg total) by mouth nightly as needed for Muscle spasms.    diclofenac sodium (VOLTAREN) 1 % Gel Apply 2 g topically 4 (four) times daily.    ergocalciferol (ERGOCALCIFEROL) 50,000 unit Cap Take 1 capsule (50,000 Units total) by mouth every 7 days.    ferrous sulfate, dried (SLOW FE) 160 mg (50 mg iron) TbSR Take 1 tablet (160 mg total) by mouth once daily.    fluticasone propionate (FLONASE) 50 mcg/actuation nasal spray USE 2 SPRAYS BY EACH NOSTRIL ROUTE ONCE DAILY    folic acid (FOLVITE) 1 MG tablet Take 1 tablet (1 mg total) by mouth once daily.    gabapentin (NEURONTIN) 100 MG capsule Take 1 capsule (100 mg total) by mouth every evening.    methotrexate 2.5 MG Tab Take 6 tablets (15 mg total) by mouth every 7 days.    montelukast (SINGULAIR) 10 mg tablet Take 1 tablet by mouth once daily    MULTIVITS,CA,MINERALS/IRON/FA (ONE-A-DAY WOMENS FORMULA ORAL) Take by mouth once daily.     potassium chloride SA (K-DUR,KLOR-CON) 20 MEQ tablet Take 1 tablet (20 mEq total) by mouth once daily.    potassium citrate 99 mg Cap Take 1 capsule by mouth once daily.     No current facility-administered medications on file prior to visit.       Objective:   Objective:  Wt Readings from Last 3 Encounters:   12/18/24 107.6 kg (237 lb 5.2 oz)   12/17/24 107 kg (235 lb 14.3 oz)   12/04/24 108.1 kg (238 lb 5.1 oz)     Temp Readings from Last 3 Encounters:   12/17/24 97.6 °F (36.4 °C) (Temporal)   07/01/24 96.5 °F (35.8 °C) (Tympanic)   05/23/24 98.4 °F (36.9 °C) (Tympanic)     BP Readings from Last 3 Encounters:   12/18/24 127/83   12/17/24 111/75   12/04/24 (!) 143/87     Pulse Readings from  Last 3 Encounters:   12/18/24 75   12/17/24 68   12/04/24 71       Physical Exam  Vitals reviewed.   Constitutional:       Appearance: She is well-developed.   Neck:      Vascular: No carotid bruit.   Cardiovascular:      Rate and Rhythm: Normal rate and regular rhythm.      Pulses: Intact distal pulses.      Heart sounds: Normal heart sounds. No murmur heard.  Pulmonary:      Breath sounds: Normal breath sounds.   Neurological:      Mental Status: She is oriented to person, place, and time.         Lab Results   Component Value Date    CHOL 189 10/21/2024    CHOL 169 07/15/2024    CHOL 179 05/12/2023     Lab Results   Component Value Date    HDL 62 10/21/2024    HDL 55 07/15/2024    HDL 61 05/12/2023     Lab Results   Component Value Date    LDLCALC 107.6 10/21/2024    LDLCALC 92.2 07/15/2024    LDLCALC 98.4 05/12/2023     Lab Results   Component Value Date    TRIG 97 10/21/2024    TRIG 109 07/15/2024    TRIG 98 05/12/2023     Lab Results   Component Value Date    CHOLHDL 32.8 10/21/2024    CHOLHDL 32.5 07/15/2024    CHOLHDL 34.1 05/12/2023       Chemistry        Component Value Date/Time     09/18/2024 1154    K 4.3 09/18/2024 1154     09/18/2024 1154    CO2 26 09/18/2024 1154    BUN 16 09/18/2024 1154    CREATININE 0.8 09/18/2024 1154    GLU 88 09/18/2024 1154        Component Value Date/Time    CALCIUM 9.8 09/18/2024 1154    ALKPHOS 69 09/18/2024 1154    AST 23 09/18/2024 1154    ALT 22 09/18/2024 1154    BILITOT 0.4 09/18/2024 1154    ESTGFRAFRICA >60 05/06/2022 1547    EGFRNONAA >60 05/06/2022 1547          Lab Results   Component Value Date    TSH 0.579 09/18/2024     Lab Results   Component Value Date    INR 1.1 05/14/2024    INR 1.1 05/13/2024    INR 1.0 05/12/2024     Lab Results   Component Value Date    WBC 4.77 10/21/2024    HGB 13.2 10/21/2024    HCT 41.5 10/21/2024    MCV 82 10/21/2024     10/21/2024     BNP  @LABRCNTIP(BNP,BNPTRIAGEBLO)@  CrCl cannot be calculated (Patient's most  recent lab result is older than the maximum 7 days allowed.).     Imaging:  ======    No results found for this or any previous visit.    No results found for this or any previous visit.    No results found for this or any previous visit.    No results found for this or any previous visit.    No valid procedures specified.    No results found for this or any previous visit.      No results found for this or any previous visit.      No results found for this or any previous visit.      Diagnostic Results:  ECG: Reviewed    The 10-year ASCVD risk score (Titus VEGAS, et al., 2019) is: 4.5%    Values used to calculate the score:      Age: 59 years      Sex: Female      Is Non- : Yes      Diabetic: No      Tobacco smoker: No      Systolic Blood Pressure: 127 mmHg      Is BP treated: No      HDL Cholesterol: 62 mg/dL      Total Cholesterol: 189 mg/dL    Results for orders placed during the hospital encounter of 09/26/24    Echo    Interpretation Summary    Left Ventricle: The left ventricle is normal in size. Normal wall thickness. There is concentric remodeling. There is normal systolic function with a visually estimated ejection fraction of 55 - 60%. Ejection fraction by visual approximation is 55%. There is normal diastolic function.    Right Ventricle: Normal right ventricular cavity size. Wall thickness is normal. Systolic function is normal.    Left Atrium: Left atrium is moderately dilated.    Mitral Valve: There is mild regurgitation.    Tricuspid Valve: There is mild to moderate regurgitation.  No pulmonary hypertension.    Pulmonary Artery: The estimated pulmonary artery systolic pressure is 27 mmHg.    IVC/SVC: Normal venous pressure at 3 mmHg.      Assessment and Plan:       Keloid of skin  -     Ambulatory referral/consult to Dermatology; Future; Expected date: 12/25/2024    History of coronary angiogram    Rheumatoid arthritis involving multiple sites with positive rheumatoid  factor    Morbid obesity      Reviewed  repeat echo and RAN  Reviewed all tests and above medical conditions with patient in detail and formulated treatment plan.  Risk factor modification discussed.   Cardiac low salt diet discussed.  Maintaining healthy weight and weight loss goals were discussed in clinic.  Okay to stop aspirin.    Follow up in 12 -MONTH

## 2024-12-30 ENCOUNTER — LAB VISIT (OUTPATIENT)
Dept: LAB | Facility: HOSPITAL | Age: 59
End: 2024-12-30
Attending: STUDENT IN AN ORGANIZED HEALTH CARE EDUCATION/TRAINING PROGRAM
Payer: COMMERCIAL

## 2024-12-30 DIAGNOSIS — Z79.60 ENCOUNTER FOR MONITORING IMMUNOSUPPRESSIVE MEDICATION THERAPY CAUSING IMMUNODEFICIENCY: ICD-10-CM

## 2024-12-30 DIAGNOSIS — M05.79 RHEUMATOID ARTHRITIS INVOLVING MULTIPLE SITES WITH POSITIVE RHEUMATOID FACTOR: ICD-10-CM

## 2024-12-30 DIAGNOSIS — D84.821 IMMUNODEFICIENCY DUE TO DRUG THERAPY: ICD-10-CM

## 2024-12-30 DIAGNOSIS — D84.821 ENCOUNTER FOR MONITORING IMMUNOSUPPRESSIVE MEDICATION THERAPY CAUSING IMMUNODEFICIENCY: ICD-10-CM

## 2024-12-30 DIAGNOSIS — Z51.81 ENCOUNTER FOR MONITORING IMMUNOSUPPRESSIVE MEDICATION THERAPY CAUSING IMMUNODEFICIENCY: ICD-10-CM

## 2024-12-30 DIAGNOSIS — Z79.899 IMMUNODEFICIENCY DUE TO DRUG THERAPY: ICD-10-CM

## 2024-12-30 DIAGNOSIS — Z79.899 HIGH RISK MEDICATION USE: ICD-10-CM

## 2024-12-30 LAB
ALBUMIN SERPL BCP-MCNC: 3.6 G/DL (ref 3.5–5.2)
ALP SERPL-CCNC: 70 U/L (ref 40–150)
ALT SERPL W/O P-5'-P-CCNC: 24 U/L (ref 10–44)
ANION GAP SERPL CALC-SCNC: 8 MMOL/L (ref 8–16)
AST SERPL-CCNC: 27 U/L (ref 10–40)
BASOPHILS # BLD AUTO: 0.03 K/UL (ref 0–0.2)
BASOPHILS NFR BLD: 0.7 % (ref 0–1.9)
BILIRUB SERPL-MCNC: 0.3 MG/DL (ref 0.1–1)
BUN SERPL-MCNC: 11 MG/DL (ref 6–20)
CALCIUM SERPL-MCNC: 9.2 MG/DL (ref 8.7–10.5)
CHLORIDE SERPL-SCNC: 108 MMOL/L (ref 95–110)
CO2 SERPL-SCNC: 26 MMOL/L (ref 23–29)
CREAT SERPL-MCNC: 0.7 MG/DL (ref 0.5–1.4)
CRP SERPL-MCNC: 2.4 MG/L (ref 0–8.2)
DIFFERENTIAL METHOD BLD: ABNORMAL
EOSINOPHIL # BLD AUTO: 0.3 K/UL (ref 0–0.5)
EOSINOPHIL NFR BLD: 6.1 % (ref 0–8)
ERYTHROCYTE [DISTWIDTH] IN BLOOD BY AUTOMATED COUNT: 14.8 % (ref 11.5–14.5)
ERYTHROCYTE [SEDIMENTATION RATE] IN BLOOD BY WESTERGREN METHOD: 26 MM/HR (ref 0–36)
EST. GFR  (NO RACE VARIABLE): >60 ML/MIN/1.73 M^2
GLUCOSE SERPL-MCNC: 64 MG/DL (ref 70–110)
HBV CORE AB SERPL QL IA: NORMAL
HBV SURFACE AG SERPL QL IA: NORMAL
HCT VFR BLD AUTO: 43 % (ref 37–48.5)
HGB BLD-MCNC: 12.8 G/DL (ref 12–16)
IMM GRANULOCYTES # BLD AUTO: 0.02 K/UL (ref 0–0.04)
IMM GRANULOCYTES NFR BLD AUTO: 0.5 % (ref 0–0.5)
LYMPHOCYTES # BLD AUTO: 1.4 K/UL (ref 1–4.8)
LYMPHOCYTES NFR BLD: 34.4 % (ref 18–48)
MCH RBC QN AUTO: 25.8 PG (ref 27–31)
MCHC RBC AUTO-ENTMCNC: 29.8 G/DL (ref 32–36)
MCV RBC AUTO: 87 FL (ref 82–98)
MONOCYTES # BLD AUTO: 0.5 K/UL (ref 0.3–1)
MONOCYTES NFR BLD: 11.8 % (ref 4–15)
NEUTROPHILS # BLD AUTO: 1.9 K/UL (ref 1.8–7.7)
NEUTROPHILS NFR BLD: 46.5 % (ref 38–73)
NRBC BLD-RTO: 0 /100 WBC
PLATELET # BLD AUTO: 272 K/UL (ref 150–450)
PMV BLD AUTO: 11.1 FL (ref 9.2–12.9)
POTASSIUM SERPL-SCNC: 4 MMOL/L (ref 3.5–5.1)
PROT SERPL-MCNC: 7.3 G/DL (ref 6–8.4)
RBC # BLD AUTO: 4.96 M/UL (ref 4–5.4)
SODIUM SERPL-SCNC: 142 MMOL/L (ref 136–145)
WBC # BLD AUTO: 4.07 K/UL (ref 3.9–12.7)

## 2024-12-30 PROCEDURE — 87340 HEPATITIS B SURFACE AG IA: CPT | Performed by: STUDENT IN AN ORGANIZED HEALTH CARE EDUCATION/TRAINING PROGRAM

## 2024-12-30 PROCEDURE — 86704 HEP B CORE ANTIBODY TOTAL: CPT | Performed by: STUDENT IN AN ORGANIZED HEALTH CARE EDUCATION/TRAINING PROGRAM

## 2024-12-30 PROCEDURE — 80053 COMPREHEN METABOLIC PANEL: CPT | Performed by: STUDENT IN AN ORGANIZED HEALTH CARE EDUCATION/TRAINING PROGRAM

## 2024-12-30 PROCEDURE — 86140 C-REACTIVE PROTEIN: CPT | Performed by: STUDENT IN AN ORGANIZED HEALTH CARE EDUCATION/TRAINING PROGRAM

## 2024-12-30 PROCEDURE — 85025 COMPLETE CBC W/AUTO DIFF WBC: CPT | Performed by: STUDENT IN AN ORGANIZED HEALTH CARE EDUCATION/TRAINING PROGRAM

## 2024-12-30 PROCEDURE — 36415 COLL VENOUS BLD VENIPUNCTURE: CPT | Mod: PN | Performed by: STUDENT IN AN ORGANIZED HEALTH CARE EDUCATION/TRAINING PROGRAM

## 2024-12-30 PROCEDURE — 85651 RBC SED RATE NONAUTOMATED: CPT | Performed by: STUDENT IN AN ORGANIZED HEALTH CARE EDUCATION/TRAINING PROGRAM

## 2024-12-30 PROCEDURE — 86480 TB TEST CELL IMMUN MEASURE: CPT | Performed by: STUDENT IN AN ORGANIZED HEALTH CARE EDUCATION/TRAINING PROGRAM

## 2024-12-31 LAB
GAMMA INTERFERON BACKGROUND BLD IA-ACNC: 0.16 IU/ML
M TB IFN-G CD4+ BCKGRND COR BLD-ACNC: -0.03 IU/ML
M TB IFN-G CD4+ BCKGRND COR BLD-ACNC: -0.04 IU/ML
MITOGEN IGNF BCKGRD COR BLD-ACNC: 9.84 IU/ML
TB GOLD PLUS: NEGATIVE

## 2025-01-02 ENCOUNTER — PATIENT MESSAGE (OUTPATIENT)
Dept: INTERNAL MEDICINE | Facility: CLINIC | Age: 60
End: 2025-01-02
Payer: COMMERCIAL

## 2025-01-02 ENCOUNTER — PATIENT MESSAGE (OUTPATIENT)
Dept: RHEUMATOLOGY | Facility: CLINIC | Age: 60
End: 2025-01-02
Payer: COMMERCIAL

## 2025-01-06 NOTE — PROGRESS NOTES
Chronic Pain Note     Referring Physician: No ref. provider found    PCP: Angelica Lagunas MD    Chief Complaint:   Chief Complaint   Patient presents with    Knee Pain     Left side      Rectal Pain     Right side        SUBJECTIVE:  Interval History (1/13/2025):  Patient Jake Hoskins presents today for follow-up visit.  Patient was last seen on 12/17/2024 Right SIJ injection with 50% relief. She rates her pain 4/10. She continues to have pain on the right lower back which is primarily axial. No pain on the left. Pain is worse with prolonged standing. At times she will feel referred pain into the back of the thigh but never past the knees.  She has chronic left knee pain and has had steroid injections in the past with rheumatology but is no longer followed by them. She would like to see about getting in for left knee injection. Pain worse with prolonged walking and standing.  Patient denies night fever/night sweats, urinary incontinence, bowel incontinence, significant weight loss and significant motor weakness.   Patient denies any other complaints or concerns at this time.      Interval history 12/4/2024  Jake Hoskins is a 59 y.o. female who presents to the clinic for the evaluation of   Back pain. She was referred by her primary care team for further evaluation and management of this pain.  She has past medical history of lumbar radiculopathy, hypertension, rheumatoid arthritis, anemia, and multiple other medical comorbidities as listed in her chart. The pain started about 2 years ago and symptoms have been unchanged.The pain is located in the right lumbosacral area and radiates to the right buttock and extending posteriorly to the knee.  The pain is described as  sharp, stabbing, aching  and is rated as 3/10. The pain is rated with a score of  3/10 on the BEST day and a score of 8/10 on the WORST day.  Symptoms interfere with daily activity. The pain is exacerbated by sit to stand, morning pain,  prolonged standing or walking.  The pain is mitigated by rest, repositioning, gabapentin, Celebrex.     Patient denies night fever/night sweats, urinary incontinence, bowel incontinence, significant weight loss, significant motor weakness, and loss of sensations.    Pain Disability Index Review:         12/4/2024    10:19 AM   Last 3 PDI Scores   Pain Disability Index (PDI) 21       Non-Pharmacologic Treatments:  Physical Therapy/Home Exercise: yes  Ice/Heat:yes  TENS: no  Acupuncture: no  Massage: no  Chiropractic: no    Other: no      Pain Medications:  - Opioids: Tylenol 3, Norco  - Adjuvant Medications: gabapentin, Celebrex, Flexeril, Voltaren gel, methotrexate  - Anti-Coagulants: Aspirin     report:  Reviewed and consistent with medication use as prescribed.    Pain Procedures:   12/17/2024 Right SIJ injection with 50% relief.      Imaging:    X-ray lumbar spine 06/24/2024:  Vertebral body heights maintained. Trace retrolisthesis of L3 on L4. Disc spaces relatively maintained. Facet arthropathy noted. SI joint degenerative findings, greater on the left. No acute abnormality.     Past Medical History:   Diagnosis Date    Allergy     Anemia     Hypertension     Plantar fasciitis of left foot      Past Surgical History:   Procedure Laterality Date    CATHETERIZATION OF BOTH LEFT AND RIGHT HEART N/A 5/10/2024    Procedure: CATHETERIZATION, HEART, BOTH LEFT AND RIGHT;  Surgeon: Alison Buchanan MD;  Location: [facility] CATH LAB;  Service: Cardiology;  Laterality: N/A;    COLONOSCOPY N/A 04/09/2021    Procedure: COLONOSCOPY;  Surgeon: Hua Elmore MD;  Location: [facility] ENDO;  Service: Endoscopy;  Laterality: N/A;    ECHOCARDIOGRAM,TRANSESOPHAGEAL N/A 5/13/2024    Procedure: ECHOCARDIOGRAM,TRANSESOPHAGEAL;  Surgeon: Chester Sanchez MD;  Location: [facility] OR;  Service: Cardiovascular;  Laterality: N/A;    HYSTERECTOMY  05/2021    INJECTION OF ANESTHETIC AGENT AROUND MULTIPLE INTERCOSTAL NERVES N/A  5/13/2024    Procedure: BLOCK, NERVE, INTERCOSTAL, 2 OR MORE;  Surgeon: Chester Sanchez MD;  Location: [facility] OR;  Service: Cardiovascular;  Laterality: N/A;    INJECTION OF ANESTHETIC AGENT INTO SACROILIAC JOINT Right 12/17/2024    Procedure: Right SIJ Injection;  Surgeon: Dorian Sumner MD;  Location: [facility];  Service: Pain Management;  Laterality: Right;    OOPHORECTOMY  05/2021    RESECTION OF ATRIAL MYXOMA N/A 5/13/2024    Procedure: RESECTION, MYXOMA, CARDIAC ATRIUM;  Surgeon: Chester Sanchez MD;  Location: [facility] OR;  Service: Cardiovascular;  Laterality: N/A;    ROBOT-ASSISTED LAPAROSCOPIC ABDOMINAL HYSTERECTOMY USING DA ABBEY XI N/A 05/18/2021    Procedure: XI ROBOTIC HYSTERECTOMY;  Surgeon: Christa Davila MD;  Location: [facility] OR;  Service: OB/GYN;  Laterality: N/A;    ROBOT-ASSISTED LAPAROSCOPIC SALPINGO-OOPHORECTOMY USING DA ABBEY XI Bilateral 05/18/2021    Procedure: XI ROBOTIC SALPINGO-OOPHORECTOMY;  Surgeon: Christa Davila MD;  Location: [facility] OR;  Service: OB/GYN;  Laterality: Bilateral;     Social History     Socioeconomic History    Marital status:     Number of children: 2   Occupational History    Occupation: [employer]     Employer: [employer]   Tobacco Use    Smoking status: Never    Smokeless tobacco: Never   Substance and Sexual Activity    Alcohol use: No    Drug use: No    Sexual activity: Not Currently     Birth control/protection: Surgical     Social Drivers of Health     Financial Resource Strain: Patient Declined (5/9/2024)    Overall Financial Resource Strain (CARDIA)     Difficulty of Paying Living Expenses: Patient declined   Food Insecurity: Patient Declined (5/9/2024)    Hunger Vital Sign     Worried About Running Out of Food in the Last Year: Patient declined     Ran Out of Food in the Last Year: Patient declined   Transportation Needs: Patient Declined (5/9/2024)    TRANSPORTATION NEEDS     Transportation : Patient declined   Physical Activity:  Sufficiently Active (5/10/2023)    Exercise Vital Sign     Days of Exercise per Week: 5 days     Minutes of Exercise per Session: 150+ min   Stress: Patient Declined (5/9/2024)    [employer] [place] [employer] - Occupational Stress Questionnaire     Feeling of Stress : Patient declined   Housing Stability: Patient Declined (5/9/2024)    Housing Stability Vital Sign     Unable to Pay for Housing in the Last Year: Patient declined     Homeless in the Last Year: Patient declined     Family History   Problem Relation Name Age of Onset    Hypertension Mother      Asthma Mother      Cancer Mother          unknown    Cataracts Father      Hypertension Father      Heart disease Father      Hypertension Sister         Review of patient's allergies indicates:   Allergen Reactions    Tramadol Nausea And Vomiting       Current Outpatient Medications   Medication Sig    acetaminophen-codeine 300-30mg (TYLENOL #3) 300-30 mg Tab Take 1 tablet by mouth every 6 (six) hours as needed.    aspirin (ECOTRIN) 81 MG EC tablet Take 1 tablet (81 mg total) by mouth once daily.    celecoxib (CELEBREX) 200 MG capsule Take 1 capsule (200 mg total) by mouth once daily.    cyclobenzaprine (FLEXERIL) 10 MG tablet Take 1 tablet (10 mg total) by mouth nightly as needed for Muscle spasms.    diclofenac sodium (VOLTAREN) 1 % Gel Apply 2 g topically 4 (four) times daily.    ergocalciferol (ERGOCALCIFEROL) 50,000 unit Cap Take 1 capsule (50,000 Units total) by mouth every 7 days.    ferrous sulfate, dried (SLOW FE) 160 mg (50 mg iron) TbSR Take 1 tablet (160 mg total) by mouth once daily.    fluticasone propionate (FLONASE) 50 mcg/actuation nasal spray USE 2 SPRAYS BY EACH NOSTRIL ROUTE ONCE DAILY    folic acid (FOLVITE) 1 MG tablet Take 1 tablet (1 mg total) by mouth once daily.    gabapentin (NEURONTIN) 100 MG capsule Take 1 capsule (100 mg total) by mouth every evening.    methotrexate 2.5 MG Tab Take 6 tablets (15  "mg total) by mouth every 7 days.    montelukast (SINGULAIR) 10 mg tablet Take 1 tablet by mouth once daily    MULTIVITS,CA,MINERALS/IRON/FA (ONE-A-DAY WOMENS FORMULA ORAL) Take by mouth once daily.     potassium chloride SA (K-DUR,KLOR-CON) 20 MEQ tablet Take 1 tablet (20 mEq total) by mouth once daily.    potassium citrate 99 mg Cap Take 1 capsule by mouth once daily.     No current facility-administered medications for this visit.       Review of Systems     GENERAL:  No weight loss, malaise or fevers.  HEENT:   No recent changes in vision or hearing  NECK:  Negative for lumps, no difficulty with swallowing.  RESPIRATORY:  Negative for cough, wheezing or shortness of breath, patient denies any recent URI.  CARDIOVASCULAR:  Negative for chest pain, leg swelling or palpitations.  GI:  Negative for abdominal discomfort, blood in stools or black stools or change in bowel habits.  MUSCULOSKELETAL:  See HPI.  SKIN:  Negative for lesions, rash, and itching.  PSYCH:  No mood disorder or recent psychosocial stressors.  Patients sleep is not disturbed secondary to pain.  HEMATOLOGY/LYMPHOLOGY:  Negative for prolonged bleeding, bruising easily or swollen nodes.  Patient is currently taking anti-coagulants  NEURO:   No history of headaches, syncope, paralysis, seizures or tremors.  All other reviewed and negative other than HPI.    OBJECTIVE:    /86 (BP Location: Right arm)   Pulse 72   Ht 5' 4" (1.626 m)   Wt 108.9 kg (240 lb 1.3 oz)   LMP 12/26/2020   BMI 41.21 kg/m²         Physical Exam    GENERAL: Well appearing, in no acute distress, alert and oriented x3.  PSYCH:  Mood and affect appropriate.  SKIN: Skin color, texture, turgor normal, no rashes or lesions.  HEAD/FACE:  Normocephalic, atraumatic. Cranial nerves grossly intact.  CV: RRR with palpation of the radial artery.  PULM: No evidence of respiratory difficulty, symmetric chest rise.  GI:  Soft and non-tender.  BACK: Straight leg raising in the " sitting and supine positions is negative to radicular pain.  Mild-to-moderate pain to palpation over the facet joints of the lumbar spine and lumbar paraspinals..  Limited range of motion secondary to body habitus and pain reproduction. Directional preference:  Flexion. Mild pain with lumbar extension and facet loading on the right  EXTREMITIES: Peripheral joint ROM is full and pain free without obvious instability or laxity in all four extremities. No deformities, edema, or skin discoloration. Good capillary refill.  MUSCULOSKELETAL: Shoulder, hip, and knee provocative maneuvers are negative.  There is moderate pain with palpation over the sacroiliac joint on the right.  FABERs test is positive on the right.  Sacral thrust positive.  FADIRs test is equivocal.   Bilateral upper and lower extremity strength is normal and symmetric.  No atrophy or tone abnormalities are noted.  Left knee: no swelling, erythema, or warmth. Crepitus present. Some pain with flexion and extension   NEURO: Bilateral upper and lower extremity coordination and muscle stretch reflexes are physiologic and symmetric.  Plantar response are downgoing. No clonus.  No loss of sensation is noted.  GAIT:  Slow, antalgic.      LABS:  Lab Results   Component Value Date    WBC 4.07 12/30/2024    HGB 12.8 12/30/2024    HCT 43.0 12/30/2024    MCV 87 12/30/2024     12/30/2024       CMP  Sodium   Date Value Ref Range Status   12/30/2024 142 136 - 145 mmol/L Final     Potassium   Date Value Ref Range Status   12/30/2024 4.0 3.5 - 5.1 mmol/L Final     Chloride   Date Value Ref Range Status   12/30/2024 108 95 - 110 mmol/L Final     CO2   Date Value Ref Range Status   12/30/2024 26 23 - 29 mmol/L Final     Glucose   Date Value Ref Range Status   12/30/2024 64 (L) 70 - 110 mg/dL Final     BUN   Date Value Ref Range Status   12/30/2024 11 6 - 20 mg/dL Final     Creatinine   Date Value Ref Range Status   12/30/2024 0.7 0.5 - 1.4 mg/dL Final     Calcium    Date Value Ref Range Status   12/30/2024 9.2 8.7 - 10.5 mg/dL Final     Total Protein   Date Value Ref Range Status   12/30/2024 7.3 6.0 - 8.4 g/dL Final     Albumin   Date Value Ref Range Status   12/30/2024 3.6 3.5 - 5.2 g/dL Final     Total Bilirubin   Date Value Ref Range Status   12/30/2024 0.3 0.1 - 1.0 mg/dL Final     Comment:     For infants and newborns, interpretation of results should be based  on gestational age, weight and in agreement with clinical  observations.    Premature Infant recommended reference ranges:  Up to 24 hours.............<8.0 mg/dL  Up to 48 hours............<12.0 mg/dL  3-5 days..................<15.0 mg/dL  6-29 days.................<15.0 mg/dL       Alkaline Phosphatase   Date Value Ref Range Status   12/30/2024 70 40 - 150 U/L Final     AST   Date Value Ref Range Status   12/30/2024 27 10 - 40 U/L Final     ALT   Date Value Ref Range Status   12/30/2024 24 10 - 44 U/L Final     Anion Gap   Date Value Ref Range Status   12/30/2024 8 8 - 16 mmol/L Final     eGFR if    Date Value Ref Range Status   05/06/2022 >60 >60 mL/min/1.73 m^2 Final     eGFR if non    Date Value Ref Range Status   05/06/2022 >60 >60 mL/min/1.73 m^2 Final     Comment:     Calculation used to obtain the estimated glomerular filtration  rate (eGFR) is the CKD-EPI equation.          Lab Results   Component Value Date    HGBA1C 5.4 09/18/2024             ASSESSMENT: 59 y.o. year old female with lower back pain, consistent with     1. Sacroiliitis        2. Lumbar spondylosis  Case Request-RAD/Other Procedure Area: right L4-5 and L5-S1 MBB      3. Chronic pain of left knee        4. Arthritis of left knee                PLAN:   - Interventions: Schedule lumbar L3-5 MBB #1. We will call the day after the procedure. If patient reports at least 80% relief of pain, we will move forward with MBB #2. If patient again reports at least 80% relief of pain, we will then move forward with the  RFA.   Explained the risks and benefits of the procedure in detail with the patient today in clinic along with alternative treatment options, and the patient elected to pursue the intervention.      If fails to get relief, consider lumbar MRI    Will get follow up after MBB/RFA with Dr. Sumner for in office left knee injection  - Anticoagulation use: Patient is taking ASA as 1° prevention; no need to hold for SI joint injection        - Medications: I have stressed the importance of physical activity and a home exercise plan to help with pain and improve health. and Patient can continue with medications for now since they are providing benefits, using them appropriately, and without side effects.     Continue gabapentin, celebrex, voltaren gel, flexeril- outside providers  Discussed increasing gabapentin- pt declines at this time          - Therapy:  Advised patient continue with activities as tolerated    - Psychological:  Discussed coping mechanisms to help address chronic pain issues    - Labs:  Reviewed    - Imaging: Reviewed available imaging with patient and answered any questions they had regarding study.    - Consults/Referrals:  None at this time    - Records:  Reviewed/Obtain old records from outside physicians and imaging    - Follow up visit: call day after procedure/ will get follow up with Dr. Sumner for left knee injection in office    - Counseled patient regarding the importance of activity modification and physical therapy    - This condition does not require this patient to take time off of work, and the primary goal of our Pain Management services is to improve the patient's functional capacity.    - Patient Questions: Answered all of the patient's questions regarding diagnosis, therapy, and treatment        The above plan and management options were discussed at length with patient. Patient is in agreement with the above and verbalized understanding.    I discussed the goals of interventional  chronic pain management with the patient on today's visit.  I explained the utility of injections for diagnostic and therapeutic purposes.  We discussed a multimodal approach to pain including treating the patient's given worst pain at any given time.  We will use a systematic approach to addressing pain.  We will also adopt a multimodal approach that includes injections, adjuvant medications, physical therapy, at times psychiatry.  There may be a limited role for opioid use intermittently in the treatment of pain, more particularly for acute pain although no one approach can be used as a sole treatment modality.    I emphasized the importance of regular exercise, core strengthening and stretching, diet and weight loss as a cornerstone of long-term pain management.    Visit today included increased complexity associated with the care of the episodic problem of chronic pain which was addressed and continue to manage the longitudinal care of the patient due to the serious and/or complex managed problem(s) listed above.      Dorita Shukla NP  Interventional Pain Management  [facility]    Disclaimer:  This note was prepared using voice recognition system and is likely to have sound alike errors that may have been overlooked even after proof reading.  Please call me with any questions

## 2025-01-13 ENCOUNTER — OFFICE VISIT (OUTPATIENT)
Dept: PAIN MEDICINE | Facility: CLINIC | Age: 60
End: 2025-01-13
Payer: COMMERCIAL

## 2025-01-13 VITALS
WEIGHT: 240.06 LBS | HEIGHT: 64 IN | HEART RATE: 72 BPM | SYSTOLIC BLOOD PRESSURE: 129 MMHG | BODY MASS INDEX: 40.98 KG/M2 | DIASTOLIC BLOOD PRESSURE: 86 MMHG

## 2025-01-13 DIAGNOSIS — M47.816 LUMBAR SPONDYLOSIS: ICD-10-CM

## 2025-01-13 DIAGNOSIS — M17.12 ARTHRITIS OF LEFT KNEE: ICD-10-CM

## 2025-01-13 DIAGNOSIS — M25.562 CHRONIC PAIN OF LEFT KNEE: ICD-10-CM

## 2025-01-13 DIAGNOSIS — M46.1 SACROILIITIS: Primary | ICD-10-CM

## 2025-01-13 DIAGNOSIS — G89.29 CHRONIC PAIN OF LEFT KNEE: ICD-10-CM

## 2025-01-13 PROCEDURE — 3008F BODY MASS INDEX DOCD: CPT | Mod: CPTII,S$GLB,, | Performed by: NURSE PRACTITIONER

## 2025-01-13 PROCEDURE — 1159F MED LIST DOCD IN RCRD: CPT | Mod: CPTII,S$GLB,, | Performed by: NURSE PRACTITIONER

## 2025-01-13 PROCEDURE — 3079F DIAST BP 80-89 MM HG: CPT | Mod: CPTII,S$GLB,, | Performed by: NURSE PRACTITIONER

## 2025-01-13 PROCEDURE — 3074F SYST BP LT 130 MM HG: CPT | Mod: CPTII,S$GLB,, | Performed by: NURSE PRACTITIONER

## 2025-01-13 PROCEDURE — 99999 PR PBB SHADOW E&M-EST. PATIENT-LVL III: CPT | Mod: PBBFAC,,, | Performed by: NURSE PRACTITIONER

## 2025-01-13 PROCEDURE — 99214 OFFICE O/P EST MOD 30 MIN: CPT | Mod: S$GLB,,, | Performed by: NURSE PRACTITIONER

## 2025-01-15 NOTE — PRE-PROCEDURE INSTRUCTIONS
Spoke with patient regarding procedure scheduled on 1.30     Arrival time 0645     Has patient been sick with fever or on antibiotics within the last 7 days? No     Does the patient have any open wounds, sores or rashes? No     Does the patient have any recent fractures? no     Has patient received a vaccination within the last 7 days? No     Received the COVID vaccination? yes     Has the patient stopped all medications as directed? na     Does patient have a pacemaker, defibrillator, or implantable stimulator? No     Does the patient have a ride to and from procedure and someone reliable to remain with patient?  COORDINATING TRANSPORTATION. AWARE OF POLICY      Is the patient diabetic? no      Does the patient have sleep apnea? Or use O2 at home? no     Is the patient receiving sedation? Yes      Is the patient instructed to remain NPO beginning at midnight the night before their procedure? yes     Procedure location confirmed with patient? Yes     Covid- Denies signs/symptoms. Instructed to notify PAT/MD if any changes.

## 2025-01-30 ENCOUNTER — HOSPITAL ENCOUNTER (OUTPATIENT)
Facility: HOSPITAL | Age: 60
Discharge: HOME | End: 2025-01-30
Attending: PHYSICAL MEDICINE & REHABILITATION | Admitting: PHYSICAL MEDICINE & REHABILITATION
Payer: COMMERCIAL

## 2025-01-30 VITALS
BODY MASS INDEX: 41.87 KG/M2 | SYSTOLIC BLOOD PRESSURE: 118 MMHG | WEIGHT: 245.25 LBS | HEART RATE: 70 BPM | TEMPERATURE: 97 F | DIASTOLIC BLOOD PRESSURE: 62 MMHG | HEIGHT: 64 IN | OXYGEN SATURATION: 98 % | RESPIRATION RATE: 16 BRPM

## 2025-01-30 DIAGNOSIS — M47.816 LUMBAR SPONDYLOSIS: Primary | ICD-10-CM

## 2025-01-30 PROCEDURE — 64493 INJ PARAVERT F JNT L/S 1 LEV: CPT | Mod: RT | Performed by: PHYSICAL MEDICINE & REHABILITATION

## 2025-01-30 PROCEDURE — 64494 INJ PARAVERT F JNT L/S 2 LEV: CPT | Mod: KX,RT,, | Performed by: PHYSICAL MEDICINE & REHABILITATION

## 2025-01-30 PROCEDURE — 64494 INJ PARAVERT F JNT L/S 2 LEV: CPT | Mod: RT | Performed by: PHYSICAL MEDICINE & REHABILITATION

## 2025-01-30 PROCEDURE — 63600175 PHARM REV CODE 636 W HCPCS: Performed by: PHYSICAL MEDICINE & REHABILITATION

## 2025-01-30 PROCEDURE — 64493 INJ PARAVERT F JNT L/S 1 LEV: CPT | Mod: KX,RT,, | Performed by: PHYSICAL MEDICINE & REHABILITATION

## 2025-01-30 RX ORDER — MIDAZOLAM HYDROCHLORIDE 1 MG/ML
INJECTION, SOLUTION INTRAMUSCULAR; INTRAVENOUS
Status: DISCONTINUED | OUTPATIENT
Start: 2025-01-30 | End: 2025-01-30 | Stop reason: HOSPADM

## 2025-01-30 RX ORDER — BUPIVACAINE HYDROCHLORIDE 5 MG/ML
INJECTION, SOLUTION EPIDURAL; INTRACAUDAL
Status: DISCONTINUED | OUTPATIENT
Start: 2025-01-30 | End: 2025-01-30 | Stop reason: HOSPADM

## 2025-01-30 RX ORDER — FENTANYL CITRATE 50 UG/ML
INJECTION, SOLUTION INTRAMUSCULAR; INTRAVENOUS
Status: DISCONTINUED | OUTPATIENT
Start: 2025-01-30 | End: 2025-01-30 | Stop reason: HOSPADM

## 2025-01-30 RX ORDER — ONDANSETRON HYDROCHLORIDE 2 MG/ML
4 INJECTION, SOLUTION INTRAVENOUS ONCE AS NEEDED
Status: DISCONTINUED | OUTPATIENT
Start: 2025-01-30 | End: 2025-01-30 | Stop reason: HOSPADM

## 2025-01-30 NOTE — DISCHARGE SUMMARY
Discharge Note  Short Stay      SUMMARY     Admit Date: 1/30/2025    Attending Physician: Dorian Sumner MD        Discharge Physician: Dorian Sumner MD        Discharge Date: 1/30/2025 7:34 AM    Procedure(s) (LRB):  right L4-5 and L5-S1 MBB (Right)    Final Diagnosis: Lumbar spondylosis [M47.816]    Disposition: Home or self care    Patient Instructions:   Current Discharge Medication List        CONTINUE these medications which have NOT CHANGED    Details   celecoxib (CELEBREX) 200 MG capsule Take 1 capsule (200 mg total) by mouth once daily.  Qty: 90 capsule, Refills: 1    Associated Diagnoses: Rheumatoid arthritis involving multiple sites with positive rheumatoid factor      ferrous sulfate, dried (SLOW FE) 160 mg (50 mg iron) TbSR Take 1 tablet (160 mg total) by mouth once daily.  Qty: 90 tablet, Refills: 3    Associated Diagnoses: Anemia, unspecified type      folic acid (FOLVITE) 1 MG tablet Take 1 tablet (1 mg total) by mouth once daily.  Qty: 90 tablet, Refills: 3    Associated Diagnoses: Rheumatoid arthritis involving multiple sites with positive rheumatoid factor; Immunodeficiency due to drug therapy; Encounter for monitoring immunosuppressive medication therapy causing immunodeficiency; High risk medication use      gabapentin (NEURONTIN) 100 MG capsule Take 1 capsule (100 mg total) by mouth every evening.  Qty: 30 capsule, Refills: 11      montelukast (SINGULAIR) 10 mg tablet Take 1 tablet by mouth once daily  Qty: 90 tablet, Refills: 3    Associated Diagnoses: Acute seasonal allergic rhinitis due to pollen      potassium citrate 99 mg Cap Take 1 capsule by mouth once daily.      cyclobenzaprine (FLEXERIL) 10 MG tablet Take 1 tablet (10 mg total) by mouth nightly as needed for Muscle spasms.  Qty: 90 tablet, Refills: 0    Associated Diagnoses: Rheumatoid arthritis involving multiple sites with positive rheumatoid factor      diclofenac sodium (VOLTAREN) 1 % Gel Apply 2 g topically 4 (four) times  daily.  Qty: 50 g, Refills: 1    Associated Diagnoses: Primary osteoarthritis of both knees      ergocalciferol (ERGOCALCIFEROL) 50,000 unit Cap Take 1 capsule (50,000 Units total) by mouth every 7 days.  Qty: 12 capsule, Refills: 4    Associated Diagnoses: Vitamin D deficiency      fluticasone propionate (FLONASE) 50 mcg/actuation nasal spray USE 2 SPRAYS BY EACH NOSTRIL ROUTE ONCE DAILY  Qty: 48 g, Refills: 3    Associated Diagnoses: Acute seasonal allergic rhinitis due to pollen; Cough      methotrexate 2.5 MG Tab Take 6 tablets (15 mg total) by mouth every 7 days.  Qty: 72 tablet, Refills: 0    Associated Diagnoses: Rheumatoid arthritis involving multiple sites with positive rheumatoid factor; Immunodeficiency due to drug therapy; Encounter for monitoring immunosuppressive medication therapy causing immunodeficiency; High risk medication use      MULTIVITS,CA,MINERALS/IRON/FA (ONE-A-DAY WOMENS FORMULA ORAL) Take by mouth once daily.            STOP taking these medications       acetaminophen-codeine 300-30mg (TYLENOL #3) 300-30 mg Tab Comments:   Reason for Stopping:         aspirin (ECOTRIN) 81 MG EC tablet Comments:   Reason for Stopping:         potassium chloride SA (K-DUR,KLOR-CON) 20 MEQ tablet Comments:   Reason for Stopping:                   Discharge Diagnosis: Lumbar spondylosis [M47.816]  Condition on Discharge: Stable with no complications to procedure   Diet on Discharge: Same as before.  Activity: as per instruction sheet.  Discharge to: Home with a responsible adult.  Follow up: 2-4 weeks       Please call the office at [phone] if you experience any weakness or loss of sensation, fever > 101.5, pain uncontrolled with oral medications, persistent nausea/vomiting/or diarrhea, redness or drainage from the incisions, or any other worrisome concerns. If physician on call was not reached or could not communicate with our office for any reason please go to the nearest emergency department

## 2025-01-30 NOTE — H&P
HPI  Patient presenting for Procedure(s) (LRB):  right L4-5 and L5-S1 MBB (Right)       No health changes since previous encounter    Past Medical History:   Diagnosis Date    Allergy     Anemia     Hypertension     Plantar fasciitis of left foot      Past Surgical History:   Procedure Laterality Date    CATHETERIZATION OF BOTH LEFT AND RIGHT HEART N/A 5/10/2024    Procedure: CATHETERIZATION, HEART, BOTH LEFT AND RIGHT;  Surgeon: Alison Buchanan MD;  Location: [facility] CATH LAB;  Service: Cardiology;  Laterality: N/A;    COLONOSCOPY N/A 04/09/2021    Procedure: COLONOSCOPY;  Surgeon: Hua Elmore MD;  Location: [facility] ENDO;  Service: Endoscopy;  Laterality: N/A;    ECHOCARDIOGRAM,TRANSESOPHAGEAL N/A 5/13/2024    Procedure: ECHOCARDIOGRAM,TRANSESOPHAGEAL;  Surgeon: Chester Sanchez MD;  Location: [facility] OR;  Service: Cardiovascular;  Laterality: N/A;    HYSTERECTOMY  05/2021    INJECTION OF ANESTHETIC AGENT AROUND MULTIPLE INTERCOSTAL NERVES N/A 5/13/2024    Procedure: BLOCK, NERVE, INTERCOSTAL, 2 OR MORE;  Surgeon: Chester Sanchez MD;  Location: [facility] OR;  Service: Cardiovascular;  Laterality: N/A;    INJECTION OF ANESTHETIC AGENT INTO SACROILIAC JOINT Right 12/17/2024    Procedure: Right SIJ Injection;  Surgeon: Dorian Sumner MD;  Location: [facility];  Service: Pain Management;  Laterality: Right;    OOPHORECTOMY  05/2021    RESECTION OF ATRIAL MYXOMA N/A 5/13/2024    Procedure: RESECTION, MYXOMA, CARDIAC ATRIUM;  Surgeon: Chester Sanchez MD;  Location: [facility] OR;  Service: Cardiovascular;  Laterality: N/A;    ROBOT-ASSISTED LAPAROSCOPIC ABDOMINAL HYSTERECTOMY USING DA ABBEY XI N/A 05/18/2021    Procedure: XI ROBOTIC HYSTERECTOMY;  Surgeon: Christa Davila MD;  Location: [facility] OR;  Service: OB/GYN;  Laterality: N/A;    ROBOT-ASSISTED LAPAROSCOPIC SALPINGO-OOPHORECTOMY USING DA ABBEY XI Bilateral 05/18/2021    Procedure: XI ROBOTIC SALPINGO-OOPHORECTOMY;  Surgeon: Christa Davila MD;  Location: [facility]  OR;  Service: OB/GYN;  Laterality: Bilateral;     Review of patient's allergies indicates:   Allergen Reactions    Tramadol Nausea And Vomiting        No current facility-administered medications on file prior to encounter.     Current Outpatient Medications on File Prior to Encounter   Medication Sig Dispense Refill    acetaminophen-codeine 300-30mg (TYLENOL #3) 300-30 mg Tab Take 1 tablet by mouth every 6 (six) hours as needed.      aspirin (ECOTRIN) 81 MG EC tablet Take 1 tablet (81 mg total) by mouth once daily. 90 tablet 3    celecoxib (CELEBREX) 200 MG capsule Take 1 capsule (200 mg total) by mouth once daily. 90 capsule 1    cyclobenzaprine (FLEXERIL) 10 MG tablet Take 1 tablet (10 mg total) by mouth nightly as needed for Muscle spasms. 90 tablet 0    diclofenac sodium (VOLTAREN) 1 % Gel Apply 2 g topically 4 (four) times daily. 50 g 1    ergocalciferol (ERGOCALCIFEROL) 50,000 unit Cap Take 1 capsule (50,000 Units total) by mouth every 7 days. 12 capsule 4    ferrous sulfate, dried (SLOW FE) 160 mg (50 mg iron) TbSR Take 1 tablet (160 mg total) by mouth once daily. 90 tablet 3    fluticasone propionate (FLONASE) 50 mcg/actuation nasal spray USE 2 SPRAYS BY EACH NOSTRIL ROUTE ONCE DAILY 48 g 3    folic acid (FOLVITE) 1 MG tablet Take 1 tablet (1 mg total) by mouth once daily. 90 tablet 3    gabapentin (NEURONTIN) 100 MG capsule Take 1 capsule (100 mg total) by mouth every evening. 30 capsule 11    methotrexate 2.5 MG Tab Take 6 tablets (15 mg total) by mouth every 7 days. 72 tablet 0    montelukast (SINGULAIR) 10 mg tablet Take 1 tablet by mouth once daily 90 tablet 3    MULTIVITS,CA,MINERALS/IRON/FA (ONE-A-DAY WOMENS FORMULA ORAL) Take by mouth once daily.       potassium chloride SA (K-DUR,KLOR-CON) 20 MEQ tablet Take 1 tablet (20 mEq total) by mouth once daily. 30 tablet 0    potassium citrate 99 mg Cap Take 1 capsule by mouth once daily.          PMHx, PSHx, Allergies, Medications reviewed in epic    ROS  negative except pain complaints in HPI    OBJECTIVE:    LMP 12/26/2020     PHYSICAL EXAMINATION:    GENERAL: Well appearing, in no acute distress, alert and oriented x3.  PSYCH:  Mood and affect appropriate.  SKIN: Skin color, texture, turgor normal, no rashes or lesions which will impact the procedure.  CV: RRR with palpation of the radial artery.  PULM: No evidence of respiratory difficulty, symmetric chest rise. Clear to auscultation.  NEURO: Cranial nerves grossly intact.    Plan:    Proceed with procedure as planned Procedure(s) (LRB):  right L4-5 and L5-S1 MBB (Right)    Dorian Sumner MD  01/30/2025

## 2025-01-30 NOTE — DISCHARGE INSTRUCTIONS

## 2025-01-30 NOTE — OP NOTE
ASSESSMENT:   68 yo female with pmh of scleroderma, ILD (FEV1 30%), not on home O2, also with Moderate-severe pHTN on Tadalafil and Opsumit, poor functional status, PAD, previous PE on AC who presents for a toe ambulation 2/2 to infected gangrenous toes, found to be in decompensated HF, new LV dysfunction.  RIZWAN - This is likely hemodynamic. Possibly serum creatinine under estimates her GFR (likely worse)           1 Renal-  Renal function is stable;  CKD stage 3 and volume status is acceptable   Cont diuretics at present dose     2 ID-Off IV abx     3 Pulm -End stage lung;  Full DNR      DC planning     Will sign off   thank you     Sayed [facility]  [phone]     LUMBAR Medial Branch Block (DIAGNOSTIC) Under Fluoroscopy  Time-out taken to identify patient and procedure side prior to starting the procedure.            01/30/2025                                                         Patient has clinical and imaging findings suggestive of facet mediated pain.    For supporting clinical documentation, please see most recent clinic and telephone notes.      PROCEDURE:  Right medial branch block at the transverse processes at the level of L4, L5, Sacral ala    REASON FOR PROCEDURE: Lumbar spondylosis [M47.816]    PHYSICIAN: Dorian Sumner MD  ASSISTANTS: None    MEDICATIONS INJECTED: 0.5% bupivicane, 1mL at each level    LOCAL ANESTHETIC USED: Xylocaine 1% 10ml    SEDATION MEDICATIONS: Conscious sedation provided by M.D    The patient was monitored with continuous pulse oximetry, EKG, and intermittent blood pressure monitors, immediately prior to administration of sedation.  The patient was hemodynamically stable throughout the entire process was responsive to voice, and breathing spontaneously.  Supplemental O2 was provided at 2L/min via nasal cannula.  Patient was comfortable for the duration of the procedure.     There was a total of 2mg IV Midazolam and 25mcg Fentanyl titrated for the procedure    Total sedation time was <10 minutes      ESTIMATED BLOOD LOSS:  None.    COMPLICATIONS:  None.    TECHNIQUE: Laying in a prone position, the patient was prepped and draped in the usual sterile fashion using ChloraPrep and fenestrated drape.  The level was determined under fluoroscopic guidance.  Local anesthetic was given by going down to the hub of the 27-gauge 1.25in needle and raising a wheel.  A 22-gauge 3.5inch needle was introduced to the anatomic local of the medial branch at each of the above levels using fluoroscopy in the AP, oblique, and lateral views.  After negative aspiration, medication was injected slowly. The patient tolerated the procedure well.       The  patient was monitored after the procedure.  Patient was given post procedure and discharge instructions to follow at home.  We will see the patient back in two weeks or the patient may call to inform of status. The patient was discharged in a stable condition

## 2025-01-31 ENCOUNTER — TELEPHONE (OUTPATIENT)
Dept: PAIN MEDICINE | Facility: CLINIC | Age: 60
End: 2025-01-31
Payer: COMMERCIAL

## 2025-01-31 NOTE — TELEPHONE ENCOUNTER
----- Message from Med Assistant Klaudia sent at 1/13/2025 11:39 AM CST -----  Regarding: Tanya KELLEY #1  Call pt to check % of relief received from block.

## 2025-01-31 NOTE — TELEPHONE ENCOUNTER
Tried calling patient but no answer I left a voicemail for her to give our office a call back to follow up with the MBB questions.    [facility]      At your earliest convenience, please answer the following questions from your procedure. Please feel free to add any additional information if needed.     1. What percentage of pain relief did you receive following the block, from 0-100%?     2. What was pain score the day before your procedure on a scale from 0-10?    3. What was pain score immediately after your procedure (up to 6 hours)  on a scale from 0-10?    4. When your pain returned, what was your pain score on a scale from 0-10?     5. How many hours did pain relief last following the block?       6. During this time, please describe in detail the activities you were able to do?        Pain Disability Index (PDI) Score Review:      12/4/2024    10:19 AM   Last 3 PDI Scores   Pain Disability Index (PDI) 21

## 2025-02-11 DIAGNOSIS — M25.562 PAIN IN BOTH KNEES, UNSPECIFIED CHRONICITY: Primary | ICD-10-CM

## 2025-02-11 DIAGNOSIS — M25.561 PAIN IN BOTH KNEES, UNSPECIFIED CHRONICITY: Primary | ICD-10-CM

## 2025-02-13 ENCOUNTER — APPOINTMENT (OUTPATIENT)
Dept: RADIOLOGY | Facility: HOSPITAL | Age: 60
End: 2025-02-13
Attending: STUDENT IN AN ORGANIZED HEALTH CARE EDUCATION/TRAINING PROGRAM
Payer: COMMERCIAL

## 2025-02-13 DIAGNOSIS — M25.562 PAIN IN BOTH KNEES, UNSPECIFIED CHRONICITY: ICD-10-CM

## 2025-02-13 DIAGNOSIS — M25.561 PAIN IN BOTH KNEES, UNSPECIFIED CHRONICITY: ICD-10-CM

## 2025-02-13 PROCEDURE — 73564 X-RAY EXAM KNEE 4 OR MORE: CPT | Mod: TC,50,PN

## 2025-02-19 ENCOUNTER — OFFICE VISIT (OUTPATIENT)
Dept: PAIN MEDICINE | Facility: CLINIC | Age: 60
End: 2025-02-19
Payer: COMMERCIAL

## 2025-02-19 VITALS
SYSTOLIC BLOOD PRESSURE: 153 MMHG | HEIGHT: 64 IN | WEIGHT: 249.13 LBS | RESPIRATION RATE: 17 BRPM | BODY MASS INDEX: 42.53 KG/M2 | DIASTOLIC BLOOD PRESSURE: 82 MMHG

## 2025-02-19 DIAGNOSIS — M47.816 LUMBAR SPONDYLOSIS: Primary | ICD-10-CM

## 2025-02-19 DIAGNOSIS — M05.79 RHEUMATOID ARTHRITIS INVOLVING MULTIPLE SITES WITH POSITIVE RHEUMATOID FACTOR: ICD-10-CM

## 2025-02-19 RX ORDER — CELECOXIB 200 MG/1
200 CAPSULE ORAL DAILY
Qty: 90 CAPSULE | Refills: 1 | Status: SHIPPED | OUTPATIENT
Start: 2025-02-19

## 2025-02-19 NOTE — PROGRESS NOTES
Established chronic pain patient note (follow-up visit):      Chief Complaint:   Chief Complaint   Patient presents with    Low-back Pain    Leg Pain     Right         SUBJECTIVE:    Jake Hoskins is a 59 y.o. female presents today for follow-up.  She recently underwent right-sided L4-5 and L5-S1 medial branch blocks on 01/30/2025.  Multiple attempts were made to try and contact the patient for diagnostic response.  She reports that she has received at least 80% relief for the initial 24 hours, and then 50% relief for the next few days following the diagnostic block.  She reports that her pain has returned to baseline and rates her pain at 6/10.  She continues to take the gabapentin, Celebrex, and Flexeril as prescribed.  She reports that her pain is of the same type and quality as well.    Patient denies night fever/night sweats, urinary incontinence, bowel incontinence, significant weight loss, significant motor weakness, and loss of sensations.    Pain Disability Index Review:      12/4/2024    10:19 AM   Last 3 PDI Scores   Pain Disability Index (PDI) 21       Interval History (1/13/2025):  Patient Jake Hoskins presents today for follow-up visit.  Patient was last seen on 12/17/2024 Right SIJ injection with 50% relief. She rates her pain 4/10. She continues to have pain on the right lower back which is primarily axial. No pain on the left. Pain is worse with prolonged standing. At times she will feel referred pain into the back of the thigh but never past the knees.  She has chronic left knee pain and has had steroid injections in the past with rheumatology but is no longer followed by them. She would like to see about getting in for left knee injection. Pain worse with prolonged walking and standing.      Interval history 12/4/2024  Jake Hoskins is a 59 y.o. female who presents to the clinic for the evaluation of   Back pain. She was referred by her primary care team for further evaluation and  management of this pain.  She has past medical history of lumbar radiculopathy, hypertension, rheumatoid arthritis, anemia, and multiple other medical comorbidities as listed in her chart. The pain started about 2 years ago and symptoms have been unchanged.The pain is located in the right lumbosacral area and radiates to the right buttock and extending posteriorly to the knee.  The pain is described as sharp, stabbing, aching and is rated as 3/10. The pain is rated with a score of  3/10 on the BEST day and a score of 8/10 on the WORST day.  Symptoms interfere with daily activity. The pain is exacerbated by sit to stand, morning pain, prolonged standing or walking.  The pain is mitigated by rest, repositioning, gabapentin, Celebrex.     Non-Pharmacologic Treatments:  Physical Therapy/Home Exercise: yes  Ice/Heat:yes  TENS: no  Acupuncture: no  Massage: no  Chiropractic: no    Other: no      Pain Medications:  - Opioids: Tylenol 3, Norco  - Adjuvant Medications: gabapentin, Celebrex, Flexeril, Voltaren gel, methotrexate  - Anti-Coagulants: Aspirin     report:  Reviewed and consistent with medication use as prescribed.    Pain Procedures:   12/17/2024 Right SIJ injection with 50% relief.  01/30/2025: Right L4-5 and L5-S1 medial branch blocks, 80+% relief for 24 hours      Imaging:    X-ray lumbar spine 06/24/2024:  Vertebral body heights maintained. Trace retrolisthesis of L3 on L4. Disc spaces relatively maintained. Facet arthropathy noted. SI joint degenerative findings, greater on the left. No acute abnormality.     Past Medical History:   Diagnosis Date    Allergy     Anemia     Hypertension     Plantar fasciitis of left foot      Past Surgical History:   Procedure Laterality Date    CATHETERIZATION OF BOTH LEFT AND RIGHT HEART N/A 5/10/2024    Procedure: CATHETERIZATION, HEART, BOTH LEFT AND RIGHT;  Surgeon: Alison Buchanan MD;  Location: [facility] CATH LAB;  Service: Cardiology;  Laterality: N/A;    COLONOSCOPY  N/A 04/09/2021    Procedure: COLONOSCOPY;  Surgeon: Hua Elmore MD;  Location: [facility] ENDO;  Service: Endoscopy;  Laterality: N/A;    ECHOCARDIOGRAM,TRANSESOPHAGEAL N/A 5/13/2024    Procedure: ECHOCARDIOGRAM,TRANSESOPHAGEAL;  Surgeon: Chester Sanchez MD;  Location: [facility] OR;  Service: Cardiovascular;  Laterality: N/A;    HYSTERECTOMY  05/2021    INJECTION OF ANESTHETIC AGENT AROUND MEDIAL BRANCH NERVES INNERVATING LUMBAR FACET JOINT Right 1/30/2025    Procedure: right L4-5 and L5-S1 MBB;  Surgeon: Dorian Sumner MD;  Location: [facility] PAIN MGT;  Service: Pain Management;  Laterality: Right;    INJECTION OF ANESTHETIC AGENT AROUND MULTIPLE INTERCOSTAL NERVES N/A 5/13/2024    Procedure: BLOCK, NERVE, INTERCOSTAL, 2 OR MORE;  Surgeon: Chester Sanchez MD;  Location: [facility] OR;  Service: Cardiovascular;  Laterality: N/A;    INJECTION OF ANESTHETIC AGENT INTO SACROILIAC JOINT Right 12/17/2024    Procedure: Right SIJ Injection;  Surgeon: Dorian Sumner MD;  Location: [facility] PAIN MGT;  Service: Pain Management;  Laterality: Right;    OOPHORECTOMY  05/2021    RESECTION OF ATRIAL MYXOMA N/A 5/13/2024    Procedure: RESECTION, MYXOMA, CARDIAC ATRIUM;  Surgeon: Chester Sanchez MD;  Location: [facility] OR;  Service: Cardiovascular;  Laterality: N/A;    ROBOT-ASSISTED LAPAROSCOPIC ABDOMINAL HYSTERECTOMY USING DA ABBEY XI N/A 05/18/2021    Procedure: XI ROBOTIC HYSTERECTOMY;  Surgeon: Christa Davila MD;  Location: [facility] OR;  Service: OB/GYN;  Laterality: N/A;    ROBOT-ASSISTED LAPAROSCOPIC SALPINGO-OOPHORECTOMY USING DA ABBEY XI Bilateral 05/18/2021    Procedure: XI ROBOTIC SALPINGO-OOPHORECTOMY;  Surgeon: Christa Davila MD;  Location: [facility] OR;  Service: OB/GYN;  Laterality: Bilateral;     Social History     Socioeconomic History    Marital status:     Number of children: 2   Occupational History    Occupation: [employer]     Employer: [employer]   Tobacco Use    Smoking status: Never    Smokeless tobacco: Never    Substance and Sexual Activity    Alcohol use: No    Drug use: No    Sexual activity: Not Currently     Birth control/protection: Surgical     Social Drivers of Health     Financial Resource Strain: Patient Declined (5/9/2024)    Overall Financial Resource Strain (CARDIA)     Difficulty of Paying Living Expenses: Patient declined   Food Insecurity: Patient Declined (5/9/2024)    Hunger Vital Sign     Worried About Running Out of Food in the Last Year: Patient declined     Ran Out of Food in the Last Year: Patient declined   Transportation Needs: Patient Declined (5/9/2024)    TRANSPORTATION NEEDS     Transportation : Patient declined   Physical Activity: Sufficiently Active (5/10/2023)    Exercise Vital Sign     Days of Exercise per Week: 5 days     Minutes of Exercise per Session: 150+ min   Stress: Patient Declined (5/9/2024)    [place] [employer] - Occupational Stress Questionnaire     Feeling of Stress : Patient declined   Housing Stability: Patient Declined (5/9/2024)    Housing Stability Vital Sign     Unable to Pay for Housing in the Last Year: Patient declined     Homeless in the Last Year: Patient declined     Family History   Problem Relation Name Age of Onset    Hypertension Mother      Asthma Mother      Cancer Mother          unknown    Cataracts Father      Hypertension Father      Heart disease Father      Hypertension Sister         Review of patient's allergies indicates:   Allergen Reactions    Tramadol Nausea And Vomiting       Current Outpatient Medications   Medication Sig    cyclobenzaprine (FLEXERIL) 10 MG tablet Take 1 tablet (10 mg total) by mouth nightly as needed for Muscle spasms.    diclofenac sodium (VOLTAREN) 1 % Gel Apply 2 g topically 4 (four) times daily.    ergocalciferol (ERGOCALCIFEROL) 50,000 unit Cap Take 1 capsule (50,000 Units total) by mouth every 7 days.    ferrous sulfate, dried (SLOW FE) 160 mg (50 mg iron) TbSR Take 1 tablet (160 mg total) by mouth  "once daily.    fluticasone propionate (FLONASE) 50 mcg/actuation nasal spray USE 2 SPRAYS BY EACH NOSTRIL ROUTE ONCE DAILY    folic acid (FOLVITE) 1 MG tablet Take 1 tablet (1 mg total) by mouth once daily.    gabapentin (NEURONTIN) 100 MG capsule Take 1 capsule (100 mg total) by mouth every evening.    methotrexate 2.5 MG Tab Take 6 tablets (15 mg total) by mouth every 7 days.    montelukast (SINGULAIR) 10 mg tablet Take 1 tablet by mouth once daily    MULTIVITS,CA,MINERALS/IRON/FA (ONE-A-DAY WOMENS FORMULA ORAL) Take by mouth once daily.     potassium citrate 99 mg Cap Take 1 capsule by mouth once daily.    celecoxib (CELEBREX) 200 MG capsule Take 1 capsule (200 mg total) by mouth once daily.     No current facility-administered medications for this visit.       Review of Systems     GENERAL:  No weight loss, malaise or fevers.  HEENT:   No recent changes in vision or hearing  NECK:  Negative for lumps, no difficulty with swallowing.  RESPIRATORY:  Negative for cough, wheezing or shortness of breath, patient denies any recent URI.  CARDIOVASCULAR:  Negative for chest pain, leg swelling or palpitations.  GI:  Negative for abdominal discomfort, blood in stools or black stools or change in bowel habits.  MUSCULOSKELETAL:  See HPI.  SKIN:  Negative for lesions, rash, and itching.  PSYCH:  No mood disorder or recent psychosocial stressors.  Patients sleep is not disturbed secondary to pain.  HEMATOLOGY/LYMPHOLOGY:  Negative for prolonged bleeding, bruising easily or swollen nodes.  Patient is currently taking anti-coagulants  NEURO:   No history of headaches, syncope, paralysis, seizures or tremors.  All other reviewed and negative other than HPI.    OBJECTIVE:    BP (!) 153/82   Pulse (!) (P) 55   Resp 17   Ht 5' 4" (1.626 m)   Wt 113 kg (249 lb 1.9 oz)   LMP 12/26/2020   BMI 42.76 kg/m²         Physical Exam    GENERAL: Well appearing, in no acute distress, alert and oriented x3.  PSYCH:  Mood and affect " appropriate.  SKIN: Skin color, texture, turgor normal, no rashes or lesions.  HEAD/FACE:  Normocephalic, atraumatic. Cranial nerves grossly intact.  CV: RRR with palpation of the radial artery.  PULM: No evidence of respiratory difficulty, symmetric chest rise.  GI:  Soft and non-tender.  BACK: Straight leg raising in the sitting and supine positions is negative to radicular pain.  Mild-to-moderate pain to palpation over the facet joints of the lumbar spine and lumbar paraspinals..  Limited range of motion secondary to body habitus and pain reproduction. Directional preference:  Flexion. Mild pain with lumbar extension and facet loading on the right  EXTREMITIES: Peripheral joint ROM is full and pain free without obvious instability or laxity in all four extremities. No deformities, edema, or skin discoloration. Good capillary refill.  MUSCULOSKELETAL: Shoulder, hip, and knee provocative maneuvers are negative.  There is moderate pain with palpation over the sacroiliac joint on the right.  FABERs test is positive on the right.  Sacral thrust positive.  FADIRs test is equivocal.   Bilateral upper and lower extremity strength is normal and symmetric.  No atrophy or tone abnormalities are noted.  Left knee: no swelling, erythema, or warmth. Crepitus present. Some pain with flexion and extension   NEURO: Bilateral upper and lower extremity coordination and muscle stretch reflexes are physiologic and symmetric.  Plantar response are downgoing. No clonus.  No loss of sensation is noted.  GAIT:  Slow, antalgic.      LABS:  Lab Results   Component Value Date    WBC 4.07 12/30/2024    HGB 12.8 12/30/2024    HCT 43.0 12/30/2024    MCV 87 12/30/2024     12/30/2024       CMP  Sodium   Date Value Ref Range Status   12/30/2024 142 136 - 145 mmol/L Final     Potassium   Date Value Ref Range Status   12/30/2024 4.0 3.5 - 5.1 mmol/L Final     Chloride   Date Value Ref Range Status   12/30/2024 108 95 - 110 mmol/L Final      CO2   Date Value Ref Range Status   12/30/2024 26 23 - 29 mmol/L Final     Glucose   Date Value Ref Range Status   12/30/2024 64 (L) 70 - 110 mg/dL Final     BUN   Date Value Ref Range Status   12/30/2024 11 6 - 20 mg/dL Final     Creatinine   Date Value Ref Range Status   12/30/2024 0.7 0.5 - 1.4 mg/dL Final     Calcium   Date Value Ref Range Status   12/30/2024 9.2 8.7 - 10.5 mg/dL Final     Total Protein   Date Value Ref Range Status   12/30/2024 7.3 6.0 - 8.4 g/dL Final     Albumin   Date Value Ref Range Status   12/30/2024 3.6 3.5 - 5.2 g/dL Final     Total Bilirubin   Date Value Ref Range Status   12/30/2024 0.3 0.1 - 1.0 mg/dL Final     Comment:     For infants and newborns, interpretation of results should be based  on gestational age, weight and in agreement with clinical  observations.    Premature Infant recommended reference ranges:  Up to 24 hours.............<8.0 mg/dL  Up to 48 hours............<12.0 mg/dL  3-5 days..................<15.0 mg/dL  6-29 days.................<15.0 mg/dL       Alkaline Phosphatase   Date Value Ref Range Status   12/30/2024 70 40 - 150 U/L Final     AST   Date Value Ref Range Status   12/30/2024 27 10 - 40 U/L Final     ALT   Date Value Ref Range Status   12/30/2024 24 10 - 44 U/L Final     Anion Gap   Date Value Ref Range Status   12/30/2024 8 8 - 16 mmol/L Final     eGFR if    Date Value Ref Range Status   05/06/2022 >60 >60 mL/min/1.73 m^2 Final     eGFR if non    Date Value Ref Range Status   05/06/2022 >60 >60 mL/min/1.73 m^2 Final     Comment:     Calculation used to obtain the estimated glomerular filtration  rate (eGFR) is the CKD-EPI equation.          Lab Results   Component Value Date    HGBA1C 5.4 09/18/2024             ASSESSMENT: 59 y.o. year old female with lower back pain, consistent with     1. Lumbar spondylosis  Case Request-RAD/Other Procedure Area: Right L4-5 and L5-S1 MBB (diagnostic, #2)      2. Rheumatoid arthritis  involving multiple sites with positive rheumatoid factor  celecoxib (CELEBREX) 200 MG capsule                PLAN:   - Interventions: Schedule 2nd set of diagnostic blocks to target right L4-5 and L5-S1 . If patient reports at least 80% relief of pain, we will move forward with RFA of the same levels.  Explained the risks and benefits of the procedure in detail with the patient today in clinic along with alternative treatment options, and the patient elected to pursue the intervention.      If fails to get relief, consider lumbar MRI    - Anticoagulation use: Patient is taking ASA as 1° prevention; no need to hold for lumbar MBB/RFA    - Medications: I have stressed the importance of physical activity and a home exercise plan to help with pain and improve health. and Patient can continue with medications for now since they are providing benefits, using them appropriately, and without side effects.     Continue gabapentin, celebrex, voltaren gel, flexeril- outside providers  Discussed increasing gabapentin- pt declines at this time          - Therapy:  Advised patient continue with activities as tolerated    - Psychological:  Discussed coping mechanisms to help address chronic pain issues    - Labs:  Reviewed    - Imaging: Reviewed available imaging with patient and answered any questions they had regarding study.    - Consults/Referrals:  None at this time    - Records:  Reviewed/Obtain old records from outside physicians and imaging    - Follow up visit:  Contact patient following diagnostic block    - Counseled patient regarding the importance of activity modification and physical therapy    - This condition does not require this patient to take time off of work, and the primary goal of our Pain Management services is to improve the patient's functional capacity.    - Patient Questions: Answered all of the patient's questions regarding diagnosis, therapy, and treatment        The above plan and management options  were discussed at length with patient. Patient is in agreement with the above and verbalized understanding.      Visit today included increased complexity associated with the care of the episodic problem of chronic pain which was addressed and continue to manage the longitudinal care of the patient due to the serious and/or complex managed problem(s) listed above.      Dorian Sumner MD  Interventional Pain Management  [facility]    Disclaimer:  This note was prepared using voice recognition system and is likely to have sound alike errors that may have been overlooked even after proof reading.  Please call me with any questions

## 2025-02-20 ENCOUNTER — OFFICE VISIT (OUTPATIENT)
Dept: SPORTS MEDICINE | Facility: CLINIC | Age: 60
End: 2025-02-20
Payer: COMMERCIAL

## 2025-02-20 VITALS — BODY MASS INDEX: 42.53 KG/M2 | HEIGHT: 64 IN | WEIGHT: 249.13 LBS

## 2025-02-20 DIAGNOSIS — M17.0 PRIMARY OSTEOARTHRITIS OF BOTH KNEES: Primary | ICD-10-CM

## 2025-02-20 RX ORDER — TRIAMCINOLONE ACETONIDE 40 MG/ML
40 INJECTION, SUSPENSION INTRA-ARTICULAR; INTRAMUSCULAR
Status: DISCONTINUED | OUTPATIENT
Start: 2025-02-20 | End: 2025-02-20 | Stop reason: HOSPADM

## 2025-02-20 RX ADMIN — TRIAMCINOLONE ACETONIDE 40 MG: 40 INJECTION, SUSPENSION INTRA-ARTICULAR; INTRAMUSCULAR at 04:02

## 2025-02-20 NOTE — PROCEDURES
Sports Medicine US - Guidance for Needle Placement    Date/Time: 2/20/2025 4:20 PM    Performed by: Lauri Berman MD  Authorized by: Lauri Berman MD  Preparation: Patient was prepped and draped in the usual sterile fashion.  Local anesthesia used: no    Anesthesia:  Local anesthesia used: no    Sedation:  Patient sedated: no    Patient tolerance: patient tolerated the procedure well with no immediate complications  Comments: Ultrasound guidance was used for needle localization. Images were saved and stored for documentation. The appropriate structures were visualized. Dynamic visualization of the needle was continuous throughout the procedures and maintained good position.

## 2025-02-20 NOTE — PROGRESS NOTES
Patient ID: Jake Hoskins  YOB: 1965  MRN: [id number]    Chief Complaint: Pain of the Left Knee      Referred By: self      History of Present Illness: Jake Hoskins is a right-hand dominant 59 y.o. female who presents today with left knee pain followup after receiving left knee steroid injection over a year ago, she has been uses Celebrex and her RA medications with some knee pain relief. She reports popping and sharp pains from sitting to standing. It has been over a year since has had any injections. Knee pain is worsened with weight-bearing and getting up after prolonged inactivity. Eases up after a few steps. Tries steroid Cortizone about a year ago, provided minimal relief. She would like to try gel injection in left knee.         09/26/2024 Interval History of Present Illness: Jake Hoskins is a right-hand dominant 60 y.o. female who presents today with left knee pain,  reports popping and sharp pains from sitting to standing., she also has a  history of left knee DJD. She has required injections in the past. It has been over a year since has had any injections. Knee pain is worsened with weight-bearing and getting up after prolonged inactivity. Eases up after a few steps. Tries steroid Cortizone about a year ago, provided minimal relief. She would like to try gel injection in left knee.    The patient is active in none.  Occupation: none      Past Medical History:   Past Medical History:   Diagnosis Date    Allergy     Anemia     Hypertension     Plantar fasciitis of left foot      Past Surgical History:   Procedure Laterality Date    CATHETERIZATION OF BOTH LEFT AND RIGHT HEART N/A 5/10/2024    Procedure: CATHETERIZATION, HEART, BOTH LEFT AND RIGHT;  Surgeon: Alison Buchanan MD;  Location: [facility] CATH LAB;  Service: Cardiology;  Laterality: N/A;    COLONOSCOPY N/A 04/09/2021    Procedure: COLONOSCOPY;  Surgeon: Hua Elmore MD;  Location: [facility] ENDO;  Service:  Endoscopy;  Laterality: N/A;    ECHOCARDIOGRAM,TRANSESOPHAGEAL N/A 5/13/2024    Procedure: ECHOCARDIOGRAM,TRANSESOPHAGEAL;  Surgeon: Chester Sanchez MD;  Location: [facility] OR;  Service: Cardiovascular;  Laterality: N/A;    HYSTERECTOMY  05/2021    INJECTION OF ANESTHETIC AGENT AROUND MEDIAL BRANCH NERVES INNERVATING LUMBAR FACET JOINT Right 1/30/2025    Procedure: right L4-5 and L5-S1 MBB;  Surgeon: oDrian Sumner MD;  Location: [facility] PAIN MGT;  Service: Pain Management;  Laterality: Right;    INJECTION OF ANESTHETIC AGENT AROUND MULTIPLE INTERCOSTAL NERVES N/A 5/13/2024    Procedure: BLOCK, NERVE, INTERCOSTAL, 2 OR MORE;  Surgeon: Chester Sanchez MD;  Location: [facility] OR;  Service: Cardiovascular;  Laterality: N/A;    INJECTION OF ANESTHETIC AGENT INTO SACROILIAC JOINT Right 12/17/2024    Procedure: Right SIJ Injection;  Surgeon: Dorian Sumner MD;  Location: [facility] PAIN MGT;  Service: Pain Management;  Laterality: Right;    OOPHORECTOMY  05/2021    RESECTION OF ATRIAL MYXOMA N/A 5/13/2024    Procedure: RESECTION, MYXOMA, CARDIAC ATRIUM;  Surgeon: Chester Sanchez MD;  Location: [facility] OR;  Service: Cardiovascular;  Laterality: N/A;    ROBOT-ASSISTED LAPAROSCOPIC ABDOMINAL HYSTERECTOMY USING DA ABBEY XI N/A 05/18/2021    Procedure: XI ROBOTIC HYSTERECTOMY;  Surgeon: Christa Davila MD;  Location: [facility] OR;  Service: OB/GYN;  Laterality: N/A;    ROBOT-ASSISTED LAPAROSCOPIC SALPINGO-OOPHORECTOMY USING DA ABBEY XI Bilateral 05/18/2021    Procedure: XI ROBOTIC SALPINGO-OOPHORECTOMY;  Surgeon: Christa Davila MD;  Location: [facility] OR;  Service: OB/GYN;  Laterality: Bilateral;     Family History   Problem Relation Name Age of Onset    Hypertension Mother      Asthma Mother      Cancer Mother          unknown    Cataracts Father      Hypertension Father      Heart disease Father      Hypertension Sister       Social History     Socioeconomic History    Marital status:     Number of children: 2   Occupational  History    Occupation: [employer]     Employer: [employer]   Tobacco Use    Smoking status: Never    Smokeless tobacco: Never   Substance and Sexual Activity    Alcohol use: No    Drug use: No    Sexual activity: Not Currently     Birth control/protection: Surgical     Social Drivers of Health     Financial Resource Strain: Patient Declined (5/9/2024)    Overall Financial Resource Strain (CARDIA)     Difficulty of Paying Living Expenses: Patient declined   Food Insecurity: Patient Declined (5/9/2024)    Hunger Vital Sign     Worried About Running Out of Food in the Last Year: Patient declined     Ran Out of Food in the Last Year: Patient declined   Transportation Needs: Patient Declined (5/9/2024)    TRANSPORTATION NEEDS     Transportation : Patient declined   Physical Activity: Sufficiently Active (5/10/2023)    Exercise Vital Sign     Days of Exercise per Week: 5 days     Minutes of Exercise per Session: 150+ min   Stress: Patient Declined (5/9/2024)    [employer] - Occupational Stress Questionnaire     Feeling of Stress : Patient declined   Housing Stability: Patient Declined (5/9/2024)    Housing Stability Vital Sign     Unable to Pay for Housing in the Last Year: Patient declined     Homeless in the Last Year: Patient declined     Medication List with Changes/Refills   Current Medications    CELECOXIB (CELEBREX) 200 MG CAPSULE    Take 1 capsule (200 mg total) by mouth once daily.    CYCLOBENZAPRINE (FLEXERIL) 10 MG TABLET    Take 1 tablet (10 mg total) by mouth nightly as needed for Muscle spasms.    DICLOFENAC SODIUM (VOLTAREN) 1 % GEL    Apply 2 g topically 4 (four) times daily.    ERGOCALCIFEROL (ERGOCALCIFEROL) 50,000 UNIT CAP    Take 1 capsule (50,000 Units total) by mouth every 7 days.    FERROUS SULFATE, DRIED (SLOW FE) 160 MG (50 MG IRON) TBSR    Take 1 tablet (160 mg total) by mouth once daily.    FLUTICASONE PROPIONATE (FLONASE) 50 MCG/ACTUATION NASAL SPRAY    USE 2 SPRAYS BY  EACH NOSTRIL ROUTE ONCE DAILY    FOLIC ACID (FOLVITE) 1 MG TABLET    Take 1 tablet (1 mg total) by mouth once daily.    GABAPENTIN (NEURONTIN) 100 MG CAPSULE    Take 1 capsule (100 mg total) by mouth every evening.    METHOTREXATE 2.5 MG TAB    Take 6 tablets (15 mg total) by mouth every 7 days.    MONTELUKAST (SINGULAIR) 10 MG TABLET    Take 1 tablet by mouth once daily    MULTIVITS,CA,MINERALS/IRON/FA (ONE-A-DAY WOMENS FORMULA ORAL)    Take by mouth once daily.     POTASSIUM CITRATE 99 MG CAP    Take 1 capsule by mouth once daily.     Review of patient's allergies indicates:   Allergen Reactions    Tramadol Nausea And Vomiting       Physical Exam:   Body mass index is 42.76 kg/m².    GENERAL: Well appearing, in no acute distress.  HEAD: Normocephalic and atraumatic.  ENT: External ears and nose grossly normal.  EYES: EOMI bilaterally  PULMONARY: Respirations are grossly even and non-labored.  NEURO: Awake, alert, and oriented x 3.  SKIN: No obvious rashes appreciated.  PSYCH: Mood & affect are appropriate.    Detailed MSK exam:     Left knee exam:   -ROM: extension 0, flexion 120  -TTP: Lateral joint line  -effusion: none  -Patellar apprehension negative  -Jose test negative  -stable to varus and valgus stress tests  -Lachman test negative, anterior drawer test negative, posterior drawer test negative    Right knee exam:   -ROM: extension 0, flexion 120  -TTP: Medial joint line  -effusion: none  -Patellar apprehension negative  -Jose test negative  -stable to varus and valgus stress tests  -Lachman test negative, anterior drawer test negative, posterior drawer test negative      Imaging:  X-ray Knee Ortho Bilateral with Flexion  Narrative: EXAMINATION:  XR KNEE ORTHO BILAT WITH FLEXION    CLINICAL HISTORY:  Pain in right knee    TECHNIQUE:  AP standing of both knees, PA flexion standing views of both knees, and Merchant views of both knees were performed.  Lateral views of both knees were also  performed.    COMPARISON:  Prior radiographs    FINDINGS:  Bilateral degenerative findings present more prevalent within the left knee and most advanced within the medial compartment including moderate to severe joint space loss.  No acute osseous abnormality.  No large joint effusion.  Impression: As above    Electronically signed by: Rivera Luu MD  Date:    02/13/2025  Time:    09:49        Relevant imaging results were reviewed and interpreted by me and per my read shows mild to moderate arthritic changes left knee, mild arthritic changes right knee.  This was discussed with the patient and / or family today.     Assessment:  Jake Hoskins is a 60 y.o. female presenting with chronic left knee pain.   History, physical and radiographs are consistent with a likely diagnosis of OA.   Plan: Steroid injection given today (see separate procedure note for details). We discussed the proper protocols after the injection such as no submerging pools, baths tubs, or hot tubs for 24 hr.  Showering is okay today.  We also discussed that blood sugars can be elevated after an injection and asked patient to properly checked her sugars over the next few days and contact their PCP if there are any concerns.  We discussed red flags such as fevers, chills, red, warm, tender joint at the area of injection to please seek medical care immediately.   Ice, voltaren gel as needed. OA info given. Continue conservative management for pain.   Follow up as needed. All questions answered.      Primary osteoarthritis of both knees  -     Sports Medicine US - Guidance for Needle Placement  -     Large Joint Aspiration/Injection: L knee       Ultrasound guidance was used for needle localization. Images were saved and stored for documentation. The appropriate structures were visualized. Dynamic visualization of the needle was continuous throughout the procedures and maintained good position.     MEDICAL NECESSITY FOR  VISCOSUPPLEMENTATION: After thorough evaluation of the patient, I have determined that visco-supplementation is medically necessary. The patient has painful degenerative changes of the knee with failure of conservative treatments including lifestyle modifications and rehabilitation exercises.  Oral analgesis/NSAIDs have not adequately controlled symptoms and there is radiographic evidence of Kellgren Zeus grade 2 or greater osteoarthritic changes, or in lack of radiographic evidence, there is arthroscopic or other evidence of chondrosis.       A copy of today's visit note has been sent to the referring provider.     Electronically signed:  Lauri Berman MD, MPH  02/20/2025  1:36 PM

## 2025-02-20 NOTE — PROCEDURES
Large Joint Aspiration/Injection: L knee    Date/Time: 2/20/2025 4:20 PM    Performed by: Lauri Berman MD  Authorized by: Lauri Berman MD    Consent Done?:  Yes (Verbal)  Indications:  Arthritis and pain  Site marked: the procedure site was marked    Timeout: prior to procedure the correct patient, procedure, and site was verified    Prep: patient was prepped and draped in usual sterile fashion    Local anesthetic:  Bupivacaine 0.5% without epinephrine and lidocaine 1% without epinephrine    Details:  Needle Size:  21 G  Ultrasonic Guidance for needle placement?: Yes    Images are saved and documented.  Approach:  Lateral (superior)  Location:  Knee  Site:  L knee  Medications:  40 mg triamcinolone acetonide 40 mg/mL  Patient tolerance:  Patient tolerated the procedure well with no immediate complications     Ultrasound guidance was used for needle localization. Images were saved and stored for documentation. The appropriate structures were visualized. Dynamic visualization of the needle was continuous throughout the procedures and maintained good position.

## 2025-02-20 NOTE — PATIENT INSTRUCTIONS
Assessment:  Jake Hoskins is a 60 y.o. female   Chief Complaint   Patient presents with    Left Knee - Pain       No diagnosis found.     Plan:  Ultrasound guided cortisone injection to the left knee  We discussed the proper protocols after the injection such as no submerging pools, baths tubs, or hot tubs for 24 hr.  Showering is okay today.  We also discussed that blood sugars can be elevated after an injection and asked patient to properly checked her sugars over the next few days and contact their PCP if there are any concerns.  We discussed red flags such as fevers, chills, red, warm, tender joint at the area of injection to please seek medical care immediately.    Follow up as needed      Follow-up: as needed.    Thank you for choosing [facility] and Dr. Lauri Berman for your orthopedic & sports medicine care. It is our goal to provide you with exceptional care that will help keep you healthy, active, and get you back in the game.    Please do not hesitate to reach out to us via email, phone, or MyChart with any questions, concerns, or feedback.    If you felt that you received exemplary care today, please consider leaving us feedback on [employer] at:  [email or web address]    If you are experiencing pain/discomfort ,or have questions after 5pm and would like to be connected to the [facility] on-call team, please call this number and specify which Sports Medicine provider is treating you: [phone]

## 2025-02-25 NOTE — PRE-PROCEDURE INSTRUCTIONS
Spoke with patient regarding procedure scheduled on 3.13     Arrival time 0600     Has patient been sick with fever or on antibiotics within the last 7 days? No     Does the patient have any open wounds, sores or rashes? No     Does the patient have any recent fractures? no     Has patient received a vaccination within the last 7 days? No     Received the COVID vaccination? yes     Has the patient stopped all medications as directed? na     Does patient have a pacemaker, defibrillator, or implantable stimulator? No     Does the patient have a ride to and from procedure and someone reliable to remain with patient?  SISTER     Is the patient diabetic? no      Does the patient have sleep apnea? Or use O2 at home? no     Is the patient receiving sedation? Yes      Is the patient instructed to remain NPO beginning at midnight the night before their procedure? yes     Procedure location confirmed with patient? Yes     Covid- Denies signs/symptoms. Instructed to notify PAT/MD if any changes.

## 2025-03-10 ENCOUNTER — PATIENT MESSAGE (OUTPATIENT)
Dept: PAIN MEDICINE | Facility: HOSPITAL | Age: 60
End: 2025-03-10
Payer: COMMERCIAL

## 2025-03-13 ENCOUNTER — PATIENT MESSAGE (OUTPATIENT)
Dept: RHEUMATOLOGY | Facility: CLINIC | Age: 60
End: 2025-03-13
Payer: COMMERCIAL

## 2025-03-13 ENCOUNTER — HOSPITAL ENCOUNTER (OUTPATIENT)
Facility: HOSPITAL | Age: 60
Discharge: HOME | End: 2025-03-13
Attending: PHYSICAL MEDICINE & REHABILITATION | Admitting: PHYSICAL MEDICINE & REHABILITATION
Payer: COMMERCIAL

## 2025-03-13 VITALS
TEMPERATURE: 98 F | HEART RATE: 62 BPM | WEIGHT: 246.5 LBS | HEIGHT: 64 IN | OXYGEN SATURATION: 100 % | SYSTOLIC BLOOD PRESSURE: 129 MMHG | DIASTOLIC BLOOD PRESSURE: 89 MMHG | BODY MASS INDEX: 42.08 KG/M2 | RESPIRATION RATE: 16 BRPM

## 2025-03-13 DIAGNOSIS — M47.816 LUMBAR SPONDYLOSIS: Primary | ICD-10-CM

## 2025-03-13 PROCEDURE — 63600175 PHARM REV CODE 636 W HCPCS: Performed by: PHYSICAL MEDICINE & REHABILITATION

## 2025-03-13 PROCEDURE — 64494 INJ PARAVERT F JNT L/S 2 LEV: CPT | Mod: RT,,, | Performed by: PHYSICAL MEDICINE & REHABILITATION

## 2025-03-13 PROCEDURE — 64493 INJ PARAVERT F JNT L/S 1 LEV: CPT | Mod: RT | Performed by: PHYSICAL MEDICINE & REHABILITATION

## 2025-03-13 PROCEDURE — 64494 INJ PARAVERT F JNT L/S 2 LEV: CPT | Mod: RT | Performed by: PHYSICAL MEDICINE & REHABILITATION

## 2025-03-13 PROCEDURE — 64493 INJ PARAVERT F JNT L/S 1 LEV: CPT | Mod: RT,,, | Performed by: PHYSICAL MEDICINE & REHABILITATION

## 2025-03-13 RX ORDER — FENTANYL CITRATE 50 UG/ML
INJECTION, SOLUTION INTRAMUSCULAR; INTRAVENOUS
Status: DISCONTINUED | OUTPATIENT
Start: 2025-03-13 | End: 2025-03-13 | Stop reason: HOSPADM

## 2025-03-13 RX ORDER — ONDANSETRON HYDROCHLORIDE 2 MG/ML
4 INJECTION, SOLUTION INTRAVENOUS ONCE AS NEEDED
Status: DISCONTINUED | OUTPATIENT
Start: 2025-03-13 | End: 2025-03-13 | Stop reason: HOSPADM

## 2025-03-13 RX ORDER — BUPIVACAINE HYDROCHLORIDE 5 MG/ML
INJECTION, SOLUTION EPIDURAL; INTRACAUDAL; PERINEURAL
Status: DISCONTINUED | OUTPATIENT
Start: 2025-03-13 | End: 2025-03-13 | Stop reason: HOSPADM

## 2025-03-13 RX ORDER — MIDAZOLAM HYDROCHLORIDE 1 MG/ML
INJECTION, SOLUTION INTRAMUSCULAR; INTRAVENOUS
Status: DISCONTINUED | OUTPATIENT
Start: 2025-03-13 | End: 2025-03-13 | Stop reason: HOSPADM

## 2025-03-13 NOTE — PLAN OF CARE
Pt and family verbalized understanding of discharge instructions. Bandaids x 2 to lower back c/d/i. Patient voiced no complaints, with no further questions at this time. Patient stood at side of bed, walked steps with no new motor deficits.  VSS on RA. Recovery care complete.

## 2025-03-13 NOTE — H&P
HPI  Patient presenting for Procedure(s) (LRB):  Right L4-5 and L5-S1 MBB (diagnostic, #2) (Right)       No health changes since previous encounter    Past Medical History:   Diagnosis Date    Allergy     Anemia     Hypertension     Plantar fasciitis of left foot      Past Surgical History:   Procedure Laterality Date    CATHETERIZATION OF BOTH LEFT AND RIGHT HEART N/A 5/10/2024    Procedure: CATHETERIZATION, HEART, BOTH LEFT AND RIGHT;  Surgeon: Alison Buchanan MD;  Location: [facility] CATH LAB;  Service: Cardiology;  Laterality: N/A;    COLONOSCOPY N/A 04/09/2021    Procedure: COLONOSCOPY;  Surgeon: Hua Elmore MD;  Location: [facility] ENDO;  Service: Endoscopy;  Laterality: N/A;    ECHOCARDIOGRAM,TRANSESOPHAGEAL N/A 5/13/2024    Procedure: ECHOCARDIOGRAM,TRANSESOPHAGEAL;  Surgeon: Chester Sanchez MD;  Location: [facility] OR;  Service: Cardiovascular;  Laterality: N/A;    HYSTERECTOMY  05/2021    INJECTION OF ANESTHETIC AGENT AROUND MEDIAL BRANCH NERVES INNERVATING LUMBAR FACET JOINT Right 1/30/2025    Procedure: right L4-5 and L5-S1 MBB;  Surgeon: Dorian Sumner MD;  Location: [facility] PAIN MGT;  Service: Pain Management;  Laterality: Right;    INJECTION OF ANESTHETIC AGENT AROUND MULTIPLE INTERCOSTAL NERVES N/A 5/13/2024    Procedure: BLOCK, NERVE, INTERCOSTAL, 2 OR MORE;  Surgeon: Chester Sanchez MD;  Location: [facility] OR;  Service: Cardiovascular;  Laterality: N/A;    INJECTION OF ANESTHETIC AGENT INTO SACROILIAC JOINT Right 12/17/2024    Procedure: Right SIJ Injection;  Surgeon: Dorian Sumner MD;  Location: [facility] PAIN MGT;  Service: Pain Management;  Laterality: Right;    OOPHORECTOMY  05/2021    RESECTION OF ATRIAL MYXOMA N/A 5/13/2024    Procedure: RESECTION, MYXOMA, CARDIAC ATRIUM;  Surgeon: Chester Sanchez MD;  Location: [facility] OR;  Service: Cardiovascular;  Laterality: N/A;    ROBOT-ASSISTED LAPAROSCOPIC ABDOMINAL HYSTERECTOMY USING DA ABBEY XI N/A 05/18/2021    Procedure: XI ROBOTIC HYSTERECTOMY;   "Surgeon: Christa Davila MD;  Location: [facility] OR;  Service: OB/GYN;  Laterality: N/A;    ROBOT-ASSISTED LAPAROSCOPIC SALPINGO-OOPHORECTOMY USING DA ABBEY XI Bilateral 05/18/2021    Procedure: XI ROBOTIC SALPINGO-OOPHORECTOMY;  Surgeon: Christa Davila MD;  Location: [facility] OR;  Service: OB/GYN;  Laterality: Bilateral;     Review of patient's allergies indicates:   Allergen Reactions    Tramadol Nausea And Vomiting        Medications Ordered Prior to Encounter[1]     PMHx, PSHx, Allergies, Medications reviewed in epic    ROS negative except pain complaints in HPI    OBJECTIVE:    /81 (BP Location: Right arm, Patient Position: Sitting)   Pulse 70   Temp 97.1 °F (36.2 °C) (Temporal)   Resp 19   Ht 5' 4" (1.626 m)   Wt 111.8 kg (246 lb 7.6 oz)   LMP 12/26/2020   SpO2 97%   Breastfeeding No   BMI 42.31 kg/m²     PHYSICAL EXAMINATION:    GENERAL: Well appearing, in no acute distress, alert and oriented x3.  PSYCH:  Mood and affect appropriate.  SKIN: Skin color, texture, turgor normal, no rashes or lesions which will impact the procedure.  CV: RRR with palpation of the radial artery.  PULM: No evidence of respiratory difficulty, symmetric chest rise. Clear to auscultation.  NEURO: Cranial nerves grossly intact.    Plan:    Proceed with procedure as planned Procedure(s) (LRB):  Right L4-5 and L5-S1 MBB (diagnostic, #2) (Right)    Dorian Sumner MD  03/13/2025                 [1]   No current facility-administered medications on file prior to encounter.     Current Outpatient Medications on File Prior to Encounter   Medication Sig Dispense Refill    celecoxib (CELEBREX) 200 MG capsule Take 1 capsule (200 mg total) by mouth once daily. 90 capsule 1    cyclobenzaprine (FLEXERIL) 10 MG tablet Take 1 tablet (10 mg total) by mouth nightly as needed for Muscle spasms. 90 tablet 0    diclofenac sodium (VOLTAREN) 1 % Gel Apply 2 g topically 4 (four) times daily. 50 g 1    ergocalciferol (ERGOCALCIFEROL) 50,000 unit " Cap Take 1 capsule (50,000 Units total) by mouth every 7 days. 12 capsule 4    ferrous sulfate, dried (SLOW FE) 160 mg (50 mg iron) TbSR Take 1 tablet (160 mg total) by mouth once daily. 90 tablet 3    fluticasone propionate (FLONASE) 50 mcg/actuation nasal spray USE 2 SPRAYS BY EACH NOSTRIL ROUTE ONCE DAILY 48 g 3    folic acid (FOLVITE) 1 MG tablet Take 1 tablet (1 mg total) by mouth once daily. 90 tablet 3    gabapentin (NEURONTIN) 100 MG capsule Take 1 capsule (100 mg total) by mouth every evening. 30 capsule 11    methotrexate 2.5 MG Tab Take 6 tablets (15 mg total) by mouth every 7 days. 72 tablet 0    montelukast (SINGULAIR) 10 mg tablet Take 1 tablet by mouth once daily 90 tablet 3    MULTIVITS,CA,MINERALS/IRON/FA (ONE-A-DAY WOMENS FORMULA ORAL) Take by mouth once daily.       potassium citrate 99 mg Cap Take 1 capsule by mouth once daily.

## 2025-03-13 NOTE — OP NOTE
LUMBAR Medial Branch Block (DIAGNOSTIC) Under Fluoroscopy  Time-out taken to identify patient and procedure side prior to starting the procedure.            03/13/2025                                                         Patient has clinical and imaging findings suggestive of facet mediated pain and has completed previous diagnostic medial branch blocks at specified levels with at least 80% relief for the expected duration of the local anesthetic utilized.    For supporting clinical documentation, please see most recent clinic and telephone notes.      PROCEDURE:  Right medial branch block at the transverse processes at the level of L4, L5, Sacral ala    REASON FOR PROCEDURE: Lumbar spondylosis [M47.816]    PHYSICIAN: Dorian Sumner MD  ASSISTANTS: None    MEDICATIONS INJECTED: 0.5% bupivicane, 1mL at each level    LOCAL ANESTHETIC USED: Xylocaine 1% 10ml    SEDATION MEDICATIONS: Conscious sedation provided by M.D    The patient was monitored with continuous pulse oximetry, EKG, and intermittent blood pressure monitors, immediately prior to administration of sedation.  The patient was hemodynamically stable throughout the entire process was responsive to voice, and breathing spontaneously.  Supplemental O2 was provided at 2L/min via nasal cannula.  Patient was comfortable for the duration of the procedure.     There was a total of 2mg IV Midazolam and 25mcg Fentanyl titrated for the procedure    Total sedation time was >10minutes and <20minutes      ESTIMATED BLOOD LOSS:  None.    COMPLICATIONS:  None.    TECHNIQUE: Laying in a prone position, the patient was prepped and draped in the usual sterile fashion using ChloraPrep and fenestrated drape.  The level was determined under fluoroscopic guidance.  Local anesthetic was given by going down to the hub of the 27-gauge 1.25in needle and raising a wheel.  A 22-gauge 3.5inch needle was introduced to the anatomic local of the medial branch at each of the above  levels using fluoroscopy in the AP, oblique, and lateral views.  After negative aspiration, medication was injected slowly. The patient tolerated the procedure well.       The patient was monitored after the procedure.  Patient was given post procedure and discharge instructions to follow at home.  We will see the patient back in two weeks or the patient may call to inform of status. The patient was discharged in a stable condition

## 2025-03-13 NOTE — DISCHARGE INSTRUCTIONS

## 2025-03-13 NOTE — DISCHARGE SUMMARY
Discharge Note  Short Stay      SUMMARY     Admit Date: 3/13/2025    Attending Physician: Dorian Sumner MD        Discharge Physician: Dorian Sumner MD        Discharge Date: 3/13/2025 8:04 AM    Procedure(s) (LRB):  Right L4-5 and L5-S1 MBB (diagnostic, #2) (Right)    Final Diagnosis: Lumbar spondylosis [M47.816]    Disposition: Home or self care    Patient Instructions:   Current Discharge Medication List        CONTINUE these medications which have NOT CHANGED    Details   celecoxib (CELEBREX) 200 MG capsule Take 1 capsule (200 mg total) by mouth once daily.  Qty: 90 capsule, Refills: 1    Associated Diagnoses: Rheumatoid arthritis involving multiple sites with positive rheumatoid factor      cyclobenzaprine (FLEXERIL) 10 MG tablet Take 1 tablet (10 mg total) by mouth nightly as needed for Muscle spasms.  Qty: 90 tablet, Refills: 0    Associated Diagnoses: Rheumatoid arthritis involving multiple sites with positive rheumatoid factor      diclofenac sodium (VOLTAREN) 1 % Gel Apply 2 g topically 4 (four) times daily.  Qty: 50 g, Refills: 1    Associated Diagnoses: Primary osteoarthritis of both knees      ergocalciferol (ERGOCALCIFEROL) 50,000 unit Cap Take 1 capsule (50,000 Units total) by mouth every 7 days.  Qty: 12 capsule, Refills: 4    Associated Diagnoses: Vitamin D deficiency      ferrous sulfate, dried (SLOW FE) 160 mg (50 mg iron) TbSR Take 1 tablet (160 mg total) by mouth once daily.  Qty: 90 tablet, Refills: 3    Associated Diagnoses: Anemia, unspecified type      fluticasone propionate (FLONASE) 50 mcg/actuation nasal spray USE 2 SPRAYS BY EACH NOSTRIL ROUTE ONCE DAILY  Qty: 48 g, Refills: 3    Associated Diagnoses: Acute seasonal allergic rhinitis due to pollen; Cough      folic acid (FOLVITE) 1 MG tablet Take 1 tablet (1 mg total) by mouth once daily.  Qty: 90 tablet, Refills: 3    Associated Diagnoses: Rheumatoid arthritis involving multiple sites with positive rheumatoid factor;  Immunodeficiency due to drug therapy; Encounter for monitoring immunosuppressive medication therapy causing immunodeficiency; High risk medication use      gabapentin (NEURONTIN) 100 MG capsule Take 1 capsule (100 mg total) by mouth every evening.  Qty: 30 capsule, Refills: 11      methotrexate 2.5 MG Tab Take 6 tablets (15 mg total) by mouth every 7 days.  Qty: 72 tablet, Refills: 0    Associated Diagnoses: Rheumatoid arthritis involving multiple sites with positive rheumatoid factor; Immunodeficiency due to drug therapy; Encounter for monitoring immunosuppressive medication therapy causing immunodeficiency; High risk medication use      montelukast (SINGULAIR) 10 mg tablet Take 1 tablet by mouth once daily  Qty: 90 tablet, Refills: 3    Associated Diagnoses: Acute seasonal allergic rhinitis due to pollen      MULTIVITS,CA,MINERALS/IRON/FA (ONE-A-DAY WOMENS FORMULA ORAL) Take by mouth once daily.       potassium citrate 99 mg Cap Take 1 capsule by mouth once daily.                 Discharge Diagnosis: Lumbar spondylosis [M47.816]  Condition on Discharge: Stable with no complications to procedure   Diet on Discharge: Same as before.  Activity: as per instruction sheet.  Discharge to: Home with a responsible adult.  Follow up: 2-4 weeks       Please call the office at [phone] if you experience any weakness or loss of sensation, fever > 101.5, pain uncontrolled with oral medications, persistent nausea/vomiting/or diarrhea, redness or drainage from the incisions, or any other worrisome concerns. If physician on call was not reached or could not communicate with our office for any reason please go to the nearest emergency department

## 2025-03-14 ENCOUNTER — TELEPHONE (OUTPATIENT)
Dept: PAIN MEDICINE | Facility: CLINIC | Age: 60
End: 2025-03-14
Payer: COMMERCIAL

## 2025-03-14 NOTE — TELEPHONE ENCOUNTER
Called patient in Regarding recent procedure: Lumbar MBB #2 on 3/13/25 scheduled an appt with MERRILL.    1. How much better did you feel day of procedure (0-6 hours immediately after)? 100% = complete relief of pain; 0% = no pain relief at all. 20%    2. What was pain score the day before your procedure on a scale from 0-10? 5    3. What was pain score immediately after your procedure (up to 6 hours) on a scale from 0-10? 6    4. When your pain returned, what was your pain score on a scale from 0-10? 6         Pain Disability Index (PDI) Score Review:      12/4/2024    10:19 AM   Last 3 PDI Scores   Pain Disability Index (PDI) 21

## 2025-03-17 ENCOUNTER — OFFICE VISIT (OUTPATIENT)
Dept: PAIN MEDICINE | Facility: CLINIC | Age: 60
End: 2025-03-17
Payer: COMMERCIAL

## 2025-03-17 VITALS
HEART RATE: 66 BPM | SYSTOLIC BLOOD PRESSURE: 133 MMHG | DIASTOLIC BLOOD PRESSURE: 69 MMHG | BODY MASS INDEX: 42.53 KG/M2 | WEIGHT: 247.81 LBS

## 2025-03-17 DIAGNOSIS — M54.9 DORSALGIA, UNSPECIFIED: Primary | ICD-10-CM

## 2025-03-17 DIAGNOSIS — M54.16 LUMBAR RADICULOPATHY: ICD-10-CM

## 2025-03-17 DIAGNOSIS — M46.1 SACROILIITIS: ICD-10-CM

## 2025-03-17 PROCEDURE — 3075F SYST BP GE 130 - 139MM HG: CPT | Mod: CPTII,S$GLB,, | Performed by: NURSE PRACTITIONER

## 2025-03-17 PROCEDURE — 99213 OFFICE O/P EST LOW 20 MIN: CPT | Mod: S$GLB,,, | Performed by: NURSE PRACTITIONER

## 2025-03-17 PROCEDURE — 4010F ACE/ARB THERAPY RXD/TAKEN: CPT | Mod: CPTII,S$GLB,, | Performed by: NURSE PRACTITIONER

## 2025-03-17 PROCEDURE — 3008F BODY MASS INDEX DOCD: CPT | Mod: CPTII,S$GLB,, | Performed by: NURSE PRACTITIONER

## 2025-03-17 PROCEDURE — 3078F DIAST BP <80 MM HG: CPT | Mod: CPTII,S$GLB,, | Performed by: NURSE PRACTITIONER

## 2025-03-17 PROCEDURE — 1159F MED LIST DOCD IN RCRD: CPT | Mod: CPTII,S$GLB,, | Performed by: NURSE PRACTITIONER

## 2025-03-17 PROCEDURE — 99999 PR PBB SHADOW E&M-EST. PATIENT-LVL III: CPT | Mod: PBBFAC,,, | Performed by: NURSE PRACTITIONER

## 2025-03-17 NOTE — PROGRESS NOTES
Established chronic pain patient note (follow-up visit):      Chief Complaint:   Chief Complaint   Patient presents with    Low-back Pain        SUBJECTIVE:  Interval History (3/17/2025):  Patient Jake Hoskins presents today for follow-up visit.  Patient had her Right L4/5 + L5/S1 MBB #2 without any relief.   She continues to have right-sided lower back pain which will radiate down the back of the right leg down to the knee.  Pain is worse with prolonged standing and walking.  She rates her pain today a 5 of 10.  Patient denies night fever/night sweats, urinary incontinence, bowel incontinence, significant weight loss and significant motor weakness.   Patient denies any other complaints or concerns at this time.      2/19/2025  Jake Hoskins is a 60 y.o. female presents today for follow-up.  She recently underwent right-sided L4-5 and L5-S1 medial branch blocks on 01/30/2025.  Multiple attempts were made to try and contact the patient for diagnostic response.  She reports that she has received at least 80% relief for the initial 24 hours, and then 50% relief for the next few days following the diagnostic block.  She reports that her pain has returned to baseline and rates her pain at 6/10.  She continues to take the gabapentin, Celebrex, and Flexeril as prescribed.  She reports that her pain is of the same type and quality as well.    Patient denies night fever/night sweats, urinary incontinence, bowel incontinence, significant weight loss, significant motor weakness, and loss of sensations.    Pain Disability Index Review:      12/4/2024    10:19 AM   Last 3 PDI Scores   Pain Disability Index (PDI) 21       Interval History (1/13/2025):  Patient Jake Hoskins presents today for follow-up visit.  Patient was last seen on 12/17/2024 Right SIJ injection with 50% relief. She rates her pain 4/10. She continues to have pain on the right lower back which is primarily axial. No pain on the left. Pain is worse  with prolonged standing. At times she will feel referred pain into the back of the thigh but never past the knees.  She has chronic left knee pain and has had steroid injections in the past with rheumatology but is no longer followed by them. She would like to see about getting in for left knee injection. Pain worse with prolonged walking and standing.      Interval history 12/4/2024  Jake Hoskins is a 59 y.o. female who presents to the clinic for the evaluation of   Back pain. She was referred by her primary care team for further evaluation and management of this pain.  She has past medical history of lumbar radiculopathy, hypertension, rheumatoid arthritis, anemia, and multiple other medical comorbidities as listed in her chart. The pain started about 2 years ago and symptoms have been unchanged.The pain is located in the right lumbosacral area and radiates to the right buttock and extending posteriorly to the knee.  The pain is described as sharp, stabbing, aching and is rated as 3/10. The pain is rated with a score of  3/10 on the BEST day and a score of 8/10 on the WORST day.  Symptoms interfere with daily activity. The pain is exacerbated by sit to stand, morning pain, prolonged standing or walking.  The pain is mitigated by rest, repositioning, gabapentin, Celebrex.     Non-Pharmacologic Treatments:  Physical Therapy/Home Exercise: yes  Ice/Heat:yes  TENS: no  Acupuncture: no  Massage: no  Chiropractic: no    Other: no      Pain Medications:  - Opioids: Tylenol 3, Norco  - Adjuvant Medications: gabapentin, Celebrex, Flexeril, Voltaren gel, methotrexate  - Anti-Coagulants: Aspirin     report:  Reviewed and consistent with medication use as prescribed.    Pain Procedures:   12/17/2024 Right SIJ injection with 50% relief.  01/30/2025: Right L4-5 and L5-S1 medial branch blocks, 80+% relief for 24 hours  3/13/2025  Right L4/5 + L5/S1 MBB #2    Imaging:    X-ray lumbar spine 06/24/2024:  Vertebral body  heights maintained. Trace retrolisthesis of L3 on L4. Disc spaces relatively maintained. Facet arthropathy noted. SI joint degenerative findings, greater on the left. No acute abnormality.     Past Medical History:   Diagnosis Date    Allergy     Anemia     Hypertension     Plantar fasciitis of left foot      Past Surgical History:   Procedure Laterality Date    CATHETERIZATION OF BOTH LEFT AND RIGHT HEART N/A 5/10/2024    Procedure: CATHETERIZATION, HEART, BOTH LEFT AND RIGHT;  Surgeon: Alison Buchanan MD;  Location: [facility] CATH LAB;  Service: Cardiology;  Laterality: N/A;    COLONOSCOPY N/A 04/09/2021    Procedure: COLONOSCOPY;  Surgeon: Hua Elmore MD;  Location: [facility] ENDO;  Service: Endoscopy;  Laterality: N/A;    ECHOCARDIOGRAM,TRANSESOPHAGEAL N/A 5/13/2024    Procedure: ECHOCARDIOGRAM,TRANSESOPHAGEAL;  Surgeon: Chester Sanchez MD;  Location: [facility] OR;  Service: Cardiovascular;  Laterality: N/A;    HYSTERECTOMY  05/2021    INJECTION OF ANESTHETIC AGENT AROUND MEDIAL BRANCH NERVES INNERVATING LUMBAR FACET JOINT Right 1/30/2025    Procedure: right L4-5 and L5-S1 MBB;  Surgeon: Dorian Sumner MD;  Location: [facility] PAIN MGT;  Service: Pain Management;  Laterality: Right;    INJECTION OF ANESTHETIC AGENT AROUND MEDIAL BRANCH NERVES INNERVATING LUMBAR FACET JOINT Right 3/13/2025    Procedure: Right L4-5 and L5-S1 MBB (diagnostic, #2);  Surgeon: Dorian Sumner MD;  Location: [facility] PAIN MGT;  Service: Pain Management;  Laterality: Right;    INJECTION OF ANESTHETIC AGENT AROUND MULTIPLE INTERCOSTAL NERVES N/A 5/13/2024    Procedure: BLOCK, NERVE, INTERCOSTAL, 2 OR MORE;  Surgeon: Chester Sanchez MD;  Location: [facility] OR;  Service: Cardiovascular;  Laterality: N/A;    INJECTION OF ANESTHETIC AGENT INTO SACROILIAC JOINT Right 12/17/2024    Procedure: Right SIJ Injection;  Surgeon: Dorian Sumner MD;  Location: [facility] PAIN MGT;  Service: Pain Management;  Laterality: Right;    OOPHORECTOMY  05/2021     RESECTION OF ATRIAL MYXOMA N/A 5/13/2024    Procedure: RESECTION, MYXOMA, CARDIAC ATRIUM;  Surgeon: Chester Sanchez MD;  Location: [facility] OR;  Service: Cardiovascular;  Laterality: N/A;    ROBOT-ASSISTED LAPAROSCOPIC ABDOMINAL HYSTERECTOMY USING DA ABBEY XI N/A 05/18/2021    Procedure: XI ROBOTIC HYSTERECTOMY;  Surgeon: Christa Davila MD;  Location: [facility] OR;  Service: OB/GYN;  Laterality: N/A;    ROBOT-ASSISTED LAPAROSCOPIC SALPINGO-OOPHORECTOMY USING DA ABBEY XI Bilateral 05/18/2021    Procedure: XI ROBOTIC SALPINGO-OOPHORECTOMY;  Surgeon: Christa Davila MD;  Location: [facility] OR;  Service: OB/GYN;  Laterality: Bilateral;     Social History     Socioeconomic History    Marital status:     Number of children: 2   Occupational History    Occupation: [employer]     Employer: [employer]   Tobacco Use    Smoking status: Never    Smokeless tobacco: Never   Substance and Sexual Activity    Alcohol use: No    Drug use: No    Sexual activity: Not Currently     Birth control/protection: Surgical     Social Drivers of Health     Financial Resource Strain: Patient Declined (5/9/2024)    Overall Financial Resource Strain (CARDIA)     Difficulty of Paying Living Expenses: Patient declined   Food Insecurity: Patient Declined (5/9/2024)    Hunger Vital Sign     Worried About Running Out of Food in the Last Year: Patient declined     Ran Out of Food in the Last Year: Patient declined   Transportation Needs: Patient Declined (5/9/2024)    TRANSPORTATION NEEDS     Transportation : Patient declined   Physical Activity: Sufficiently Active (5/10/2023)    Exercise Vital Sign     Days of Exercise per Week: 5 days     Minutes of Exercise per Session: 150+ min   Stress: Patient Declined (5/9/2024)    [place] [employer] - Occupational Stress Questionnaire     Feeling of Stress : Patient declined   Housing Stability: Patient Declined (5/9/2024)    Housing Stability Vital Sign     Unable to Pay for Housing in the Last  Year: Patient declined     Homeless in the Last Year: Patient declined     Family History   Problem Relation Name Age of Onset    Hypertension Mother      Asthma Mother      Cancer Mother          unknown    Cataracts Father      Hypertension Father      Heart disease Father      Hypertension Sister         Review of patient's allergies indicates:   Allergen Reactions    Tramadol Nausea And Vomiting       Current Outpatient Medications   Medication Sig    celecoxib (CELEBREX) 200 MG capsule Take 1 capsule (200 mg total) by mouth once daily.    cyclobenzaprine (FLEXERIL) 10 MG tablet Take 1 tablet (10 mg total) by mouth nightly as needed for Muscle spasms.    diclofenac sodium (VOLTAREN) 1 % Gel Apply 2 g topically 4 (four) times daily.    ergocalciferol (ERGOCALCIFEROL) 50,000 unit Cap Take 1 capsule (50,000 Units total) by mouth every 7 days.    ferrous sulfate, dried (SLOW FE) 160 mg (50 mg iron) TbSR Take 1 tablet (160 mg total) by mouth once daily.    fluticasone propionate (FLONASE) 50 mcg/actuation nasal spray USE 2 SPRAYS BY EACH NOSTRIL ROUTE ONCE DAILY    folic acid (FOLVITE) 1 MG tablet Take 1 tablet (1 mg total) by mouth once daily.    gabapentin (NEURONTIN) 100 MG capsule Take 1 capsule (100 mg total) by mouth every evening.    methotrexate 2.5 MG Tab Take 6 tablets (15 mg total) by mouth every 7 days.    montelukast (SINGULAIR) 10 mg tablet Take 1 tablet by mouth once daily    MULTIVITS,CA,MINERALS/IRON/FA (ONE-A-DAY WOMENS FORMULA ORAL) Take by mouth once daily.     potassium citrate 99 mg Cap Take 1 capsule by mouth once daily.     No current facility-administered medications for this visit.       Review of Systems     GENERAL:  No weight loss, malaise or fevers.  HEENT:   No recent changes in vision or hearing  NECK:  Negative for lumps, no difficulty with swallowing.  RESPIRATORY:  Negative for cough, wheezing or shortness of breath, patient denies any recent URI.  CARDIOVASCULAR:  Negative for  chest pain, leg swelling or palpitations.  GI:  Negative for abdominal discomfort, blood in stools or black stools or change in bowel habits.  MUSCULOSKELETAL:  See HPI.  SKIN:  Negative for lesions, rash, and itching.  PSYCH:  No mood disorder or recent psychosocial stressors.  Patients sleep is not disturbed secondary to pain.  HEMATOLOGY/LYMPHOLOGY:  Negative for prolonged bleeding, bruising easily or swollen nodes.  Patient is currently taking anti-coagulants  NEURO:   No history of headaches, syncope, paralysis, seizures or tremors.  All other reviewed and negative other than HPI.    OBJECTIVE:    /69   Pulse 66   Wt 112.4 kg (247 lb 12.8 oz)   LMP 12/26/2020   BMI 42.53 kg/m²         Physical Exam    GENERAL: Well appearing, in no acute distress, alert and oriented x3.  PSYCH:  Mood and affect appropriate.  SKIN: Skin color, texture, turgor normal, no rashes or lesions.  HEAD/FACE:  Normocephalic, atraumatic. Cranial nerves grossly intact.  CV: RRR with palpation of the radial artery.  PULM: No evidence of respiratory difficulty, symmetric chest rise.  GI:  Soft and non-tender.  BACK: Straight leg raising in the sitting and supine positions is negative to radicular pain on the left, equivocal on the right.  Mild-to-moderate pain to palpation over the facet joints of the lumbar spine and lumbar paraspinals..  Limited range of motion secondary to body habitus and pain reproduction. Directional preference:  Flexion. Mild pain with lumbar extension and facet loading on the right  EXTREMITIES: Peripheral joint ROM is full and pain free without obvious instability or laxity in all four extremities. No deformities, edema, or skin discoloration. Good capillary refill.  MUSCULOSKELETAL: Shoulder, hip, and knee provocative maneuvers are negative.  There is moderate pain with palpation over the sacroiliac joint on the right.  FABERs test is positive on the right.  Sacral thrust positive.  FADIRs test is  equivocal.   Bilateral upper and lower extremity strength is normal and symmetric.  No atrophy or tone abnormalities are noted.  Left knee: no swelling, erythema, or warmth. Crepitus present. Some pain with flexion and extension   NEURO: Bilateral upper and lower extremity coordination and muscle stretch reflexes are physiologic and symmetric.  Plantar response are downgoing. No clonus.  No loss of sensation is noted.  GAIT:  Slow, antalgic.      LABS:  Lab Results   Component Value Date    WBC 4.07 12/30/2024    HGB 12.8 12/30/2024    HCT 43.0 12/30/2024    MCV 87 12/30/2024     12/30/2024       CMP  Sodium   Date Value Ref Range Status   12/30/2024 142 136 - 145 mmol/L Final     Potassium   Date Value Ref Range Status   12/30/2024 4.0 3.5 - 5.1 mmol/L Final     Chloride   Date Value Ref Range Status   12/30/2024 108 95 - 110 mmol/L Final     CO2   Date Value Ref Range Status   12/30/2024 26 23 - 29 mmol/L Final     Glucose   Date Value Ref Range Status   12/30/2024 64 (L) 70 - 110 mg/dL Final     BUN   Date Value Ref Range Status   12/30/2024 11 6 - 20 mg/dL Final     Creatinine   Date Value Ref Range Status   12/30/2024 0.7 0.5 - 1.4 mg/dL Final     Calcium   Date Value Ref Range Status   12/30/2024 9.2 8.7 - 10.5 mg/dL Final     Total Protein   Date Value Ref Range Status   12/30/2024 7.3 6.0 - 8.4 g/dL Final     Albumin   Date Value Ref Range Status   12/30/2024 3.6 3.5 - 5.2 g/dL Final     Total Bilirubin   Date Value Ref Range Status   12/30/2024 0.3 0.1 - 1.0 mg/dL Final     Comment:     For infants and newborns, interpretation of results should be based  on gestational age, weight and in agreement with clinical  observations.    Premature Infant recommended reference ranges:  Up to 24 hours.............<8.0 mg/dL  Up to 48 hours............<12.0 mg/dL  3-5 days..................<15.0 mg/dL  6-29 days.................<15.0 mg/dL       Alkaline Phosphatase   Date Value Ref Range Status   12/30/2024 70  40 - 150 U/L Final     AST   Date Value Ref Range Status   12/30/2024 27 10 - 40 U/L Final     ALT   Date Value Ref Range Status   12/30/2024 24 10 - 44 U/L Final     Anion Gap   Date Value Ref Range Status   12/30/2024 8 8 - 16 mmol/L Final     eGFR if    Date Value Ref Range Status   05/06/2022 >60 >60 mL/min/1.73 m^2 Final     eGFR if non    Date Value Ref Range Status   05/06/2022 >60 >60 mL/min/1.73 m^2 Final     Comment:     Calculation used to obtain the estimated glomerular filtration  rate (eGFR) is the CKD-EPI equation.          Lab Results   Component Value Date    HGBA1C 5.4 09/18/2024             ASSESSMENT: 60 y.o. year old female with lower back pain, consistent with     1. Dorsalgia, unspecified  MRI Lumbar Spine Without Contrast      2. Lumbar radiculopathy  MRI Lumbar Spine Without Contrast      3. Sacroiliitis                    PLAN:   - Interventions: Schedule lumbar MRI for further evaluation    - Anticoagulation use: Patient is taking ASA as 1° prevention; no need to hold for lumbar MBB/RFA    - Medications: I have stressed the importance of physical activity and a home exercise plan to help with pain and improve health. and Patient can continue with medications for now since they are providing benefits, using them appropriately, and without side effects.     Continue gabapentin, celebrex, voltaren gel, flexeril- outside providers  Discussed increasing gabapentin- pt declines at this time          - Therapy:  Advised patient continue with activities as tolerated  Continue at home physical therapy exercises    - Psychological:  Discussed coping mechanisms to help address chronic pain issues    - Labs:  Reviewed    - Imaging: Reviewed available imaging with patient and answered any questions they had regarding study.    - Consults/Referrals:  None at this time    - Records:  Reviewed/Obtain old records from outside physicians and imaging    - Follow up visit:   after MRI- in person, pt request    - Counseled patient regarding the importance of activity modification and physical therapy    - This condition does not require this patient to take time off of work, and the primary goal of our Pain Management services is to improve the patient's functional capacity.    - Patient Questions: Answered all of the patient's questions regarding diagnosis, therapy, and treatment        The above plan and management options were discussed at length with patient. Patient is in agreement with the above and verbalized understanding.      Visit today included increased complexity associated with the care of the episodic problem of chronic pain which was addressed and continue to manage the longitudinal care of the patient due to the serious and/or complex managed problem(s) listed above.      Dorita Shukla NP  Interventional Pain Management  [facility]    Disclaimer:  This note was prepared using voice recognition system and is likely to have sound alike errors that may have been overlooked even after proof reading.  Please call me with any questions

## 2025-03-18 ENCOUNTER — LAB VISIT (OUTPATIENT)
Dept: LAB | Facility: HOSPITAL | Age: 60
End: 2025-03-18
Attending: STUDENT IN AN ORGANIZED HEALTH CARE EDUCATION/TRAINING PROGRAM
Payer: COMMERCIAL

## 2025-03-18 DIAGNOSIS — D84.821 IMMUNODEFICIENCY DUE TO DRUG THERAPY: ICD-10-CM

## 2025-03-18 DIAGNOSIS — Z79.899 IMMUNODEFICIENCY DUE TO DRUG THERAPY: ICD-10-CM

## 2025-03-18 DIAGNOSIS — Z79.60 ENCOUNTER FOR MONITORING IMMUNOSUPPRESSIVE MEDICATION THERAPY CAUSING IMMUNODEFICIENCY: ICD-10-CM

## 2025-03-18 DIAGNOSIS — Z51.81 ENCOUNTER FOR MONITORING IMMUNOSUPPRESSIVE MEDICATION THERAPY CAUSING IMMUNODEFICIENCY: ICD-10-CM

## 2025-03-18 DIAGNOSIS — D84.821 ENCOUNTER FOR MONITORING IMMUNOSUPPRESSIVE MEDICATION THERAPY CAUSING IMMUNODEFICIENCY: ICD-10-CM

## 2025-03-18 DIAGNOSIS — Z79.899 HIGH RISK MEDICATION USE: ICD-10-CM

## 2025-03-18 DIAGNOSIS — M05.79 RHEUMATOID ARTHRITIS INVOLVING MULTIPLE SITES WITH POSITIVE RHEUMATOID FACTOR: ICD-10-CM

## 2025-03-18 LAB
ALBUMIN SERPL BCP-MCNC: 3.6 G/DL (ref 3.5–5.2)
ALP SERPL-CCNC: 62 U/L (ref 40–150)
ALT SERPL W/O P-5'-P-CCNC: 16 U/L (ref 10–44)
ANION GAP SERPL CALC-SCNC: 9 MMOL/L (ref 8–16)
AST SERPL-CCNC: 17 U/L (ref 10–40)
BASOPHILS # BLD AUTO: 0.04 K/UL (ref 0–0.2)
BASOPHILS NFR BLD: 0.9 % (ref 0–1.9)
BILIRUB SERPL-MCNC: 0.5 MG/DL (ref 0.1–1)
BUN SERPL-MCNC: 15 MG/DL (ref 8–23)
CALCIUM SERPL-MCNC: 9.2 MG/DL (ref 8.7–10.5)
CHLORIDE SERPL-SCNC: 107 MMOL/L (ref 95–110)
CO2 SERPL-SCNC: 26 MMOL/L (ref 23–29)
CREAT SERPL-MCNC: 0.7 MG/DL (ref 0.5–1.4)
CRP SERPL-MCNC: 2.4 MG/L (ref 0–8.2)
DIFFERENTIAL METHOD BLD: ABNORMAL
EOSINOPHIL # BLD AUTO: 0.3 K/UL (ref 0–0.5)
EOSINOPHIL NFR BLD: 5.6 % (ref 0–8)
ERYTHROCYTE [DISTWIDTH] IN BLOOD BY AUTOMATED COUNT: 15.9 % (ref 11.5–14.5)
ERYTHROCYTE [SEDIMENTATION RATE] IN BLOOD BY WESTERGREN METHOD: 7 MM/HR (ref 0–36)
EST. GFR  (NO RACE VARIABLE): >60 ML/MIN/1.73 M^2
GLUCOSE SERPL-MCNC: 96 MG/DL (ref 70–110)
HCT VFR BLD AUTO: 43.3 % (ref 37–48.5)
HGB BLD-MCNC: 13.3 G/DL (ref 12–16)
IMM GRANULOCYTES # BLD AUTO: 0.01 K/UL (ref 0–0.04)
IMM GRANULOCYTES NFR BLD AUTO: 0.2 % (ref 0–0.5)
LYMPHOCYTES # BLD AUTO: 1.6 K/UL (ref 1–4.8)
LYMPHOCYTES NFR BLD: 36.4 % (ref 18–48)
MCH RBC QN AUTO: 26.4 PG (ref 27–31)
MCHC RBC AUTO-ENTMCNC: 30.7 G/DL (ref 32–36)
MCV RBC AUTO: 86 FL (ref 82–98)
MONOCYTES # BLD AUTO: 0.4 K/UL (ref 0.3–1)
MONOCYTES NFR BLD: 8 % (ref 4–15)
NEUTROPHILS # BLD AUTO: 2.2 K/UL (ref 1.8–7.7)
NEUTROPHILS NFR BLD: 48.9 % (ref 38–73)
NRBC BLD-RTO: 0 /100 WBC
PLATELET # BLD AUTO: 276 K/UL (ref 150–450)
PMV BLD AUTO: 11.5 FL (ref 9.2–12.9)
POTASSIUM SERPL-SCNC: 4.1 MMOL/L (ref 3.5–5.1)
PROT SERPL-MCNC: 7.1 G/DL (ref 6–8.4)
RBC # BLD AUTO: 5.03 M/UL (ref 4–5.4)
SODIUM SERPL-SCNC: 142 MMOL/L (ref 136–145)
WBC # BLD AUTO: 4.5 K/UL (ref 3.9–12.7)

## 2025-03-18 PROCEDURE — 80053 COMPREHEN METABOLIC PANEL: CPT | Performed by: STUDENT IN AN ORGANIZED HEALTH CARE EDUCATION/TRAINING PROGRAM

## 2025-03-18 PROCEDURE — 85025 COMPLETE CBC W/AUTO DIFF WBC: CPT | Performed by: STUDENT IN AN ORGANIZED HEALTH CARE EDUCATION/TRAINING PROGRAM

## 2025-03-18 PROCEDURE — 85651 RBC SED RATE NONAUTOMATED: CPT | Performed by: STUDENT IN AN ORGANIZED HEALTH CARE EDUCATION/TRAINING PROGRAM

## 2025-03-18 PROCEDURE — 86140 C-REACTIVE PROTEIN: CPT | Performed by: STUDENT IN AN ORGANIZED HEALTH CARE EDUCATION/TRAINING PROGRAM

## 2025-03-18 PROCEDURE — 36415 COLL VENOUS BLD VENIPUNCTURE: CPT | Mod: PN | Performed by: STUDENT IN AN ORGANIZED HEALTH CARE EDUCATION/TRAINING PROGRAM

## 2025-03-20 ENCOUNTER — HOSPITAL ENCOUNTER (OUTPATIENT)
Dept: RADIOLOGY | Facility: HOSPITAL | Age: 60
Discharge: HOME | End: 2025-03-20
Attending: FAMILY MEDICINE
Payer: COMMERCIAL

## 2025-03-20 ENCOUNTER — PATIENT MESSAGE (OUTPATIENT)
Dept: RHEUMATOLOGY | Facility: CLINIC | Age: 60
End: 2025-03-20
Payer: COMMERCIAL

## 2025-03-20 DIAGNOSIS — Z12.31 ENCOUNTER FOR SCREENING MAMMOGRAM FOR BREAST CANCER: ICD-10-CM

## 2025-03-20 PROCEDURE — 77063 BREAST TOMOSYNTHESIS BI: CPT | Mod: 26,,, | Performed by: RADIOLOGY

## 2025-03-20 PROCEDURE — 77063 BREAST TOMOSYNTHESIS BI: CPT | Mod: TC

## 2025-03-20 PROCEDURE — 77067 SCR MAMMO BI INCL CAD: CPT | Mod: 26,,, | Performed by: RADIOLOGY

## 2025-03-21 ENCOUNTER — RESULTS FOLLOW-UP (OUTPATIENT)
Dept: INTERNAL MEDICINE | Facility: CLINIC | Age: 60
End: 2025-03-21

## 2025-03-25 ENCOUNTER — OFFICE VISIT (OUTPATIENT)
Dept: RHEUMATOLOGY | Facility: CLINIC | Age: 60
End: 2025-03-25
Payer: COMMERCIAL

## 2025-03-25 VITALS
HEART RATE: 75 BPM | HEIGHT: 64 IN | WEIGHT: 244.25 LBS | SYSTOLIC BLOOD PRESSURE: 129 MMHG | DIASTOLIC BLOOD PRESSURE: 83 MMHG | BODY MASS INDEX: 41.7 KG/M2

## 2025-03-25 DIAGNOSIS — Z79.899 IMMUNODEFICIENCY DUE TO DRUG THERAPY: ICD-10-CM

## 2025-03-25 DIAGNOSIS — E55.9 VITAMIN D DEFICIENCY: ICD-10-CM

## 2025-03-25 DIAGNOSIS — D84.821 IMMUNODEFICIENCY DUE TO DRUG THERAPY: ICD-10-CM

## 2025-03-25 DIAGNOSIS — M05.79 RHEUMATOID ARTHRITIS INVOLVING MULTIPLE SITES WITH POSITIVE RHEUMATOID FACTOR: Primary | ICD-10-CM

## 2025-03-25 DIAGNOSIS — Z79.60 ENCOUNTER FOR MONITORING IMMUNOSUPPRESSIVE MEDICATION THERAPY CAUSING IMMUNODEFICIENCY: ICD-10-CM

## 2025-03-25 DIAGNOSIS — Z79.899 HIGH RISK MEDICATION USE: ICD-10-CM

## 2025-03-25 DIAGNOSIS — Z51.81 ENCOUNTER FOR MONITORING IMMUNOSUPPRESSIVE MEDICATION THERAPY CAUSING IMMUNODEFICIENCY: ICD-10-CM

## 2025-03-25 DIAGNOSIS — D84.821 ENCOUNTER FOR MONITORING IMMUNOSUPPRESSIVE MEDICATION THERAPY CAUSING IMMUNODEFICIENCY: ICD-10-CM

## 2025-03-25 PROCEDURE — 1160F RVW MEDS BY RX/DR IN RCRD: CPT | Mod: CPTII,S$GLB,, | Performed by: STUDENT IN AN ORGANIZED HEALTH CARE EDUCATION/TRAINING PROGRAM

## 2025-03-25 PROCEDURE — 1159F MED LIST DOCD IN RCRD: CPT | Mod: CPTII,S$GLB,, | Performed by: STUDENT IN AN ORGANIZED HEALTH CARE EDUCATION/TRAINING PROGRAM

## 2025-03-25 PROCEDURE — 3074F SYST BP LT 130 MM HG: CPT | Mod: CPTII,S$GLB,, | Performed by: STUDENT IN AN ORGANIZED HEALTH CARE EDUCATION/TRAINING PROGRAM

## 2025-03-25 PROCEDURE — 3079F DIAST BP 80-89 MM HG: CPT | Mod: CPTII,S$GLB,, | Performed by: STUDENT IN AN ORGANIZED HEALTH CARE EDUCATION/TRAINING PROGRAM

## 2025-03-25 PROCEDURE — 3008F BODY MASS INDEX DOCD: CPT | Mod: CPTII,S$GLB,, | Performed by: STUDENT IN AN ORGANIZED HEALTH CARE EDUCATION/TRAINING PROGRAM

## 2025-03-25 PROCEDURE — 4010F ACE/ARB THERAPY RXD/TAKEN: CPT | Mod: CPTII,S$GLB,, | Performed by: STUDENT IN AN ORGANIZED HEALTH CARE EDUCATION/TRAINING PROGRAM

## 2025-03-25 PROCEDURE — 99214 OFFICE O/P EST MOD 30 MIN: CPT | Mod: S$GLB,,, | Performed by: STUDENT IN AN ORGANIZED HEALTH CARE EDUCATION/TRAINING PROGRAM

## 2025-03-25 PROCEDURE — 99999 PR PBB SHADOW E&M-EST. PATIENT-LVL III: CPT | Mod: PBBFAC,,, | Performed by: STUDENT IN AN ORGANIZED HEALTH CARE EDUCATION/TRAINING PROGRAM

## 2025-03-25 RX ORDER — FOLIC ACID 1 MG/1
1 TABLET ORAL DAILY
Qty: 90 TABLET | Refills: 3 | Status: SHIPPED | OUTPATIENT
Start: 2025-03-25

## 2025-03-25 RX ORDER — METHOTREXATE 2.5 MG/1
15 TABLET ORAL
Qty: 72 TABLET | Refills: 2 | Status: SHIPPED | OUTPATIENT
Start: 2025-03-25

## 2025-03-25 RX ORDER — ERGOCALCIFEROL 1.25 MG/1
50000 CAPSULE ORAL
Qty: 12 CAPSULE | Refills: 3 | Status: SHIPPED | OUTPATIENT
Start: 2025-03-25

## 2025-03-25 NOTE — PROGRESS NOTES
"Subjective:      Patient ID: Jake Hoskins is a 60 y.o. female.    Chief Complaint: Rheumatoid arthritis    HPI:   Patient presents for Rheumatology follow up for RA. She's taking MTX 15 mg/wk + folic acid 1 mg daily. RA is doing very well with no joint flare ups and minimal morning stiffness. Left knee has medial compartment OA and this is better since she got a steroid injection through Sports Medicine. She follows with Pain Management for low back degenerative arthritis. She has also been trying to make dietary changes, cutting out processed food and salt from her diet. Denies joint swelling, significant stiffness, skin rashes, oral ulcers, patchy alopecia, sicca symptoms, cardiopulmonary symptoms, eye inflammation, IBD, fevers, weight loss, Raynaud's. Rheumatology review of systems is otherwise negative.    Objective:   /83 (BP Location: Right arm, Patient Position: Sitting)   Pulse 75   Ht 5' 4" (1.626 m)   Wt 110.8 kg (244 lb 4.3 oz)   LMP 12/26/2020   BMI 41.93 kg/m²   Physical Exam  Constitutional:       General: She is not in acute distress.     Appearance: Normal appearance.   HENT:      Head: Normocephalic and atraumatic.      Mouth/Throat:      Mouth: Mucous membranes are moist.      Pharynx: Oropharynx is clear.   Cardiovascular:      Rate and Rhythm: Normal rate and regular rhythm.   Pulmonary:      Effort: Pulmonary effort is normal.      Breath sounds: Normal breath sounds.   Abdominal:      Palpations: Abdomen is soft.      Tenderness: There is no abdominal tenderness.   Musculoskeletal:         General: No swelling or tenderness.      Cervical back: Normal range of motion. No tenderness.      Right lower leg: Edema present.      Left lower leg: Edema present.   Skin:     General: Skin is warm and dry.   Neurological:      Mental Status: She is alert and oriented to person, place, and time. Mental status is at baseline.             Assessment and Plan:     Problem List Items Addressed " This Visit          Unprioritized    Rheumatoid arthritis involving multiple sites with positive rheumatoid factor - Primary    Relevant Medications    methotrexate 2.5 MG Tab    folic acid (FOLVITE) 1 MG tablet     Other Visit Diagnoses         Immunodeficiency due to drug therapy        Relevant Medications    methotrexate 2.5 MG Tab    folic acid (FOLVITE) 1 MG tablet      Encounter for monitoring immunosuppressive medication therapy causing immunodeficiency        Relevant Medications    methotrexate 2.5 MG Tab    folic acid (FOLVITE) 1 MG tablet      High risk medication use        Relevant Medications    methotrexate 2.5 MG Tab    folic acid (FOLVITE) 1 MG tablet      Vitamin D deficiency        Relevant Medications    ergocalciferol (ERGOCALCIFEROL) 50,000 unit Cap              Patient presents for Rheumatology follow up for RA.   RF+ 59, CCP+ 10.8.  Stable disease. Reviewed labs for this visit which are stable.     Plan:  Continue MTX 15 mg weekly.  Continue folic acid 1 mg daily.  Continue vitamin D 50,000 IU weekly for vitamin D deficiency.  Informed patient that I will be leaving [facility] in June and she will have to establish with an external Rheumatologist for follow up in the next 4-6 months.      High Risk Medication Monitoring encounter  Drug therapy requiring intensive monitoring for toxicity  No current medication related issues, no evidence of toxicity  Appropriate labs ordered for toxicity monitoring    Compromised immune system secondary to autoimmune disease and/or use of immunosuppressive drugs.  Monitor carefully for infections.  Advised patient to get immediate medical care if any infection arises.  Also advised strict adherence age-appropriate vaccinations and cancer screenings with PCP.    Patient advised to hold DMARD and/or biologic therapy for signs of infection or for surgery. If you are unsure what to do please call our office for instruction.[facility]  [phone]        I spent a total of 30 minutes on the day of the visit.  This includes face to face time and non-face to face time preparing to see the patient (eg, review of tests), obtaining and/or reviewing separately obtained history, documenting clinical information in the electronic or other health record, independently interpreting results and communicating results to the patient/family/caregiver, or care coordinator.

## 2025-04-08 NOTE — PROGRESS NOTES
Established chronic pain patient note (follow-up visit):      Chief Complaint:   Chief Complaint   Patient presents with    Low-back Pain        SUBJECTIVE:    Interval History (4/16/2025):  Patient Jake Hoskins presents today for follow-up visit.  Patient   Is being seen today to review her lumbar MRI.  She continues to have pain over the right side of the lower back which will radiate down the side in the back of the right leg to about the knee.  Pain is worse with prolonged standing and bending.  She has had a sacroiliac joint injection which only provided relief for a couple of weeks.  She had a medial branch block which provided good relief with the 1st 1 and not with the 2nd 1.  She continues to take gabapentin, Celebrex, Voltaren gel.  She rates her pain today a 4/10.    Interval History (3/17/2025):  Patient Jake Hoskins presents today for follow-up visit.  Patient had her Right L4/5 + L5/S1 MBB #2 without any relief.   She continues to have right-sided lower back pain which will radiate down the back of the right leg down to the knee.  Pain is worse with prolonged standing and walking.  She rates her pain today a 5 of 10.  Patient denies night fever/night sweats, urinary incontinence, bowel incontinence, significant weight loss and significant motor weakness.   Patient denies any other complaints or concerns at this time.      2/19/2025  Jake Hoskins is a 60 y.o. female presents today for follow-up.  She recently underwent right-sided L4-5 and L5-S1 medial branch blocks on 01/30/2025.  Multiple attempts were made to try and contact the patient for diagnostic response.  She reports that she has received at least 80% relief for the initial 24 hours, and then 50% relief for the next few days following the diagnostic block.  She reports that her pain has returned to baseline and rates her pain at 6/10.  She continues to take the gabapentin, Celebrex, and Flexeril as prescribed.  She reports that  her pain is of the same type and quality as well.    Patient denies night fever/night sweats, urinary incontinence, bowel incontinence, significant weight loss, significant motor weakness, and loss of sensations.    Pain Disability Index Review:      4/16/2025    10:47 AM 12/4/2024    10:19 AM   Last 3 PDI Scores   Pain Disability Index (PDI) 28 21       Interval History (1/13/2025):  Patient Jake Hoskins presents today for follow-up visit.  Patient was last seen on 12/17/2024 Right SIJ injection with 50% relief. She rates her pain 4/10. She continues to have pain on the right lower back which is primarily axial. No pain on the left. Pain is worse with prolonged standing. At times she will feel referred pain into the back of the thigh but never past the knees.  She has chronic left knee pain and has had steroid injections in the past with rheumatology but is no longer followed by them. She would like to see about getting in for left knee injection. Pain worse with prolonged walking and standing.      Interval history 12/4/2024  Jake Hoskins is a 59 y.o. female who presents to the clinic for the evaluation of   Back pain. She was referred by her primary care team for further evaluation and management of this pain.  She has past medical history of lumbar radiculopathy, hypertension, rheumatoid arthritis, anemia, and multiple other medical comorbidities as listed in her chart. The pain started about 2 years ago and symptoms have been unchanged.The pain is located in the right lumbosacral area and radiates to the right buttock and extending posteriorly to the knee.  The pain is described as sharp, stabbing, aching and is rated as 3/10. The pain is rated with a score of  3/10 on the BEST day and a score of 8/10 on the WORST day.  Symptoms interfere with daily activity. The pain is exacerbated by sit to stand, morning pain, prolonged standing or walking.  The pain is mitigated by rest, repositioning, gabapentin,  Celebrex.     Non-Pharmacologic Treatments:  Physical Therapy/Home Exercise: yes  Ice/Heat:yes  TENS: no  Acupuncture: no  Massage: no  Chiropractic: no    Other: no      Pain Medications:  - Opioids: Tylenol 3, Norco  - Adjuvant Medications: gabapentin, Celebrex, Flexeril, Voltaren gel, methotrexate  - Anti-Coagulants: Aspirin     report:  Reviewed and consistent with medication use as prescribed.    Pain Procedures:   2024 Right SIJ injection with 50% relief.  2025: Right L4-5 and L5-S1 medial branch blocks, 80+% relief for 24 hours  3/13/2025  Right L4/5 + L5/S1 MBB #2    Imagin2025 MRI lumbar  FINDINGS:     Lumbar spine alignment is normal. No fracture is identified. No acute paravertebral soft tissue abnormality is seen. The conus medullaris is normal in position and appearance. No suspicious bone lesions identified. Degenerative changes and spinal stenosis will be detailed below on a level by level basis.     T12-L1: Minor Modic type II endplate changes and small anterior endplate osteophytes.  Otherwise unremarkable.     L1-L2: Mild disc height loss, mild Modic type II endplate changes and anterior osteophytes.  Small annular disc bulge without stenosis.     L2-L3: Minor peripheral annular disc bulge and facet arthropathy without stenosis.     L3-L4: Minor peripheral annular disc bulge and facet arthropathy without stenosis.     L4-L5: Small annular disc bulge and facet arthropathy causing minor foraminal narrowing without impingement.  No central canal stenosis.     L5-S1:Facet arthropathy.  Otherwise unremarkable.        Impression:     1. Multilevel lumbar degenerative change resulting in minor L4-5 foraminal narrowing, as detailed above.  No other significant stenosis.  No evidence of neural impingement.     X-ray lumbar spine 2024:  Vertebral body heights maintained. Trace retrolisthesis of L3 on L4. Disc spaces relatively maintained. Facet arthropathy noted. SI joint  degenerative findings, greater on the left. No acute abnormality.     Past Medical History:   Diagnosis Date    Allergy     Anemia     Hypertension     Plantar fasciitis of left foot      Past Surgical History:   Procedure Laterality Date    CATHETERIZATION OF BOTH LEFT AND RIGHT HEART N/A 5/10/2024    Procedure: CATHETERIZATION, HEART, BOTH LEFT AND RIGHT;  Surgeon: Alison Buchanan MD;  Location: [facility] CATH LAB;  Service: Cardiology;  Laterality: N/A;    COLONOSCOPY N/A 04/09/2021    Procedure: COLONOSCOPY;  Surgeon: Hua Elmore MD;  Location: [facility] ENDO;  Service: Endoscopy;  Laterality: N/A;    ECHOCARDIOGRAM,TRANSESOPHAGEAL N/A 5/13/2024    Procedure: ECHOCARDIOGRAM,TRANSESOPHAGEAL;  Surgeon: Chester Sanchez MD;  Location: [facility] OR;  Service: Cardiovascular;  Laterality: N/A;    HYSTERECTOMY  05/2021    INJECTION OF ANESTHETIC AGENT AROUND MEDIAL BRANCH NERVES INNERVATING LUMBAR FACET JOINT Right 1/30/2025    Procedure: right L4-5 and L5-S1 MBB;  Surgeon: Dorian Sumner MD;  Location: [facility] PAIN MGT;  Service: Pain Management;  Laterality: Right;    INJECTION OF ANESTHETIC AGENT AROUND MEDIAL BRANCH NERVES INNERVATING LUMBAR FACET JOINT Right 3/13/2025    Procedure: Right L4-5 and L5-S1 MBB (diagnostic, #2);  Surgeon: Dorian Sumner MD;  Location: [facility] PAIN MGT;  Service: Pain Management;  Laterality: Right;    INJECTION OF ANESTHETIC AGENT AROUND MULTIPLE INTERCOSTAL NERVES N/A 5/13/2024    Procedure: BLOCK, NERVE, INTERCOSTAL, 2 OR MORE;  Surgeon: Chester Sanchez MD;  Location: [facility] OR;  Service: Cardiovascular;  Laterality: N/A;    INJECTION OF ANESTHETIC AGENT INTO SACROILIAC JOINT Right 12/17/2024    Procedure: Right SIJ Injection;  Surgeon: Dorian Sumner MD;  Location: [facility] PAIN MGT;  Service: Pain Management;  Laterality: Right;    OOPHORECTOMY  05/2021    RESECTION OF ATRIAL MYXOMA N/A 5/13/2024    Procedure: RESECTION, MYXOMA, CARDIAC ATRIUM;  Surgeon: Chester Sanchez MD;   Location: [facility] OR;  Service: Cardiovascular;  Laterality: N/A;    ROBOT-ASSISTED LAPAROSCOPIC ABDOMINAL HYSTERECTOMY USING DA ABBEY XI N/A 05/18/2021    Procedure: XI ROBOTIC HYSTERECTOMY;  Surgeon: Christa Davila MD;  Location: [facility] OR;  Service: OB/GYN;  Laterality: N/A;    ROBOT-ASSISTED LAPAROSCOPIC SALPINGO-OOPHORECTOMY USING DA ABBEY XI Bilateral 05/18/2021    Procedure: XI ROBOTIC SALPINGO-OOPHORECTOMY;  Surgeon: Christa Davila MD;  Location: [facility] OR;  Service: OB/GYN;  Laterality: Bilateral;     Social History     Socioeconomic History    Marital status:     Number of children: 2   Occupational History    Occupation: [employer]     Employer: [employer]   Tobacco Use    Smoking status: Never    Smokeless tobacco: Never   Substance and Sexual Activity    Alcohol use: No    Drug use: No    Sexual activity: Not Currently     Birth control/protection: Surgical     Social Drivers of Health     Financial Resource Strain: Patient Declined (5/9/2024)    Overall Financial Resource Strain (CARDIA)     Difficulty of Paying Living Expenses: Patient declined   Food Insecurity: Patient Declined (5/9/2024)    Hunger Vital Sign     Worried About Running Out of Food in the Last Year: Patient declined     Ran Out of Food in the Last Year: Patient declined   Transportation Needs: Patient Declined (5/9/2024)    TRANSPORTATION NEEDS     Transportation : Patient declined   Physical Activity: Sufficiently Active (5/10/2023)    Exercise Vital Sign     Days of Exercise per Week: 5 days     Minutes of Exercise per Session: 150+ min   Stress: Patient Declined (5/9/2024)    [employer] - Occupational Stress Questionnaire     Feeling of Stress : Patient declined   Housing Stability: Patient Declined (5/9/2024)    Housing Stability Vital Sign     Unable to Pay for Housing in the Last Year: Patient declined     Homeless in the Last Year: Patient declined     Family History   Problem Relation Name Age of  Onset    Hypertension Mother      Asthma Mother      Cancer Mother          unknown    Cataracts Father      Hypertension Father      Heart disease Father      Hypertension Sister         Review of patient's allergies indicates:   Allergen Reactions    Tramadol Nausea And Vomiting       Current Outpatient Medications   Medication Sig    celecoxib (CELEBREX) 200 MG capsule Take 1 capsule (200 mg total) by mouth once daily.    cyclobenzaprine (FLEXERIL) 10 MG tablet Take 1 tablet (10 mg total) by mouth nightly as needed for Muscle spasms.    diclofenac sodium (VOLTAREN) 1 % Gel Apply 2 g topically 4 (four) times daily.    ergocalciferol (ERGOCALCIFEROL) 50,000 unit Cap Take 1 capsule (50,000 Units total) by mouth every 7 days.    ferrous sulfate, dried (SLOW FE) 160 mg (50 mg iron) TbSR Take 1 tablet (160 mg total) by mouth once daily.    fluticasone propionate (FLONASE) 50 mcg/actuation nasal spray USE 2 SPRAYS BY EACH NOSTRIL ROUTE ONCE DAILY    folic acid (FOLVITE) 1 MG tablet Take 1 tablet (1 mg total) by mouth once daily.    gabapentin (NEURONTIN) 100 MG capsule Take 1 capsule (100 mg total) by mouth every evening.    methotrexate 2.5 MG Tab Take 6 tablets (15 mg total) by mouth every 7 days.    montelukast (SINGULAIR) 10 mg tablet Take 1 tablet by mouth once daily    MULTIVITS,CA,MINERALS/IRON/FA (ONE-A-DAY WOMENS FORMULA ORAL) Take by mouth once daily.     potassium citrate 99 mg Cap Take 1 capsule by mouth once daily.     No current facility-administered medications for this visit.       Review of Systems     GENERAL:  No weight loss, malaise or fevers.  HEENT:   No recent changes in vision or hearing  NECK:  Negative for lumps, no difficulty with swallowing.  RESPIRATORY:  Negative for cough, wheezing or shortness of breath, patient denies any recent URI.  CARDIOVASCULAR:  Negative for chest pain, leg swelling or palpitations.  GI:  Negative for abdominal discomfort, blood in stools or black stools or change  "in bowel habits.  MUSCULOSKELETAL:  See HPI.  SKIN:  Negative for lesions, rash, and itching.  PSYCH:  No mood disorder or recent psychosocial stressors.  Patients sleep is not disturbed secondary to pain.  HEMATOLOGY/LYMPHOLOGY:  Negative for prolonged bleeding, bruising easily or swollen nodes.  Patient is currently taking anti-coagulants  NEURO:   No history of headaches, syncope, paralysis, seizures or tremors.  All other reviewed and negative other than HPI.    OBJECTIVE:    BP (!) 123/91   Pulse 72   Ht 5' 4" (1.626 m)   Wt 112.1 kg (247 lb 2.2 oz)   LMP 12/26/2020   BMI 42.42 kg/m²         Physical Exam    GENERAL: Well appearing, in no acute distress, alert and oriented x3.  PSYCH:  Mood and affect appropriate.  SKIN: Skin color, texture, turgor normal, no rashes or lesions.  HEAD/FACE:  Normocephalic, atraumatic. Cranial nerves grossly intact.  CV: RRR with palpation of the radial artery.  PULM: No evidence of respiratory difficulty, symmetric chest rise.  GI:  Soft and non-tender.  BACK: Straight leg raising in the sitting and supine positions is negative to radicular pain on the left, equivocal on the right.  Mild-to-moderate pain to palpation over the facet joints of the lumbar spine and lumbar paraspinals on right.  Limited range of motion secondary to body habitus and pain reproduction. Directional preference:  Flexion. Mild pain with lumbar extension and facet loading on the right  EXTREMITIES: Peripheral joint ROM is full and pain free without obvious instability or laxity in all four extremities. No deformities, edema, or skin discoloration. Good capillary refill.  MUSCULOSKELETAL: Shoulder, hip, and knee provocative maneuvers are negative.  There is moderate pain with palpation over the sacroiliac joint on the right.  FABERs test is positive on the right.  Sacral thrust positive.  FADIRs test is equivocal.   Bilateral upper and lower extremity strength is normal and symmetric.  No atrophy or " tone abnormalities are noted.  Left knee: no swelling, erythema, or warmth. Crepitus present. Some pain with flexion and extension   NEURO: Bilateral upper and lower extremity coordination and muscle stretch reflexes are physiologic and symmetric.  Plantar response are downgoing. No clonus.  No loss of sensation is noted.  GAIT:  Slow, antalgic.      LABS:  Lab Results   Component Value Date    WBC 4.50 03/18/2025    HGB 13.3 03/18/2025    HCT 43.3 03/18/2025    MCV 86 03/18/2025     03/18/2025       CMP  Sodium   Date Value Ref Range Status   03/18/2025 142 136 - 145 mmol/L Final     Potassium   Date Value Ref Range Status   03/18/2025 4.1 3.5 - 5.1 mmol/L Final     Chloride   Date Value Ref Range Status   03/18/2025 107 95 - 110 mmol/L Final     CO2   Date Value Ref Range Status   03/18/2025 26 23 - 29 mmol/L Final     Glucose   Date Value Ref Range Status   03/18/2025 96 70 - 110 mg/dL Final     BUN   Date Value Ref Range Status   03/18/2025 15 8 - 23 mg/dL Final     Creatinine   Date Value Ref Range Status   03/18/2025 0.7 0.5 - 1.4 mg/dL Final     Calcium   Date Value Ref Range Status   03/18/2025 9.2 8.7 - 10.5 mg/dL Final     Total Protein   Date Value Ref Range Status   03/18/2025 7.1 6.0 - 8.4 g/dL Final     Albumin   Date Value Ref Range Status   03/18/2025 3.6 3.5 - 5.2 g/dL Final     Total Bilirubin   Date Value Ref Range Status   03/18/2025 0.5 0.1 - 1.0 mg/dL Final     Comment:     For infants and newborns, interpretation of results should be based  on gestational age, weight and in agreement with clinical  observations.    Premature Infant recommended reference ranges:  Up to 24 hours.............<8.0 mg/dL  Up to 48 hours............<12.0 mg/dL  3-5 days..................<15.0 mg/dL  6-29 days.................<15.0 mg/dL       Alkaline Phosphatase   Date Value Ref Range Status   03/18/2025 62 40 - 150 U/L Final     AST   Date Value Ref Range Status   03/18/2025 17 10 - 40 U/L Final     ALT    Date Value Ref Range Status   03/18/2025 16 10 - 44 U/L Final     Anion Gap   Date Value Ref Range Status   03/18/2025 9 8 - 16 mmol/L Final     eGFR if    Date Value Ref Range Status   05/06/2022 >60 >60 mL/min/1.73 m^2 Final     eGFR if non    Date Value Ref Range Status   05/06/2022 >60 >60 mL/min/1.73 m^2 Final     Comment:     Calculation used to obtain the estimated glomerular filtration  rate (eGFR) is the CKD-EPI equation.          Lab Results   Component Value Date    HGBA1C 5.4 09/18/2024             ASSESSMENT: 60 y.o. year old female with lower back pain, consistent with     1. Lumbar radiculopathy  Case Request-RAD/Other Procedure Area: Right L4/5 TF RUT    Ambulatory Referral/Consult to Physical Therapy      2. Sacroiliitis        3. Lumbar spondylosis                      PLAN:   - Interventions: Schedule right L4/5 TF RUT  Explained the risks and benefits of the procedure in detail with the patient today in clinic along with alternative treatment options, and the patient elected to pursue the intervention.        - Anticoagulation use: none    - Medications: I have stressed the importance of physical activity and a home exercise plan to help with pain and improve health. and Patient can continue with medications for now since they are providing benefits, using them appropriately, and without side effects.     Continue gabapentin, celebrex, voltaren gel, flexeril- outside providers  Discussed increasing gabapentin- pt declines at this time          - Therapy:  Advised patient continue with activities as tolerated  Continue at home physical therapy exercises    - Psychological:  Discussed coping mechanisms to help address chronic pain issues    - Labs:  Reviewed    - Imaging: Reviewed available imaging with patient and answered any questions they had regarding study.    - Consults/Referrals:  None at this time    - Records:  Reviewed/Obtain old records from outside  physicians and imaging    - Follow up visit: 4 weeks after injection    - Counseled patient regarding the importance of activity modification and physical therapy    - This condition does not require this patient to take time off of work, and the primary goal of our Pain Management services is to improve the patient's functional capacity.    - Patient Questions: Answered all of the patient's questions regarding diagnosis, therapy, and treatment        The above plan and management options were discussed at length with patient. Patient is in agreement with the above and verbalized understanding.      Visit today included increased complexity associated with the care of the episodic problem of chronic pain which was addressed and continue to manage the longitudinal care of the patient due to the serious and/or complex managed problem(s) listed above.      Dorita Shukla NP  Interventional Pain Management  [facility]    Disclaimer:  This note was prepared using voice recognition system and is likely to have sound alike errors that may have been overlooked even after proof reading.  Please call me with any questions

## 2025-04-09 ENCOUNTER — HOSPITAL ENCOUNTER (OUTPATIENT)
Dept: RADIOLOGY | Facility: HOSPITAL | Age: 60
Discharge: HOME | End: 2025-04-09
Attending: NURSE PRACTITIONER
Payer: COMMERCIAL

## 2025-04-09 DIAGNOSIS — M54.9 DORSALGIA, UNSPECIFIED: ICD-10-CM

## 2025-04-09 DIAGNOSIS — M54.16 LUMBAR RADICULOPATHY: ICD-10-CM

## 2025-04-09 PROCEDURE — 72148 MRI LUMBAR SPINE W/O DYE: CPT | Mod: TC

## 2025-04-09 PROCEDURE — 72148 MRI LUMBAR SPINE W/O DYE: CPT | Mod: 26,,, | Performed by: RADIOLOGY

## 2025-04-10 ENCOUNTER — RESULTS FOLLOW-UP (OUTPATIENT)
Dept: PAIN MEDICINE | Facility: CLINIC | Age: 60
End: 2025-04-10

## 2025-04-11 ENCOUNTER — OFFICE VISIT (OUTPATIENT)
Dept: DERMATOLOGY | Facility: CLINIC | Age: 60
End: 2025-04-11
Payer: COMMERCIAL

## 2025-04-11 DIAGNOSIS — D23.9 DERMATOFIBROMA: Primary | ICD-10-CM

## 2025-04-11 DIAGNOSIS — L91.0 KELOID: ICD-10-CM

## 2025-04-11 DIAGNOSIS — D17.9 LIPOMA, UNSPECIFIED SITE: ICD-10-CM

## 2025-04-11 DIAGNOSIS — D22.9 MULTIPLE BENIGN NEVI: ICD-10-CM

## 2025-04-11 PROCEDURE — 99999 PR PBB SHADOW E&M-EST. PATIENT-LVL III: CPT | Mod: PBBFAC,,, | Performed by: STUDENT IN AN ORGANIZED HEALTH CARE EDUCATION/TRAINING PROGRAM

## 2025-04-11 NOTE — PROGRESS NOTES
Patient Information  Name: Jake Hoskins  : 1965  MRN: [id number]     Referring Physician:  Dr. Buchanan   Primary Care Physician:  Angelica Edwards MD   Date of Visit: 2025      Subjective:       Jake Hoskins is a 60 y.o. female who presents for   Chief Complaint   Patient presents with    Growth     C/o growth on right lower leg, x 5 years     Keloid     Keloid on chest from open hear surgery     Mole     C/o mole on back and right temple      HPI  Patient is here with concern of: skin lesions  Location: right leg  Duration: years  Symptoms: growing  Prior treatments: none    Patient is here with concern of: skin lesions  Location: chest  Duration: 6 months  Symptoms: growing  Prior treatments: none    Patient is here with concern of: skin lesions  Location: right preauricular face, left back  Duration: years  Symptoms: none  Prior treatments: none        Patient was last seen:Visit date not found     Prior notes by myself reviewed.   Clinical documentation obtained by nursing staff reviewed.    Review of Systems   Skin:  Negative for itching and rash.        Objective:    Physical Exam   Constitutional: She appears well-developed and well-nourished. No distress.   Neurological: She is alert and oriented to person, place, and time. She is not disoriented.   Psychiatric: She has a normal mood and affect.   Skin:   Areas Examined (abnormalities noted in diagram):   Head / Face Inspection Performed  Neck Inspection Performed  Back Inspection Performed                   Diagram Legend     Erythematous scaling macule/papule c/w actinic keratosis       Vascular papule c/w angioma      Pigmented verrucoid papule/plaque c/w seborrheic keratosis      Yellow umbilicated papule c/w sebaceous hyperplasia      Irregularly shaped tan macule c/w lentigo     1-2 mm smooth white papules consistent with Milia      Movable subcutaneous cyst with punctum c/w epidermal inclusion cyst      Subcutaneous  movable cyst c/w pilar cyst      Firm pink to brown papule c/w dermatofibroma      Pedunculated fleshy papule(s) c/w skin tag(s)      Evenly pigmented macule c/w junctional nevus     Mildly variegated pigmented, slightly irregular-bordered macule c/w mildly atypical nevus      Flesh colored to evenly pigmented papule c/w intradermal nevus       Pink pearly papule/plaque c/w basal cell carcinoma      Erythematous hyperkeratotic cursted plaque c/w SCC      Surgical scar with no sign of skin cancer recurrence      Open and closed comedones      Inflammatory papules and pustules      Verrucoid papule consistent consistent with wart     Erythematous eczematous patches and plaques     Dystrophic onycholytic nail with subungual debris c/w onychomycosis     Umbilicated papule    Erythematous-base heme-crusted tan verrucoid plaque consistent with inflamed seborrheic keratosis     Erythematous Silvery Scaling Plaque c/w Psoriasis     See annotation      No images are attached to the encounter or orders placed in the encounter.    [] Data reviewed  [] Independent review of test  [] Management discussed with another provider    Assessment / Plan:        Dermatofibroma  This is a benign scar-like lesion secondary to minor trauma. No treatment required.     Lipoma, unspecified site  -     Ambulatory referral/consult to General Surgery; Future; Expected date: 04/18/2025  Patient has been informed of the diagnosis, the planned procedure, the risks, benefits, and alternatives associated with the procedure. All questions were answered. Patient would like to proceed referral to general surgery to discuss options.      Multiple benign nevi  Discussed ABCDE's of nevi.  Monitor for new mole or moles that are becoming bigger, darker, irritated, or developing irregular borders. Brochure provided. Instructed patient to observe lesion(s) for changes and follow up in clinic if changes are noted. Patient to monitor skin at home for new or  changing lesions.     Keloid  Intralesional Kenalog 40mg/cc (1 cc total) injected into 1 lesions on the chest today after obtaining verbal consent including risk of surrounding hypopigmentation. Patient tolerated procedure well.    Units:1  NDC for Kenalog 40mg/cc:  8979-8182-97                   LOS NUMBER AND COMPLEXITY OF PROBLEMS    COMPLEXITY OF DATA RISK TOTAL TIME (m)   61193  [id number] [] 1 self-limited or minor problem [x] Minimal to none [] No treatment recommended or patient to monitor 15-29  10-19   57182  08019 Low  [] 2 or > self limited or minor problems  [] 1 stable chronic illness  [] 1 acute, uncomplicated illness or injury Limited (2)  [] Prior external notes from each unique source  [] Review result of each unique test  [] Order each unique test [x]  Low  OTC medications, minor skin biopsy 30-44  20-29   89063  55814 Moderate  []  1 or > chronic illness with progression, exacerbation or SE of treatment  [x]  2 or more stable chronic illnesses  []  1 acute illness with systemic symptoms  []  1 acute complicated injury  []  1 undiagnosed new problem with uncertain prognosis Moderate (1/3 below)  []  3 or more data items        *Now includes assessment requiring independent historian  []  Independent interpretation of a test  []  Discuss management/test with another provider Moderate  []  Prescription drug mgmt  []  Minor surgery with risk discussed  []  Mgmt limited by social determinates 45-59  30-39   16518  69620 High  []  1 or more chronic illness with severe exacerbation, progression or SE of treatment  []  1 acute or chronic illness/injury that poses a threat to life or bodily function Extensive (2/3 below)  []  3 or more data items        *Now includes assessment requiring independent historian.  []  Independent interpretation of a test  []  Discuss management/test with another provider High  []  Major surgery with risk discussed  []  Drug therapy requiring intensive monitoring for toxicity  []   Hospitalization  []  Decision for DNR 60-74  40-54      No follow-ups on file.    Flori Murray MD, FAAD  [facility]

## 2025-04-15 ENCOUNTER — TELEPHONE (OUTPATIENT)
Dept: PAIN MEDICINE | Facility: CLINIC | Age: 60
End: 2025-04-15
Payer: COMMERCIAL

## 2025-04-16 ENCOUNTER — OFFICE VISIT (OUTPATIENT)
Dept: PAIN MEDICINE | Facility: CLINIC | Age: 60
End: 2025-04-16
Payer: COMMERCIAL

## 2025-04-16 VITALS
BODY MASS INDEX: 42.19 KG/M2 | WEIGHT: 247.13 LBS | SYSTOLIC BLOOD PRESSURE: 130 MMHG | HEART RATE: 72 BPM | HEIGHT: 64 IN | DIASTOLIC BLOOD PRESSURE: 86 MMHG

## 2025-04-16 DIAGNOSIS — M46.1 SACROILIITIS: ICD-10-CM

## 2025-04-16 DIAGNOSIS — M54.16 LUMBAR RADICULOPATHY: Primary | ICD-10-CM

## 2025-04-16 DIAGNOSIS — M47.816 LUMBAR SPONDYLOSIS: ICD-10-CM

## 2025-04-16 PROCEDURE — 3075F SYST BP GE 130 - 139MM HG: CPT | Mod: CPTII,S$GLB,, | Performed by: NURSE PRACTITIONER

## 2025-04-16 PROCEDURE — 3008F BODY MASS INDEX DOCD: CPT | Mod: CPTII,S$GLB,, | Performed by: NURSE PRACTITIONER

## 2025-04-16 PROCEDURE — 99214 OFFICE O/P EST MOD 30 MIN: CPT | Mod: S$GLB,,, | Performed by: NURSE PRACTITIONER

## 2025-04-16 PROCEDURE — 99999 PR PBB SHADOW E&M-EST. PATIENT-LVL IV: CPT | Mod: PBBFAC,,, | Performed by: NURSE PRACTITIONER

## 2025-04-16 PROCEDURE — 1159F MED LIST DOCD IN RCRD: CPT | Mod: CPTII,S$GLB,, | Performed by: NURSE PRACTITIONER

## 2025-04-16 PROCEDURE — 4010F ACE/ARB THERAPY RXD/TAKEN: CPT | Mod: CPTII,S$GLB,, | Performed by: NURSE PRACTITIONER

## 2025-04-16 PROCEDURE — 3080F DIAST BP >= 90 MM HG: CPT | Mod: CPTII,S$GLB,, | Performed by: NURSE PRACTITIONER

## 2025-04-17 ENCOUNTER — PATIENT MESSAGE (OUTPATIENT)
Dept: RHEUMATOLOGY | Facility: CLINIC | Age: 60
End: 2025-04-17
Payer: COMMERCIAL

## 2025-04-20 ENCOUNTER — HOSPITAL ENCOUNTER (EMERGENCY)
Facility: HOSPITAL | Age: 60
Discharge: HOME | End: 2025-04-20
Attending: EMERGENCY MEDICINE
Payer: COMMERCIAL

## 2025-04-20 VITALS
TEMPERATURE: 98 F | RESPIRATION RATE: 17 BRPM | OXYGEN SATURATION: 99 % | HEART RATE: 67 BPM | WEIGHT: 247 LBS | DIASTOLIC BLOOD PRESSURE: 77 MMHG | HEIGHT: 64 IN | SYSTOLIC BLOOD PRESSURE: 128 MMHG | BODY MASS INDEX: 42.17 KG/M2

## 2025-04-20 DIAGNOSIS — R14.0 ABDOMINAL BLOATING: ICD-10-CM

## 2025-04-20 DIAGNOSIS — R51.9 NONINTRACTABLE EPISODIC HEADACHE, UNSPECIFIED HEADACHE TYPE: Primary | ICD-10-CM

## 2025-04-20 LAB
ABSOLUTE EOSINOPHIL (OHS): 0.22 K/UL
ABSOLUTE MONOCYTE (OHS): 0.54 K/UL (ref 0.3–1)
ABSOLUTE NEUTROPHIL COUNT (OHS): 2.9 K/UL (ref 1.8–7.7)
ALBUMIN SERPL BCP-MCNC: 3.9 G/DL (ref 3.5–5.2)
ALP SERPL-CCNC: 65 UNIT/L (ref 40–150)
ALT SERPL W/O P-5'-P-CCNC: 17 UNIT/L (ref 10–44)
ANION GAP (OHS): 8 MMOL/L (ref 8–16)
AST SERPL-CCNC: 17 UNIT/L (ref 11–45)
BACTERIA #/AREA URNS AUTO: ABNORMAL /HPF
BASOPHILS # BLD AUTO: 0.03 K/UL
BASOPHILS NFR BLD AUTO: 0.5 %
BILIRUB SERPL-MCNC: 0.3 MG/DL (ref 0.1–1)
BILIRUB UR QL STRIP.AUTO: NEGATIVE
BUN SERPL-MCNC: 11 MG/DL (ref 8–23)
CALCIUM SERPL-MCNC: 9.3 MG/DL (ref 8.7–10.5)
CHLORIDE SERPL-SCNC: 108 MMOL/L (ref 95–110)
CLARITY UR: CLEAR
CO2 SERPL-SCNC: 25 MMOL/L (ref 23–29)
COLOR UR AUTO: COLORLESS
CREAT SERPL-MCNC: 0.7 MG/DL (ref 0.5–1.4)
ERYTHROCYTE [DISTWIDTH] IN BLOOD BY AUTOMATED COUNT: 15.2 % (ref 11.5–14.5)
GFR SERPLBLD CREATININE-BSD FMLA CKD-EPI: >60 ML/MIN/1.73/M2
GLUCOSE SERPL-MCNC: 86 MG/DL (ref 70–110)
GLUCOSE UR QL STRIP: NEGATIVE
HCT VFR BLD AUTO: 45.1 % (ref 37–48.5)
HCV AB SERPL QL IA: NEGATIVE
HGB BLD-MCNC: 14.1 GM/DL (ref 12–16)
HGB UR QL STRIP: NEGATIVE
HIV 1+2 AB+HIV1 P24 AG SERPL QL IA: NEGATIVE
HOLD SPECIMEN: NORMAL
IMM GRANULOCYTES # BLD AUTO: 0.05 K/UL (ref 0–0.04)
IMM GRANULOCYTES NFR BLD AUTO: 0.9 % (ref 0–0.5)
KETONES UR QL STRIP: NEGATIVE
LEUKOCYTE ESTERASE UR QL STRIP: ABNORMAL
LIPASE SERPL-CCNC: 20 U/L (ref 4–60)
LYMPHOCYTES # BLD AUTO: 2.04 K/UL (ref 1–4.8)
MCH RBC QN AUTO: 26.6 PG (ref 27–31)
MCHC RBC AUTO-ENTMCNC: 31.3 G/DL (ref 32–36)
MCV RBC AUTO: 85 FL (ref 82–98)
MICROSCOPIC COMMENT: ABNORMAL
NITRITE UR QL STRIP: NEGATIVE
NUCLEATED RBC (/100WBC) (OHS): 0 /100 WBC
PH UR STRIP: 7 [PH]
PLATELET # BLD AUTO: 272 K/UL (ref 150–450)
PMV BLD AUTO: 10.2 FL (ref 9.2–12.9)
POTASSIUM SERPL-SCNC: 4.4 MMOL/L (ref 3.5–5.1)
PROT SERPL-MCNC: 7.8 GM/DL (ref 6–8.4)
PROT UR QL STRIP: NEGATIVE
RBC # BLD AUTO: 5.31 M/UL (ref 4–5.4)
RBC #/AREA URNS AUTO: 1 /HPF (ref 0–4)
RELATIVE EOSINOPHIL (OHS): 3.8 %
RELATIVE LYMPHOCYTE (OHS): 35.3 % (ref 18–48)
RELATIVE MONOCYTE (OHS): 9.3 % (ref 4–15)
RELATIVE NEUTROPHIL (OHS): 50.2 % (ref 38–73)
SODIUM SERPL-SCNC: 141 MMOL/L (ref 136–145)
SP GR UR STRIP: 1
SQUAMOUS #/AREA URNS AUTO: 4 /HPF
UROBILINOGEN UR STRIP-ACNC: NEGATIVE EU/DL
WBC # BLD AUTO: 5.78 K/UL (ref 3.9–12.7)
WBC #/AREA URNS AUTO: 6 /HPF (ref 0–5)

## 2025-04-20 PROCEDURE — 87389 HIV-1 AG W/HIV-1&-2 AB AG IA: CPT | Performed by: EMERGENCY MEDICINE

## 2025-04-20 PROCEDURE — 83690 ASSAY OF LIPASE: CPT | Performed by: EMERGENCY MEDICINE

## 2025-04-20 PROCEDURE — 81001 URINALYSIS AUTO W/SCOPE: CPT | Performed by: EMERGENCY MEDICINE

## 2025-04-20 PROCEDURE — 25000003 PHARM REV CODE 250: Performed by: EMERGENCY MEDICINE

## 2025-04-20 PROCEDURE — 99284 EMERGENCY DEPT VISIT MOD MDM: CPT | Mod: 25

## 2025-04-20 PROCEDURE — 85025 COMPLETE CBC W/AUTO DIFF WBC: CPT | Performed by: EMERGENCY MEDICINE

## 2025-04-20 PROCEDURE — 86803 HEPATITIS C AB TEST: CPT | Performed by: EMERGENCY MEDICINE

## 2025-04-20 PROCEDURE — 80053 COMPREHEN METABOLIC PANEL: CPT | Performed by: EMERGENCY MEDICINE

## 2025-04-20 RX ORDER — BUTALBITAL, ACETAMINOPHEN AND CAFFEINE 50; 325; 40 MG/1; MG/1; MG/1
1 TABLET ORAL EVERY 6 HOURS PRN
Qty: 16 TABLET | Refills: 0 | Status: SHIPPED | OUTPATIENT
Start: 2025-04-20 | End: 2025-04-23 | Stop reason: SDUPTHER

## 2025-04-20 RX ORDER — DOCUSATE SODIUM 100 MG/1
100 CAPSULE, LIQUID FILLED ORAL 2 TIMES DAILY PRN
Qty: 60 CAPSULE | Refills: 0 | Status: SHIPPED | OUTPATIENT
Start: 2025-04-20

## 2025-04-20 RX ORDER — ACETAMINOPHEN 325 MG/1
650 TABLET ORAL
Status: COMPLETED | OUTPATIENT
Start: 2025-04-20 | End: 2025-04-20

## 2025-04-20 RX ADMIN — ACETAMINOPHEN 650 MG: 325 TABLET ORAL at 09:04

## 2025-04-21 NOTE — ED PROVIDER NOTES
Emergency Medicine Provider Note - 4/20/2025       SCRIBE NOTE: IGaby, am scribing for, and in the presence of Nancy Shah DO, FACEP     History     Chief Complaint   Patient presents with    Abdominal Pain     Pt c/o abdominal pain when she takes a breath and reports abdominal swelling.  Both symptoms began yesterday.       Allergies:  Review of patient's allergies indicates:   Allergen Reactions    Tramadol Nausea And Vomiting       History of Present Illness   HPI    4/20/2025, 8:19 PM  The history is provided by the  medical records and patient  Sister is present at bedside.    Jake Hoskins is a 60 y.o. female patient with a PHMx of anemia and HTN who presents to the Emergency Department for evaluation of abdominal bloating which onset earlier today. Pt reports eating only a salad and a potato today. Pt felt like she was not taking in enough air due to bloating. Pt reports a hysterectomy in 2021. Pt reports after cardiac surgery, pt's blood pressure was low so she was taken off losartan. Pt saw her doctor on 4/16/2025 and was told her blood pressure is elevated. Pt started taking her original dosage of losartan but is still experiencing uncontrolled high blood pressure. Symptoms are constant and moderate in severity. No mitigating or exacerbating factors reported. Associated sxs include dull headache (onset 3 weeks ago). Patient denies any abdominal pain, fever, N/V, dysuria, frequency, chest pain or tightness, sore throat, congestion, numbness, weakness, vision disturbances, BLE edema, and all other sxs at this time. Prior Tx includes 650 mg tylenol for headache. Pt denies any history of liver disease or kidney disease. No further complaints or concerns at this time.      Arrival mode: Private Vehicle     PCP: Angelica Lagunas MD     Past Medical History:  Past Medical History:   Diagnosis Date    Allergy     Anemia     Hypertension     Plantar fasciitis of left foot        Past  Surgical History:  Past Surgical History:   Procedure Laterality Date    CATHETERIZATION OF BOTH LEFT AND RIGHT HEART N/A 5/10/2024    Procedure: CATHETERIZATION, HEART, BOTH LEFT AND RIGHT;  Surgeon: Alison Buchnaan MD;  Location: [facility] CATH LAB;  Service: Cardiology;  Laterality: N/A;    COLONOSCOPY N/A 04/09/2021    Procedure: COLONOSCOPY;  Surgeon: Hua Elmore MD;  Location: [facility] ENDO;  Service: Endoscopy;  Laterality: N/A;    ECHOCARDIOGRAM,TRANSESOPHAGEAL N/A 5/13/2024    Procedure: ECHOCARDIOGRAM,TRANSESOPHAGEAL;  Surgeon: Chester Sanchez MD;  Location: [facility] OR;  Service: Cardiovascular;  Laterality: N/A;    HYSTERECTOMY  05/2021    INJECTION OF ANESTHETIC AGENT AROUND MEDIAL BRANCH NERVES INNERVATING LUMBAR FACET JOINT Right 1/30/2025    Procedure: right L4-5 and L5-S1 MBB;  Surgeon: Dorian Sumner MD;  Location: [facility] PAIN MGT;  Service: Pain Management;  Laterality: Right;    INJECTION OF ANESTHETIC AGENT AROUND MEDIAL BRANCH NERVES INNERVATING LUMBAR FACET JOINT Right 3/13/2025    Procedure: Right L4-5 and L5-S1 MBB (diagnostic, #2);  Surgeon: Dorian Sumner MD;  Location: [facility] PAIN MGT;  Service: Pain Management;  Laterality: Right;    INJECTION OF ANESTHETIC AGENT AROUND MULTIPLE INTERCOSTAL NERVES N/A 5/13/2024    Procedure: BLOCK, NERVE, INTERCOSTAL, 2 OR MORE;  Surgeon: Chester Sanchez MD;  Location: [facility] OR;  Service: Cardiovascular;  Laterality: N/A;    INJECTION OF ANESTHETIC AGENT INTO SACROILIAC JOINT Right 12/17/2024    Procedure: Right SIJ Injection;  Surgeon: Dorian Sumner MD;  Location: [facility] PAIN MGT;  Service: Pain Management;  Laterality: Right;    OOPHORECTOMY  05/2021    RESECTION OF ATRIAL MYXOMA N/A 5/13/2024    Procedure: RESECTION, MYXOMA, CARDIAC ATRIUM;  Surgeon: Chester Sanchez MD;  Location: [facility] OR;  Service: Cardiovascular;  Laterality: N/A;    ROBOT-ASSISTED LAPAROSCOPIC ABDOMINAL HYSTERECTOMY USING DA ABBEY XI N/A 05/18/2021    Procedure: XI  ROBOTIC HYSTERECTOMY;  Surgeon: Christa Davila MD;  Location: [facility] OR;  Service: OB/GYN;  Laterality: N/A;    ROBOT-ASSISTED LAPAROSCOPIC SALPINGO-OOPHORECTOMY USING DA ABBEY XI Bilateral 05/18/2021    Procedure: XI ROBOTIC SALPINGO-OOPHORECTOMY;  Surgeon: Christa Davila MD;  Location: [facility] OR;  Service: OB/GYN;  Laterality: Bilateral;         Family History:  Family History   Problem Relation Name Age of Onset    Hypertension Mother      Asthma Mother      Cancer Mother          unknown    Cataracts Father      Hypertension Father      Heart disease Father      Hypertension Sister         Social History:  Social History     Tobacco Use    Smoking status: Never    Smokeless tobacco: Never   Substance and Sexual Activity    Alcohol use: No    Drug use: No    Sexual activity: Not Currently     Birth control/protection: Surgical       I have reviewed the Past Medical History, Past Surgical History, Family History and Social History as documented above.      Review of Systems     Review of Systems   Constitutional:  Negative for fever.   HENT:  Negative for congestion and sore throat.    Eyes:  Negative for visual disturbance.   Respiratory:  Negative for chest tightness and shortness of breath.    Cardiovascular:  Negative for chest pain and leg swelling.   Gastrointestinal:  Negative for abdominal pain, nausea and vomiting.        (+) Abdominal bloating   Genitourinary:  Negative for dysuria and frequency.   Musculoskeletal:  Negative for back pain.   Skin:  Negative for rash.   Neurological:  Positive for headaches (Dull). Negative for weakness and numbness.   Hematological:  Does not bruise/bleed easily.   All other systems reviewed and are negative.     Physical Exam     Initial Vitals [04/20/25 1834]   BP Pulse Resp Temp SpO2   (!) 140/69 71 18 98.2 °F (36.8 °C) 98 %      MAP       --          Physical Exam  Nursing Notes and Vital Signs Reviewed.  Constitutional: Patient is in no acute distress. Well-developed  "and well-nourished.  Head: Atraumatic. Normocephalic.  Eyes: PERRL. EOM intact. Conjunctivae are not pale. No scleral icterus.  ENT: Mucous membranes are moist. Oropharynx is clear and symmetric.    Neck: Supple. Full ROM. No lymphadenopathy.  Cardiovascular: Regular rate. Regular rhythm. No murmurs, rubs, or gallops. Distal pulses are 2+ and symmetric.  Pulmonary/Chest: No respiratory distress. Clear to auscultation bilaterally. No wheezing or rales.  Abdominal: Soft and non-distended.  There is no tenderness.  No rebound, guarding, or rigidity. Good bowel sounds.  Re-examination of abdomen: Soft. No rebound, guarding, or masses.  Genitourinary: N/A   Musculoskeletal: Moves all extremities. No obvious deformities. No edema. No calf tenderness.  Skin: Warm and dry.  Neurological:  Alert, awake, and appropriate.  Normal speech.  No acute focal neurological deficits are appreciated. Cranial nerves 2-12 intact. FTN intact.   GCS 15. Negative pronator drive. Negative heel drop.   Psychiatric: Normal affect. Good eye contact. Appropriate in content.     ED Course   ED Procedures:  Procedures    ED Vital Signs:  Vitals:    04/20/25 1834 04/20/25 1943 04/20/25 2004 04/20/25 2034   BP: (!) 140/69 133/66 129/82 (!) 142/83   Pulse: 71 68 66 68   Resp: 18 17 17 18   Temp: 98.2 °F (36.8 °C)      TempSrc: Oral      SpO2: 98% 100% 99% 100%   Weight: 112 kg (247 lb)      Height:        04/20/25 2044 04/20/25 2110 04/20/25 2130 04/20/25 2132   BP:  133/70  133/86   Pulse:  64  68   Resp:  19 17    Temp:       TempSrc:       SpO2:  100%  100%   Weight:       Height: 5' 4" (1.626 m)       04/20/25 2204   BP: 128/77   Pulse: 67   Resp: 17   Temp:    TempSrc:    SpO2: 99%   Weight:    Height:        All Lab Results:  Results for orders placed or performed during the hospital encounter of 04/20/25   Hepatitis C Antibody    Collection Time: 04/20/25  7:43 PM   Result Value Ref Range    Hep C Ab Interp Negative Negative   HIV 1/2 Ag/Ab " (4th Gen)    Collection Time: 04/20/25  7:43 PM   Result Value Ref Range    HIV 1/2 Ag/Ab Negative Negative   Comprehensive metabolic panel    Collection Time: 04/20/25  7:43 PM   Result Value Ref Range    Sodium 141 136 - 145 mmol/L    Potassium 4.4 3.5 - 5.1 mmol/L    Chloride 108 95 - 110 mmol/L    CO2 25 23 - 29 mmol/L    Glucose 86 70 - 110 mg/dL    BUN 11 8 - 23 mg/dL    Creatinine 0.7 0.5 - 1.4 mg/dL    Calcium 9.3 8.7 - 10.5 mg/dL    Protein Total 7.8 6.0 - 8.4 gm/dL    Albumin 3.9 3.5 - 5.2 g/dL    Bilirubin Total 0.3 0.1 - 1.0 mg/dL    ALP 65 40 - 150 unit/L    AST 17 11 - 45 unit/L    ALT 17 10 - 44 unit/L    Anion Gap 8 8 - 16 mmol/L    eGFR >60 >60 mL/min/1.73/m2   Lipase    Collection Time: 04/20/25  7:43 PM   Result Value Ref Range    Lipase Level 20 4 - 60 U/L   CBC with Differential    Collection Time: 04/20/25  7:43 PM   Result Value Ref Range    WBC 5.78 3.90 - 12.70 K/uL    RBC 5.31 4.00 - 5.40 M/uL    HGB 14.1 12.0 - 16.0 gm/dL    HCT 45.1 37.0 - 48.5 %    MCV 85 82 - 98 fL    MCH 26.6 (L) 27.0 - 31.0 pg    MCHC 31.3 (L) 32.0 - 36.0 g/dL    RDW 15.2 (H) 11.5 - 14.5 %    Platelet Count 272 150 - 450 K/uL    MPV 10.2 9.2 - 12.9 fL    Nucleated RBC 0 <=0 /100 WBC    Neut % 50.2 38 - 73 %    Lymph % 35.3 18 - 48 %    Mono % 9.3 4 - 15 %    Eos % 3.8 <=8 %    Basophil % 0.5 <=1.9 %    Imm Grans % 0.9 (H) 0.0 - 0.5 %    Neut # 2.90 1.8 - 7.7 K/uL    Lymph # 2.04 1 - 4.8 K/uL    Mono # 0.54 0.3 - 1 K/uL    Eos # 0.22 <=0.5 K/uL    Baso # 0.03 <=0.2 K/uL    Imm Grans # 0.05 (H) 0.00 - 0.04 K/uL   Urinalysis, Reflex to Urine Culture    Collection Time: 04/20/25  9:11 PM    Specimen: Urine   Result Value Ref Range    Color, UA Colorless (A) Straw, Cata, Yellow, Light-Orange    Appearance, UA Clear Clear    pH, UA 7.0 5.0 - 8.0    Spec Grav UA 1.005 1.005 - 1.030    Protein, UA Negative Negative    Glucose, UA Negative Negative    Ketones, UA Negative Negative    Bilirubin, UA Negative Negative    Blood,  UA Negative Negative    Nitrites, UA Negative Negative    Urobilinogen, UA Negative <2.0 EU/dL    Leukocyte Esterase, UA 1+ (A) Negative   GREY TOP URINE HOLD    Collection Time: 04/20/25  9:11 PM   Result Value Ref Range    Extra Tube Hold for add-ons.    Urinalysis Microscopic    Collection Time: 04/20/25  9:11 PM   Result Value Ref Range    RBC, UA 1 0 - 4 /HPF    WBC, UA 6 (H) 0 - 5 /HPF    Bacteria, UA Rare None, Rare, Occasional /HPF    Squamous Epithelial Cells, UA 4 /HPF    Microscopic Comment       No EKG was ordered.      Imaging Results:  Imaging Results              X-Ray Abdomen Flat And Erect (Final result)  Result time 04/20/25 21:41:29      Final result by Frank Osman MD (04/20/25 21:41:29)                   Impression:      1.  Negative for acute process involving the abdomen or pelvis, considering the technical limitations described above.  2.  Incidental findings as noted above    Finalized on: 4/20/2025 9:41 PM By:  Frank Osman MD  [facility]# [id number]      2025-04-20 21:43:32.931     [facility]               Narrative:    EXAM: XR ABDOMEN FLAT AND ERECT    CLINICAL HISTORY: Abdominal pain and distention    FINDINGS:     No comparison studies are available.  EKG leads overlie the abdomen.  The upper abdomen is excluded.  One image was provided for review.    The abdominal gas pattern is normal. No sign of bowel dilation, air/fluid levels or free air.    No abnormal calcifications. There are degenerative changes of the pubic symphysis.                                         X-Ray Chest 1 View (Final result)  Result time 04/20/25 21:43:13      Final result by Frank Osman MD (04/20/25 21:43:13)                   Impression:      1.  Negative for acute process involving the chest.  2.  Stable findings as noted above.    Finalized on: 4/20/2025 9:43 PM By:  Frank Osman MD  [facility]# [id number]      2025-04-20 21:45:23.043     [facility]               Narrative:    EXAM:  XR CHEST 1 VIEW    CLINICAL  INDICATION:  Chest pain.  Abdominal pain    FINDINGS:  Frontal views of the chest were obtained.    Comparisons are made to 05/18/2024.  EKG leads overlie the chest.  Chronically low lung volumes.     The lungs are free of new pulmonary opacities. The cardiac silhouette size is enlarged. The trachea is midline and the mediastinal width is normal. Negative for focal infiltrate, effusion or pneumothorax. Pulmonary vasculature is normal. Negative for osseous abnormalities.  3 sternal wires again seen.  There are atherosclerotic calcifications of the aortic knob and tortuosity of the aorta.  There are degenerative changes of the spine and both shoulder girdles.                                              The Emergency Provider reviewed the vital signs and test results, which are outlined above.     ED Discussion   ED Medication(s):  Medications   acetaminophen tablet 650 mg (650 mg Oral Given 4/20/25 2145)       ED Course as of 04/21/25 1411   Sun Apr 20, 2025   2210 WBC, UA(!): 6 [LB]   2210 Leukocyte Esterase, UA(!): 1+ [LB]   2210 Bacteria, UA: Rare [LB]   2216 Re-examined abdomen.  Soft no rebound or guarding no masses.  Return precautions given.  All questions answered [LB]   2218 I have not determined a specific etiology for patient's symptoms based on evaluation in the ED, but I have low suspicion for medical, surgical or other life threatening illness and I believe patient is safe for discharge.  With lack of etiology for the patient's complaints, I specifically counseled the patient and/or family/caretaker that despite an unrevealing evaluation in the ED, patient may still be at risk for serious, even life threatening illness.  As such, patient was instructed to return immediately for any worsening or change in current symptoms, or for any concern.   [LB]      ED Course User Index  [LB] Nancy Shah, DO       10:17 PM - Reassessment: Dr. Shah reassessed the pt.  The pt is resting comfortably and is  NAD.  Pt states their sx have improved. Discussed test results, shared treatment plan, specific conditions for return, and the need for f/u.  Answered their questions at this time.  Pt and/or family/caretaker understands and agrees to the plan.  The pt has remained hemodynamically stable through ED course and is stable for discharge.    I discussed with patient and/or family/caretaker that evaluation in the ED does not suggest any emergent or life threatening medical conditions requiring immediate intervention beyond what was provided in the ED, and I believe patient is safe for discharge.  Regardless, an unremarkable evaluation in the ED does not preclude the development or presence of a serious of life threatening condition. As such, patient was instructed to return immediately for any worsening or change in current symptoms.    Regarding ABDOMINAL PAIN, I recommended that the patient: Sip water or other clear fluids; avoid solid food for the first few hours after vomiting or diarrhea; if vomiting, wait 6 hours, and then eat small amounts of mild foods such as rice, applesauce, or crackers; avoid dairy products; avoid citrus, high-fat foods, fried or greasy foods, tomato products, caffeine, alcohol, and carbonated beverages;  avoid aspirin, ibuprofen or other anti-inflammatory medications, and narcotic pain medications unless prescribed.  In regards to prevention, I encouraged patient to:  Avoid fatty or greasy foods; drink plenty of water each day; eat small meals more frequently; exercise regularly; limit foods that produce gas; make sure meals are well-balanced and high in fiber and include plenty of fruits and vegetables.    Patient's headache is consistent with previous headaches and lacks features concerning for emergent or life threatening condition.  I do not suspect SAH, meningitis, increased IC pressure, infectious, toxic, vascular, CNS, or other EMC.  I have discussed this at length with patient.   Regarding treatment, advised patient to take nonsteroidal antiinflammatory medications, acetaminophen, or any medications prescribed as instructed.  To prevent headaches, patient advised to avoid muscle tension (do not stay in one position for long periods of time), avoid eye strain (make sure there is adequate lighting for reading and routine tasks), eat healthy foods, exercise, and do not smoke or drink excessive alcohol.  Patient also advised to avoid overuse of over-the-counter or prescription medications. Patient was instructed to contact primary healthcare provider if: headaches continue to get worse; occur often enough that they affect daily work or normal activities; frequent medication use is needed to manage headaches; headaches that worsen and cause vomiting; or there are any questions or concerns about the condition or care. Advised patient to return to the emergency department or call 911 if they develop a sudden headache that presents suddenly and much worse than usual headaches; have difficulty seeing, speaking, or moving; become confused or have seizure activity; or develop a fever and stiff neck with the headache.        MIPS Measures     Smoker? No     Hypertension: Patient has a known history of hypertension.     Medical Decision Making                 Medical Decision Making  Differential Diagnosis: constipation, SBO, UTI, CHF, tension headache    Cardiac Monitor Interpretation:  Independent ED physician interpretation of cardiac monitoring.   Rate: 68  Rhythm: Sinus  Indication: Abdominal bloating    ED course:   UA 1+ LE, Micro:   1 RBC, 6 WBC.  CMP negative,  Lipase negative, CBC negative. AAS: negative,  Chest: negative  Patient has benign abdomen.  Normal neurological exam.   Abdominal and Headache return precautions.      ED course:     Amount and/or Complexity of Data Reviewed  Labs: ordered. Decision-making details documented in ED Course.  Radiology: ordered. Decision-making details  documented in ED Course.    Risk  OTC drugs.  Prescription drug management.  Risk Details: Discharge Medication List as of 4/20/2025 10:19 PM    START taking these medications    butalbital-acetaminophen-caffeine -40 mg (FIORICET, ESGIC) -40 mg per tablet  Take 1 tablet by mouth every 6 (six) hours as needed for Pain or Headaches., Starting Sun 4/20/2025, Until Tue 5/6/2025 at 2359, Print    docusate sodium (COLACE) 100 MG capsule  Take 1 capsule (100 mg total) by mouth 2 (two) times daily as needed., Starting Sun 4/20/2025, Print                Prescription Management: I performed a review of the patient's current Rx medication list as input by nursing staff.    Discharge Medication List as of 4/20/2025 10:19 PM        START taking these medications    Details   butalbital-acetaminophen-caffeine -40 mg (FIORICET, ESGIC) -40 mg per tablet Take 1 tablet by mouth every 6 (six) hours as needed for Pain or Headaches., Starting Sun 4/20/2025, Until Tue 5/6/2025 at 2359, Print      docusate sodium (COLACE) 100 MG capsule Take 1 capsule (100 mg total) by mouth 2 (two) times daily as needed., Starting Sun 4/20/2025, Print           CONTINUE these medications which have NOT CHANGED    Details   celecoxib (CELEBREX) 200 MG capsule Take 1 capsule (200 mg total) by mouth once daily., Starting Wed 2/19/2025, Normal      cyclobenzaprine (FLEXERIL) 10 MG tablet Take 1 tablet (10 mg total) by mouth nightly as needed for Muscle spasms., Starting Tue 6/4/2024, Normal      diclofenac sodium (VOLTAREN) 1 % Gel Apply 2 g topically 4 (four) times daily., Starting Thu 9/26/2024, Normal      ergocalciferol (ERGOCALCIFEROL) 50,000 unit Cap Take 1 capsule (50,000 Units total) by mouth every 7 days., Starting Tue 3/25/2025, Normal      ferrous sulfate, dried (SLOW FE) 160 mg (50 mg iron) TbSR Take 1 tablet (160 mg total) by mouth once daily., Starting Thu 5/2/2024, Normal      fluticasone propionate (FLONASE) 50  "mcg/actuation nasal spray USE 2 SPRAYS BY EACH NOSTRIL ROUTE ONCE DAILY, Normal      folic acid (FOLVITE) 1 MG tablet Take 1 tablet (1 mg total) by mouth once daily., Starting Tue 3/25/2025, Normal      gabapentin (NEURONTIN) 100 MG capsule Take 1 capsule (100 mg total) by mouth every evening., Starting Mon 7/1/2024, Until Tue 7/1/2025, Normal      methotrexate 2.5 MG Tab Take 6 tablets (15 mg total) by mouth every 7 days., Starting Tue 3/25/2025, Normal      montelukast (SINGULAIR) 10 mg tablet Take 1 tablet by mouth once daily, Starting Tue 8/13/2024, Normal      MULTIVITS,CA,MINERALS/IRON/FA (ONE-A-DAY WOMENS FORMULA ORAL) Take by mouth once daily. , Until Discontinued, Historical Med      potassium citrate 99 mg Cap Take 1 capsule by mouth once daily., Historical Med              Discussed case verbally with:N/A      Referrals:  No orders of the defined types were placed in this encounter.        Portions of this note may have been created with voice recognition software. Occasional "wrong-word" or "sound-a-like" substitutions may have occurred due to the inherent limitations of voice recognition software. Please, read the note carefully and recognize, using context, where substitutions have occurred.          Clinical Impression       ICD-10-CM ICD-9-CM   1. Nonintractable episodic headache, unspecified headache type  R51.9 784.0   2. Abdominal bloating  R14.0 787.3         ED Disposition     Disposition: Discharge to home  Patient condition: Stable    ED Follow-up   Follow-up Information       Angelica Lagunas MD In 2 days.    Specialty: Family Medicine  Why: Keep log of blood pressure.  Return to emergency room for difficulty breathing, chest pain, chest pressure, shortness of breath, severe headache, numbness or weakness to 1 side, slurred speech, difficulty talking, or other concerns  Contact information:  [id number] [facility] [place] [address] [phone]                               Scribe " Attestation:   Scribe #1: I, Gaby Miller, performed the above scribed service and the documentation accurately describes the services I performed. I attest to the accuracy of the note.     Attending:   Physician Attestation Statement for Scribe #1: I, Nancy Shah DO, [facility], personally performed the services described in this documentation, as scribed by Gaby Miller, in my presence, and it is both accurate and complete.                   Nancy Shah DO  04/21/25 1410

## 2025-04-22 ENCOUNTER — PATIENT MESSAGE (OUTPATIENT)
Dept: PAIN MEDICINE | Facility: CLINIC | Age: 60
End: 2025-04-22
Payer: COMMERCIAL

## 2025-04-23 ENCOUNTER — OFFICE VISIT (OUTPATIENT)
Dept: INTERNAL MEDICINE | Facility: CLINIC | Age: 60
End: 2025-04-23
Payer: COMMERCIAL

## 2025-04-23 VITALS
DIASTOLIC BLOOD PRESSURE: 88 MMHG | BODY MASS INDEX: 42.04 KG/M2 | HEIGHT: 64 IN | SYSTOLIC BLOOD PRESSURE: 128 MMHG | WEIGHT: 246.25 LBS | TEMPERATURE: 98 F

## 2025-04-23 DIAGNOSIS — D15.1 ATRIAL MYXOMA: ICD-10-CM

## 2025-04-23 DIAGNOSIS — R51.9 NONINTRACTABLE HEADACHE, UNSPECIFIED CHRONICITY PATTERN, UNSPECIFIED HEADACHE TYPE: Primary | ICD-10-CM

## 2025-04-23 DIAGNOSIS — M05.79 RHEUMATOID ARTHRITIS INVOLVING MULTIPLE SITES WITH POSITIVE RHEUMATOID FACTOR: ICD-10-CM

## 2025-04-23 DIAGNOSIS — M54.16 LUMBAR RADICULOPATHY: ICD-10-CM

## 2025-04-23 DIAGNOSIS — I10 ESSENTIAL HYPERTENSION: Chronic | ICD-10-CM

## 2025-04-23 DIAGNOSIS — I05.8 MITRAL VALVE MASS: ICD-10-CM

## 2025-04-23 PROCEDURE — 3008F BODY MASS INDEX DOCD: CPT | Mod: CPTII,S$GLB,, | Performed by: FAMILY MEDICINE

## 2025-04-23 PROCEDURE — 3079F DIAST BP 80-89 MM HG: CPT | Mod: CPTII,S$GLB,, | Performed by: FAMILY MEDICINE

## 2025-04-23 PROCEDURE — 3074F SYST BP LT 130 MM HG: CPT | Mod: CPTII,S$GLB,, | Performed by: FAMILY MEDICINE

## 2025-04-23 PROCEDURE — 4010F ACE/ARB THERAPY RXD/TAKEN: CPT | Mod: CPTII,S$GLB,, | Performed by: FAMILY MEDICINE

## 2025-04-23 PROCEDURE — 99999 PR PBB SHADOW E&M-EST. PATIENT-LVL III: CPT | Mod: PBBFAC,,, | Performed by: FAMILY MEDICINE

## 2025-04-23 PROCEDURE — G2211 COMPLEX E/M VISIT ADD ON: HCPCS | Mod: S$GLB,,, | Performed by: FAMILY MEDICINE

## 2025-04-23 PROCEDURE — 1159F MED LIST DOCD IN RCRD: CPT | Mod: CPTII,S$GLB,, | Performed by: FAMILY MEDICINE

## 2025-04-23 PROCEDURE — 99214 OFFICE O/P EST MOD 30 MIN: CPT | Mod: S$GLB,,, | Performed by: FAMILY MEDICINE

## 2025-04-23 RX ORDER — LOSARTAN POTASSIUM 25 MG/1
25 TABLET ORAL
COMMUNITY
Start: 2025-04-19

## 2025-04-23 RX ORDER — HYDROCHLOROTHIAZIDE 12.5 MG/1
12.5 TABLET ORAL DAILY
Qty: 30 TABLET | Refills: 11 | Status: SHIPPED | OUTPATIENT
Start: 2025-04-23 | End: 2026-04-23

## 2025-04-23 RX ORDER — BUTALBITAL, ACETAMINOPHEN AND CAFFEINE 50; 325; 40 MG/1; MG/1; MG/1
1 TABLET ORAL EVERY 6 HOURS PRN
Qty: 16 TABLET | Refills: 0 | Status: SHIPPED | OUTPATIENT
Start: 2025-04-23 | End: 2025-05-23

## 2025-04-23 RX ORDER — BUTALBITAL, ACETAMINOPHEN AND CAFFEINE 50; 325; 40 MG/1; MG/1; MG/1
1 TABLET ORAL EVERY 6 HOURS PRN
Qty: 16 TABLET | Refills: 0 | Status: SHIPPED | OUTPATIENT
Start: 2025-04-23 | End: 2025-04-23 | Stop reason: SDUPTHER

## 2025-04-23 RX ORDER — BUTALBITAL, ACETAMINOPHEN AND CAFFEINE 50; 325; 40 MG/1; MG/1; MG/1
1 TABLET ORAL EVERY 6 HOURS PRN
Qty: 16 TABLET | Refills: 0 | Status: SHIPPED | OUTPATIENT
Start: 2025-04-23 | End: 2025-04-23

## 2025-04-23 NOTE — PROGRESS NOTES
Jake Hoskins  04/23/2025  [id number]    Angelica Lagunas MD  Patient Care Team:  Angelica Lagunas MD as PCP - General (Family Medicine)  Phil Baires LPN (Inactive) as Care Coordinator (Internal Medicine)          Visit Type:a scheduled routine follow-up visit    Chief Complaint:  Chief Complaint   Patient presents with    Headache     Dull ache; Travels to back of head       History of Present Illness:    History of Present Illness    CHIEF COMPLAINT:  Ms. Hoskins presents today with abdominal pain and headaches.    HEADACHES:  She reports headaches ongoing for over 3 weeks, initially localized to temporal region and now includes back of head. Pain level was 7/10 this morning, currently at 4/10 after taking Butalbital with Tylenol and caffeine, which provides mild relief. She denies any vision changes associated with headaches.    ABDOMINAL PAIN:  She reports abdominal pain characterized as a pressure sensation, similar to wearing a tight shaper or feeling extremely full. The pain is associated with bloating and fullness. She has bowel movements twice daily without obstruction.    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    BLOOD PRESSURE:  Morning blood pressure readings were 126/92, improving to 131/86 approximately 30 minutes after taking Losartan. She has previous history of hydrochlorothiazide use, which was discontinued when blood pressure became too low following discovery of cardiac tumor.    RECENT TREATMENTS:  She has received five steroid injections since December 30th, including injections in her knee and for a keloid at the cardiac surgery incision site.        60 year old  ER follow up.  Went to Er 4/20  With stomach pain.Abdominal pain  More pressure, fullness    Pain with breathing.    She had headache for 3 weeks.  She is not sure if from BP, as she said it was  creeping up.  She had had 3 injections with steroids, not sure if that has triggered her headache.   Headache 4/10  She is temporal and now the back of the neck.    PHMx of anemia and HTN who presented to the Emergency Department for evaluation of abdominal bloating which onset earlier today. Pt reports eating only a salad and a potato today. Pt felt like she was not taking in enough air due to bloating. Pt reports a hysterectomy in 2021. Pt reports after cardiac surgery, pt's blood pressure was low so she was taken off losartan. Pt saw her doctor on 4/16/2025 and was told her blood pressure is elevated. Pt started taking her original dosage of losartan but is still experiencing uncontrolled high blood pressure. Symptoms are constant and moderate in severity. No mitigating or exacerbating factors reported. Associated sxs include dull headache (onset 3 weeks ago). Patient denies any abdominal pain, fever, N/V, dysuria, frequency, chest pain or tightness, sore throat, congestion, numbness, weakness, vision disturbances, BLE edema, and all other sxs at this time. Prior Tx includes 650 mg tylenol for headache  EXAM: XR ABDOMEN FLAT AND ERECT     CLINICAL HISTORY: Abdominal pain and distention     FINDINGS:     No comparison studies are available.  EKG leads overlie the abdomen.  The upper abdomen is excluded.  One image was provided for review.     The abdominal gas pattern is normal. No sign of bowel dilation, air/fluid levels or free air.     No abnormal calcifications. There are degenerative changes of the pubic symphysis.     Impression:     1.  Negative for acute process involving the abdomen or pelvis, considering the technical limitations described above.      ER gave her Fioricet for HA and colace   Labs reviewed  No anemia  Liver and kidney are fine.    Told to keep log of BP and see me in office.          The following were reviewed at this visit: active problem list, medication list, allergies, family  history, social history, and health maintenance.  History:  Past Medical History:   Diagnosis Date    Allergy     Anemia     Hypertension     Plantar fasciitis of left foot      Past Surgical History:   Procedure Laterality Date    CATHETERIZATION OF BOTH LEFT AND RIGHT HEART N/A 5/10/2024    Procedure: CATHETERIZATION, HEART, BOTH LEFT AND RIGHT;  Surgeon: Alison Buchanan MD;  Location: [facility] CATH LAB;  Service: Cardiology;  Laterality: N/A;    COLONOSCOPY N/A 04/09/2021    Procedure: COLONOSCOPY;  Surgeon: Hua Elmore MD;  Location: [facility] ENDO;  Service: Endoscopy;  Laterality: N/A;    ECHOCARDIOGRAM,TRANSESOPHAGEAL N/A 5/13/2024    Procedure: ECHOCARDIOGRAM,TRANSESOPHAGEAL;  Surgeon: Chester Sanchez MD;  Location: [facility] OR;  Service: Cardiovascular;  Laterality: N/A;    HYSTERECTOMY  05/2021    INJECTION OF ANESTHETIC AGENT AROUND MEDIAL BRANCH NERVES INNERVATING LUMBAR FACET JOINT Right 1/30/2025    Procedure: right L4-5 and L5-S1 MBB;  Surgeon: Dorian Sumner MD;  Location: [facility] PAIN MGT;  Service: Pain Management;  Laterality: Right;    INJECTION OF ANESTHETIC AGENT AROUND MEDIAL BRANCH NERVES INNERVATING LUMBAR FACET JOINT Right 3/13/2025    Procedure: Right L4-5 and L5-S1 MBB (diagnostic, #2);  Surgeon: Dorain Sumner MD;  Location: [facility] PAIN MGT;  Service: Pain Management;  Laterality: Right;    INJECTION OF ANESTHETIC AGENT AROUND MULTIPLE INTERCOSTAL NERVES N/A 5/13/2024    Procedure: BLOCK, NERVE, INTERCOSTAL, 2 OR MORE;  Surgeon: Chester Sanchez MD;  Location: [facility] OR;  Service: Cardiovascular;  Laterality: N/A;    INJECTION OF ANESTHETIC AGENT INTO SACROILIAC JOINT Right 12/17/2024    Procedure: Right SIJ Injection;  Surgeon: Dorian Sumner MD;  Location: [facility] PAIN MGT;  Service: Pain Management;  Laterality: Right;    OOPHORECTOMY  05/2021    RESECTION OF ATRIAL MYXOMA N/A 5/13/2024    Procedure: RESECTION, MYXOMA, CARDIAC ATRIUM;  Surgeon: Chester Sanchez MD;  Location:  [facility] OR;  Service: Cardiovascular;  Laterality: N/A;    ROBOT-ASSISTED LAPAROSCOPIC ABDOMINAL HYSTERECTOMY USING DA ABBEY XI N/A 05/18/2021    Procedure: XI ROBOTIC HYSTERECTOMY;  Surgeon: Christa Davila MD;  Location: [facility] OR;  Service: OB/GYN;  Laterality: N/A;    ROBOT-ASSISTED LAPAROSCOPIC SALPINGO-OOPHORECTOMY USING DA ABBEY XI Bilateral 05/18/2021    Procedure: XI ROBOTIC SALPINGO-OOPHORECTOMY;  Surgeon: Christa Davila MD;  Location: [facility] OR;  Service: OB/GYN;  Laterality: Bilateral;     Problem List[1]    Medications:  Medications Ordered Prior to Encounter[2]    Medications have been reviewed and reconciled with patient at this visit.    Exam:  Wt Readings from Last 3 Encounters:   04/23/25 111.7 kg (246 lb 4.1 oz)   04/20/25 112 kg (247 lb)   04/16/25 112.1 kg (247 lb 2.2 oz)     Temp Readings from Last 3 Encounters:   04/23/25 97.5 °F (36.4 °C) (Tympanic)   04/20/25 98.2 °F (36.8 °C) (Oral)   03/13/25 97.6 °F (36.4 °C) (Temporal)     BP Readings from Last 3 Encounters:   04/23/25 128/88   04/20/25 128/77   04/16/25 130/86     Pulse Readings from Last 3 Encounters:   04/20/25 67   04/16/25 72   03/25/25 75     Body mass index is 42.27 kg/m².      Review of Systems   Constitutional: Negative.  Negative for chills and fever.   HENT: Negative.  Negative for congestion, sinus pain and sore throat.    Eyes:  Negative for blurred vision and double vision.   Respiratory:  Negative for cough, sputum production, shortness of breath and wheezing.    Cardiovascular:  Negative for chest pain, palpitations and leg swelling.   Gastrointestinal:  Positive for abdominal pain. Negative for constipation, diarrhea, heartburn, nausea and vomiting.   Genitourinary: Negative.    Musculoskeletal: Negative.    Skin: Negative.  Negative for rash.   Neurological:  Positive for headaches.   Endo/Heme/Allergies: Negative.  Negative for polydipsia. Does not bruise/bleed easily.   Psychiatric/Behavioral:  Negative for depression and  substance abuse.      Physical Exam  Nursing note reviewed.   Cardiovascular:      Rate and Rhythm: Normal rate and regular rhythm.   Pulmonary:      Effort: Pulmonary effort is normal. No respiratory distress.   Neurological:      Mental Status: She is alert and oriented to person, place, and time.   Psychiatric:         Mood and Affect: Mood normal.         Behavior: Behavior normal.         Thought Content: Thought content normal.         Judgment: Judgment normal.       Physical Exam             Laboratory Reviewed ({Yes)    Lab Results   Component Value Date    WBC 5.78 04/20/2025    HGB 14.1 04/20/2025    HCT 45.1 04/20/2025     04/20/2025    CHOL 189 10/21/2024    TRIG 97 10/21/2024    HDL 62 10/21/2024    ALT 17 04/20/2025    AST 17 04/20/2025     04/20/2025    K 4.4 04/20/2025     04/20/2025    CREATININE 0.7 04/20/2025    BUN 11 04/20/2025    CO2 25 04/20/2025    TSH 0.579 09/18/2024    INR 1.1 05/14/2024    GLUF 94 10/21/2024    HGBA1C 5.4 09/18/2024       Assessment & Plan    Headaches  R10.84 Generalized abdominal pain  I10 Essential (primary) hypertension  Z95.818 Presence of other cardiac implants and grafts      Reviewed chest XR noting tortuous aortic knob.  Evaluated abdominal XR showing normal gas pattern.  Determined arthritis as cause of pain when sitting.    HEADACHE:  - Evaluated the patient's headache, which has persisted for over 3 weeks, initially at a pain level of 7, now at 4 after medication.  - Noted the headache location started in the temporal region and has spread to the back of the head.  - Assessed potential causes including blood pressure, steroids, arthritis, and poor sleep.  - Continued the ER-prescribed Butalbital (Fioricet) for 1-2 weeks and provided a refill for ongoing management.  - Educated the patient on potential causes of headaches.    GENERALIZED ABDOMINAL PAIN:  - Evaluated the patient's abdominal pain, described as pressure and fullness, similar to  wearing a tight shaper or having eaten a large meal.  - Reviewed x-ray showing normal abdominal gas pattern, no signs of bowel dilatation or air fluid levels, with some stool burden present but not considered constipation.  - Noted normal pancreatic enzyme (lipase) levels, ruling out acute pancreatitis.  - Assessed the condition as likely functional gastroenteritis, with no signs of obstruction or major infection.  - Recommend bowel rest, bland diet, adequate water intake, and regular bowel movements.  - Prescribed Colace (docusate sodium) as a stool softener.    ESSENTIAL HYPERTENSION:  - Monitored recent morning blood pressure readings of 126/92 and 131/86 after medication.  - Evaluated blood pressure as elevated but not severely high, noting some effect from current medication.  - Assessed the need for continued blood pressure monitoring and medication adjustment.  - Continued current treatment with Losartan.  - Prescribed hydrochlorothiazide 12.5mg for better blood pressure control.    PRESENCE OF CARDIAC IMPLANTS AND HISTORY OF CIRCULATORY SYSTEM DISEASE:  - Reviewed chest XR showing presence of sternal wires, indicating previous cardiac surgery.  - Noted tortuous aortic knob on x-ray, indicating calcifications or signs of previous cardiovascular disease.  - Assessed findings as chronic changes, not requiring immediate intervention.  - Recommend management including controlling blood pressure and cholesterol.  - Noted patient's history of syncope due to a tumor near heart valve,     FOLLOW-UP AND GENERAL INSTRUCTIONS:  - Instructed the patient to follow up in 3 weeks if not better.  - Advised the patient to contact the office if headaches do not improve or new symptoms develop.  - Continue current medications.       There are no diagnoses linked to this encounter.            Care Plan/Goals: Reviewed     Follow up: No follow-ups on file.    After visit summary was printed and given to patient upon discharge  today.  Patient goals and care plan are included in After Visit Summary.    This note was generated with the assistance of ambient listening technology. Verbal consent was obtained by the patient and accompanying visitor(s) for the recording of patient appointment to facilitate this note. I attest to having reviewed and edited the generated note for accuracy, though some syntax or spelling errors may persist. Please contact the author of this note for any clarification.            [1]   Patient Active Problem List  Diagnosis    Anemia    Plantar fasciitis of left foot    Essential hypertension    Cervical polyp    S/P total hysterectomy and BSO (bilateral salpingo-oophorectomy)    Rheumatoid arthritis involving multiple sites with positive rheumatoid factor    Breast mass, left    Mitral valve mass    Atrial myxoma    Abnormal echocardiogram findings without diagnosis    Post-operative state    Lumbar radiculopathy   [2]   Current Outpatient Medications on File Prior to Visit   Medication Sig Dispense Refill    celecoxib (CELEBREX) 200 MG capsule Take 1 capsule (200 mg total) by mouth once daily. 90 capsule 1    cyclobenzaprine (FLEXERIL) 10 MG tablet Take 1 tablet (10 mg total) by mouth nightly as needed for Muscle spasms. 90 tablet 0    ergocalciferol (ERGOCALCIFEROL) 50,000 unit Cap Take 1 capsule (50,000 Units total) by mouth every 7 days. 12 capsule 3    ferrous sulfate, dried (SLOW FE) 160 mg (50 mg iron) TbSR Take 1 tablet (160 mg total) by mouth once daily. 90 tablet 3    fluticasone propionate (FLONASE) 50 mcg/actuation nasal spray USE 2 SPRAYS BY EACH NOSTRIL ROUTE ONCE DAILY 48 g 3    folic acid (FOLVITE) 1 MG tablet Take 1 tablet (1 mg total) by mouth once daily. 90 tablet 3    gabapentin (NEURONTIN) 100 MG capsule Take 1 capsule (100 mg total) by mouth every evening. 30 capsule 11    losartan (COZAAR) 25 MG tablet Take 25 mg by mouth.      methotrexate 2.5 MG Tab Take 6 tablets (15 mg total) by mouth  every 7 days. 72 tablet 2    montelukast (SINGULAIR) 10 mg tablet Take 1 tablet by mouth once daily 90 tablet 3    MULTIVITS,CA,MINERALS/IRON/FA (ONE-A-DAY WOMENS FORMULA ORAL) Take by mouth once daily.       potassium citrate 99 mg Cap Take 1 capsule by mouth once daily.      [DISCONTINUED] butalbital-acetaminophen-caffeine -40 mg (FIORICET, ESGIC) -40 mg per tablet Take 1 tablet by mouth every 6 (six) hours as needed for Pain or Headaches. 16 tablet 0    diclofenac sodium (VOLTAREN) 1 % Gel Apply 2 g topically 4 (four) times daily. (Patient not taking: Reported on 4/23/2025) 50 g 1    docusate sodium (COLACE) 100 MG capsule Take 1 capsule (100 mg total) by mouth 2 (two) times daily as needed. (Patient not taking: Reported on 4/23/2025) 60 capsule 0     No current facility-administered medications on file prior to visit.

## 2025-04-30 ENCOUNTER — TELEPHONE (OUTPATIENT)
Dept: RHEUMATOLOGY | Facility: CLINIC | Age: 60
End: 2025-04-30
Payer: COMMERCIAL

## 2025-05-05 ENCOUNTER — PATIENT MESSAGE (OUTPATIENT)
Dept: RHEUMATOLOGY | Facility: CLINIC | Age: 60
End: 2025-05-05
Payer: COMMERCIAL

## 2025-05-05 ENCOUNTER — TELEPHONE (OUTPATIENT)
Dept: RHEUMATOLOGY | Facility: CLINIC | Age: 60
End: 2025-05-05
Payer: COMMERCIAL

## 2025-05-05 ENCOUNTER — PATIENT MESSAGE (OUTPATIENT)
Dept: PAIN MEDICINE | Facility: CLINIC | Age: 60
End: 2025-05-05
Payer: COMMERCIAL

## 2025-05-05 NOTE — TELEPHONE ENCOUNTER
I called the patient letting her know that we send our patients to downtime physical therapy. Patient does understand.    Martha CASTILLO ([facility])   Patient without complaints today.  Reports no further vaginal bleeding.  Is curious to know when she can discontinue pelvic rest.  Explained that she can resume normal activities but to be gentle.  Stop if any pain or further bleeding occurs.  All questions answered and precautions given.  Return to clinic in 4 weeks with AFP and visit

## 2025-05-05 NOTE — TELEPHONE ENCOUNTER
Spoke to pt, she stated she will be calling pm to see if she has any restrictions for work and she will get back with the staff to inform us.

## 2025-05-08 ENCOUNTER — PATIENT MESSAGE (OUTPATIENT)
Dept: INTERNAL MEDICINE | Facility: CLINIC | Age: 60
End: 2025-05-08
Payer: COMMERCIAL

## 2025-05-15 ENCOUNTER — PATIENT MESSAGE (OUTPATIENT)
Dept: INTERNAL MEDICINE | Facility: CLINIC | Age: 60
End: 2025-05-15
Payer: COMMERCIAL

## 2025-05-19 RX ORDER — LOSARTAN POTASSIUM 25 MG/1
25 TABLET ORAL
Qty: 30 TABLET | Refills: 11 | Status: SHIPPED | OUTPATIENT
Start: 2025-05-19

## 2025-06-12 ENCOUNTER — TELEPHONE (OUTPATIENT)
Dept: INTERNAL MEDICINE | Facility: CLINIC | Age: 60
End: 2025-06-12
Payer: COMMERCIAL

## 2025-06-12 NOTE — TELEPHONE ENCOUNTER
Patient's return to work paperwork faxed to [employer]    6/12/2025 8:47:27 AM Transmission Record          Sent to [phone] with remote ID "          Result: (0/339;0/0) Success          Page record: 1 - 4          Elapsed time: 01:44 on channel 11

## 2025-07-01 NOTE — TELEPHONE ENCOUNTER
No care due was identified.  [facility] Embedded Care Due Messages. Reference number: [id number].   7/01/2025 4:53:23 PM CDT

## 2025-07-02 RX ORDER — GABAPENTIN 100 MG/1
100 CAPSULE ORAL NIGHTLY
Qty: 30 CAPSULE | Refills: 11 | Status: SHIPPED | OUTPATIENT
Start: 2025-07-02 | End: 2026-07-02

## 2025-07-11 ENCOUNTER — HOSPITAL ENCOUNTER (OUTPATIENT)
Dept: RADIOLOGY | Facility: HOSPITAL | Age: 60
Discharge: HOME | End: 2025-07-11
Payer: COMMERCIAL

## 2025-07-11 ENCOUNTER — OFFICE VISIT (OUTPATIENT)
Dept: INTERNAL MEDICINE | Facility: CLINIC | Age: 60
End: 2025-07-11
Payer: COMMERCIAL

## 2025-07-11 VITALS
HEART RATE: 61 BPM | WEIGHT: 248 LBS | TEMPERATURE: 98 F | BODY MASS INDEX: 42.34 KG/M2 | OXYGEN SATURATION: 98 % | SYSTOLIC BLOOD PRESSURE: 124 MMHG | HEIGHT: 64 IN | DIASTOLIC BLOOD PRESSURE: 74 MMHG

## 2025-07-11 DIAGNOSIS — I10 ESSENTIAL HYPERTENSION: ICD-10-CM

## 2025-07-11 DIAGNOSIS — R06.02 SOB (SHORTNESS OF BREATH): ICD-10-CM

## 2025-07-11 DIAGNOSIS — K21.9 GASTROESOPHAGEAL REFLUX DISEASE, UNSPECIFIED WHETHER ESOPHAGITIS PRESENT: ICD-10-CM

## 2025-07-11 DIAGNOSIS — R10.9 ABDOMINAL PAIN, UNSPECIFIED ABDOMINAL LOCATION: ICD-10-CM

## 2025-07-11 DIAGNOSIS — R06.09 DOE (DYSPNEA ON EXERTION): ICD-10-CM

## 2025-07-11 DIAGNOSIS — E66.01 MORBID OBESITY: ICD-10-CM

## 2025-07-11 DIAGNOSIS — R06.09 DOE (DYSPNEA ON EXERTION): Primary | ICD-10-CM

## 2025-07-11 LAB
OHS QRS DURATION: 88 MS
OHS QTC CALCULATION: 463 MS

## 2025-07-11 PROCEDURE — 99999 PR PBB SHADOW E&M-EST. PATIENT-LVL V: CPT | Mod: PBBFAC,,,

## 2025-07-11 PROCEDURE — 71046 X-RAY EXAM CHEST 2 VIEWS: CPT | Mod: 26,,, | Performed by: RADIOLOGY

## 2025-07-11 PROCEDURE — 71046 X-RAY EXAM CHEST 2 VIEWS: CPT | Mod: TC

## 2025-07-11 RX ORDER — HYDROCODONE BITARTRATE AND ACETAMINOPHEN 7.5; 325 MG/1; MG/1
1 TABLET ORAL EVERY 6 HOURS PRN
COMMUNITY
Start: 2025-07-02

## 2025-07-11 RX ORDER — PANTOPRAZOLE SODIUM 40 MG/1
40 TABLET, DELAYED RELEASE ORAL DAILY
Qty: 90 TABLET | Refills: 3 | Status: SHIPPED | OUTPATIENT
Start: 2025-07-11 | End: 2026-07-11

## 2025-07-11 RX ORDER — AMOXICILLIN 500 MG/1
CAPSULE ORAL
COMMUNITY
Start: 2025-07-02

## 2025-07-11 NOTE — PROGRESS NOTES
"Jake Hoskins  07/11/2025  [id number]    Angelica Lagunas MD  Patient Care Team:  Angelica Lagunas MD as PCP - General (Family Medicine)  Phil Baires LPN (Inactive) as Care Coordinator (Internal Medicine)          Visit Type:an urgent visit for a new problem    Chief Complaint:  Chief Complaint   Patient presents with    Bloated    Fatigue        History of Present Illness:    History of Present Illness    CHIEF COMPLAINT:  Ms. Hoskins presents today with bloating and burping.    HISTORY OF PRESENT ILLNESS:  She reports new onset of bloating and excessive burping starting yesterday after eating a sandwich with mustard. She describes the bloating as localized to the epigastric region and has had multiple attempts to relieve symptoms by drinking soda and taking four Tums throughout the day. She also developed new onset shortness of breath associated with the sandwich ingestion, describing the breathing difficulty as feeling "not normal" when inhaling but denies chest pain or sensation of something stuck in throat. She reports her heart racing but denies leg swelling. She has had increased bowel movements, approximately 3 times today, with soft stool described as "ice cream" consistency compared to her normal two daily bowel movements. She notes potential gradual onset of bloating since starting amoxicillin with increased awareness after consuming the sandwich.    MEDICAL HISTORY:  She has a history of heart surgery and sees a cardiologist annually for follow-up ultrasound to monitor for potential tumor recurrence. She was seen in the emergency room during Easter in April with similar bloating symptoms. Her most recent cardiology visit was in December with next annual visit scheduled for December of this year. She acknowledges being vigilant about monitoring her health following heart surgery.    CURRENT MEDICATIONS:  She is currently taking amoxicillin following oral surgery performed last Wednesday. She " discontinued methotrexate two weeks ago due to concurrent use with amoxicillin. She denies experiencing nausea from medications.    DIET:  She consumes oatmeal daily every morning which helps maintain her regular bowel movements of twice daily.      ROS:  General: -fever, -chills, +fatigue, -weight gain, -weight loss, +loss of energy, +decreased energy levels  Eyes: -vision changes, -redness, -discharge  ENT: -ear pain, -nasal congestion, -sore throat  Cardiovascular: -chest pain, +palpitations, -lower extremity edema  Respiratory: -cough, +shortness of breath  Gastrointestinal: -abdominal pain, -nausea, -vomiting, +diarrhea, -constipation, -blood in stool, +bloating, +indigestion, +change in bowel habits  Genitourinary: -dysuria, -hematuria, -frequency  Musculoskeletal: -joint pain, -muscle pain  Skin: -rash, -lesion  Neurological: -headache, -dizziness, -numbness, -tingling  Psychiatric: -anxiety, -depression, -sleep difficulty            History:  Past Medical History:   Diagnosis Date    Allergy     Anemia     Hypertension     Plantar fasciitis of left foot      Past Surgical History:   Procedure Laterality Date    CATHETERIZATION OF BOTH LEFT AND RIGHT HEART N/A 5/10/2024    Procedure: CATHETERIZATION, HEART, BOTH LEFT AND RIGHT;  Surgeon: Alison Buchanan MD;  Location: [facility] CATH LAB;  Service: Cardiology;  Laterality: N/A;    COLONOSCOPY N/A 04/09/2021    Procedure: COLONOSCOPY;  Surgeon: Hua Elmore MD;  Location: [facility] ENDO;  Service: Endoscopy;  Laterality: N/A;    ECHOCARDIOGRAM,TRANSESOPHAGEAL N/A 5/13/2024    Procedure: ECHOCARDIOGRAM,TRANSESOPHAGEAL;  Surgeon: Chester Sanchez MD;  Location: [facility] OR;  Service: Cardiovascular;  Laterality: N/A;    HYSTERECTOMY  05/2021    INJECTION OF ANESTHETIC AGENT AROUND MEDIAL BRANCH NERVES INNERVATING LUMBAR FACET JOINT Right 1/30/2025    Procedure: right L4-5 and L5-S1 MBB;  Surgeon: Dorian Sumner MD;  Location: [facility] PAIN MGT;  Service: Pain  Management;  Laterality: Right;    INJECTION OF ANESTHETIC AGENT AROUND MEDIAL BRANCH NERVES INNERVATING LUMBAR FACET JOINT Right 3/13/2025    Procedure: Right L4-5 and L5-S1 MBB (diagnostic, #2);  Surgeon: Dorian Sumner MD;  Location: [facility] PAIN MGT;  Service: Pain Management;  Laterality: Right;    INJECTION OF ANESTHETIC AGENT AROUND MULTIPLE INTERCOSTAL NERVES N/A 5/13/2024    Procedure: BLOCK, NERVE, INTERCOSTAL, 2 OR MORE;  Surgeon: Chester Sanchez MD;  Location: [facility] OR;  Service: Cardiovascular;  Laterality: N/A;    INJECTION OF ANESTHETIC AGENT INTO SACROILIAC JOINT Right 12/17/2024    Procedure: Right SIJ Injection;  Surgeon: Dorian Sumner MD;  Location: [facility] PAIN MGT;  Service: Pain Management;  Laterality: Right;    OOPHORECTOMY  05/2021    RESECTION OF ATRIAL MYXOMA N/A 5/13/2024    Procedure: RESECTION, MYXOMA, CARDIAC ATRIUM;  Surgeon: Chester Sanchez MD;  Location: [facility] OR;  Service: Cardiovascular;  Laterality: N/A;    ROBOT-ASSISTED LAPAROSCOPIC ABDOMINAL HYSTERECTOMY USING DA ABBEY XI N/A 05/18/2021    Procedure: XI ROBOTIC HYSTERECTOMY;  Surgeon: Christa Davila MD;  Location: [facility] OR;  Service: OB/GYN;  Laterality: N/A;    ROBOT-ASSISTED LAPAROSCOPIC SALPINGO-OOPHORECTOMY USING DA ABBEY XI Bilateral 05/18/2021    Procedure: XI ROBOTIC SALPINGO-OOPHORECTOMY;  Surgeon: Christa Davila MD;  Location: [facility] OR;  Service: OB/GYN;  Laterality: Bilateral;     Family History   Problem Relation Name Age of Onset    Hypertension Mother      Asthma Mother      Cancer Mother          unknown    Cataracts Father      Hypertension Father      Heart disease Father      Hypertension Sister       Social History[1]  Problem List[2]  Review of patient's allergies indicates:   Allergen Reactions    Tramadol Nausea And Vomiting       The following were reviewed at this visit: active problem list, medication list, allergies, family history, social history, and health  maintenance.    Medications:  Medications Ordered Prior to Encounter[3]    Medications have been reviewed and reconciled with patient at this visit.  Barriers to medications reviewed with patient.    Adverse reactions to current medications reviewed with patient..    Over the counter medications reviewed and reconciled with patient.    Exam:  Wt Readings from Last 3 Encounters:   07/11/25 112.5 kg (248 lb 0.3 oz)   04/23/25 111.7 kg (246 lb 4.1 oz)   04/20/25 112 kg (247 lb)     Temp Readings from Last 3 Encounters:   07/11/25 97.8 °F (36.6 °C) (Tympanic)   04/23/25 97.5 °F (36.4 °C) (Tympanic)   04/20/25 98.2 °F (36.8 °C) (Oral)     BP Readings from Last 3 Encounters:   07/11/25 124/74   04/23/25 128/88   04/20/25 128/77     Pulse Readings from Last 3 Encounters:   07/11/25 61   04/20/25 67   04/16/25 72     Body mass index is 42.57 kg/m².    Physical Exam  Nursing note reviewed.   Constitutional:       Appearance: She is morbidly obese.   HENT:      Head: Normocephalic and atraumatic.   Cardiovascular:      Rate and Rhythm: Regular rhythm.      Heart sounds: Normal heart sounds.   Pulmonary:      Effort: Pulmonary effort is normal. No respiratory distress.      Breath sounds: Normal breath sounds.   Abdominal:      General: Bowel sounds are normal.      Tenderness: There is abdominal tenderness in the epigastric area.   Neurological:      Mental Status: She is alert and oriented to person, place, and time.   Psychiatric:         Mood and Affect: Mood normal.         Behavior: Behavior normal.         Thought Content: Thought content normal.         Judgment: Judgment normal.           Laboratory Reviewed ({Yes)  Lab Results   Component Value Date    WBC 5.78 04/20/2025    HGB 14.1 04/20/2025    HCT 45.1 04/20/2025     04/20/2025    CHOL 189 10/21/2024    TRIG 97 10/21/2024    HDL 62 10/21/2024    ALT 17 04/20/2025    AST 17 04/20/2025     04/20/2025    K 4.4 04/20/2025     04/20/2025     CREATININE 0.7 04/20/2025    BUN 11 04/20/2025    CO2 25 04/20/2025    TSH 0.579 09/18/2024    INR 1.1 05/14/2024    GLUF 94 10/21/2024    HGBA1C 5.4 09/18/2024       Jake was seen today for bloated and fatigue.    Diagnoses and all orders for this visit:    VAUGHN (dyspnea on exertion)  -     X-Ray Chest PA And Lateral; Future  -     BNP; Future    Essential hypertension  At visit, Blood pressure is at goal. Continue current medications      Morbid obesity  Encouraged healthy diet and exercise as tolerated to help bring BMI into normal range.      Gastroesophageal reflux disease, unspecified whether esophagitis present  -     pantoprazole (PROTONIX) 40 MG tablet; Take 1 tablet (40 mg total) by mouth once daily.    SOB (shortness of breath)  -     X-Ray Chest PA And Lateral; Future  -     BNP; Future  -     IN OFFICE EKG 12-LEAD (to Muse)    Abdominal pain, unspecified abdominal location  -     Lipase; Future  -     CBC Auto Differential; Future  -     Comprehensive Metabolic Panel; Future          Assessment & Plan    GASTROESOPHAGEAL REFLUX DISEASE:  - Ms. Hoskins reports burping and bloating after eating a sandwich with mustard, relieved by antacids.  - Denies chest pain or burning sensation.  - Explained that gastroesophageal reflux can cause bloating sensation and may be contributing to dyspnea symptoms.  - Prescribed Protonix for acid reflux, to be taken 1-2 hours before other medications.  - Will consider further imaging (CT) or endoscopy (EGD) if symptoms persist.  - Advised follow up to discuss need for additional testing if no improvement.    PALPITATIONS:  - Ms. Hoskins reports feeling like heart is racing.  - Heart sounds were normal on auscultation.  - Ordered electrocardiogram due to sensation of racing heart.  - Will consider follow-up with cardiologist based on results.    ABDOMINAL DISTENSION (GASEOUS):  - Ms. Hoskins reports bloating and burping after eating a sandwich with mustard.  - Discussed that  trapped gas can be very painful but typically does not cause persistent dyspnea.  - This may be related to GERD     SHORTNESS OF BREATH:  - Ms. Hoskins reports new onset of dyspnea after eating a sandwich, experiencing symptoms when sitting quietly but not when talking or coughing.  - Ordered chest radiograph to rule out cardiac or pulmonary issues     CHANGE IN BOWEL HABIT:  - Ms. Hoskins reports having 3 bowel movements daily with softer consistency, compared to typical pattern of twice daily.  - Recommend OTC probiotic supplement.      - Ms. Hoskins has history of heart surgery and sees a cardiologist annually for ultrasound to monitor heart valve replacement.  - This history factored into decision to order chest radiograph for dyspnea evaluation.    LONG TERM USE OF ANTIBIOTICS:  - Ms. Hoskins is currently taking amoxicillin after oral surgery, which may be contributing to GI symptoms including bloating and changes in bowel habits.    LABORATORY STUDIES:  - Ordered comprehensive laboratory studies including lipase, basic metabolic panel, complete blood count, and B-type natriuretic peptide to assess for other potential causes of patient's symptoms.        Recommend to start on daily probiotic     EKG showed SR with occasional PVC  Will schedule follow up appt with cards     Visit today included increased complexity associated with the care of the episodic problem VAUGHN , which was addressed while instituting co-management of the longitudinal care of the patient due to the serious and/or complex managed problem(s) .    I have evaluated and discussed management associated with medical care services that serve as the continuing focal point for all needed health care services and/or with medical care services that are part of ongoing care related to my patient's single, serious condition or a complex condition(s).    I am providing ongoing care and I am the primary care provider for this patient, and they are being managed,  monitored, and/or observed for their chronic conditions over time.     I have addressed their ongoing health maintenance requirements and needs for all health care services and reviewed co-management plans provided by specialty providers when available.    Health Maintenance Due   Topic Date Due    Shingles Vaccine (1 of 2) Never done    Pneumococcal Vaccines (Age 50+) (1 of 2 - PCV) Never done    RSV Vaccine (Age 60+ and Pregnant patients) (1 - Risk 60-74 years 1-dose series) Never done    COVID-19 Vaccine (5 - 2024-25 season) 12/22/2024        Care Plan/Goals: Reviewed    Goals    None         Follow up: No follow-ups on file.    After visit summary was printed and given to patient upon discharge today.  Patient goals and care plan are included in After Visit Summary.    This note was generated with the assistance of ambient listening technology. Verbal consent was obtained by the patient and accompanying visitor(s) for the recording of patient appointment to facilitate this note. I attest to having reviewed and edited the generated note for accuracy, though some syntax or spelling errors may persist. Please contact the author of this note for any clarification.            [1]   Social History  Socioeconomic History    Marital status:     Number of children: 2   Occupational History    Occupation: [employer]     Employer: [employer]   Tobacco Use    Smoking status: Never    Smokeless tobacco: Never   Substance and Sexual Activity    Alcohol use: No    Drug use: No    Sexual activity: Not Currently     Birth control/protection: Surgical     Social Drivers of Health     Financial Resource Strain: Patient Declined (5/9/2024)    Overall Financial Resource Strain (CARDIA)     Difficulty of Paying Living Expenses: Patient declined   Food Insecurity: Patient Declined (5/9/2024)    Hunger Vital Sign     Worried About Running Out of Food in the Last Year: Patient declined     Ran Out of Food in the Last Year: Patient  declined   Transportation Needs: Patient Declined (5/9/2024)    TRANSPORTATION NEEDS     Transportation : Patient declined   Physical Activity: Sufficiently Active (5/10/2023)    Exercise Vital Sign     Days of Exercise per Week: 5 days     Minutes of Exercise per Session: 150+ min   Stress: Patient Declined (5/9/2024)    [employer] [place] [employer] - Occupational Stress Questionnaire     Feeling of Stress : Patient declined   Housing Stability: Patient Declined (5/9/2024)    Housing Stability Vital Sign     Unable to Pay for Housing in the Last Year: Patient declined     Homeless in the Last Year: Patient declined   [2]   Patient Active Problem List  Diagnosis    Anemia    Plantar fasciitis of left foot    Essential hypertension    Cervical polyp    S/P total hysterectomy and BSO (bilateral salpingo-oophorectomy)    Rheumatoid arthritis involving multiple sites with positive rheumatoid factor    Breast mass, left    Mitral valve mass    Atrial myxoma    Abnormal echocardiogram findings without diagnosis    Post-operative state    Lumbar radiculopathy   [3]   Current Outpatient Medications on File Prior to Visit   Medication Sig Dispense Refill    amoxicillin (AMOXIL) 500 MG capsule Take by mouth.      celecoxib (CELEBREX) 200 MG capsule Take 1 capsule (200 mg total) by mouth once daily. 90 capsule 1    cyclobenzaprine (FLEXERIL) 10 MG tablet Take 1 tablet (10 mg total) by mouth nightly as needed for Muscle spasms. 90 tablet 0    ergocalciferol (ERGOCALCIFEROL) 50,000 unit Cap Take 1 capsule (50,000 Units total) by mouth every 7 days. 12 capsule 3    ferrous sulfate, dried (SLOW FE) 160 mg (50 mg iron) TbSR Take 1 tablet (160 mg total) by mouth once daily. 90 tablet 3    fluticasone propionate (FLONASE) 50 mcg/actuation nasal spray USE 2 SPRAYS BY EACH NOSTRIL ROUTE ONCE DAILY 48 g 3    folic acid (FOLVITE) 1 MG tablet Take 1 tablet (1 mg total) by mouth once daily. 90 tablet 3    gabapentin (NEURONTIN)  100 MG capsule Take 1 capsule (100 mg total) by mouth every evening. 30 capsule 11    hydroCHLOROthiazide 12.5 MG Tab Take 1 tablet (12.5 mg total) by mouth once daily. 30 tablet 11    HYDROcodone-acetaminophen (NORCO) 7.5-325 mg per tablet Take 1 tablet by mouth every 6 (six) hours as needed.      losartan (COZAAR) 25 MG tablet Take 1 tablet by mouth once daily 30 tablet 11    methotrexate 2.5 MG Tab Take 6 tablets (15 mg total) by mouth every 7 days. 72 tablet 2    montelukast (SINGULAIR) 10 mg tablet Take 1 tablet by mouth once daily 90 tablet 3    MULTIVITS,CA,MINERALS/IRON/FA (ONE-A-DAY WOMENS FORMULA ORAL) Take by mouth once daily.       potassium citrate 99 mg Cap Take 1 capsule by mouth once daily.      diclofenac sodium (VOLTAREN) 1 % Gel Apply 2 g topically 4 (four) times daily. (Patient not taking: Reported on 7/11/2025) 50 g 1    docusate sodium (COLACE) 100 MG capsule Take 1 capsule (100 mg total) by mouth 2 (two) times daily as needed. (Patient not taking: Reported on 7/11/2025) 60 capsule 0     No current facility-administered medications on file prior to visit.

## 2025-08-01 DIAGNOSIS — J30.1 ACUTE SEASONAL ALLERGIC RHINITIS DUE TO POLLEN: ICD-10-CM

## 2025-08-01 RX ORDER — MONTELUKAST SODIUM 10 MG/1
10 TABLET ORAL
Qty: 90 TABLET | Refills: 2 | Status: SHIPPED | OUTPATIENT
Start: 2025-08-01

## 2025-08-01 NOTE — TELEPHONE ENCOUNTER
No care due was identified.  [facility] Embedded Care Due Messages. Reference number: [id number].   8/01/2025 6:44:30 AM CDT

## 2025-08-01 NOTE — TELEPHONE ENCOUNTER
Refill Decision Note   Elneahugo Gato  is requesting a refill authorization.  Brief Assessment and Rationale for Refill:  Approve     Medication Therapy Plan:         Comments:     Note composed:11:49 AM 08/01/2025

## 2025-08-13 ENCOUNTER — OFFICE VISIT (OUTPATIENT)
Dept: CARDIOLOGY | Facility: CLINIC | Age: 60
End: 2025-08-13
Payer: COMMERCIAL

## 2025-08-13 VITALS
SYSTOLIC BLOOD PRESSURE: 130 MMHG | DIASTOLIC BLOOD PRESSURE: 74 MMHG | OXYGEN SATURATION: 96 % | BODY MASS INDEX: 42.84 KG/M2 | HEART RATE: 66 BPM | WEIGHT: 249.56 LBS

## 2025-08-13 DIAGNOSIS — I70.0 AORTIC ATHEROSCLEROSIS: ICD-10-CM

## 2025-08-13 DIAGNOSIS — Z98.890 HISTORY OF CORONARY ANGIOGRAM: Primary | ICD-10-CM

## 2025-08-13 DIAGNOSIS — E66.01 MORBID OBESITY: ICD-10-CM

## 2025-08-13 DIAGNOSIS — D15.1 ATRIAL MYXOMA: ICD-10-CM

## 2025-08-13 DIAGNOSIS — Z98.890 POST-OPERATIVE STATE: ICD-10-CM

## 2025-08-13 PROCEDURE — 99999 PR PBB SHADOW E&M-EST. PATIENT-LVL IV: CPT | Mod: PBBFAC,,, | Performed by: INTERNAL MEDICINE

## 2025-08-13 RX ORDER — PRAVASTATIN SODIUM 20 MG/1
20 TABLET ORAL NIGHTLY
Qty: 30 TABLET | Refills: 11 | Status: SHIPPED | OUTPATIENT
Start: 2025-08-13 | End: 2026-08-13

## (undated) DEVICE — DRAPE STERI LONG

## (undated) DEVICE — SEE MEDLINE ITEM 157117

## (undated) DEVICE — TOWEL OR DISP STRL BLUE 4/PK

## (undated) DEVICE — CATH JR4 5FR

## (undated) DEVICE — SYR 3CC LUER LOC

## (undated) DEVICE — CATH URETHRAL RED RUBBER 18FR

## (undated) DEVICE — KIT PREVENA PLUS

## (undated) DEVICE — PACK HEART CATH BR

## (undated) DEVICE — SOL ELECTROLUBE ANTI-STIC

## (undated) DEVICE — NDL PNEUMO INSUFFLATI 120MM

## (undated) DEVICE — RETRACTOR OCTOBASE INSERT HOLD

## (undated) DEVICE — SUT ETHBND EXCEL 2-0 RB-1 30IN

## (undated) DEVICE — SUT ABS CLIP LAPRA-TY CTD

## (undated) DEVICE — OMNIPAQUE 300MG 150ML VIAL

## (undated) DEVICE — DRAPE ARM DAVINCI XI

## (undated) DEVICE — GLOVE SURGICAL LATEX SZ 6

## (undated) DEVICE — SUT PERMA HAND SILK BLK 0-0

## (undated) DEVICE — SPONGE COTTON TRAY 4X4IN

## (undated) DEVICE — CATH INFANT VENT 10 FRENCHMALL

## (undated) DEVICE — SUT MCRYL PLUS 4-0 PS2 27IN

## (undated) DEVICE — DRESSING MEPORE ADH 3.5X12

## (undated) DEVICE — KIT ANTIFOG

## (undated) DEVICE — BOWL CELL SAVER 5 225ML

## (undated) DEVICE — CONNECTOR 3/8X3/8 STERILE

## (undated) DEVICE — SET SUCTION ANTICOAGULANT

## (undated) DEVICE — SUT STEEL 7 MONO B&S18 CCS

## (undated) DEVICE — SUT SILK 1 BLK BRAID SA87G

## (undated) DEVICE — ANGIOTOUCH KIT

## (undated) DEVICE — CATH JL3.5 5FR

## (undated) DEVICE — DRAPE COLUMN DAVINCI XI

## (undated) DEVICE — DRAIN CHEST DRY SUCTION

## (undated) DEVICE — POSITIONER HEAD DONUT 9IN FOAM

## (undated) DEVICE — SEE MEDLINE ITEM 157181

## (undated) DEVICE — Device

## (undated) DEVICE — SEE MEDLINE ITEM 146292

## (undated) DEVICE — SET PNEUMOCLEAR HEAT HUM SE HF

## (undated) DEVICE — CONTAINER SPECIMEN OR STER 4OZ

## (undated) DEVICE — ELECTRODE REM PLYHSV RETURN 9

## (undated) DEVICE — SUT SILK 2-0 SH 18IN BLACK

## (undated) DEVICE — YANKAUER FLEX NO VENT REG CAP

## (undated) DEVICE — BLANKET HYPOTHERMIA 25X64IN

## (undated) DEVICE — SUPPORT ULNA NERVE PROTECTOR

## (undated) DEVICE — TRAY CATH FOL SIL TEMP 10 16FR

## (undated) DEVICE — INSERT STEALTH SURGICAL CLAMP

## (undated) DEVICE — SUT PROLENE 6-0 C-1 30IN BL

## (undated) DEVICE — OCCLUDER COLPO-PNEUMO STERILE

## (undated) DEVICE — NDL SAFETY 22G X 1.5 ECLIPSE

## (undated) DEVICE — SEE MEDLINE ITEM 157027

## (undated) DEVICE — DRAIN CHAN RND HUBLS 8MM 24FR

## (undated) DEVICE — CANNULA EOPA BLUNT VENT

## (undated) DEVICE — COVER TIP CURVED SCISSORS XI

## (undated) DEVICE — KIT SITE-RITE NDL GUIDE 21G

## (undated) DEVICE — GLOVE SURGICAL LATEX SZ 6.5

## (undated) DEVICE — CANNULA AG CARDPLG 14G FLANGE

## (undated) DEVICE — CONNECTOR STRAIGHT 1/4X1/4IN

## (undated) DEVICE — ORGNZR TUBING CLR W/CLIPS

## (undated) DEVICE — DRAPE ANGIO BRACH 38X44IN

## (undated) DEVICE — TAPE UMBILICAL 1/8X36IN WHITE

## (undated) DEVICE — DRAPE LAVH LAPAROSCOPY W/FLUID

## (undated) DEVICE — SOL IRRI STRL WATER 1000ML

## (undated) DEVICE — SOL NORMAL USPCA 0.9%

## (undated) DEVICE — CELL SAVER 4/CASE

## (undated) DEVICE — SUT PLEDGET LG SOFT

## (undated) DEVICE — PERFUSION FX X-COATED

## (undated) DEVICE — SET CARDIOPLEGIA DEL

## (undated) DEVICE — TAPE SILK 3IN

## (undated) DEVICE — SUT MONOCRYL 4-0 PS-1 UND

## (undated) DEVICE — GOWN POLY REINF X-LONG XL

## (undated) DEVICE — SEE MEDLINE ITEM 154981

## (undated) DEVICE — SEAL UNIVERSAL 5MM-8MM XI

## (undated) DEVICE — GLOVE SURGICAL LATEX SZ 7

## (undated) DEVICE — SPONGE LAP 18X18 PREWASHED

## (undated) DEVICE — SUCTION INTRA SUMP 20FG

## (undated) DEVICE — CATH CV QD LUMN 6FRX110CM

## (undated) DEVICE — COVER LIGHT HANDLE 80/CA

## (undated) DEVICE — SEE MEDLINE ITEM 152622

## (undated) DEVICE — CANNULA VENOUS MC2X 29FR

## (undated) DEVICE — SET PERFUSION

## (undated) DEVICE — KIT SYR REUSABLE

## (undated) DEVICE — TIP RUMI GREEN DISPOSABLE6.7MM

## (undated) DEVICE — ADHESIVE DERMABOND ADVANCED

## (undated) DEVICE — GOWN POLY REINF BRTH SLV XL

## (undated) DEVICE — SYR 10CC LUER LOCK

## (undated) DEVICE — SYR 30CC LUER LOCK

## (undated) DEVICE — SUMP PERICARDIAL WS

## (undated) DEVICE — BLADE SCALP OPHTL BEVEL STR

## (undated) DEVICE — BAND TR COMP DEVICE REG 24CM

## (undated) DEVICE — CANNULA VENOUS 24FR

## (undated) DEVICE — APPLICATOR CHLORAPREP ORN 26ML

## (undated) DEVICE — SYR 50CC LL

## (undated) DEVICE — DRESSING MEPORE ISLAND 31/2X4

## (undated) DEVICE — TUBING MEDI-VAC 20FT .25IN

## (undated) DEVICE — SUT SILK 3-0 SH 18IN BLACK

## (undated) DEVICE — GOWN POLY REINF X-LONG 2XL

## (undated) DEVICE — CATH INFINITI MP JL3.5 JR4 5FR

## (undated) DEVICE — SOL PLASMALYTE PH 7.4 1000ML

## (undated) DEVICE — PUNCH AORTIC SHORT 4.4MM

## (undated) DEVICE — COUNTER BDL BLADEGUARD LI DBL

## (undated) DEVICE — SENSORS CDI

## (undated) DEVICE — TROCAR ENDOPATH XCEL 5X100MM

## (undated) DEVICE — OBTURATOR BLADELESS 8MM XI CLR

## (undated) DEVICE — GUIDEWIRE EMERALD .035IN 260CM

## (undated) DEVICE — ELECTRODE BLADE E-Z CLEAN 4IN

## (undated) DEVICE — COVER TABLE 77 X 96

## (undated) DEVICE — SUT VICRYL 1 OB 36 CTX

## (undated) DEVICE — DRAPE SLUSH WARMER WITH DISC

## (undated) DEVICE — SOL NACL IRR 1000ML BTL

## (undated) DEVICE — DECANTER FLUID TRNSF WHITE 9IN

## (undated) DEVICE — IRRIGATOR ENDOSCOPY DISP.

## (undated) DEVICE — COVER MAYO STND XL 30X57IN

## (undated) DEVICE — SUT PROLENE 4-0 RB-1 BL MO

## (undated) DEVICE — KIT MANIFOLD LOW PRESS TUBING

## (undated) DEVICE — KIT GLIDESHEATH SLEND 6FR 10CM

## (undated) DEVICE — CATH PIG145 INFINITI 5X110CM

## (undated) DEVICE — PACK OPEN HEART SC BR

## (undated) DEVICE — SOL NS 1000CC

## (undated) DEVICE — SUT VICRYL PLUS 0 CT1 36IN